# Patient Record
Sex: MALE | Race: ASIAN | NOT HISPANIC OR LATINO | URBAN - METROPOLITAN AREA
[De-identification: names, ages, dates, MRNs, and addresses within clinical notes are randomized per-mention and may not be internally consistent; named-entity substitution may affect disease eponyms.]

---

## 2021-12-05 ENCOUNTER — EMERGENCY (EMERGENCY)
Facility: HOSPITAL | Age: 46
LOS: 0 days | Discharge: HOME | End: 2021-12-05
Attending: EMERGENCY MEDICINE | Admitting: EMERGENCY MEDICINE
Payer: MEDICARE

## 2021-12-05 VITALS
HEART RATE: 119 BPM | SYSTOLIC BLOOD PRESSURE: 162 MMHG | WEIGHT: 130.07 LBS | DIASTOLIC BLOOD PRESSURE: 70 MMHG | OXYGEN SATURATION: 99 % | RESPIRATION RATE: 20 BRPM | HEIGHT: 69 IN | TEMPERATURE: 98 F

## 2021-12-05 VITALS — HEART RATE: 96 BPM
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
## 2021-12-05 DIAGNOSIS — Z88.0 ALLERGY STATUS TO PENICILLIN: ICD-10-CM

## 2021-12-05 DIAGNOSIS — R51.9 HEADACHE, UNSPECIFIED: ICD-10-CM

## 2021-12-05 DIAGNOSIS — S09.90XA UNSPECIFIED INJURY OF HEAD, INITIAL ENCOUNTER: ICD-10-CM
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
## 2021-12-05 PROCEDURE — 71045 X-RAY EXAM CHEST 1 VIEW: CPT | Mod: 26

## 2021-12-05 PROCEDURE — 70450 CT HEAD/BRAIN W/O DYE: CPT | Mod: 26,MA

## 2021-12-05 PROCEDURE — 99284 EMERGENCY DEPT VISIT MOD MDM: CPT

## 2021-12-05 PROCEDURE — 99053 MED SERV 10PM-8AM 24 HR FAC: CPT

## 2021-12-05 NOTE — ED ADULT TRIAGE NOTE - MODE OF ARRIVAL
[facility] HOSPITALIST  DISCHARGE SUMMARY     Alan Almeida Patient Status:  Observation    5/15/1970 MRN [id number]   [facility] 3NE-A Attending Jennifer Padilla, [address] Day # 2 PCP None Pcp     Date of Admission: 2017  Date of Discharge: clinically improved with above management and was discharged home in good condition.      Procedures during hospitalization:   • None    Incidental or significant findings and recommendations (brief descriptions):  • None    Lab/Test results pending at [facility].   Respiratory: Clear to auscultation bilaterally. No wheezes. No rhonchi. Cardiovascular: S1, S2. Regular rate and rhythm. No murmurs, rubs or gallops. Abdomen: Soft, nontender, nondistended. Positive bowel sounds. No rebound or guarding.   [facility]

## 2021-12-05 NOTE — ED PROVIDER NOTE - NSFOLLOWUPCLINICS_GEN_ALL_ED_FT
[facility] Concussion Program  Concussion Program  [address] [place] [address]   Phone: [phone]  Fax:   Follow Up Time: 1-3 Days

## 2021-12-05 NOTE — ED ADULT NURSE REASSESSMENT NOTE - NS ED NURSE REASSESS COMMENT FT1
Awaiting transport (cab) to [facility]. Previous JULES Joseph discharged patient

## 2021-12-05 NOTE — ED PROVIDER NOTE - OBJECTIVE STATEMENT
47 yo M pmhx DM presenting to the ED for evaluation of mechanical trip and fall, pt reports he was at motel, tripped, hit his head into table and fell backward about 1 hour ago. Pt reports mild frontal headache and mild back pain since that time. Denies any alleviating or provoking factors. Denies fever, chills, nausea, vomiting, chest pain, sob, dizziness, visual changes.

## 2021-12-05 NOTE — ED PROVIDER NOTE - NS ED ROS FT
Constitutional: (-) fever (-) malaise (-) diaphoresis (-) chills (-) wt. loss (-) bodyaches (-) night sweats  Eyes: (-) visual changes (-) eye pain (-) eye discharge (-) photophobia (-) FB sensation  Cardiac: (-) chest pain  (-) palpitations (-) syncope (-) edema  Respiratory: (-) cough (-) SOB (-) PETERSON  GI: (-) nausea (-) vomiting (-) diarrhea (-) abdominal pain  MS: (+) back pain (-) myalgia (-) muscle weakness (-)  joint pain  Neuro: (-) headache (-) dizziness (-) numbness/tingling to extremities B/L (-) weakness (-) LOC (+) head injury   Skin: (-) rash (-) laceration    Except as documented in the HPI, all other systems are negative.

## 2021-12-05 NOTE — ED PROVIDER NOTE - PHYSICAL EXAMINATION
GENERAL: Well-nourished, Well-developed. NAD.  HEAD: No visible or palpable bumps or hematomas. No ecchymosis behind ears B/L.  Eyes: PERRLA, EOMI. No asymmetry. No nystagmus. No conjunctival injection. Non-icteric sclera.  ENMT: MMM.   Neck: Supple. No cervical midline TTP. No paraveretebral TTP to traps. FROM  CVS: RRR. Normal S1,S2. No murmurs appreciated on auscultation   RESP: No use of accessory muscles. Chest rise symmetrical with good expansion. Lungs clear to auscultation B/L. No wheezing, rales, or rhonchi auscultated.  GI: Normal auscultation of bowel sounds in all 4 quadrants. Soft, Nontender, Nondistended. No guarding or rebound tenderness. No CVAT B/L.  MSK: Extremities w/o deformity or ttp. No visible signs of trauma such as ecchymosis, erythema, or swelling. FROM of upper and lower extremities B/L. No midline spinal TTP. FROM of back with flexion and extension.  Skin: Warm, Dry. No rashes or lesions. Good cap refill < 2 sec B/L.  EXT: Radial and pedal pulses present B/L. No calf tenderness or swelling B/L. No palpable cords. No pedal edema B/L.  Neuro: AA&O x 3. Sensation grossly intact. Strength 5/5 B/L. Gait within normal limits.

## 2021-12-05 NOTE — ED PROVIDER NOTE - NSFOLLOWUPINSTRUCTIONS_ED_ALL_ED_FT
**Follow up with concussion clinic and primary care doctor 1-3 days**    Closed Head Injury    A closed head injury is an injury to your head that may or may not involve a traumatic brain injury (TBI). Symptoms of TBI can be short or long lasting and include headache, dizziness, interference with memory or speech, fatigue, confusion, changes in sleep, mood changes, nausea, depression/anxiety, and dulling of senses. Make sure to obtain proper rest which includes getting plenty of sleep, avoiding excessive visual stimulation, and avoiding activities that may cause physical or mental stress. Avoid any situation where there is potential for another head injury, including sports.    SEEK IMMEDIATE MEDICAL CARE IF YOU HAVE ANY OF THE FOLLOWING SYMPTOMS: unusual drowsiness, vomiting, severe dizziness, seizures, lightheadedness, muscular weakness, different pupil sizes, visual changes, or clear or bloody discharge from your ears or nose.

## 2021-12-05 NOTE — ED PROVIDER NOTE - PATIENT PORTAL LINK FT
You can access the FollowMyHealth Patient Portal offered by [facility] by registering at the following website: [email or web address]. By joining [email or web address]’s FollowMyHealth portal, you will also be able to view your health information using other applications (apps) compatible with our system.

## 2021-12-05 NOTE — ED PROVIDER NOTE - ATTENDING CONTRIBUTION TO CARE
I personally evaluated the patient. I reviewed the Resident’s or Physician Assistant’s note (as assigned above), and agree with the findings and plan except as documented in my note.  Chart reviewed.  H/O schizophrenia, DM, fell and hit head and hurt upper back.  No LOC.  Exam shows alert in no distress, HEENT NCAT, lungs clear, RR S1S2, abdomen soft Nt +BS, FROM, neuro A&OX3 GCS 15 nod eficits.

## 2022-03-31 ENCOUNTER — INPATIENT (INPATIENT)
Facility: HOSPITAL | Age: 47
LOS: 193 days | Discharge: HOME HEALTH | End: 2022-10-11
Attending: HOSPITALIST | Admitting: HOSPITALIST
Payer: MEDICARE

## 2022-03-31 VITALS
TEMPERATURE: 98 F | DIASTOLIC BLOOD PRESSURE: 87 MMHG | OXYGEN SATURATION: 99 % | RESPIRATION RATE: 16 BRPM | WEIGHT: 134.92 LBS | SYSTOLIC BLOOD PRESSURE: 133 MMHG | HEIGHT: 69 IN | HEART RATE: 111 BPM

## 2022-03-31 LAB
ALBUMIN SERPL ELPH-MCNC: 4.3 G/DL — SIGNIFICANT CHANGE UP (ref 3.5–5.2)
ALP SERPL-CCNC: 145 U/L — HIGH (ref 30–115)
ALT FLD-CCNC: 14 U/L — SIGNIFICANT CHANGE UP (ref 0–41)
ANION GAP SERPL CALC-SCNC: 11 MMOL/L — SIGNIFICANT CHANGE UP (ref 7–14)
AST SERPL-CCNC: 15 U/L — SIGNIFICANT CHANGE UP (ref 0–41)
BASOPHILS # BLD AUTO: 0.03 K/UL — SIGNIFICANT CHANGE UP (ref 0–0.2)
BASOPHILS NFR BLD AUTO: 0.5 % — SIGNIFICANT CHANGE UP (ref 0–1)
BILIRUB SERPL-MCNC: 0.5 MG/DL — SIGNIFICANT CHANGE UP (ref 0.2–1.2)
BUN SERPL-MCNC: 15 MG/DL — SIGNIFICANT CHANGE UP (ref 10–20)
CALCIUM SERPL-MCNC: 9.2 MG/DL — SIGNIFICANT CHANGE UP (ref 8.5–10.1)
CHLORIDE SERPL-SCNC: 96 MMOL/L — LOW (ref 98–110)
CO2 SERPL-SCNC: 27 MMOL/L — SIGNIFICANT CHANGE UP (ref 17–32)
CREAT SERPL-MCNC: 1.2 MG/DL — SIGNIFICANT CHANGE UP (ref 0.7–1.5)
EGFR: 75 ML/MIN/1.73M2 — SIGNIFICANT CHANGE UP
EOSINOPHIL # BLD AUTO: 0.07 K/UL — SIGNIFICANT CHANGE UP (ref 0–0.7)
EOSINOPHIL NFR BLD AUTO: 1.1 % — SIGNIFICANT CHANGE UP (ref 0–8)
GLUCOSE BLDC GLUCOMTR-MCNC: 247 MG/DL — HIGH (ref 70–99)
GLUCOSE SERPL-MCNC: 369 MG/DL — HIGH (ref 70–99)
HCT VFR BLD CALC: 41.7 % — LOW (ref 42–52)
HGB BLD-MCNC: 14 G/DL — SIGNIFICANT CHANGE UP (ref 14–18)
IMM GRANULOCYTES NFR BLD AUTO: 0.3 % — SIGNIFICANT CHANGE UP (ref 0.1–0.3)
LYMPHOCYTES # BLD AUTO: 1.24 K/UL — SIGNIFICANT CHANGE UP (ref 1.2–3.4)
LYMPHOCYTES # BLD AUTO: 19.2 % — LOW (ref 20.5–51.1)
MAGNESIUM SERPL-MCNC: 1.8 MG/DL — SIGNIFICANT CHANGE UP (ref 1.8–2.4)
MCHC RBC-ENTMCNC: 31.7 PG — HIGH (ref 27–31)
MCHC RBC-ENTMCNC: 33.6 G/DL — SIGNIFICANT CHANGE UP (ref 32–37)
MCV RBC AUTO: 94.6 FL — HIGH (ref 80–94)
MONOCYTES # BLD AUTO: 0.5 K/UL — SIGNIFICANT CHANGE UP (ref 0.1–0.6)
MONOCYTES NFR BLD AUTO: 7.7 % — SIGNIFICANT CHANGE UP (ref 1.7–9.3)
NEUTROPHILS # BLD AUTO: 4.6 K/UL — SIGNIFICANT CHANGE UP (ref 1.4–6.5)
NEUTROPHILS NFR BLD AUTO: 71.2 % — SIGNIFICANT CHANGE UP (ref 42.2–75.2)
NRBC # BLD: 0 /100 WBCS — SIGNIFICANT CHANGE UP (ref 0–0)
PHOSPHATE SERPL-MCNC: 3.1 MG/DL — SIGNIFICANT CHANGE UP (ref 2.1–4.9)
PLATELET # BLD AUTO: 269 K/UL — SIGNIFICANT CHANGE UP (ref 130–400)
POTASSIUM SERPL-MCNC: 3.9 MMOL/L — SIGNIFICANT CHANGE UP (ref 3.5–5)
POTASSIUM SERPL-SCNC: 3.9 MMOL/L — SIGNIFICANT CHANGE UP (ref 3.5–5)
PROT SERPL-MCNC: 7.2 G/DL — SIGNIFICANT CHANGE UP (ref 6–8)
RBC # BLD: 4.41 M/UL — LOW (ref 4.7–6.1)
RBC # FLD: 11.2 % — LOW (ref 11.5–14.5)
SARS-COV-2 RNA SPEC QL NAA+PROBE: SIGNIFICANT CHANGE UP
SODIUM SERPL-SCNC: 134 MMOL/L — LOW (ref 135–146)
WBC # BLD: 6.46 K/UL — SIGNIFICANT CHANGE UP (ref 4.8–10.8)
WBC # FLD AUTO: 6.46 K/UL — SIGNIFICANT CHANGE UP (ref 4.8–10.8)

## 2022-03-31 PROCEDURE — 73630 X-RAY EXAM OF FOOT: CPT | Mod: 26,50

## 2022-03-31 PROCEDURE — 99285 EMERGENCY DEPT VISIT HI MDM: CPT

## 2022-03-31 RX ORDER — INSULIN LISPRO 100/ML
3 VIAL (ML) SUBCUTANEOUS
Refills: 0 | Status: DISCONTINUED | OUTPATIENT
Start: 2022-03-31 | End: 2022-04-02

## 2022-03-31 RX ORDER — INSULIN GLARGINE 100 [IU]/ML
9 INJECTION, SOLUTION SUBCUTANEOUS AT BEDTIME
Refills: 0 | Status: DISCONTINUED | OUTPATIENT
Start: 2022-03-31 | End: 2022-04-02

## 2022-03-31 RX ORDER — ENOXAPARIN SODIUM 100 MG/ML
40 INJECTION SUBCUTANEOUS EVERY 24 HOURS
Refills: 0 | Status: DISCONTINUED | OUTPATIENT
Start: 2022-03-31 | End: 2022-04-27

## 2022-03-31 RX ORDER — INSULIN LISPRO 100/ML
VIAL (ML) SUBCUTANEOUS
Refills: 0 | Status: DISCONTINUED | OUTPATIENT
Start: 2022-03-31 | End: 2022-04-02

## 2022-03-31 RX ADMIN — Medication 100 MILLIGRAM(S): at 23:53

## 2022-03-31 RX ADMIN — ENOXAPARIN SODIUM 40 MILLIGRAM(S): 100 INJECTION SUBCUTANEOUS at 23:53

## 2022-03-31 NOTE — ED PROVIDER NOTE - CARE PLAN
1 Principal Discharge DX:	DM foot ulcers   Principal Discharge DX:	DM foot ulcers  Secondary Diagnosis:	Hyperglycemia

## 2022-03-31 NOTE — ED PROVIDER NOTE - PHYSICAL EXAMINATION
Vital Signs: Reviewed  GEN: alert, NAD, speaks full sentences  HEAD:  normocephalic, atraumatic  EYES:  PERRLA; conjunctivae without injection, drainage or discharge  ENMT:  nasal mucosa moist; mouth moist without ulcerations or lesions; throat moist without erythema, exudate, ulcerations or lesions  NECK:  supple  CARDIAC:  regular rate, normal S1 and S2, no murmurs  RESP:  respiratory rate and effort appear normal for age; lungs are clear to auscultation bilaterally; no rales or wheezes  ABDOMEN:  soft, nontender, nondistended  MUSCULOSKELETAL/NEURO:  normal movement, normal tone  SKIN: b/l feet w/ extensive ulceration and scant purulent discharge, fungal nail growth; otherwise normal skin color for age and race, well-perfused; warm and dry

## 2022-03-31 NOTE — H&P ADULT - HISTORY OF PRESENT ILLNESS
45 yo M with PMHx of Intellectual Disability, DM not on any medications presents with foot wounds. Pt has very poor foot hygiene and per the sister, has been persistently scratching an open wound on his L second toe, which worsened and became more red, had drainage, malodor. He saw a physician at [facility] and was told to come to the ED for evaluation. Pt is poor historian. Does endorse abdominal pain for a few days, cannot give much more description. Per the sister, pt did not seem to have any recent fevers, however is also not a very good historian and does not seem to have good health literacy. States that her and her mom decided to stop giving pt Metformin a while ago, are treating his diabetes by not giving him any sugary foods.  In the ED, T 97.8, /87, , RR 16, SpO2 99% on RA. Lab work unrevealing.

## 2022-03-31 NOTE — ED PROVIDER NOTE - NS ED ROS FT
Review of Systems:  	•	CONSTITUTIONAL - no fever, no diaphoresis  	•	SKIN - no rash, +foot ulcers  	•	HEMATOLOGIC - no bleeding, no bruising  	•	EYES - no discharge, no injection  	•	ENT - no sore throat, no runny nose  	•	RESPIRATORY - no shortness of breath, no cough  	•	CARDIAC - no chest pain, no palpitations  	•	GI - no abd pain, no nausea, no vomiting, no diarrhea  	•	GENITO-URINARY - no dysuria, no hematuria  	•	MUSCULOSKELETAL - no joint pain, no muscle aches  	•	NEUROLOGIC - no dizziness, no headache

## 2022-03-31 NOTE — H&P ADULT - NSHPPHYSICALEXAM_GEN_ALL_CORE
VITALS:   T(C): 36.7 (03-31-22 @ 20:02), Max: 36.7 (03-31-22 @ 20:02)  HR: 113 (03-31-22 @ 20:02) (111 - 113)  BP: 123/58 (03-31-22 @ 20:02) (123/58 - 133/87)  RR: 17 (03-31-22 @ 20:02) (16 - 17)  SpO2: 100% (03-31-22 @ 20:02) (99% - 100%)    GENERAL: NAD, sitting up in chair comfortably  HEART: Regular rhythm, inc rate, no murmurs, rubs, or gallops  LUNGS: Unlabored respirations.  Clear to auscultation bilaterally, no crackles, wheezing, or rhonchi  ABDOMEN: Soft, slightly tender, nondistended, +BS  EXTREMITIES: Warm, well perfused feet, peripheral pulses intact; very poor foot hygiene bilaterally, fungal nail growth, open, ulcerative wound with small amount of purulent discharge on L 2nd toe, eschar on 1st toe  NERVOUS SYSTEM:  A&Ox3, no focal deficits   SKIN: No rashes or lesions

## 2022-03-31 NOTE — H&P ADULT - NSHPLABSRESULTS_GEN_ALL_CORE
LABS:                        14.0   6.46  )-----------( 269      ( 31 Mar 2022 18:39 )             41.7     03-31    134<L>  |  96<L>  |  15  ----------------------------<  369<H>  3.9   |  27  |  1.2    Ca    9.2      31 Mar 2022 18:39  Phos  3.1     03-31  Mg     1.8     03-31    TPro  7.2  /  Alb  4.3  /  TBili  0.5  /  DBili  x   /  AST  15  /  ALT  14  /  AlkPhos  145<H>  03-31

## 2022-03-31 NOTE — H&P ADULT - ASSESSMENT
47 yo M with PMHx of Intellectual Disability, DM not on any medications presents with foot wounds.     #Diabetes Mellitus not on any medications  #Poor foot hygiene, fungal nail growth bilaterally  #Purulent wound on L first toe  - No sepsis on admission  - Podiatry called by ED, will evaluate pt, likely needs ultimate debridement  - XRay bilateral feet performed, pending read  - Will treat with IV Clindamycin  - A1c, lipid profile in AM  - Will initiate Lantus 9, Lispro 3 and SS, escalate as needed  - Per sister, pt was previously on Metformin, stopped giving it to pt because sometimes it would affect his stomach and decided to treat DM with low sugar foods - will likely need appropriate regimen upon discharge depending on A1c    DVT ppx: Lovenox  Diet: CC  Activity: AAT  Full Code  Dispo: Acute

## 2022-03-31 NOTE — ED PROVIDER NOTE - OBJECTIVE STATEMENT
46yM pmhx DM, intellectual disability c/o sores to b/l feet for a long time, worsening over the past few days, states it is a "fungal and bacterial infection"; was evaluated at [facility] and told to come to ED for possible admission. Pt reports difficulty ambulating due to pain. Sister at bedside confirms pt's history. Denies fever/chills, chest pain, SOB.

## 2022-03-31 NOTE — H&P ADULT - ATTENDING COMMENTS
46 yr old male presented with foot wounds. 46 yr old male presented with foot wounds.  VITAL SIGNS (Last 24 hrs):  T(C): 35.7 (04-01-22 @ 08:00), Max: 36.8 (04-01-22 @ 00:00)  HR: 101 (04-01-22 @ 08:00) (101 - 120)  BP: 130/77 (04-01-22 @ 08:00) (120/56 - 133/87)  RR: 18 (04-01-22 @ 08:00) (16 - 18)  SpO2: 97% (04-01-22 @ 00:15) (97% - 100%)  Wt(kg): --  Daily Height in cm: 175.26 (31 Mar 2022 16:49)    Daily     I&O's Summary    01 Apr 2022 07:01  -  01 Apr 2022 13:46  --------------------------------------------------------  IN: 220 mL / OUT: 0 mL / NET: 220 mL                          14.3   7.15  )-----------( 276      ( 01 Apr 2022 07:30 )             42.8   04-01    142  |  102  |  16  ----------------------------<  194<H>  3.7   |  26  |  0.7    Ca    9.8      01 Apr 2022 07:30  Phos  3.1     03-31  Mg     1.7     04-01    TPro  7.1  /  Alb  4.5  /  TBili  0.7  /  DBili  x   /  AST  15  /  ALT  14  /  AlkPhos  141<H>  04-01  o/e  aaox3  chest-- b/l air entry  [id number]  feet-- dry-- fungal infection in nails  no edema  Assessment and plan:  #onchymosis of finger nails-- anifungal ABX po likely due to severe infection-- will await ID and podiatry eval  # uncontrolled diabetes mellitus-- hba1c is 8.9--patient does not take any medication at home  # mildly autistic-- patient is high functioning but unable to take care of basics.  # hypomagnesemia-- replace IV  spoke with podiatry-- no surgical intervention is advised.

## 2022-03-31 NOTE — ED PROVIDER NOTE - ATTENDING CONTRIBUTION TO CARE
46-year-old male with past medical history of diabetes, intellectual disability presents to ED for some of his bilateral feet that is gotten worse the past few days.  Patient was seen at [facility] and told to come to the emergency department for possible admission for bacterial/fungal infection.  Patient has not had any fever or chills.  Patient is here with his sister.    Const: Well nourished, well developed, appears stated age  CV: RRR, Warm, well-perfused extremities  RESP: CTA B/L, no tachypnea   GI: soft, non-tender, non-distended  MSK: No gross deformities appreciated  Skin: ulcers of feet with ulceration and discharge     will do labs, call podiatry

## 2022-03-31 NOTE — ED PROVIDER NOTE - PROGRESS NOTE DETAILS
AG: per podiatry admit to medicine, will likely require at the least debridement and washout, depending on xr may need further care.

## 2022-03-31 NOTE — ED ADULT NURSE NOTE - OBJECTIVE STATEMENT
pt reports bilateral foot sores for a few weeks. was evaluated by [facility] and was told to come to ER.

## 2022-03-31 NOTE — ED PROVIDER NOTE - CLINICAL SUMMARY MEDICAL DECISION MAKING FREE TEXT BOX
46-year-old male presented to ED for ulcers of bilateral feet.  Podiatry consulted and recommended admission.  Patient mated for further evaluation management.

## 2022-04-01 LAB
A1C WITH ESTIMATED AVERAGE GLUCOSE RESULT: 8.9 % — HIGH (ref 4–5.6)
ALBUMIN SERPL ELPH-MCNC: 4.5 G/DL — SIGNIFICANT CHANGE UP (ref 3.5–5.2)
ALP SERPL-CCNC: 141 U/L — HIGH (ref 30–115)
ALT FLD-CCNC: 14 U/L — SIGNIFICANT CHANGE UP (ref 0–41)
ANION GAP SERPL CALC-SCNC: 14 MMOL/L — SIGNIFICANT CHANGE UP (ref 7–14)
AST SERPL-CCNC: 15 U/L — SIGNIFICANT CHANGE UP (ref 0–41)
BASOPHILS # BLD AUTO: 0.04 K/UL — SIGNIFICANT CHANGE UP (ref 0–0.2)
BASOPHILS NFR BLD AUTO: 0.6 % — SIGNIFICANT CHANGE UP (ref 0–1)
BILIRUB SERPL-MCNC: 0.7 MG/DL — SIGNIFICANT CHANGE UP (ref 0.2–1.2)
BUN SERPL-MCNC: 16 MG/DL — SIGNIFICANT CHANGE UP (ref 10–20)
CALCIUM SERPL-MCNC: 9.8 MG/DL — SIGNIFICANT CHANGE UP (ref 8.5–10.1)
CHLORIDE SERPL-SCNC: 102 MMOL/L — SIGNIFICANT CHANGE UP (ref 98–110)
CHOLEST SERPL-MCNC: 153 MG/DL — SIGNIFICANT CHANGE UP
CO2 SERPL-SCNC: 26 MMOL/L — SIGNIFICANT CHANGE UP (ref 17–32)
CREAT SERPL-MCNC: 0.7 MG/DL — SIGNIFICANT CHANGE UP (ref 0.7–1.5)
EGFR: 114 ML/MIN/1.73M2 — SIGNIFICANT CHANGE UP
EOSINOPHIL # BLD AUTO: 0.09 K/UL — SIGNIFICANT CHANGE UP (ref 0–0.7)
EOSINOPHIL NFR BLD AUTO: 1.3 % — SIGNIFICANT CHANGE UP (ref 0–8)
ESTIMATED AVERAGE GLUCOSE: 209 MG/DL — HIGH (ref 68–114)
GLUCOSE BLDC GLUCOMTR-MCNC: 163 MG/DL — HIGH (ref 70–99)
GLUCOSE BLDC GLUCOMTR-MCNC: 174 MG/DL — HIGH (ref 70–99)
GLUCOSE BLDC GLUCOMTR-MCNC: 188 MG/DL — HIGH (ref 70–99)
GLUCOSE BLDC GLUCOMTR-MCNC: 228 MG/DL — HIGH (ref 70–99)
GLUCOSE SERPL-MCNC: 194 MG/DL — HIGH (ref 70–99)
HCT VFR BLD CALC: 42.8 % — SIGNIFICANT CHANGE UP (ref 42–52)
HDLC SERPL-MCNC: 48 MG/DL — SIGNIFICANT CHANGE UP
HGB BLD-MCNC: 14.3 G/DL — SIGNIFICANT CHANGE UP (ref 14–18)
IMM GRANULOCYTES NFR BLD AUTO: 0.4 % — HIGH (ref 0.1–0.3)
LIPID PNL WITH DIRECT LDL SERPL: 102 MG/DL — HIGH
LYMPHOCYTES # BLD AUTO: 2.48 K/UL — SIGNIFICANT CHANGE UP (ref 1.2–3.4)
LYMPHOCYTES # BLD AUTO: 34.7 % — SIGNIFICANT CHANGE UP (ref 20.5–51.1)
MAGNESIUM SERPL-MCNC: 1.7 MG/DL — LOW (ref 1.8–2.4)
MCHC RBC-ENTMCNC: 31.8 PG — HIGH (ref 27–31)
MCHC RBC-ENTMCNC: 33.4 G/DL — SIGNIFICANT CHANGE UP (ref 32–37)
MCV RBC AUTO: 95.1 FL — HIGH (ref 80–94)
MONOCYTES # BLD AUTO: 0.46 K/UL — SIGNIFICANT CHANGE UP (ref 0.1–0.6)
MONOCYTES NFR BLD AUTO: 6.4 % — SIGNIFICANT CHANGE UP (ref 1.7–9.3)
NEUTROPHILS # BLD AUTO: 4.05 K/UL — SIGNIFICANT CHANGE UP (ref 1.4–6.5)
NEUTROPHILS NFR BLD AUTO: 56.6 % — SIGNIFICANT CHANGE UP (ref 42.2–75.2)
NON HDL CHOLESTEROL: 105 MG/DL — SIGNIFICANT CHANGE UP
NRBC # BLD: 0 /100 WBCS — SIGNIFICANT CHANGE UP (ref 0–0)
PLATELET # BLD AUTO: 276 K/UL — SIGNIFICANT CHANGE UP (ref 130–400)
POTASSIUM SERPL-MCNC: 3.7 MMOL/L — SIGNIFICANT CHANGE UP (ref 3.5–5)
POTASSIUM SERPL-SCNC: 3.7 MMOL/L — SIGNIFICANT CHANGE UP (ref 3.5–5)
PROT SERPL-MCNC: 7.1 G/DL — SIGNIFICANT CHANGE UP (ref 6–8)
RBC # BLD: 4.5 M/UL — LOW (ref 4.7–6.1)
RBC # FLD: 11.1 % — LOW (ref 11.5–14.5)
SODIUM SERPL-SCNC: 142 MMOL/L — SIGNIFICANT CHANGE UP (ref 135–146)
TRIGL SERPL-MCNC: 53 MG/DL — SIGNIFICANT CHANGE UP
WBC # BLD: 7.15 K/UL — SIGNIFICANT CHANGE UP (ref 4.8–10.8)
WBC # FLD AUTO: 7.15 K/UL — SIGNIFICANT CHANGE UP (ref 4.8–10.8)

## 2022-04-01 PROCEDURE — 99221 1ST HOSP IP/OBS SF/LOW 40: CPT

## 2022-04-01 PROCEDURE — 93010 ELECTROCARDIOGRAM REPORT: CPT

## 2022-04-01 PROCEDURE — 99223 1ST HOSP IP/OBS HIGH 75: CPT

## 2022-04-01 RX ORDER — ONDANSETRON 8 MG/1
4 TABLET, FILM COATED ORAL ONCE
Refills: 0 | Status: COMPLETED | OUTPATIENT
Start: 2022-04-01 | End: 2022-04-01

## 2022-04-01 RX ORDER — ONDANSETRON 8 MG/1
4 TABLET, FILM COATED ORAL EVERY 6 HOURS
Refills: 0 | Status: DISCONTINUED | OUTPATIENT
Start: 2022-04-01 | End: 2022-05-17

## 2022-04-01 RX ORDER — MAGNESIUM SULFATE 500 MG/ML
2 VIAL (ML) INJECTION ONCE
Refills: 0 | Status: COMPLETED | OUTPATIENT
Start: 2022-04-01 | End: 2022-04-01

## 2022-04-01 RX ADMIN — Medication 100 MILLIGRAM(S): at 15:52

## 2022-04-01 RX ADMIN — Medication 100 MILLIGRAM(S): at 21:35

## 2022-04-01 RX ADMIN — Medication 3 UNIT(S): at 17:16

## 2022-04-01 RX ADMIN — Medication 25 GRAM(S): at 11:42

## 2022-04-01 RX ADMIN — Medication 3 UNIT(S): at 07:47

## 2022-04-01 RX ADMIN — Medication 100 MILLIGRAM(S): at 05:20

## 2022-04-01 RX ADMIN — INSULIN GLARGINE 9 UNIT(S): 100 INJECTION, SOLUTION SUBCUTANEOUS at 21:35

## 2022-04-01 RX ADMIN — Medication 4: at 17:17

## 2022-04-01 RX ADMIN — ONDANSETRON 4 MILLIGRAM(S): 8 TABLET, FILM COATED ORAL at 18:08

## 2022-04-01 RX ADMIN — Medication 3 UNIT(S): at 11:43

## 2022-04-01 RX ADMIN — ONDANSETRON 4 MILLIGRAM(S): 8 TABLET, FILM COATED ORAL at 14:39

## 2022-04-01 RX ADMIN — ENOXAPARIN SODIUM 40 MILLIGRAM(S): 100 INJECTION SUBCUTANEOUS at 23:59

## 2022-04-01 RX ADMIN — Medication 2: at 11:43

## 2022-04-01 RX ADMIN — Medication 2: at 07:45

## 2022-04-01 NOTE — CONSULT NOTE ADULT - SUBJECTIVE AND OBJECTIVE BOX
CARIN WATSON  46y, Male  Allergy: penicillin (Unknown)      CHIEF COMPLAINT: DFU (31 Mar 2022 22:36)      HPI:  46yMale with a past medical history of DM (not on any medications 2/2 possible medical illiteracy), intellectual disability, and poor hygiene presents from urgent care for foot ulcer. Per patient's sister, patient persistently scratching Left toe wound; now malodorous with drainage. No recent fevers. Tachy in ED to 111; otherwise VS stable. Labs show random blood glucose of 369.      Infectious Diseases History:  Old Micro Data/Cultures:     FAMILY HISTORY:    PAST MEDICAL & SURGICAL HISTORY:  DM (diabetes mellitus)    Intellectual disability        SOCIAL HISTORY  Social History:      Recent Travel:  Other Exposures:     ROS  General: Denies rigors, nightsweats  HEENT: Denies headache, rhinorrhea, sore throat, eye pain  CV: Denies CP, palpitations  PULM: Denies wheezing, hemoptysis  GI: Denies hematemesis, hematochezia, melena  : Denies discharge, hematuria  MSK: Denies arthralgias, myalgias  SKIN: Denies rash, lesions  NEURO: Denies paresthesias, weakness  PSYCH: Denies depression, anxiety    VITALS:  T(F): 96.3, Max: 98.3 (04-01-22 @ 00:00)  HR: 101  BP: 130/77  RR: 18Vital Signs Last 24 Hrs  T(C): 35.7 (01 Apr 2022 08:00), Max: 36.8 (01 Apr 2022 00:00)  T(F): 96.3 (01 Apr 2022 08:00), Max: 98.3 (01 Apr 2022 00:00)  HR: 101 (01 Apr 2022 08:00) (101 - 120)  BP: 130/77 (01 Apr 2022 08:00) (120/56 - 133/87)  BP(mean): --  RR: 18 (01 Apr 2022 08:00) (16 - 18)  SpO2: 97% (01 Apr 2022 00:15) (97% - 100%)    PHYSICAL EXAM:  CONSTITUTIONAL: Well nourished.  NAD  ENT: Airway patent, No thrush  EYES: Clear bilaterally, pupils equal, round and reactive to light.  CARDIAC: Normal rate, regular rhythm. no edema  RESPIRATORY: No wheezing; Normal chest expansion. Not tachypneic, No use of accessory muscles  GASTROINTESTINAL: Abdomen soft, non-tender, No guarding, Positive BS  MUSCULOSKELETAL: Range of motion is not limited, No clubbing, cyanosis  NEUROLOGICAL: Alert and oriented, No motor deficits.  SKIN: Skin normal color for race, No evidence of rash    TESTS & MEASUREMENTS:                        14.3   7.15  )-----------( 276      ( 01 Apr 2022 07:30 )             42.8     04-01    142  |  102  |  16  ----------------------------<  194<H>  3.7   |  26  |  0.7    Ca    9.8      01 Apr 2022 07:30  Phos  3.1     03-31  Mg     1.7     04-01    TPro  7.1  /  Alb  4.5  /  TBili  0.7  /  DBili  x   /  AST  15  /  ALT  14  /  AlkPhos  141<H>  04-01      LIVER FUNCTIONS - ( 01 Apr 2022 07:30 )  Alb: 4.5 g/dL / Pro: 7.1 g/dL / ALK PHOS: 141 U/L / ALT: 14 U/L / AST: 15 U/L / GGT: x                     INFECTIOUS DISEASES TESTING      RADIOLOGY & ADDITIONAL TESTS:  < from: Xray Foot AP + Lateral + Oblique, Bilat (03.31.22 @ 21:07) >  INTERPRETATION:  Clinical History / Reason for exam: Foot ulcer.    Technique: 6 x-rays of bilateral feet are obtained.    Comparison:  No studies are available for comparison.    Findings:  No radiographic evidence of acute osteomyelitis of bilateral feet.  No radiographic evidence of acute fracture or dislocation.  Lisfranc   joints are intact.    Impression:  No evidence of acute osseous abnormality.    < end of copied text >        CARDIOLOGY TESTING  n/a    MEDICATIONS  clindamycin IVPB   clindamycin IVPB 600  enoxaparin Injectable 40  insulin glargine Injectable (LANTUS) 9  insulin lispro (ADMELOG) corrective regimen sliding scale   insulin lispro Injectable (ADMELOG) 3      ANTIBIOTICS:  clindamycin IVPB      clindamycin IVPB 600 milliGRAM(s) IV Intermittent every 8 hours      All available historical data has been reviewed    ASSESSMENT  46yMale with a PMH of DM (not on any medications 2/2 possible medical illiteracy), intellectual disability, and poor hygiene presents from urgent care for foot ulcer. Per patient's sister, patient persistently scratching Left toe wound; now malodorous with drainage. No recent fevers. Tachy in ED to 111; otherwise VS stable. Labs show random blood glucose of 369.    IMPRESSION  #Infected ulcer of Left toe    RECOMMENDATIONS  - f/u Podiatry evaluation  -     This is a pended note. All final recommendations to follow pending discussion with ID Attending    CARIN WATSON  46y, Male  Allergy: penicillin (Unknown)      CHIEF COMPLAINT: DFU (31 Mar 2022 22:36)      HPI:  46yMale with a past medical history of DM (not on any medications 2/2 possible medical illiteracy), intellectual disability, and poor hygiene presents from urgent care for foot ulcer. Per patient's sister, patient persistently scratching Left toe wound; now malodorous with drainage. No recent fevers. Tachy in ED to 111; otherwise VS stable. Labs show random blood glucose of 369.      Infectious Diseases History:  Old Micro Data/Cultures:     FAMILY HISTORY:    PAST MEDICAL & SURGICAL HISTORY:  DM (diabetes mellitus)    Intellectual disability        SOCIAL HISTORY  Social History:      Recent Travel:  Other Exposures:     ROS  General: Denies fever  HEENT: +Headaches  CV: Denies CP, palpitations  PULM: Denies cough  GI: +Vomited on ride to ED after ingesting Cheetos and chocolate milk  MSK: Denies arthralgias, myalgias  SKIN: +Itching and pain of toes of both feet  NEURO: +Tingling discomfort of b/l toes    VITALS:  T(F): 96.3, Max: 98.3 (04-01-22 @ 00:00)  HR: 101  BP: 130/77  RR: 18Vital Signs Last 24 Hrs  T(C): 35.7 (01 Apr 2022 08:00), Max: 36.8 (01 Apr 2022 00:00)  T(F): 96.3 (01 Apr 2022 08:00), Max: 98.3 (01 Apr 2022 00:00)  HR: 101 (01 Apr 2022 08:00) (101 - 120)  BP: 130/77 (01 Apr 2022 08:00) (120/56 - 133/87)  BP(mean): --  RR: 18 (01 Apr 2022 08:00) (16 - 18)  SpO2: 97% (01 Apr 2022 00:15) (97% - 100%)    PHYSICAL EXAM:  CONSTITUTIONAL: Well nourished comfortable appearing male in NAD  ENT: Airway patent, poor dental hygiene  EYES: Clear bilaterally, pupils equal, round and reactive to light.  CARDIAC: Normal rate, regular rhythm. no edema  RESPIRATORY: No wheezing; Normal chest expansion. Not tachypneic, No use of accessory muscles  GASTROINTESTINAL: Abdomen soft, non-tender, No guarding, Positive BS  MUSCULOSKELETAL: Range of motion is not limited, b/l clubbing of fingernails  NEUROLOGICAL: Alert and awake, No motor deficits.  SKIN: Skin normal color for race, Left foot wrapped in gauze; Right foot with fungal deformities of toe nails; yellow/brown plaques on dorsal toes and lateral foot    TESTS & MEASUREMENTS:                        14.3   7.15  )-----------( 276      ( 01 Apr 2022 07:30 )             42.8     04-01    142  |  102  |  16  ----------------------------<  194<H>  3.7   |  26  |  0.7    Ca    9.8      01 Apr 2022 07:30  Phos  3.1     03-31  Mg     1.7     04-01    TPro  7.1  /  Alb  4.5  /  TBili  0.7  /  DBili  x   /  AST  15  /  ALT  14  /  AlkPhos  141<H>  04-01      LIVER FUNCTIONS - ( 01 Apr 2022 07:30 )  Alb: 4.5 g/dL / Pro: 7.1 g/dL / ALK PHOS: 141 U/L / ALT: 14 U/L / AST: 15 U/L / GGT: x                 RADIOLOGY & ADDITIONAL TESTS:  < from: Xray Foot AP + Lateral + Oblique, Bilat (03.31.22 @ 21:07) >  INTERPRETATION:  Clinical History / Reason for exam: Foot ulcer.    Technique: 6 x-rays of bilateral feet are obtained.    Comparison:  No studies are available for comparison.    Findings:  No radiographic evidence of acute osteomyelitis of bilateral feet.  No radiographic evidence of acute fracture or dislocation.  Lisfranc   joints are intact.    Impression:  No evidence of acute osseous abnormality.    < end of copied text >        CARDIOLOGY TESTING  n/a    MEDICATIONS  clindamycin IVPB   clindamycin IVPB 600  enoxaparin Injectable 40  insulin glargine Injectable (LANTUS) 9  insulin lispro (ADMELOG) corrective regimen sliding scale   insulin lispro Injectable (ADMELOG) 3      ANTIBIOTICS:  clindamycin IVPB      clindamycin IVPB 600 milliGRAM(s) IV Intermittent every 8 hours      All available historical data has been reviewed    ASSESSMENT  46yMale with a PMH of DM (not on any medications 2/2 possible medical illiteracy), intellectual disability, and poor hygiene presents from urgent care for foot ulcer. Per patient's sister, patient persistently scratching Left toe wound; now malodorous with drainage. No recent fevers. Tachy in ED to 111; otherwise VS stable. Labs show random blood glucose of 369.    IMPRESSION  #Infected ulcer of Left toe  #Onychomycosis    RECOMMENDATIONS  - f/u Podiatry evaluation  -     This is a pended note. All final recommendations to follow pending discussion with ID Attending    CARIN WATSON  46y, Male  Allergy: penicillin (Unknown)      CHIEF COMPLAINT: DFU (31 Mar 2022 22:36)      HPI:  46yMale with a past medical history of DM (not on any medications 2/2 possible medical illiteracy), intellectual disability, and poor hygiene presents from urgent care for foot ulcer. Per patient's sister, patient persistently scratching Left toe wound; now malodorous with drainage. No recent fevers. Tachy in ED to 111; otherwise VS stable. Labs show random blood glucose of 369.      Infectious Diseases History:  Old Micro Data/Cultures:     FAMILY HISTORY:    PAST MEDICAL & SURGICAL HISTORY:  DM (diabetes mellitus)    Intellectual disability        SOCIAL HISTORY  Social History:      Recent Travel:  Other Exposures:     ROS  General: Denies fever  HEENT: +Headaches  CV: Denies CP, palpitations  PULM: Denies cough  GI: +Vomited on ride to ED after ingesting Cheetos and chocolate milk  MSK: Denies arthralgias, myalgias  SKIN: +Itching and pain of toes of both feet  NEURO: +Tingling discomfort of b/l toes    VITALS:  T(F): 96.3, Max: 98.3 (04-01-22 @ 00:00)  HR: 101  BP: 130/77  RR: 18Vital Signs Last 24 Hrs  T(C): 35.7 (01 Apr 2022 08:00), Max: 36.8 (01 Apr 2022 00:00)  T(F): 96.3 (01 Apr 2022 08:00), Max: 98.3 (01 Apr 2022 00:00)  HR: 101 (01 Apr 2022 08:00) (101 - 120)  BP: 130/77 (01 Apr 2022 08:00) (120/56 - 133/87)  BP(mean): --  RR: 18 (01 Apr 2022 08:00) (16 - 18)  SpO2: 97% (01 Apr 2022 00:15) (97% - 100%)    PHYSICAL EXAM:  CONSTITUTIONAL: Well nourished comfortable appearing male in NAD  ENT: Airway patent, poor dental hygiene  EYES: Clear bilaterally, pupils equal, round and reactive to light.  CARDIAC: Normal rate, regular rhythm. no edema  RESPIRATORY: No wheezing; Normal chest expansion. Not tachypneic, No use of accessory muscles  GASTROINTESTINAL: Abdomen soft, non-tender, No guarding, Positive BS  MUSCULOSKELETAL: Range of motion is not limited, b/l clubbing of fingernails  NEUROLOGICAL: Alert and awake, No motor deficits.  SKIN: Skin normal color for race, Left foot wrapped in gauze; Right foot with fungal deformities of toe nails; yellow/brown plaques on dorsal toes and lateral foot    TESTS & MEASUREMENTS:                        14.3   7.15  )-----------( 276      ( 01 Apr 2022 07:30 )             42.8     04-01    142  |  102  |  16  ----------------------------<  194<H>  3.7   |  26  |  0.7    Ca    9.8      01 Apr 2022 07:30  Phos  3.1     03-31  Mg     1.7     04-01    TPro  7.1  /  Alb  4.5  /  TBili  0.7  /  DBili  x   /  AST  15  /  ALT  14  /  AlkPhos  141<H>  04-01      LIVER FUNCTIONS - ( 01 Apr 2022 07:30 )  Alb: 4.5 g/dL / Pro: 7.1 g/dL / ALK PHOS: 141 U/L / ALT: 14 U/L / AST: 15 U/L / GGT: x                 RADIOLOGY & ADDITIONAL TESTS:  < from: Xray Foot AP + Lateral + Oblique, Bilat (03.31.22 @ 21:07) >  INTERPRETATION:  Clinical History / Reason for exam: Foot ulcer.    Technique: 6 x-rays of bilateral feet are obtained.    Comparison:  No studies are available for comparison.    Findings:  No radiographic evidence of acute osteomyelitis of bilateral feet.  No radiographic evidence of acute fracture or dislocation.  Lisfranc   joints are intact.    Impression:  No evidence of acute osseous abnormality.    < end of copied text >        CARDIOLOGY TESTING  n/a    MEDICATIONS  clindamycin IVPB   clindamycin IVPB 600  enoxaparin Injectable 40  insulin glargine Injectable (LANTUS) 9  insulin lispro (ADMELOG) corrective regimen sliding scale   insulin lispro Injectable (ADMELOG) 3      ANTIBIOTICS:  clindamycin IVPB      clindamycin IVPB 600 milliGRAM(s) IV Intermittent every 8 hours      All available historical data has been reviewed    ASSESSMENT  46yMale with a PMH of DM (not on any medications 2/2 possible medical illiteracy), intellectual disability, and poor hygiene presents from urgent care for foot ulcer. Per patient's sister, patient persistently scratching Left toe wound; now malodorous with drainage. No recent fevers. Tachy in ED to 111; otherwise VS stable. Labs show random blood glucose of 369.    IMPRESSION  #Left foot 1st and 2nd nail bed wounds  #Onychomycosis    RECOMMENDATIONS  - f/u Podiatry evaluation  - Clindamycin 300mg q8h; complete 7-day course    This is a pended note. All final recommendations to follow pending discussion with ID Attending    CARIN WATSON  46y, Male  Allergy: penicillin (Unknown)      CHIEF COMPLAINT: DFU (31 Mar 2022 22:36)      HPI:  46yMale with a past medical history of DM (not on any medications 2/2 possible medical illiteracy), intellectual disability, and poor hygiene presents from urgent care for foot ulcer. Per patient's sister, patient persistently scratching Left toe wound; now malodorous with drainage. No recent fevers. Tachy in ED to 111; otherwise VS stable. Labs show random blood glucose of 369.      Infectious Diseases History:  Old Micro Data/Cultures:     FAMILY HISTORY:    PAST MEDICAL & SURGICAL HISTORY:  DM (diabetes mellitus)    Intellectual disability        SOCIAL HISTORY  Social History:      Recent Travel:  Other Exposures:     ROS  General: Denies fever  HEENT: +Headaches  CV: Denies CP, palpitations  PULM: Denies cough  GI: +Vomited on ride to ED after ingesting Cheetos and chocolate milk  MSK: Denies arthralgias, myalgias  SKIN: +Itching and pain of toes of both feet  NEURO: +Tingling discomfort of b/l toes    VITALS:  T(F): 96.3, Max: 98.3 (04-01-22 @ 00:00)  HR: 101  BP: 130/77  RR: 18Vital Signs Last 24 Hrs  T(C): 35.7 (01 Apr 2022 08:00), Max: 36.8 (01 Apr 2022 00:00)  T(F): 96.3 (01 Apr 2022 08:00), Max: 98.3 (01 Apr 2022 00:00)  HR: 101 (01 Apr 2022 08:00) (101 - 120)  BP: 130/77 (01 Apr 2022 08:00) (120/56 - 133/87)  BP(mean): --  RR: 18 (01 Apr 2022 08:00) (16 - 18)  SpO2: 97% (01 Apr 2022 00:15) (97% - 100%)    PHYSICAL EXAM:  CONSTITUTIONAL: Well nourished comfortable appearing male in NAD  ENT: Airway patent, poor dental hygiene  EYES: Clear bilaterally, pupils equal, round and reactive to light.  CARDIAC: Normal rate, regular rhythm. no edema  RESPIRATORY: No wheezing; Normal chest expansion. Not tachypneic, No use of accessory muscles  GASTROINTESTINAL: Abdomen soft, non-tender, No guarding, Positive BS  MUSCULOSKELETAL: Range of motion is not limited, b/l clubbing of fingernails  NEUROLOGICAL: Alert and awake, No motor deficits.  SKIN: Skin normal color for race, Left foot wrapped in gauze; Right foot with fungal deformities of toe nails; yellow/brown plaques on dorsal toes and lateral foot    TESTS & MEASUREMENTS:                        14.3   7.15  )-----------( 276      ( 01 Apr 2022 07:30 )             42.8     04-01    142  |  102  |  16  ----------------------------<  194<H>  3.7   |  26  |  0.7    Ca    9.8      01 Apr 2022 07:30  Phos  3.1     03-31  Mg     1.7     04-01    TPro  7.1  /  Alb  4.5  /  TBili  0.7  /  DBili  x   /  AST  15  /  ALT  14  /  AlkPhos  141<H>  04-01      LIVER FUNCTIONS - ( 01 Apr 2022 07:30 )  Alb: 4.5 g/dL / Pro: 7.1 g/dL / ALK PHOS: 141 U/L / ALT: 14 U/L / AST: 15 U/L / GGT: x                 RADIOLOGY & ADDITIONAL TESTS:  < from: Xray Foot AP + Lateral + Oblique, Bilat (03.31.22 @ 21:07) >  INTERPRETATION:  Clinical History / Reason for exam: Foot ulcer.    Technique: 6 x-rays of bilateral feet are obtained.    Comparison:  No studies are available for comparison.    Findings:  No radiographic evidence of acute osteomyelitis of bilateral feet.  No radiographic evidence of acute fracture or dislocation.  Lisfranc   joints are intact.    Impression:  No evidence of acute osseous abnormality.    < end of copied text >        CARDIOLOGY TESTING  n/a    MEDICATIONS  clindamycin IVPB   clindamycin IVPB 600  enoxaparin Injectable 40  insulin glargine Injectable (LANTUS) 9  insulin lispro (ADMELOG) corrective regimen sliding scale   insulin lispro Injectable (ADMELOG) 3      ANTIBIOTICS:  clindamycin IVPB      clindamycin IVPB 600 milliGRAM(s) IV Intermittent every 8 hours      All available historical data has been reviewed    ASSESSMENT  46yMale with a PMH of DM (not on any medications 2/2 possible medical illiteracy), intellectual disability, and poor hygiene presents from urgent care for foot ulcer. Per patient's sister, patient persistently scratching Left toe wound; now malodorous with drainage. No recent fevers. Tachy in ED to 111; otherwise VS stable. Labs show random blood glucose of 369.    IMPRESSION  #Left foot 1st and 2nd nail bed wounds  #Onychomycosis    RECOMMENDATIONS  - f/u Podiatry evaluation  - Clindamycin 300mg po  q8h; complete 7-day course  Please do not hesitate to recall ID if any questions arise either through [email or web address] or through [employer]

## 2022-04-01 NOTE — PROGRESS NOTE ADULT - SUBJECTIVE AND OBJECTIVE BOX
FARHADDAVIDCARIN 46y Male      Patient is a 46y old  Male who presents with a chief complaint of DFU (01 Apr 2022 13:10)        INTERVAL HPI/OVERNIGHT EVENTS: No acute events overnight. Patient was seen and evaluated at the bedside. The patient denies pain. Vitals stable. Patient denies fever/chills, chest pain, shortness of breath, abdominal pain, headaches, nausea/vomiting, and diarrhea/constipation.      PHYSICAL EXAM:  GENERAL: NAD  HEAD:  Normocephalic  EYES:  conjunctiva and sclera clear  ENMT: Moist mucous membranes  NECK: Supple  NERVOUS SYSTEM:  Alert, awake  CHEST/LUNG: Good air exchange bilaterally, no wheeze  HEART: Regular rate and rhythm        Vital Signs Last 24 Hrs  T(C): 35.7 (01 Apr 2022 08:00), Max: 36.8 (01 Apr 2022 00:00)  T(F): 96.3 (01 Apr 2022 08:00), Max: 98.3 (01 Apr 2022 00:00)  HR: 101 (01 Apr 2022 08:00) (101 - 120)  BP: 130/77 (01 Apr 2022 08:00) (120/56 - 133/87)  BP(mean): --  RR: 18 (01 Apr 2022 08:00) (16 - 18)  SpO2: 97% (01 Apr 2022 00:15) (97% - 100%)      Consultant(s) Notes Reviewed:  [X] YES  [ ] NO  Care Discussed with Consultants/Other Providers [X] YES  [ ] NO  Imaging Personally Reviewed:  [X] YES  [ ] NO      LABS:                        14.3   7.15  )-----------( 276      ( 01 Apr 2022 07:30 )             42.8     04-01    142  |  102  |  16  ----------------------------<  194<H>  3.7   |  26  |  0.7    Ca    9.8      01 Apr 2022 07:30  Phos  3.1     03-31  Mg     1.7     04-01    TPro  7.1  /  Alb  4.5  /  TBili  0.7  /  DBili  x   /  AST  15  /  ALT  14  /  AlkPhos  141<H>  04-01          CAPILLARY BLOOD GLUCOSE      POCT Blood Glucose.: 188 mg/dL (01 Apr 2022 11:19)  POCT Blood Glucose.: 174 mg/dL (01 Apr 2022 07:27)  POCT Blood Glucose.: 247 mg/dL (31 Mar 2022 23:37)           CARIN WATSON 46y Male      Patient is a 46y old  Male who presents with a chief complaint of DFU (01 Apr 2022 13:10)        INTERVAL HPI/OVERNIGHT EVENTS: Pt with episodes of vomit.       PHYSICAL EXAM:  GENERAL: NAD  HEAD:  Normocephalic  EYES:  conjunctiva and sclera clear  ENMT: Moist mucous membranes  NECK: Supple  NERVOUS SYSTEM:  Alert, awake  CHEST/LUNG: Good air exchange bilaterally, no wheeze  HEART: Regular rate and rhythm        Vital Signs Last 24 Hrs  T(C): 35.7 (01 Apr 2022 08:00), Max: 36.8 (01 Apr 2022 00:00)  T(F): 96.3 (01 Apr 2022 08:00), Max: 98.3 (01 Apr 2022 00:00)  HR: 101 (01 Apr 2022 08:00) (101 - 120)  BP: 130/77 (01 Apr 2022 08:00) (120/56 - 133/87)  BP(mean): --  RR: 18 (01 Apr 2022 08:00) (16 - 18)  SpO2: 97% (01 Apr 2022 00:15) (97% - 100%)      Consultant(s) Notes Reviewed:  [X] YES  [ ] NO  Care Discussed with Consultants/Other Providers [X] YES  [ ] NO  Imaging Personally Reviewed:  [X] YES  [ ] NO      LABS:                        14.3   7.15  )-----------( 276      ( 01 Apr 2022 07:30 )             42.8     04-01    142  |  102  |  16  ----------------------------<  194<H>  3.7   |  26  |  0.7    Ca    9.8      01 Apr 2022 07:30  Phos  3.1     03-31  Mg     1.7     04-01    TPro  7.1  /  Alb  4.5  /  TBili  0.7  /  DBili  x   /  AST  15  /  ALT  14  /  AlkPhos  141<H>  04-01          CAPILLARY BLOOD GLUCOSE      POCT Blood Glucose.: 188 mg/dL (01 Apr 2022 11:19)  POCT Blood Glucose.: 174 mg/dL (01 Apr 2022 07:27)  POCT Blood Glucose.: 247 mg/dL (31 Mar 2022 23:37)

## 2022-04-01 NOTE — PROGRESS NOTE ADULT - ASSESSMENT
47 yo M with PMHx of Intellectual Disability, DM not on any medications presents with foot wounds.       #  - No sepsis on admission  - XRay bilateral feet: negative   - Podiatry recs noted  - FU ID     #Diabetes Mellitus not on any medications  - A1c 8.9   -Lantus 9, Lispro 3 and SS    DVT ppx: Lovenox  Diet: CC  Activity: AAT  Full Code  Dispo: Acute 45 yo M with PMHx of Intellectual Disability, DM not on any medications presents with foot wounds.       ##Left foot 1st and 2nd nail bed wounds  #Onychomycosis  - No sepsis on admission  - XRay bilateral feet: negative   - Podiatry recs noted  - FU ID     #Diabetes Mellitus not on any medications  - A1c 8.9   -Lantus 9, Lispro 3 and SS    DVT ppx: Lovenox  Diet: CC  Activity: AAT  Full Code  Dispo: Acute

## 2022-04-01 NOTE — CONSULT NOTE ADULT - SUBJECTIVE AND OBJECTIVE BOX
Podiatry Consult Note    Subjective:  CARIN WATSON is a pleasant well-nourished, well developed 46y Male in no acute distress, alert awake, and oriented to person, place and time.   Patient is a 46y old  Male who presents with a chief complaint of DFU (01 Apr 2022 09:53). Pt seen & evaluated at bedside w/Attending. Pt rambling at bedside; states he sees Dr. Tucker for foot care. Denies N/V/F/C/pain. No other pedal complaints.    HPI:  47 yo M with PMHx of Intellectual Disability, DM not on any medications presents with foot wounds. Pt has very poor foot hygiene and per the sister, has been persistently scratching an open wound on his L second toe, which worsened and became more red, had drainage, malodor. He saw a physician at [facility] and was told to come to the ED for evaluation. Pt is poor historian. Does endorse abdominal pain for a few days, cannot give much more description. Per the sister, pt did not seem to have any recent fevers, however is also not a very good historian and does not seem to have good health literacy. States that her and her mom decided to stop giving pt Metformin a while ago, are treating his diabetes by not giving him any sugary foods.  In the ED, T 97.8, /87, , RR 16, SpO2 99% on RA. Lab work unrevealing.  (31 Mar 2022 22:36)    PAST MEDICAL & SURGICAL HISTORY:  DM (diabetes mellitus)    Intellectual disability       Objective:  Vital Signs Last 24 Hrs  T(C): 35.7 (01 Apr 2022 08:00), Max: 36.8 (01 Apr 2022 00:00)  T(F): 96.3 (01 Apr 2022 08:00), Max: 98.3 (01 Apr 2022 00:00)  HR: 101 (01 Apr 2022 08:00) (101 - 120)  BP: 130/77 (01 Apr 2022 08:00) (120/56 - 133/87)  BP(mean): --  RR: 18 (01 Apr 2022 08:00) (16 - 18)  SpO2: 97% (01 Apr 2022 00:15) (97% - 100%)                        14.3   7.15  )-----------( 276      ( 01 Apr 2022 07:30 )             42.8                 04-01    142  |  102  |  16  ----------------------------<  194<H>  3.7   |  26  |  0.7    Ca    9.8      01 Apr 2022 07:30  Phos  3.1     03-31  Mg     1.7     04-01    TPro  7.1  /  Alb  4.5  /  TBili  0.7  /  DBili  x   /  AST  15  /  ALT  14  /  AlkPhos  141<H>  04-01      ------------    ACC: [id number] EXAM: XR FOOT COMP MIN 3 VIEWS BI  PROCEDURE DATE: 03/31/2022  INTERPRETATION: Clinical History / Reason for exam: Foot ulcer.    Technique: 6 x-rays of bilateral feet are obtained.    Comparison: No studies are available for comparison.    Findings:  No radiographic evidence of acute osteomyelitis of bilateral feet.  No radiographic evidence of acute fracture or dislocation. Lisfranc joints are intact.    Impression:  No evidence of acute osseous abnormality.    --- End of Report ---    CAMERON RASCON MD; Attending Radiologist  This document has been electronically signed. Apr 1 2022 4:31AM  Notes    ----------  Physical Exam - Lower Extremity Focused:   #Left Lower Extremity  Derm:   Open Left Hallux Nail Bed Ulcer  Wound Probes to Soft Tissue w/ No Active Drainage/Bleeding. Dried sanguinous exudate to wound bed  Erythema & Non-Pitting Edema to Left Hallux & Left Second Digit    Vascular: DP and PT Pulses Diminished; Foot is Warm to Warm to the touch   Neuro: Protective Sensation Diminished / Moderately Neuropathic   MSK: Pain On Palpation at Wound Site     Assessment:  Left Hallux Sausage Digit  Left Second Sausage Digit    Plan:  Chart reviewed and Patient evaluated. All Questions and Concerns Addressed and Answered  Discussed diagnosis and treatment with patient  Wound Flushed w/ NS; Wound Dressed w/ Xeroform / DSD / Kerlix   Local Wound Care; As Stated Above   XR Imaging B/L Foot; See Report Above  Recommend; Lower Extremity Arterial Duplex B/L; ESR/CRP;   ID Consult Active; WBC; 7.15 / Follow Up Final Recs;  Patient stable from Podiatry standpoint. Patient can f/u w/Dr. Tucker in o/p Podiatry Clinic 1 week post DSC;  Discussed Plan w/ Attending    Podiatry

## 2022-04-01 NOTE — PATIENT PROFILE ADULT - FALL HARM RISK - HARM RISK INTERVENTIONS
Assistance with ambulation/Assistance OOB with selected safe patient handling equipment/Communicate Risk of Fall with Harm to all staff/Discuss with provider need for PT consult/Monitor gait and stability/Reinforce activity limits and safety measures with patient and family/Tailored Fall Risk Interventions/Visual Cue: Yellow wristband and red socks/Bed in lowest position, wheels locked, appropriate side rails in place/Call bell, personal items and telephone in reach/Instruct patient to call for assistance before getting out of bed or chair/Non-slip footwear when patient is out of bed/Central to call system/Physically safe environment - no spills, clutter or unnecessary equipment/Purposeful Proactive Rounding/Room/bathroom lighting operational, light cord in reach

## 2022-04-02 LAB
ANION GAP SERPL CALC-SCNC: 16 MMOL/L — HIGH (ref 7–14)
BUN SERPL-MCNC: 18 MG/DL — SIGNIFICANT CHANGE UP (ref 10–20)
CALCIUM SERPL-MCNC: 9.5 MG/DL — SIGNIFICANT CHANGE UP (ref 8.5–10.1)
CHLORIDE SERPL-SCNC: 102 MMOL/L — SIGNIFICANT CHANGE UP (ref 98–110)
CO2 SERPL-SCNC: 25 MMOL/L — SIGNIFICANT CHANGE UP (ref 17–32)
CREAT SERPL-MCNC: 1 MG/DL — SIGNIFICANT CHANGE UP (ref 0.7–1.5)
EGFR: 93 ML/MIN/1.73M2 — SIGNIFICANT CHANGE UP
GLUCOSE BLDC GLUCOMTR-MCNC: 119 MG/DL — HIGH (ref 70–99)
GLUCOSE BLDC GLUCOMTR-MCNC: 195 MG/DL — HIGH (ref 70–99)
GLUCOSE BLDC GLUCOMTR-MCNC: 252 MG/DL — HIGH (ref 70–99)
GLUCOSE BLDC GLUCOMTR-MCNC: 266 MG/DL — HIGH (ref 70–99)
GLUCOSE SERPL-MCNC: 118 MG/DL — HIGH (ref 70–99)
HCT VFR BLD CALC: 46.2 % — SIGNIFICANT CHANGE UP (ref 42–52)
HGB BLD-MCNC: 15.7 G/DL — SIGNIFICANT CHANGE UP (ref 14–18)
MCHC RBC-ENTMCNC: 32.2 PG — HIGH (ref 27–31)
MCHC RBC-ENTMCNC: 34 G/DL — SIGNIFICANT CHANGE UP (ref 32–37)
MCV RBC AUTO: 94.9 FL — HIGH (ref 80–94)
NRBC # BLD: 0 /100 WBCS — SIGNIFICANT CHANGE UP (ref 0–0)
PLATELET # BLD AUTO: 288 K/UL — SIGNIFICANT CHANGE UP (ref 130–400)
POTASSIUM SERPL-MCNC: 3.7 MMOL/L — SIGNIFICANT CHANGE UP (ref 3.5–5)
POTASSIUM SERPL-SCNC: 3.7 MMOL/L — SIGNIFICANT CHANGE UP (ref 3.5–5)
RBC # BLD: 4.87 M/UL — SIGNIFICANT CHANGE UP (ref 4.7–6.1)
RBC # FLD: 11.4 % — LOW (ref 11.5–14.5)
SODIUM SERPL-SCNC: 143 MMOL/L — SIGNIFICANT CHANGE UP (ref 135–146)
WBC # BLD: 8.64 K/UL — SIGNIFICANT CHANGE UP (ref 4.8–10.8)
WBC # FLD AUTO: 8.64 K/UL — SIGNIFICANT CHANGE UP (ref 4.8–10.8)

## 2022-04-02 PROCEDURE — 99232 SBSQ HOSP IP/OBS MODERATE 35: CPT

## 2022-04-02 RX ORDER — SOD,AMMONIUM,POTASSIUM LACTATE
1 CREAM (GRAM) TOPICAL
Refills: 0 | Status: DISCONTINUED | OUTPATIENT
Start: 2022-04-02 | End: 2022-10-11

## 2022-04-02 RX ORDER — METFORMIN HYDROCHLORIDE 850 MG/1
500 TABLET ORAL
Refills: 0 | Status: DISCONTINUED | OUTPATIENT
Start: 2022-04-02 | End: 2022-04-03

## 2022-04-02 RX ORDER — PANTOPRAZOLE SODIUM 20 MG/1
40 TABLET, DELAYED RELEASE ORAL
Refills: 0 | Status: DISCONTINUED | OUTPATIENT
Start: 2022-04-02 | End: 2022-05-13

## 2022-04-02 RX ADMIN — Medication 3 UNIT(S): at 14:01

## 2022-04-02 RX ADMIN — Medication 300 MILLIGRAM(S): at 22:18

## 2022-04-02 RX ADMIN — Medication 6: at 14:02

## 2022-04-02 RX ADMIN — Medication 3 UNIT(S): at 08:25

## 2022-04-02 RX ADMIN — Medication 300 MILLIGRAM(S): at 16:40

## 2022-04-02 RX ADMIN — Medication 100 MILLIGRAM(S): at 05:37

## 2022-04-02 RX ADMIN — ENOXAPARIN SODIUM 40 MILLIGRAM(S): 100 INJECTION SUBCUTANEOUS at 23:12

## 2022-04-02 RX ADMIN — PANTOPRAZOLE SODIUM 40 MILLIGRAM(S): 20 TABLET, DELAYED RELEASE ORAL at 14:04

## 2022-04-02 RX ADMIN — Medication 1 APPLICATION(S): at 16:42

## 2022-04-02 RX ADMIN — Medication 1 APPLICATION(S): at 17:22

## 2022-04-02 RX ADMIN — METFORMIN HYDROCHLORIDE 500 MILLIGRAM(S): 850 TABLET ORAL at 16:41

## 2022-04-02 NOTE — DISCHARGE NOTE NURSING/CASE MANAGEMENT/SOCIAL WORK - NSDCPEFALRISK_GEN_ALL_CORE
For information on Fall & Injury Prevention, visit: [email or web address] OR  [email or web address] OR  https://www.cdc.gov/steadi/patient.html

## 2022-04-02 NOTE — DISCHARGE NOTE PROVIDER - NSDCCPCAREPLAN_GEN_ALL_CORE_FT
PRINCIPAL DISCHARGE DIAGNOSIS  Diagnosis: Onychomycosis  Assessment and Plan of Treatment: Nail fungus, or onychomycosis, is a fungal infection in your toenail or fingernail. Nail fungus is more common in toenails than fingernails. The cause of the infection may not be known.  Common symptoms include the following:   •Nails that curl up or down or are misshapen  •Discolored (often white, yellow, or brown) nails  •Fragile or cracked nails  •Thick nails or nails with rough, jagged edges  •Nail that is  from the nail bed  •Tenderness or pain in the affected nail  Please follow up with a podiatrist as instructed as OP for further management.         SECONDARY DISCHARGE DIAGNOSES  Diagnosis: Hyperglycemia  Assessment and Plan of Treatment: Diabetes is a chronic condition caused by high blood sugar levels. Your blood sugar levels become high because your body does not have enough insulin. Insulin helps move sugar out of the blood so it can be used for energy. Increased sugar in your blood can cause problems in several organs of your body including but not limited to your blood vessels, your kidneys, your brain, and your eyes. The lack of insulin forces your body to use fat instead of sugar for energy. This can be dangerous if not controlled.  Seek Medical Attention If:  You have a seizure.  You begin to breathe fast, or are short of breath.  You become weak and confused.  You are more drowsy than usual.  You have fruity, sweet breath.  You have severe, new stomach pain and are vomiting.  Your blood sugar level is lower or higher than your healthcare provider says it should be.  You have ketones in your blood or urine.  You have a fever or chills.  You are more thirsty than usual.  You are urinating more often than usual.  You have questions or concerns about your condition or care.  Insulin and diabetes medicine decreases the amount of sugar in your blood.  The best way to prevent problems from Diabetes is to control your blood sugars.   Monitor your blood sugar levels closely.   Please follow up with an endocrinolgist as directed in the instructions.

## 2022-04-02 NOTE — PROGRESS NOTE ADULT - ASSESSMENT
45 yo M with PMHx of Intellectual Disability, DM not on any medications presents with foot wounds. Pt has very poor foot hygiene and per the sister, has been persistently scratching an open wound on his L second toe, which worsened and became more red, had drainage, malodor. He saw a physician at [facility] and was told to come to the ED for eval    #onchymosis of finger nails-- anifungal ABX po likely due to severe infection-- seen by ID and podiatry eval-- started on po clindamycin changed iv clindamycin. PO terbinafine   # uncontrolled diabetes mellitus-- hba1c is 8.9--patient does not take any medication at home-- started on metformin.  # mildly autistic-- patient is high functioning but unable to take care of basics.  # hypomagnesemia-- replace IV  spoke with podiatry-- no surgical intervention is advised.   spoke with patient's family-- mother and sister live in a hotel and are unable to take care of him. They will not take him for a week and refused discharge--I told them that we will not do any further treatment inpatient but they want to talk with .    DC plan is cancelled for weekend-- as has no home.

## 2022-04-02 NOTE — DISCHARGE NOTE NURSING/CASE MANAGEMENT/SOCIAL WORK - PATIENT PORTAL LINK FT
You can access the FollowMyHealth Patient Portal offered by [facility] by registering at the following website: [email or web address]. By joining [employer]’s FollowMyHealth portal, you will also be able to view your health information using other applications (apps) compatible with our system.

## 2022-04-02 NOTE — PROGRESS NOTE ADULT - SUBJECTIVE AND OBJECTIVE BOX
SUBJECTIVE:    Patient is a 46y old Male who presents with a chief complaint of DFU (02 Apr 2022 09:19)    Currently admitted to medicine with the primary diagnosis of Onychomycosis       Today is hospital day 2d.     PAST MEDICAL & SURGICAL HISTORY  DM (diabetes mellitus)    Intellectual disability      ALLERGIES:  penicillin (Unknown)    MEDICATIONS:  STANDING MEDICATIONS  clindamycin   Capsule 300 milliGRAM(s) Oral every 8 hours  enoxaparin Injectable 40 milliGRAM(s) SubCutaneous every 24 hours  insulin glargine Injectable (LANTUS) 9 Unit(s) SubCutaneous at bedtime  insulin lispro (ADMELOG) corrective regimen sliding scale   SubCutaneous three times a day before meals  insulin lispro Injectable (ADMELOG) 3 Unit(s) SubCutaneous three times a day before meals  pantoprazole    Tablet 40 milliGRAM(s) Oral before breakfast    PRN MEDICATIONS  ondansetron Injectable 4 milliGRAM(s) IV Push every 6 hours PRN    VITALS:   T(F): 97.3  HR: 97  BP: 124/72  RR: 18  SpO2: 97%    LABS:                        15.7   8.64  )-----------( 288      ( 02 Apr 2022 04:30 )             46.2     04-02    143  |  102  |  18  ----------------------------<  118<H>  3.7   |  25  |  1.0    Ca    9.5      02 Apr 2022 04:30  Phos  3.1     03-31  Mg     1.7     04-01    TPro  7.1  /  Alb  4.5  /  TBili  0.7  /  DBili  x   /  AST  15  /  ALT  14  /  AlkPhos  141<H>  04-01                  RADIOLOGY:    PHYSICAL EXAM:  GEN: No acute distress  LUNGS: Clear to auscultation bilaterally   HEART: S1/S2 present. RRR.   ABD/ GI: Soft, non-tender, non-distended. Bowel sounds present  EXT: NC/NC/NE/2+PP/LOJA  NEURO: [id number]

## 2022-04-02 NOTE — DISCHARGE NOTE PROVIDER - HOSPITAL COURSE
History of Present Illness:   47 yo M with PMHx of Intellectual Disability, DM not on any medications presents with foot wounds. Pt has very poor foot hygiene and per the sister, has been persistently scratching an open wound on his L second toe, which worsened and became more red, had drainage, malodor. He saw a physician at [facility] and was told to come to the ED for evaluation. Pt is poor historian. Does endorse abdominal pain for a few days, cannot give much more description. Per the sister, pt did not seem to have any recent fevers, however is also not a very good historian and does not seem to have good health literacy. States that her and her mom decided to stop giving pt Metformin a while ago, are treating his diabetes by not giving him any sugary foods.  In the ED, T 97.8, /87, , RR 16, SpO2 99% on RA. Lab work unrevealing.     Hospital course:   Pt admitted to medical floor for management of onychomycosis and Left foot 1st and 2nd nail bed wounds. Xray done negative for osteomyelitis. Seen by podiatry. No intervention needed, pt to follo wup with them as OP. Pt also seen by ID who recommneded clinda for 7 days. History of Present Illness:   45 yo M with PMHx of Intellectual Disability, DM not on any medications presents with foot wounds. Pt has very poor foot hygiene and per the sister, has been persistently scratching an open wound on his L second toe, which worsened and became more red, had drainage, malodor. He saw a physician at [facility] and was told to come to the ED for evaluation. Pt is poor historian. Does endorse abdominal pain for a few days, cannot give much more description. Per the sister, pt did not seem to have any recent fevers, however is also not a very good historian and does not seem to have good health literacy. States that her and her mom decided to stop giving pt Metformin a while ago, are treating his diabetes by not giving him any sugary foods.  In the ED, T 97.8, /87, , RR 16, SpO2 99% on RA. Lab work unrevealing.     Hospital course:   Pt admitted to medical floor for management of onychomycosis and Left foot 1st and 2nd nail bed wounds. Xray done negative for osteomyelitis. Seen by podiatry. No intervention needed, pt to follo wup with them as OP. Pt also seen by ID who recommneded clinda for 7 days.   Diabtes with hba1c of 8.9-- patient started on metformin 500mg Bid and dose increased today to 850 mg BID. History of Present Illness:   45 yo M with PMHx of Intellectual Disability, DM not on any medications presents with foot wounds. Pt has very poor foot hygiene and per the sister, has been persistently scratching an open wound on his L second toe, which worsened and became more red, had drainage, malodor. He saw a physician at [facility] [place] and was told to come to the ED for evaluation. Pt is poor historian. Does endorse abdominal pain for a few days, cannot give much more description. Per the sister, pt did not seem to have any recent fevers, however is also not a very good historian and does not seem to have good health literacy. States that her and her mom decided to stop giving pt Metformin a while ago, are treating his diabetes by not giving him any sugary foods.  In the ED, T 97.8, /87, , RR 16, SpO2 99% on RA. Lab work unrevealing.     Hospital course:   Pt admitted to medical floor for management of onychomycosis and Left foot 1st and 2nd nail bed wounds. Xray done negative for osteomyelitis. Seen by podiatry. No intervention needed, pt to follow up with them as OP. Pt also seen by ID who recommneded clinda for 7 days.   Diabetes with hba1c of 8.9-- patient started on metformin 500mg Bid and dose increased to 850 mg BID with improved glucose control. Will send home with 100 mg BID.     Physical exam and vital signs stable for discharge. 45 yo M with PMHx of Intellectual Disability, DM not on any medications presents with foot wounds. Pt has very poor foot hygiene and per the sister, has been persistently scratching an open wound on his L second toe, which worsened and became more red, had drainage, malodor. He saw a physician at [facility] and was told to come to the ED for eval.    #Onychomycosis   - antifungal, ABX po (likely due to severe infection)  - seen by ID and podiatry eval  - Completed clindamycin  - PO terbinafine not suggested by ID or podiatry  - Per podiatry, no surgical intervention is advised, clotrimazole cream adequate     #uncontrolled diabetes mellitus   - hba1c is 8.9- patient does not take any medication at home  - started on metformin- dose increased to 1000 mg BID.    # Autism   - patient is high functioning but unable to take care of basics. 46 yo M with PMH of Intellectual Disability and DM not on any medications presented with foot wounds. Hospital stay complicated Hospital acquired COVID s/p RDV, poorly controlled DM. Patient has been in the hospital >100 days, guardianship established and now pending placement after IQ tests done by neuropsych. Patient still has itchy skin excoriations all over his body.    # Excoriations likely 2/2 skin dryness- Active  - lesions still itchy but improving on lac-hydrin and topical triamcinolone.  - Derm consut 9/16- cw triamcinolone 0.1% topical cream, moisturizing cream, and only way to improve excoriations is pt must avoid scratching  - Patient given on 9/15 permethrin 1% full body wash.     # sinus Tachycardia 8/12 and intermittently - resolved  - No CP/SOB  - EKG 8/11 with new incomplete RBBB and s1q3t3  - DVT and PE were ruled out on 8/13 : CTA + LE duplex negative  - TTE on 8/14: 1. EF of 66 % Grade I diastolic dysfunction, no valvular dysfunction    # Covid PNA 5/21- resolved  - S/p RDV    # bilateral Onychomycosis - stable  - S/p debridement and curettage by podiatry 7/18  - F/u outpatient with Dr. Bronson  - will consider podiatry follow up for finger and toe nails    # Dental caries - stable  - S/p course of clindamycin, outpatient f/u for teeth extractions    # DM2- stable  - LAntus, lispro and SS  - A1c 7.5 in 8/11. well controlled.    # Intellectual disability/autism- stable  - Neuropsych consulted Dr Deborah Weller ([facility]) and her assistant Cheryl ([phone])  - Psychological test with IQ and [place] scores done for placement completed 9/14 and showed:  Based on full-scale IQ (FSIQ = 60) and his inability to perform IADLs and certain ADLS on his own, he meets criteria for Mild Intellectual Disability (F70.) Following that patient does not have a support system nor family to care for him, it is crucial for his safety, that he should be placed in a supervised living environment. Ideally, this placement should be long-standing, as his disability is permanent and he will continue to require support and supervision for the remainder of his life.    48 yo M with PMH of Intellectual Disability and DM not on any medications presented with foot wounds. Hospital stay complicated Hospital acquired COVID s/p RDV, poorly controlled DM. Patient has been in the hospital >100 days, guardianship established and now pending placement after IQ tests done by neuropsych. Patient still has itchy skin excoriations all over his body.    #Possible conjunctivitis vs allergy  - added opthalmic erythromycin  - patient scratches around eyes occasionally advised pt to limit doing so  - Claritin 10mg PO daily for 1 month starting 10/10/22    # Excoriations likely 2/2 skin dryness- Active  - lesions still itchy but improving on lac-hydrin and topical triamcinolone.  - Derm consut 9/16- cw triamcinolone 0.1% topical cream, moisturizing cream, and only way to improve excoriations is pt must avoid scratching  - Patient given on 9/15 permethrin 1% full body wash.     # sinus Tachycardia 8/12 and intermittently - resolved  - No CP/SOB  - EKG 8/11 with new incomplete RBBB and s1q3t3  - DVT and PE were ruled out on 8/13 : CTA + LE duplex negative  - TTE on 8/14: 1. EF of 66 % Grade I diastolic dysfunction, no valvular dysfunction    # Covid PNA 5/21- resolved  - S/p RDV    # bilateral Onychomycosis - stable  - S/p debridement and curettage by podiatry 7/18  - F/u outpatient with Dr. Bronson  - will consider podiatry follow up for finger and toe nails    # Dental caries - stable  - S/p course of clindamycin, outpatient f/u for teeth extractions    # DM2- stable  - LAntus, lispro and SS  - A1c 7.5 in 8/11. well controlled.    # Intellectual disability/autism- stable  - Neuropsych consulted Dr Deborah Weller ([facility]) and her assistant Cheryl ([phone])  - Psychological test with IQ and [place] scores done for placement completed 9/14 and showed:  Based on full-scale IQ (FSIQ = 60) and his inability to perform IADLs and certain ADLS on his own, he meets criteria for Mild Intellectual Disability (F70.) Following that patient does not have a support system nor family to care for him, it is crucial for his safety, that he should be placed in a supervised living environment. Ideally, this placement should be long-standing, as his disability is permanent and he will continue to require support and supervision for the remainder of his life.    48 yo M with PMH of Intellectual Disability and DM not on any medications presented with foot wounds. Hospital stay complicated Hospital acquired COVID s/p RDV, poorly controlled DM. Patient has been in the hospital >100 days, guardianship established and now pending placement after IQ tests done by neuropsych. Patient still has itchy skin excoriations all over his body.    #Possible conjunctivitis vs allergy  - added opthalmic erythromycin  - patient scratches around eyes occasionally advised pt to limit doing so  - Claritin 10mg PO daily for 1 month starting 10/10/22    # Excoriations likely 2/2 skin dryness- Active  - lesions still itchy but improving on lac-hydrin and topical triamcinolone.  - Derm consut 9/16- cw triamcinolone 0.1% topical cream, moisturizing cream, and only way to improve excoriations is pt must avoid scratching  - Patient given on 9/15 permethrin 1% full body wash.     # sinus Tachycardia 8/12 and intermittently - resolved  - No CP/SOB  - EKG 8/11 with new incomplete RBBB and s1q3t3  - DVT and PE were ruled out on 8/13 : CTA + LE duplex negative  - TTE on 8/14: 1. EF of 66 % Grade I diastolic dysfunction, no valvular dysfunction    # Covid PNA 5/21- resolved  - S/p RDV    # bilateral Onychomycosis - stable  - S/p debridement and curettage by podiatry 7/18  - F/u outpatient with Dr. Bronson  - will consider podiatry follow up for finger and toe nails    # Dental caries - stable  - S/p course of clindamycin, outpatient f/u for teeth extractions    # DM2- stable  - LAntus, lispro and SS  - A1c 7.5 in 8/11. well controlled.    # Intellectual disability/autism- stable  - Neuropsych consulted Dr Deborah Weller ([facility]) and her assistant Cheryl ([phone])  - Psychological test with IQ and [place] scores done for placement completed 9/14 and showed:  Based on full-scale IQ (FSIQ = 60) and his inability to perform IADLs and certain ADLS on his own, he meets criteria for Mild Intellectual Disability (F70.) Following that patient does not have a support system nor family to care for him, it is crucial for his safety, that he should be placed in a supervised living environment. Ideally, this placement should be long-standing, as his disability is permanent and he will continue to require support and supervision for the remainder of his life.    46 yo M with PMH of Intellectual Disability and DM not on any medications presented with foot wounds. Hospital stay complicated Hospital acquired COVID s/p RDV, poorly controlled DM. Patient has been in the hospital >100 days, guardianship established and now pending placement after IQ tests done by neuropsych. Patient still has itchy skin excoriations all over his body.    #Possible conjunctivitis vs allergy  - added opthalmic erythromycin  - patient scratches around eyes occasionally advised pt to limit doing so  - Claritin 10mg PO daily for 1 month starting 10/10/22    # Excoriations likely 2/2 skin dryness- Active  - lesions still itchy but improving on lac-hydrin and topical triamcinolone.  - Derm consut 9/16- cw triamcinolone 0.1% topical cream, moisturizing cream, and only way to improve excoriations is pt must avoid scratching  - Patient given on 9/15 permethrin 1% full body wash.     # sinus Tachycardia 8/12 and intermittently - resolved  - No CP/SOB  - EKG 8/11 with new incomplete RBBB and s1q3t3  - DVT and PE were ruled out on 8/13 : CTA + LE duplex negative  - TTE on 8/14: 1. EF of 66 % Grade I diastolic dysfunction, no valvular dysfunction    # Covid PNA 5/21- resolved  - S/p RDV    # bilateral Onychomycosis - stable  - S/p debridement and curettage by podiatry 7/18  - F/u outpatient with Dr. Bronson  - will consider podiatry follow up for finger and toe nails    # Dental caries - stable  - S/p course of clindamycin, outpatient f/u for teeth extractions    # DM2- stable  - LAntus, lispro and SS  - A1c 7.5 in 8/11. well controlled.    # Intellectual disability/autism- stable  - Neuropsych consulted Dr Deborah Weller ([facility]) and her assistant Cheryl ([phone])  - Psychological test with IQ and [place] scores done for placement completed 9/14 and showed:  Based on full-scale IQ (FSIQ = 60) and his inability to perform IADLs and certain ADLS on his own, he meets criteria for Mild Intellectual Disability (F70.) Following that patient does not have a support system nor family to care for him, it is crucial for his safety, that he should be placed in a supervised living environment. Ideally, this placement should be long-standing, as his disability is permanent and he will continue to require support and supervision for the remainder of his life.     Attending Attestation:  Patient was seen & examined independently. At least 10 systems were reviewed in ROS. All systems reviewed  are within normal limits. Latest vital signs and labs were reviewed today. Case was discussed with house staff in morning rounds for assessment and plan.  Patient is medically stable for discharge . About 45 mins spent on discharge disposition.

## 2022-04-02 NOTE — DISCHARGE NOTE PROVIDER - PROVIDER TOKENS
PROVIDER:[TOKEN:[[id number]],FOLLOWUP:[1 week]],PROVIDER:[TOKEN:[[id number]],FOLLOWUP:[1 week]]

## 2022-04-02 NOTE — DISCHARGE NOTE PROVIDER - NSDCMRMEDTOKEN_GEN_ALL_CORE_FT
ammonium lactate 12% topical lotion: 1 application topically 2 times a day  clindamycin 300 mg oral capsule: 1 cap(s) orally every 8 hours  clotrimazole 1% topical cream: 1 application topically 2 times a day  metFORMIN 850 mg oral tablet: 1 tab(s) orally 2 times a day   ciclopirox 0.77% topical cream: Apply topically to affected area once a day   metFORMIN 1000 mg oral tablet: 1 tab(s) orally 2 times a day   clotrimazole 1% topical cream: 1 application topically 2 times a day  metFORMIN 1000 mg oral tablet: 1 tab(s) orally 2 times a day  vitamin A and D topical ointment: 1 application topically once a day   acetaminophen 325 mg oral tablet: 2 tab(s) orally every 6 hours, As needed, Temp greater or equal to 38C (100.4F), Mild Pain (1 - 3)  clotrimazole 1% topical cream: 1 application topically 2 times a day  metFORMIN 1000 mg oral tablet: 1 tab(s) orally 2 times a day  rivaroxaban 2.5 mg oral tablet: 2 tab(s) orally once a day  vitamin A and D topical ointment: 1 application topically once a day   acetaminophen 325 mg oral tablet: 2 tab(s) orally every 6 hours, As needed, Temp greater or equal to 38C (100.4F), Mild Pain (1 - 3)  atorvastatin 40 mg oral tablet: 1 tab(s) orally once a day (at bedtime) MDD:40mg  clotrimazole 1% topical cream: 1 application topically 2 times a day  famotidine 20 mg oral tablet: 1 tab(s) orally once a day MDD:20mg  lactobacillus acidophilus oral capsule: 1  orally once a day MDD:1 pill  loratadine 10 mg oral tablet: 1 tab(s) orally once a day MDD:10mg  melatonin 3 mg oral tablet: 1 tab(s) orally once a day (at bedtime)  metFORMIN 1000 mg oral tablet: 1 tab(s) orally 2 times a day MDD:2000mg  rivaroxaban 2.5 mg oral tablet: 2 tab(s) orally once a day MDD:5mg  vitamin A and D topical ointment: 1 application topically once a day   acetaminophen 325 mg oral tablet: 2 tab(s) orally every 6 hours, As needed, Temp greater or equal to 38C (100.4F), Mild Pain (1 - 3)  atorvastatin 40 mg oral tablet: 1 tab(s) orally once a day (at bedtime) MDD:40mg  clotrimazole 1% topical cream: 1 application topically 2 times a day  famotidine 20 mg oral tablet: 1 tab(s) orally once a day MDD:20mg  lactobacillus acidophilus oral capsule: 1  orally once a day MDD:1 pill  loratadine 10 mg oral tablet: 1 tab(s) orally once a day MDD:10mg  melatonin 3 mg oral tablet: 1 tab(s) orally once a day (at bedtime)  metFORMIN 1000 mg oral tablet: 1 tab(s) orally 2 times a day MDD:2000mg  vitamin A and D topical ointment: 1 application topically once a day

## 2022-04-02 NOTE — DISCHARGE NOTE PROVIDER - CARE PROVIDER_API CALL
Noe Tucker  PODIATRIC MEDICINE AND SURGERY  [address] [place] [address]  Phone: [phone]  Fax: [phone]  Follow Up Time: 1 week    Britta Epstein)  [place] [address]  Phone: [phone]  Fax: [phone]  Follow Up Time: 1 week

## 2022-04-03 LAB
ALBUMIN SERPL ELPH-MCNC: 4.4 G/DL — SIGNIFICANT CHANGE UP (ref 3.5–5.2)
ALP SERPL-CCNC: 144 U/L — HIGH (ref 30–115)
ALT FLD-CCNC: 15 U/L — SIGNIFICANT CHANGE UP (ref 0–41)
ANION GAP SERPL CALC-SCNC: 11 MMOL/L — SIGNIFICANT CHANGE UP (ref 7–14)
AST SERPL-CCNC: 18 U/L — SIGNIFICANT CHANGE UP (ref 0–41)
BASOPHILS # BLD AUTO: 0.06 K/UL — SIGNIFICANT CHANGE UP (ref 0–0.2)
BASOPHILS NFR BLD AUTO: 0.9 % — SIGNIFICANT CHANGE UP (ref 0–1)
BILIRUB SERPL-MCNC: 1.1 MG/DL — SIGNIFICANT CHANGE UP (ref 0.2–1.2)
BUN SERPL-MCNC: 14 MG/DL — SIGNIFICANT CHANGE UP (ref 10–20)
CALCIUM SERPL-MCNC: 9.3 MG/DL — SIGNIFICANT CHANGE UP (ref 8.5–10.1)
CHLORIDE SERPL-SCNC: 101 MMOL/L — SIGNIFICANT CHANGE UP (ref 98–110)
CO2 SERPL-SCNC: 26 MMOL/L — SIGNIFICANT CHANGE UP (ref 17–32)
CREAT SERPL-MCNC: 0.8 MG/DL — SIGNIFICANT CHANGE UP (ref 0.7–1.5)
EGFR: 110 ML/MIN/1.73M2 — SIGNIFICANT CHANGE UP
EOSINOPHIL # BLD AUTO: 0.11 K/UL — SIGNIFICANT CHANGE UP (ref 0–0.7)
EOSINOPHIL NFR BLD AUTO: 1.7 % — SIGNIFICANT CHANGE UP (ref 0–8)
GLUCOSE BLDC GLUCOMTR-MCNC: 188 MG/DL — HIGH (ref 70–99)
GLUCOSE BLDC GLUCOMTR-MCNC: 216 MG/DL — HIGH (ref 70–99)
GLUCOSE BLDC GLUCOMTR-MCNC: 228 MG/DL — HIGH (ref 70–99)
GLUCOSE BLDC GLUCOMTR-MCNC: 239 MG/DL — HIGH (ref 70–99)
GLUCOSE BLDC GLUCOMTR-MCNC: 308 MG/DL — HIGH (ref 70–99)
GLUCOSE SERPL-MCNC: 157 MG/DL — HIGH (ref 70–99)
HCT VFR BLD CALC: 45.4 % — SIGNIFICANT CHANGE UP (ref 42–52)
HGB BLD-MCNC: 15.3 G/DL — SIGNIFICANT CHANGE UP (ref 14–18)
IMM GRANULOCYTES NFR BLD AUTO: 0.2 % — SIGNIFICANT CHANGE UP (ref 0.1–0.3)
LYMPHOCYTES # BLD AUTO: 2.05 K/UL — SIGNIFICANT CHANGE UP (ref 1.2–3.4)
LYMPHOCYTES # BLD AUTO: 31.4 % — SIGNIFICANT CHANGE UP (ref 20.5–51.1)
MCHC RBC-ENTMCNC: 32.3 PG — HIGH (ref 27–31)
MCHC RBC-ENTMCNC: 33.7 G/DL — SIGNIFICANT CHANGE UP (ref 32–37)
MCV RBC AUTO: 95.8 FL — HIGH (ref 80–94)
MONOCYTES # BLD AUTO: 0.47 K/UL — SIGNIFICANT CHANGE UP (ref 0.1–0.6)
MONOCYTES NFR BLD AUTO: 7.2 % — SIGNIFICANT CHANGE UP (ref 1.7–9.3)
NEUTROPHILS # BLD AUTO: 3.82 K/UL — SIGNIFICANT CHANGE UP (ref 1.4–6.5)
NEUTROPHILS NFR BLD AUTO: 58.6 % — SIGNIFICANT CHANGE UP (ref 42.2–75.2)
NRBC # BLD: 0 /100 WBCS — SIGNIFICANT CHANGE UP (ref 0–0)
PLATELET # BLD AUTO: 266 K/UL — SIGNIFICANT CHANGE UP (ref 130–400)
POTASSIUM SERPL-MCNC: 3.8 MMOL/L — SIGNIFICANT CHANGE UP (ref 3.5–5)
POTASSIUM SERPL-SCNC: 3.8 MMOL/L — SIGNIFICANT CHANGE UP (ref 3.5–5)
PROT SERPL-MCNC: 7.1 G/DL — SIGNIFICANT CHANGE UP (ref 6–8)
RBC # BLD: 4.74 M/UL — SIGNIFICANT CHANGE UP (ref 4.7–6.1)
RBC # FLD: 11.4 % — LOW (ref 11.5–14.5)
SODIUM SERPL-SCNC: 138 MMOL/L — SIGNIFICANT CHANGE UP (ref 135–146)
WBC # BLD: 6.52 K/UL — SIGNIFICANT CHANGE UP (ref 4.8–10.8)
WBC # FLD AUTO: 6.52 K/UL — SIGNIFICANT CHANGE UP (ref 4.8–10.8)

## 2022-04-03 PROCEDURE — 99232 SBSQ HOSP IP/OBS MODERATE 35: CPT

## 2022-04-03 RX ORDER — METFORMIN HYDROCHLORIDE 850 MG/1
850 TABLET ORAL
Refills: 0 | Status: DISCONTINUED | OUTPATIENT
Start: 2022-04-03 | End: 2022-04-05

## 2022-04-03 RX ORDER — METFORMIN HYDROCHLORIDE 850 MG/1
1 TABLET ORAL
Qty: 0 | Refills: 0 | DISCHARGE
Start: 2022-04-03

## 2022-04-03 RX ORDER — SOD,AMMONIUM,POTASSIUM LACTATE
1 CREAM (GRAM) TOPICAL
Qty: 0 | Refills: 0 | DISCHARGE
Start: 2022-04-03

## 2022-04-03 RX ADMIN — Medication 300 MILLIGRAM(S): at 15:11

## 2022-04-03 RX ADMIN — Medication 1 APPLICATION(S): at 06:46

## 2022-04-03 RX ADMIN — METFORMIN HYDROCHLORIDE 500 MILLIGRAM(S): 850 TABLET ORAL at 06:27

## 2022-04-03 RX ADMIN — ENOXAPARIN SODIUM 40 MILLIGRAM(S): 100 INJECTION SUBCUTANEOUS at 23:12

## 2022-04-03 RX ADMIN — Medication 1 APPLICATION(S): at 18:52

## 2022-04-03 RX ADMIN — Medication 300 MILLIGRAM(S): at 22:34

## 2022-04-03 RX ADMIN — METFORMIN HYDROCHLORIDE 850 MILLIGRAM(S): 850 TABLET ORAL at 18:53

## 2022-04-03 RX ADMIN — Medication 300 MILLIGRAM(S): at 06:27

## 2022-04-03 RX ADMIN — PANTOPRAZOLE SODIUM 40 MILLIGRAM(S): 20 TABLET, DELAYED RELEASE ORAL at 06:27

## 2022-04-03 NOTE — PROGRESS NOTE ADULT - SUBJECTIVE AND OBJECTIVE BOX
SUBJECTIVE:    Patient is a 46y old Male who presents with a chief complaint of DFU (02 Apr 2022 14:54)    Currently admitted to medicine with the primary diagnosis of Onychomycosis       Today is hospital day 3d.     PAST MEDICAL & SURGICAL HISTORY  DM (diabetes mellitus)    Intellectual disability      ALLERGIES:  penicillin (Unknown)    MEDICATIONS:  STANDING MEDICATIONS  ammonium lactate 12% Lotion 1 Application(s) Topical two times a day  clindamycin   Capsule 300 milliGRAM(s) Oral every 8 hours  clotrimazole 1% Cream 1 Application(s) Topical two times a day  enoxaparin Injectable 40 milliGRAM(s) SubCutaneous every 24 hours  metFORMIN 500 milliGRAM(s) Oral two times a day  pantoprazole    Tablet 40 milliGRAM(s) Oral before breakfast    PRN MEDICATIONS  ondansetron Injectable 4 milliGRAM(s) IV Push every 6 hours PRN    VITALS:   T(F): 96.6  HR: 78  BP: 116/70  RR: 18  SpO2: --    LABS:                        15.3   6.52  )-----------( 266      ( 03 Apr 2022 06:27 )             45.4     04-03    138  |  101  |  14  ----------------------------<  157<H>  3.8   |  26  |  0.8    Ca    9.3      03 Apr 2022 06:27    TPro  7.1  /  Alb  4.4  /  TBili  1.1  /  DBili  x   /  AST  18  /  ALT  15  /  AlkPhos  144<H>  04-03                  RADIOLOGY:    PHYSICAL EXAM:  GEN: No acute distress  LUNGS: Clear to auscultation bilaterally   HEART: S1/S2 present. RRR.   ABD/ GI: Soft, non-tender, non-distended. Bowel sounds present  EXT: NC/NC/NE/2+PP/LOJA  NEURO: AAOX3

## 2022-04-03 NOTE — PROGRESS NOTE ADULT - ASSESSMENT
45 yo M with PMHx of Intellectual Disability, DM not on any medications presents with foot wounds. Pt has very poor foot hygiene and per the sister, has been persistently scratching an open wound on his L second toe, which worsened and became more red, had drainage, malodor. He saw a physician at [facility] and was told to come to the ED for eval    #onchymosis of finger nails-- anifungal ABX po likely due to severe infection-- seen by ID and podiatry eval-- started on po clindamycin changed iv clindamycin. PO terbinafine not suggested by ID or podiatry  # uncontrolled diabetes mellitus-- hba1c is 8.9--patient does not take any medication at home-- started on metformin-- dose increased to 850mg BID.  # mildly autistic-- patient is high functioning but unable to take care of basics.  # hypomagnesemia-- replace IV  spoke with podiatry-- no surgical intervention is advised.   spoke with patient's family-- mother and sister live in a hotel and are unable to take care of him. They will not take him for a week and refused discharge--I told them that we will not do any further treatment inpatient but they want to talk with .    DC plan -- family may appeal discahrge.

## 2022-04-04 LAB
GLUCOSE BLDC GLUCOMTR-MCNC: 153 MG/DL — HIGH (ref 70–99)
GLUCOSE BLDC GLUCOMTR-MCNC: 179 MG/DL — HIGH (ref 70–99)
GLUCOSE BLDC GLUCOMTR-MCNC: 183 MG/DL — HIGH (ref 70–99)
GLUCOSE BLDC GLUCOMTR-MCNC: 196 MG/DL — HIGH (ref 70–99)

## 2022-04-04 PROCEDURE — 99232 SBSQ HOSP IP/OBS MODERATE 35: CPT

## 2022-04-04 RX ORDER — METFORMIN HYDROCHLORIDE 850 MG/1
1 TABLET ORAL
Qty: 60 | Refills: 0
Start: 2022-04-04 | End: 2022-05-03

## 2022-04-04 RX ORDER — CICLOPIROX OLAMINE 7.7 MG/G
1 CREAM TOPICAL
Qty: 1 | Refills: 0
Start: 2022-04-04 | End: 2022-04-10

## 2022-04-04 RX ADMIN — METFORMIN HYDROCHLORIDE 850 MILLIGRAM(S): 850 TABLET ORAL at 05:20

## 2022-04-04 RX ADMIN — Medication 1 APPLICATION(S): at 17:16

## 2022-04-04 RX ADMIN — Medication 300 MILLIGRAM(S): at 21:39

## 2022-04-04 RX ADMIN — METFORMIN HYDROCHLORIDE 850 MILLIGRAM(S): 850 TABLET ORAL at 17:17

## 2022-04-04 RX ADMIN — Medication 1 APPLICATION(S): at 06:14

## 2022-04-04 RX ADMIN — PANTOPRAZOLE SODIUM 40 MILLIGRAM(S): 20 TABLET, DELAYED RELEASE ORAL at 08:01

## 2022-04-04 RX ADMIN — Medication 300 MILLIGRAM(S): at 13:03

## 2022-04-04 RX ADMIN — ENOXAPARIN SODIUM 40 MILLIGRAM(S): 100 INJECTION SUBCUTANEOUS at 21:42

## 2022-04-04 RX ADMIN — Medication 300 MILLIGRAM(S): at 05:22

## 2022-04-04 NOTE — PROGRESS NOTE ADULT - ASSESSMENT
45 yo M with PMHx of Intellectual Disability, DM not on any medications presents with foot wounds. Pt has very poor foot hygiene and per the sister, has been persistently scratching an open wound on his L second toe, which worsened and became more red, had drainage, malodor. He saw a physician at [facility] and was told to come to the ED for eval.    #Onchymosis of finger nails  - antifungal ABX po likely due to severe infection  - seen by ID and podiatry eval  - started on po clindamycin changed iv clindamycin. PO terbinafine not suggested by ID or podiatry  - Per podiatry, no surgical intervention is advised.     #uncontrolled diabetes mellitus   - hba1c is 8.9- patient does not take any medication at home  - started on metformin- dose increased to 850mg BID.    # mildly autistic  - patient is high functioning but unable to take care of basics.    # hypomagnesemia  - replace IV?    Attending spoke with patient's family- mother and sister live in a hotel and are unable to take care of him. They will not take him for a week and refused discharge- informed that no further treatment inpatient required but they requested to speak with .    Dispo: DC planning; family may appeal discharge

## 2022-04-04 NOTE — PROGRESS NOTE ADULT - SUBJECTIVE AND OBJECTIVE BOX
HPI:  45 yo M with PMHx of Intellectual Disability, DM not on any medications presents with foot wounds. Pt has very poor foot hygiene and per the sister, has been persistently scratching an open wound on his L second toe, which worsened and became more red, had drainage, malodor. He saw a physician at [facility] and was told to come to the ED for evaluation. Pt is poor historian. Does endorse abdominal pain for a few days, cannot give much more description. Per the sister, pt did not seem to have any recent fevers, however is also not a very good historian and does not seem to have good health literacy. States that her and her mom decided to stop giving pt Metformin a while ago, are treating his diabetes by not giving him any sugary foods.  In the ED, T 97.8, /87, , RR 16, SpO2 99% on RA. Lab work unrevealing.  (31 Mar 2022 22:36)    Currently admitted to medicine with the primary diagnosis of Onychomycosis       Today is hospital day 4d.     INTERVAL HPI / OVERNIGHT EVENTS:  Patient was examined and seen at bedside. This morning he is resting comfortably in bed and reports no new issues or overnight events.     ROS: Otherwise unremarkable     PAST MEDICAL & SURGICAL HISTORY  DM (diabetes mellitus)    Intellectual disability      ALLERGIES  penicillin (Unknown)    MEDICATIONS  STANDING MEDICATIONS  ammonium lactate 12% Lotion 1 Application(s) Topical two times a day  clindamycin   Capsule 300 milliGRAM(s) Oral every 8 hours  clotrimazole 1% Cream 1 Application(s) Topical two times a day  enoxaparin Injectable 40 milliGRAM(s) SubCutaneous every 24 hours  metFORMIN 850 milliGRAM(s) Oral two times a day  pantoprazole    Tablet 40 milliGRAM(s) Oral before breakfast    PRN MEDICATIONS  ondansetron Injectable 4 milliGRAM(s) IV Push every 6 hours PRN    VITALS:  T(F): 96.6  HR: 106  BP: 123/76  RR: 18  SpO2: 97%    PHYSICAL EXAM  GEN: NAD, Resting comfortably in bed  PULM: Clear to auscultation bilaterally, No wheezes  CVS: Regular rate and rhythm, S1-S2, no murmurs  ABD: Soft, non-tender, non-distended, no guarding  EXT: No edema  NEURO: A&Ox3, no focal deficits    LABS                        15.3   6.52  )-----------( 266      ( 03 Apr 2022 06:27 )             45.4     04-03    138  |  101  |  14  ----------------------------<  157<H>  3.8   |  26  |  0.8    Ca    9.3      03 Apr 2022 06:27    TPro  7.1  /  Alb  4.4  /  TBili  1.1  /  DBili  x   /  AST  18  /  ALT  15  /  AlkPhos  144<H>  04-03                  RADIOLOGY

## 2022-04-05 LAB
GLUCOSE BLDC GLUCOMTR-MCNC: 114 MG/DL — HIGH (ref 70–99)
GLUCOSE BLDC GLUCOMTR-MCNC: 126 MG/DL — HIGH (ref 70–99)
GLUCOSE BLDC GLUCOMTR-MCNC: 153 MG/DL — HIGH (ref 70–99)
GLUCOSE BLDC GLUCOMTR-MCNC: 231 MG/DL — HIGH (ref 70–99)

## 2022-04-05 PROCEDURE — 99231 SBSQ HOSP IP/OBS SF/LOW 25: CPT

## 2022-04-05 RX ORDER — CICLOPIROX OLAMINE 7.7 MG/G
1 CREAM TOPICAL
Qty: 1 | Refills: 0
Start: 2022-04-05 | End: 2022-04-11

## 2022-04-05 RX ORDER — METFORMIN HYDROCHLORIDE 850 MG/1
1 TABLET ORAL
Qty: 60 | Refills: 0
Start: 2022-04-05 | End: 2022-05-04

## 2022-04-05 RX ORDER — SALICYLIC ACID 0.5 %
1 CLEANSER (GRAM) TOPICAL DAILY
Refills: 0 | Status: DISCONTINUED | OUTPATIENT
Start: 2022-04-05 | End: 2022-10-11

## 2022-04-05 RX ORDER — METFORMIN HYDROCHLORIDE 850 MG/1
1000 TABLET ORAL
Refills: 0 | Status: ACTIVE | OUTPATIENT
Start: 2022-04-05 | End: 2023-03-04

## 2022-04-05 RX ADMIN — ENOXAPARIN SODIUM 40 MILLIGRAM(S): 100 INJECTION SUBCUTANEOUS at 21:27

## 2022-04-05 RX ADMIN — METFORMIN HYDROCHLORIDE 1000 MILLIGRAM(S): 850 TABLET ORAL at 18:15

## 2022-04-05 RX ADMIN — Medication 300 MILLIGRAM(S): at 05:46

## 2022-04-05 RX ADMIN — Medication 1 APPLICATION(S): at 05:46

## 2022-04-05 RX ADMIN — PANTOPRAZOLE SODIUM 40 MILLIGRAM(S): 20 TABLET, DELAYED RELEASE ORAL at 12:07

## 2022-04-05 RX ADMIN — Medication 1 APPLICATION(S): at 18:15

## 2022-04-05 RX ADMIN — METFORMIN HYDROCHLORIDE 850 MILLIGRAM(S): 850 TABLET ORAL at 05:46

## 2022-04-05 RX ADMIN — Medication 1 APPLICATION(S): at 05:45

## 2022-04-05 RX ADMIN — Medication 1 APPLICATION(S): at 12:07

## 2022-04-05 NOTE — PROGRESS NOTE ADULT - SUBJECTIVE AND OBJECTIVE BOX
HPI:  47 yo M with PMHx of Intellectual Disability, DM not on any medications presents with foot wounds. Pt has very poor foot hygiene and per the sister, has been persistently scratching an open wound on his L second toe, which worsened and became more red, had drainage, malodor. He saw a physician at [facility] and was told to come to the ED for evaluation. Pt is poor historian. Does endorse abdominal pain for a few days, cannot give much more description. Per the sister, pt did not seem to have any recent fevers, however is also not a very good historian and does not seem to have good health literacy. States that her and her mom decided to stop giving pt Metformin a while ago, are treating his diabetes by not giving him any sugary foods.  In the ED, T 97.8, /87, , RR 16, SpO2 99% on RA. Lab work unrevealing.  (31 Mar 2022 22:36)    Currently admitted to medicine with the primary diagnosis of Onychomycosis       Today is hospital day 5d.     INTERVAL HPI / OVERNIGHT EVENTS:  Patient was examined and seen at bedside. This morning he is resting comfortably in bed and reports no new issues or overnight events.     ROS: Otherwise unremarkable     PAST MEDICAL & SURGICAL HISTORY  DM (diabetes mellitus)    Intellectual disability      ALLERGIES  penicillin (Unknown)    MEDICATIONS  STANDING MEDICATIONS  ammonium lactate 12% Lotion 1 Application(s) Topical two times a day  clotrimazole 1% Cream 1 Application(s) Topical two times a day  enoxaparin Injectable 40 milliGRAM(s) SubCutaneous every 24 hours  metFORMIN 1000 milliGRAM(s) Oral two times a day  pantoprazole    Tablet 40 milliGRAM(s) Oral before breakfast  vitamin A &amp; D Ointment 1 Application(s) Topical daily    PRN MEDICATIONS  ondansetron Injectable 4 milliGRAM(s) IV Push every 6 hours PRN    VITALS:  T(F): 98.1  HR: 89  BP: 119/80  RR: 18  SpO2: --    PHYSICAL EXAM  GEN: NAD, Resting comfortably in bed  PULM: Clear to auscultation bilaterally, No wheezes  CVS: Regular rate and rhythm, S1-S2, no murmurs  ABD: Soft, non-tender, non-distended, no guarding  EXT: No edema  NEURO: A&Ox3, no focal deficits

## 2022-04-05 NOTE — PROGRESS NOTE ADULT - ASSESSMENT
47 yo M with PMHx of Intellectual Disability, DM not on any medications presents with foot wounds. Pt has very poor foot hygiene and per the sister, has been persistently scratching an open wound on his L second toe, which worsened and became more red, had drainage, malodor. He saw a physician at [facility] and was told to come to the ED for eval.    #Onychomycosis   - antifungal, ABX po (likely due to severe infection)  - seen by ID and podiatry eval  - Completed clindamycin  - PO terbinafine not suggested by ID or podiatry  - Per podiatry, no surgical intervention is advised, clotrimazole cream adequate     #uncontrolled diabetes mellitus   - hba1c is 8.9- patient does not take any medication at home  - started on metformin- dose increased to 1000 mg BID.    # Autism   - patient is high functioning but unable to take care of basics.    Attending spoke with patient's family- mother and sister live in a hotel and are unable to take care of him. They will not take him for a week and refused discharge- informed that no further treatment inpatient required but they requested to speak with . Afterwards, pt's family refused to answer calls from the hospital, including 's calls to coordinate discharge home.     Dispo: discharged; family may appeal discharge

## 2022-04-05 NOTE — PROGRESS NOTE ADULT - SUBJECTIVE AND OBJECTIVE BOX
CARIN WATSON  46y Male    INTERVAL HPI/OVERNIGHT EVENTS:    some pain of left toe  otherwise no complaints and ambulating  ROS negative  no fever    T(F): 97.5 (04-05-22 @ 15:14), Max: 98.1 (04-05-22 @ 07:37)  HR: 86 (04-05-22 @ 15:14) (86 - 107)  BP: 117/55 (04-05-22 @ 15:14) (110/58 - 178/81)  RR: 18 (04-05-22 @ 15:14) (18 - 18)  SpO2: --  I&O's Summary    CAPILLARY BLOOD GLUCOSE      POCT Blood Glucose.: 231 mg/dL (05 Apr 2022 11:25)  POCT Blood Glucose.: 126 mg/dL (05 Apr 2022 07:20)  POCT Blood Glucose.: 179 mg/dL (04 Apr 2022 21:03)  POCT Blood Glucose.: 183 mg/dL (04 Apr 2022 16:23)        PHYSICAL EXAM:  GENERAL: NAD  HEAD:  Normocephalic  EYES:  conjunctiva and sclera clear  ENMT: Moist mucous membranes  NERVOUS SYSTEM:  Alert, awake, Good concentration  CHEST/LUNG: CTA b/l  HEART: Regular rate and rhythm  ABDOMEN: Soft, Nontender, Nondistended; Bowel sounds present  EXTREMITIES:   No edema  SKIN: dressing over feet    Consultant(s) Notes Reviewed:  [x ] YES  [ ] NO  Care Discussed with Consultants/Other Providers [ x] YES  [ ] NO    MEDICATIONS  (STANDING):  ammonium lactate 12% Lotion 1 Application(s) Topical two times a day  clotrimazole 1% Cream 1 Application(s) Topical two times a day  enoxaparin Injectable 40 milliGRAM(s) SubCutaneous every 24 hours  metFORMIN 1000 milliGRAM(s) Oral two times a day  pantoprazole    Tablet 40 milliGRAM(s) Oral before breakfast  vitamin A &amp; D Ointment 1 Application(s) Topical daily    MEDICATIONS  (PRN):  ondansetron Injectable 4 milliGRAM(s) IV Push every 6 hours PRN Nausea and/or Vomiting        Case discussed with residents and RN on rounds today    Care discussed with pt

## 2022-04-05 NOTE — PROGRESS NOTE ADULT - ASSESSMENT
47 yo M with PMH of Intellectual Disability and DM not on any medications presented with foot wounds. Pt has very poor foot hygiene and per the sister, has been persistently scratching an open wound on his Left second toe, which worsened and became more red, had drainage and malodor. He saw a physician at [facility] and was told to come to the ED for evaluation.    1. Nail bed wound and onychomycosis  s/p clindamycin  outpt podiatry f/u    2. DM type 2 - uncontrolled  A1C 8.9  on metformin 1000mg BID  now better controlled    3. mildly autistic    4. DVT prophylaxis    PROGRESS NOTE HANDOFF    Pending: discharge in appeals - case management following    Family discussion: resident updated sister today    Disposition: home - discharge in appeals

## 2022-04-06 LAB
GLUCOSE BLDC GLUCOMTR-MCNC: 122 MG/DL — HIGH (ref 70–99)
GLUCOSE BLDC GLUCOMTR-MCNC: 97 MG/DL — SIGNIFICANT CHANGE UP (ref 70–99)
GLUCOSE BLDC GLUCOMTR-MCNC: 99 MG/DL — SIGNIFICANT CHANGE UP (ref 70–99)

## 2022-04-06 PROCEDURE — 99232 SBSQ HOSP IP/OBS MODERATE 35: CPT

## 2022-04-06 RX ADMIN — Medication 1 APPLICATION(S): at 18:21

## 2022-04-06 RX ADMIN — ENOXAPARIN SODIUM 40 MILLIGRAM(S): 100 INJECTION SUBCUTANEOUS at 21:20

## 2022-04-06 RX ADMIN — METFORMIN HYDROCHLORIDE 1000 MILLIGRAM(S): 850 TABLET ORAL at 18:21

## 2022-04-06 RX ADMIN — PANTOPRAZOLE SODIUM 40 MILLIGRAM(S): 20 TABLET, DELAYED RELEASE ORAL at 07:48

## 2022-04-06 RX ADMIN — Medication 1 APPLICATION(S): at 18:20

## 2022-04-06 RX ADMIN — Medication 1 APPLICATION(S): at 05:18

## 2022-04-06 RX ADMIN — METFORMIN HYDROCHLORIDE 1000 MILLIGRAM(S): 850 TABLET ORAL at 05:18

## 2022-04-06 NOTE — PROGRESS NOTE ADULT - ASSESSMENT
47 yo M with PMHx of Intellectual Disability, DM not on any medications presents with foot wounds. Pt has very poor foot hygiene and per the sister, has been persistently scratching an open wound on his L second toe, which worsened and became more red, had drainage, malodor. He saw a physician at [facility] and was told to come to the ED for eval.    #Onychomycosis   - antifungal, ABX po (likely due to severe infection)  - seen by ID and podiatry eval  - Completed clindamycin  - PO terbinafine not suggested by ID or podiatry  - Per podiatry, no surgical intervention is advised, clotrimazole cream adequate     #uncontrolled diabetes mellitus   - hba1c is 8.9- patient does not take any medication at home  - started on metformin- dose increased to 1000 mg BID.    # Autism   - patient is high functioning but unable to take care of basics.    Attending spoke with patient's family- mother and sister live in a hotel and are unable to take care of him. They will not take him for a week and refused discharge- informed that no further treatment inpatient required but they requested to speak with . Afterwards, pt's family refused to answer calls from the hospital, including 's calls to coordinate discharge home. Pt was discharged and family was educated on Aftercare recommendations, for which they communicated understanding. They are currently appealing the discharge.     Dispo: discharged; family appealing discharge

## 2022-04-06 NOTE — PROGRESS NOTE ADULT - SUBJECTIVE AND OBJECTIVE BOX
HPI:  47 yo M with PMHx of Intellectual Disability, DM not on any medications presents with foot wounds. Pt has very poor foot hygiene and per the sister, has been persistently scratching an open wound on his L second toe, which worsened and became more red, had drainage, malodor. He saw a physician at [facility] and was told to come to the ED for evaluation. Pt is poor historian. Does endorse abdominal pain for a few days, cannot give much more description. Per the sister, pt did not seem to have any recent fevers, however is also not a very good historian and does not seem to have good health literacy. States that her and her mom decided to stop giving pt Metformin a while ago, are treating his diabetes by not giving him any sugary foods.  In the ED, T 97.8, /87, , RR 16, SpO2 99% on RA. Lab work unrevealing.  (31 Mar 2022 22:36)    Currently admitted to medicine with the primary diagnosis of Onychomycosis       Today is hospital day 6d.     INTERVAL HPI / OVERNIGHT EVENTS:  Patient was examined and seen at bedside. This morning he is resting comfortably in bed and reports no new issues or overnight events.     ROS: Otherwise unremarkable     PAST MEDICAL & SURGICAL HISTORY  DM (diabetes mellitus)    Intellectual disability      ALLERGIES  penicillin (Unknown)    MEDICATIONS  STANDING MEDICATIONS  ammonium lactate 12% Lotion 1 Application(s) Topical two times a day  clotrimazole 1% Cream 1 Application(s) Topical two times a day  enoxaparin Injectable 40 milliGRAM(s) SubCutaneous every 24 hours  metFORMIN 1000 milliGRAM(s) Oral two times a day  pantoprazole    Tablet 40 milliGRAM(s) Oral before breakfast  vitamin A &amp; D Ointment 1 Application(s) Topical daily    PRN MEDICATIONS  ondansetron Injectable 4 milliGRAM(s) IV Push every 6 hours PRN    VITALS:  T(F): 99.2  HR: 101  BP: 132/58  RR: 16  SpO2: 97%    PHYSICAL EXAM  GEN: NAD, Resting comfortably in bed  PULM: Clear to auscultation bilaterally, No wheezes  CVS: Regular rate and rhythm, S1-S2, no murmurs  ABD: Soft, non-tender, non-distended, no guarding  EXT: No edema  NEURO: A&Ox3, no focal deficits

## 2022-04-06 NOTE — PROGRESS NOTE ADULT - SUBJECTIVE AND OBJECTIVE BOX
Progress Note:  Provider Speciality                            Hospitalist      CARIN WATSON MRN-[id number] 46y Male     CHIEF PRESENTING COMPLAINT:  Patient is a 46y old  Male who presents with a chief complaint of DFU (06 Apr 2022 12:18)        SUBJECTIVE:  Patient was seen and examined at bedside. No new complaints described by patient in morning rounds.   No significant overnight events reported.     HISTORY OF PRESENTING ILLNESS:  HPI:  45 yo M with PMHx of Intellectual Disability, DM not on any medications presents with foot wounds. Pt has very poor foot hygiene and per the sister, has been persistently scratching an open wound on his L second toe, which worsened and became more red, had drainage, malodor. He saw a physician at [facility] and was told to come to the ED for evaluation. Pt is poor historian. Does endorse abdominal pain for a few days, cannot give much more description. Per the sister, pt did not seem to have any recent fevers, however is also not a very good historian and does not seem to have good health literacy. States that her and her mom decided to stop giving pt Metformin a while ago, are treating his diabetes by not giving him any sugary foods.  In the ED, T 97.8, /87, , RR 16, SpO2 99% on RA. Lab work unrevealing.  (31 Mar 2022 22:36)        REVIEW OF SYSTEMS:  Patient denies any headache, any vision complaints, runny nose, fever, chills. Denies chest pain, shortness of breath, palpitation. Denies nausea, vomiting, abdominal pain or diarrhoea, Denies dysuria. Denies  localized weakness in any part of the body or numbness.   At least 10 systems were reviewed in ROS. All systems reviewed  are within normal limits except for the complaints as described in Subjective.    PAST MEDICAL & SURGICAL HISTORY:  PAST MEDICAL & SURGICAL HISTORY:  DM (diabetes mellitus)    Intellectual disability            VITAL SIGNS:  Vital Signs Last 24 Hrs  T(C): 37.3 (06 Apr 2022 09:40), Max: 37.3 (06 Apr 2022 09:40)  T(F): 99.2 (06 Apr 2022 09:40), Max: 99.2 (06 Apr 2022 09:40)  HR: 101 (06 Apr 2022 09:40) (86 - 101)  BP: 132/58 (06 Apr 2022 09:40) (117/55 - 132/58)  BP(mean): --  RR: 16 (06 Apr 2022 09:40) (16 - 18)  SpO2: 97% (06 Apr 2022 09:40) (97% - 97%)          PHYSICAL EXAMINATION:  Not in acute distress  General: No icterus  HEENT:   no JVD.  Heart: S1+S2 audible  Lungs: bilateral  moderate air entry, no wheezing, no crepitations.  Abdomen: Soft, non-tender, non-distended , no  rigidity or guarding.  CNS: Awake alert, CN  grossly intact.  Extremities:  No edema            CONSULTS:  Consultant(s) Notes Reviewed by me.   Care Discussed with Consultants/Other Providers where required.        MEDICATIONS:  MEDICATIONS  (STANDING):  ammonium lactate 12% Lotion 1 Application(s) Topical two times a day  clotrimazole 1% Cream 1 Application(s) Topical two times a day  enoxaparin Injectable 40 milliGRAM(s) SubCutaneous every 24 hours  metFORMIN 1000 milliGRAM(s) Oral two times a day  pantoprazole    Tablet 40 milliGRAM(s) Oral before breakfast  vitamin A &amp; D Ointment 1 Application(s) Topical daily    MEDICATIONS  (PRN):  ondansetron Injectable 4 milliGRAM(s) IV Push every 6 hours PRN Nausea and/or Vomiting            ASSESSMENT:    45 yo M with PMH of Intellectual Disability and DM not on any medications presented with foot wounds.    Onychomycosis  DM2  Intellectual disability/autism    Nail bed wound and onychomycosis  Completed clindamycin  Podiatry noted- no surgical intervention is advised. outpt podiatry f/u  DM type 2 - uncontrolled. A1C 8.9. Continue  metformin 1000mg BID  Handoff" Pending: discharge in appeal - case management following

## 2022-04-07 LAB
GLUCOSE BLDC GLUCOMTR-MCNC: 171 MG/DL — HIGH (ref 70–99)
GLUCOSE BLDC GLUCOMTR-MCNC: 72 MG/DL — SIGNIFICANT CHANGE UP (ref 70–99)

## 2022-04-07 PROCEDURE — 99231 SBSQ HOSP IP/OBS SF/LOW 25: CPT

## 2022-04-07 RX ADMIN — ENOXAPARIN SODIUM 40 MILLIGRAM(S): 100 INJECTION SUBCUTANEOUS at 21:14

## 2022-04-07 RX ADMIN — Medication 1 APPLICATION(S): at 17:04

## 2022-04-07 RX ADMIN — PANTOPRAZOLE SODIUM 40 MILLIGRAM(S): 20 TABLET, DELAYED RELEASE ORAL at 08:00

## 2022-04-07 RX ADMIN — Medication 1 APPLICATION(S): at 06:02

## 2022-04-07 RX ADMIN — METFORMIN HYDROCHLORIDE 1000 MILLIGRAM(S): 850 TABLET ORAL at 17:05

## 2022-04-07 RX ADMIN — Medication 1 APPLICATION(S): at 11:25

## 2022-04-07 RX ADMIN — Medication 1 APPLICATION(S): at 17:05

## 2022-04-07 RX ADMIN — METFORMIN HYDROCHLORIDE 1000 MILLIGRAM(S): 850 TABLET ORAL at 05:13

## 2022-04-07 NOTE — PROGRESS NOTE ADULT - SUBJECTIVE AND OBJECTIVE BOX
Progress Note:  Provider Speciality                            Hospitalist      CARIN WATSON MRN-[id number] 46y Male     CHIEF PRESENTING COMPLAINT:  Patient is a 46y old  Male who presents with a chief complaint of DFU (06 Apr 2022 12:18)        SUBJECTIVE:  Patient was seen and examined at bedside. No new complaints described by patient in morning rounds.   No significant overnight events reported.     HISTORY OF PRESENTING ILLNESS:  HPI:  47 yo M with PMHx of Intellectual Disability, DM not on any medications presents with foot wounds. Pt has very poor foot hygiene and per the sister, has been persistently scratching an open wound on his L second toe, which worsened and became more red, had drainage, malodor. He saw a physician at [facility] and was told to come to the ED for evaluation. Pt is poor historian. Does endorse abdominal pain for a few days, cannot give much more description. Per the sister, pt did not seem to have any recent fevers, however is also not a very good historian and does not seem to have good health literacy. States that her and her mom decided to stop giving pt Metformin a while ago, are treating his diabetes by not giving him any sugary foods.  In the ED, T 97.8, /87, , RR 16, SpO2 99% on RA. Lab work unrevealing.  (31 Mar 2022 22:36)        REVIEW OF SYSTEMS:  Patient denies any headache, any vision complaints, runny nose, fever, chills. Denies chest pain, shortness of breath, palpitation. Denies nausea, vomiting, abdominal pain or diarrhoea, Denies dysuria. Denies  localized weakness in any part of the body or numbness.   At least 10 systems were reviewed in ROS. All systems reviewed  are within normal limits except for the complaints as described in Subjective.    PAST MEDICAL & SURGICAL HISTORY:  PAST MEDICAL & SURGICAL HISTORY:  DM (diabetes mellitus)    Intellectual disability            VITAL SIGNS:  Vital Signs Last 24 Hrs  T(C): 36.2 (07 Apr 2022 07:27), Max: 36.2 (07 Apr 2022 07:27)  T(F): 97.1 (07 Apr 2022 07:27), Max: 97.1 (07 Apr 2022 07:27)  HR: 82 (07 Apr 2022 07:27) (75 - 99)  BP: 116/56 (07 Apr 2022 07:27) (102/52 - 116/56)  BP(mean): --  RR: 18 (07 Apr 2022 07:27) (16 - 18)  SpO2: 98% (07 Apr 2022 07:27) (98% - 98%)      PHYSICAL EXAMINATION:  Not in acute distress  General: No icterus  HEENT:   no JVD.  Heart: S1+S2 audible  Lungs: bilateral  moderate air entry, no wheezing, no crepitations.  Abdomen: Soft, non-tender, non-distended , no  rigidity or guarding.  CNS: Awake alert, CN  grossly intact.  Extremities:  No edema            CONSULTS:  Consultant(s) Notes Reviewed by me.   Care Discussed with Consultants/Other Providers where required.        MEDICATIONS:  MEDICATIONS  (STANDING):  ammonium lactate 12% Lotion 1 Application(s) Topical two times a day  clotrimazole 1% Cream 1 Application(s) Topical two times a day  enoxaparin Injectable 40 milliGRAM(s) SubCutaneous every 24 hours  metFORMIN 1000 milliGRAM(s) Oral two times a day  pantoprazole    Tablet 40 milliGRAM(s) Oral before breakfast  vitamin A &amp; D Ointment 1 Application(s) Topical daily    MEDICATIONS  (PRN):  ondansetron Injectable 4 milliGRAM(s) IV Push every 6 hours PRN Nausea and/or Vomiting            ASSESSMENT:    47 yo M with PMH of Intellectual Disability and DM not on any medications presented with foot wounds.    Onychomycosis  DM2  Intellectual disability/autism    Nail bed wound and onychomycosis  Completed clindamycin  Podiatry noted- no surgical intervention is advised. outpt podiatry f/u  DM type 2 - uncontrolled. A1C 8.9. Continue  metformin 1000mg BID  Handoff" Pending: discharge in appeal - case management following

## 2022-04-07 NOTE — PROGRESS NOTE ADULT - ASSESSMENT
47 yo M with PMHx of Intellectual Disability, DM not on any medications presents with foot wounds. Pt has very poor foot hygiene and per the sister, has been persistently scratching an open wound on his L second toe, which worsened and became more red, had drainage, malodor. He saw a physician at [facility] and was told to come to the ED for eval.    #Onychomycosis   - antifungal, ABX po (likely due to severe infection)  - seen by ID and podiatry eval  - Completed clindamycin  - PO terbinafine not suggested by ID or podiatry  - Per podiatry, no surgical intervention is advised, clotrimazole cream adequate     #uncontrolled diabetes mellitus   - hba1c is 8.9- patient does not take any medication at home  - started on metformin- dose increased to 1000 mg BID.    # Autism   - patient is high functioning but unable to take care of basics.    Attending spoke with patient's family- mother and sister live in a hotel and are unable to take care of him. They will not take him for a week and refused discharge- informed that no further treatment inpatient required but they requested to speak with . Afterwards, pt's family refused to answer calls from the hospital, including 's calls to coordinate discharge home. Pt was discharged and family was educated on Aftercare recommendations, for which they communicated understanding. They are currently appealing the discharge.     Dispo: discharged; family appealing discharge

## 2022-04-07 NOTE — PROGRESS NOTE ADULT - SUBJECTIVE AND OBJECTIVE BOX
HPI:  47 yo M with PMHx of Intellectual Disability, DM not on any medications presents with foot wounds. Pt has very poor foot hygiene and per the sister, has been persistently scratching an open wound on his L second toe, which worsened and became more red, had drainage, malodor. He saw a physician at [facility] and was told to come to the ED for evaluation. Pt is poor historian. Does endorse abdominal pain for a few days, cannot give much more description. Per the sister, pt did not seem to have any recent fevers, however is also not a very good historian and does not seem to have good health literacy. States that her and her mom decided to stop giving pt Metformin a while ago, are treating his diabetes by not giving him any sugary foods.  In the ED, T 97.8, /87, , RR 16, SpO2 99% on RA. Lab work unrevealing.  (31 Mar 2022 22:36)    Currently admitted to medicine with the primary diagnosis of Onychomycosis       Today is hospital day 7d.     INTERVAL HPI / OVERNIGHT EVENTS:  Patient was examined and seen at bedside. This morning he is resting comfortably in bed and reports no new issues or overnight events.     ROS: Otherwise unremarkable     PAST MEDICAL & SURGICAL HISTORY  DM (diabetes mellitus)    Intellectual disability      ALLERGIES  penicillin (Unknown)    MEDICATIONS  STANDING MEDICATIONS  ammonium lactate 12% Lotion 1 Application(s) Topical two times a day  clotrimazole 1% Cream 1 Application(s) Topical two times a day  enoxaparin Injectable 40 milliGRAM(s) SubCutaneous every 24 hours  metFORMIN 1000 milliGRAM(s) Oral two times a day  pantoprazole    Tablet 40 milliGRAM(s) Oral before breakfast  vitamin A &amp; D Ointment 1 Application(s) Topical daily    PRN MEDICATIONS  ondansetron Injectable 4 milliGRAM(s) IV Push every 6 hours PRN    VITALS:  T(F): 97.1  HR: 82  BP: 116/56  RR: 18  SpO2: 98%    PHYSICAL EXAM  GEN: NAD, Resting comfortably in bed  PULM: Clear to auscultation bilaterally, No wheezes  CVS: Regular rate and rhythm, S1-S2, no murmurs  ABD: Soft, non-tender, non-distended, no guarding  EXT: No edema  NEURO: A&Ox3, no focal deficits    LABS                        RADIOLOGY

## 2022-04-08 LAB — GLUCOSE BLDC GLUCOMTR-MCNC: 111 MG/DL — HIGH (ref 70–99)

## 2022-04-08 PROCEDURE — 99231 SBSQ HOSP IP/OBS SF/LOW 25: CPT

## 2022-04-08 RX ORDER — ACETAMINOPHEN 500 MG
650 TABLET ORAL ONCE
Refills: 0 | Status: COMPLETED | OUTPATIENT
Start: 2022-04-08 | End: 2022-04-08

## 2022-04-08 RX ADMIN — Medication 1 APPLICATION(S): at 11:05

## 2022-04-08 RX ADMIN — Medication 1 APPLICATION(S): at 05:09

## 2022-04-08 RX ADMIN — Medication 1 APPLICATION(S): at 17:08

## 2022-04-08 RX ADMIN — Medication 1 APPLICATION(S): at 17:07

## 2022-04-08 RX ADMIN — METFORMIN HYDROCHLORIDE 1000 MILLIGRAM(S): 850 TABLET ORAL at 05:08

## 2022-04-08 RX ADMIN — METFORMIN HYDROCHLORIDE 1000 MILLIGRAM(S): 850 TABLET ORAL at 17:08

## 2022-04-08 RX ADMIN — PANTOPRAZOLE SODIUM 40 MILLIGRAM(S): 20 TABLET, DELAYED RELEASE ORAL at 07:50

## 2022-04-08 RX ADMIN — Medication 650 MILLIGRAM(S): at 03:10

## 2022-04-08 RX ADMIN — ENOXAPARIN SODIUM 40 MILLIGRAM(S): 100 INJECTION SUBCUTANEOUS at 23:31

## 2022-04-08 NOTE — PROGRESS NOTE ADULT - ASSESSMENT
45 yo M with PMHx of Intellectual Disability, DM not on any medications presents with foot wounds. Pt has very poor foot hygiene and per the sister, has been persistently scratching an open wound on his L second toe, which worsened and became more red, had drainage, malodor. He saw a physician at [facility] and was told to come to the ED for eval.    #Onychomycosis   - antifungal, ABX po (likely due to severe infection)  - seen by ID and podiatry eval  - Completed clindamycin  - PO terbinafine not suggested by ID or podiatry  - Per podiatry, no surgical intervention is advised, clotrimazole cream adequate     #uncontrolled diabetes mellitus   - hba1c is 8.9- patient does not take any medication at home  - started on metformin- dose increased to 1000 mg BID.    # Autism   - patient is high functioning but unable to take care of basics.    Attending spoke with patient's family- mother and sister live in a hotel and are unable to take care of him. They will not take him for a week and refused discharge- informed that no further treatment inpatient required but they requested to speak with . Afterwards, pt's family refused to answer calls from the hospital, including 's calls to coordinate discharge home. Pt was discharged and family was educated on Aftercare recommendations, for which they communicated understanding. They are currently appealing the discharge.     Dispo: discharged; family appealing discharge

## 2022-04-08 NOTE — PROGRESS NOTE ADULT - SUBJECTIVE AND OBJECTIVE BOX
HPI:  45 yo M with PMHx of Intellectual Disability, DM not on any medications presents with foot wounds. Pt has very poor foot hygiene and per the sister, has been persistently scratching an open wound on his L second toe, which worsened and became more red, had drainage, malodor. He saw a physician at [facility] and was told to come to the ED for evaluation. Pt is poor historian. Does endorse abdominal pain for a few days, cannot give much more description. Per the sister, pt did not seem to have any recent fevers, however is also not a very good historian and does not seem to have good health literacy. States that her and her mom decided to stop giving pt Metformin a while ago, are treating his diabetes by not giving him any sugary foods.  In the ED, T 97.8, /87, , RR 16, SpO2 99% on RA. Lab work unrevealing.  (31 Mar 2022 22:36)    Currently admitted to medicine with the primary diagnosis of Onychomycosis       Today is hospital day 8d.     INTERVAL HPI / OVERNIGHT EVENTS:  Patient was examined and seen at bedside. This morning he is resting comfortably in bed and reports no new issues or overnight events.     ROS: Otherwise unremarkable     PAST MEDICAL & SURGICAL HISTORY  DM (diabetes mellitus)    Intellectual disability      ALLERGIES  penicillin (Unknown)    MEDICATIONS  STANDING MEDICATIONS  ammonium lactate 12% Lotion 1 Application(s) Topical two times a day  clotrimazole 1% Cream 1 Application(s) Topical two times a day  enoxaparin Injectable 40 milliGRAM(s) SubCutaneous every 24 hours  metFORMIN 1000 milliGRAM(s) Oral two times a day  pantoprazole    Tablet 40 milliGRAM(s) Oral before breakfast  vitamin A &amp; D Ointment 1 Application(s) Topical daily    PRN MEDICATIONS  ondansetron Injectable 4 milliGRAM(s) IV Push every 6 hours PRN    VITALS:  T(F): 97.2  HR: 83  BP: 131/61  RR: 18  SpO2: 98%    PHYSICAL EXAM  GEN: NAD, Resting comfortably in bed  PULM: Clear to auscultation bilaterally, No wheezes  CVS: Regular rate and rhythm, S1-S2, no murmurs  ABD: Soft, non-tender, non-distended, no guarding  EXT: No edema  NEURO: A&Ox3, no focal deficits    LABS                        RADIOLOGY

## 2022-04-08 NOTE — PROGRESS NOTE ADULT - SUBJECTIVE AND OBJECTIVE BOX
Progress Note:  Provider Speciality                            Hospitalist      CARIN WATSON MRN-[id number] 46y Male     CHIEF PRESENTING COMPLAINT:  Patient is a 46y old  Male who presents with a chief complaint of DFU (06 Apr 2022 12:18)        SUBJECTIVE:  Patient was seen and examined at bedside. No new complaints described by patient in morning rounds.   No significant overnight events reported.     HISTORY OF PRESENTING ILLNESS:  HPI:  47 yo M with PMHx of Intellectual Disability, DM not on any medications presents with foot wounds. Pt has very poor foot hygiene and per the sister, has been persistently scratching an open wound on his L second toe, which worsened and became more red, had drainage, malodor. He saw a physician at [facility] and was told to come to the ED for evaluation. Pt is poor historian. Does endorse abdominal pain for a few days, cannot give much more description. Per the sister, pt did not seem to have any recent fevers, however is also not a very good historian and does not seem to have good health literacy. States that her and her mom decided to stop giving pt Metformin a while ago, are treating his diabetes by not giving him any sugary foods.  In the ED, T 97.8, /87, , RR 16, SpO2 99% on RA. Lab work unrevealing.  (31 Mar 2022 22:36)        REVIEW OF SYSTEMS:  Patient denies any headache, any vision complaints, runny nose, fever, chills. Denies chest pain, shortness of breath, palpitation. Denies nausea, vomiting, abdominal pain or diarrhoea, Denies dysuria. Denies  localized weakness in any part of the body or numbness.   At least 10 systems were reviewed in ROS. All systems reviewed  are within normal limits except for the complaints as described in Subjective.    PAST MEDICAL & SURGICAL HISTORY:  PAST MEDICAL & SURGICAL HISTORY:  DM (diabetes mellitus)    Intellectual disability            VITAL SIGNS:  Vital Signs Last 24 Hrs  T(C): 36.2 (08 Apr 2022 07:56), Max: 36.2 (08 Apr 2022 07:56)  T(F): 97.2 (08 Apr 2022 07:56), Max: 97.2 (08 Apr 2022 07:56)  HR: 83 (08 Apr 2022 07:56) (78 - 83)  BP: 131/61 (08 Apr 2022 07:56) (115/59 - 131/61)  BP(mean): --  RR: 18 (08 Apr 2022 07:56) (18 - 18)  SpO2: 98% (08 Apr 2022 07:56) (98% - 98%)    PHYSICAL EXAMINATION:  Not in acute distress  General: No icterus  HEENT:   no JVD.  Heart: S1+S2 audible  Lungs: bilateral  moderate air entry, no wheezing, no crepitations.  Abdomen: Soft, non-tender, non-distended , no  rigidity or guarding.  CNS: Awake alert, CN  grossly intact.  Extremities:  No edema            CONSULTS:  Consultant(s) Notes Reviewed by me.   Care Discussed with Consultants/Other Providers where required.        MEDICATIONS:  MEDICATIONS  (STANDING):  ammonium lactate 12% Lotion 1 Application(s) Topical two times a day  clotrimazole 1% Cream 1 Application(s) Topical two times a day  enoxaparin Injectable 40 milliGRAM(s) SubCutaneous every 24 hours  metFORMIN 1000 milliGRAM(s) Oral two times a day  pantoprazole    Tablet 40 milliGRAM(s) Oral before breakfast  vitamin A &amp; D Ointment 1 Application(s) Topical daily    MEDICATIONS  (PRN):  ondansetron Injectable 4 milliGRAM(s) IV Push every 6 hours PRN Nausea and/or Vomiting            ASSESSMENT:    47 yo M with PMH of Intellectual Disability and DM not on any medications presented with foot wounds.    Onychomycosis  DM2  Intellectual disability/autism    Nail bed wound and onychomycosis  Completed clindamycin  Podiatry noted- no surgical intervention is advised. outpt podiatry f/u  DM type 2 - uncontrolled. A1C 8.9. Continue  metformin 1000mg BID  Handoff: Pending: discharge in appeal - case management following

## 2022-04-09 LAB
GLUCOSE BLDC GLUCOMTR-MCNC: 101 MG/DL — HIGH (ref 70–99)
GLUCOSE BLDC GLUCOMTR-MCNC: 113 MG/DL — HIGH (ref 70–99)
GLUCOSE BLDC GLUCOMTR-MCNC: 134 MG/DL — HIGH (ref 70–99)
GLUCOSE BLDC GLUCOMTR-MCNC: 84 MG/DL — SIGNIFICANT CHANGE UP (ref 70–99)

## 2022-04-09 PROCEDURE — 99231 SBSQ HOSP IP/OBS SF/LOW 25: CPT

## 2022-04-09 RX ADMIN — METFORMIN HYDROCHLORIDE 1000 MILLIGRAM(S): 850 TABLET ORAL at 05:10

## 2022-04-09 RX ADMIN — Medication 1 APPLICATION(S): at 11:48

## 2022-04-09 RX ADMIN — Medication 1 APPLICATION(S): at 05:11

## 2022-04-09 RX ADMIN — METFORMIN HYDROCHLORIDE 1000 MILLIGRAM(S): 850 TABLET ORAL at 18:37

## 2022-04-09 RX ADMIN — Medication 1 APPLICATION(S): at 18:37

## 2022-04-09 RX ADMIN — ENOXAPARIN SODIUM 40 MILLIGRAM(S): 100 INJECTION SUBCUTANEOUS at 21:31

## 2022-04-09 RX ADMIN — PANTOPRAZOLE SODIUM 40 MILLIGRAM(S): 20 TABLET, DELAYED RELEASE ORAL at 05:11

## 2022-04-09 NOTE — PROGRESS NOTE ADULT - SUBJECTIVE AND OBJECTIVE BOX
Progress Note:  Provider Speciality                            Hospitalist      CARIN WATSON MRN-[id number] 46y Male     CHIEF PRESENTING COMPLAINT:  Patient is a 46y old  Male who presents with a chief complaint of DFU (06 Apr 2022 12:18)        SUBJECTIVE:  Patient was seen and examined at bedside. No new complaints described by patient in morning rounds.   No significant overnight events reported.     HISTORY OF PRESENTING ILLNESS:  HPI:  45 yo M with PMHx of Intellectual Disability, DM not on any medications presents with foot wounds. Pt has very poor foot hygiene and per the sister, has been persistently scratching an open wound on his L second toe, which worsened and became more red, had drainage, malodor. He saw a physician at [facility] and was told to come to the ED for evaluation. Pt is poor historian. Does endorse abdominal pain for a few days, cannot give much more description. Per the sister, pt did not seem to have any recent fevers, however is also not a very good historian and does not seem to have good health literacy. States that her and her mom decided to stop giving pt Metformin a while ago, are treating his diabetes by not giving him any sugary foods.  In the ED, T 97.8, /87, , RR 16, SpO2 99% on RA. Lab work unrevealing.  (31 Mar 2022 22:36)        REVIEW OF SYSTEMS:  Patient denies any headache, any vision complaints, runny nose, fever, chills. Denies chest pain, shortness of breath, palpitation. Denies nausea, vomiting, abdominal pain or diarrhoea, Denies dysuria. Denies  localized weakness in any part of the body or numbness.   At least 10 systems were reviewed in ROS. All systems reviewed  are within normal limits except for the complaints as described in Subjective.    PAST MEDICAL & SURGICAL HISTORY:  PAST MEDICAL & SURGICAL HISTORY:  DM (diabetes mellitus)    Intellectual disability            VITAL SIGNS:  Vital Signs Last 24 Hrs  T(C): 36.1 (09 Apr 2022 08:24), Max: 36.1 (09 Apr 2022 08:24)  T(F): 97 (09 Apr 2022 08:24), Max: 97 (09 Apr 2022 08:24)  HR: 80 (09 Apr 2022 08:24) (80 - 85)  BP: 111/55 (09 Apr 2022 08:24) (101/69 - 111/55)  BP(mean): --  RR: 18 (09 Apr 2022 08:24) (18 - 18)  SpO2: --      PHYSICAL EXAMINATION:  Not in acute distress  General: No icterus  HEENT:   no JVD.  Heart: S1+S2 audible  Lungs: bilateral  moderate air entry, no wheezing, no crepitations.  Abdomen: Soft, non-tender, non-distended , no  rigidity or guarding.  CNS: Awake alert, CN  grossly intact.  Extremities:  No edema            CONSULTS:  Consultant(s) Notes Reviewed by me.   Care Discussed with Consultants/Other Providers where required.        MEDICATIONS:  MEDICATIONS  (STANDING):  ammonium lactate 12% Lotion 1 Application(s) Topical two times a day  clotrimazole 1% Cream 1 Application(s) Topical two times a day  enoxaparin Injectable 40 milliGRAM(s) SubCutaneous every 24 hours  metFORMIN 1000 milliGRAM(s) Oral two times a day  pantoprazole    Tablet 40 milliGRAM(s) Oral before breakfast  vitamin A &amp; D Ointment 1 Application(s) Topical daily    MEDICATIONS  (PRN):  ondansetron Injectable 4 milliGRAM(s) IV Push every 6 hours PRN Nausea and/or Vomiting            ASSESSMENT:    45 yo M with PMH of Intellectual Disability and DM not on any medications presented with foot wounds.    Onychomycosis  DM2  Intellectual disability/autism    Nail bed wound and onychomycosis  Completed clindamycin  Podiatry noted- no surgical intervention is advised. outpt podiatry f/u  DM type 2 - uncontrolled. A1C 8.9. Continue  metformin 1000mg BID  Handoff: Pending: discharge in appeal - case management following

## 2022-04-10 LAB
GLUCOSE BLDC GLUCOMTR-MCNC: 110 MG/DL — HIGH (ref 70–99)
GLUCOSE BLDC GLUCOMTR-MCNC: 113 MG/DL — HIGH (ref 70–99)
GLUCOSE BLDC GLUCOMTR-MCNC: 144 MG/DL — HIGH (ref 70–99)
GLUCOSE BLDC GLUCOMTR-MCNC: 180 MG/DL — HIGH (ref 70–99)

## 2022-04-10 PROCEDURE — 99231 SBSQ HOSP IP/OBS SF/LOW 25: CPT

## 2022-04-10 RX ADMIN — METFORMIN HYDROCHLORIDE 1000 MILLIGRAM(S): 850 TABLET ORAL at 17:22

## 2022-04-10 RX ADMIN — Medication 1 APPLICATION(S): at 16:45

## 2022-04-10 RX ADMIN — Medication 1 APPLICATION(S): at 05:12

## 2022-04-10 RX ADMIN — Medication 1 APPLICATION(S): at 10:16

## 2022-04-10 RX ADMIN — ONDANSETRON 4 MILLIGRAM(S): 8 TABLET, FILM COATED ORAL at 01:54

## 2022-04-10 RX ADMIN — METFORMIN HYDROCHLORIDE 1000 MILLIGRAM(S): 850 TABLET ORAL at 05:12

## 2022-04-10 RX ADMIN — ENOXAPARIN SODIUM 40 MILLIGRAM(S): 100 INJECTION SUBCUTANEOUS at 23:03

## 2022-04-10 RX ADMIN — PANTOPRAZOLE SODIUM 40 MILLIGRAM(S): 20 TABLET, DELAYED RELEASE ORAL at 05:12

## 2022-04-10 NOTE — PROGRESS NOTE ADULT - ASSESSMENT
47 yo M with PMHx of Intellectual Disability, DM not on any medications presents with foot wounds. Pt has very poor foot hygiene and per the sister, has been persistently scratching an open wound on his L second toe, which worsened and became more red, had drainage, malodor. He saw a physician at [facility] and was told to come to the ED for eval.    #Onychomycosis   - antifungal, ABX po (likely due to severe infection)  - seen by ID and podiatry eval  - Completed clindamycin  - PO terbinafine not suggested by ID or podiatry  - Per podiatry, no surgical intervention is advised, clotrimazole cream adequate     #uncontrolled diabetes mellitus   - hba1c is 8.9- patient does not take any medication at home  - started on metformin- dose increased to 1000 mg BID.    # Autism   - patient is high functioning but unable to take care of basics.    Attending spoke with patient's family- mother and sister live in a hotel and are unable to take care of him. They will not take him for a week and refused discharge- informed that no further treatment inpatient required but they requested to speak with . Afterwards, pt's family refused to answer calls from the hospital, including 's calls to coordinate discharge home. Pt was discharged and family was educated on Aftercare recommendations, for which they communicated understanding. They are currently appealing the discharge.     Dispo: discharged; family appealing discharge

## 2022-04-10 NOTE — PROGRESS NOTE ADULT - SUBJECTIVE AND OBJECTIVE BOX
HPI:  47 yo M with PMHx of Intellectual Disability, DM not on any medications presents with foot wounds. Pt has very poor foot hygiene and per the sister, has been persistently scratching an open wound on his L second toe, which worsened and became more red, had drainage, malodor. He saw a physician at [facility] and was told to come to the ED for evaluation. Pt is poor historian. Does endorse abdominal pain for a few days, cannot give much more description. Per the sister, pt did not seem to have any recent fevers, however is also not a very good historian and does not seem to have good health literacy. States that her and her mom decided to stop giving pt Metformin a while ago, are treating his diabetes by not giving him any sugary foods.  In the ED, T 97.8, /87, , RR 16, SpO2 99% on RA. Lab work unrevealing.  (31 Mar 2022 22:36)    Currently admitted to medicine with the primary diagnosis of Onychomycosis       Today is hospital day 10d.     INTERVAL HPI / OVERNIGHT EVENTS:  Patient was examined and seen at bedside. This morning he is resting comfortably in bed and reports no new issues or overnight events.     ROS: Otherwise unremarkable     PAST MEDICAL & SURGICAL HISTORY  DM (diabetes mellitus)    Intellectual disability      ALLERGIES  penicillin (Unknown)    MEDICATIONS  STANDING MEDICATIONS  ammonium lactate 12% Lotion 1 Application(s) Topical two times a day  clotrimazole 1% Cream 1 Application(s) Topical two times a day  enoxaparin Injectable 40 milliGRAM(s) SubCutaneous every 24 hours  metFORMIN 1000 milliGRAM(s) Oral two times a day  pantoprazole    Tablet 40 milliGRAM(s) Oral before breakfast  vitamin A &amp; D Ointment 1 Application(s) Topical daily    PRN MEDICATIONS  ondansetron Injectable 4 milliGRAM(s) IV Push every 6 hours PRN    VITALS:  T(F): 97  HR: 81  BP: 116/56  RR: 18  SpO2: 98%    PHYSICAL EXAM  GEN: NAD, Resting comfortably in bed  PULM: Clear to auscultation bilaterally, No wheezes  CVS: Regular rate and rhythm, S1-S2, no murmurs  ABD: Soft, non-tender, non-distended, no guarding  EXT: No edema  NEURO: A&Ox3, no focal deficits    LABS                        RADIOLOGY

## 2022-04-10 NOTE — PROGRESS NOTE ADULT - SUBJECTIVE AND OBJECTIVE BOX
Progress Note:  Provider Speciality                            Hospitalist      CARIN WATSON MRN-[id number] 46y Male     CHIEF PRESENTING COMPLAINT:  Patient is a 46y old  Male who presents with a chief complaint of DFU (06 Apr 2022 12:18)        SUBJECTIVE:  Patient was seen and examined at bedside. No new complaints described by patient in morning rounds.   No significant overnight events reported.     HISTORY OF PRESENTING ILLNESS:  HPI:  45 yo M with PMHx of Intellectual Disability, DM not on any medications presents with foot wounds. Pt has very poor foot hygiene and per the sister, has been persistently scratching an open wound on his L second toe, which worsened and became more red, had drainage, malodor. He saw a physician at [facility] and was told to come to the ED for evaluation. Pt is poor historian. Does endorse abdominal pain for a few days, cannot give much more description. Per the sister, pt did not seem to have any recent fevers, however is also not a very good historian and does not seem to have good health literacy. States that her and her mom decided to stop giving pt Metformin a while ago, are treating his diabetes by not giving him any sugary foods.  In the ED, T 97.8, /87, , RR 16, SpO2 99% on RA. Lab work unrevealing.  (31 Mar 2022 22:36)        REVIEW OF SYSTEMS:  Patient denies any headache, any vision complaints, runny nose, fever, chills. Denies chest pain, shortness of breath, palpitation. Denies nausea, vomiting, abdominal pain or diarrhoea, Denies dysuria. Denies  localized weakness in any part of the body or numbness.   At least 10 systems were reviewed in ROS. All systems reviewed  are within normal limits except for the complaints as described in Subjective.    PAST MEDICAL & SURGICAL HISTORY:  PAST MEDICAL & SURGICAL HISTORY:  DM (diabetes mellitus)    Intellectual disability            VITAL SIGNS:  Vital Signs Last 24 Hrs  T(C): 36.3 (10 Apr 2022 07:06), Max: 36.3 (10 Apr 2022 07:06)  T(F): 97.3 (10 Apr 2022 07:06), Max: 97.3 (10 Apr 2022 07:06)  HR: 78 (10 Apr 2022 07:06) (72 - 81)  BP: 106/58 (10 Apr 2022 07:06) (106/58 - 154/54)  BP(mean): 80 (09 Apr 2022 21:00) (80 - 80)  RR: 18 (10 Apr 2022 07:06) (18 - 18)  SpO2: 98% (09 Apr 2022 21:00) (98% - 98%)    PHYSICAL EXAMINATION:  Not in acute distress  General: No icterus  HEENT:   no JVD.  Heart: S1+S2 audible  Lungs: bilateral  moderate air entry, no wheezing, no crepitations.  Abdomen: Soft, non-tender, non-distended , no  rigidity or guarding.  CNS: Awake alert, CN  grossly intact.  Extremities:  No edema            CONSULTS:  Consultant(s) Notes Reviewed by me.   Care Discussed with Consultants/Other Providers where required.        MEDICATIONS:  MEDICATIONS  (STANDING):  ammonium lactate 12% Lotion 1 Application(s) Topical two times a day  clotrimazole 1% Cream 1 Application(s) Topical two times a day  enoxaparin Injectable 40 milliGRAM(s) SubCutaneous every 24 hours  metFORMIN 1000 milliGRAM(s) Oral two times a day  pantoprazole    Tablet 40 milliGRAM(s) Oral before breakfast  vitamin A &amp; D Ointment 1 Application(s) Topical daily    MEDICATIONS  (PRN):  ondansetron Injectable 4 milliGRAM(s) IV Push every 6 hours PRN Nausea and/or Vomiting            ASSESSMENT:    45 yo M with PMH of Intellectual Disability and DM not on any medications presented with foot wounds.    Onychomycosis  DM2  Intellectual disability/autism    Nail bed wound and onychomycosis  Completed clindamycin  Podiatry noted- no surgical intervention is advised. outpt podiatry f/u  DM type 2 - uncontrolled. A1C 8.9. Continue  metformin 1000mg BID  Handoff: Pending: discharge in appeal(lost appeal & now intends to review) - case management following

## 2022-04-11 LAB
GLUCOSE BLDC GLUCOMTR-MCNC: 105 MG/DL — HIGH (ref 70–99)
GLUCOSE BLDC GLUCOMTR-MCNC: 153 MG/DL — HIGH (ref 70–99)
GLUCOSE BLDC GLUCOMTR-MCNC: 170 MG/DL — HIGH (ref 70–99)
GLUCOSE BLDC GLUCOMTR-MCNC: 92 MG/DL — SIGNIFICANT CHANGE UP (ref 70–99)

## 2022-04-11 PROCEDURE — 99231 SBSQ HOSP IP/OBS SF/LOW 25: CPT

## 2022-04-11 RX ADMIN — Medication 1 APPLICATION(S): at 05:06

## 2022-04-11 RX ADMIN — METFORMIN HYDROCHLORIDE 1000 MILLIGRAM(S): 850 TABLET ORAL at 05:05

## 2022-04-11 RX ADMIN — Medication 1 APPLICATION(S): at 18:25

## 2022-04-11 RX ADMIN — Medication 1 APPLICATION(S): at 18:24

## 2022-04-11 RX ADMIN — Medication 1 APPLICATION(S): at 11:33

## 2022-04-11 RX ADMIN — METFORMIN HYDROCHLORIDE 1000 MILLIGRAM(S): 850 TABLET ORAL at 18:26

## 2022-04-11 RX ADMIN — ENOXAPARIN SODIUM 40 MILLIGRAM(S): 100 INJECTION SUBCUTANEOUS at 23:02

## 2022-04-11 RX ADMIN — PANTOPRAZOLE SODIUM 40 MILLIGRAM(S): 20 TABLET, DELAYED RELEASE ORAL at 06:32

## 2022-04-11 NOTE — PROGRESS NOTE ADULT - SUBJECTIVE AND OBJECTIVE BOX
Progress Note:  Provider Speciality                            Hospitalist      CARIN WATSON MRN-[id number] 46y Male     CHIEF PRESENTING COMPLAINT:  Patient is a 46y old  Male who presents with a chief complaint of DFU (06 Apr 2022 12:18)        SUBJECTIVE:  Patient was seen and examined at bedside. No new complaints described by patient in morning rounds.   No significant overnight events reported.     HISTORY OF PRESENTING ILLNESS:  HPI:  47 yo M with PMHx of Intellectual Disability, DM not on any medications presents with foot wounds. Pt has very poor foot hygiene and per the sister, has been persistently scratching an open wound on his L second toe, which worsened and became more red, had drainage, malodor. He saw a physician at [facility] and was told to come to the ED for evaluation. Pt is poor historian. Does endorse abdominal pain for a few days, cannot give much more description. Per the sister, pt did not seem to have any recent fevers, however is also not a very good historian and does not seem to have good health literacy. States that her and her mom decided to stop giving pt Metformin a while ago, are treating his diabetes by not giving him any sugary foods.  In the ED, T 97.8, /87, , RR 16, SpO2 99% on RA. Lab work unrevealing.  (31 Mar 2022 22:36)        REVIEW OF SYSTEMS:  Patient denies any headache, any vision complaints, runny nose, fever, chills. Denies chest pain, shortness of breath, palpitation. Denies nausea, vomiting, abdominal pain or diarrhoea, Denies dysuria. Denies  localized weakness in any part of the body or numbness.   At least 10 systems were reviewed in ROS. All systems reviewed  are within normal limits except for the complaints as described in Subjective.    PAST MEDICAL & SURGICAL HISTORY:  PAST MEDICAL & SURGICAL HISTORY:  DM (diabetes mellitus)    Intellectual disability            VITAL SIGNS:  Vital Signs Last 24 Hrs  T(C): 35.6 (11 Apr 2022 08:00), Max: 35.8 (11 Apr 2022 00:00)  T(F): 96 (11 Apr 2022 08:00), Max: 96.4 (11 Apr 2022 00:00)  HR: 74 (11 Apr 2022 08:00) (74 - 92)  BP: 106/63 (11 Apr 2022 08:00) (106/63 - 113/56)  BP(mean): --  RR: 18 (11 Apr 2022 08:00) (18 - 18)  SpO2: --  PHYSICAL EXAMINATION:  Not in acute distress  General: No icterus  HEENT:   no JVD.  Heart: S1+S2 audible  Lungs: bilateral  moderate air entry, no wheezing, no crepitations.  Abdomen: Soft, non-tender, non-distended , no  rigidity or guarding.  CNS: Awake alert, CN  grossly intact.  Extremities:  No edema            CONSULTS:  Consultant(s) Notes Reviewed by me.   Care Discussed with Consultants/Other Providers where required.        MEDICATIONS:  MEDICATIONS  (STANDING):  ammonium lactate 12% Lotion 1 Application(s) Topical two times a day  clotrimazole 1% Cream 1 Application(s) Topical two times a day  enoxaparin Injectable 40 milliGRAM(s) SubCutaneous every 24 hours  metFORMIN 1000 milliGRAM(s) Oral two times a day  pantoprazole    Tablet 40 milliGRAM(s) Oral before breakfast  vitamin A &amp; D Ointment 1 Application(s) Topical daily    MEDICATIONS  (PRN):  ondansetron Injectable 4 milliGRAM(s) IV Push every 6 hours PRN Nausea and/or Vomiting            ASSESSMENT:    47 yo M with PMH of Intellectual Disability and DM not on any medications presented with foot wounds.    Onychomycosis  DM2  Intellectual disability/autism    Nail bed wound and onychomycosis  Completed clindamycin  Podiatry noted- no surgical intervention is advised. outpt podiatry f/u  DM type 2 - uncontrolled. A1C 8.9. Continue  metformin 1000mg BID  Handoff: Pending: discharge in appeal(lost appeal & now intends to review) - case management following

## 2022-04-11 NOTE — CHART NOTE - NSCHARTNOTEFT_GEN_A_CORE
Limited Note:    Patient with good appetite/ PO intake consuming % of meals. Requesting additional food at time of visit. No pressure injuries.    Diet:  Diet, Consistent Carbohydrate/No Snacks (03-31-22 @ 22:59) [Active]      No nutrition risk at this time, RD will continue to follow up  Jonny Hamilton RD #[id number]

## 2022-04-11 NOTE — PROGRESS NOTE ADULT - ASSESSMENT
47 yo M with PMHx of Intellectual Disability, DM not on any medications presents with foot wounds. Pt has very poor foot hygiene and per the sister, has been persistently scratching an open wound on his L second toe, which worsened and became more red, had drainage, malodor. He saw a physician at [facility] and was told to come to the ED for eval.    #Onychomycosis   - antifungal, ABX po (likely due to severe infection)  - seen by ID and podiatry eval  - Completed clindamycin  - PO terbinafine not suggested by ID or podiatry  - Per podiatry, no surgical intervention is advised, clotrimazole cream adequate     #uncontrolled diabetes mellitus   - hba1c is 8.9- patient does not take any medication at home  - started on metformin- dose increased to 1000 mg BID.    # Autism   - patient is high functioning but unable to take care of basics.    #Misc   - DVT prophylaxis: Lovenox   - GI prophylaxis: PPI   - Diet: CC  - Activity: IAT   - Disposition: per CM pt's sister Shantel on Friday who requested to file second level appeal

## 2022-04-11 NOTE — PROGRESS NOTE ADULT - SUBJECTIVE AND OBJECTIVE BOX
----------Daily Progress Note----------    HISTORY OF PRESENT ILLNESS:  Patient is a 46y old Male who presents with a chief complaint of DFU (10 Apr 2022 10:15)    Currently admitted to medicine with the primary diagnosis of Onychomycosis    47 yo M with PMHx of Intellectual Disability, DM not on any medications presents with foot wounds. Pt has very poor foot hygiene and per the sister, has been persistently scratching an open wound on his L second toe, which worsened and became more red, had drainage, malodor. He saw a physician at [facility] and was told to come to the ED for evaluation. Pt is poor historian. Does endorse abdominal pain for a few days, cannot give much more description. Per the sister, pt did not seem to have any recent fevers, however is also not a very good historian and does not seem to have good health literacy. States that her and her mom decided to stop giving pt Metformin a while ago, are treating his diabetes by not giving him any sugary foods.  In the ED, T 97.8, /87, , RR 16, SpO2 99% on RA. Lab work unrevealing.      Today is hospital day 11d.     INTERVAL HOSPITAL COURSE / OVERNIGHT EVENTS:    Patient was examined and seen at bedside. This morning he is resting comfortably in bed and reports no new issues or overnight events.     Review of Systems: Otherwise unremarkable     <<<<<PAST MEDICAL & SURGICAL HISTORY>>>>>  DM (diabetes mellitus)    Intellectual disability      ALLERGIES  penicillin (Unknown)      Home Medications:        MEDICATIONS  STANDING MEDICATIONS  ammonium lactate 12% Lotion 1 Application(s) Topical two times a day  clotrimazole 1% Cream 1 Application(s) Topical two times a day  enoxaparin Injectable 40 milliGRAM(s) SubCutaneous every 24 hours  metFORMIN 1000 milliGRAM(s) Oral two times a day  pantoprazole    Tablet 40 milliGRAM(s) Oral before breakfast  vitamin A &amp; D Ointment 1 Application(s) Topical daily    PRN MEDICATIONS  ondansetron Injectable 4 milliGRAM(s) IV Push every 6 hours PRN    VITALS:  T(F): 96  HR: 74  BP: 106/63  RR: 18  SpO2: --    <<<<<LABS>>>>>                  [id number]        <<<<<RADIOLOGY>>>>>    <<<<<PHYSICAL EXAM>>>>>  GENERAL: Patient in no acute distress. Resting comfortably in bed.  PULMONARY: Clear to auscultation bilaterally. No rales, rhonchi, or wheezing.  CARDIOVASCULAR: Regular rate and rhythm, S1-S2, no murmurs  GASTROINTESTINAL: Soft, non-tender, non-distended, no guarding.  SKIN/EXTREMITIES: onychomycosis noted         -----------------------------------------------------------------------------------------------------------------------------------------------------------------------------------------------

## 2022-04-12 LAB
GLUCOSE BLDC GLUCOMTR-MCNC: 118 MG/DL — HIGH (ref 70–99)
GLUCOSE BLDC GLUCOMTR-MCNC: 121 MG/DL — HIGH (ref 70–99)
GLUCOSE BLDC GLUCOMTR-MCNC: 141 MG/DL — HIGH (ref 70–99)

## 2022-04-12 PROCEDURE — 99231 SBSQ HOSP IP/OBS SF/LOW 25: CPT

## 2022-04-12 RX ORDER — SALICYLIC ACID 0.5 %
1 CLEANSER (GRAM) TOPICAL
Qty: 0 | Refills: 0 | DISCHARGE
Start: 2022-04-12

## 2022-04-12 RX ADMIN — Medication 1 APPLICATION(S): at 11:21

## 2022-04-12 RX ADMIN — Medication 1 APPLICATION(S): at 05:53

## 2022-04-12 RX ADMIN — METFORMIN HYDROCHLORIDE 1000 MILLIGRAM(S): 850 TABLET ORAL at 17:20

## 2022-04-12 RX ADMIN — Medication 1 APPLICATION(S): at 05:52

## 2022-04-12 RX ADMIN — Medication 1 APPLICATION(S): at 17:22

## 2022-04-12 RX ADMIN — METFORMIN HYDROCHLORIDE 1000 MILLIGRAM(S): 850 TABLET ORAL at 05:52

## 2022-04-12 RX ADMIN — ENOXAPARIN SODIUM 40 MILLIGRAM(S): 100 INJECTION SUBCUTANEOUS at 22:27

## 2022-04-12 RX ADMIN — PANTOPRAZOLE SODIUM 40 MILLIGRAM(S): 20 TABLET, DELAYED RELEASE ORAL at 07:44

## 2022-04-12 NOTE — PROGRESS NOTE ADULT - SUBJECTIVE AND OBJECTIVE BOX
----------Daily Progress Note----------    HISTORY OF PRESENT ILLNESS:  Patient is a 46y old Male who presents with a chief complaint of DFU (11 Apr 2022 12:03)    Currently admitted to medicine with the primary diagnosis of Onychomycosis    45 yo M with PMHx of Intellectual Disability, DM not on any medications presents with foot wounds. Pt has very poor foot hygiene and per the sister, has been persistently scratching an open wound on his L second toe, which worsened and became more red, had drainage, malodor. He saw a physician at [facility] and was told to come to the ED for evaluation. Pt is poor historian. Does endorse abdominal pain for a few days, cannot give much more description. Per the sister, pt did not seem to have any recent fevers, however is also not a very good historian and does not seem to have good health literacy. States that her and her mom decided to stop giving pt Metformin a while ago, are treating his diabetes by not giving him any sugary foods.  In the ED, T 97.8, /87, , RR 16, SpO2 99% on RA. Lab work unrevealing.     Today is hospital day 12d.     INTERVAL HOSPITAL COURSE / OVERNIGHT EVENTS:    Patient was examined and seen at bedside. This morning he is resting comfortably in bed and reports no new issues or overnight events.     Review of Systems: Otherwise unremarkable     <<<<<PAST MEDICAL & SURGICAL HISTORY>>>>>  DM (diabetes mellitus)    Intellectual disability      ALLERGIES  penicillin (Unknown)      Home Medications:        MEDICATIONS  STANDING MEDICATIONS  ammonium lactate 12% Lotion 1 Application(s) Topical two times a day  clotrimazole 1% Cream 1 Application(s) Topical two times a day  enoxaparin Injectable 40 milliGRAM(s) SubCutaneous every 24 hours  metFORMIN 1000 milliGRAM(s) Oral two times a day  pantoprazole    Tablet 40 milliGRAM(s) Oral before breakfast  vitamin A &amp; D Ointment 1 Application(s) Topical daily    PRN MEDICATIONS  ondansetron Injectable 4 milliGRAM(s) IV Push every 6 hours PRN    VITALS:  T(F): 97.9  HR: 73  BP: 102/50  RR: 18  SpO2: --    <<<<<LABS>>>>>                  [id number]        <<<<<RADIOLOGY>>>>>    <<<<<PHYSICAL EXAM>>>>>  GENERAL: Patient in no acute distress. Resting comfortably in bed.  PULMONARY: Clear to auscultation bilaterally. No rales, rhonchi, or wheezing.  CARDIOVASCULAR: Regular rate and rhythm, S1-S2, no murmurs  GASTROINTESTINAL: Soft, non-tender, non-distended, no guarding.  SKIN/EXTREMITIES: onychomycosis noted       -----------------------------------------------------------------------------------------------------------------------------------------------------------------------------------------------

## 2022-04-12 NOTE — PROGRESS NOTE ADULT - SUBJECTIVE AND OBJECTIVE BOX
Progress Note:  Provider Speciality                            Hospitalist      CARIN WATSON MRN-[id number] 46y Male     CHIEF PRESENTING COMPLAINT:  Patient is a 46y old  Male who presents with a chief complaint of DFU (06 Apr 2022 12:18)        SUBJECTIVE:  Patient was seen and examined at bedside. No new complaints described by patient in morning rounds.   No significant overnight events reported.     HISTORY OF PRESENTING ILLNESS:  HPI:  45 yo M with PMHx of Intellectual Disability, DM not on any medications presents with foot wounds. Pt has very poor foot hygiene and per the sister, has been persistently scratching an open wound on his L second toe, which worsened and became more red, had drainage, malodor. He saw a physician at [facility] and was told to come to the ED for evaluation. Pt is poor historian. Does endorse abdominal pain for a few days, cannot give much more description. Per the sister, pt did not seem to have any recent fevers, however is also not a very good historian and does not seem to have good health literacy. States that her and her mom decided to stop giving pt Metformin a while ago, are treating his diabetes by not giving him any sugary foods.  In the ED, T 97.8, /87, , RR 16, SpO2 99% on RA. Lab work unrevealing.  (31 Mar 2022 22:36)        REVIEW OF SYSTEMS:  Patient denies any headache, any vision complaints, runny nose, fever, chills. Denies chest pain, shortness of breath, palpitation. Denies nausea, vomiting, abdominal pain or diarrhoea, Denies dysuria. Denies  localized weakness in any part of the body or numbness.   At least 10 systems were reviewed in ROS. All systems reviewed  are within normal limits except for the complaints as described in Subjective.    PAST MEDICAL & SURGICAL HISTORY:  PAST MEDICAL & SURGICAL HISTORY:  DM (diabetes mellitus)    Intellectual disability            VITAL SIGNS:  Vital Signs Last 24 Hrs  T(C): 36.6 (12 Apr 2022 07:53), Max: 36.6 (12 Apr 2022 07:53)  T(F): 97.9 (12 Apr 2022 07:53), Max: 97.9 (12 Apr 2022 07:53)  HR: 73 (12 Apr 2022 07:53) (73 - 102)  BP: 102/50 (12 Apr 2022 07:53) (102/50 - 121/64)  BP(mean): --  RR: 18 (12 Apr 2022 07:53) (18 - 19)  SpO2: --      PHYSICAL EXAMINATION:  Not in acute distress  General: No icterus  HEENT:   no JVD.  Heart: S1+S2 audible  Lungs: bilateral  moderate air entry, no wheezing, no crepitations.  Abdomen: Soft, non-tender, non-distended , no  rigidity or guarding.  CNS: Awake alert, CN  grossly intact.  Extremities:  No edema            CONSULTS:  Consultant(s) Notes Reviewed by me.   Care Discussed with Consultants/Other Providers where required.        MEDICATIONS:  MEDICATIONS  (STANDING):  ammonium lactate 12% Lotion 1 Application(s) Topical two times a day  clotrimazole 1% Cream 1 Application(s) Topical two times a day  enoxaparin Injectable 40 milliGRAM(s) SubCutaneous every 24 hours  metFORMIN 1000 milliGRAM(s) Oral two times a day  pantoprazole    Tablet 40 milliGRAM(s) Oral before breakfast  vitamin A &amp; D Ointment 1 Application(s) Topical daily    MEDICATIONS  (PRN):  ondansetron Injectable 4 milliGRAM(s) IV Push every 6 hours PRN Nausea and/or Vomiting            ASSESSMENT:    45 yo M with PMH of Intellectual Disability and DM not on any medications presented with foot wounds.    Onychomycosis  DM2  Intellectual disability/autism    Nail bed wound and onychomycosis  Completed clindamycin  Podiatry noted- no surgical intervention is advised. outpt podiatry f/u  DM type 2 - uncontrolled. A1C 8.9. Continue  metformin 1000mg BID  Handoff: Pending: discharge in appeal(lost appeal & now HCP intends to review but we are not able to reach HCP) - case management following

## 2022-04-12 NOTE — PROGRESS NOTE ADULT - ASSESSMENT
47 yo M with PMHx of Intellectual Disability, DM not on any medications presents with foot wounds. Pt has very poor foot hygiene and per the sister, has been persistently scratching an open wound on his L second toe, which worsened and became more red, had drainage, malodor. He saw a physician at [facility] and was told to come to the ED for eval.    #Onychomycosis   - antifungal, ABX po (likely due to severe infection)  - seen by ID and podiatry eval  - Completed clindamycin  - PO terbinafine not suggested by ID or podiatry  - Per podiatry, no surgical intervention is advised, clotrimazole cream adequate     #uncontrolled diabetes mellitus   - hba1c is 8.9- patient does not take any medication at home  - started on metformin- dose increased to 1000 mg BID.    # Autism   - patient is high functioning but unable to take care of basics.    #Misc   - DVT prophylaxis: Lovenox   - GI prophylaxis: PPI   - Diet: CC  - Activity: IAT   - Disposition: per CM pt's sister Shantel on Friday who requested to file second level appeal

## 2022-04-13 LAB
GLUCOSE BLDC GLUCOMTR-MCNC: 109 MG/DL — HIGH (ref 70–99)
GLUCOSE BLDC GLUCOMTR-MCNC: 185 MG/DL — HIGH (ref 70–99)
GLUCOSE BLDC GLUCOMTR-MCNC: 237 MG/DL — HIGH (ref 70–99)
GLUCOSE BLDC GLUCOMTR-MCNC: 96 MG/DL — SIGNIFICANT CHANGE UP (ref 70–99)

## 2022-04-13 PROCEDURE — 99231 SBSQ HOSP IP/OBS SF/LOW 25: CPT

## 2022-04-13 RX ADMIN — METFORMIN HYDROCHLORIDE 1000 MILLIGRAM(S): 850 TABLET ORAL at 05:35

## 2022-04-13 RX ADMIN — Medication 1 APPLICATION(S): at 05:35

## 2022-04-13 RX ADMIN — Medication 1 APPLICATION(S): at 17:08

## 2022-04-13 RX ADMIN — METFORMIN HYDROCHLORIDE 1000 MILLIGRAM(S): 850 TABLET ORAL at 17:10

## 2022-04-13 RX ADMIN — PANTOPRAZOLE SODIUM 40 MILLIGRAM(S): 20 TABLET, DELAYED RELEASE ORAL at 05:35

## 2022-04-13 RX ADMIN — ENOXAPARIN SODIUM 40 MILLIGRAM(S): 100 INJECTION SUBCUTANEOUS at 21:00

## 2022-04-13 NOTE — PROGRESS NOTE ADULT - ASSESSMENT
47 yo M with PMHx of Intellectual Disability, DM not on any medications presents with foot wounds. Pt has very poor foot hygiene and per the sister, has been persistently scratching an open wound on his L second toe, which worsened and became more red, had drainage, malodor. He saw a physician at [facility] and was told to come to the ED for eval.    #Onychomycosis   - antifungal, ABX po (likely due to severe infection)  - seen by ID and podiatry eval  - Completed clindamycin  - PO terbinafine not suggested by ID or podiatry  - Per podiatry, no surgical intervention is advised, clotrimazole cream adequate     #uncontrolled diabetes mellitus   - hba1c is 8.9- patient does not take any medication at home  - started on metformin- dose increased to 1000 mg BID.    # Autism   - patient is high functioning but unable to take care of basics.    #Misc   - DVT prophylaxis: Lovenox   - GI prophylaxis: PPI   - Diet: CC  - Activity: IAT   - Disposition: per CM pt's sister Shantel on Friday who requested to file second level appeal

## 2022-04-14 LAB
GLUCOSE BLDC GLUCOMTR-MCNC: 105 MG/DL — HIGH (ref 70–99)
GLUCOSE BLDC GLUCOMTR-MCNC: 161 MG/DL — HIGH (ref 70–99)
GLUCOSE BLDC GLUCOMTR-MCNC: 165 MG/DL — HIGH (ref 70–99)
GLUCOSE BLDC GLUCOMTR-MCNC: 204 MG/DL — HIGH (ref 70–99)

## 2022-04-14 PROCEDURE — 99231 SBSQ HOSP IP/OBS SF/LOW 25: CPT

## 2022-04-14 RX ADMIN — PANTOPRAZOLE SODIUM 40 MILLIGRAM(S): 20 TABLET, DELAYED RELEASE ORAL at 10:40

## 2022-04-14 RX ADMIN — METFORMIN HYDROCHLORIDE 1000 MILLIGRAM(S): 850 TABLET ORAL at 05:16

## 2022-04-14 RX ADMIN — Medication 1 APPLICATION(S): at 17:15

## 2022-04-14 RX ADMIN — Medication 1 APPLICATION(S): at 17:14

## 2022-04-14 RX ADMIN — Medication 1 APPLICATION(S): at 05:16

## 2022-04-14 RX ADMIN — METFORMIN HYDROCHLORIDE 1000 MILLIGRAM(S): 850 TABLET ORAL at 17:13

## 2022-04-14 NOTE — PROGRESS NOTE ADULT - ASSESSMENT
45 yo M with PMHx of Intellectual Disability, DM not on any medications presents with foot wounds. Pt has very poor foot hygiene and per the sister, has been persistently scratching an open wound on his L second toe, which worsened and became more red, had drainage, malodor. He saw a physician at [facility] and was told to come to the ED for eval.    #Onychomycosis   - antifungal, ABX po (likely due to severe infection)  - seen by ID and podiatry eval  - Completed clindamycin  - PO terbinafine not suggested by ID or podiatry  - Per podiatry, no surgical intervention is advised, clotrimazole cream adequate     #uncontrolled diabetes mellitus   - hba1c is 8.9- patient does not take any medication at home  - started on metformin- dose increased to 1000 mg BID.    # Autism   - patient is high functioning but unable to take care of basics.    #Misc   - DVT prophylaxis: Lovenox   - GI prophylaxis: PPI   - Diet: CC  - Activity: IAT   - Disposition: Discharge

## 2022-04-14 NOTE — PROGRESS NOTE ADULT - SUBJECTIVE AND OBJECTIVE BOX
----------Daily Progress Note----------    HISTORY OF PRESENT ILLNESS:  Patient is a 46y old Male who presents with a chief complaint of DFU (13 Apr 2022 10:03)    Currently admitted to medicine with the primary diagnosis of Onychomycosis    45 yo M with PMHx of Intellectual Disability, DM not on any medications presents with foot wounds. Pt has very poor foot hygiene and per the sister, has been persistently scratching an open wound on his L second toe, which worsened and became more red, had drainage, malodor. He saw a physician at [facility] and was told to come to the ED for evaluation. Pt is poor historian. Does endorse abdominal pain for a few days, cannot give much more description. Per the sister, pt did not seem to have any recent fevers, however is also not a very good historian and does not seem to have good health literacy. States that her and her mom decided to stop giving pt Metformin a while ago, are treating his diabetes by not giving him any sugary foods.  In the ED, T 97.8, /87, , RR 16, SpO2 99% on RA. Lab work unrevealing.       Today is hospital day 14d.     INTERVAL HOSPITAL COURSE / OVERNIGHT EVENTS:    Patient was examined and seen at bedside. This morning he is resting comfortably in bed and reports no new issues or overnight events.     Review of Systems: Otherwise unremarkable     <<<<<PAST MEDICAL & SURGICAL HISTORY>>>>>  DM (diabetes mellitus)    Intellectual disability      ALLERGIES  penicillin (Unknown)      Home Medications:  vitamin A and D topical ointment: 1 application topically once a day (12 Apr 2022 11:40)        MEDICATIONS  STANDING MEDICATIONS  ammonium lactate 12% Lotion 1 Application(s) Topical two times a day  clotrimazole 1% Cream 1 Application(s) Topical two times a day  enoxaparin Injectable 40 milliGRAM(s) SubCutaneous every 24 hours  metFORMIN 1000 milliGRAM(s) Oral two times a day  pantoprazole    Tablet 40 milliGRAM(s) Oral before breakfast  vitamin A &amp; D Ointment 1 Application(s) Topical daily    PRN MEDICATIONS  ondansetron Injectable 4 milliGRAM(s) IV Push every 6 hours PRN    VITALS:  T(F): 98.1  HR: 79  BP: 106/71  RR: 18  SpO2: 98%    <<<<<LABS>>>>>                  [id number]        <<<<<RADIOLOGY>>>>>    <<<<<PHYSICAL EXAM>>>>>  GENERAL: Patient in no acute distress. Resting comfortably in bed.  PULMONARY: Clear to auscultation bilaterally. No rales, rhonchi, or wheezing.  CARDIOVASCULAR: Regular rate and rhythm, S1-S2, no murmurs  GASTROINTESTINAL: Soft, non-tender, non-distended, no guarding.  SKIN/EXTREMITIES: onychomycosis noted           -----------------------------------------------------------------------------------------------------------------------------------------------------------------------------------------------

## 2022-04-15 DIAGNOSIS — F84.0 AUTISTIC DISORDER: ICD-10-CM

## 2022-04-15 LAB
GLUCOSE BLDC GLUCOMTR-MCNC: 130 MG/DL — HIGH (ref 70–99)
GLUCOSE BLDC GLUCOMTR-MCNC: 188 MG/DL — HIGH (ref 70–99)

## 2022-04-15 PROCEDURE — 99221 1ST HOSP IP/OBS SF/LOW 40: CPT

## 2022-04-15 PROCEDURE — 99231 SBSQ HOSP IP/OBS SF/LOW 25: CPT

## 2022-04-15 RX ADMIN — Medication 1 APPLICATION(S): at 11:38

## 2022-04-15 RX ADMIN — Medication 1 APPLICATION(S): at 05:12

## 2022-04-15 RX ADMIN — METFORMIN HYDROCHLORIDE 1000 MILLIGRAM(S): 850 TABLET ORAL at 05:12

## 2022-04-15 RX ADMIN — Medication 1 APPLICATION(S): at 17:09

## 2022-04-15 RX ADMIN — PANTOPRAZOLE SODIUM 40 MILLIGRAM(S): 20 TABLET, DELAYED RELEASE ORAL at 08:02

## 2022-04-15 RX ADMIN — METFORMIN HYDROCHLORIDE 1000 MILLIGRAM(S): 850 TABLET ORAL at 17:09

## 2022-04-15 RX ADMIN — Medication 1 APPLICATION(S): at 05:11

## 2022-04-15 RX ADMIN — Medication 1 APPLICATION(S): at 17:10

## 2022-04-15 NOTE — BH CONSULTATION LIAISON ASSESSMENT NOTE - ADDITIONAL DETAILS ALL
Patient denies current SI. Patient reported that in the past, he has thought of suicide, describing moments when he was on a roof and no one was around and thinking of jumping.

## 2022-04-15 NOTE — BH CONSULTATION LIAISON ASSESSMENT NOTE - RISK ASSESSMENT
Risk factors - reported past SI, intellectual disability limiting insight and judgment  Protective factors - no hx of aggression or violence, denies current substance use and SI/HI/psychotic symptoms Risk factors - reported past Suicidal ideation, intellectual disability limiting insight and judgment  Protective factors - no hx of aggression or violence, denies current substance use and SI/HI/psychotic symptoms

## 2022-04-15 NOTE — BH CONSULTATION LIAISON ASSESSMENT NOTE - NSBHCHARTREVIEWVS_PSY_A_CORE FT
Vital Signs Last 24 Hrs  T(C): 35.7 (15 Apr 2022 08:26), Max: 36.7 (15 Apr 2022 00:00)  T(F): 96.2 (15 Apr 2022 08:26), Max: 98 (15 Apr 2022 00:00)  HR: 73 (15 Apr 2022 08:26) (73 - 104)  BP: 120/58 (15 Apr 2022 08:26) (106/59 - 120/58)  BP(mean): --  RR: 18 (15 Apr 2022 08:26) (18 - 18)  SpO2: --

## 2022-04-15 NOTE — BH CONSULTATION LIAISON ASSESSMENT NOTE - CASE SUMMARY
Mr. Maurer is a 45 yo  male, with Intellectual Disability, DM, reported admission to [facility] >20 years ago who is admitted for the management of diabetic food ulcers, psychiatry consulted for mental health evaluation, awaiting placement/disposition.   On evaluation there is overt presence of intellectual limitation, impairing level functioning and insight  which are all  related to the  underlying intellectual disability.  However, there is no acute psychiatric symptoms that warrant inpatient psychiatry admission or pharmacological treatment management. This patient is not suicidal, he is not homicidal and there is no psychosis elicited. He is very preoccupied about the reason his family is not returning his call. At this time there is no psychiatric contraindication to discharge this patient.

## 2022-04-15 NOTE — PROGRESS NOTE ADULT - ASSESSMENT
45 yo M with PMHx of Intellectual Disability, DM not on any medications presents with foot wounds. Pt has very poor foot hygiene and per the sister, has been persistently scratching an open wound on his L second toe, which worsened and became more red, had drainage, malodor. He saw a physician at [facility] and was told to come to the ED for eval.    #Onychomycosis   - antifungal, ABX po (likely due to severe infection)  - seen by ID and podiatry eval  - Completed clindamycin  - PO terbinafine not suggested by ID or podiatry  - Per podiatry, no surgical intervention is advised, clotrimazole cream adequate     #uncontrolled diabetes mellitus   - hba1c is 8.9- patient does not take any medication at home  - started on metformin- dose increased to 1000 mg BID.    # Autism   - patient is high functioning but unable to take care of basics.    #Misc   - DVT prophylaxis: Lovenox   - GI prophylaxis: PPI   - Diet: CC  - Activity: IAT   - Disposition: Discharge. Legal will obtain guardianship.  psychiatry eval is pending.

## 2022-04-15 NOTE — BH CONSULTATION LIAISON ASSESSMENT NOTE - OTHER PAST PSYCHIATRIC HISTORY (INCLUDE DETAILS REGARDING ONSET, COURSE OF ILLNESS, INPATIENT/OUTPATIENT TREATMENT)
Patient reports hx of AH and VH for which he was hospitalized at [facility] in 2000 and medicated. Cannot recall what medication. Not currently on psychotropic medication.

## 2022-04-15 NOTE — BH CONSULTATION LIAISON ASSESSMENT NOTE - NSBHMSEPERCEPT_PSY_A_CORE
Please call patient with message. Will need to follow-up in about 6 weeks instead of 3 months. Thanks    Damaris Pace, your labs show that your diabetes is uncontrolled and a little bit worse than it was a couple of years ago. Your average sugar was around 230 over the past 3 months based on these numbers. Your cholesterol is slightly elevated but we can continue the same dose of pravastatin at this time. Your kidney function dropped a little bit and your blood counts were also a little bit too low. We should recheck this in about 6 weeks to make sure you are not actively bleeding. Keep working on diet (more fruits and vegetables) and exercise (15-20 minutes of walking or biking) daily. Call with any questions.     ThanksDoris MD No abnormalities

## 2022-04-15 NOTE — BH CONSULTATION LIAISON ASSESSMENT NOTE - NSBHCONSULTFOLLOWAFTERCARE_PSY_A_CORE FT
Please refer patient to [facility], [address] [place] [address], phone number [phone] for supportive psychotherapy and medication management, if agreeable. Patient will need to call listed phone number for appointment.

## 2022-04-15 NOTE — BH CONSULTATION LIAISON ASSESSMENT NOTE - NSBHREFERDETAILS_PSY_A_CORE_FT
Patient has Hx of Autism. Patient's family not picking up their phone after appeal. Psych is consulted for guardianship process

## 2022-04-15 NOTE — PROGRESS NOTE ADULT - SUBJECTIVE AND OBJECTIVE BOX
----------Daily Progress Note----------    HISTORY OF PRESENT ILLNESS:  Patient is a 46y old Male who presents with a chief complaint of DFU (14 Apr 2022 10:17)    Currently admitted to medicine with the primary diagnosis of Onychomycosis    45 yo M with PMHx of Intellectual Disability, DM not on any medications presents with foot wounds. Pt has very poor foot hygiene and per the sister, has been persistently scratching an open wound on his L second toe, which worsened and became more red, had drainage, malodor. He saw a physician at [facility] and was told to come to the ED for evaluation. Pt is poor historian. Does endorse abdominal pain for a few days, cannot give much more description. Per the sister, pt did not seem to have any recent fevers, however is also not a very good historian and does not seem to have good health literacy. States that her and her mom decided to stop giving pt Metformin a while ago, are treating his diabetes by not giving him any sugary foods.  In the ED, T 97.8, /87, , RR 16, SpO2 99% on RA. Lab work unrevealing.        Today is hospital day 15d.     INTERVAL HOSPITAL COURSE / OVERNIGHT EVENTS:    Patient was examined and seen at bedside. This morning he is resting comfortably in bed and reports no new issues or overnight events.     Review of Systems: Otherwise unremarkable     <<<<<PAST MEDICAL & SURGICAL HISTORY>>>>>  DM (diabetes mellitus)    Intellectual disability      ALLERGIES  penicillin (Unknown)      Home Medications:  vitamin A and D topical ointment: 1 application topically once a day (12 Apr 2022 11:40)        MEDICATIONS  STANDING MEDICATIONS  ammonium lactate 12% Lotion 1 Application(s) Topical two times a day  clotrimazole 1% Cream 1 Application(s) Topical two times a day  enoxaparin Injectable 40 milliGRAM(s) SubCutaneous every 24 hours  metFORMIN 1000 milliGRAM(s) Oral two times a day  pantoprazole    Tablet 40 milliGRAM(s) Oral before breakfast  vitamin A &amp; D Ointment 1 Application(s) Topical daily    PRN MEDICATIONS  ondansetron Injectable 4 milliGRAM(s) IV Push every 6 hours PRN    VITALS:  T(F): 98  HR: 81  BP: 108/62  RR: 18  SpO2: --    <<<<<LABS>>>>>                  [id number]        <<<<<RADIOLOGY>>>>>      <<<<<PHYSICAL EXAM>>>>>  GENERAL: Patient in no acute distress. Resting comfortably in bed.  PULMONARY: Clear to auscultation bilaterally. No rales, rhonchi, or wheezing.  CARDIOVASCULAR: Regular rate and rhythm, S1-S2, no murmurs  GASTROINTESTINAL: Soft, non-tender, non-distended, no guarding.  SKIN/EXTREMITIES: onychomycosis noted           -----------------------------------------------------------------------------------------------------------------------------------------------------------------------------------------------

## 2022-04-15 NOTE — BH CONSULTATION LIAISON ASSESSMENT NOTE - HPI (INCLUDE ILLNESS QUALITY, SEVERITY, DURATION, TIMING, CONTEXT, MODIFYING FACTORS, ASSOCIATED SIGNS AND SYMPTOMS)
Mr. Maurer is a 47 yo  male, single, domiciled with family, on disability, PMHx of Intellectual Disability, DM, unknown pph with reported admission to [facility] >20 years ago admitted for the management of diabetic food ulcers, psychiatry consulted for mental health evaluation    On approach, patient is sitting in bed, no acute distress and friendly to writer on introductions. Patient states that he is waiting for his family to pick him up and has been calling them but keeps getting their voicemail. Patient states that if they do not pick him up, he would be all right with remaining in the hospital until his family is contacted and/or he has a place to live. Patient feels "happy" and is comfortable. He denies current feelings of depression and anxiety. States that he sleeps well with the help of Ambien, though sometimes has nightmares and walks in his sleep. Patient states his appetite is good. He enjoys drawing and watching [employer], which he is able to do without loss of interest or concentration. He denies thoughts of self harm or suicide and states he would never want to hurt or kill others. Patient reported that in the past, he has thought of suicide, describing moments when he was on a roof and no one was around and thinking of jumping; denies current thoughts of suicide. Patient denies hearing voices or seeing shadows/images/visions at this time. He has reported hearing voices speaking to him in the past and telling him to do things to himself, stating he is able to stop them by telling them to go away. He has also reported seeing visions and black shadows in the past. Patient states he was admitted to [facility] for the voices and shadows about 20 years ago. Denies current AVH, feelings of paranoia, SI, HI. Patient denies feeling elevated sense of self, elevated energy and feeling as if he is on top of the world.     Patient was born in the [facility]. Moved to [place] where he almost completed high school. 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 He reports past smoking and denies current use. Describes social alcohol use. Denies use of heroin, ecstasy and cocaine.     Collateral -  Mr. Maurer is a 45 yo  male, single, domiciled with family, on disability, PMHx of Intellectual Disability, DM, unknown pph with reported admission to [facility] >20 years ago admitted for the management of diabetic food ulcers, psychiatry consulted for mental health evaluation    On approach, patient is sitting in bed, no acute distress and friendly to writer on introductions. Patient states that he is waiting for his family to pick him up and has been calling them but keeps getting their voicemail. Patient states that if they do not pick him up, he would be all right with remaining in the hospital until his family is contacted and/or he has a place to live. Patient feels "happy" and is comfortable. He denies current feelings of depression and anxiety. States that he sleeps well with the help of Ambien, though sometimes has nightmares and walks in his sleep. Patient states his appetite is good. He enjoys drawing and watching [employer], which he is able to do without loss of interest or concentration. He denies thoughts of self harm or suicide and states he would never want to hurt or kill others. Patient reported that in the past, he has thought of suicide, describing moments when he was on a roof and no one was around and thinking of jumping; denies current thoughts of suicide. Patient denies hearing voices or seeing shadows/images/visions at this time. He has reported hearing voices speaking to him in the past and telling him to do things to himself, stating he is able to stop them by telling them to go away. He has also reported seeing visions and black shadows in the past. Patient states he was admitted to [facility] for the voices and shadows about 20 years ago. Denies current AVH, feelings of paranoia, SI, HI. Patient denies feeling elevated sense of self, elevated energy and feeling as if he is on top of the world.     Patient was born in the [facility]. Moved to [place] where he almost completed high school. 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 He reports past smoking and denies current use. Describes social alcohol use. Denies use of heroin, ecstasy and cocaine.     Katherine - Shameka, patient's mother, [phone] and [phone] - left  at 11:45 AM Mr. Maurer is a 45 yo  male, single, domiciled with family, on disability, PMHx of Intellectual Disability, DM, unknown pph with reported admission to [facility]note 2] >20 years ago admitted for the management of diabetic food ulcers, psychiatry consulted for mental health evaluation    On approach, patient is sitting in bed, no acute distress and friendly to writer on introductions. Patient states that he is waiting for his family to pick him up and has been calling them but keeps getting their voicemail. Patient states that if they do not pick him up, he would be all right with remaining in the hospital until his family is contacted and/or he has a place to live. Patient feels "happy" and is comfortable. He denies current feelings of depression and anxiety. States that he sleeps well with the help of Ambien, though sometimes has nightmares and walks in his sleep. Patient states his appetite is good. He enjoys drawing and watching [employer], which he is able to do without loss of interest or concentration. He denies thoughts of self harm or suicide and states he would never want to hurt or kill others. Patient reported that in the past, he has thought of suicide, describing moments when he was on a roof and no one was around and thinking of jumping; denies current thoughts of suicide. Patient denies hearing voices or seeing shadows/images/visions at this time. He has reported hearing voices speaking to him in the past and telling him to do things to himself, stating he is able to stop them by telling them to go away. He has also reported seeing visions and black shadows in the past. Patient states he was admitted to [facility] for the voices and shadows about 20 years ago. Denies current AVH, feelings of paranoia, SI, HI. Patient denies feeling elevated sense of self, elevated energy and feeling as if he is on top of the world.     Patient was born in the [facility]. Moved to [place] where he almost completed high school. 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 He reports past smoking and denies current use. Describes social alcohol use. Denies use of heroin, ecstasy and cocaine.     Katherine Myles, patient's mother, [phone] and [phone] - left Voicemail at 11:45 AM

## 2022-04-15 NOTE — BH CONSULTATION LIAISON ASSESSMENT NOTE - SUMMARY
Mr. Maurer is a 45 yo  male, single, domiciled with family, on disability, PMHx of Intellectual Disability, DM, unknown pph with reported admission to [facility] >20 years ago admitted for the management of diabetic food ulcers, psychiatry consulted for mental health evaluation.    At this time, patient reports he is doing well, awaiting family. He is pleasant and cooperative, insight and judgment are limited. Patient does not show signs of current mood disorder, phillip, psychosis or suicidal and homicidal ideation. He does not report substance use beyond social alcohol use. Collateral contacts were not able to be reached despite multiple attempts by writer and social work team. At this time, patient is not a danger to himself or others and would not benefit from inpatient psychiatric hospitalization or psychotropic medications.     Recommendations:  - Continue medical management per primary team  - Signing off. Call back with any questions.

## 2022-04-15 NOTE — BH CONSULTATION LIAISON ASSESSMENT NOTE - CURRENT MEDICATION
MEDICATIONS  (STANDING):  ammonium lactate 12% Lotion 1 Application(s) Topical two times a day  clotrimazole 1% Cream 1 Application(s) Topical two times a day  enoxaparin Injectable 40 milliGRAM(s) SubCutaneous every 24 hours  metFORMIN 1000 milliGRAM(s) Oral two times a day  pantoprazole    Tablet 40 milliGRAM(s) Oral before breakfast  vitamin A &amp; D Ointment 1 Application(s) Topical daily    MEDICATIONS  (PRN):  ondansetron Injectable 4 milliGRAM(s) IV Push every 6 hours PRN Nausea and/or Vomiting

## 2022-04-16 LAB
GLUCOSE BLDC GLUCOMTR-MCNC: 181 MG/DL — HIGH (ref 70–99)
GLUCOSE BLDC GLUCOMTR-MCNC: 195 MG/DL — HIGH (ref 70–99)
GLUCOSE BLDC GLUCOMTR-MCNC: 208 MG/DL — HIGH (ref 70–99)
GLUCOSE BLDC GLUCOMTR-MCNC: 215 MG/DL — HIGH (ref 70–99)
GLUCOSE BLDC GLUCOMTR-MCNC: 226 MG/DL — HIGH (ref 70–99)

## 2022-04-16 PROCEDURE — 99231 SBSQ HOSP IP/OBS SF/LOW 25: CPT

## 2022-04-16 RX ADMIN — Medication 1 APPLICATION(S): at 06:46

## 2022-04-16 RX ADMIN — METFORMIN HYDROCHLORIDE 1000 MILLIGRAM(S): 850 TABLET ORAL at 05:48

## 2022-04-16 RX ADMIN — METFORMIN HYDROCHLORIDE 1000 MILLIGRAM(S): 850 TABLET ORAL at 17:11

## 2022-04-16 RX ADMIN — PANTOPRAZOLE SODIUM 40 MILLIGRAM(S): 20 TABLET, DELAYED RELEASE ORAL at 07:53

## 2022-04-16 RX ADMIN — ENOXAPARIN SODIUM 40 MILLIGRAM(S): 100 INJECTION SUBCUTANEOUS at 23:00

## 2022-04-16 RX ADMIN — Medication 1 APPLICATION(S): at 17:11

## 2022-04-16 RX ADMIN — Medication 1 APPLICATION(S): at 11:36

## 2022-04-16 NOTE — PROGRESS NOTE ADULT - SUBJECTIVE AND OBJECTIVE BOX
SUBJECTIVE:    Patient is a 46y old Male who presents with a chief complaint of DFU (15 Apr 2022 07:29)    Currently admitted to medicine with the primary diagnosis of Onychomycosis       Today is hospital day 16d.     PAST MEDICAL & SURGICAL HISTORY  DM (diabetes mellitus)    Intellectual disability      ALLERGIES:  penicillin (Unknown)    MEDICATIONS:  STANDING MEDICATIONS  ammonium lactate 12% Lotion 1 Application(s) Topical two times a day  clotrimazole 1% Cream 1 Application(s) Topical two times a day  enoxaparin Injectable 40 milliGRAM(s) SubCutaneous every 24 hours  metFORMIN 1000 milliGRAM(s) Oral two times a day  pantoprazole    Tablet 40 milliGRAM(s) Oral before breakfast  vitamin A &amp; D Ointment 1 Application(s) Topical daily    PRN MEDICATIONS  ondansetron Injectable 4 milliGRAM(s) IV Push every 6 hours PRN    VITALS:   T(F): 97.9  HR: 63  BP: 103/50  RR: 18  SpO2: --    LABS:                        RADIOLOGY:    PHYSICAL EXAM:  GEN: No acute distress  LUNGS: Clear to auscultation bilaterally   HEART: S1/S2 present. RRR.   ABD/ GI: Soft, non-tender, non-distended. Bowel sounds present  EXT: NC/NC/NE/2+PP/LOJA  NEURO: AAOX3

## 2022-04-16 NOTE — PROGRESS NOTE ADULT - ASSESSMENT
47 yo M with PMH of Intellectual Disability and DM not on any medications presented with foot wounds.    Onychomycosis  DM2  Intellectual disability/autism    Nail bed wound and onychomycosis  Completed clindamycin  Podiatry noted- no surgical intervention is advised. outpt podiatry f/u  DM type 2 - uncontrolled. A1C 8.9. Continue  metformin 1000mg BID  Handoff: Pending: l(lost appeal & now HCP intends to review but we are not able to reach HCP) - case management following. Legal will obtain guardianship.  psychiatry eval is done.   patient is walking around on the floor.

## 2022-04-17 LAB
GLUCOSE BLDC GLUCOMTR-MCNC: 109 MG/DL — HIGH (ref 70–99)
GLUCOSE BLDC GLUCOMTR-MCNC: 147 MG/DL — HIGH (ref 70–99)
GLUCOSE BLDC GLUCOMTR-MCNC: 265 MG/DL — HIGH (ref 70–99)

## 2022-04-17 PROCEDURE — 99231 SBSQ HOSP IP/OBS SF/LOW 25: CPT

## 2022-04-17 RX ADMIN — ENOXAPARIN SODIUM 40 MILLIGRAM(S): 100 INJECTION SUBCUTANEOUS at 23:28

## 2022-04-17 RX ADMIN — Medication 1 APPLICATION(S): at 05:11

## 2022-04-17 RX ADMIN — Medication 1 APPLICATION(S): at 11:33

## 2022-04-17 RX ADMIN — Medication 1 APPLICATION(S): at 17:03

## 2022-04-17 RX ADMIN — METFORMIN HYDROCHLORIDE 1000 MILLIGRAM(S): 850 TABLET ORAL at 17:03

## 2022-04-17 RX ADMIN — METFORMIN HYDROCHLORIDE 1000 MILLIGRAM(S): 850 TABLET ORAL at 05:10

## 2022-04-17 RX ADMIN — PANTOPRAZOLE SODIUM 40 MILLIGRAM(S): 20 TABLET, DELAYED RELEASE ORAL at 07:14

## 2022-04-17 NOTE — PROGRESS NOTE ADULT - ASSESSMENT
45 yo M with PMHx of Intellectual Disability, DM not on any medications presents with foot wounds. Pt has very poor foot hygiene and per the sister, has been persistently scratching an open wound on his L second toe, which worsened and became more red, had drainage, malodor. He saw a physician at [facility] and was told to come to the ED for eval.    #Onychomycosis   - antifungal, ABX po (likely due to severe infection)  - seen by ID and podiatry eval  - Completed clindamycin  - PO terbinafine not suggested by ID or podiatry  - Per podiatry, no surgical intervention is advised, clotrimazole cream adequate     #uncontrolled diabetes mellitus   - hba1c is 8.9- patient does not take any medication at home  - started on metformin- dose increased to 1000 mg BID.    # Autism   - patient is high functioning but unable to take care of basics.    #Misc   - DVT prophylaxis: Lovenox   - GI prophylaxis: PPI   - Diet: CC  - Activity: IAT   - Disposition: CM following. Legal will obtain guardianship.   eval is done.

## 2022-04-17 NOTE — PROGRESS NOTE ADULT - SUBJECTIVE AND OBJECTIVE BOX
----------Daily Progress Note----------    HISTORY OF PRESENT ILLNESS:  Patient is a 46y old Male who presents with a chief complaint of DFU (16 Apr 2022 14:29)    Currently admitted to medicine with the primary diagnosis of Onychomycosis    47 yo M with PMHx of Intellectual Disability, DM not on any medications presents with foot wounds. Pt has very poor foot hygiene and per the sister, has been persistently scratching an open wound on his L second toe, which worsened and became more red, had drainage, malodor. He saw a physician at [facility] and was told to come to the ED for evaluation. Pt is poor historian. Does endorse abdominal pain for a few days, cannot give much more description. Per the sister, pt did not seem to have any recent fevers, however is also not a very good historian and does not seem to have good health literacy. States that her and her mom decided to stop giving pt Metformin a while ago, are treating his diabetes by not giving him any sugary foods.  In the ED, T 97.8, /87, , RR 16, SpO2 99% on RA. Lab work unrevealing.        Today is hospital day 17d.     INTERVAL HOSPITAL COURSE / OVERNIGHT EVENTS:    Patient was examined and seen at bedside. This morning he is resting comfortably in bed and reports no new issues or overnight events.     Review of Systems: Otherwise unremarkable     <<<<<PAST MEDICAL & SURGICAL HISTORY>>>>>  DM (diabetes mellitus)    Intellectual disability      ALLERGIES  penicillin (Unknown)      Home Medications:  vitamin A and D topical ointment: 1 application topically once a day (12 Apr 2022 11:40)        MEDICATIONS  STANDING MEDICATIONS  ammonium lactate 12% Lotion 1 Application(s) Topical two times a day  clotrimazole 1% Cream 1 Application(s) Topical two times a day  enoxaparin Injectable 40 milliGRAM(s) SubCutaneous every 24 hours  metFORMIN 1000 milliGRAM(s) Oral two times a day  pantoprazole    Tablet 40 milliGRAM(s) Oral before breakfast  vitamin A &amp; D Ointment 1 Application(s) Topical daily    PRN MEDICATIONS  ondansetron Injectable 4 milliGRAM(s) IV Push every 6 hours PRN    VITALS:  T(F): 97.2  HR: 87  BP: 106/56  RR: 18  SpO2: --    <<<<<LABS>>>>>                  [id number]        <<<<<RADIOLOGY>>>>>    <<<<<PHYSICAL EXAM>>>>>  GENERAL: Patient in no acute distress. Resting comfortably in bed.  PULMONARY: Clear to auscultation bilaterally. No rales, rhonchi, or wheezing.  CARDIOVASCULAR: Regular rate and rhythm, S1-S2, no murmurs  GASTROINTESTINAL: Soft, non-tender, non-distended, no guarding.  SKIN/EXTREMITIES: onychomycosis noted       -----------------------------------------------------------------------------------------------------------------------------------------------------------------------------------------------

## 2022-04-18 LAB
GLUCOSE BLDC GLUCOMTR-MCNC: 123 MG/DL — HIGH (ref 70–99)
GLUCOSE BLDC GLUCOMTR-MCNC: 199 MG/DL — HIGH (ref 70–99)
GLUCOSE BLDC GLUCOMTR-MCNC: 262 MG/DL — HIGH (ref 70–99)
GLUCOSE BLDC GLUCOMTR-MCNC: 336 MG/DL — HIGH (ref 70–99)

## 2022-04-18 PROCEDURE — 99231 SBSQ HOSP IP/OBS SF/LOW 25: CPT

## 2022-04-18 RX ADMIN — Medication 1 APPLICATION(S): at 05:37

## 2022-04-18 RX ADMIN — Medication 1 APPLICATION(S): at 21:00

## 2022-04-18 RX ADMIN — METFORMIN HYDROCHLORIDE 1000 MILLIGRAM(S): 850 TABLET ORAL at 18:52

## 2022-04-18 RX ADMIN — Medication 1 APPLICATION(S): at 05:38

## 2022-04-18 RX ADMIN — PANTOPRAZOLE SODIUM 40 MILLIGRAM(S): 20 TABLET, DELAYED RELEASE ORAL at 07:03

## 2022-04-18 RX ADMIN — ENOXAPARIN SODIUM 40 MILLIGRAM(S): 100 INJECTION SUBCUTANEOUS at 23:01

## 2022-04-18 RX ADMIN — METFORMIN HYDROCHLORIDE 1000 MILLIGRAM(S): 850 TABLET ORAL at 05:37

## 2022-04-18 RX ADMIN — Medication 1 APPLICATION(S): at 16:55

## 2022-04-18 NOTE — PROGRESS NOTE ADULT - SUBJECTIVE AND OBJECTIVE BOX
HPI:  45 yo M with PMHx of Intellectual Disability, DM not on any medications presents with foot wounds. Pt has very poor foot hygiene and per the sister, has been persistently scratching an open wound on his L second toe, which worsened and became more red, had drainage, malodor. He saw a physician at [facility] and was told to come to the ED for evaluation. Pt is poor historian. Does endorse abdominal pain for a few days, cannot give much more description. Per the sister, pt did not seem to have any recent fevers, however is also not a very good historian and does not seem to have good health literacy. States that her and her mom decided to stop giving pt Metformin a while ago, are treating his diabetes by not giving him any sugary foods.  In the ED, T 97.8, /87, , RR 16, SpO2 99% on RA. Lab work unrevealing.  (31 Mar 2022 22:36)    Currently admitted to medicine with the primary diagnosis of Onychomycosis       Today is hospital day 18d.     INTERVAL HPI / OVERNIGHT EVENTS:  Patient was examined and seen at bedside. This morning he is resting comfortably in bed and reports no new issues or overnight events.     ROS: Otherwise unremarkable     PAST MEDICAL & SURGICAL HISTORY  DM (diabetes mellitus)    Intellectual disability      ALLERGIES  penicillin (Unknown)    MEDICATIONS  STANDING MEDICATIONS  ammonium lactate 12% Lotion 1 Application(s) Topical two times a day  clotrimazole 1% Cream 1 Application(s) Topical two times a day  enoxaparin Injectable 40 milliGRAM(s) SubCutaneous every 24 hours  metFORMIN 1000 milliGRAM(s) Oral two times a day  pantoprazole    Tablet 40 milliGRAM(s) Oral before breakfast  vitamin A &amp; D Ointment 1 Application(s) Topical daily    PRN MEDICATIONS  ondansetron Injectable 4 milliGRAM(s) IV Push every 6 hours PRN    VITALS:  T(F): 98.3  HR: 76  BP: 106/61  RR: 18  SpO2: 99%    PHYSICAL EXAM  GEN: NAD, Resting comfortably in bed  PULM: Clear to auscultation bilaterally, No wheezes  CVS: Regular rate and rhythm, S1-S2, no murmurs  ABD: Soft, non-tender, non-distended, no guarding  EXT: No edema, onychomycosis  NEURO: A&Ox3, no focal deficits    LABS                        RADIOLOGY

## 2022-04-19 LAB
GLUCOSE BLDC GLUCOMTR-MCNC: 131 MG/DL — HIGH (ref 70–99)
GLUCOSE BLDC GLUCOMTR-MCNC: 154 MG/DL — HIGH (ref 70–99)
GLUCOSE BLDC GLUCOMTR-MCNC: 158 MG/DL — HIGH (ref 70–99)
GLUCOSE BLDC GLUCOMTR-MCNC: 209 MG/DL — HIGH (ref 70–99)

## 2022-04-19 PROCEDURE — 99231 SBSQ HOSP IP/OBS SF/LOW 25: CPT

## 2022-04-19 RX ADMIN — Medication 1 APPLICATION(S): at 11:30

## 2022-04-19 RX ADMIN — Medication 1 APPLICATION(S): at 17:34

## 2022-04-19 RX ADMIN — PANTOPRAZOLE SODIUM 40 MILLIGRAM(S): 20 TABLET, DELAYED RELEASE ORAL at 06:49

## 2022-04-19 RX ADMIN — Medication 1 APPLICATION(S): at 17:33

## 2022-04-19 RX ADMIN — METFORMIN HYDROCHLORIDE 1000 MILLIGRAM(S): 850 TABLET ORAL at 05:33

## 2022-04-19 RX ADMIN — METFORMIN HYDROCHLORIDE 1000 MILLIGRAM(S): 850 TABLET ORAL at 17:32

## 2022-04-19 RX ADMIN — ENOXAPARIN SODIUM 40 MILLIGRAM(S): 100 INJECTION SUBCUTANEOUS at 23:06

## 2022-04-19 RX ADMIN — Medication 1 APPLICATION(S): at 05:39

## 2022-04-19 RX ADMIN — Medication 1 APPLICATION(S): at 05:38

## 2022-04-19 NOTE — PROGRESS NOTE ADULT - SUBJECTIVE AND OBJECTIVE BOX
HPI:  45 yo M with PMHx of Intellectual Disability, DM not on any medications presents with foot wounds. Pt has very poor foot hygiene and per the sister, has been persistently scratching an open wound on his L second toe, which worsened and became more red, had drainage, malodor. He saw a physician at [facility] and was told to come to the ED for evaluation. Pt is poor historian. Does endorse abdominal pain for a few days, cannot give much more description. Per the sister, pt did not seem to have any recent fevers, however is also not a very good historian and does not seem to have good health literacy. States that her and her mom decided to stop giving pt Metformin a while ago, are treating his diabetes by not giving him any sugary foods.  In the ED, T 97.8, /87, , RR 16, SpO2 99% on RA. Lab work unrevealing.  (31 Mar 2022 22:36)    Currently admitted to medicine with the primary diagnosis of Onychomycosis       Today is hospital day 19d.     INTERVAL HPI / OVERNIGHT EVENTS:  Patient was examined and seen at bedside. This morning he is resting comfortably in bed and reports no new issues or overnight events.     ROS: Otherwise unremarkable     PAST MEDICAL & SURGICAL HISTORY  DM (diabetes mellitus)    Intellectual disability      ALLERGIES  penicillin (Unknown)    MEDICATIONS  STANDING MEDICATIONS  ammonium lactate 12% Lotion 1 Application(s) Topical two times a day  clotrimazole 1% Cream 1 Application(s) Topical two times a day  enoxaparin Injectable 40 milliGRAM(s) SubCutaneous every 24 hours  metFORMIN 1000 milliGRAM(s) Oral two times a day  pantoprazole    Tablet 40 milliGRAM(s) Oral before breakfast  vitamin A &amp; D Ointment 1 Application(s) Topical daily    PRN MEDICATIONS  ondansetron Injectable 4 milliGRAM(s) IV Push every 6 hours PRN    VITALS:  T(F): 97.1  HR: 80  BP: 125/69  RR: 18  SpO2: --    PHYSICAL EXAM  GEN: NAD, Resting comfortably in bed  PULM: Clear to auscultation bilaterally, No wheezes  CVS: Regular rate and rhythm, S1-S2, no murmurs  ABD: Soft, non-tender, non-distended, no guarding  EXT: No edema  NEURO: A&Ox3, no focal deficits    LABS                        RADIOLOGY

## 2022-04-19 NOTE — PROGRESS NOTE ADULT - ASSESSMENT
47 yo M with PMHx of Intellectual Disability, DM not on any medications presents with foot wounds. Pt has very poor foot hygiene and per the sister, has been persistently scratching an open wound on his L second toe, which worsened and became more red, had drainage, malodor. He saw a physician at [facility] and was told to come to the ED for eval.    #Onychomycosis   - antifungal, s/p ABX po (likely due to severe infection)  - seen by ID and podiatry eval  - Completed clindamycin  - PO terbinafine not suggested by ID or podiatry  - Per podiatry, no surgical intervention is advised, clotrimazole cream adequate     #uncontrolled diabetes mellitus   - hba1c is 8.9- patient does not take any medication at home  - started on metformin- dose increased to 1000 mg BID.    # Autism   - patient is high functioning but unable to take care of basics.    #Misc   - DVT prophylaxis: Lovenox   - GI prophylaxis: PPI   - Diet: CC  - Activity: IAT   - Disposition: CM following. Legal will obtain guardianship.  eval is done.

## 2022-04-20 LAB
GLUCOSE BLDC GLUCOMTR-MCNC: 131 MG/DL — HIGH (ref 70–99)
GLUCOSE BLDC GLUCOMTR-MCNC: 176 MG/DL — HIGH (ref 70–99)
GLUCOSE BLDC GLUCOMTR-MCNC: 210 MG/DL — HIGH (ref 70–99)
GLUCOSE BLDC GLUCOMTR-MCNC: 218 MG/DL — HIGH (ref 70–99)
GLUCOSE BLDC GLUCOMTR-MCNC: 291 MG/DL — HIGH (ref 70–99)

## 2022-04-20 PROCEDURE — 99231 SBSQ HOSP IP/OBS SF/LOW 25: CPT

## 2022-04-20 RX ADMIN — Medication 1 APPLICATION(S): at 05:17

## 2022-04-20 RX ADMIN — PANTOPRAZOLE SODIUM 40 MILLIGRAM(S): 20 TABLET, DELAYED RELEASE ORAL at 06:21

## 2022-04-20 RX ADMIN — Medication 1 APPLICATION(S): at 17:02

## 2022-04-20 RX ADMIN — METFORMIN HYDROCHLORIDE 1000 MILLIGRAM(S): 850 TABLET ORAL at 17:01

## 2022-04-20 RX ADMIN — METFORMIN HYDROCHLORIDE 1000 MILLIGRAM(S): 850 TABLET ORAL at 05:11

## 2022-04-20 RX ADMIN — Medication 1 APPLICATION(S): at 11:01

## 2022-04-20 NOTE — PROGRESS NOTE ADULT - SUBJECTIVE AND OBJECTIVE BOX
Progress Note:  Provider Speciality                            Hospitalist      CARIN WATSON MRN-[id number] 46y Male     CHIEF PRESENTING COMPLAINT:  Patient is a 46y old  Male who presents with a chief complaint of DFU (06 Apr 2022 12:18)        SUBJECTIVE:  Patient was seen and examined at bedside. No new complaints described by patient in morning rounds.   No significant overnight events reported.     HISTORY OF PRESENTING ILLNESS:  HPI:  47 yo M with PMHx of Intellectual Disability, DM not on any medications presents with foot wounds. Pt has very poor foot hygiene and per the sister, has been persistently scratching an open wound on his L second toe, which worsened and became more red, had drainage, malodor. He saw a physician at [facility] and was told to come to the ED for evaluation. Pt is poor historian. Does endorse abdominal pain for a few days, cannot give much more description. Per the sister, pt did not seem to have any recent fevers, however is also not a very good historian and does not seem to have good health literacy. States that her and her mom decided to stop giving pt Metformin a while ago, are treating his diabetes by not giving him any sugary foods.  In the ED, T 97.8, /87, , RR 16, SpO2 99% on RA. Lab work unrevealing.  (31 Mar 2022 22:36)        REVIEW OF SYSTEMS:  Patient denies any headache, any vision complaints, runny nose, fever, chills. Denies chest pain, shortness of breath, palpitation. Denies nausea, vomiting, abdominal pain or diarrhoea, Denies dysuria. Denies  localized weakness in any part of the body or numbness.   At least 10 systems were reviewed in ROS. All systems reviewed  are within normal limits except for the complaints as described in Subjective.    PAST MEDICAL & SURGICAL HISTORY:  PAST MEDICAL & SURGICAL HISTORY:  DM (diabetes mellitus)    Intellectual disability            VITAL SIGNS:  Vital Signs Last 24 Hrs  T(C): 35.7 (20 Apr 2022 13:32), Max: 36.4 (19 Apr 2022 15:14)  T(F): 96.2 (20 Apr 2022 13:32), Max: 97.6 (19 Apr 2022 15:14)  HR: 94 (20 Apr 2022 09:20) (67 - 94)  BP: 115/58 (20 Apr 2022 09:20) (103/53 - 135/83)  BP(mean): --  RR: 18 (20 Apr 2022 09:20) (17 - 18)  SpO2: --    PHYSICAL EXAMINATION:  Not in acute distress  General: No icterus  HEENT:   no JVD.  Heart: S1+S2 audible  Lungs: bilateral  moderate air entry, no wheezing, no crepitations.  Abdomen: Soft, non-tender, non-distended , no  rigidity or guarding.  CNS: Awake alert, CN  grossly intact.  Extremities:  No edema            CONSULTS:  Consultant(s) Notes Reviewed by me.   Care Discussed with Consultants/Other Providers where required.        MEDICATIONS:  MEDICATIONS  (STANDING):  ammonium lactate 12% Lotion 1 Application(s) Topical two times a day  clotrimazole 1% Cream 1 Application(s) Topical two times a day  enoxaparin Injectable 40 milliGRAM(s) SubCutaneous every 24 hours  metFORMIN 1000 milliGRAM(s) Oral two times a day  pantoprazole    Tablet 40 milliGRAM(s) Oral before breakfast  vitamin A &amp; D Ointment 1 Application(s) Topical daily    MEDICATIONS  (PRN):  ondansetron Injectable 4 milliGRAM(s) IV Push every 6 hours PRN Nausea and/or Vomiting            ASSESSMENT:    47 yo M with PMH of Intellectual Disability and DM not on any medications presented with foot wounds.    Onychomycosis  DM2  Intellectual disability/autism    Nail bed wound and onychomycosis  Completed clindamycin  Podiatry noted- no surgical intervention is advised. outpt podiatry f/u  DM type 2 - uncontrolled. A1C 8.9. Continue  metformin 1000mg BID  Handoff: Pending legal issue and guardianship, otherwise medically stable for discharge

## 2022-04-20 NOTE — PROGRESS NOTE ADULT - ASSESSMENT
45 yo M with PMHx of Intellectual Disability, DM not on any medications presents with foot wounds. Pt has very poor foot hygiene and per the sister, has been persistently scratching an open wound on his L second toe, which worsened and became more red, had drainage, malodor. He saw a physician at [facility] and was told to come to the ED for eval.    #Onychomycosis   - antifungal, s/p ABX po (likely due to severe infection)  - seen by ID and podiatry eval  - Completed clindamycin  - PO terbinafine not suggested by ID or podiatry  - Per podiatry, no surgical intervention is advised, clotrimazole cream adequate     #uncontrolled diabetes mellitus   - hba1c is 8.9- patient does not take any medication at home  - started on metformin- dose increased to 1000 mg BID.    # Autism   - patient is high functioning but unable to take care of basics.    #Misc   - DVT prophylaxis: Lovenox   - GI prophylaxis: PPI   - Diet: CC  - Activity: IAT   - Disposition: CM following. Legal will obtain guardianship.  eval is done.

## 2022-04-20 NOTE — PROGRESS NOTE ADULT - SUBJECTIVE AND OBJECTIVE BOX
HPI:  47 yo M with PMHx of Intellectual Disability, DM not on any medications presents with foot wounds. Pt has very poor foot hygiene and per the sister, has been persistently scratching an open wound on his L second toe, which worsened and became more red, had drainage, malodor. He saw a physician at [facility] and was told to come to the ED for evaluation. Pt is poor historian. Does endorse abdominal pain for a few days, cannot give much more description. Per the sister, pt did not seem to have any recent fevers, however is also not a very good historian and does not seem to have good health literacy. States that her and her mom decided to stop giving pt Metformin a while ago, are treating his diabetes by not giving him any sugary foods.  In the ED, T 97.8, /87, , RR 16, SpO2 99% on RA. Lab work unrevealing.  (31 Mar 2022 22:36)    Currently admitted to medicine with the primary diagnosis of Onychomycosis       Today is hospital day 20d.     INTERVAL HPI / OVERNIGHT EVENTS:  Patient was examined and seen at bedside. This morning he is resting comfortably in bed and reports no new issues or overnight events.     ROS: Otherwise unremarkable     PAST MEDICAL & SURGICAL HISTORY  DM (diabetes mellitus)    Intellectual disability      ALLERGIES  penicillin (Unknown)    MEDICATIONS  STANDING MEDICATIONS  ammonium lactate 12% Lotion 1 Application(s) Topical two times a day  clotrimazole 1% Cream 1 Application(s) Topical two times a day  enoxaparin Injectable 40 milliGRAM(s) SubCutaneous every 24 hours  metFORMIN 1000 milliGRAM(s) Oral two times a day  pantoprazole    Tablet 40 milliGRAM(s) Oral before breakfast  vitamin A &amp; D Ointment 1 Application(s) Topical daily    PRN MEDICATIONS  ondansetron Injectable 4 milliGRAM(s) IV Push every 6 hours PRN    VITALS:  T(F): 96.2  HR: 94  BP: 115/58  RR: 18  SpO2: --    PHYSICAL EXAM  GEN: NAD, Resting comfortably in bed  PULM: Clear to auscultation bilaterally, No wheezes  CVS: Regular rate and rhythm, S1-S2, no murmurs  ABD: Soft, non-tender, non-distended, no guarding  EXT: No edema, onychomycosis  NEURO: A&Ox3, no focal deficits    LABS                        RADIOLOGY

## 2022-04-21 LAB
ALBUMIN SERPL ELPH-MCNC: 4.6 G/DL — SIGNIFICANT CHANGE UP (ref 3.5–5.2)
ALP SERPL-CCNC: 118 U/L — HIGH (ref 30–115)
ALT FLD-CCNC: 77 U/L — HIGH (ref 0–41)
ANION GAP SERPL CALC-SCNC: 11 MMOL/L — SIGNIFICANT CHANGE UP (ref 7–14)
AST SERPL-CCNC: 37 U/L — SIGNIFICANT CHANGE UP (ref 0–41)
BILIRUB SERPL-MCNC: 0.4 MG/DL — SIGNIFICANT CHANGE UP (ref 0.2–1.2)
BUN SERPL-MCNC: 14 MG/DL — SIGNIFICANT CHANGE UP (ref 10–20)
CALCIUM SERPL-MCNC: 9.8 MG/DL — SIGNIFICANT CHANGE UP (ref 8.5–10.1)
CHLORIDE SERPL-SCNC: 99 MMOL/L — SIGNIFICANT CHANGE UP (ref 98–110)
CO2 SERPL-SCNC: 27 MMOL/L — SIGNIFICANT CHANGE UP (ref 17–32)
CREAT SERPL-MCNC: 0.7 MG/DL — SIGNIFICANT CHANGE UP (ref 0.7–1.5)
EGFR: 114 ML/MIN/1.73M2 — SIGNIFICANT CHANGE UP
GLUCOSE BLDC GLUCOMTR-MCNC: 142 MG/DL — HIGH (ref 70–99)
GLUCOSE BLDC GLUCOMTR-MCNC: 171 MG/DL — HIGH (ref 70–99)
GLUCOSE BLDC GLUCOMTR-MCNC: 218 MG/DL — HIGH (ref 70–99)
GLUCOSE BLDC GLUCOMTR-MCNC: 246 MG/DL — HIGH (ref 70–99)
GLUCOSE SERPL-MCNC: 189 MG/DL — HIGH (ref 70–99)
HCT VFR BLD CALC: 40.4 % — LOW (ref 42–52)
HGB BLD-MCNC: 13.7 G/DL — LOW (ref 14–18)
MAGNESIUM SERPL-MCNC: 1.7 MG/DL — LOW (ref 1.8–2.4)
MCHC RBC-ENTMCNC: 33 PG — HIGH (ref 27–31)
MCHC RBC-ENTMCNC: 33.9 G/DL — SIGNIFICANT CHANGE UP (ref 32–37)
MCV RBC AUTO: 97.3 FL — HIGH (ref 80–94)
NRBC # BLD: 0 /100 WBCS — SIGNIFICANT CHANGE UP (ref 0–0)
PLATELET # BLD AUTO: 307 K/UL — SIGNIFICANT CHANGE UP (ref 130–400)
POTASSIUM SERPL-MCNC: 4.5 MMOL/L — SIGNIFICANT CHANGE UP (ref 3.5–5)
POTASSIUM SERPL-SCNC: 4.5 MMOL/L — SIGNIFICANT CHANGE UP (ref 3.5–5)
PROT SERPL-MCNC: 7 G/DL — SIGNIFICANT CHANGE UP (ref 6–8)
RBC # BLD: 4.15 M/UL — LOW (ref 4.7–6.1)
RBC # FLD: 11.8 % — SIGNIFICANT CHANGE UP (ref 11.5–14.5)
SODIUM SERPL-SCNC: 137 MMOL/L — SIGNIFICANT CHANGE UP (ref 135–146)
WBC # BLD: 6.1 K/UL — SIGNIFICANT CHANGE UP (ref 4.8–10.8)
WBC # FLD AUTO: 6.1 K/UL — SIGNIFICANT CHANGE UP (ref 4.8–10.8)

## 2022-04-21 PROCEDURE — 99231 SBSQ HOSP IP/OBS SF/LOW 25: CPT

## 2022-04-21 RX ADMIN — Medication 1 APPLICATION(S): at 17:07

## 2022-04-21 RX ADMIN — Medication 1 APPLICATION(S): at 05:53

## 2022-04-21 RX ADMIN — Medication 1 APPLICATION(S): at 05:52

## 2022-04-21 RX ADMIN — PANTOPRAZOLE SODIUM 40 MILLIGRAM(S): 20 TABLET, DELAYED RELEASE ORAL at 07:49

## 2022-04-21 RX ADMIN — Medication 1 APPLICATION(S): at 11:17

## 2022-04-21 RX ADMIN — METFORMIN HYDROCHLORIDE 1000 MILLIGRAM(S): 850 TABLET ORAL at 17:06

## 2022-04-21 RX ADMIN — ENOXAPARIN SODIUM 40 MILLIGRAM(S): 100 INJECTION SUBCUTANEOUS at 23:35

## 2022-04-21 RX ADMIN — METFORMIN HYDROCHLORIDE 1000 MILLIGRAM(S): 850 TABLET ORAL at 05:57

## 2022-04-21 NOTE — PROGRESS NOTE ADULT - ASSESSMENT
47 yo M with PMHx of Intellectual Disability, DM not on any medications presents with foot wounds. Pt has very poor foot hygiene and per the sister, has been persistently scratching an open wound on his L second toe, which worsened and became more red, had drainage, malodor. He saw a physician at [facility] and was told to come to the ED for eval.    #Onychomycosis   - antifungal, s/p ABX po (likely due to severe infection)  - seen by ID and podiatry eval  - Completed clindamycin  - PO terbinafine not suggested by ID or podiatry  - Per podiatry, no surgical intervention is advised, clotrimazole cream adequate     #uncontrolled diabetes mellitus   - hba1c is 8.9- patient does not take any medication at home  - started on metformin- dose increased to 1000 mg BID.    # Autism   - patient is high functioning but unable to take care of basics.    #Misc   - DVT prophylaxis: Lovenox   - GI prophylaxis: PPI   - Diet: CC  - Activity: IAT   - Disposition: CM following. Legal will obtain guardianship.  eval is done.

## 2022-04-21 NOTE — PROGRESS NOTE ADULT - SUBJECTIVE AND OBJECTIVE BOX
HPI:  47 yo M with PMHx of Intellectual Disability, DM not on any medications presents with foot wounds. Pt has very poor foot hygiene and per the sister, has been persistently scratching an open wound on his L second toe, which worsened and became more red, had drainage, malodor. He saw a physician at [facility] and was told to come to the ED for evaluation. Pt is poor historian. Does endorse abdominal pain for a few days, cannot give much more description. Per the sister, pt did not seem to have any recent fevers, however is also not a very good historian and does not seem to have good health literacy. States that her and her mom decided to stop giving pt Metformin a while ago, are treating his diabetes by not giving him any sugary foods.  In the ED, T 97.8, /87, , RR 16, SpO2 99% on RA. Lab work unrevealing.  (31 Mar 2022 22:36)    Currently admitted to medicine with the primary diagnosis of Onychomycosis       Today is hospital day 21d.     INTERVAL HPI / OVERNIGHT EVENTS:  Patient was examined and seen at bedside. This morning he is resting comfortably in bed and reports no new issues or overnight events.     ROS: Otherwise unremarkable     PAST MEDICAL & SURGICAL HISTORY  DM (diabetes mellitus)    Intellectual disability      ALLERGIES  penicillin (Unknown)    MEDICATIONS  STANDING MEDICATIONS  ammonium lactate 12% Lotion 1 Application(s) Topical two times a day  clotrimazole 1% Cream 1 Application(s) Topical two times a day  enoxaparin Injectable 40 milliGRAM(s) SubCutaneous every 24 hours  metFORMIN 1000 milliGRAM(s) Oral two times a day  pantoprazole    Tablet 40 milliGRAM(s) Oral before breakfast  vitamin A &amp; D Ointment 1 Application(s) Topical daily    PRN MEDICATIONS  ondansetron Injectable 4 milliGRAM(s) IV Push every 6 hours PRN    VITALS:  T(F): 96.5  HR: 78  BP: 111/56  RR: 18  SpO2: 97%    PHYSICAL EXAM  GEN: NAD, Resting comfortably in bed  PULM: Clear to auscultation bilaterally, No wheezes  CVS: Regular rate and rhythm, S1-S2, no murmurs  ABD: Soft, non-tender, non-distended, no guarding  EXT: No edema, onychomycosis  NEURO: A&Ox3, no focal deficits    LABS                        RADIOLOGY

## 2022-04-21 NOTE — PROGRESS NOTE ADULT - SUBJECTIVE AND OBJECTIVE BOX
Progress Note:  Provider Speciality                            Hospitalist      CARIN WATSON MRN-[id number] 46y Male     CHIEF PRESENTING COMPLAINT:  Patient is a 46y old  Male who presents with a chief complaint of DFU (06 Apr 2022 12:18)        SUBJECTIVE:  Patient was seen and examined at bedside. No new complaints described by patient in morning rounds.   No significant overnight events reported.     HISTORY OF PRESENTING ILLNESS:  HPI:  45 yo M with PMHx of Intellectual Disability, DM not on any medications presents with foot wounds. Pt has very poor foot hygiene and per the sister, has been persistently scratching an open wound on his L second toe, which worsened and became more red, had drainage, malodor. He saw a physician at [facility] and was told to come to the ED for evaluation. Pt is poor historian. Does endorse abdominal pain for a few days, cannot give much more description. Per the sister, pt did not seem to have any recent fevers, however is also not a very good historian and does not seem to have good health literacy. States that her and her mom decided to stop giving pt Metformin a while ago, are treating his diabetes by not giving him any sugary foods.  In the ED, T 97.8, /87, , RR 16, SpO2 99% on RA. Lab work unrevealing.  (31 Mar 2022 22:36)        REVIEW OF SYSTEMS:  Patient denies any headache, any vision complaints, runny nose, fever, chills. Denies chest pain, shortness of breath, palpitation. Denies nausea, vomiting, abdominal pain or diarrhoea, Denies dysuria. Denies  localized weakness in any part of the body or numbness.   At least 10 systems were reviewed in ROS. All systems reviewed  are within normal limits except for the complaints as described in Subjective.    PAST MEDICAL & SURGICAL HISTORY:  PAST MEDICAL & SURGICAL HISTORY:  DM (diabetes mellitus)    Intellectual disability            VITAL SIGNS:  Vital Signs Last 24 Hrs  T(C): 35.8 (21 Apr 2022 08:55), Max: 36.2 (21 Apr 2022 00:00)  T(F): 96.5 (21 Apr 2022 08:55), Max: 97.2 (21 Apr 2022 00:00)  HR: 78 (21 Apr 2022 08:55) (78 - 97)  BP: 111/56 (21 Apr 2022 08:55) (111/56 - 118/60)  BP(mean): --  RR: 18 (21 Apr 2022 08:55) (18 - 18)  SpO2: --        PHYSICAL EXAMINATION:  Not in acute distress  General: No icterus  HEENT:   no JVD.  Heart: S1+S2 audible  Lungs: bilateral  moderate air entry, no wheezing, no crepitations.  Abdomen: Soft, non-tender, non-distended , no  rigidity or guarding.  CNS: Awake alert, CN  grossly intact.  Extremities:  No edema            CONSULTS:  Consultant(s) Notes Reviewed by me.   Care Discussed with Consultants/Other Providers where required.        MEDICATIONS:  MEDICATIONS  (STANDING):  ammonium lactate 12% Lotion 1 Application(s) Topical two times a day  clotrimazole 1% Cream 1 Application(s) Topical two times a day  enoxaparin Injectable 40 milliGRAM(s) SubCutaneous every 24 hours  metFORMIN 1000 milliGRAM(s) Oral two times a day  pantoprazole    Tablet 40 milliGRAM(s) Oral before breakfast  vitamin A &amp; D Ointment 1 Application(s) Topical daily    MEDICATIONS  (PRN):  ondansetron Injectable 4 milliGRAM(s) IV Push every 6 hours PRN Nausea and/or Vomiting            ASSESSMENT:    45 yo M with PMH of Intellectual Disability and DM not on any medications presented with foot wounds.    Onychomycosis  DM2  Intellectual disability/autism    Nail bed wound and onychomycosis  Completed clindamycin  Podiatry noted- no surgical intervention is advised. outpt podiatry f/u  DM type 2 - uncontrolled. A1C 8.9. Continue  metformin 1000mg BID  Handoff: Pending legal issue and guardianship, otherwise medically stable for discharge

## 2022-04-22 LAB
GLUCOSE BLDC GLUCOMTR-MCNC: 119 MG/DL — HIGH (ref 70–99)
GLUCOSE BLDC GLUCOMTR-MCNC: 200 MG/DL — HIGH (ref 70–99)
GLUCOSE BLDC GLUCOMTR-MCNC: 233 MG/DL — HIGH (ref 70–99)

## 2022-04-22 PROCEDURE — 99231 SBSQ HOSP IP/OBS SF/LOW 25: CPT

## 2022-04-22 RX ADMIN — PANTOPRAZOLE SODIUM 40 MILLIGRAM(S): 20 TABLET, DELAYED RELEASE ORAL at 06:40

## 2022-04-22 RX ADMIN — METFORMIN HYDROCHLORIDE 1000 MILLIGRAM(S): 850 TABLET ORAL at 17:01

## 2022-04-22 RX ADMIN — Medication 1 APPLICATION(S): at 06:43

## 2022-04-22 RX ADMIN — Medication 1 APPLICATION(S): at 17:01

## 2022-04-22 RX ADMIN — Medication 1 APPLICATION(S): at 11:02

## 2022-04-22 RX ADMIN — Medication 1 APPLICATION(S): at 06:42

## 2022-04-22 RX ADMIN — Medication 1 APPLICATION(S): at 17:37

## 2022-04-22 RX ADMIN — ENOXAPARIN SODIUM 40 MILLIGRAM(S): 100 INJECTION SUBCUTANEOUS at 23:10

## 2022-04-22 RX ADMIN — METFORMIN HYDROCHLORIDE 1000 MILLIGRAM(S): 850 TABLET ORAL at 06:41

## 2022-04-22 NOTE — PROGRESS NOTE ADULT - SUBJECTIVE AND OBJECTIVE BOX
HPI:  45 yo M with PMHx of Intellectual Disability, DM not on any medications presents with foot wounds. Pt has very poor foot hygiene and per the sister, has been persistently scratching an open wound on his L second toe, which worsened and became more red, had drainage, malodor. He saw a physician at [facility] and was told to come to the ED for evaluation. Pt is poor historian. Does endorse abdominal pain for a few days, cannot give much more description. Per the sister, pt did not seem to have any recent fevers, however is also not a very good historian and does not seem to have good health literacy. States that her and her mom decided to stop giving pt Metformin a while ago, are treating his diabetes by not giving him any sugary foods.  In the ED, T 97.8, /87, , RR 16, SpO2 99% on RA. Lab work unrevealing.  (31 Mar 2022 22:36)    Currently admitted to medicine with the primary diagnosis of Onychomycosis       Today is hospital day 22d.     INTERVAL HPI / OVERNIGHT EVENTS:  Patient was examined and seen at bedside. This morning he is resting comfortably in bed and reports no new issues or overnight events.     ROS: Otherwise unremarkable     PAST MEDICAL & SURGICAL HISTORY  DM (diabetes mellitus)    Intellectual disability      ALLERGIES  penicillin (Unknown)    MEDICATIONS  STANDING MEDICATIONS  ammonium lactate 12% Lotion 1 Application(s) Topical two times a day  clotrimazole 1% Cream 1 Application(s) Topical two times a day  enoxaparin Injectable 40 milliGRAM(s) SubCutaneous every 24 hours  metFORMIN 1000 milliGRAM(s) Oral two times a day  pantoprazole    Tablet 40 milliGRAM(s) Oral before breakfast  vitamin A &amp; D Ointment 1 Application(s) Topical daily    PRN MEDICATIONS  ondansetron Injectable 4 milliGRAM(s) IV Push every 6 hours PRN    VITALS:  T(F): 97.1  HR: 77  BP: 108/72  RR: 18  SpO2: 98%    PHYSICAL EXAM  GEN: NAD, Resting comfortably in bed  PULM: Clear to auscultation bilaterally, No wheezes  CVS: Regular rate and rhythm, S1-S2, no murmurs  ABD: Soft, non-tender, non-distended, no guarding  EXT: No edema, onychomycosis   NEURO: A&Ox3, no focal deficits    LABS                        13.7   6.10  )-----------( 307      ( 21 Apr 2022 11:39 )             40.4     04-21    137  |  99  |  14  ----------------------------<  189<H>  4.5   |  27  |  0.7    Ca    9.8      21 Apr 2022 11:39  Mg     1.7     04-21    TPro  7.0  /  Alb  4.6  /  TBili  0.4  /  DBili  x   /  AST  37  /  ALT  77<H>  /  AlkPhos  118<H>  04-21                  RADIOLOGY

## 2022-04-22 NOTE — PROGRESS NOTE ADULT - SUBJECTIVE AND OBJECTIVE BOX
Progress Note:  Provider Speciality                            Hospitalist      CARIN WATSON MRN-[id number] 46y Male     CHIEF PRESENTING COMPLAINT:  Patient is a 46y old  Male who presents with a chief complaint of DFU (06 Apr 2022 12:18)        SUBJECTIVE:  Patient was seen and examined at bedside. No new complaints described by patient in morning rounds.   No significant overnight events reported.     HISTORY OF PRESENTING ILLNESS:  HPI:  45 yo M with PMHx of Intellectual Disability, DM not on any medications presents with foot wounds. Pt has very poor foot hygiene and per the sister, has been persistently scratching an open wound on his L second toe, which worsened and became more red, had drainage, malodor. He saw a physician at [facility] and was told to come to the ED for evaluation. Pt is poor historian. Does endorse abdominal pain for a few days, cannot give much more description. Per the sister, pt did not seem to have any recent fevers, however is also not a very good historian and does not seem to have good health literacy. States that her and her mom decided to stop giving pt Metformin a while ago, are treating his diabetes by not giving him any sugary foods.  In the ED, T 97.8, /87, , RR 16, SpO2 99% on RA. Lab work unrevealing.  (31 Mar 2022 22:36)        REVIEW OF SYSTEMS:  Patient denies any headache, any vision complaints, runny nose, fever, chills. Denies chest pain, shortness of breath, palpitation. Denies nausea, vomiting, abdominal pain or diarrhoea, Denies dysuria. Denies  localized weakness in any part of the body or numbness.   At least 10 systems were reviewed in ROS. All systems reviewed  are within normal limits except for the complaints as described in Subjective.    PAST MEDICAL & SURGICAL HISTORY:  PAST MEDICAL & SURGICAL HISTORY:  DM (diabetes mellitus)    Intellectual disability            VITAL SIGNS:  Vital Signs Last 24 Hrs  T(C): 36.2 (22 Apr 2022 08:10), Max: 36.2 (22 Apr 2022 08:10)  T(F): 97.1 (22 Apr 2022 08:10), Max: 97.1 (22 Apr 2022 08:10)  HR: 77 (22 Apr 2022 08:10) (77 - 87)  BP: 108/72 (22 Apr 2022 08:10) (103/51 - 109/66)  BP(mean): --  RR: 18 (22 Apr 2022 08:10) (18 - 18)  SpO2: 98% (21 Apr 2022 15:12) (98% - 98%)        PHYSICAL EXAMINATION:  Not in acute distress  General: No icterus  HEENT:   no JVD.  Heart: S1+S2 audible  Lungs: bilateral  moderate air entry, no wheezing, no crepitations.  Abdomen: Soft, non-tender, non-distended , no  rigidity or guarding.  CNS: Awake alert, CN  grossly intact.  Extremities:  No edema            CONSULTS:  Consultant(s) Notes Reviewed by me.   Care Discussed with Consultants/Other Providers where required.        MEDICATIONS:  MEDICATIONS  (STANDING):  ammonium lactate 12% Lotion 1 Application(s) Topical two times a day  clotrimazole 1% Cream 1 Application(s) Topical two times a day  enoxaparin Injectable 40 milliGRAM(s) SubCutaneous every 24 hours  metFORMIN 1000 milliGRAM(s) Oral two times a day  pantoprazole    Tablet 40 milliGRAM(s) Oral before breakfast  vitamin A &amp; D Ointment 1 Application(s) Topical daily    MEDICATIONS  (PRN):  ondansetron Injectable 4 milliGRAM(s) IV Push every 6 hours PRN Nausea and/or Vomiting            ASSESSMENT:    45 yo M with PMH of Intellectual Disability and DM not on any medications presented with foot wounds.    Onychomycosis  DM2  Intellectual disability/autism    Nail bed wound and onychomycosis  Completed clindamycin  Podiatry noted- no surgical intervention is advised. outpt podiatry f/u  DM type 2 - uncontrolled. A1C 8.9. Continue  metformin 1000mg BID  Handoff: Pending legal issue and guardianship, otherwise medically stable for discharge

## 2022-04-22 NOTE — PROGRESS NOTE ADULT - ASSESSMENT
47 yo M with PMHx of Intellectual Disability, DM not on any medications presents with foot wounds. Pt has very poor foot hygiene and per the sister, has been persistently scratching an open wound on his L second toe, which worsened and became more red, had drainage, malodor. He saw a physician at [facility] and was told to come to the ED for eval.    #Onychomycosis   - antifungal, s/p ABX po (likely due to severe infection)  - seen by ID and podiatry eval  - Completed clindamycin  - PO terbinafine not suggested by ID or podiatry  - Per podiatry, no surgical intervention is advised, clotrimazole cream adequate     #uncontrolled diabetes mellitus   - hba1c is 8.9- patient does not take any medication at home  - started on metformin- dose increased to 1000 mg BID.    # Autism   - patient is high functioning but unable to take care of basics.    #Misc   - DVT prophylaxis: Lovenox   - GI prophylaxis: PPI   - Diet: CC  - Activity: IAT   - Disposition: CM following. Legal will obtain guardianship.  eval is done.

## 2022-04-23 LAB
GLUCOSE BLDC GLUCOMTR-MCNC: 145 MG/DL — HIGH (ref 70–99)
GLUCOSE BLDC GLUCOMTR-MCNC: 250 MG/DL — HIGH (ref 70–99)
GLUCOSE BLDC GLUCOMTR-MCNC: 261 MG/DL — HIGH (ref 70–99)
GLUCOSE BLDC GLUCOMTR-MCNC: 97 MG/DL — SIGNIFICANT CHANGE UP (ref 70–99)

## 2022-04-23 PROCEDURE — 99231 SBSQ HOSP IP/OBS SF/LOW 25: CPT

## 2022-04-23 RX ADMIN — METFORMIN HYDROCHLORIDE 1000 MILLIGRAM(S): 850 TABLET ORAL at 17:21

## 2022-04-23 RX ADMIN — METFORMIN HYDROCHLORIDE 1000 MILLIGRAM(S): 850 TABLET ORAL at 05:04

## 2022-04-23 RX ADMIN — Medication 1 APPLICATION(S): at 17:21

## 2022-04-23 RX ADMIN — Medication 1 APPLICATION(S): at 05:06

## 2022-04-23 RX ADMIN — Medication 1 APPLICATION(S): at 12:23

## 2022-04-23 RX ADMIN — ENOXAPARIN SODIUM 40 MILLIGRAM(S): 100 INJECTION SUBCUTANEOUS at 22:35

## 2022-04-23 RX ADMIN — PANTOPRAZOLE SODIUM 40 MILLIGRAM(S): 20 TABLET, DELAYED RELEASE ORAL at 08:21

## 2022-04-23 NOTE — PROGRESS NOTE ADULT - SUBJECTIVE AND OBJECTIVE BOX
Progress Note:  Provider Speciality                            Hospitalist      CARIN WATSON MRN-[id number] 46y Male     CHIEF PRESENTING COMPLAINT:  Patient is a 46y old  Male who presents with a chief complaint of DFU (06 Apr 2022 12:18)        SUBJECTIVE:  Patient was seen and examined at bedside. No new complaints described by patient in morning rounds.   No significant overnight events reported.     HISTORY OF PRESENTING ILLNESS:  HPI:  45 yo M with PMHx of Intellectual Disability, DM not on any medications presents with foot wounds. Pt has very poor foot hygiene and per the sister, has been persistently scratching an open wound on his L second toe, which worsened and became more red, had drainage, malodor. He saw a physician at [facility] and was told to come to the ED for evaluation. Pt is poor historian. Does endorse abdominal pain for a few days, cannot give much more description. Per the sister, pt did not seem to have any recent fevers, however is also not a very good historian and does not seem to have good health literacy. States that her and her mom decided to stop giving pt Metformin a while ago, are treating his diabetes by not giving him any sugary foods.  In the ED, T 97.8, /87, , RR 16, SpO2 99% on RA. Lab work unrevealing.  (31 Mar 2022 22:36)        REVIEW OF SYSTEMS:  Patient denies any headache, any vision complaints, runny nose, fever, chills. Denies chest pain, shortness of breath, palpitation. Denies nausea, vomiting, abdominal pain or diarrhoea, Denies dysuria. Denies  localized weakness in any part of the body or numbness.   At least 10 systems were reviewed in ROS. All systems reviewed  are within normal limits except for the complaints as described in Subjective.    PAST MEDICAL & SURGICAL HISTORY:  PAST MEDICAL & SURGICAL HISTORY:  DM (diabetes mellitus)    Intellectual disability            VITAL SIGNS:  Vital Signs Last 24 Hrs  T(C): 35.7 (23 Apr 2022 08:10), Max: 36.3 (22 Apr 2022 15:35)  T(F): 96.3 (23 Apr 2022 08:10), Max: 97.4 (22 Apr 2022 15:35)  HR: 71 (23 Apr 2022 08:10) (71 - 98)  BP: 109/59 (23 Apr 2022 08:10) (105/55 - 109/59)  BP(mean): --  RR: 18 (23 Apr 2022 08:10) (18 - 18)  SpO2: --      PHYSICAL EXAMINATION:  Not in acute distress  General: No icterus  HEENT:   no JVD.  Heart: S1+S2 audible  Lungs: bilateral  moderate air entry, no wheezing, no crepitations.  Abdomen: Soft, non-tender, non-distended , no  rigidity or guarding.  CNS: Awake alert, CN  grossly intact.  Extremities:  No edema            CONSULTS:  Consultant(s) Notes Reviewed by me.   Care Discussed with Consultants/Other Providers where required.        MEDICATIONS:  MEDICATIONS  (STANDING):  ammonium lactate 12% Lotion 1 Application(s) Topical two times a day  clotrimazole 1% Cream 1 Application(s) Topical two times a day  enoxaparin Injectable 40 milliGRAM(s) SubCutaneous every 24 hours  metFORMIN 1000 milliGRAM(s) Oral two times a day  pantoprazole    Tablet 40 milliGRAM(s) Oral before breakfast  vitamin A &amp; D Ointment 1 Application(s) Topical daily    MEDICATIONS  (PRN):  ondansetron Injectable 4 milliGRAM(s) IV Push every 6 hours PRN Nausea and/or Vomiting            ASSESSMENT:    45 yo M with PMH of Intellectual Disability and DM not on any medications presented with foot wounds.    Onychomycosis  DM2  Intellectual disability/autism    Nail bed wound and onychomycosis  Completed clindamycin  Podiatry noted- no surgical intervention is advised. outpt podiatry f/u  DM type 2 - uncontrolled. A1C 8.9. Continue  metformin 1000mg BID  Handoff: Pending legal issue and guardianship, otherwise medically stable for discharge

## 2022-04-23 NOTE — PROGRESS NOTE ADULT - ASSESSMENT
47 yo M with PMHx of Intellectual Disability, DM not on any medications presents with foot wounds. Pt has very poor foot hygiene and per the sister, has been persistently scratching an open wound on his L second toe, which worsened and became more red, had drainage, malodor. He saw a physician at [facility] and was told to come to the ED for eval.    #Onychomycosis   - antifungal, s/p ABX po (likely due to severe infection)  - seen by ID and podiatry eval  - Completed clindamycin  - PO terbinafine not suggested by ID or podiatry  - Per podiatry, no surgical intervention is advised, clotrimazole cream adequate     #uncontrolled diabetes mellitus   - hba1c is 8.9- patient does not take any medication at home  - started on metformin- dose increased to 1000 mg BID.    # Autism   - patient is high functioning but unable to take care of basics.    #Misc   - DVT prophylaxis: Lovenox   - GI prophylaxis: PPI   - Diet: CC  - Activity: IAT   - Disposition: CM following. Legal will obtain guardianship.  eval is done.

## 2022-04-23 NOTE — PROGRESS NOTE ADULT - SUBJECTIVE AND OBJECTIVE BOX
HPI:  45 yo M with PMHx of Intellectual Disability, DM not on any medications presents with foot wounds. Pt has very poor foot hygiene and per the sister, has been persistently scratching an open wound on his L second toe, which worsened and became more red, had drainage, malodor. He saw a physician at [facility] and was told to come to the ED for evaluation. Pt is poor historian. Does endorse abdominal pain for a few days, cannot give much more description. Per the sister, pt did not seem to have any recent fevers, however is also not a very good historian and does not seem to have good health literacy. States that her and her mom decided to stop giving pt Metformin a while ago, are treating his diabetes by not giving him any sugary foods.  In the ED, T 97.8, /87, , RR 16, SpO2 99% on RA. Lab work unrevealing.  (31 Mar 2022 22:36)    Currently admitted to medicine with the primary diagnosis of Onychomycosis       Today is hospital day 23d.     INTERVAL HPI / OVERNIGHT EVENTS:  Patient was examined and seen at bedside. This morning he is resting comfortably in bed and reports no new issues or overnight events.     ROS: Otherwise unremarkable     PAST MEDICAL & SURGICAL HISTORY  DM (diabetes mellitus)    Intellectual disability      ALLERGIES  penicillin (Unknown)    MEDICATIONS  STANDING MEDICATIONS  ammonium lactate 12% Lotion 1 Application(s) Topical two times a day  clotrimazole 1% Cream 1 Application(s) Topical two times a day  enoxaparin Injectable 40 milliGRAM(s) SubCutaneous every 24 hours  metFORMIN 1000 milliGRAM(s) Oral two times a day  pantoprazole    Tablet 40 milliGRAM(s) Oral before breakfast  vitamin A &amp; D Ointment 1 Application(s) Topical daily    PRN MEDICATIONS  ondansetron Injectable 4 milliGRAM(s) IV Push every 6 hours PRN    VITALS:  T(F): 96.3  HR: 71  BP: 109/59  RR: 18  SpO2: --    PHYSICAL EXAM  GEN: NAD, Resting comfortably in bed  PULM: Clear to auscultation bilaterally, No wheezes  CVS: Regular rate and rhythm, S1-S2, no murmurs  ABD: Soft, non-tender, non-distended, no guarding  EXT: No edema, onychomycosis   NEURO: A&Ox3, no focal deficits    LABS                        13.7   6.10  )-----------( 307      ( 21 Apr 2022 11:39 )             40.4     04-21    137  |  99  |  14  ----------------------------<  189<H>  4.5   |  27  |  0.7    Ca    9.8      21 Apr 2022 11:39  Mg     1.7     04-21    TPro  7.0  /  Alb  4.6  /  TBili  0.4  /  DBili  x   /  AST  37  /  ALT  77<H>  /  AlkPhos  118<H>  04-21                  RADIOLOGY

## 2022-04-24 LAB
GLUCOSE BLDC GLUCOMTR-MCNC: 142 MG/DL — HIGH (ref 70–99)
GLUCOSE BLDC GLUCOMTR-MCNC: 187 MG/DL — HIGH (ref 70–99)
GLUCOSE BLDC GLUCOMTR-MCNC: 237 MG/DL — HIGH (ref 70–99)
GLUCOSE BLDC GLUCOMTR-MCNC: 259 MG/DL — HIGH (ref 70–99)

## 2022-04-24 PROCEDURE — 99231 SBSQ HOSP IP/OBS SF/LOW 25: CPT

## 2022-04-24 RX ADMIN — Medication 1 APPLICATION(S): at 17:33

## 2022-04-24 RX ADMIN — Medication 1 APPLICATION(S): at 17:32

## 2022-04-24 RX ADMIN — Medication 1 APPLICATION(S): at 06:17

## 2022-04-24 RX ADMIN — METFORMIN HYDROCHLORIDE 1000 MILLIGRAM(S): 850 TABLET ORAL at 17:33

## 2022-04-24 RX ADMIN — ENOXAPARIN SODIUM 40 MILLIGRAM(S): 100 INJECTION SUBCUTANEOUS at 22:25

## 2022-04-24 RX ADMIN — PANTOPRAZOLE SODIUM 40 MILLIGRAM(S): 20 TABLET, DELAYED RELEASE ORAL at 06:18

## 2022-04-24 RX ADMIN — METFORMIN HYDROCHLORIDE 1000 MILLIGRAM(S): 850 TABLET ORAL at 05:52

## 2022-04-24 RX ADMIN — Medication 1 APPLICATION(S): at 11:30

## 2022-04-24 NOTE — PROGRESS NOTE ADULT - SUBJECTIVE AND OBJECTIVE BOX
Progress Note:  Provider Speciality                            Hospitalist      CARIN WATSON MRN-[id number] 46y Male     CHIEF PRESENTING COMPLAINT:  Patient is a 46y old  Male who presents with a chief complaint of DFU (06 Apr 2022 12:18)        SUBJECTIVE:  Patient was seen and examined at bedside. No new complaints described by patient in morning rounds.   No significant overnight events reported.     HISTORY OF PRESENTING ILLNESS:  HPI:  47 yo M with PMHx of Intellectual Disability, DM not on any medications presents with foot wounds. Pt has very poor foot hygiene and per the sister, has been persistently scratching an open wound on his L second toe, which worsened and became more red, had drainage, malodor. He saw a physician at [facility] and was told to come to the ED for evaluation. Pt is poor historian. Does endorse abdominal pain for a few days, cannot give much more description. Per the sister, pt did not seem to have any recent fevers, however is also not a very good historian and does not seem to have good health literacy. States that her and her mom decided to stop giving pt Metformin a while ago, are treating his diabetes by not giving him any sugary foods.  In the ED, T 97.8, /87, , RR 16, SpO2 99% on RA. Lab work unrevealing.  (31 Mar 2022 22:36)        REVIEW OF SYSTEMS:  Patient denies any headache, any vision complaints, runny nose, fever, chills. Denies chest pain, shortness of breath, palpitation. Denies nausea, vomiting, abdominal pain or diarrhoea, Denies dysuria. Denies  localized weakness in any part of the body or numbness.   At least 10 systems were reviewed in ROS. All systems reviewed  are within normal limits except for the complaints as described in Subjective.    PAST MEDICAL & SURGICAL HISTORY:  PAST MEDICAL & SURGICAL HISTORY:  DM (diabetes mellitus)    Intellectual disability            VITAL SIGNS:  Vital Signs Last 24 Hrs  T(C): 37 (24 Apr 2022 07:39), Max: 37 (24 Apr 2022 07:39)  T(F): 98.6 (24 Apr 2022 07:39), Max: 98.6 (24 Apr 2022 07:39)  HR: 87 (24 Apr 2022 07:39) (87 - 92)  BP: 124/59 (24 Apr 2022 07:39) (98/49 - 124/59)  BP(mean): --  RR: 16 (24 Apr 2022 07:39) (16 - 18)  SpO2: --    PHYSICAL EXAMINATION:  Not in acute distress  General: No icterus  HEENT:   no JVD.  Heart: S1+S2 audible  Lungs: bilateral  moderate air entry, no wheezing, no crepitations.  Abdomen: Soft, non-tender, non-distended , no  rigidity or guarding.  CNS: Awake alert, CN  grossly intact.  Extremities:  No edema            CONSULTS:  Consultant(s) Notes Reviewed by me.   Care Discussed with Consultants/Other Providers where required.        MEDICATIONS:  MEDICATIONS  (STANDING):  ammonium lactate 12% Lotion 1 Application(s) Topical two times a day  clotrimazole 1% Cream 1 Application(s) Topical two times a day  enoxaparin Injectable 40 milliGRAM(s) SubCutaneous every 24 hours  metFORMIN 1000 milliGRAM(s) Oral two times a day  pantoprazole    Tablet 40 milliGRAM(s) Oral before breakfast  vitamin A &amp; D Ointment 1 Application(s) Topical daily    MEDICATIONS  (PRN):  ondansetron Injectable 4 milliGRAM(s) IV Push every 6 hours PRN Nausea and/or Vomiting            ASSESSMENT:    47 yo M with PMH of Intellectual Disability and DM not on any medications presented with foot wounds.    Onychomycosis  DM2  Intellectual disability/autism    Nail bed wound and onychomycosis  Completed clindamycin  Podiatry noted- no surgical intervention is advised. outpt podiatry f/u  DM type 2 - uncontrolled. A1C 8.9. Continue  metformin 1000mg BID  Handoff: Pending legal issue and guardianship, otherwise medically stable for discharge

## 2022-04-25 LAB
GLUCOSE BLDC GLUCOMTR-MCNC: 131 MG/DL — HIGH (ref 70–99)
GLUCOSE BLDC GLUCOMTR-MCNC: 234 MG/DL — HIGH (ref 70–99)
GLUCOSE BLDC GLUCOMTR-MCNC: 239 MG/DL — HIGH (ref 70–99)
GLUCOSE BLDC GLUCOMTR-MCNC: 264 MG/DL — HIGH (ref 70–99)

## 2022-04-25 PROCEDURE — 99231 SBSQ HOSP IP/OBS SF/LOW 25: CPT

## 2022-04-25 RX ORDER — INSULIN LISPRO 100/ML
4 VIAL (ML) SUBCUTANEOUS ONCE
Refills: 0 | Status: COMPLETED | OUTPATIENT
Start: 2022-04-25 | End: 2022-04-25

## 2022-04-25 RX ADMIN — METFORMIN HYDROCHLORIDE 1000 MILLIGRAM(S): 850 TABLET ORAL at 17:18

## 2022-04-25 RX ADMIN — Medication 1 APPLICATION(S): at 17:18

## 2022-04-25 RX ADMIN — Medication 1 APPLICATION(S): at 06:10

## 2022-04-25 RX ADMIN — PANTOPRAZOLE SODIUM 40 MILLIGRAM(S): 20 TABLET, DELAYED RELEASE ORAL at 06:10

## 2022-04-25 RX ADMIN — Medication 4 UNIT(S): at 22:42

## 2022-04-25 RX ADMIN — Medication 1 APPLICATION(S): at 06:09

## 2022-04-25 RX ADMIN — Medication 1 APPLICATION(S): at 11:34

## 2022-04-25 RX ADMIN — METFORMIN HYDROCHLORIDE 1000 MILLIGRAM(S): 850 TABLET ORAL at 05:34

## 2022-04-25 NOTE — PROGRESS NOTE ADULT - SUBJECTIVE AND OBJECTIVE BOX
HPI:  47 yo M with PMHx of Intellectual Disability, DM not on any medications presents with foot wounds. Pt has very poor foot hygiene and per the sister, has been persistently scratching an open wound on his L second toe, which worsened and became more red, had drainage, malodor. He saw a physician at [facility] and was told to come to the ED for evaluation. Pt is poor historian. Does endorse abdominal pain for a few days, cannot give much more description. Per the sister, pt did not seem to have any recent fevers, however is also not a very good historian and does not seem to have good health literacy. States that her and her mom decided to stop giving pt Metformin a while ago, are treating his diabetes by not giving him any sugary foods.  In the ED, T 97.8, /87, , RR 16, SpO2 99% on RA. Lab work unrevealing.  (31 Mar 2022 22:36)    Currently admitted to medicine with the primary diagnosis of Onychomycosis       Today is hospital day 25d.     INTERVAL HPI / OVERNIGHT EVENTS:  Patient was examined and seen at bedside. This morning he is resting comfortably in bed and reports no new issues or overnight events.     ROS: Otherwise unremarkable     PAST MEDICAL & SURGICAL HISTORY  DM (diabetes mellitus)    Intellectual disability      ALLERGIES  penicillin (Unknown)    MEDICATIONS  STANDING MEDICATIONS  ammonium lactate 12% Lotion 1 Application(s) Topical two times a day  clotrimazole 1% Cream 1 Application(s) Topical two times a day  enoxaparin Injectable 40 milliGRAM(s) SubCutaneous every 24 hours  metFORMIN 1000 milliGRAM(s) Oral two times a day  pantoprazole    Tablet 40 milliGRAM(s) Oral before breakfast  vitamin A &amp; D Ointment 1 Application(s) Topical daily    PRN MEDICATIONS  ondansetron Injectable 4 milliGRAM(s) IV Push every 6 hours PRN    VITALS:  T(F): 97.1  HR: 92  BP: 88/64  RR: 18  SpO2: --    PHYSICAL EXAM  GEN: NAD, Resting comfortably in bed  PULM: Clear to auscultation bilaterally, No wheezes  CVS: Regular rate and rhythm, S1-S2, no murmurs  ABD: Soft, non-tender, non-distended, no guarding  EXT: No edema, onychomycosis   NEURO: A&Ox3, no focal deficits    LABS                        RADIOLOGY

## 2022-04-25 NOTE — PROGRESS NOTE ADULT - ASSESSMENT
47 yo M with PMHx of Intellectual Disability, DM not on any medications presents with foot wounds. Pt has very poor foot hygiene and per the sister, has been persistently scratching an open wound on his L second toe, which worsened and became more red, had drainage, malodor. He saw a physician at [facility] and was told to come to the ED for eval.    #Onychomycosis   - antifungal, s/p ABX po (likely due to severe infection)  - seen by ID and podiatry eval  - Completed clindamycin  - PO terbinafine not suggested by ID or podiatry  - Per podiatry, no surgical intervention is advised, clotrimazole cream adequate     #uncontrolled diabetes mellitus   - hba1c is 8.9- patient does not take any medication at home  - started on metformin- dose increased to 1000 mg BID.    # Autism   - patient is high functioning but unable to take care of basics.    #Misc   - DVT prophylaxis: Lovenox   - GI prophylaxis: PPI   - Diet: CC  - Activity: IAT   - Disposition: CM following. Legal will obtain guardianship.  eval is done.

## 2022-04-25 NOTE — PROGRESS NOTE ADULT - SUBJECTIVE AND OBJECTIVE BOX
Progress Note:  Provider Speciality                            Hospitalist      CARIN WATSON MRN-[id number] 46y Male     CHIEF PRESENTING COMPLAINT:  Patient is a 46y old  Male who presents with a chief complaint of DFU (06 Apr 2022 12:18)        SUBJECTIVE:  Patient was seen and examined at bedside. No new complaints described by patient in morning rounds.   No significant overnight events reported.     HISTORY OF PRESENTING ILLNESS:  HPI:  47 yo M with PMHx of Intellectual Disability, DM not on any medications presents with foot wounds. Pt has very poor foot hygiene and per the sister, has been persistently scratching an open wound on his L second toe, which worsened and became more red, had drainage, malodor. He saw a physician at [facility] and was told to come to the ED for evaluation. Pt is poor historian. Does endorse abdominal pain for a few days, cannot give much more description. Per the sister, pt did not seem to have any recent fevers, however is also not a very good historian and does not seem to have good health literacy. States that her and her mom decided to stop giving pt Metformin a while ago, are treating his diabetes by not giving him any sugary foods.  In the ED, T 97.8, /87, , RR 16, SpO2 99% on RA. Lab work unrevealing.  (31 Mar 2022 22:36)        REVIEW OF SYSTEMS:  Patient denies any headache, any vision complaints, runny nose, fever, chills. Denies chest pain, shortness of breath, palpitation. Denies nausea, vomiting, abdominal pain or diarrhoea, Denies dysuria. Denies  localized weakness in any part of the body or numbness.   At least 10 systems were reviewed in ROS. All systems reviewed  are within normal limits except for the complaints as described in Subjective.    PAST MEDICAL & SURGICAL HISTORY:  PAST MEDICAL & SURGICAL HISTORY:  DM (diabetes mellitus)    Intellectual disability            VITAL SIGNS:  Vital Signs Last 24 Hrs  T(C): 36.2 (25 Apr 2022 07:38), Max: 36.6 (25 Apr 2022 00:00)  T(F): 97.1 (25 Apr 2022 07:38), Max: 97.9 (25 Apr 2022 00:00)  HR: 92 (25 Apr 2022 07:38) (92 - 103)  BP: 88/64 (25 Apr 2022 07:38) (88/64 - 139/67)  BP(mean): --  RR: 18 (25 Apr 2022 07:38) (17 - 18)  SpO2: --        PHYSICAL EXAMINATION:  Not in acute distress  General: No icterus  HEENT:   no JVD.  Heart: S1+S2 audible  Lungs: bilateral  moderate air entry, no wheezing, no crepitations.  Abdomen: Soft, non-tender, non-distended , no  rigidity or guarding.  CNS: Awake alert, CN  grossly intact.  Extremities:  No edema            CONSULTS:  Consultant(s) Notes Reviewed by me.   Care Discussed with Consultants/Other Providers where required.        MEDICATIONS:  MEDICATIONS  (STANDING):  ammonium lactate 12% Lotion 1 Application(s) Topical two times a day  clotrimazole 1% Cream 1 Application(s) Topical two times a day  enoxaparin Injectable 40 milliGRAM(s) SubCutaneous every 24 hours  metFORMIN 1000 milliGRAM(s) Oral two times a day  pantoprazole    Tablet 40 milliGRAM(s) Oral before breakfast  vitamin A &amp; D Ointment 1 Application(s) Topical daily    MEDICATIONS  (PRN):  ondansetron Injectable 4 milliGRAM(s) IV Push every 6 hours PRN Nausea and/or Vomiting            ASSESSMENT:    47 yo M with PMH of Intellectual Disability and DM not on any medications presented with foot wounds.    Onychomycosis  DM2  Intellectual disability/autism    Nail bed wound and onychomycosis  Completed clindamycin  Podiatry noted- no surgical intervention is advised. outpt podiatry f/u  DM type 2 - uncontrolled. A1C 8.9. Continue  metformin 1000mg BID  Handoff: Pending legal issue and guardianship, otherwise medically stable for discharge

## 2022-04-26 LAB
GLUCOSE BLDC GLUCOMTR-MCNC: 160 MG/DL — HIGH (ref 70–99)
GLUCOSE BLDC GLUCOMTR-MCNC: 162 MG/DL — HIGH (ref 70–99)
GLUCOSE BLDC GLUCOMTR-MCNC: 192 MG/DL — HIGH (ref 70–99)
GLUCOSE BLDC GLUCOMTR-MCNC: 243 MG/DL — HIGH (ref 70–99)

## 2022-04-26 PROCEDURE — 99231 SBSQ HOSP IP/OBS SF/LOW 25: CPT

## 2022-04-26 RX ADMIN — METFORMIN HYDROCHLORIDE 1000 MILLIGRAM(S): 850 TABLET ORAL at 17:02

## 2022-04-26 RX ADMIN — METFORMIN HYDROCHLORIDE 1000 MILLIGRAM(S): 850 TABLET ORAL at 05:59

## 2022-04-26 RX ADMIN — Medication 1 APPLICATION(S): at 17:02

## 2022-04-26 RX ADMIN — PANTOPRAZOLE SODIUM 40 MILLIGRAM(S): 20 TABLET, DELAYED RELEASE ORAL at 05:59

## 2022-04-26 RX ADMIN — Medication 1 APPLICATION(S): at 06:00

## 2022-04-26 RX ADMIN — Medication 1 APPLICATION(S): at 17:01

## 2022-04-26 RX ADMIN — Medication 1 APPLICATION(S): at 11:17

## 2022-04-26 RX ADMIN — Medication 1 APPLICATION(S): at 05:59

## 2022-04-26 NOTE — PROGRESS NOTE ADULT - SUBJECTIVE AND OBJECTIVE BOX
Progress Note:  Provider Speciality                            Hospitalist      CARIN WATSON MRN-[id number] 46y Male     CHIEF PRESENTING COMPLAINT:  Patient is a 46y old  Male who presents with a chief complaint of DFU (06 Apr 2022 12:18)        SUBJECTIVE:  Patient was seen and examined at bedside. No new complaints described by patient in morning rounds.   No significant overnight events reported.     HISTORY OF PRESENTING ILLNESS:  HPI:  45 yo M with PMHx of Intellectual Disability, DM not on any medications presents with foot wounds. Pt has very poor foot hygiene and per the sister, has been persistently scratching an open wound on his L second toe, which worsened and became more red, had drainage, malodor. He saw a physician at [facility] and was told to come to the ED for evaluation. Pt is poor historian. Does endorse abdominal pain for a few days, cannot give much more description. Per the sister, pt did not seem to have any recent fevers, however is also not a very good historian and does not seem to have good health literacy. States that her and her mom decided to stop giving pt Metformin a while ago, are treating his diabetes by not giving him any sugary foods.  In the ED, T 97.8, /87, , RR 16, SpO2 99% on RA. Lab work unrevealing.  (31 Mar 2022 22:36)        REVIEW OF SYSTEMS:  Patient denies any headache, any vision complaints, runny nose, fever, chills. Denies chest pain, shortness of breath, palpitation. Denies nausea, vomiting, abdominal pain or diarrhoea, Denies dysuria. Denies  localized weakness in any part of the body or numbness.   At least 10 systems were reviewed in ROS. All systems reviewed  are within normal limits except for the complaints as described in Subjective.    PAST MEDICAL & SURGICAL HISTORY:  PAST MEDICAL & SURGICAL HISTORY:  DM (diabetes mellitus)    Intellectual disability            VITAL SIGNS:  Vital Signs Last 24 Hrs  T(C): 36.7 (26 Apr 2022 07:51), Max: 36.7 (26 Apr 2022 07:51)  T(F): 98.1 (26 Apr 2022 07:51), Max: 98.1 (26 Apr 2022 07:51)  HR: 78 (26 Apr 2022 07:51) (78 - 112)  BP: 115/66 (26 Apr 2022 07:51) (115/66 - 126/58)  BP(mean): --  RR: 20 (26 Apr 2022 07:51) (20 - 20)  SpO2: --        PHYSICAL EXAMINATION:  Not in acute distress  General: No icterus  HEENT:   no JVD.  Heart: S1+S2 audible  Lungs: bilateral  moderate air entry, no wheezing, no crepitations.  Abdomen: Soft, non-tender, non-distended , no  rigidity or guarding.  CNS: Awake alert, CN  grossly intact.  Extremities:  No edema            CONSULTS:  Consultant(s) Notes Reviewed by me.   Care Discussed with Consultants/Other Providers where required.        MEDICATIONS:  MEDICATIONS  (STANDING):  ammonium lactate 12% Lotion 1 Application(s) Topical two times a day  clotrimazole 1% Cream 1 Application(s) Topical two times a day  enoxaparin Injectable 40 milliGRAM(s) SubCutaneous every 24 hours  metFORMIN 1000 milliGRAM(s) Oral two times a day  pantoprazole    Tablet 40 milliGRAM(s) Oral before breakfast  vitamin A &amp; D Ointment 1 Application(s) Topical daily    MEDICATIONS  (PRN):  ondansetron Injectable 4 milliGRAM(s) IV Push every 6 hours PRN Nausea and/or Vomiting            ASSESSMENT:    45 yo M with PMH of Intellectual Disability and DM not on any medications presented with foot wounds.    Onychomycosis  DM2  Intellectual disability/autism    Nail bed wound and onychomycosis  Completed clindamycin  Podiatry noted- no surgical intervention is advised. outpt podiatry f/u  DM type 2 - uncontrolled. A1C 8.9. Continue  metformin 1000mg BID  Handoff: Pending legal issue and guardianship, otherwise medically stable for discharge

## 2022-04-26 NOTE — PROGRESS NOTE ADULT - SUBJECTIVE AND OBJECTIVE BOX
HPI:  45 yo M with PMHx of Intellectual Disability, DM not on any medications presents with foot wounds. Pt has very poor foot hygiene and per the sister, has been persistently scratching an open wound on his L second toe, which worsened and became more red, had drainage, malodor. He saw a physician at [facility] and was told to come to the ED for evaluation. Pt is poor historian. Does endorse abdominal pain for a few days, cannot give much more description. Per the sister, pt did not seem to have any recent fevers, however is also not a very good historian and does not seem to have good health literacy. States that her and her mom decided to stop giving pt Metformin a while ago, are treating his diabetes by not giving him any sugary foods.  In the ED, T 97.8, /87, , RR 16, SpO2 99% on RA. Lab work unrevealing.  (31 Mar 2022 22:36)    Currently admitted to medicine with the primary diagnosis of Onychomycosis       Today is hospital day 26d.     INTERVAL HPI / OVERNIGHT EVENTS:  Patient was examined and seen at bedside. This morning he is resting comfortably in bed and reports no new issues or overnight events.     ROS: Otherwise unremarkable     PAST MEDICAL & SURGICAL HISTORY  DM (diabetes mellitus)    Intellectual disability      ALLERGIES  penicillin (Unknown)    MEDICATIONS  STANDING MEDICATIONS  ammonium lactate 12% Lotion 1 Application(s) Topical two times a day  clotrimazole 1% Cream 1 Application(s) Topical two times a day  enoxaparin Injectable 40 milliGRAM(s) SubCutaneous every 24 hours  metFORMIN 1000 milliGRAM(s) Oral two times a day  pantoprazole    Tablet 40 milliGRAM(s) Oral before breakfast  vitamin A &amp; D Ointment 1 Application(s) Topical daily    PRN MEDICATIONS  ondansetron Injectable 4 milliGRAM(s) IV Push every 6 hours PRN    VITALS:  T(F): 98.1  HR: 78  BP: 115/66  RR: 20  SpO2: --    PHYSICAL EXAM  GEN: NAD, Resting comfortably in bed  PULM: Clear to auscultation bilaterally, No wheezes  CVS: Regular rate and rhythm, S1-S2, no murmurs  ABD: Soft, non-tender, non-distended, no guarding  EXT: No edema, onychomycosis   NEURO: A&Ox3, no focal deficits    LABS                        RADIOLOGY

## 2022-04-26 NOTE — PROGRESS NOTE ADULT - ASSESSMENT
47 yo M with PMHx of Intellectual Disability, DM not on any medications presents with foot wounds. Pt has very poor foot hygiene and per the sister, has been persistently scratching an open wound on his L second toe, which worsened and became more red, had drainage, malodor. He saw a physician at [facility] and was told to come to the ED for eval.    #Onychomycosis   - antifungal, s/p ABX po (likely due to severe infection)  - seen by ID and podiatry eval  - Completed clindamycin  - PO terbinafine not suggested by ID or podiatry  - Per podiatry, no surgical intervention is advised, clotrimazole cream adequate     #uncontrolled diabetes mellitus   - hba1c is 8.9- patient does not take any medication at home  - started on metformin- dose increased to 1000 mg BID.    # Autism   - patient is high functioning but unable to take care of basics.    #Misc   - DVT prophylaxis: Lovenox   - GI prophylaxis: PPI   - Diet: CC  - Activity: IAT   - Disposition: CM following. Legal will obtain guardianship.  eval is done.

## 2022-04-27 LAB
GLUCOSE BLDC GLUCOMTR-MCNC: 137 MG/DL — HIGH (ref 70–99)
GLUCOSE BLDC GLUCOMTR-MCNC: 193 MG/DL — HIGH (ref 70–99)
GLUCOSE BLDC GLUCOMTR-MCNC: 219 MG/DL — HIGH (ref 70–99)
GLUCOSE BLDC GLUCOMTR-MCNC: 310 MG/DL — HIGH (ref 70–99)

## 2022-04-27 PROCEDURE — 99231 SBSQ HOSP IP/OBS SF/LOW 25: CPT

## 2022-04-27 RX ADMIN — PANTOPRAZOLE SODIUM 40 MILLIGRAM(S): 20 TABLET, DELAYED RELEASE ORAL at 05:44

## 2022-04-27 RX ADMIN — Medication 1 APPLICATION(S): at 05:45

## 2022-04-27 RX ADMIN — METFORMIN HYDROCHLORIDE 1000 MILLIGRAM(S): 850 TABLET ORAL at 05:44

## 2022-04-27 RX ADMIN — METFORMIN HYDROCHLORIDE 1000 MILLIGRAM(S): 850 TABLET ORAL at 17:52

## 2022-04-27 RX ADMIN — Medication 1 APPLICATION(S): at 05:47

## 2022-04-27 RX ADMIN — Medication 1 APPLICATION(S): at 12:27

## 2022-04-27 NOTE — PROGRESS NOTE ADULT - SUBJECTIVE AND OBJECTIVE BOX
HPI:  45 yo M with PMHx of Intellectual Disability, DM not on any medications presents with foot wounds. Pt has very poor foot hygiene and per the sister, has been persistently scratching an open wound on his L second toe, which worsened and became more red, had drainage, malodor. He saw a physician at [facility] and was told to come to the ED for evaluation. Pt is poor historian. Does endorse abdominal pain for a few days, cannot give much more description. Per the sister, pt did not seem to have any recent fevers, however is also not a very good historian and does not seem to have good health literacy. States that her and her mom decided to stop giving pt Metformin a while ago, are treating his diabetes by not giving him any sugary foods.  In the ED, T 97.8, /87, , RR 16, SpO2 99% on RA. Lab work unrevealing.  (31 Mar 2022 22:36)    Currently admitted to medicine with the primary diagnosis of Onychomycosis       Today is hospital day 27d.     INTERVAL HPI / OVERNIGHT EVENTS:  Patient was examined and seen at bedside. This morning he is resting comfortably in bed and reports no new issues or overnight events.     ROS: Otherwise unremarkable     PAST MEDICAL & SURGICAL HISTORY  DM (diabetes mellitus)    Intellectual disability      ALLERGIES  penicillin (Unknown)    MEDICATIONS  STANDING MEDICATIONS  ammonium lactate 12% Lotion 1 Application(s) Topical two times a day  clotrimazole 1% Cream 1 Application(s) Topical two times a day  enoxaparin Injectable 40 milliGRAM(s) SubCutaneous every 24 hours  metFORMIN 1000 milliGRAM(s) Oral two times a day  pantoprazole    Tablet 40 milliGRAM(s) Oral before breakfast  vitamin A &amp; D Ointment 1 Application(s) Topical daily    PRN MEDICATIONS  ondansetron Injectable 4 milliGRAM(s) IV Push every 6 hours PRN    VITALS:  T(F): 98.1  HR: 77  BP: 106/58  RR: 18  SpO2: --    PHYSICAL EXAM  GEN: NAD, Resting comfortably in bed  PULM: Clear to auscultation bilaterally, No wheezes  CVS: Regular rate and rhythm, S1-S2, no murmurs  ABD: Soft, non-tender, non-distended, no guarding  EXT: No edema, onychomycosis   NEURO: A&Ox3, no focal deficits    LABS                        RADIOLOGY

## 2022-04-27 NOTE — PROGRESS NOTE ADULT - ASSESSMENT
45 yo M with PMHx of Intellectual Disability, DM not on any medications presents with foot wounds. Pt has very poor foot hygiene and per the sister, has been persistently scratching an open wound on his L second toe, which worsened and became more red, had drainage, malodor. He saw a physician at [facility] and was told to come to the ED for eval.    #Onychomycosis   - antifungal, s/p ABX po (likely due to severe infection)  - seen by ID and podiatry eval  - Completed clindamycin  - PO terbinafine not suggested by ID or podiatry  - Per podiatry, no surgical intervention is advised, clotrimazole cream adequate     #uncontrolled diabetes mellitus   - hba1c is 8.9- patient does not take any medication at home  - started on metformin- dose increased to 1000 mg BID.    # Autism   - patient is high functioning but unable to take care of basics.    #Misc   - DVT prophylaxis: not indicated, pt is ambulating   - GI prophylaxis: PPI   - Diet: CC  - Activity: IAT   - Disposition: CM following. Legal will obtain guardianship.  eval is done.

## 2022-04-28 LAB
GLUCOSE BLDC GLUCOMTR-MCNC: 177 MG/DL — HIGH (ref 70–99)
GLUCOSE BLDC GLUCOMTR-MCNC: 200 MG/DL — HIGH (ref 70–99)
GLUCOSE BLDC GLUCOMTR-MCNC: 228 MG/DL — HIGH (ref 70–99)
GLUCOSE BLDC GLUCOMTR-MCNC: 284 MG/DL — HIGH (ref 70–99)

## 2022-04-28 PROCEDURE — 99231 SBSQ HOSP IP/OBS SF/LOW 25: CPT

## 2022-04-28 RX ADMIN — METFORMIN HYDROCHLORIDE 1000 MILLIGRAM(S): 850 TABLET ORAL at 17:09

## 2022-04-28 RX ADMIN — PANTOPRAZOLE SODIUM 40 MILLIGRAM(S): 20 TABLET, DELAYED RELEASE ORAL at 06:56

## 2022-04-28 RX ADMIN — Medication 1 APPLICATION(S): at 13:01

## 2022-04-28 RX ADMIN — METFORMIN HYDROCHLORIDE 1000 MILLIGRAM(S): 850 TABLET ORAL at 05:17

## 2022-04-28 RX ADMIN — Medication 1 APPLICATION(S): at 05:17

## 2022-04-28 NOTE — PROGRESS NOTE ADULT - SUBJECTIVE AND OBJECTIVE BOX
HPI:  45 yo M with PMHx of Intellectual Disability, DM not on any medications presents with foot wounds. Pt has very poor foot hygiene and per the sister, has been persistently scratching an open wound on his L second toe, which worsened and became more red, had drainage, malodor. He saw a physician at [facility] and was told to come to the ED for evaluation. Pt is poor historian. Does endorse abdominal pain for a few days, cannot give much more description. Per the sister, pt did not seem to have any recent fevers, however is also not a very good historian and does not seem to have good health literacy. States that her and her mom decided to stop giving pt Metformin a while ago, are treating his diabetes by not giving him any sugary foods.  In the ED, T 97.8, /87, , RR 16, SpO2 99% on RA. Lab work unrevealing.  (31 Mar 2022 22:36)    Currently admitted to medicine with the primary diagnosis of Onychomycosis       Today is hospital day 28d.     INTERVAL HPI / OVERNIGHT EVENTS:  Patient was examined and seen at bedside. This morning he is resting comfortably in bed and reports no new issues or overnight events.     ROS: Otherwise unremarkable     PAST MEDICAL & SURGICAL HISTORY  DM (diabetes mellitus)    Intellectual disability      ALLERGIES  penicillin (Unknown)    MEDICATIONS  STANDING MEDICATIONS  ammonium lactate 12% Lotion 1 Application(s) Topical two times a day  clotrimazole 1% Cream 1 Application(s) Topical two times a day  metFORMIN 1000 milliGRAM(s) Oral two times a day  pantoprazole    Tablet 40 milliGRAM(s) Oral before breakfast  vitamin A &amp; D Ointment 1 Application(s) Topical daily    PRN MEDICATIONS  ondansetron Injectable 4 milliGRAM(s) IV Push every 6 hours PRN    VITALS:  T(F): 98.1  HR: 96  BP: 128/65  RR: 18  SpO2: --    PHYSICAL EXAM  GEN: NAD, Resting comfortably in bed  PULM: Clear to auscultation bilaterally, No wheezes  CVS: Regular rate and rhythm, S1-S2, no murmurs  ABD: Soft, non-tender, non-distended, no guarding  EXT: No edema, onychomycosis  NEURO: A&Ox3, no focal deficits    LABS                        RADIOLOGY

## 2022-04-28 NOTE — PROGRESS NOTE ADULT - ASSESSMENT
45 yo M with PMHx of Intellectual Disability, DM not on any medications presents with foot wounds. Pt has very poor foot hygiene and per the sister, has been persistently scratching an open wound on his L second toe, which worsened and became more red, had drainage, malodor. He saw a physician at [facility] and was told to come to the ED for eval.    #Onychomycosis   - antifungal, s/p ABX po (likely due to severe infection)  - seen by ID and podiatry eval  - Completed clindamycin  - PO terbinafine not suggested by ID or podiatry  - Per podiatry, no surgical intervention is advised, clotrimazole cream adequate     #uncontrolled diabetes mellitus   - hba1c is 8.9- patient does not take any medication at home  - started on metformin- dose increased to 1000 mg BID.    # Autism   - patient is high functioning but unable to take care of basics.    #Misc   - DVT prophylaxis: not indicated, pt is ambulating   - GI prophylaxis: PPI   - Diet: CC  - Activity: IAT   - Disposition: CM following. Legal will obtain guardianship.  eval is done.

## 2022-04-29 LAB
GLUCOSE BLDC GLUCOMTR-MCNC: 132 MG/DL — HIGH (ref 70–99)
GLUCOSE BLDC GLUCOMTR-MCNC: 182 MG/DL — HIGH (ref 70–99)
GLUCOSE BLDC GLUCOMTR-MCNC: 230 MG/DL — HIGH (ref 70–99)
GLUCOSE BLDC GLUCOMTR-MCNC: 266 MG/DL — HIGH (ref 70–99)

## 2022-04-29 PROCEDURE — 93010 ELECTROCARDIOGRAM REPORT: CPT

## 2022-04-29 PROCEDURE — 99231 SBSQ HOSP IP/OBS SF/LOW 25: CPT

## 2022-04-29 RX ORDER — INSULIN LISPRO 100/ML
VIAL (ML) SUBCUTANEOUS
Refills: 0 | Status: DISCONTINUED | OUTPATIENT
Start: 2022-04-29 | End: 2022-06-25

## 2022-04-29 RX ORDER — DEXTROSE 50 % IN WATER 50 %
25 SYRINGE (ML) INTRAVENOUS ONCE
Refills: 0 | Status: DISCONTINUED | OUTPATIENT
Start: 2022-04-29 | End: 2022-05-04

## 2022-04-29 RX ORDER — DEXTROSE 50 % IN WATER 50 %
12.5 SYRINGE (ML) INTRAVENOUS ONCE
Refills: 0 | Status: DISCONTINUED | OUTPATIENT
Start: 2022-04-29 | End: 2022-05-04

## 2022-04-29 RX ORDER — SODIUM CHLORIDE 9 MG/ML
1000 INJECTION, SOLUTION INTRAVENOUS
Refills: 0 | Status: DISCONTINUED | OUTPATIENT
Start: 2022-04-29 | End: 2022-05-04

## 2022-04-29 RX ORDER — DEXTROSE 50 % IN WATER 50 %
15 SYRINGE (ML) INTRAVENOUS ONCE
Refills: 0 | Status: DISCONTINUED | OUTPATIENT
Start: 2022-04-29 | End: 2022-05-04

## 2022-04-29 RX ORDER — GLUCAGON INJECTION, SOLUTION 0.5 MG/.1ML
1 INJECTION, SOLUTION SUBCUTANEOUS ONCE
Refills: 0 | Status: DISCONTINUED | OUTPATIENT
Start: 2022-04-29 | End: 2022-07-30

## 2022-04-29 RX ORDER — ACETAMINOPHEN 500 MG
650 TABLET ORAL ONCE
Refills: 0 | Status: COMPLETED | OUTPATIENT
Start: 2022-04-29 | End: 2022-04-29

## 2022-04-29 RX ADMIN — Medication 2: at 17:40

## 2022-04-29 RX ADMIN — METFORMIN HYDROCHLORIDE 1000 MILLIGRAM(S): 850 TABLET ORAL at 17:39

## 2022-04-29 RX ADMIN — Medication 650 MILLIGRAM(S): at 12:34

## 2022-04-29 RX ADMIN — METFORMIN HYDROCHLORIDE 1000 MILLIGRAM(S): 850 TABLET ORAL at 05:02

## 2022-04-29 RX ADMIN — Medication 1 APPLICATION(S): at 06:11

## 2022-04-29 RX ADMIN — Medication 1 APPLICATION(S): at 12:06

## 2022-04-29 RX ADMIN — PANTOPRAZOLE SODIUM 40 MILLIGRAM(S): 20 TABLET, DELAYED RELEASE ORAL at 06:11

## 2022-04-29 NOTE — CHART NOTE - NSCHARTNOTEFT_GEN_A_CORE
Patient on Carb Consistent diet (no snacks).  Patient not fully receptive to the diet and counting carbohydrates, making meal ordering and delivery challenging as patient is requesting additional carbohydrate-rich foods.  Due to patient's preferences and regular diet at home, recommend changing diet to Regular.  This could allow insulin dosing to be more accurate and specific at discharge, for home regimen.  Spoke with Resident regarding diet liberalization earlier today.      Recommend liberalizing diet and adjusting insulin for coverage.     Will continue to follow.

## 2022-04-29 NOTE — PROGRESS NOTE ADULT - SUBJECTIVE AND OBJECTIVE BOX
HPI:  47 yo M with PMHx of Intellectual Disability, DM not on any medications presents with foot wounds. Pt has very poor foot hygiene and per the sister, has been persistently scratching an open wound on his L second toe, which worsened and became more red, had drainage, malodor. He saw a physician at [facility] and was told to come to the ED for evaluation. Pt is poor historian. Does endorse abdominal pain for a few days, cannot give much more description. Per the sister, pt did not seem to have any recent fevers, however is also not a very good historian and does not seem to have good health literacy. States that her and her mom decided to stop giving pt Metformin a while ago, are treating his diabetes by not giving him any sugary foods.  In the ED, T 97.8, /87, , RR 16, SpO2 99% on RA. Lab work unrevealing.  (31 Mar 2022 22:36)    Currently admitted to medicine with the primary diagnosis of Onychomycosis       Today is hospital day 29d.     INTERVAL HPI / OVERNIGHT EVENTS:  Patient was examined and seen at bedside. This morning he is resting comfortably in bed and reports no new issues or overnight events. Pt noted to be mildly tachy this morning, with no sxs or sensation of palpitations. Will obtain EKG.     ROS: Otherwise unremarkable     PAST MEDICAL & SURGICAL HISTORY  DM (diabetes mellitus)    Intellectual disability      ALLERGIES  penicillin (Unknown)    MEDICATIONS  STANDING MEDICATIONS  ammonium lactate 12% Lotion 1 Application(s) Topical two times a day  clotrimazole 1% Cream 1 Application(s) Topical two times a day  metFORMIN 1000 milliGRAM(s) Oral two times a day  pantoprazole    Tablet 40 milliGRAM(s) Oral before breakfast  vitamin A &amp; D Ointment 1 Application(s) Topical daily    PRN MEDICATIONS  ondansetron Injectable 4 milliGRAM(s) IV Push every 6 hours PRN    VITALS:  T(F): 97  HR: 84  BP: 142/63  RR: 18  SpO2: --    PHYSICAL EXAM  GEN: NAD, Resting comfortably in bed  PULM: Clear to auscultation bilaterally, No wheezes  CVS: Regular rate and rhythm, S1-S2, no murmurs  ABD: Soft, non-tender, non-distended, no guarding  EXT: No edema, onychomyosis  NEURO: A&Ox3, no focal deficits    LABS                        RADIOLOGY

## 2022-04-29 NOTE — PROGRESS NOTE ADULT - ASSESSMENT
45 yo M with PMHx of Intellectual Disability, DM not on any medications presents with foot wounds. Pt has very poor foot hygiene and per the sister, has been persistently scratching an open wound on his L second toe, which worsened and became more red, had drainage, malodor. He saw a physician at [facility] and was told to come to the ED for eval.    #Onychomycosis   - antifungal, s/p ABX po (likely due to severe infection)  - seen by ID and podiatry eval  - Completed clindamycin  - PO terbinafine not suggested by ID or podiatry  - Per podiatry, no surgical intervention is advised, clotrimazole cream adequate     #uncontrolled diabetes mellitus   - hba1c is 8.9- patient does not take any medication at home  - started on metformin- dose increased to 1000 mg BID.    # Autism   - patient is high functioning but unable to take care of basics.    #Misc   - DVT prophylaxis: not indicated, pt is ambulating   - GI prophylaxis: PPI   - Diet: CC  - Activity: IAT   - Disposition: CM following. Legal will obtain guardianship.  eval is done.

## 2022-04-30 LAB
ALBUMIN SERPL ELPH-MCNC: 4.2 G/DL — SIGNIFICANT CHANGE UP (ref 3.5–5.2)
ALP SERPL-CCNC: 121 U/L — HIGH (ref 30–115)
ALT FLD-CCNC: 64 U/L — HIGH (ref 0–41)
ANION GAP SERPL CALC-SCNC: 13 MMOL/L — SIGNIFICANT CHANGE UP (ref 7–14)
AST SERPL-CCNC: 24 U/L — SIGNIFICANT CHANGE UP (ref 0–41)
BILIRUB SERPL-MCNC: 0.4 MG/DL — SIGNIFICANT CHANGE UP (ref 0.2–1.2)
BUN SERPL-MCNC: 21 MG/DL — HIGH (ref 10–20)
CALCIUM SERPL-MCNC: 9.6 MG/DL — SIGNIFICANT CHANGE UP (ref 8.5–10.1)
CHLORIDE SERPL-SCNC: 101 MMOL/L — SIGNIFICANT CHANGE UP (ref 98–110)
CO2 SERPL-SCNC: 26 MMOL/L — SIGNIFICANT CHANGE UP (ref 17–32)
CREAT SERPL-MCNC: 0.7 MG/DL — SIGNIFICANT CHANGE UP (ref 0.7–1.5)
EGFR: 114 ML/MIN/1.73M2 — SIGNIFICANT CHANGE UP
GLUCOSE BLDC GLUCOMTR-MCNC: 136 MG/DL — HIGH (ref 70–99)
GLUCOSE BLDC GLUCOMTR-MCNC: 169 MG/DL — HIGH (ref 70–99)
GLUCOSE BLDC GLUCOMTR-MCNC: 181 MG/DL — HIGH (ref 70–99)
GLUCOSE BLDC GLUCOMTR-MCNC: 259 MG/DL — HIGH (ref 70–99)
GLUCOSE SERPL-MCNC: 165 MG/DL — HIGH (ref 70–99)
HCT VFR BLD CALC: 40.8 % — LOW (ref 42–52)
HGB BLD-MCNC: 13.1 G/DL — LOW (ref 14–18)
MCHC RBC-ENTMCNC: 32.1 G/DL — SIGNIFICANT CHANGE UP (ref 32–37)
MCHC RBC-ENTMCNC: 32.1 PG — HIGH (ref 27–31)
MCV RBC AUTO: 100 FL — HIGH (ref 80–94)
NRBC # BLD: 0 /100 WBCS — SIGNIFICANT CHANGE UP (ref 0–0)
PLATELET # BLD AUTO: 297 K/UL — SIGNIFICANT CHANGE UP (ref 130–400)
POTASSIUM SERPL-MCNC: 4.1 MMOL/L — SIGNIFICANT CHANGE UP (ref 3.5–5)
POTASSIUM SERPL-SCNC: 4.1 MMOL/L — SIGNIFICANT CHANGE UP (ref 3.5–5)
PROT SERPL-MCNC: 6.5 G/DL — SIGNIFICANT CHANGE UP (ref 6–8)
RBC # BLD: 4.08 M/UL — LOW (ref 4.7–6.1)
RBC # FLD: 11.9 % — SIGNIFICANT CHANGE UP (ref 11.5–14.5)
SODIUM SERPL-SCNC: 140 MMOL/L — SIGNIFICANT CHANGE UP (ref 135–146)
TSH SERPL-MCNC: 4.11 UIU/ML — SIGNIFICANT CHANGE UP (ref 0.27–4.2)
WBC # BLD: 7.5 K/UL — SIGNIFICANT CHANGE UP (ref 4.8–10.8)
WBC # FLD AUTO: 7.5 K/UL — SIGNIFICANT CHANGE UP (ref 4.8–10.8)

## 2022-04-30 PROCEDURE — 99232 SBSQ HOSP IP/OBS MODERATE 35: CPT

## 2022-04-30 RX ORDER — ACETAMINOPHEN 500 MG
650 TABLET ORAL ONCE
Refills: 0 | Status: COMPLETED | OUTPATIENT
Start: 2022-04-30 | End: 2022-04-30

## 2022-04-30 RX ADMIN — METFORMIN HYDROCHLORIDE 1000 MILLIGRAM(S): 850 TABLET ORAL at 17:31

## 2022-04-30 RX ADMIN — Medication 1 APPLICATION(S): at 17:30

## 2022-04-30 RX ADMIN — Medication 650 MILLIGRAM(S): at 15:55

## 2022-04-30 RX ADMIN — Medication 1 APPLICATION(S): at 11:39

## 2022-04-30 RX ADMIN — PANTOPRAZOLE SODIUM 40 MILLIGRAM(S): 20 TABLET, DELAYED RELEASE ORAL at 06:04

## 2022-04-30 RX ADMIN — Medication 1: at 12:19

## 2022-04-30 RX ADMIN — Medication 650 MILLIGRAM(S): at 16:57

## 2022-04-30 RX ADMIN — Medication 1 APPLICATION(S): at 06:07

## 2022-04-30 RX ADMIN — METFORMIN HYDROCHLORIDE 1000 MILLIGRAM(S): 850 TABLET ORAL at 06:04

## 2022-04-30 RX ADMIN — Medication 3: at 16:58

## 2022-04-30 NOTE — PROGRESS NOTE ADULT - ASSESSMENT
47 yo M with PMH of Intellectual Disability and DM not on any medications presented with foot wounds.    Onychomycosis  DM2  Intellectual disability/autism    Nail bed wound and onychomycosis  Completed clindamycin  Podiatry noted- no surgical intervention is advised. outpt podiatry f/u  DM type 2 - uncontrolled. A1C 8.9. Continue  metformin 1000mg BID-- insulin sliding scale.  Handoff: Pending legal issue and guardianship, otherwise medically stable for discharge .

## 2022-04-30 NOTE — PROGRESS NOTE ADULT - SUBJECTIVE AND OBJECTIVE BOX
SUBJECTIVE:    Patient is a 46y old Male who presents with a chief complaint of DFU (29 Apr 2022 11:28)    Currently admitted to medicine with the primary diagnosis of Onychomycosis       Today is hospital day 30d.     PAST MEDICAL & SURGICAL HISTORY  DM (diabetes mellitus)    Intellectual disability      ALLERGIES:  penicillin (Unknown)    MEDICATIONS:  STANDING MEDICATIONS  ammonium lactate 12% Lotion 1 Application(s) Topical two times a day  clotrimazole 1% Cream 1 Application(s) Topical two times a day  dextrose 5%. 1000 milliLiter(s) IV Continuous <Continuous>  dextrose 5%. 1000 milliLiter(s) IV Continuous <Continuous>  dextrose 50% Injectable 25 Gram(s) IV Push once  dextrose 50% Injectable 12.5 Gram(s) IV Push once  dextrose 50% Injectable 25 Gram(s) IV Push once  glucagon  Injectable 1 milliGRAM(s) IntraMuscular once  insulin lispro (ADMELOG) corrective regimen sliding scale   SubCutaneous three times a day before meals  metFORMIN 1000 milliGRAM(s) Oral two times a day  pantoprazole    Tablet 40 milliGRAM(s) Oral before breakfast  vitamin A &amp; D Ointment 1 Application(s) Topical daily    PRN MEDICATIONS  dextrose Oral Gel 15 Gram(s) Oral once PRN  ondansetron Injectable 4 milliGRAM(s) IV Push every 6 hours PRN    VITALS:   T(F): 98  HR: 97  BP: 130/64  RR: 18  SpO2: --    LABS:                        13.1   7.50  )-----------( 297      ( 30 Apr 2022 04:30 )             40.8     04-30    140  |  101  |  21<H>  ----------------------------<  165<H>  4.1   |  26  |  0.7    Ca    9.6      30 Apr 2022 04:30    TPro  6.5  /  Alb  4.2  /  TBili  0.4  /  DBili  x   /  AST  24  /  ALT  64<H>  /  AlkPhos  121<H>  04-30                  RADIOLOGY:    PHYSICAL EXAM:  GEN: No acute distress  LUNGS: Clear to auscultation bilaterally   HEART: S1/S2 present. RRR.   ABD/ GI: Soft, non-tender, non-distended. Bowel sounds present  EXT: NC/NC/NE/2+PP/LOJA  NEURO: AAOX3

## 2022-05-01 LAB
GLUCOSE BLDC GLUCOMTR-MCNC: 170 MG/DL — HIGH (ref 70–99)
GLUCOSE BLDC GLUCOMTR-MCNC: 255 MG/DL — HIGH (ref 70–99)
GLUCOSE BLDC GLUCOMTR-MCNC: 292 MG/DL — HIGH (ref 70–99)
GLUCOSE BLDC GLUCOMTR-MCNC: 418 MG/DL — HIGH (ref 70–99)

## 2022-05-01 PROCEDURE — 99231 SBSQ HOSP IP/OBS SF/LOW 25: CPT

## 2022-05-01 RX ORDER — LANOLIN ALCOHOL/MO/W.PET/CERES
5 CREAM (GRAM) TOPICAL AT BEDTIME
Refills: 0 | Status: DISCONTINUED | OUTPATIENT
Start: 2022-05-01 | End: 2022-05-07

## 2022-05-01 RX ADMIN — Medication 1: at 08:18

## 2022-05-01 RX ADMIN — METFORMIN HYDROCHLORIDE 1000 MILLIGRAM(S): 850 TABLET ORAL at 17:16

## 2022-05-01 RX ADMIN — Medication 1 APPLICATION(S): at 05:20

## 2022-05-01 RX ADMIN — Medication 5 MILLIGRAM(S): at 21:20

## 2022-05-01 RX ADMIN — Medication 6: at 17:14

## 2022-05-01 RX ADMIN — PANTOPRAZOLE SODIUM 40 MILLIGRAM(S): 20 TABLET, DELAYED RELEASE ORAL at 05:20

## 2022-05-01 RX ADMIN — Medication 1 APPLICATION(S): at 17:16

## 2022-05-01 RX ADMIN — Medication 1 APPLICATION(S): at 17:18

## 2022-05-01 RX ADMIN — Medication 3: at 11:53

## 2022-05-01 RX ADMIN — Medication 1 APPLICATION(S): at 11:53

## 2022-05-01 RX ADMIN — METFORMIN HYDROCHLORIDE 1000 MILLIGRAM(S): 850 TABLET ORAL at 05:20

## 2022-05-01 NOTE — PROGRESS NOTE ADULT - SUBJECTIVE AND OBJECTIVE BOX
HPI:  45 yo M with PMHx of Intellectual Disability, DM not on any medications presents with foot wounds. Pt has very poor foot hygiene and per the sister, has been persistently scratching an open wound on his L second toe, which worsened and became more red, had drainage, malodor. He saw a physician at [facility] and was told to come to the ED for evaluation. Pt is poor historian. Does endorse abdominal pain for a few days, cannot give much more description. Per the sister, pt did not seem to have any recent fevers, however is also not a very good historian and does not seem to have good health literacy. States that her and her mom decided to stop giving pt Metformin a while ago, are treating his diabetes by not giving him any sugary foods.  In the ED, T 97.8, /87, , RR 16, SpO2 99% on RA. Lab work unrevealing.  (31 Mar 2022 22:36)    Currently admitted to medicine with the primary diagnosis of Onychomycosis       Today is hospital day 31d.     INTERVAL HPI / OVERNIGHT EVENTS:  Patient was examined and seen at bedside. This morning he is resting comfortably in bed and reports no new issues or overnight events. Pt requesting Ambien for sleep, stating he had been on it in the past. Will start melatonin tonight; pt agreeable.     ROS: Otherwise unremarkable     PAST MEDICAL & SURGICAL HISTORY  DM (diabetes mellitus)    Intellectual disability      ALLERGIES  penicillin (Unknown)    MEDICATIONS  STANDING MEDICATIONS  ammonium lactate 12% Lotion 1 Application(s) Topical two times a day  clotrimazole 1% Cream 1 Application(s) Topical two times a day  dextrose 5%. 1000 milliLiter(s) IV Continuous <Continuous>  dextrose 5%. 1000 milliLiter(s) IV Continuous <Continuous>  dextrose 50% Injectable 25 Gram(s) IV Push once  dextrose 50% Injectable 12.5 Gram(s) IV Push once  dextrose 50% Injectable 25 Gram(s) IV Push once  glucagon  Injectable 1 milliGRAM(s) IntraMuscular once  insulin lispro (ADMELOG) corrective regimen sliding scale   SubCutaneous three times a day before meals  melatonin 5 milliGRAM(s) Oral at bedtime  metFORMIN 1000 milliGRAM(s) Oral two times a day  pantoprazole    Tablet 40 milliGRAM(s) Oral before breakfast  vitamin A &amp; D Ointment 1 Application(s) Topical daily    PRN MEDICATIONS  dextrose Oral Gel 15 Gram(s) Oral once PRN  ondansetron Injectable 4 milliGRAM(s) IV Push every 6 hours PRN    VITALS:  T(F): 97.2  HR: 85  BP: 111/60  RR: 18  SpO2: --    PHYSICAL EXAM  GEN: NAD, Resting comfortably in bed  PULM: Clear to auscultation bilaterally, No wheezes  CVS: Regular rate and rhythm, S1-S2, no murmurs  ABD: Soft, non-tender, non-distended, no guarding  EXT: No edema, onychomycosis   NEURO: A&Ox3, no focal deficits    LABS                        13.1   7.50  )-----------( 297      ( 30 Apr 2022 04:30 )             40.8     04-30    140  |  101  |  21<H>  ----------------------------<  165<H>  4.1   |  26  |  0.7    Ca    9.6      30 Apr 2022 04:30    TPro  6.5  /  Alb  4.2  /  TBili  0.4  /  DBili  x   /  AST  24  /  ALT  64<H>  /  AlkPhos  121<H>  04-30                  RADIOLOGY

## 2022-05-01 NOTE — PROGRESS NOTE ADULT - ASSESSMENT
45 yo M with PMHx of Intellectual Disability, DM not on any medications presents with foot wounds. Pt has very poor foot hygiene and per the sister, has been persistently scratching an open wound on his L second toe, which worsened and became more red, had drainage, malodor. He saw a physician at [facility] and was told to come to the ED for eval.    #Onychomycosis   - antifungal, s/p ABX po (likely due to severe infection)  - seen by ID and podiatry eval  - Completed clindamycin  - PO terbinafine not suggested by ID or podiatry  - Per podiatry, no surgical intervention is advised, clotrimazole cream adequate     #uncontrolled diabetes mellitus   - hba1c is 8.9- patient does not take any medication at home  - started on metformin- dose increased to 1000 mg BID.    # Autism   - patient is high functioning but unable to take care of basics.    #Insomnia  - start melatonin 5 mg PO qhs  - counseled on sleep hygiene     #Misc   - DVT prophylaxis: not indicated, pt is ambulating   - GI prophylaxis: PPI   - Diet: CC  - Activity: IAT   - Disposition: CM following. Legal will obtain guardianship.  eval is done.

## 2022-05-02 LAB
GLUCOSE BLDC GLUCOMTR-MCNC: 146 MG/DL — HIGH (ref 70–99)
GLUCOSE BLDC GLUCOMTR-MCNC: 169 MG/DL — HIGH (ref 70–99)
GLUCOSE BLDC GLUCOMTR-MCNC: 335 MG/DL — HIGH (ref 70–99)
GLUCOSE BLDC GLUCOMTR-MCNC: 374 MG/DL — HIGH (ref 70–99)

## 2022-05-02 PROCEDURE — 99231 SBSQ HOSP IP/OBS SF/LOW 25: CPT

## 2022-05-02 RX ADMIN — PANTOPRAZOLE SODIUM 40 MILLIGRAM(S): 20 TABLET, DELAYED RELEASE ORAL at 05:16

## 2022-05-02 RX ADMIN — Medication 5 MILLIGRAM(S): at 21:06

## 2022-05-02 RX ADMIN — Medication 1 APPLICATION(S): at 05:16

## 2022-05-02 RX ADMIN — METFORMIN HYDROCHLORIDE 1000 MILLIGRAM(S): 850 TABLET ORAL at 17:05

## 2022-05-02 RX ADMIN — Medication 5: at 17:06

## 2022-05-02 RX ADMIN — METFORMIN HYDROCHLORIDE 1000 MILLIGRAM(S): 850 TABLET ORAL at 05:16

## 2022-05-02 RX ADMIN — Medication 1: at 08:14

## 2022-05-02 RX ADMIN — Medication 1 APPLICATION(S): at 12:35

## 2022-05-02 NOTE — PROGRESS NOTE ADULT - ASSESSMENT
47 yo M with PMHx of Intellectual Disability, DM not on any medications presents with foot wounds. Pt has very poor foot hygiene and per the sister, has been persistently scratching an open wound on his L second toe, which worsened and became more red, had drainage, malodor. He saw a physician at [facility] and was told to come to the ED for eval.    #Onychomycosis   - antifungal, s/p ABX po (likely due to severe infection)  - seen by ID and podiatry eval  - Completed clindamycin  - PO terbinafine not suggested by ID or podiatry  - Per podiatry, no surgical intervention is advised, clotrimazole cream adequate     #uncontrolled diabetes mellitus   - hba1c is 8.9- patient does not take any medication at home  - started on metformin- dose increased to 1000 mg BID.    # Autism   - patient is high functioning but unable to take care of basics.    #Insomnia  - start melatonin 5 mg PO qhs  - counseled on sleep hygiene     #Misc   - DVT prophylaxis: not indicated, pt is ambulating   - GI prophylaxis: PPI   - Diet: CC  - Activity: IAT   - Disposition: CM following. Legal will obtain guardianship.  eval is done.

## 2022-05-02 NOTE — PROGRESS NOTE ADULT - SUBJECTIVE AND OBJECTIVE BOX
HPI:  47 yo M with PMHx of Intellectual Disability, DM not on any medications presents with foot wounds. Pt has very poor foot hygiene and per the sister, has been persistently scratching an open wound on his L second toe, which worsened and became more red, had drainage, malodor. He saw a physician at [facility] and was told to come to the ED for evaluation. Pt is poor historian. Does endorse abdominal pain for a few days, cannot give much more description. Per the sister, pt did not seem to have any recent fevers, however is also not a very good historian and does not seem to have good health literacy. States that her and her mom decided to stop giving pt Metformin a while ago, are treating his diabetes by not giving him any sugary foods.  In the ED, T 97.8, /87, , RR 16, SpO2 99% on RA. Lab work unrevealing.  (31 Mar 2022 22:36)    Currently admitted to medicine with the primary diagnosis of Onychomycosis       Today is hospital day 32d.     INTERVAL HPI / OVERNIGHT EVENTS:  Patient was examined and seen at bedside. This morning he is resting comfortably in bed and reports no new issues or overnight events.     Pt requesting Ambien for sleep, stating he had been on it in the past. Started melatonin 4/30; pt agreeable.     ROS: Otherwise unremarkable     PAST MEDICAL & SURGICAL HISTORY  DM (diabetes mellitus)    Intellectual disability      ALLERGIES  penicillin (Unknown)    MEDICATIONS  STANDING MEDICATIONS  ammonium lactate 12% Lotion 1 Application(s) Topical two times a day  clotrimazole 1% Cream 1 Application(s) Topical two times a day  dextrose 5%. 1000 milliLiter(s) IV Continuous <Continuous>  dextrose 5%. 1000 milliLiter(s) IV Continuous <Continuous>  dextrose 50% Injectable 25 Gram(s) IV Push once  dextrose 50% Injectable 12.5 Gram(s) IV Push once  dextrose 50% Injectable 25 Gram(s) IV Push once  glucagon  Injectable 1 milliGRAM(s) IntraMuscular once  insulin lispro (ADMELOG) corrective regimen sliding scale   SubCutaneous three times a day before meals  melatonin 5 milliGRAM(s) Oral at bedtime  metFORMIN 1000 milliGRAM(s) Oral two times a day  pantoprazole    Tablet 40 milliGRAM(s) Oral before breakfast  vitamin A &amp; D Ointment 1 Application(s) Topical daily    PRN MEDICATIONS  dextrose Oral Gel 15 Gram(s) Oral once PRN  ondansetron Injectable 4 milliGRAM(s) IV Push every 6 hours PRN    Vital Signs Last 24 Hrs  T(C): 36.8 (02 May 2022 07:57), Max: 36.8 (02 May 2022 07:57)  T(F): 98.3 (02 May 2022 07:57), Max: 98.3 (02 May 2022 07:57)  HR: 87 (02 May 2022 07:57) (87 - 100)  BP: 130/74 (02 May 2022 07:57) (113/56 - 130/74)  BP(mean): --  RR: 18 (02 May 2022 07:57) (18 - 19)  SpO2: --    PHYSICAL EXAM  GEN: NAD, Resting comfortably in bed  PULM: Clear to auscultation bilaterally, No wheezes  CVS: Regular rate and rhythm, S1-S2, no murmurs  ABD: Soft, non-tender, non-distended, no guarding  EXT: No edema, onychomycosis   NEURO: A&Ox3, no focal deficits    LABS                        13.1   7.50  )-----------( 297      ( 30 Apr 2022 04:30 )             40.8     04-30    140  |  101  |  21<H>  ----------------------------<  165<H>  4.1   |  26  |  0.7    Ca    9.6      30 Apr 2022 04:30    TPro  6.5  /  Alb  4.2  /  TBili  0.4  /  DBili  x   /  AST  24  /  ALT  64<H>  /  AlkPhos  121<H>  04-30

## 2022-05-03 LAB
GLUCOSE BLDC GLUCOMTR-MCNC: 141 MG/DL — HIGH (ref 70–99)
GLUCOSE BLDC GLUCOMTR-MCNC: 205 MG/DL — HIGH (ref 70–99)
GLUCOSE BLDC GLUCOMTR-MCNC: 224 MG/DL — HIGH (ref 70–99)
GLUCOSE BLDC GLUCOMTR-MCNC: 228 MG/DL — HIGH (ref 70–99)

## 2022-05-03 PROCEDURE — 99231 SBSQ HOSP IP/OBS SF/LOW 25: CPT

## 2022-05-03 RX ADMIN — Medication 2: at 17:54

## 2022-05-03 RX ADMIN — Medication 1 APPLICATION(S): at 11:48

## 2022-05-03 RX ADMIN — PANTOPRAZOLE SODIUM 40 MILLIGRAM(S): 20 TABLET, DELAYED RELEASE ORAL at 06:58

## 2022-05-03 RX ADMIN — Medication 2: at 11:48

## 2022-05-03 RX ADMIN — Medication 2: at 08:23

## 2022-05-03 RX ADMIN — METFORMIN HYDROCHLORIDE 1000 MILLIGRAM(S): 850 TABLET ORAL at 17:57

## 2022-05-03 RX ADMIN — METFORMIN HYDROCHLORIDE 1000 MILLIGRAM(S): 850 TABLET ORAL at 06:57

## 2022-05-03 RX ADMIN — Medication 5 MILLIGRAM(S): at 21:35

## 2022-05-03 NOTE — PROGRESS NOTE ADULT - ASSESSMENT
45 yo M with PMHx of Intellectual Disability, DM not on any medications presents with foot wounds. Pt has very poor foot hygiene and per the sister, has been persistently scratching an open wound on his L second toe, which worsened and became more red, had drainage, malodor. He saw a physician at [facility] and was told to come to the ED for eval.    #Onychomycosis   - antifungal, s/p ABX po (likely due to severe infection)  - seen by ID and podiatry eval  - Completed clindamycin  - PO terbinafine not suggested by ID or podiatry  - Per podiatry, no surgical intervention is advised, clotrimazole cream adequate     #uncontrolled diabetes mellitus   - hba1c is 8.9- patient does not take any medication at home  - started on metformin- dose increased to 1000 mg BID.    # Autism   - patient is high functioning but unable to take care of basics.    #Insomnia  - start melatonin 5 mg PO qhs  - counseled on sleep hygiene     #Misc   - DVT prophylaxis: not indicated, pt is ambulating   - GI prophylaxis: PPI   - Diet: CC  - Activity: IAT   - Disposition: CM following. Legal will obtain guardianship, meeting planned for Friday.  eval is done.

## 2022-05-03 NOTE — PROGRESS NOTE ADULT - SUBJECTIVE AND OBJECTIVE BOX
HPI:  47 yo M with PMHx of Intellectual Disability, DM not on any medications presents with foot wounds. Pt has very poor foot hygiene and per the sister, has been persistently scratching an open wound on his L second toe, which worsened and became more red, had drainage, malodor. He saw a physician at [facility] and was told to come to the ED for evaluation. Pt is poor historian. Does endorse abdominal pain for a few days, cannot give much more description. Per the sister, pt did not seem to have any recent fevers, however is also not a very good historian and does not seem to have good health literacy. States that her and her mom decided to stop giving pt Metformin a while ago, are treating his diabetes by not giving him any sugary foods.  In the ED, T 97.8, /87, , RR 16, SpO2 99% on RA. Lab work unrevealing.  (31 Mar 2022 22:36)    Currently admitted to medicine with the primary diagnosis of Onychomycosis       Today is hospital day 33d.     INTERVAL HPI / OVERNIGHT EVENTS:  Patient was examined and seen at bedside. This morning he is resting comfortably in bed and reports no new issues or overnight events.     Pt requesting Ambien for sleep, stating he had been on it in the past. Started melatonin 4/30; pt agreeable.     5/3: Patient has a meeting Friday for guardianship     ROS: Otherwise unremarkable     PAST MEDICAL & SURGICAL HISTORY  DM (diabetes mellitus)    Intellectual disability      ALLERGIES  penicillin (Unknown)    MEDICATIONS  MEDICATIONS  (STANDING):  ammonium lactate 12% Lotion 1 Application(s) Topical two times a day  clotrimazole 1% Cream 1 Application(s) Topical two times a day  dextrose 5%. 1000 milliLiter(s) (100 mL/Hr) IV Continuous <Continuous>  dextrose 5%. 1000 milliLiter(s) (50 mL/Hr) IV Continuous <Continuous>  dextrose 50% Injectable 25 Gram(s) IV Push once  dextrose 50% Injectable 12.5 Gram(s) IV Push once  dextrose 50% Injectable 25 Gram(s) IV Push once  glucagon  Injectable 1 milliGRAM(s) IntraMuscular once  insulin lispro (ADMELOG) corrective regimen sliding scale   SubCutaneous three times a day before meals  melatonin 5 milliGRAM(s) Oral at bedtime  metFORMIN 1000 milliGRAM(s) Oral two times a day  pantoprazole    Tablet 40 milliGRAM(s) Oral before breakfast  vitamin A &amp; D Ointment 1 Application(s) Topical daily    MEDICATIONS  (PRN):  dextrose Oral Gel 15 Gram(s) Oral once PRN Blood Glucose LESS THAN 70 milliGRAM(s)/deciliter  ondansetron Injectable 4 milliGRAM(s) IV Push every 6 hours PRN Nausea and/or Vomiting      Vital Signs Last 24 Hrs  T(C): 36.2 (03 May 2022 08:12), Max: 36.2 (02 May 2022 15:30)  T(F): 97.2 (03 May 2022 08:12), Max: 97.2 (02 May 2022 15:30)  HR: 80 (03 May 2022 08:12) (80 - 115)  BP: 104/53 (03 May 2022 08:12) (104/53 - 127/78)  BP(mean): --  RR: 18 (03 May 2022 08:12) (18 - 18)  SpO2: --    PHYSICAL EXAM  GEN: NAD, Resting comfortably in bed  PULM: Clear to auscultation bilaterally, No wheezes  CVS: Regular rate and rhythm, S1-S2, no murmurs  ABD: Soft, non-tender, non-distended, no guarding  EXT: No edema, onychomycosis   NEURO: A&Ox3, no focal deficits    LABS                        13.1   7.50  )-----------( 297      ( 30 Apr 2022 04:30 )             40.8     04-30    140  |  101  |  21<H>  ----------------------------<  165<H>  4.1   |  26  |  0.7    Ca    9.6      30 Apr 2022 04:30    TPro  6.5  /  Alb  4.2  /  TBili  0.4  /  DBili  x   /  AST  24  /  ALT  64<H>  /  AlkPhos  121<H>  04-30

## 2022-05-04 LAB
GLUCOSE BLDC GLUCOMTR-MCNC: 173 MG/DL — HIGH (ref 70–99)
GLUCOSE BLDC GLUCOMTR-MCNC: 194 MG/DL — HIGH (ref 70–99)
GLUCOSE BLDC GLUCOMTR-MCNC: 213 MG/DL — HIGH (ref 70–99)
GLUCOSE BLDC GLUCOMTR-MCNC: 257 MG/DL — HIGH (ref 70–99)

## 2022-05-04 PROCEDURE — 99231 SBSQ HOSP IP/OBS SF/LOW 25: CPT

## 2022-05-04 RX ADMIN — Medication 1: at 17:12

## 2022-05-04 RX ADMIN — Medication 1: at 08:03

## 2022-05-04 RX ADMIN — PANTOPRAZOLE SODIUM 40 MILLIGRAM(S): 20 TABLET, DELAYED RELEASE ORAL at 05:53

## 2022-05-04 RX ADMIN — METFORMIN HYDROCHLORIDE 1000 MILLIGRAM(S): 850 TABLET ORAL at 17:13

## 2022-05-04 RX ADMIN — METFORMIN HYDROCHLORIDE 1000 MILLIGRAM(S): 850 TABLET ORAL at 05:53

## 2022-05-04 RX ADMIN — Medication 3: at 12:07

## 2022-05-04 RX ADMIN — Medication 1 APPLICATION(S): at 12:08

## 2022-05-04 RX ADMIN — Medication 5 MILLIGRAM(S): at 21:34

## 2022-05-04 NOTE — PROGRESS NOTE ADULT - SUBJECTIVE AND OBJECTIVE BOX
HPI:  45 yo M with PMHx of Intellectual Disability, DM not on any medications presents with foot wounds. Pt has very poor foot hygiene and per the sister, has been persistently scratching an open wound on his L second toe, which worsened and became more red, had drainage, malodor. He saw a physician at [facility] and was told to come to the ED for evaluation. Pt is poor historian. Does endorse abdominal pain for a few days, cannot give much more description. Per the sister, pt did not seem to have any recent fevers, however is also not a very good historian and does not seem to have good health literacy. States that her and her mom decided to stop giving pt Metformin a while ago, are treating his diabetes by not giving him any sugary foods.  In the ED, T 97.8, /87, , RR 16, SpO2 99% on RA. Lab work unrevealing.  (31 Mar 2022 22:36)    Currently admitted to medicine with the primary diagnosis of Onychomycosis       Today is hospital day 34d.     INTERVAL HPI / OVERNIGHT EVENTS:  Patient was examined and seen at bedside. This morning he is resting comfortably in bed and reports no new issues or overnight events.     Pt requesting Ambien for sleep, stating he had been on it in the past. Started melatonin 4/30; pt agreeable.     5/3: Patient has a meeting Friday for guardianship   5/4: meeting is scheduled for friday, no acute distress    ROS: Otherwise unremarkable     PAST MEDICAL & SURGICAL HISTORY  DM (diabetes mellitus)    Intellectual disability      ALLERGIES  penicillin (Unknown)    MEDICATIONS  MEDICATIONS  (STANDING):  ammonium lactate 12% Lotion 1 Application(s) Topical two times a day  clotrimazole 1% Cream 1 Application(s) Topical two times a day  dextrose 5%. 1000 milliLiter(s) (100 mL/Hr) IV Continuous <Continuous>  dextrose 5%. 1000 milliLiter(s) (50 mL/Hr) IV Continuous <Continuous>  dextrose 50% Injectable 25 Gram(s) IV Push once  dextrose 50% Injectable 12.5 Gram(s) IV Push once  dextrose 50% Injectable 25 Gram(s) IV Push once  glucagon  Injectable 1 milliGRAM(s) IntraMuscular once  insulin lispro (ADMELOG) corrective regimen sliding scale   SubCutaneous three times a day before meals  melatonin 5 milliGRAM(s) Oral at bedtime  metFORMIN 1000 milliGRAM(s) Oral two times a day  pantoprazole    Tablet 40 milliGRAM(s) Oral before breakfast  vitamin A &amp; D Ointment 1 Application(s) Topical daily    MEDICATIONS  (PRN):  dextrose Oral Gel 15 Gram(s) Oral once PRN Blood Glucose LESS THAN 70 milliGRAM(s)/deciliter  ondansetron Injectable 4 milliGRAM(s) IV Push every 6 hours PRN Nausea and/or Vomiting      Vital Signs Last 24 Hrs  T(C): 36.2 (04 May 2022 08:44), Max: 36.2 (04 May 2022 08:44)  T(F): 97.2 (04 May 2022 08:44), Max: 97.2 (04 May 2022 08:44)  HR: 97 (04 May 2022 08:44) (89 - 111)  BP: 119/58 (04 May 2022 08:44) (119/58 - 138/81)  BP(mean): --  RR: 18 (04 May 2022 08:44) (18 - 18)  SpO2: --    PHYSICAL EXAM  GEN: NAD, Resting comfortably in bed  PULM: Clear to auscultation bilaterally, No wheezes  CVS: Regular rate and rhythm, S1-S2, no murmurs  ABD: Soft, non-tender, non-distended, no guarding  EXT: No edema, onychomycosis   NEURO: A&Ox3, no focal deficits    LABS                        13.1   7.50  )-----------( 297      ( 30 Apr 2022 04:30 )             40.8     04-30    140  |  101  |  21<H>  ----------------------------<  165<H>  4.1   |  26  |  0.7    Ca    9.6      30 Apr 2022 04:30    TPro  6.5  /  Alb  4.2  /  TBili  0.4  /  DBili  x   /  AST  24  /  ALT  64<H>  /  AlkPhos  121<H>  04-30

## 2022-05-04 NOTE — PROGRESS NOTE ADULT - ASSESSMENT
45 yo M with PMHx of Intellectual Disability, DM not on any medications presents with foot wounds. Pt has very poor foot hygiene and per the sister, has been persistently scratching an open wound on his L second toe, which worsened and became more red, had drainage, malodor. He saw a physician at [facility] and was told to come to the ED for eval.    #Onychomycosis   - antifungal, s/p ABX po (likely due to severe infection)  - seen by ID and podiatry eval  - Completed clindamycin  - PO terbinafine not suggested by ID or podiatry  - Per podiatry, no surgical intervention is advised, clotrimazole cream adequate     #uncontrolled diabetes mellitus   - hba1c is 8.9- patient does not take any medication at home  - started on metformin- dose increased to 1000 mg BID.    # Autism   - patient is high functioning but unable to take care of basics.    #Insomnia  - start melatonin 5 mg PO qhs  - counseled on sleep hygiene     #Misc   - DVT prophylaxis: not indicated, pt is ambulating   - GI prophylaxis: PPI   - Diet: CC  - Activity: IAT   - Disposition: CM following. Legal will obtain guardianship, meeting planned for Friday.  eval is done.

## 2022-05-04 NOTE — PROGRESS NOTE ADULT - SUBJECTIVE AND OBJECTIVE BOX
Progress Note:  Provider Speciality                            Hospitalist      CARIN WATSON MRN-[id number] 46y Male     CHIEF PRESENTING COMPLAINT:  Patient is a 46y old  Male who presents with a chief complaint of DFU (06 Apr 2022 12:18)        SUBJECTIVE:  Patient was seen and examined at bedside. No new complaints described by patient in morning rounds.   No significant overnight events reported.     HISTORY OF PRESENTING ILLNESS:  HPI:  45 yo M with PMHx of Intellectual Disability, DM not on any medications presents with foot wounds. Pt has very poor foot hygiene and per the sister, has been persistently scratching an open wound on his L second toe, which worsened and became more red, had drainage, malodor. He saw a physician at [facility] and was told to come to the ED for evaluation. Pt is poor historian. Does endorse abdominal pain for a few days, cannot give much more description. Per the sister, pt did not seem to have any recent fevers, however is also not a very good historian and does not seem to have good health literacy. States that her and her mom decided to stop giving pt Metformin a while ago, are treating his diabetes by not giving him any sugary foods.  In the ED, T 97.8, /87, , RR 16, SpO2 99% on RA. Lab work unrevealing.  (31 Mar 2022 22:36)        REVIEW OF SYSTEMS:  Patient denies any headache, any vision complaints, runny nose, fever, chills. Denies chest pain, shortness of breath, palpitation. Denies nausea, vomiting, abdominal pain or diarrhoea, Denies dysuria. Denies  localized weakness in any part of the body or numbness.   At least 10 systems were reviewed in ROS. All systems reviewed  are within normal limits except for the complaints as described in Subjective.    PAST MEDICAL & SURGICAL HISTORY:  PAST MEDICAL & SURGICAL HISTORY:  DM (diabetes mellitus)    Intellectual disability            VITAL SIGNS:  Vital Signs Last 24 Hrs  T(C): 36.2 (04 May 2022 08:44), Max: 36.2 (04 May 2022 08:44)  T(F): 97.2 (04 May 2022 08:44), Max: 97.2 (04 May 2022 08:44)  HR: 97 (04 May 2022 08:44) (89 - 111)  BP: 119/58 (04 May 2022 08:44) (119/58 - 138/81)  BP(mean): --  RR: 18 (04 May 2022 08:44) (18 - 18)  SpO2: --      PHYSICAL EXAMINATION:  Not in acute distress  General: No icterus  HEENT:   no JVD.  Heart: S1+S2 audible  Lungs: bilateral  moderate air entry, no wheezing, no crepitations.  Abdomen: Soft, non-tender, non-distended , no  rigidity or guarding.  CNS: Awake alert, CN  grossly intact.  Extremities:  No edema            CONSULTS:  Consultant(s) Notes Reviewed by me.   Care Discussed with Consultants/Other Providers where required.        MEDICATIONS:  MEDICATIONS  (STANDING):  ammonium lactate 12% Lotion 1 Application(s) Topical two times a day  clotrimazole 1% Cream 1 Application(s) Topical two times a day  enoxaparin Injectable 40 milliGRAM(s) SubCutaneous every 24 hours  metFORMIN 1000 milliGRAM(s) Oral two times a day  pantoprazole    Tablet 40 milliGRAM(s) Oral before breakfast  vitamin A &amp; D Ointment 1 Application(s) Topical daily    MEDICATIONS  (PRN):  ondansetron Injectable 4 milliGRAM(s) IV Push every 6 hours PRN Nausea and/or Vomiting            ASSESSMENT:    45 yo M with PMH of Intellectual Disability and DM not on any medications presented with foot wounds.    Onychomycosis  DM2  Intellectual disability/autism    Nail bed wound and onychomycosis  Completed clindamycin  Podiatry noted- no surgical intervention is advised. outpt podiatry f/u  DM type 2 - uncontrolled. A1C 8.9. Continue  metformin 1000mg BID  Handoff:  Pending legal issue and guardianship, meeting with case management on friday .

## 2022-05-05 LAB
GLUCOSE BLDC GLUCOMTR-MCNC: 136 MG/DL — HIGH (ref 70–99)
GLUCOSE BLDC GLUCOMTR-MCNC: 165 MG/DL — HIGH (ref 70–99)
GLUCOSE BLDC GLUCOMTR-MCNC: 186 MG/DL — HIGH (ref 70–99)
GLUCOSE BLDC GLUCOMTR-MCNC: 306 MG/DL — HIGH (ref 70–99)

## 2022-05-05 PROCEDURE — 99231 SBSQ HOSP IP/OBS SF/LOW 25: CPT

## 2022-05-05 RX ADMIN — Medication 5 MILLIGRAM(S): at 21:02

## 2022-05-05 RX ADMIN — METFORMIN HYDROCHLORIDE 1000 MILLIGRAM(S): 850 TABLET ORAL at 05:05

## 2022-05-05 RX ADMIN — Medication 1 APPLICATION(S): at 04:59

## 2022-05-05 RX ADMIN — Medication 4: at 17:07

## 2022-05-05 RX ADMIN — Medication 1 APPLICATION(S): at 11:49

## 2022-05-05 RX ADMIN — Medication 1: at 11:49

## 2022-05-05 RX ADMIN — PANTOPRAZOLE SODIUM 40 MILLIGRAM(S): 20 TABLET, DELAYED RELEASE ORAL at 05:06

## 2022-05-05 RX ADMIN — METFORMIN HYDROCHLORIDE 1000 MILLIGRAM(S): 850 TABLET ORAL at 17:07

## 2022-05-05 NOTE — PROGRESS NOTE ADULT - SUBJECTIVE AND OBJECTIVE BOX
Progress Note:  Provider Speciality                            Hospitalist      CARIN WATSON MRN-[id number] 46y Male     CHIEF PRESENTING COMPLAINT:  Patient is a 46y old  Male who presents with a chief complaint of DFU (06 Apr 2022 12:18)        SUBJECTIVE:  Patient was seen and examined at bedside. No new complaints described by patient in morning rounds.   No significant overnight events reported.     HISTORY OF PRESENTING ILLNESS:  HPI:  47 yo M with PMHx of Intellectual Disability, DM not on any medications presents with foot wounds. Pt has very poor foot hygiene and per the sister, has been persistently scratching an open wound on his L second toe, which worsened and became more red, had drainage, malodor. He saw a physician at [facility] and was told to come to the ED for evaluation. Pt is poor historian. Does endorse abdominal pain for a few days, cannot give much more description. Per the sister, pt did not seem to have any recent fevers, however is also not a very good historian and does not seem to have good health literacy. States that her and her mom decided to stop giving pt Metformin a while ago, are treating his diabetes by not giving him any sugary foods.  In the ED, T 97.8, /87, , RR 16, SpO2 99% on RA. Lab work unrevealing.  (31 Mar 2022 22:36)        REVIEW OF SYSTEMS:  Patient denies any headache, any vision complaints, runny nose, fever, chills. Denies chest pain, shortness of breath, palpitation. Denies nausea, vomiting, abdominal pain or diarrhoea, Denies dysuria. Denies  localized weakness in any part of the body or numbness.   At least 10 systems were reviewed in ROS. All systems reviewed  are within normal limits except for the complaints as described in Subjective.    PAST MEDICAL & SURGICAL HISTORY:  PAST MEDICAL & SURGICAL HISTORY:  DM (diabetes mellitus)    Intellectual disability            VITAL SIGNS:  Vital Signs Last 24 Hrs  T(C): 36.5 (05 May 2022 07:13), Max: 36.5 (05 May 2022 07:13)  T(F): 97.7 (05 May 2022 07:13), Max: 97.7 (05 May 2022 07:13)  HR: 86 (05 May 2022 07:13) (86 - 109)  BP: 104/58 (05 May 2022 07:13) (104/58 - 122/52)  BP(mean): 79 (04 May 2022 15:49) (79 - 79)  RR: 18 (05 May 2022 07:13) (18 - 18)  SpO2: --    PHYSICAL EXAMINATION:  Not in acute distress  General: No icterus  HEENT:   no JVD.  Heart: S1+S2 audible  Lungs: bilateral  moderate air entry, no wheezing, no crepitations.  Abdomen: Soft, non-tender, non-distended , no  rigidity or guarding.  CNS: Awake alert, CN  grossly intact.  Extremities:  No edema            CONSULTS:  Consultant(s) Notes Reviewed by me.   Care Discussed with Consultants/Other Providers where required.        MEDICATIONS:  MEDICATIONS  (STANDING):  ammonium lactate 12% Lotion 1 Application(s) Topical two times a day  clotrimazole 1% Cream 1 Application(s) Topical two times a day  enoxaparin Injectable 40 milliGRAM(s) SubCutaneous every 24 hours  metFORMIN 1000 milliGRAM(s) Oral two times a day  pantoprazole    Tablet 40 milliGRAM(s) Oral before breakfast  vitamin A &amp; D Ointment 1 Application(s) Topical daily    MEDICATIONS  (PRN):  ondansetron Injectable 4 milliGRAM(s) IV Push every 6 hours PRN Nausea and/or Vomiting            ASSESSMENT:    47 yo M with PMH of Intellectual Disability and DM not on any medications presented with foot wounds.    Onychomycosis  DM2  Intellectual disability/autism    Nail bed wound and onychomycosis  Completed clindamycin  Podiatry noted- no surgical intervention is advised. outpt podiatry f/u  DM type 2 - uncontrolled. A1C 8.9. Continue  metformin 1000mg BID  Handoff:  Pending legal issue and guardianship, meeting with case management on friday .

## 2022-05-05 NOTE — PROGRESS NOTE ADULT - ASSESSMENT
47 yo M with PMHx of Intellectual Disability, DM not on any medications presents with foot wounds. Pt has very poor foot hygiene and per the sister, has been persistently scratching an open wound on his L second toe, which worsened and became more red, had drainage, malodor. He saw a physician at [facility] and was told to come to the ED for eval.    #Onychomycosis   - antifungal, s/p ABX po (likely due to severe infection)  - seen by ID and podiatry eval  - Completed clindamycin  - PO terbinafine not suggested by ID or podiatry  - Per podiatry, no surgical intervention is advised, clotrimazole cream adequate     #uncontrolled diabetes mellitus   - hba1c is 8.9- patient does not take any medication at home  - started on metformin- dose increased to 1000 mg BID.    # Autism   - patient is high functioning but unable to take care of basics.    #Insomnia  - start melatonin 5 mg PO qhs  - counseled on sleep hygiene     #Misc   - DVT prophylaxis: not indicated, pt is ambulating   - GI prophylaxis: PPI   - Diet: CC  - Activity: IAT   - Disposition: CM following. Legal will obtain guardianship, meeting planned for Friday.  eval is done.

## 2022-05-05 NOTE — PROGRESS NOTE ADULT - SUBJECTIVE AND OBJECTIVE BOX
HPI:  47 yo M with PMHx of Intellectual Disability, DM not on any medications presents with foot wounds. Pt has very poor foot hygiene and per the sister, has been persistently scratching an open wound on his L second toe, which worsened and became more red, had drainage, malodor. He saw a physician at [facility] and was told to come to the ED for evaluation. Pt is poor historian. Does endorse abdominal pain for a few days, cannot give much more description. Per the sister, pt did not seem to have any recent fevers, however is also not a very good historian and does not seem to have good health literacy. States that her and her mom decided to stop giving pt Metformin a while ago, are treating his diabetes by not giving him any sugary foods.  In the ED, T 97.8, /87, , RR 16, SpO2 99% on RA. Lab work unrevealing.  (31 Mar 2022 22:36)    Currently admitted to medicine with the primary diagnosis of Onychomycosis       Today is hospital day 35d.     INTERVAL HPI / OVERNIGHT EVENTS:  Patient was examined and seen at bedside. This morning he is resting comfortably in bed and reports no new issues or overnight events.     Pt requesting Ambien for sleep, stating he had been on it in the past. Started melatonin 4/30; pt agreeable.     5/3: Patient has a meeting Friday for guardianship   5/4: meeting is scheduled for friday, no acute distress  5/5: resting comfortably in bed    ROS: Otherwise unremarkable     PAST MEDICAL & SURGICAL HISTORY  DM (diabetes mellitus)    Intellectual disability      ALLERGIES  penicillin (Unknown)    MEDICATIONS  MEDICATIONS  (STANDING):  ammonium lactate 12% Lotion 1 Application(s) Topical two times a day  clotrimazole 1% Cream 1 Application(s) Topical two times a day  dextrose 5%. 1000 milliLiter(s) (100 mL/Hr) IV Continuous <Continuous>  dextrose 5%. 1000 milliLiter(s) (50 mL/Hr) IV Continuous <Continuous>  dextrose 50% Injectable 25 Gram(s) IV Push once  dextrose 50% Injectable 12.5 Gram(s) IV Push once  dextrose 50% Injectable 25 Gram(s) IV Push once  glucagon  Injectable 1 milliGRAM(s) IntraMuscular once  insulin lispro (ADMELOG) corrective regimen sliding scale   SubCutaneous three times a day before meals  melatonin 5 milliGRAM(s) Oral at bedtime  metFORMIN 1000 milliGRAM(s) Oral two times a day  pantoprazole    Tablet 40 milliGRAM(s) Oral before breakfast  vitamin A &amp; D Ointment 1 Application(s) Topical daily    MEDICATIONS  (PRN):  dextrose Oral Gel 15 Gram(s) Oral once PRN Blood Glucose LESS THAN 70 milliGRAM(s)/deciliter  ondansetron Injectable 4 milliGRAM(s) IV Push every 6 hours PRN Nausea and/or Vomiting      Vital Signs Last 24 Hrs  T(C): 36.5 (05 May 2022 07:13), Max: 36.5 (05 May 2022 07:13)  T(F): 97.7 (05 May 2022 07:13), Max: 97.7 (05 May 2022 07:13)  HR: 86 (05 May 2022 07:13) (86 - 109)  BP: 104/58 (05 May 2022 07:13) (104/58 - 122/52)  BP(mean): 79 (04 May 2022 15:49) (79 - 79)  RR: 18 (05 May 2022 07:13) (18 - 18)  SpO2: --    PHYSICAL EXAM  GEN: NAD, Resting comfortably in bed  PULM: Clear to auscultation bilaterally, No wheezes  CVS: Regular rate and rhythm, S1-S2, no murmurs  ABD: Soft, non-tender, non-distended, no guarding  EXT: No edema, onychomycosis   NEURO: A&Ox3, no focal deficits    LABS                        13.1   7.50  )-----------( 297      ( 30 Apr 2022 04:30 )             40.8     04-30    140  |  101  |  21<H>  ----------------------------<  165<H>  4.1   |  26  |  0.7    Ca    9.6      30 Apr 2022 04:30    TPro  6.5  /  Alb  4.2  /  TBili  0.4  /  DBili  x   /  AST  24  /  ALT  64<H>  /  AlkPhos  121<H>  04-30

## 2022-05-06 LAB
GLUCOSE BLDC GLUCOMTR-MCNC: 154 MG/DL — HIGH (ref 70–99)
GLUCOSE BLDC GLUCOMTR-MCNC: 194 MG/DL — HIGH (ref 70–99)
GLUCOSE BLDC GLUCOMTR-MCNC: 216 MG/DL — HIGH (ref 70–99)
GLUCOSE BLDC GLUCOMTR-MCNC: 238 MG/DL — HIGH (ref 70–99)

## 2022-05-06 PROCEDURE — 99231 SBSQ HOSP IP/OBS SF/LOW 25: CPT

## 2022-05-06 RX ADMIN — Medication 5 MILLIGRAM(S): at 21:08

## 2022-05-06 RX ADMIN — PANTOPRAZOLE SODIUM 40 MILLIGRAM(S): 20 TABLET, DELAYED RELEASE ORAL at 05:02

## 2022-05-06 RX ADMIN — Medication 2: at 17:00

## 2022-05-06 RX ADMIN — Medication 1 APPLICATION(S): at 11:27

## 2022-05-06 RX ADMIN — METFORMIN HYDROCHLORIDE 1000 MILLIGRAM(S): 850 TABLET ORAL at 17:00

## 2022-05-06 RX ADMIN — Medication 2: at 11:26

## 2022-05-06 RX ADMIN — Medication 1 APPLICATION(S): at 17:01

## 2022-05-06 RX ADMIN — Medication 1: at 08:06

## 2022-05-06 RX ADMIN — Medication 1 APPLICATION(S): at 05:03

## 2022-05-06 RX ADMIN — METFORMIN HYDROCHLORIDE 1000 MILLIGRAM(S): 850 TABLET ORAL at 05:02

## 2022-05-06 NOTE — PROGRESS NOTE ADULT - ASSESSMENT
47 yo M with PMHx of Intellectual Disability, DM not on any medications presents with foot wounds. Pt has very poor foot hygiene and per the sister, has been persistently scratching an open wound on his L second toe, which worsened and became more red, had drainage, malodor. He saw a physician at [facility] [place] and was told to come to the ED for eval.    #Onychomycosis   - antifungal, s/p ABX po (likely due to severe infection)  - seen by ID and podiatry eval  - Completed clindamycin  - PO terbinafine not suggested by ID or podiatry  - Per podiatry, no surgical intervention is advised, clotrimazole cream adequate     #uncontrolled diabetes mellitus   - hba1c is 8.9- patient does not take any medication at home  - started on metformin- dose increased to 1000 mg BID.    # Autism   - patient is high functioning but unable to take care of basics.    #Insomnia  - start melatonin 5 mg PO qhs  - counseled on sleep hygiene     #Misc   - DVT prophylaxis: not indicated, pt is ambulating   - GI prophylaxis: PPI   - Diet: CC  - Activity: IAT   - Disposition: guardianship meeting today 5/6

## 2022-05-06 NOTE — PROGRESS NOTE ADULT - SUBJECTIVE AND OBJECTIVE BOX
Progress Note:  Provider Speciality                            Hospitalist      CARIN WATSON MRN-[id number] 46y Male     CHIEF PRESENTING COMPLAINT:  Patient is a 46y old  Male who presents with a chief complaint of DFU (06 Apr 2022 12:18)        SUBJECTIVE:  Patient was seen and examined at bedside. No new complaints described by patient in morning rounds.   No significant overnight events reported.     HISTORY OF PRESENTING ILLNESS:  HPI:  45 yo M with PMHx of Intellectual Disability, DM not on any medications presents with foot wounds. Pt has very poor foot hygiene and per the sister, has been persistently scratching an open wound on his L second toe, which worsened and became more red, had drainage, malodor. He saw a physician at [facility] and was told to come to the ED for evaluation. Pt is poor historian. Does endorse abdominal pain for a few days, cannot give much more description. Per the sister, pt did not seem to have any recent fevers, however is also not a very good historian and does not seem to have good health literacy. States that her and her mom decided to stop giving pt Metformin a while ago, are treating his diabetes by not giving him any sugary foods.  In the ED, T 97.8, /87, , RR 16, SpO2 99% on RA. Lab work unrevealing.  (31 Mar 2022 22:36)        REVIEW OF SYSTEMS:  Patient denies any headache, any vision complaints, runny nose, fever, chills. Denies chest pain, shortness of breath, palpitation. Denies nausea, vomiting, abdominal pain or diarrhoea, Denies dysuria. Denies  localized weakness in any part of the body or numbness.   At least 10 systems were reviewed in ROS. All systems reviewed  are within normal limits except for the complaints as described in Subjective.    PAST MEDICAL & SURGICAL HISTORY:  PAST MEDICAL & SURGICAL HISTORY:  DM (diabetes mellitus)    Intellectual disability            VITAL SIGNS:  Vital Signs Last 24 Hrs  T(C): 35.9 (06 May 2022 07:55), Max: 36.3 (05 May 2022 23:49)  T(F): 96.7 (06 May 2022 07:55), Max: 97.3 (05 May 2022 23:49)  HR: 76 (06 May 2022 07:55) (76 - 89)  BP: 115/79 (06 May 2022 07:55) (110/58 - 118/58)  BP(mean): --  RR: 18 (06 May 2022 07:55) (18 - 19)  SpO2: --      PHYSICAL EXAMINATION:  Not in acute distress  General: No icterus  HEENT:   no JVD.  Heart: S1+S2 audible  Lungs: bilateral  moderate air entry, no wheezing, no crepitations.  Abdomen: Soft, non-tender, non-distended , no  rigidity or guarding.  CNS: Awake alert, CN  grossly intact.  Extremities:  No edema            CONSULTS:  Consultant(s) Notes Reviewed by me.   Care Discussed with Consultants/Other Providers where required.        MEDICATIONS:  MEDICATIONS  (STANDING):  ammonium lactate 12% Lotion 1 Application(s) Topical two times a day  clotrimazole 1% Cream 1 Application(s) Topical two times a day  enoxaparin Injectable 40 milliGRAM(s) SubCutaneous every 24 hours  metFORMIN 1000 milliGRAM(s) Oral two times a day  pantoprazole    Tablet 40 milliGRAM(s) Oral before breakfast  vitamin A &amp; D Ointment 1 Application(s) Topical daily    MEDICATIONS  (PRN):  ondansetron Injectable 4 milliGRAM(s) IV Push every 6 hours PRN Nausea and/or Vomiting            ASSESSMENT:    45 yo M with PMH of Intellectual Disability and DM not on any medications presented with foot wounds.    Onychomycosis  DM2  Intellectual disability/autism    Nail bed wound and onychomycosis  Completed clindamycin  Podiatry noted- no surgical intervention is advised. outpt podiatry f/u  DM type 2 - uncontrolled. A1C 8.9. Continue  metformin 1000mg BID  Handoff:  Pending legal issue and guardianship, meeting with case management on friday .

## 2022-05-06 NOTE — PROGRESS NOTE ADULT - SUBJECTIVE AND OBJECTIVE BOX
HPI:  45 yo M with PMHx of Intellectual Disability, DM not on any medications presents with foot wounds. Pt has very poor foot hygiene and per the sister, has been persistently scratching an open wound on his L second toe, which worsened and became more red, had drainage, malodor. He saw a physician at [facility] and was told to come to the ED for evaluation. Pt is poor historian. Does endorse abdominal pain for a few days, cannot give much more description. Per the sister, pt did not seem to have any recent fevers, however is also not a very good historian and does not seem to have good health literacy. States that her and her mom decided to stop giving pt Metformin a while ago, are treating his diabetes by not giving him any sugary foods.  In the ED, T 97.8, /87, , RR 16, SpO2 99% on RA. Lab work unrevealing.  (31 Mar 2022 22:36)    Currently admitted to medicine with the primary diagnosis of Onychomycosis       Today is hospital day 36d.     INTERVAL HPI / OVERNIGHT EVENTS:  Patient was examined and seen at bedside. This morning he is resting comfortably in bed and reports no new issues or overnight events.     Pt requesting Ambien for sleep, stating he had been on it in the past. Started melatonin 4/30; pt agreeable.     5/3: Patient has a meeting Friday for guardianship   5/4: meeting is scheduled for friday, no acute distress  5/5: resting comfortably in bed  5/6: meeting scheduled for today    ROS: Otherwise unremarkable     PAST MEDICAL & SURGICAL HISTORY  DM (diabetes mellitus)    Intellectual disability      ALLERGIES  penicillin (Unknown)    MEDICATIONS  MEDICATIONS  (STANDING):  ammonium lactate 12% Lotion 1 Application(s) Topical two times a day  clotrimazole 1% Cream 1 Application(s) Topical two times a day  dextrose 5%. 1000 milliLiter(s) (100 mL/Hr) IV Continuous <Continuous>  dextrose 5%. 1000 milliLiter(s) (50 mL/Hr) IV Continuous <Continuous>  dextrose 50% Injectable 25 Gram(s) IV Push once  dextrose 50% Injectable 12.5 Gram(s) IV Push once  dextrose 50% Injectable 25 Gram(s) IV Push once  glucagon  Injectable 1 milliGRAM(s) IntraMuscular once  insulin lispro (ADMELOG) corrective regimen sliding scale   SubCutaneous three times a day before meals  melatonin 5 milliGRAM(s) Oral at bedtime  metFORMIN 1000 milliGRAM(s) Oral two times a day  pantoprazole    Tablet 40 milliGRAM(s) Oral before breakfast  vitamin A &amp; D Ointment 1 Application(s) Topical daily    MEDICATIONS  (PRN):  dextrose Oral Gel 15 Gram(s) Oral once PRN Blood Glucose LESS THAN 70 milliGRAM(s)/deciliter  ondansetron Injectable 4 milliGRAM(s) IV Push every 6 hours PRN Nausea and/or Vomiting      Vital Signs Last 24 Hrs  T(C): 35.9 (06 May 2022 07:55), Max: 36.3 (05 May 2022 23:49)  T(F): 96.7 (06 May 2022 07:55), Max: 97.3 (05 May 2022 23:49)  HR: 76 (06 May 2022 07:55) (76 - 89)  BP: 115/79 (06 May 2022 07:55) (110/58 - 118/58)  BP(mean): --  RR: 18 (06 May 2022 07:55) (18 - 19)  SpO2: --    PHYSICAL EXAM  GEN: NAD, Resting comfortably in bed  PULM: Clear to auscultation bilaterally, No wheezes  CVS: Regular rate and rhythm, S1-S2, no murmurs  ABD: Soft, non-tender, non-distended, no guarding  EXT: No edema, onychomycosis   NEURO: A&Ox3, no focal deficits    LABS                        13.1   7.50  )-----------( 297      ( 30 Apr 2022 04:30 )             40.8     04-30    140  |  101  |  21<H>  ----------------------------<  165<H>  4.1   |  26  |  0.7    Ca    9.6      30 Apr 2022 04:30    TPro  6.5  /  Alb  4.2  /  TBili  0.4  /  DBili  x   /  AST  24  /  ALT  64<H>  /  AlkPhos  121<H>  04-30

## 2022-05-07 LAB
GLUCOSE BLDC GLUCOMTR-MCNC: 168 MG/DL — HIGH (ref 70–99)
GLUCOSE BLDC GLUCOMTR-MCNC: 181 MG/DL — HIGH (ref 70–99)
GLUCOSE BLDC GLUCOMTR-MCNC: 192 MG/DL — HIGH (ref 70–99)
GLUCOSE BLDC GLUCOMTR-MCNC: 194 MG/DL — HIGH (ref 70–99)

## 2022-05-07 PROCEDURE — 99231 SBSQ HOSP IP/OBS SF/LOW 25: CPT

## 2022-05-07 RX ORDER — ZOLPIDEM TARTRATE 10 MG/1
5 TABLET ORAL AT BEDTIME
Refills: 0 | Status: DISCONTINUED | OUTPATIENT
Start: 2022-05-07 | End: 2022-05-14

## 2022-05-07 RX ADMIN — Medication 1: at 08:14

## 2022-05-07 RX ADMIN — Medication 1 APPLICATION(S): at 11:16

## 2022-05-07 RX ADMIN — Medication 1 APPLICATION(S): at 17:14

## 2022-05-07 RX ADMIN — Medication 1: at 12:22

## 2022-05-07 RX ADMIN — ZOLPIDEM TARTRATE 5 MILLIGRAM(S): 10 TABLET ORAL at 22:42

## 2022-05-07 RX ADMIN — METFORMIN HYDROCHLORIDE 1000 MILLIGRAM(S): 850 TABLET ORAL at 05:52

## 2022-05-07 RX ADMIN — METFORMIN HYDROCHLORIDE 1000 MILLIGRAM(S): 850 TABLET ORAL at 17:14

## 2022-05-07 RX ADMIN — PANTOPRAZOLE SODIUM 40 MILLIGRAM(S): 20 TABLET, DELAYED RELEASE ORAL at 05:52

## 2022-05-07 RX ADMIN — Medication 1 APPLICATION(S): at 17:13

## 2022-05-07 RX ADMIN — Medication 1: at 17:13

## 2022-05-07 NOTE — PROGRESS NOTE ADULT - ASSESSMENT
47 yo M with PMHx of Intellectual Disability, DM not on any medications presents with foot wounds. Pt has very poor foot hygiene and per the sister, has been persistently scratching an open wound on his L second toe, which worsened and became more red, had drainage, malodor. He saw a physician at [facility] and was told to come to the ED for eval.    #Onychomycosis   - antifungal, s/p ABX po (likely due to severe infection)  - seen by ID and podiatry eval  - Completed clindamycin  - PO terbinafine not suggested by ID or podiatry  - Per podiatry, no surgical intervention is advised, clotrimazole cream adequate     #uncontrolled diabetes mellitus   - hba1c is 8.9- patient does not take any medication at home  - started on metformin- dose increased to 1000 mg BID.    # Autism   - patient is high functioning but unable to take care of basics.    #Insomnia  - start melatonin 5 mg PO qhs  - counseled on sleep hygiene     #Misc   - DVT prophylaxis: not indicated, pt is ambulating   - GI prophylaxis: PPI   - Diet: CC  - Activity: IAT   - Disposition: guardianship meeting 5/6, social work and legal following

## 2022-05-07 NOTE — PROGRESS NOTE ADULT - SUBJECTIVE AND OBJECTIVE BOX
Progress Note:  Provider Speciality                            Hospitalist      CARIN WATSON MRN-[id number] 46y Male     CHIEF PRESENTING COMPLAINT:  Patient is a 46y old  Male who presents with a chief complaint of DFU (06 Apr 2022 12:18)        SUBJECTIVE:  Patient was seen and examined at bedside. No new complaints described by patient in morning rounds.   No significant overnight events reported.     HISTORY OF PRESENTING ILLNESS:  HPI:  45 yo M with PMHx of Intellectual Disability, DM not on any medications presents with foot wounds. Pt has very poor foot hygiene and per the sister, has been persistently scratching an open wound on his L second toe, which worsened and became more red, had drainage, malodor. He saw a physician at [facility] and was told to come to the ED for evaluation. Pt is poor historian. Does endorse abdominal pain for a few days, cannot give much more description. Per the sister, pt did not seem to have any recent fevers, however is also not a very good historian and does not seem to have good health literacy. States that her and her mom decided to stop giving pt Metformin a while ago, are treating his diabetes by not giving him any sugary foods.  In the ED, T 97.8, /87, , RR 16, SpO2 99% on RA. Lab work unrevealing.  (31 Mar 2022 22:36)        REVIEW OF SYSTEMS:  Patient denies any headache, any vision complaints, runny nose, fever, chills. Denies chest pain, shortness of breath, palpitation. Denies nausea, vomiting, abdominal pain or diarrhoea, Denies dysuria. Denies  localized weakness in any part of the body or numbness.   At least 10 systems were reviewed in ROS. All systems reviewed  are within normal limits except for the complaints as described in Subjective.    PAST MEDICAL & SURGICAL HISTORY:  PAST MEDICAL & SURGICAL HISTORY:  DM (diabetes mellitus)    Intellectual disability            VITAL SIGNS:  Vital Signs Last 24 Hrs  T(C): 36 (07 May 2022 09:45), Max: 36.4 (06 May 2022 15:40)  T(F): 96.8 (07 May 2022 09:45), Max: 97.6 (06 May 2022 15:40)  HR: 102 (07 May 2022 09:45) (81 - 102)  BP: 123/92 (07 May 2022 09:45) (103/57 - 132/65)  BP(mean): --  RR: 19 (07 May 2022 09:45) (19 - 19)  SpO2: 97% (07 May 2022 09:45) (97% - 97%)    PHYSICAL EXAMINATION:  Not in acute distress  General: No icterus  HEENT:   no JVD.  Heart: S1+S2 audible  Lungs: bilateral  moderate air entry, no wheezing, no crepitations.  Abdomen: Soft, non-tender, non-distended , no  rigidity or guarding.  CNS: Awake alert, CN  grossly intact.  Extremities:  No edema            CONSULTS:  Consultant(s) Notes Reviewed by me.   Care Discussed with Consultants/Other Providers where required.        MEDICATIONS:  MEDICATIONS  (STANDING):  ammonium lactate 12% Lotion 1 Application(s) Topical two times a day  clotrimazole 1% Cream 1 Application(s) Topical two times a day  enoxaparin Injectable 40 milliGRAM(s) SubCutaneous every 24 hours  metFORMIN 1000 milliGRAM(s) Oral two times a day  pantoprazole    Tablet 40 milliGRAM(s) Oral before breakfast  vitamin A &amp; D Ointment 1 Application(s) Topical daily    MEDICATIONS  (PRN):  ondansetron Injectable 4 milliGRAM(s) IV Push every 6 hours PRN Nausea and/or Vomiting            ASSESSMENT:    45 yo M with PMH of Intellectual Disability and DM not on any medications presented with foot wounds.    Onychomycosis  DM2  Intellectual disability/autism    Nail bed wound and onychomycosis  Completed clindamycin  Podiatry noted- no surgical intervention is advised. outpt podiatry f/u  DM type 2 - uncontrolled. A1C 8.9. Continue  metformin 1000mg BID  Handoff:  Pending legal issue and guardianship, meeting with case management on friday .

## 2022-05-07 NOTE — PROGRESS NOTE ADULT - SUBJECTIVE AND OBJECTIVE BOX
HPI:  47 yo M with PMHx of Intellectual Disability, DM not on any medications presents with foot wounds. Pt has very poor foot hygiene and per the sister, has been persistently scratching an open wound on his L second toe, which worsened and became more red, had drainage, malodor. He saw a physician at [facility] and was told to come to the ED for evaluation. Pt is poor historian. Does endorse abdominal pain for a few days, cannot give much more description. Per the sister, pt did not seem to have any recent fevers, however is also not a very good historian and does not seem to have good health literacy. States that her and her mom decided to stop giving pt Metformin a while ago, are treating his diabetes by not giving him any sugary foods.  In the ED, T 97.8, /87, , RR 16, SpO2 99% on RA. Lab work unrevealing.  (31 Mar 2022 22:36)    Currently admitted to medicine with the primary diagnosis of Onychomycosis       Today is hospital day 36d.     INTERVAL HPI / OVERNIGHT EVENTS:  Patient was examined and seen at bedside. This morning he is resting comfortably in bed and reports no new issues or overnight events.     Pt requesting Ambien for sleep, stating he had been on it in the past. Started melatonin 4/30; pt agreeable.     5/3: Patient has a meeting Friday for guardianship   5/4: meeting is scheduled for friday, no acute distress  5/5: resting comfortably in bed  5/6: meeting scheduled for today  5/7: no acute distress    ROS: Otherwise unremarkable     PAST MEDICAL & SURGICAL HISTORY  DM (diabetes mellitus)    Intellectual disability      ALLERGIES  penicillin (Unknown)    MEDICATIONS  MEDICATIONS  (STANDING):  ammonium lactate 12% Lotion 1 Application(s) Topical two times a day  clotrimazole 1% Cream 1 Application(s) Topical two times a day  dextrose 5%. 1000 milliLiter(s) (100 mL/Hr) IV Continuous <Continuous>  dextrose 5%. 1000 milliLiter(s) (50 mL/Hr) IV Continuous <Continuous>  dextrose 50% Injectable 25 Gram(s) IV Push once  dextrose 50% Injectable 12.5 Gram(s) IV Push once  dextrose 50% Injectable 25 Gram(s) IV Push once  glucagon  Injectable 1 milliGRAM(s) IntraMuscular once  insulin lispro (ADMELOG) corrective regimen sliding scale   SubCutaneous three times a day before meals  melatonin 5 milliGRAM(s) Oral at bedtime  metFORMIN 1000 milliGRAM(s) Oral two times a day  pantoprazole    Tablet 40 milliGRAM(s) Oral before breakfast  vitamin A &amp; D Ointment 1 Application(s) Topical daily    MEDICATIONS  (PRN):  dextrose Oral Gel 15 Gram(s) Oral once PRN Blood Glucose LESS THAN 70 milliGRAM(s)/deciliter  ondansetron Injectable 4 milliGRAM(s) IV Push every 6 hours PRN Nausea and/or Vomiting      Vital Signs Last 24 Hrs  T(C): 35.8 (07 May 2022 00:00), Max: 36.4 (06 May 2022 15:40)  T(F): 96.5 (07 May 2022 00:00), Max: 97.6 (06 May 2022 15:40)  HR: 81 (07 May 2022 00:00) (81 - 100)  BP: 103/57 (07 May 2022 00:00) (103/57 - 132/65)  BP(mean): --  RR: 19 (06 May 2022 15:40) (19 - 19)  SpO2: --    PHYSICAL EXAM  GEN: NAD, Resting comfortably in bed  PULM: Clear to auscultation bilaterally, No wheezes  CVS: Regular rate and rhythm, S1-S2, no murmurs  ABD: Soft, non-tender, non-distended, no guarding  EXT: No edema, onychomycosis   NEURO: A&Ox3, no focal deficits    LABS                        13.1   7.50  )-----------( 297      ( 30 Apr 2022 04:30 )             40.8     04-30    140  |  101  |  21<H>  ----------------------------<  165<H>  4.1   |  26  |  0.7    Ca    9.6      30 Apr 2022 04:30    TPro  6.5  /  Alb  4.2  /  TBili  0.4  /  DBili  x   /  AST  24  /  ALT  64<H>  /  AlkPhos  121<H>  04-30

## 2022-05-08 LAB
GLUCOSE BLDC GLUCOMTR-MCNC: 138 MG/DL — HIGH (ref 70–99)
GLUCOSE BLDC GLUCOMTR-MCNC: 199 MG/DL — HIGH (ref 70–99)
GLUCOSE BLDC GLUCOMTR-MCNC: 239 MG/DL — HIGH (ref 70–99)
GLUCOSE BLDC GLUCOMTR-MCNC: 241 MG/DL — HIGH (ref 70–99)

## 2022-05-08 PROCEDURE — 99231 SBSQ HOSP IP/OBS SF/LOW 25: CPT

## 2022-05-08 RX ADMIN — METFORMIN HYDROCHLORIDE 1000 MILLIGRAM(S): 850 TABLET ORAL at 06:35

## 2022-05-08 RX ADMIN — Medication 1 APPLICATION(S): at 11:00

## 2022-05-08 RX ADMIN — Medication 1 APPLICATION(S): at 17:12

## 2022-05-08 RX ADMIN — ZOLPIDEM TARTRATE 5 MILLIGRAM(S): 10 TABLET ORAL at 22:34

## 2022-05-08 RX ADMIN — Medication 1 APPLICATION(S): at 06:35

## 2022-05-08 RX ADMIN — PANTOPRAZOLE SODIUM 40 MILLIGRAM(S): 20 TABLET, DELAYED RELEASE ORAL at 06:35

## 2022-05-08 RX ADMIN — Medication 2: at 17:11

## 2022-05-08 RX ADMIN — Medication 1: at 12:10

## 2022-05-08 RX ADMIN — METFORMIN HYDROCHLORIDE 1000 MILLIGRAM(S): 850 TABLET ORAL at 17:11

## 2022-05-08 NOTE — PROGRESS NOTE ADULT - SUBJECTIVE AND OBJECTIVE BOX
Progress Note:  Provider Speciality                            Hospitalist      CARIN WATSON MRN-[id number] 46y Male     CHIEF PRESENTING COMPLAINT:  Patient is a 46y old  Male who presents with a chief complaint of DFU (06 Apr 2022 12:18)        SUBJECTIVE:  Patient was seen and examined at bedside. No new complaints described by patient in morning rounds.   No significant overnight events reported.     HISTORY OF PRESENTING ILLNESS:  HPI:  47 yo M with PMHx of Intellectual Disability, DM not on any medications presents with foot wounds. Pt has very poor foot hygiene and per the sister, has been persistently scratching an open wound on his L second toe, which worsened and became more red, had drainage, malodor. He saw a physician at [facility] and was told to come to the ED for evaluation. Pt is poor historian. Does endorse abdominal pain for a few days, cannot give much more description. Per the sister, pt did not seem to have any recent fevers, however is also not a very good historian and does not seem to have good health literacy. States that her and her mom decided to stop giving pt Metformin a while ago, are treating his diabetes by not giving him any sugary foods.  In the ED, T 97.8, /87, , RR 16, SpO2 99% on RA. Lab work unrevealing.  (31 Mar 2022 22:36)        REVIEW OF SYSTEMS:  Patient denies any headache, any vision complaints, runny nose, fever, chills. Denies chest pain, shortness of breath, palpitation. Denies nausea, vomiting, abdominal pain or diarrhoea, Denies dysuria. Denies  localized weakness in any part of the body or numbness.   At least 10 systems were reviewed in ROS. All systems reviewed  are within normal limits except for the complaints as described in Subjective.    PAST MEDICAL & SURGICAL HISTORY:  PAST MEDICAL & SURGICAL HISTORY:  DM (diabetes mellitus)    Intellectual disability            VITAL SIGNS:  Vital Signs Last 24 Hrs  T(C): 36.1 (08 May 2022 00:00), Max: 36.1 (08 May 2022 00:00)  T(F): 96.9 (08 May 2022 00:00), Max: 96.9 (08 May 2022 00:00)  HR: 90 (08 May 2022 00:00) (90 - 90)  BP: 123/57 (08 May 2022 00:00) (123/57 - 123/57)  BP(mean): --  RR: 18 (08 May 2022 00:00) (18 - 18)  SpO2: --        PHYSICAL EXAMINATION:  Not in acute distress  General: No icterus  HEENT:   no JVD.  Heart: S1+S2 audible  Lungs: bilateral  moderate air entry, no wheezing, no crepitations.  Abdomen: Soft, non-tender, non-distended , no  rigidity or guarding.  CNS: Awake alert, CN  grossly intact.  Extremities:  No edema            CONSULTS:  Consultant(s) Notes Reviewed by me.   Care Discussed with Consultants/Other Providers where required.        MEDICATIONS:  MEDICATIONS  (STANDING):  ammonium lactate 12% Lotion 1 Application(s) Topical two times a day  clotrimazole 1% Cream 1 Application(s) Topical two times a day  enoxaparin Injectable 40 milliGRAM(s) SubCutaneous every 24 hours  metFORMIN 1000 milliGRAM(s) Oral two times a day  pantoprazole    Tablet 40 milliGRAM(s) Oral before breakfast  vitamin A &amp; D Ointment 1 Application(s) Topical daily    MEDICATIONS  (PRN):  ondansetron Injectable 4 milliGRAM(s) IV Push every 6 hours PRN Nausea and/or Vomiting            ASSESSMENT:    47 yo M with PMH of Intellectual Disability and DM not on any medications presented with foot wounds.    Onychomycosis  DM2  Intellectual disability/autism    Nail bed wound and onychomycosis  Completed clindamycin  Podiatry noted- no surgical intervention is advised. outpt podiatry f/u  DM type 2 - uncontrolled. A1C 8.9. Continue  metformin 1000mg BID  Handoff:  Pending legal issue and guardianship, meeting with case management on friday .

## 2022-05-09 LAB
GLUCOSE BLDC GLUCOMTR-MCNC: 184 MG/DL — HIGH (ref 70–99)
GLUCOSE BLDC GLUCOMTR-MCNC: 229 MG/DL — HIGH (ref 70–99)
GLUCOSE BLDC GLUCOMTR-MCNC: 231 MG/DL — HIGH (ref 70–99)
GLUCOSE BLDC GLUCOMTR-MCNC: 272 MG/DL — HIGH (ref 70–99)

## 2022-05-09 PROCEDURE — 99231 SBSQ HOSP IP/OBS SF/LOW 25: CPT

## 2022-05-09 RX ORDER — LACTOBACILLUS ACIDOPHILUS 100MM CELL
1 CAPSULE ORAL
Refills: 0 | Status: DISCONTINUED | OUTPATIENT
Start: 2022-05-09 | End: 2022-10-11

## 2022-05-09 RX ADMIN — Medication 1 APPLICATION(S): at 05:29

## 2022-05-09 RX ADMIN — Medication 2: at 17:05

## 2022-05-09 RX ADMIN — Medication 1 APPLICATION(S): at 17:49

## 2022-05-09 RX ADMIN — Medication 1 APPLICATION(S): at 05:27

## 2022-05-09 RX ADMIN — Medication 1 TABLET(S): at 17:06

## 2022-05-09 RX ADMIN — METFORMIN HYDROCHLORIDE 1000 MILLIGRAM(S): 850 TABLET ORAL at 17:05

## 2022-05-09 RX ADMIN — PANTOPRAZOLE SODIUM 40 MILLIGRAM(S): 20 TABLET, DELAYED RELEASE ORAL at 06:13

## 2022-05-09 RX ADMIN — Medication 1 APPLICATION(S): at 11:09

## 2022-05-09 RX ADMIN — Medication 3: at 11:52

## 2022-05-09 RX ADMIN — Medication 1: at 08:02

## 2022-05-09 RX ADMIN — METFORMIN HYDROCHLORIDE 1000 MILLIGRAM(S): 850 TABLET ORAL at 05:26

## 2022-05-09 NOTE — CHART NOTE - NSCHARTNOTEFT_GEN_A_CORE
PT remains with good PO intake. Observed lunch tray eaten 90%. No chewing/swallowing difficulty reported. No GI discomfort, LBM 5/8 per pt.   EMR PO doc: 4/28-5/7: %.     Will re-assess in 10 days.

## 2022-05-09 NOTE — PROGRESS NOTE ADULT - SUBJECTIVE AND OBJECTIVE BOX
HPI:  45 yo M with PMHx of Intellectual Disability, DM not on any medications presents with foot wounds. Pt has very poor foot hygiene and per the sister, has been persistently scratching an open wound on his L second toe, which worsened and became more red, had drainage, malodor. He saw a physician at [facility] and was told to come to the ED for evaluation. Pt is poor historian. Does endorse abdominal pain for a few days, cannot give much more description. Per the sister, pt did not seem to have any recent fevers, however is also not a very good historian and does not seem to have good health literacy. States that her and her mom decided to stop giving pt Metformin a while ago, are treating his diabetes by not giving him any sugary foods.  In the ED, T 97.8, /87, , RR 16, SpO2 99% on RA. Lab work unrevealing.  (31 Mar 2022 22:36)    Currently admitted to medicine with the primary diagnosis of Onychomycosis       Today is hospital day 39d.     INTERVAL HPI / OVERNIGHT EVENTS:  Patient was examined and seen at bedside. This morning he is resting comfortably in bed and reports no new issues or overnight events.     Pt requesting Ambien for sleep, stating he had been on it in the past. Started melatonin 4/30; pt agreeable.     5/3: Patient has a meeting Friday for guardianship   5/4: meeting is scheduled for friday, no acute distress  5/5: resting comfortably in bed  5/6: meeting scheduled for today  5/7: no acute distress  5/9: Social issues continue to be addressed     ROS: Otherwise unremarkable     PAST MEDICAL & SURGICAL HISTORY  DM (diabetes mellitus)    Intellectual disability      ALLERGIES  penicillin (Unknown)    MEDICATIONS  MEDICATIONS  (STANDING):  ammonium lactate 12% Lotion 1 Application(s) Topical two times a day  clotrimazole 1% Cream 1 Application(s) Topical two times a day  dextrose 5%. 1000 milliLiter(s) (100 mL/Hr) IV Continuous <Continuous>  dextrose 5%. 1000 milliLiter(s) (50 mL/Hr) IV Continuous <Continuous>  dextrose 50% Injectable 25 Gram(s) IV Push once  dextrose 50% Injectable 12.5 Gram(s) IV Push once  dextrose 50% Injectable 25 Gram(s) IV Push once  glucagon  Injectable 1 milliGRAM(s) IntraMuscular once  insulin lispro (ADMELOG) corrective regimen sliding scale   SubCutaneous three times a day before meals  melatonin 5 milliGRAM(s) Oral at bedtime  metFORMIN 1000 milliGRAM(s) Oral two times a day  pantoprazole    Tablet 40 milliGRAM(s) Oral before breakfast  vitamin A &amp; D Ointment 1 Application(s) Topical daily    MEDICATIONS  (PRN):  dextrose Oral Gel 15 Gram(s) Oral once PRN Blood Glucose LESS THAN 70 milliGRAM(s)/deciliter  ondansetron Injectable 4 milliGRAM(s) IV Push every 6 hours PRN Nausea and/or Vomiting      Vital Signs Last 24 Hrs  Vital Signs Last 24 Hrs  T(C): 36 (09 May 2022 08:00), Max: 36.4 (09 May 2022 00:00)  T(F): 96.8 (09 May 2022 08:00), Max: 97.5 (09 May 2022 00:00)  HR: 97 (09 May 2022 08:00) (95 - 97)  BP: 131/56 (09 May 2022 08:00) (128/63 - 131/56)  BP(mean): --  RR: 18 (09 May 2022 08:00) (18 - 18)  SpO2: 97% (09 May 2022 08:00) (97% - 97%)    PHYSICAL EXAM  GEN: NAD, Resting comfortably in bed  PULM: Clear to auscultation bilaterally, No wheezes  CVS: Regular rate and rhythm, S1-S2, no murmurs  ABD: Soft, non-tender, non-distended, no guarding  EXT: No edema, onychomycosis   NEURO: A&Ox3, no focal deficits    LABS                        13.1   7.50  )-----------( 297      ( 30 Apr 2022 04:30 )             40.8     04-30    140  |  101  |  21<H>  ----------------------------<  165<H>  4.1   |  26  |  0.7    Ca    9.6      30 Apr 2022 04:30    TPro  6.5  /  Alb  4.2  /  TBili  0.4  /  DBili  x   /  AST  24  /  ALT  64<H>  /  AlkPhos  121<H>  04-30

## 2022-05-09 NOTE — PROGRESS NOTE ADULT - SUBJECTIVE AND OBJECTIVE BOX
Progress Note:  Provider Speciality                            Hospitalist      CARIN WATSON MRN-[id number] 46y Male     CHIEF PRESENTING COMPLAINT:  Patient is a 46y old  Male who presents with a chief complaint of DFU (06 Apr 2022 12:18)        SUBJECTIVE:  Patient was seen and examined at bedside. No new complaints described by patient in morning rounds.   No significant overnight events reported.     HISTORY OF PRESENTING ILLNESS:  HPI:  45 yo M with PMHx of Intellectual Disability, DM not on any medications presents with foot wounds. Pt has very poor foot hygiene and per the sister, has been persistently scratching an open wound on his L second toe, which worsened and became more red, had drainage, malodor. He saw a physician at [facility] and was told to come to the ED for evaluation. Pt is poor historian. Does endorse abdominal pain for a few days, cannot give much more description. Per the sister, pt did not seem to have any recent fevers, however is also not a very good historian and does not seem to have good health literacy. States that her and her mom decided to stop giving pt Metformin a while ago, are treating his diabetes by not giving him any sugary foods.  In the ED, T 97.8, /87, , RR 16, SpO2 99% on RA. Lab work unrevealing.  (31 Mar 2022 22:36)        REVIEW OF SYSTEMS:  Patient denies any headache, any vision complaints, runny nose, fever, chills. Denies chest pain, shortness of breath, palpitation. Denies nausea, vomiting, abdominal pain or diarrhoea, Denies dysuria. Denies  localized weakness in any part of the body or numbness.   At least 10 systems were reviewed in ROS. All systems reviewed  are within normal limits except for the complaints as described in Subjective.    PAST MEDICAL & SURGICAL HISTORY:  PAST MEDICAL & SURGICAL HISTORY:  DM (diabetes mellitus)    Intellectual disability            VITAL SIGNS:  Vital Signs Last 24 Hrs  T(C): 36 (09 May 2022 08:00), Max: 36.4 (09 May 2022 00:00)  T(F): 96.8 (09 May 2022 08:00), Max: 97.5 (09 May 2022 00:00)  HR: 97 (09 May 2022 08:00) (95 - 97)  BP: 131/56 (09 May 2022 08:00) (128/63 - 131/56)  BP(mean): --  RR: 18 (09 May 2022 08:00) (18 - 18)  SpO2: 97% (09 May 2022 08:00) (97% - 97%)    PHYSICAL EXAMINATION:  Not in acute distress  General: No icterus  HEENT:   no JVD.  Heart: S1+S2 audible  Lungs: bilateral  moderate air entry, no wheezing, no crepitations.  Abdomen: Soft, non-tender, non-distended , no  rigidity or guarding.  CNS: Awake alert, CN  grossly intact.  Extremities:  No edema            CONSULTS:  Consultant(s) Notes Reviewed by me.   Care Discussed with Consultants/Other Providers where required.        MEDICATIONS:  MEDICATIONS  (STANDING):  ammonium lactate 12% Lotion 1 Application(s) Topical two times a day  clotrimazole 1% Cream 1 Application(s) Topical two times a day  enoxaparin Injectable 40 milliGRAM(s) SubCutaneous every 24 hours  metFORMIN 1000 milliGRAM(s) Oral two times a day  pantoprazole    Tablet 40 milliGRAM(s) Oral before breakfast  vitamin A &amp; D Ointment 1 Application(s) Topical daily    MEDICATIONS  (PRN):  ondansetron Injectable 4 milliGRAM(s) IV Push every 6 hours PRN Nausea and/or Vomiting            ASSESSMENT:    45 yo M with PMH of Intellectual Disability and DM not on any medications presented with foot wounds.    Onychomycosis  DM2  Intellectual disability/autism    Nail bed wound and onychomycosis  Completed clindamycin  Podiatry noted- no surgical intervention is advised. outpt podiatry f/u  DM type 2 - uncontrolled. A1C 8.9. Continue  metformin 1000mg BID  Handoff:  Pending legal issue and guardianship, meeting with case management on friday .

## 2022-05-09 NOTE — PROGRESS NOTE ADULT - ASSESSMENT
47 yo M with PMHx of Intellectual Disability, DM not on any medications presents with foot wounds. Pt has very poor foot hygiene and per the sister, has been persistently scratching an open wound on his L second toe, which worsened and became more red, had drainage, malodor. He saw a physician at [facility] and was told to come to the ED for eval.    #Onychomycosis   - antifungal, s/p ABX po (likely due to severe infection)  - seen by ID and podiatry eval  - Completed clindamycin  - PO terbinafine not suggested by ID or podiatry  - Per podiatry, no surgical intervention is advised, clotrimazole cream adequate     #uncontrolled diabetes mellitus   - hba1c is 8.9- patient does not take any medication at home  - started on metformin- dose increased to 1000 mg BID.    # Autism   - patient is high functioning but unable to take care of basics.    #Insomnia  - start melatonin 5 mg PO qhs  - counseled on sleep hygiene     #Misc   - DVT prophylaxis: not indicated, pt is ambulating   - GI prophylaxis: PPI   - Diet: CC  - Activity: IAT   - Disposition: guardianship meeting was 5/6, social work and legal following

## 2022-05-10 LAB
GLUCOSE BLDC GLUCOMTR-MCNC: 125 MG/DL — HIGH (ref 70–99)
GLUCOSE BLDC GLUCOMTR-MCNC: 225 MG/DL — HIGH (ref 70–99)
GLUCOSE BLDC GLUCOMTR-MCNC: 227 MG/DL — HIGH (ref 70–99)
GLUCOSE BLDC GLUCOMTR-MCNC: 244 MG/DL — HIGH (ref 70–99)

## 2022-05-10 PROCEDURE — 99231 SBSQ HOSP IP/OBS SF/LOW 25: CPT

## 2022-05-10 RX ADMIN — METFORMIN HYDROCHLORIDE 1000 MILLIGRAM(S): 850 TABLET ORAL at 05:17

## 2022-05-10 RX ADMIN — METFORMIN HYDROCHLORIDE 1000 MILLIGRAM(S): 850 TABLET ORAL at 17:35

## 2022-05-10 RX ADMIN — Medication 1 APPLICATION(S): at 17:25

## 2022-05-10 RX ADMIN — Medication 1 TABLET(S): at 05:13

## 2022-05-10 RX ADMIN — Medication 1 APPLICATION(S): at 11:04

## 2022-05-10 RX ADMIN — Medication 2: at 16:33

## 2022-05-10 RX ADMIN — Medication 2: at 12:19

## 2022-05-10 RX ADMIN — Medication 1 APPLICATION(S): at 05:08

## 2022-05-10 RX ADMIN — Medication 1 APPLICATION(S): at 05:09

## 2022-05-10 RX ADMIN — Medication 1 TABLET(S): at 17:36

## 2022-05-10 RX ADMIN — PANTOPRAZOLE SODIUM 40 MILLIGRAM(S): 20 TABLET, DELAYED RELEASE ORAL at 06:01

## 2022-05-10 NOTE — PROGRESS NOTE ADULT - SUBJECTIVE AND OBJECTIVE BOX
Progress Note:  Provider Speciality                            Hospitalist      CARIN WATSON MRN-[id number] 46y Male     CHIEF PRESENTING COMPLAINT:  Patient is a 46y old  Male who presents with a chief complaint of DFU (06 Apr 2022 12:18)        SUBJECTIVE:  Patient was seen and examined at bedside. No new complaints described by patient in morning rounds.   No significant overnight events reported.     HISTORY OF PRESENTING ILLNESS:  HPI:  47 yo M with PMHx of Intellectual Disability, DM not on any medications presents with foot wounds. Pt has very poor foot hygiene and per the sister, has been persistently scratching an open wound on his L second toe, which worsened and became more red, had drainage, malodor. He saw a physician at [facility] and was told to come to the ED for evaluation. Pt is poor historian. Does endorse abdominal pain for a few days, cannot give much more description. Per the sister, pt did not seem to have any recent fevers, however is also not a very good historian and does not seem to have good health literacy. States that her and her mom decided to stop giving pt Metformin a while ago, are treating his diabetes by not giving him any sugary foods.  In the ED, T 97.8, /87, , RR 16, SpO2 99% on RA. Lab work unrevealing.  (31 Mar 2022 22:36)        REVIEW OF SYSTEMS:  Patient denies any headache, any vision complaints, runny nose, fever, chills. Denies chest pain, shortness of breath, palpitation. Denies nausea, vomiting, abdominal pain or diarrhoea, Denies dysuria. Denies  localized weakness in any part of the body or numbness.   At least 10 systems were reviewed in ROS. All systems reviewed  are within normal limits except for the complaints as described in Subjective.    PAST MEDICAL & SURGICAL HISTORY:  PAST MEDICAL & SURGICAL HISTORY:  DM (diabetes mellitus)    Intellectual disability            VITAL SIGNS:  Vital Signs Last 24 Hrs  T(C): 35.7 (10 May 2022 08:00), Max: 35.9 (10 May 2022 00:24)  T(F): 96.3 (10 May 2022 08:00), Max: 96.7 (10 May 2022 00:24)  HR: 70 (10 May 2022 08:00) (70 - 108)  BP: 124/81 (10 May 2022 08:00) (124/81 - 150/84)  BP(mean): --  RR: 18 (10 May 2022 08:00) (18 - 18)  SpO2: 98% (10 May 2022 08:00) (98% - 98%)          PHYSICAL EXAMINATION:  Not in acute distress  General: No icterus  HEENT:   no JVD.  Heart: S1+S2 audible  Lungs: bilateral  moderate air entry, no wheezing, no crepitations.  Abdomen: Soft, non-tender, non-distended , no  rigidity or guarding.  CNS: Awake alert, CN  grossly intact.  Extremities:  No edema            CONSULTS:  Consultant(s) Notes Reviewed by me.   Care Discussed with Consultants/Other Providers where required.        MEDICATIONS:  MEDICATIONS  (STANDING):  ammonium lactate 12% Lotion 1 Application(s) Topical two times a day  clotrimazole 1% Cream 1 Application(s) Topical two times a day  enoxaparin Injectable 40 milliGRAM(s) SubCutaneous every 24 hours  metFORMIN 1000 milliGRAM(s) Oral two times a day  pantoprazole    Tablet 40 milliGRAM(s) Oral before breakfast  vitamin A &amp; D Ointment 1 Application(s) Topical daily    MEDICATIONS  (PRN):  ondansetron Injectable 4 milliGRAM(s) IV Push every 6 hours PRN Nausea and/or Vomiting            ASSESSMENT:    47 yo M with PMH of Intellectual Disability and DM not on any medications presented with foot wounds.    Onychomycosis  DM2  Intellectual disability/autism    Nail bed wound and onychomycosis  Completed clindamycin  Podiatry noted- no surgical intervention is advised. outpt podiatry f/u  DM type 2 - uncontrolled. A1C 8.9. Continue  metformin 1000mg BID  Handoff:  Pending legal issue and guardianship, meeting with case management on friday .

## 2022-05-10 NOTE — PROGRESS NOTE ADULT - ASSESSMENT
45 yo M with PMHx of Intellectual Disability, DM not on any medications presents with foot wounds. Pt has very poor foot hygiene and per the sister, has been persistently scratching an open wound on his L second toe, which worsened and became more red, had drainage, malodor. He saw a physician at [facility] and was told to come to the ED for eval.    #Onychomycosis   - antifungal, s/p ABX po (likely due to severe infection)  - seen by ID and podiatry eval  - Completed clindamycin  - PO terbinafine not suggested by ID or podiatry  - Per podiatry, no surgical intervention is advised, clotrimazole cream adequate     #uncontrolled diabetes mellitus   - hba1c is 8.9- patient does not take any medication at home  - started on metformin- dose increased to 1000 mg BID.    # Autism   - patient is high functioning but unable to take care of basics.    #Insomnia  - start melatonin 5 mg PO qhs  - counseled on sleep hygiene     #Misc   - DVT prophylaxis: not indicated, pt is ambulating   - GI prophylaxis: PPI   - Diet: CC  - Activity: IAT   - Disposition: guardianship meeting was 5/6, social work and legal following

## 2022-05-10 NOTE — PROGRESS NOTE ADULT - SUBJECTIVE AND OBJECTIVE BOX
HPI:  47 yo M with PMHx of Intellectual Disability, DM not on any medications presents with foot wounds. Pt has very poor foot hygiene and per the sister, has been persistently scratching an open wound on his L second toe, which worsened and became more red, had drainage, malodor. He saw a physician at [facility] and was told to come to the ED for evaluation. Pt is poor historian. Does endorse abdominal pain for a few days, cannot give much more description. Per the sister, pt did not seem to have any recent fevers, however is also not a very good historian and does not seem to have good health literacy. States that her and her mom decided to stop giving pt Metformin a while ago, are treating his diabetes by not giving him any sugary foods.  In the ED, T 97.8, /87, , RR 16, SpO2 99% on RA. Lab work unrevealing.  (31 Mar 2022 22:36)    Currently admitted to medicine with the primary diagnosis of Onychomycosis       Today is hospital day 40d.     INTERVAL HPI / OVERNIGHT EVENTS:  Patient was examined and seen at bedside. This morning he is resting comfortably in bed and reports no new issues or overnight events.     Pt requesting Ambien for sleep, stating he had been on it in the past. Started melatonin 4/30; pt agreeable.     5/3: Patient has a meeting Friday for guardianship   5/4: meeting is scheduled for friday, no acute distress  5/5: resting comfortably in bed  5/6: meeting scheduled for today  5/7: no acute distress  5/9: Social issues continue to be addressed   5/10: Resting comfortably in bed    ROS: Otherwise unremarkable     PAST MEDICAL & SURGICAL HISTORY  DM (diabetes mellitus)    Intellectual disability      ALLERGIES  penicillin (Unknown)    MEDICATIONS  MEDICATIONS  (STANDING):  ammonium lactate 12% Lotion 1 Application(s) Topical two times a day  clotrimazole 1% Cream 1 Application(s) Topical two times a day  dextrose 5%. 1000 milliLiter(s) (100 mL/Hr) IV Continuous <Continuous>  dextrose 5%. 1000 milliLiter(s) (50 mL/Hr) IV Continuous <Continuous>  dextrose 50% Injectable 25 Gram(s) IV Push once  dextrose 50% Injectable 12.5 Gram(s) IV Push once  dextrose 50% Injectable 25 Gram(s) IV Push once  glucagon  Injectable 1 milliGRAM(s) IntraMuscular once  insulin lispro (ADMELOG) corrective regimen sliding scale   SubCutaneous three times a day before meals  melatonin 5 milliGRAM(s) Oral at bedtime  metFORMIN 1000 milliGRAM(s) Oral two times a day  pantoprazole    Tablet 40 milliGRAM(s) Oral before breakfast  vitamin A &amp; D Ointment 1 Application(s) Topical daily    MEDICATIONS  (PRN):  dextrose Oral Gel 15 Gram(s) Oral once PRN Blood Glucose LESS THAN 70 milliGRAM(s)/deciliter  ondansetron Injectable 4 milliGRAM(s) IV Push every 6 hours PRN Nausea and/or Vomiting      Vital Signs Last 24 Hrs  T(C): 35.7 (10 May 2022 08:00), Max: 35.9 (10 May 2022 00:24)  T(F): 96.3 (10 May 2022 08:00), Max: 96.7 (10 May 2022 00:24)  HR: 70 (10 May 2022 08:00) (70 - 108)  BP: 124/81 (10 May 2022 08:00) (124/81 - 150/84)  BP(mean): --  RR: 18 (10 May 2022 08:00) (18 - 18)  SpO2: 98% (10 May 2022 08:00) (98% - 98%)    PHYSICAL EXAM  GEN: NAD, Resting comfortably in bed  PULM: Clear to auscultation bilaterally, No wheezes  CVS: Regular rate and rhythm, S1-S2, no murmurs  ABD: Soft, non-tender, non-distended, no guarding  EXT: No edema, onychomycosis   NEURO: A&Ox3, no focal deficits    LABS                        13.1   7.50  )-----------( 297      ( 30 Apr 2022 04:30 )             40.8     04-30    140  |  101  |  21<H>  ----------------------------<  165<H>  4.1   |  26  |  0.7    Ca    9.6      30 Apr 2022 04:30    TPro  6.5  /  Alb  4.2  /  TBili  0.4  /  DBili  x   /  AST  24  /  ALT  64<H>  /  AlkPhos  121<H>  04-30

## 2022-05-11 LAB
GLUCOSE BLDC GLUCOMTR-MCNC: 156 MG/DL — HIGH (ref 70–99)
GLUCOSE BLDC GLUCOMTR-MCNC: 246 MG/DL — HIGH (ref 70–99)
GLUCOSE BLDC GLUCOMTR-MCNC: 264 MG/DL — HIGH (ref 70–99)
GLUCOSE BLDC GLUCOMTR-MCNC: 266 MG/DL — HIGH (ref 70–99)

## 2022-05-11 PROCEDURE — 99231 SBSQ HOSP IP/OBS SF/LOW 25: CPT

## 2022-05-11 RX ADMIN — Medication 2: at 08:08

## 2022-05-11 RX ADMIN — ZOLPIDEM TARTRATE 5 MILLIGRAM(S): 10 TABLET ORAL at 00:07

## 2022-05-11 RX ADMIN — Medication 1 TABLET(S): at 06:37

## 2022-05-11 RX ADMIN — Medication 1 APPLICATION(S): at 15:49

## 2022-05-11 RX ADMIN — PANTOPRAZOLE SODIUM 40 MILLIGRAM(S): 20 TABLET, DELAYED RELEASE ORAL at 06:36

## 2022-05-11 RX ADMIN — Medication 1: at 17:30

## 2022-05-11 RX ADMIN — Medication 1 APPLICATION(S): at 17:33

## 2022-05-11 RX ADMIN — METFORMIN HYDROCHLORIDE 1000 MILLIGRAM(S): 850 TABLET ORAL at 17:30

## 2022-05-11 RX ADMIN — Medication 1 APPLICATION(S): at 06:36

## 2022-05-11 RX ADMIN — METFORMIN HYDROCHLORIDE 1000 MILLIGRAM(S): 850 TABLET ORAL at 06:36

## 2022-05-11 RX ADMIN — Medication 1 TABLET(S): at 17:30

## 2022-05-11 RX ADMIN — Medication 3: at 12:27

## 2022-05-11 RX ADMIN — Medication 1 APPLICATION(S): at 06:35

## 2022-05-11 NOTE — PROGRESS NOTE ADULT - SUBJECTIVE AND OBJECTIVE BOX
HPI:  45 yo M with PMHx of Intellectual Disability, DM not on any medications presents with foot wounds. Pt has very poor foot hygiene and per the sister, has been persistently scratching an open wound on his L second toe, which worsened and became more red, had drainage, malodor. He saw a physician at [facility] and was told to come to the ED for evaluation. Pt is poor historian. Does endorse abdominal pain for a few days, cannot give much more description. Per the sister, pt did not seem to have any recent fevers, however is also not a very good historian and does not seem to have good health literacy. States that her and her mom decided to stop giving pt Metformin a while ago, are treating his diabetes by not giving him any sugary foods.  In the ED, T 97.8, /87, , RR 16, SpO2 99% on RA. Lab work unrevealing.  (31 Mar 2022 22:36)    Currently admitted to medicine with the primary diagnosis of Onychomycosis       Today is hospital day 41d.     INTERVAL HPI / OVERNIGHT EVENTS:  Patient was examined and seen at bedside. This morning he is resting comfortably in bed and reports no new issues or overnight events.     Pt requesting Ambien for sleep, stating he had been on it in the past. Started melatonin 4/30; pt agreeable.     5/3: Patient has a meeting Friday for guardianship   5/4: meeting is scheduled for friday, no acute distress  5/5: resting comfortably in bed  5/6: meeting scheduled for today  5/7: no acute distress  5/9: Social issues continue to be addressed   5/10: Resting comfortably in bed    Patient has Guardianship hearing 6/23 at 10am    ROS: Otherwise unremarkable     PAST MEDICAL & SURGICAL HISTORY  DM (diabetes mellitus)    Intellectual disability      ALLERGIES  penicillin (Unknown)    MEDICATIONS  MEDICATIONS  (STANDING):  ammonium lactate 12% Lotion 1 Application(s) Topical two times a day  clotrimazole 1% Cream 1 Application(s) Topical two times a day  dextrose 5%. 1000 milliLiter(s) (100 mL/Hr) IV Continuous <Continuous>  dextrose 5%. 1000 milliLiter(s) (50 mL/Hr) IV Continuous <Continuous>  dextrose 50% Injectable 25 Gram(s) IV Push once  dextrose 50% Injectable 12.5 Gram(s) IV Push once  dextrose 50% Injectable 25 Gram(s) IV Push once  glucagon  Injectable 1 milliGRAM(s) IntraMuscular once  insulin lispro (ADMELOG) corrective regimen sliding scale   SubCutaneous three times a day before meals  melatonin 5 milliGRAM(s) Oral at bedtime  metFORMIN 1000 milliGRAM(s) Oral two times a day  pantoprazole    Tablet 40 milliGRAM(s) Oral before breakfast  vitamin A &amp; D Ointment 1 Application(s) Topical daily    MEDICATIONS  (PRN):  dextrose Oral Gel 15 Gram(s) Oral once PRN Blood Glucose LESS THAN 70 milliGRAM(s)/deciliter  ondansetron Injectable 4 milliGRAM(s) IV Push every 6 hours PRN Nausea and/or Vomiting      Vital Signs Last 24 Hrs  T(C): 35.9 (11 May 2022 08:11), Max: 36.7 (11 May 2022 00:08)  T(F): 96.7 (11 May 2022 08:11), Max: 98 (11 May 2022 00:08)  HR: 89 (11 May 2022 08:11) (89 - 101)  BP: 108/55 (11 May 2022 08:11) (108/55 - 145/63)  BP(mean): --  RR: 18 (11 May 2022 08:11) (18 - 18)  SpO2: --    PHYSICAL EXAM  GEN: NAD, Resting comfortably in bed  PULM: Clear to auscultation bilaterally, No wheezes  CVS: Regular rate and rhythm, S1-S2, no murmurs  ABD: Soft, non-tender, non-distended, no guarding  EXT: No edema, onychomycosis   NEURO: A&Ox3, no focal deficits    LABS                        13.1   7.50  )-----------( 297      ( 30 Apr 2022 04:30 )             40.8     04-30    140  |  101  |  21<H>  ----------------------------<  165<H>  4.1   |  26  |  0.7    Ca    9.6      30 Apr 2022 04:30    TPro  6.5  /  Alb  4.2  /  TBili  0.4  /  DBili  x   /  AST  24  /  ALT  64<H>  /  AlkPhos  121<H>  04-30

## 2022-05-11 NOTE — PROGRESS NOTE ADULT - ASSESSMENT
45 yo M with PMHx of Intellectual Disability, DM not on any medications presents with foot wounds. Pt has very poor foot hygiene and per the sister, has been persistently scratching an open wound on his L second toe, which worsened and became more red, had drainage, malodor. He saw a physician at [facility] and was told to come to the ED for eval.    #Onychomycosis   - antifungal, s/p ABX po (likely due to severe infection)  - seen by ID and podiatry eval  - Completed clindamycin  - PO terbinafine not suggested by ID or podiatry  - Per podiatry, no surgical intervention is advised, clotrimazole cream adequate     #uncontrolled diabetes mellitus   - hba1c is 8.9- patient does not take any medication at home  - started on metformin- dose increased to 1000 mg BID.    # Autism   - patient is high functioning but unable to take care of basics.    #Insomnia  - start melatonin 5 mg PO qhs  - counseled on sleep hygiene     #Misc   - DVT prophylaxis: not indicated, pt is ambulating   - GI prophylaxis: PPI   - Diet: CC  - Activity: IAT   - Disposition: Patient has Guardianship hearing 6/23 at 10am

## 2022-05-12 LAB
GLUCOSE BLDC GLUCOMTR-MCNC: 182 MG/DL — HIGH (ref 70–99)
GLUCOSE BLDC GLUCOMTR-MCNC: 265 MG/DL — HIGH (ref 70–99)
GLUCOSE BLDC GLUCOMTR-MCNC: 266 MG/DL — HIGH (ref 70–99)
GLUCOSE BLDC GLUCOMTR-MCNC: 290 MG/DL — HIGH (ref 70–99)
GLUCOSE BLDC GLUCOMTR-MCNC: 322 MG/DL — HIGH (ref 70–99)

## 2022-05-12 PROCEDURE — 99231 SBSQ HOSP IP/OBS SF/LOW 25: CPT

## 2022-05-12 RX ORDER — INSULIN LISPRO 100/ML
3 VIAL (ML) SUBCUTANEOUS ONCE
Refills: 0 | Status: COMPLETED | OUTPATIENT
Start: 2022-05-12 | End: 2022-05-12

## 2022-05-12 RX ADMIN — Medication 1 APPLICATION(S): at 17:06

## 2022-05-12 RX ADMIN — METFORMIN HYDROCHLORIDE 1000 MILLIGRAM(S): 850 TABLET ORAL at 17:02

## 2022-05-12 RX ADMIN — Medication 1 TABLET(S): at 06:48

## 2022-05-12 RX ADMIN — Medication 1 TABLET(S): at 17:03

## 2022-05-12 RX ADMIN — Medication 1 APPLICATION(S): at 06:48

## 2022-05-12 RX ADMIN — Medication 1: at 08:23

## 2022-05-12 RX ADMIN — METFORMIN HYDROCHLORIDE 1000 MILLIGRAM(S): 850 TABLET ORAL at 06:47

## 2022-05-12 RX ADMIN — ZOLPIDEM TARTRATE 5 MILLIGRAM(S): 10 TABLET ORAL at 00:01

## 2022-05-12 RX ADMIN — Medication 3: at 12:05

## 2022-05-12 RX ADMIN — Medication 3 UNIT(S): at 21:19

## 2022-05-12 RX ADMIN — Medication 1 APPLICATION(S): at 11:04

## 2022-05-12 RX ADMIN — PANTOPRAZOLE SODIUM 40 MILLIGRAM(S): 20 TABLET, DELAYED RELEASE ORAL at 06:47

## 2022-05-12 RX ADMIN — Medication 1 APPLICATION(S): at 17:05

## 2022-05-12 RX ADMIN — Medication 4: at 17:02

## 2022-05-12 NOTE — PROGRESS NOTE ADULT - SUBJECTIVE AND OBJECTIVE BOX
HPI:  45 yo M with PMHx of Intellectual Disability, DM not on any medications presents with foot wounds. Pt has very poor foot hygiene and per the sister, has been persistently scratching an open wound on his L second toe, which worsened and became more red, had drainage, malodor. He saw a physician at [facility] and was told to come to the ED for evaluation. Pt is poor historian. Does endorse abdominal pain for a few days, cannot give much more description. Per the sister, pt did not seem to have any recent fevers, however is also not a very good historian and does not seem to have good health literacy. States that her and her mom decided to stop giving pt Metformin a while ago, are treating his diabetes by not giving him any sugary foods.  In the ED, T 97.8, /87, , RR 16, SpO2 99% on RA. Lab work unrevealing.  (31 Mar 2022 22:36)    Currently admitted to medicine with the primary diagnosis of Onychomycosis       Today is hospital day 42d.     INTERVAL HPI / OVERNIGHT EVENTS:  Patient was examined and seen at bedside. This morning he is resting comfortably in bed and reports no new issues or overnight events.     He would like to see a dentist, dental consult placed    Patient has Guardianship hearing 6/23 at 10am    ROS: Otherwise unremarkable     PAST MEDICAL & SURGICAL HISTORY  DM (diabetes mellitus)    Intellectual disability      ALLERGIES  penicillin (Unknown)    MEDICATIONS  MEDICATIONS  (STANDING):  ammonium lactate 12% Lotion 1 Application(s) Topical two times a day  clotrimazole 1% Cream 1 Application(s) Topical two times a day  dextrose 5%. 1000 milliLiter(s) (100 mL/Hr) IV Continuous <Continuous>  dextrose 5%. 1000 milliLiter(s) (50 mL/Hr) IV Continuous <Continuous>  dextrose 50% Injectable 25 Gram(s) IV Push once  dextrose 50% Injectable 12.5 Gram(s) IV Push once  dextrose 50% Injectable 25 Gram(s) IV Push once  glucagon  Injectable 1 milliGRAM(s) IntraMuscular once  insulin lispro (ADMELOG) corrective regimen sliding scale   SubCutaneous three times a day before meals  melatonin 5 milliGRAM(s) Oral at bedtime  metFORMIN 1000 milliGRAM(s) Oral two times a day  pantoprazole    Tablet 40 milliGRAM(s) Oral before breakfast  vitamin A &amp; D Ointment 1 Application(s) Topical daily    MEDICATIONS  (PRN):  dextrose Oral Gel 15 Gram(s) Oral once PRN Blood Glucose LESS THAN 70 milliGRAM(s)/deciliter  ondansetron Injectable 4 milliGRAM(s) IV Push every 6 hours PRN Nausea and/or Vomiting      Vital Signs Last 24 Hrs  T(C): 35.7 (12 May 2022 08:10), Max: 36.3 (12 May 2022 00:00)  T(F): 96.3 (12 May 2022 08:10), Max: 97.3 (12 May 2022 00:00)  HR: 89 (12 May 2022 08:10) (89 - 105)  BP: 130/66 (12 May 2022 08:10) (124/68 - 136/63)  BP(mean): --  RR: 18 (12 May 2022 08:10) (18 - 18)  SpO2: --    PHYSICAL EXAM  GEN: NAD, Resting comfortably in bed  PULM: Clear to auscultation bilaterally, No wheezes  CVS: Regular rate and rhythm, S1-S2, no murmurs  ABD: Soft, non-tender, non-distended, no guarding  EXT: No edema, onychomycosis   NEURO: A&Ox3, no focal deficits    LABS                        13.1   7.50  )-----------( 297      ( 30 Apr 2022 04:30 )             40.8     04-30    140  |  101  |  21<H>  ----------------------------<  165<H>  4.1   |  26  |  0.7    Ca    9.6      30 Apr 2022 04:30    TPro  6.5  /  Alb  4.2  /  TBili  0.4  /  DBili  x   /  AST  24  /  ALT  64<H>  /  AlkPhos  121<H>  04-30

## 2022-05-12 NOTE — PROGRESS NOTE ADULT - ASSESSMENT
47 yo M with PMHx of Intellectual Disability, DM not on any medications presents with foot wounds. Pt has very poor foot hygiene and per the sister, has been persistently scratching an open wound on his L second toe, which worsened and became more red, had drainage, malodor. He saw a physician at [facility] and was told to come to the ED for eval.    #Onychomycosis   - antifungal, s/p ABX po (likely due to severe infection)  - seen by ID and podiatry eval  - Completed clindamycin  - PO terbinafine not suggested by ID or podiatry  - Per podiatry, no surgical intervention is advised, clotrimazole cream adequate     #uncontrolled diabetes mellitus   - hba1c is 8.9- patient does not take any medication at home  - started on metformin- dose increased to 1000 mg BID.    # Autism   - patient is high functioning but unable to take care of basics.    #Insomnia  - start melatonin 5 mg PO qhs  - counseled on sleep hygiene     #Misc   - DVT prophylaxis: not indicated, pt is ambulating   - GI prophylaxis: PPI   - Diet: CC  - Activity: IAT   - Disposition: Patient has Guardianship hearing 6/23 at 10am

## 2022-05-13 LAB
GLUCOSE BLDC GLUCOMTR-MCNC: 182 MG/DL — HIGH (ref 70–99)
GLUCOSE BLDC GLUCOMTR-MCNC: 209 MG/DL — HIGH (ref 70–99)
GLUCOSE BLDC GLUCOMTR-MCNC: 233 MG/DL — HIGH (ref 70–99)
GLUCOSE BLDC GLUCOMTR-MCNC: 298 MG/DL — HIGH (ref 70–99)

## 2022-05-13 PROCEDURE — 99231 SBSQ HOSP IP/OBS SF/LOW 25: CPT

## 2022-05-13 RX ORDER — FAMOTIDINE 10 MG/ML
20 INJECTION INTRAVENOUS DAILY
Refills: 0 | Status: DISCONTINUED | OUTPATIENT
Start: 2022-05-13 | End: 2022-10-11

## 2022-05-13 RX ORDER — INSULIN LISPRO 100/ML
3 VIAL (ML) SUBCUTANEOUS ONCE
Refills: 0 | Status: COMPLETED | OUTPATIENT
Start: 2022-05-13 | End: 2022-05-21

## 2022-05-13 RX ADMIN — Medication 1 APPLICATION(S): at 17:38

## 2022-05-13 RX ADMIN — Medication 2: at 07:59

## 2022-05-13 RX ADMIN — Medication 1 APPLICATION(S): at 12:44

## 2022-05-13 RX ADMIN — Medication 3: at 17:29

## 2022-05-13 RX ADMIN — METFORMIN HYDROCHLORIDE 1000 MILLIGRAM(S): 850 TABLET ORAL at 17:28

## 2022-05-13 RX ADMIN — PANTOPRAZOLE SODIUM 40 MILLIGRAM(S): 20 TABLET, DELAYED RELEASE ORAL at 05:48

## 2022-05-13 RX ADMIN — METFORMIN HYDROCHLORIDE 1000 MILLIGRAM(S): 850 TABLET ORAL at 05:48

## 2022-05-13 RX ADMIN — Medication 1 TABLET(S): at 17:38

## 2022-05-13 RX ADMIN — Medication 1: at 11:58

## 2022-05-13 RX ADMIN — Medication 1 TABLET(S): at 05:48

## 2022-05-13 RX ADMIN — FAMOTIDINE 20 MILLIGRAM(S): 10 INJECTION INTRAVENOUS at 17:28

## 2022-05-13 NOTE — PROGRESS NOTE ADULT - ASSESSMENT
47 yo M with PMHx of Intellectual Disability, DM not on any medications presents with foot wounds. Pt has very poor foot hygiene and per the sister, has been persistently scratching an open wound on his L second toe, which worsened and became more red, had drainage, malodor. He saw a physician at [facility] and was told to come to the ED for eval.    #Onychomycosis   - antifungal, s/p ABX po (likely due to severe infection)  - seen by ID and podiatry eval  - Completed clindamycin  - PO terbinafine not suggested by ID or podiatry  - Per podiatry, no surgical intervention is advised, clotrimazole cream adequate     #uncontrolled diabetes mellitus   - hba1c is 8.9- patient does not take any medication at home  - started on metformin- dose increased to 1000 mg BID.    # Autism   - patient is high functioning but unable to take care of basics.    #Insomnia  - start melatonin 5 mg PO qhs  - counseled on sleep hygiene     #Misc   - DVT prophylaxis: not indicated, pt is ambulating   - GI prophylaxis: PPI   - Diet: CC  - Activity: IAT   - Disposition: Patient has Guardianship hearing 6/23 at 10am

## 2022-05-13 NOTE — PROGRESS NOTE ADULT - SUBJECTIVE AND OBJECTIVE BOX
HPI:  47 yo M with PMHx of Intellectual Disability, DM not on any medications presents with foot wounds. Pt has very poor foot hygiene and per the sister, has been persistently scratching an open wound on his L second toe, which worsened and became more red, had drainage, malodor. He saw a physician at [facility] and was told to come to the ED for evaluation. Pt is poor historian. Does endorse abdominal pain for a few days, cannot give much more description. Per the sister, pt did not seem to have any recent fevers, however is also not a very good historian and does not seem to have good health literacy. States that her and her mom decided to stop giving pt Metformin a while ago, are treating his diabetes by not giving him any sugary foods.  In the ED, T 97.8, /87, , RR 16, SpO2 99% on RA. Lab work unrevealing.  (31 Mar 2022 22:36)    Currently admitted to medicine with the primary diagnosis of Onychomycosis       Today is hospital day 43d.     INTERVAL HPI / OVERNIGHT EVENTS:  Patient was examined and seen at bedside. This morning he is resting comfortably in bed and reports no new issues or overnight events.     He would like to see a dentist, dental consult placed    Patient has Guardianship hearing 6/23 at 10am    ROS: Otherwise unremarkable     PAST MEDICAL & SURGICAL HISTORY  DM (diabetes mellitus)    Intellectual disability      ALLERGIES  penicillin (Unknown)    MEDICATIONS  MEDICATIONS  (STANDING):  ammonium lactate 12% Lotion 1 Application(s) Topical two times a day  clotrimazole 1% Cream 1 Application(s) Topical two times a day  dextrose 5%. 1000 milliLiter(s) (100 mL/Hr) IV Continuous <Continuous>  dextrose 5%. 1000 milliLiter(s) (50 mL/Hr) IV Continuous <Continuous>  dextrose 50% Injectable 25 Gram(s) IV Push once  dextrose 50% Injectable 12.5 Gram(s) IV Push once  dextrose 50% Injectable 25 Gram(s) IV Push once  glucagon  Injectable 1 milliGRAM(s) IntraMuscular once  insulin lispro (ADMELOG) corrective regimen sliding scale   SubCutaneous three times a day before meals  melatonin 5 milliGRAM(s) Oral at bedtime  metFORMIN 1000 milliGRAM(s) Oral two times a day  pantoprazole    Tablet 40 milliGRAM(s) Oral before breakfast  vitamin A &amp; D Ointment 1 Application(s) Topical daily    MEDICATIONS  (PRN):  dextrose Oral Gel 15 Gram(s) Oral once PRN Blood Glucose LESS THAN 70 milliGRAM(s)/deciliter  ondansetron Injectable 4 milliGRAM(s) IV Push every 6 hours PRN Nausea and/or Vomiting      Vital Signs Last 24 Hrs  T(C): 36.3 (13 May 2022 07:09), Max: 36.7 (13 May 2022 00:00)  T(F): 97.3 (13 May 2022 07:09), Max: 98 (13 May 2022 00:00)  HR: 89 (13 May 2022 07:09) (89 - 101)  BP: 131/59 (13 May 2022 07:09) (119/58 - 133/72)  BP(mean): --  RR: 18 (13 May 2022 07:09) (18 - 18)  SpO2: --      PHYSICAL EXAM  GEN: NAD, Resting comfortably in bed  PULM: Clear to auscultation bilaterally, No wheezes  CVS: Regular rate and rhythm, S1-S2, no murmurs  ABD: Soft, non-tender, non-distended, no guarding  EXT: No edema, onychomycosis   NEURO: A&Ox3, no focal deficits    LABS                        13.1   7.50  )-----------( 297      ( 30 Apr 2022 04:30 )             40.8     04-30    140  |  101  |  21<H>  ----------------------------<  165<H>  4.1   |  26  |  0.7    Ca    9.6      30 Apr 2022 04:30    TPro  6.5  /  Alb  4.2  /  TBili  0.4  /  DBili  x   /  AST  24  /  ALT  64<H>  /  AlkPhos  121<H>  04-30

## 2022-05-14 LAB
GLUCOSE BLDC GLUCOMTR-MCNC: 189 MG/DL — HIGH (ref 70–99)
GLUCOSE BLDC GLUCOMTR-MCNC: 196 MG/DL — HIGH (ref 70–99)
GLUCOSE BLDC GLUCOMTR-MCNC: 221 MG/DL — HIGH (ref 70–99)
GLUCOSE BLDC GLUCOMTR-MCNC: 249 MG/DL — HIGH (ref 70–99)

## 2022-05-14 PROCEDURE — 99231 SBSQ HOSP IP/OBS SF/LOW 25: CPT

## 2022-05-14 RX ORDER — LANOLIN ALCOHOL/MO/W.PET/CERES
3 CREAM (GRAM) TOPICAL AT BEDTIME
Refills: 0 | Status: DISCONTINUED | OUTPATIENT
Start: 2022-05-14 | End: 2022-06-08

## 2022-05-14 RX ADMIN — METFORMIN HYDROCHLORIDE 1000 MILLIGRAM(S): 850 TABLET ORAL at 17:18

## 2022-05-14 RX ADMIN — Medication 1: at 17:18

## 2022-05-14 RX ADMIN — METFORMIN HYDROCHLORIDE 1000 MILLIGRAM(S): 850 TABLET ORAL at 05:29

## 2022-05-14 RX ADMIN — Medication 1 TABLET(S): at 18:37

## 2022-05-14 RX ADMIN — ZOLPIDEM TARTRATE 5 MILLIGRAM(S): 10 TABLET ORAL at 00:26

## 2022-05-14 RX ADMIN — Medication 1 APPLICATION(S): at 05:31

## 2022-05-14 RX ADMIN — FAMOTIDINE 20 MILLIGRAM(S): 10 INJECTION INTRAVENOUS at 12:01

## 2022-05-14 RX ADMIN — Medication 1: at 08:17

## 2022-05-14 RX ADMIN — Medication 2: at 12:01

## 2022-05-14 RX ADMIN — Medication 1 TABLET(S): at 05:29

## 2022-05-14 RX ADMIN — Medication 1 APPLICATION(S): at 14:02

## 2022-05-14 RX ADMIN — Medication 3 MILLIGRAM(S): at 21:22

## 2022-05-14 NOTE — PROGRESS NOTE ADULT - SUBJECTIVE AND OBJECTIVE BOX
SUBJECTIVE:    Patient is a 46y old Male who presents with a chief complaint of DFU (13 May 2022 10:13)    Currently admitted to medicine with the primary diagnosis of Onychomycosis       Today is hospital day 44d.     PAST MEDICAL & SURGICAL HISTORY  DM (diabetes mellitus)    Intellectual disability      ALLERGIES:  penicillin (Unknown)    MEDICATIONS:  STANDING MEDICATIONS  ammonium lactate 12% Lotion 1 Application(s) Topical two times a day  clotrimazole 1% Cream 1 Application(s) Topical two times a day  famotidine    Tablet 20 milliGRAM(s) Oral daily  glucagon  Injectable 1 milliGRAM(s) IntraMuscular once  insulin lispro (ADMELOG) corrective regimen sliding scale   SubCutaneous three times a day before meals  insulin lispro Injectable (ADMELOG). 3 Unit(s) SubCutaneous once  lactobacillus acidophilus 1 Tablet(s) Oral two times a day  melatonin 3 milliGRAM(s) Oral at bedtime  metFORMIN 1000 milliGRAM(s) Oral two times a day  vitamin A &amp; D Ointment 1 Application(s) Topical daily    PRN MEDICATIONS  ondansetron Injectable 4 milliGRAM(s) IV Push every 6 hours PRN    VITALS:   T(F): 96.9  HR: 85  BP: 117/75  RR: 18  SpO2: --    LABS:                        RADIOLOGY:    PHYSICAL EXAM:  GEN: No acute distress  LUNGS: Clear to auscultation bilaterally   HEART: S1/S2 present. RRR.   ABD/ GI: Soft, non-tender, non-distended. Bowel sounds present  EXT: NC/NC/NE/2+PP/LOJA  NEURO: AAOX3

## 2022-05-14 NOTE — PROGRESS NOTE ADULT - ASSESSMENT
45 yo M with PMH of Intellectual Disability and DM not on any medications presented with foot wounds.    Onychomycosis  DM2  Intellectual disability/autism    Nail bed wound and onychomycosis  Completed clindamycin  Podiatry noted- no surgical intervention is advised. outpt podiatry f/u  DM type 2 - uncontrolled. A1C 8.9. Continue  metformin 1000mg BID--diet adjusted to diabetic-- blood sugars are high  Handoff:  Pending legal issue and guardianship, guardianship meeting 6/23 .

## 2022-05-15 LAB
GLUCOSE BLDC GLUCOMTR-MCNC: 168 MG/DL — HIGH (ref 70–99)
GLUCOSE BLDC GLUCOMTR-MCNC: 174 MG/DL — HIGH (ref 70–99)
GLUCOSE BLDC GLUCOMTR-MCNC: 232 MG/DL — HIGH (ref 70–99)
GLUCOSE BLDC GLUCOMTR-MCNC: 355 MG/DL — HIGH (ref 70–99)

## 2022-05-15 PROCEDURE — 99231 SBSQ HOSP IP/OBS SF/LOW 25: CPT

## 2022-05-15 RX ADMIN — METFORMIN HYDROCHLORIDE 1000 MILLIGRAM(S): 850 TABLET ORAL at 05:39

## 2022-05-15 RX ADMIN — Medication 1: at 08:09

## 2022-05-15 RX ADMIN — Medication 1 APPLICATION(S): at 05:38

## 2022-05-15 RX ADMIN — Medication 1 APPLICATION(S): at 05:39

## 2022-05-15 RX ADMIN — METFORMIN HYDROCHLORIDE 1000 MILLIGRAM(S): 850 TABLET ORAL at 17:17

## 2022-05-15 RX ADMIN — Medication 1 TABLET(S): at 18:12

## 2022-05-15 RX ADMIN — Medication 1 APPLICATION(S): at 17:02

## 2022-05-15 RX ADMIN — Medication 3 MILLIGRAM(S): at 21:11

## 2022-05-15 RX ADMIN — Medication 2: at 12:18

## 2022-05-15 RX ADMIN — Medication 5: at 17:16

## 2022-05-15 RX ADMIN — Medication 1 TABLET(S): at 05:39

## 2022-05-15 RX ADMIN — FAMOTIDINE 20 MILLIGRAM(S): 10 INJECTION INTRAVENOUS at 12:18

## 2022-05-15 NOTE — PROGRESS NOTE ADULT - ASSESSMENT
47 yo M with PMHx of Intellectual Disability, DM not on any medications presents with foot wounds. Pt has very poor foot hygiene and per the sister, has been persistently scratching an open wound on his L second toe, which worsened and became more red, had drainage, malodor. He saw a physician at [facility] and was told to come to the ED for eval.    #Onychomycosis   - antifungal, s/p ABX po (likely due to severe infection)  - seen by ID and podiatry eval  - Completed clindamycin  - PO terbinafine not suggested by ID or podiatry  - Per podiatry, no surgical intervention is advised, clotrimazole cream adequate     #uncontrolled diabetes mellitus   - hba1c is 8.9- patient does not take any medication at home  - started on metformin- dose increased to 1000 mg BID.    # Autism   - patient is high functioning but unable to take care of basics.    #Insomnia  - c/w melatonin 5 mg PO qhs  - counseled on sleep hygiene     #Misc   - DVT prophylaxis: not indicated, pt is ambulating   - GI prophylaxis: Famotidine  - Diet: CC  - Activity: IAT   - Disposition: Patient has Guardianship hearing 6/23 at 10am

## 2022-05-15 NOTE — PROGRESS NOTE ADULT - SUBJECTIVE AND OBJECTIVE BOX
HPI:  47 yo M with PMHx of Intellectual Disability, DM not on any medications presents with foot wounds. Pt has very poor foot hygiene and per the sister, has been persistently scratching an open wound on his L second toe, which worsened and became more red, had drainage, malodor. He saw a physician at [facility] and was told to come to the ED for evaluation. Pt is poor historian. Does endorse abdominal pain for a few days, cannot give much more description. Per the sister, pt did not seem to have any recent fevers, however is also not a very good historian and does not seem to have good health literacy. States that her and her mom decided to stop giving pt Metformin a while ago, are treating his diabetes by not giving him any sugary foods.  In the ED, T 97.8, /87, , RR 16, SpO2 99% on RA. Lab work unrevealing.  (31 Mar 2022 22:36)    Currently admitted to medicine with the primary diagnosis of Onychomycosis       Today is hospital day 45d.     INTERVAL HPI / OVERNIGHT EVENTS:  Patient was examined and seen at bedside. This morning he is resting comfortably in bed and reports no new issues or overnight events.     He would like to see a dentist, dental consult placed (may need to call [facility] to get an appointment    Patient has Guardianship hearing 6/23 at 10am    ROS: Otherwise unremarkable     PAST MEDICAL & SURGICAL HISTORY  DM (diabetes mellitus)    Intellectual disability      ALLERGIES  penicillin (Unknown)    MEDICATIONS  MEDICATIONS  (STANDING):  ammonium lactate 12% Lotion 1 Application(s) Topical two times a day  clotrimazole 1% Cream 1 Application(s) Topical two times a day  famotidine    Tablet 20 milliGRAM(s) Oral daily  glucagon  Injectable 1 milliGRAM(s) IntraMuscular once  insulin lispro (ADMELOG) corrective regimen sliding scale   SubCutaneous three times a day before meals  insulin lispro Injectable (ADMELOG). 3 Unit(s) SubCutaneous once  lactobacillus acidophilus 1 Tablet(s) Oral two times a day  melatonin 3 milliGRAM(s) Oral at bedtime  metFORMIN 1000 milliGRAM(s) Oral two times a day  vitamin A &amp; D Ointment 1 Application(s) Topical daily    MEDICATIONS  (PRN):  ondansetron Injectable 4 milliGRAM(s) IV Push every 6 hours PRN Nausea and/or Vomiting      Vital Signs Last 24 Hrs  T(C): 36 (14 May 2022 23:33), Max: 36.2 (14 May 2022 15:25)  T(F): 96.8 (14 May 2022 23:33), Max: 97.2 (14 May 2022 15:25)  HR: 92 (14 May 2022 23:33) (85 - 92)  BP: 138/90 (14 May 2022 23:33) (117/75 - 138/90)  BP(mean): --  RR: 18 (14 May 2022 23:33) (18 - 19)  SpO2: --    PHYSICAL EXAM  GEN: NAD, Resting comfortably in bed  PULM: Clear to auscultation bilaterally, No wheezes  CVS: Regular rate and rhythm, S1-S2, no murmurs  ABD: Soft, non-tender, non-distended, no guarding  EXT: No edema, onychomycosis   NEURO: A&Ox3, no focal deficits    LABS                        13.1   7.50  )-----------( 297      ( 30 Apr 2022 04:30 )             40.8     04-30    140  |  101  |  21<H>  ----------------------------<  165<H>  4.1   |  26  |  0.7    Ca    9.6      30 Apr 2022 04:30    TPro  6.5  /  Alb  4.2  /  TBili  0.4  /  DBili  x   /  AST  24  /  ALT  64<H>  /  AlkPhos  121<H>  04-30

## 2022-05-15 NOTE — PROGRESS NOTE ADULT - SUBJECTIVE AND OBJECTIVE BOX
SUBJECTIVE:    Patient is a 46y old Male who presents with a chief complaint of DFU (15 May 2022 00:32)    Currently admitted to medicine with the primary diagnosis of Onychomycosis       Today is hospital day 45d.     PAST MEDICAL & SURGICAL HISTORY  DM (diabetes mellitus)    Intellectual disability      ALLERGIES:  penicillin (Unknown)    MEDICATIONS:  STANDING MEDICATIONS  ammonium lactate 12% Lotion 1 Application(s) Topical two times a day  clotrimazole 1% Cream 1 Application(s) Topical two times a day  famotidine    Tablet 20 milliGRAM(s) Oral daily  glucagon  Injectable 1 milliGRAM(s) IntraMuscular once  insulin lispro (ADMELOG) corrective regimen sliding scale   SubCutaneous three times a day before meals  insulin lispro Injectable (ADMELOG). 3 Unit(s) SubCutaneous once  lactobacillus acidophilus 1 Tablet(s) Oral two times a day  melatonin 3 milliGRAM(s) Oral at bedtime  metFORMIN 1000 milliGRAM(s) Oral two times a day  vitamin A &amp; D Ointment 1 Application(s) Topical daily    PRN MEDICATIONS  ondansetron Injectable 4 milliGRAM(s) IV Push every 6 hours PRN    VITALS:   T(F): 96  HR: 83  BP: 125/65  RR: 18  SpO2: --    LABS:                        RADIOLOGY:    PHYSICAL EXAM:  GEN: No acute distress  LUNGS: Clear to auscultation bilaterally   HEART: S1/S2 present. RRR.   ABD/ GI: Soft, non-tender, non-distended. Bowel sounds present  EXT: NC/NC/NE/2+PP/LOJA  NEURO: AAOX3

## 2022-05-15 NOTE — PROGRESS NOTE ADULT - ASSESSMENT
47 yo M with PMH of Intellectual Disability and DM not on any medications presented with foot wounds.    Onychomycosis  DM2  Intellectual disability/autism    Nail bed wound and onychomycosis  Completed clindamycin  Podiatry noted- no surgical intervention is advised. outpt podiatry f/u  DM type 2 - uncontrolled. A1C 8.9. Continue  metformin 1000mg BID--diet adjusted to diabetic-- blood sugars are high  Handoff:  Pending legal issue and guardianship, guardianship meeting 6/23 .

## 2022-05-16 LAB
ALBUMIN SERPL ELPH-MCNC: 4.3 G/DL — SIGNIFICANT CHANGE UP (ref 3.5–5.2)
ALP SERPL-CCNC: 130 U/L — HIGH (ref 30–115)
ALT FLD-CCNC: 39 U/L — SIGNIFICANT CHANGE UP (ref 0–41)
ANION GAP SERPL CALC-SCNC: 15 MMOL/L — HIGH (ref 7–14)
AST SERPL-CCNC: 19 U/L — SIGNIFICANT CHANGE UP (ref 0–41)
BILIRUB SERPL-MCNC: 0.3 MG/DL — SIGNIFICANT CHANGE UP (ref 0.2–1.2)
BUN SERPL-MCNC: 15 MG/DL — SIGNIFICANT CHANGE UP (ref 10–20)
CALCIUM SERPL-MCNC: 9.5 MG/DL — SIGNIFICANT CHANGE UP (ref 8.5–10.1)
CHLORIDE SERPL-SCNC: 99 MMOL/L — SIGNIFICANT CHANGE UP (ref 98–110)
CO2 SERPL-SCNC: 22 MMOL/L — SIGNIFICANT CHANGE UP (ref 17–32)
CREAT SERPL-MCNC: 0.8 MG/DL — SIGNIFICANT CHANGE UP (ref 0.7–1.5)
EGFR: 110 ML/MIN/1.73M2 — SIGNIFICANT CHANGE UP
GLUCOSE BLDC GLUCOMTR-MCNC: 140 MG/DL — HIGH (ref 70–99)
GLUCOSE BLDC GLUCOMTR-MCNC: 179 MG/DL — HIGH (ref 70–99)
GLUCOSE BLDC GLUCOMTR-MCNC: 246 MG/DL — HIGH (ref 70–99)
GLUCOSE BLDC GLUCOMTR-MCNC: 252 MG/DL — HIGH (ref 70–99)
GLUCOSE SERPL-MCNC: 240 MG/DL — HIGH (ref 70–99)
HCT VFR BLD CALC: 43.7 % — SIGNIFICANT CHANGE UP (ref 42–52)
HGB BLD-MCNC: 14.6 G/DL — SIGNIFICANT CHANGE UP (ref 14–18)
MCHC RBC-ENTMCNC: 33 PG — HIGH (ref 27–31)
MCHC RBC-ENTMCNC: 33.4 G/DL — SIGNIFICANT CHANGE UP (ref 32–37)
MCV RBC AUTO: 98.9 FL — HIGH (ref 80–94)
NRBC # BLD: 0 /100 WBCS — SIGNIFICANT CHANGE UP (ref 0–0)
PLATELET # BLD AUTO: 348 K/UL — SIGNIFICANT CHANGE UP (ref 130–400)
POTASSIUM SERPL-MCNC: 4.3 MMOL/L — SIGNIFICANT CHANGE UP (ref 3.5–5)
POTASSIUM SERPL-SCNC: 4.3 MMOL/L — SIGNIFICANT CHANGE UP (ref 3.5–5)
PROT SERPL-MCNC: 7.1 G/DL — SIGNIFICANT CHANGE UP (ref 6–8)
RBC # BLD: 4.42 M/UL — LOW (ref 4.7–6.1)
RBC # FLD: 11.7 % — SIGNIFICANT CHANGE UP (ref 11.5–14.5)
SODIUM SERPL-SCNC: 136 MMOL/L — SIGNIFICANT CHANGE UP (ref 135–146)
WBC # BLD: 7.74 K/UL — SIGNIFICANT CHANGE UP (ref 4.8–10.8)
WBC # FLD AUTO: 7.74 K/UL — SIGNIFICANT CHANGE UP (ref 4.8–10.8)

## 2022-05-16 PROCEDURE — 99231 SBSQ HOSP IP/OBS SF/LOW 25: CPT

## 2022-05-16 RX ADMIN — FAMOTIDINE 20 MILLIGRAM(S): 10 INJECTION INTRAVENOUS at 11:47

## 2022-05-16 RX ADMIN — Medication 1: at 17:18

## 2022-05-16 RX ADMIN — Medication 3: at 11:46

## 2022-05-16 RX ADMIN — Medication 1 APPLICATION(S): at 05:33

## 2022-05-16 RX ADMIN — METFORMIN HYDROCHLORIDE 1000 MILLIGRAM(S): 850 TABLET ORAL at 05:33

## 2022-05-16 RX ADMIN — Medication 1 APPLICATION(S): at 05:32

## 2022-05-16 RX ADMIN — Medication 3 MILLIGRAM(S): at 21:56

## 2022-05-16 RX ADMIN — Medication 1 APPLICATION(S): at 11:49

## 2022-05-16 RX ADMIN — Medication 1 APPLICATION(S): at 17:22

## 2022-05-16 RX ADMIN — Medication 1 TABLET(S): at 17:18

## 2022-05-16 RX ADMIN — METFORMIN HYDROCHLORIDE 1000 MILLIGRAM(S): 850 TABLET ORAL at 17:18

## 2022-05-16 NOTE — PROGRESS NOTE ADULT - SUBJECTIVE AND OBJECTIVE BOX
CARIN WATSON 46y Male  MRN#: [id number]   Hospital Day: 46d    SUBJECTIVE  Patient is a 46y old Male who presents with a chief complaint of DFU (15 May 2022 16:01)  Currently admitted to medicine with the primary diagnosis of Onychomycosis      INTERVAL HPI AND OVERNIGHT EVENTS:  Patient was examined and seen at bedside. This morning he is resting comfortably in bed and reports no issues or overnight events.    REVIEW OF SYMPTOMS:  CONSTITUTIONAL: No weakness, fevers or chills; No headaches  EYES: No visual changes, eye pain, or discharge  ENT: No vertigo; No ear pain or change in hearing; No sore throat or difficulty swallowing  NECK: No pain or stiffness  RESPIRATORY: No cough, wheezing, or hemoptysis; No shortness of breath  CARDIOVASCULAR: No chest pain or palpitations  GASTROINTESTINAL: No abdominal or epigastric pain; No nausea, vomiting, or hematemesis; No diarrhea or constipation; No melena or hematochezia  GENITOURINARY: No dysuria, frequency or hematuria  MUSCULOSKELETAL: No joint pain, no muscle pain, no weakness  NEUROLOGICAL: No numbness or weakness  SKIN: No itching or rashes    OBJECTIVE  PAST MEDICAL & SURGICAL HISTORY  DM (diabetes mellitus)    Intellectual disability      ALLERGIES:  penicillin (Unknown)    MEDICATIONS:  STANDING MEDICATIONS  ammonium lactate 12% Lotion 1 Application(s) Topical two times a day  clotrimazole 1% Cream 1 Application(s) Topical two times a day  famotidine    Tablet 20 milliGRAM(s) Oral daily  glucagon  Injectable 1 milliGRAM(s) IntraMuscular once  insulin lispro (ADMELOG) corrective regimen sliding scale   SubCutaneous three times a day before meals  insulin lispro Injectable (ADMELOG). 3 Unit(s) SubCutaneous once  lactobacillus acidophilus 1 Tablet(s) Oral two times a day  melatonin 3 milliGRAM(s) Oral at bedtime  metFORMIN 1000 milliGRAM(s) Oral two times a day  vitamin A &amp; D Ointment 1 Application(s) Topical daily    PRN MEDICATIONS  ondansetron Injectable 4 milliGRAM(s) IV Push every 6 hours PRN      VITAL SIGNS: Last 24 Hours  T(C): 36.1 (16 May 2022 08:16), Max: 36.4 (15 May 2022 16:45)  T(F): 96.9 (16 May 2022 08:16), Max: 97.5 (15 May 2022 16:45)  HR: 97 (16 May 2022 08:16) (95 - 100)  BP: 110/55 (16 May 2022 08:16) (110/55 - 128/60)  BP(mean): --  RR: 18 (16 May 2022 00:15) (18 - 18)  SpO2: --    LABS:                        RADIOLOGY:      PHYSICAL EXAM:  CONSTITUTIONAL: No acute distress, well-developed, well-groomed, AAOx3  HEAD: Atraumatic, normocephalic  EYES: EOM intact, PERRLA, conjunctiva and sclera clear  ENT: Supple, no masses, no thyromegaly, no bruits, no JVD; moist mucous membranes  PULMONARY: Clear to auscultation bilaterally; no wheezes, rales, or rhonchi  CARDIOVASCULAR: Regular rate and rhythm; no murmurs, rubs, or gallops  GASTROINTESTINAL: Soft, non-tender, non-distended; bowel sounds present  MUSCULOSKELETAL: 2+ peripheral pulses; no clubbing, no cyanosis, no edema  NEUROLOGY: non-focal  SKIN: No rashes or lesions; warm and dry

## 2022-05-16 NOTE — PROGRESS NOTE ADULT - ASSESSMENT
45 yo M with PMHx of Intellectual Disability, DM not on any medications presents with foot wounds. Pt has very poor foot hygiene and per the sister, has been persistently scratching an open wound on his L second toe, which worsened and became more red, had drainage, malodor. He saw a physician at [facility] and was told to come to the ED for eval.    #Onychomycosis   - antifungal, s/p ABX po (likely due to severe infection)  - seen by ID and podiatry eval  - Completed clindamycin  - PO terbinafine not suggested by ID or podiatry  - Per podiatry, no surgical intervention is advised, clotrimazole cream adequate   - f/u labs today (no blood work for awhile)    #uncontrolled diabetes mellitus   - hba1c is 8.9- patient does not take any medication at home  - started on metformin- dose increased to 1000 mg BID.    # Autism   - patient is high functioning but unable to take care of basics.    #Insomnia  - c/w melatonin 5 mg PO qhs  - counseled on sleep hygiene     #Misc   - DVT prophylaxis: not indicated, pt is ambulating   - GI prophylaxis: Famotidine  - Diet: CC  - Activity: IAT   - Disposition: Patient has Guardianship hearing 6/23 at 10am

## 2022-05-17 LAB
ALBUMIN SERPL ELPH-MCNC: 4.4 G/DL — SIGNIFICANT CHANGE UP (ref 3.5–5.2)
ALP SERPL-CCNC: 133 U/L — HIGH (ref 30–115)
ALT FLD-CCNC: 36 U/L — SIGNIFICANT CHANGE UP (ref 0–41)
ANION GAP SERPL CALC-SCNC: 12 MMOL/L — SIGNIFICANT CHANGE UP (ref 7–14)
AST SERPL-CCNC: 18 U/L — SIGNIFICANT CHANGE UP (ref 0–41)
BILIRUB SERPL-MCNC: 0.3 MG/DL — SIGNIFICANT CHANGE UP (ref 0.2–1.2)
BUN SERPL-MCNC: 21 MG/DL — HIGH (ref 10–20)
CALCIUM SERPL-MCNC: 9.8 MG/DL — SIGNIFICANT CHANGE UP (ref 8.5–10.1)
CHLORIDE SERPL-SCNC: 102 MMOL/L — SIGNIFICANT CHANGE UP (ref 98–110)
CO2 SERPL-SCNC: 26 MMOL/L — SIGNIFICANT CHANGE UP (ref 17–32)
CREAT SERPL-MCNC: 0.8 MG/DL — SIGNIFICANT CHANGE UP (ref 0.7–1.5)
EGFR: 110 ML/MIN/1.73M2 — SIGNIFICANT CHANGE UP
GLUCOSE BLDC GLUCOMTR-MCNC: 138 MG/DL — HIGH (ref 70–99)
GLUCOSE BLDC GLUCOMTR-MCNC: 215 MG/DL — HIGH (ref 70–99)
GLUCOSE BLDC GLUCOMTR-MCNC: 217 MG/DL — HIGH (ref 70–99)
GLUCOSE BLDC GLUCOMTR-MCNC: 279 MG/DL — HIGH (ref 70–99)
GLUCOSE SERPL-MCNC: 176 MG/DL — HIGH (ref 70–99)
HCT VFR BLD CALC: 43.2 % — SIGNIFICANT CHANGE UP (ref 42–52)
HGB BLD-MCNC: 14.3 G/DL — SIGNIFICANT CHANGE UP (ref 14–18)
MAGNESIUM SERPL-MCNC: 1.7 MG/DL — LOW (ref 1.8–2.4)
MCHC RBC-ENTMCNC: 32.9 PG — HIGH (ref 27–31)
MCHC RBC-ENTMCNC: 33.1 G/DL — SIGNIFICANT CHANGE UP (ref 32–37)
MCV RBC AUTO: 99.5 FL — HIGH (ref 80–94)
NRBC # BLD: 0 /100 WBCS — SIGNIFICANT CHANGE UP (ref 0–0)
PLATELET # BLD AUTO: 331 K/UL — SIGNIFICANT CHANGE UP (ref 130–400)
POTASSIUM SERPL-MCNC: 4.2 MMOL/L — SIGNIFICANT CHANGE UP (ref 3.5–5)
POTASSIUM SERPL-SCNC: 4.2 MMOL/L — SIGNIFICANT CHANGE UP (ref 3.5–5)
PROT SERPL-MCNC: 6.7 G/DL — SIGNIFICANT CHANGE UP (ref 6–8)
RBC # BLD: 4.34 M/UL — LOW (ref 4.7–6.1)
RBC # FLD: 11.8 % — SIGNIFICANT CHANGE UP (ref 11.5–14.5)
SODIUM SERPL-SCNC: 140 MMOL/L — SIGNIFICANT CHANGE UP (ref 135–146)
WBC # BLD: 7.99 K/UL — SIGNIFICANT CHANGE UP (ref 4.8–10.8)
WBC # FLD AUTO: 7.99 K/UL — SIGNIFICANT CHANGE UP (ref 4.8–10.8)

## 2022-05-17 PROCEDURE — 99231 SBSQ HOSP IP/OBS SF/LOW 25: CPT

## 2022-05-17 RX ADMIN — Medication 1 TABLET(S): at 17:18

## 2022-05-17 RX ADMIN — Medication 3 MILLIGRAM(S): at 22:09

## 2022-05-17 RX ADMIN — METFORMIN HYDROCHLORIDE 1000 MILLIGRAM(S): 850 TABLET ORAL at 17:17

## 2022-05-17 RX ADMIN — FAMOTIDINE 20 MILLIGRAM(S): 10 INJECTION INTRAVENOUS at 11:45

## 2022-05-17 RX ADMIN — Medication 1 APPLICATION(S): at 17:17

## 2022-05-17 RX ADMIN — Medication 3: at 17:18

## 2022-05-17 RX ADMIN — Medication 1 TABLET(S): at 05:27

## 2022-05-17 RX ADMIN — Medication 1 APPLICATION(S): at 11:45

## 2022-05-17 RX ADMIN — METFORMIN HYDROCHLORIDE 1000 MILLIGRAM(S): 850 TABLET ORAL at 05:27

## 2022-05-17 RX ADMIN — Medication 1 APPLICATION(S): at 05:28

## 2022-05-17 RX ADMIN — Medication 2: at 11:45

## 2022-05-17 NOTE — PROGRESS NOTE ADULT - SUBJECTIVE AND OBJECTIVE BOX
SUBJECTIVE:    Patient is a 46y old Male who presents with a chief complaint of DFU (16 May 2022 09:06)    Currently admitted to medicine with the primary diagnosis of Onychomycosis       Today is hospital day 47d. This morning he is resting in bed and reports no new issues or overnight events.     PAST MEDICAL & SURGICAL HISTORY  DM (diabetes mellitus)    Intellectual disability      SOCIAL HISTORY:  Negative for smoking/alcohol/drug use.     ALLERGIES:  penicillin (Unknown)    MEDICATIONS:  STANDING MEDICATIONS  ammonium lactate 12% Lotion 1 Application(s) Topical two times a day  clotrimazole 1% Cream 1 Application(s) Topical two times a day  famotidine    Tablet 20 milliGRAM(s) Oral daily  glucagon  Injectable 1 milliGRAM(s) IntraMuscular once  insulin lispro (ADMELOG) corrective regimen sliding scale   SubCutaneous three times a day before meals  insulin lispro Injectable (ADMELOG). 3 Unit(s) SubCutaneous once  lactobacillus acidophilus 1 Tablet(s) Oral two times a day  melatonin 3 milliGRAM(s) Oral at bedtime  metFORMIN 1000 milliGRAM(s) Oral two times a day  vitamin A &amp; D Ointment 1 Application(s) Topical daily    PRN MEDICATIONS  ondansetron Injectable 4 milliGRAM(s) IV Push every 6 hours PRN    VITALS:   T(F): 97.1  HR: 95  BP: 130/78  RR: 18  SpO2: --    LABS:                        14.3   7.99  )-----------( 331      ( 17 May 2022 07:17 )             43.2     05-17    140  |  102  |  21<H>  ----------------------------<  176<H>  4.2   |  26  |  0.8    Ca    9.8      17 May 2022 07:17  Mg     1.7     05-17    TPro  6.7  /  Alb  4.4  /  TBili  0.3  /  DBili  x   /  AST  18  /  ALT  36  /  AlkPhos  133<H>  05-17        PHYSICAL EXAM:  GEN: No acute distress  LUNGS: Clear to auscultation bilaterally   HEART: S1/S2 present. RRR.   ABD: Soft, non-tender, non-distended. Bowel sounds present  EXT: NC/NC/NE/2+PP/LOJA  NEURO: AAOX3

## 2022-05-17 NOTE — PROGRESS NOTE ADULT - ASSESSMENT
46 yr old male with hx of Intellectual Disability and DM not on any medications presented with foot wounds.    # Onychomycosis  # DM2  # Intellectual disability/autism  - Nail bed wound and onychomycosis  - Completed clindamycin  - Podiatry noted- no surgical intervention is advised. outpt podiatry f/u  - uncontrolled. A1C 8.9. Continue  metformin 1000mg BID--diet adjusted to diabetic    # Handoff:  Pending legal issue and guardianship, guardianship meeting 6/23 .

## 2022-05-18 LAB
ALBUMIN SERPL ELPH-MCNC: 4.1 G/DL — SIGNIFICANT CHANGE UP (ref 3.5–5.2)
ALP SERPL-CCNC: 123 U/L — HIGH (ref 30–115)
ALT FLD-CCNC: 34 U/L — SIGNIFICANT CHANGE UP (ref 0–41)
ANION GAP SERPL CALC-SCNC: 14 MMOL/L — SIGNIFICANT CHANGE UP (ref 7–14)
AST SERPL-CCNC: 19 U/L — SIGNIFICANT CHANGE UP (ref 0–41)
BILIRUB SERPL-MCNC: 0.3 MG/DL — SIGNIFICANT CHANGE UP (ref 0.2–1.2)
BUN SERPL-MCNC: 19 MG/DL — SIGNIFICANT CHANGE UP (ref 10–20)
CALCIUM SERPL-MCNC: 9.5 MG/DL — SIGNIFICANT CHANGE UP (ref 8.5–10.1)
CHLORIDE SERPL-SCNC: 99 MMOL/L — SIGNIFICANT CHANGE UP (ref 98–110)
CO2 SERPL-SCNC: 22 MMOL/L — SIGNIFICANT CHANGE UP (ref 17–32)
CREAT SERPL-MCNC: 0.7 MG/DL — SIGNIFICANT CHANGE UP (ref 0.7–1.5)
EGFR: 114 ML/MIN/1.73M2 — SIGNIFICANT CHANGE UP
GLUCOSE BLDC GLUCOMTR-MCNC: 206 MG/DL — HIGH (ref 70–99)
GLUCOSE BLDC GLUCOMTR-MCNC: 213 MG/DL — HIGH (ref 70–99)
GLUCOSE BLDC GLUCOMTR-MCNC: 223 MG/DL — HIGH (ref 70–99)
GLUCOSE BLDC GLUCOMTR-MCNC: 245 MG/DL — HIGH (ref 70–99)
GLUCOSE SERPL-MCNC: 230 MG/DL — HIGH (ref 70–99)
HCT VFR BLD CALC: 39.5 % — LOW (ref 42–52)
HGB BLD-MCNC: 13.4 G/DL — LOW (ref 14–18)
MAGNESIUM SERPL-MCNC: 1.6 MG/DL — LOW (ref 1.8–2.4)
MCHC RBC-ENTMCNC: 33.3 PG — HIGH (ref 27–31)
MCHC RBC-ENTMCNC: 33.9 G/DL — SIGNIFICANT CHANGE UP (ref 32–37)
MCV RBC AUTO: 98 FL — HIGH (ref 80–94)
NRBC # BLD: 0 /100 WBCS — SIGNIFICANT CHANGE UP (ref 0–0)
PLATELET # BLD AUTO: 289 K/UL — SIGNIFICANT CHANGE UP (ref 130–400)
POTASSIUM SERPL-MCNC: 4 MMOL/L — SIGNIFICANT CHANGE UP (ref 3.5–5)
POTASSIUM SERPL-SCNC: 4 MMOL/L — SIGNIFICANT CHANGE UP (ref 3.5–5)
PROT SERPL-MCNC: 6.3 G/DL — SIGNIFICANT CHANGE UP (ref 6–8)
RBC # BLD: 4.03 M/UL — LOW (ref 4.7–6.1)
RBC # FLD: 11.7 % — SIGNIFICANT CHANGE UP (ref 11.5–14.5)
SODIUM SERPL-SCNC: 135 MMOL/L — SIGNIFICANT CHANGE UP (ref 135–146)
WBC # BLD: 7.03 K/UL — SIGNIFICANT CHANGE UP (ref 4.8–10.8)
WBC # FLD AUTO: 7.03 K/UL — SIGNIFICANT CHANGE UP (ref 4.8–10.8)

## 2022-05-18 PROCEDURE — 99231 SBSQ HOSP IP/OBS SF/LOW 25: CPT

## 2022-05-18 RX ORDER — MAGNESIUM SULFATE 500 MG/ML
2 VIAL (ML) INJECTION ONCE
Refills: 0 | Status: COMPLETED | OUTPATIENT
Start: 2022-05-18 | End: 2022-05-18

## 2022-05-18 RX ADMIN — FAMOTIDINE 20 MILLIGRAM(S): 10 INJECTION INTRAVENOUS at 11:57

## 2022-05-18 RX ADMIN — Medication 1 APPLICATION(S): at 17:23

## 2022-05-18 RX ADMIN — Medication 2: at 11:56

## 2022-05-18 RX ADMIN — Medication 1 APPLICATION(S): at 11:57

## 2022-05-18 RX ADMIN — Medication 2: at 07:52

## 2022-05-18 RX ADMIN — Medication 2: at 17:23

## 2022-05-18 RX ADMIN — Medication 1 APPLICATION(S): at 06:32

## 2022-05-18 RX ADMIN — Medication 3 MILLIGRAM(S): at 22:00

## 2022-05-18 RX ADMIN — Medication 1 TABLET(S): at 06:31

## 2022-05-18 RX ADMIN — METFORMIN HYDROCHLORIDE 1000 MILLIGRAM(S): 850 TABLET ORAL at 06:32

## 2022-05-18 RX ADMIN — Medication 1 APPLICATION(S): at 06:36

## 2022-05-18 RX ADMIN — METFORMIN HYDROCHLORIDE 1000 MILLIGRAM(S): 850 TABLET ORAL at 17:24

## 2022-05-18 RX ADMIN — Medication 1 TABLET(S): at 17:24

## 2022-05-18 NOTE — PROGRESS NOTE ADULT - SUBJECTIVE AND OBJECTIVE BOX
Patient is a 46y old  Male who presents with a chief complaint of DFU (17 May 2022 14:22)      OVERNIGHT EVENTS: remained stable     SUBJECTIVE / INTERVAL HPI: Patient seen and examined at bedside.     VITAL SIGNS:  Vital Signs Last 24 Hrs  T(C): 36.1 (18 May 2022 08:08), Max: 36.8 (17 May 2022 16:16)  T(F): 96.9 (18 May 2022 08:08), Max: 98.3 (17 May 2022 16:16)  HR: 95 (18 May 2022 08:08) (95 - 108)  BP: 116/63 (18 May 2022 08:08) (116/63 - 138/79)  BP(mean): --  RR: 18 (18 May 2022 08:08) (18 - 18)  SpO2: --    PHYSICAL EXAM:    General: WDWN  HEENT: NC/AT; PERRL, clear conjunctiva  Neck: supple  Cardiovascular: +S1/S2; RRR  Respiratory: CTA b/l; no W/R/R  Gastrointestinal: soft, NT/ND; +BSx4  Extremities: WWP; 2+ peripheral pulses; no edema   Neurological: AAOx3; no focal deficits    MEDICATIONS:  MEDICATIONS  (STANDING):  ammonium lactate 12% Lotion 1 Application(s) Topical two times a day  clotrimazole 1% Cream 1 Application(s) Topical two times a day  famotidine    Tablet 20 milliGRAM(s) Oral daily  glucagon  Injectable 1 milliGRAM(s) IntraMuscular once  insulin lispro (ADMELOG) corrective regimen sliding scale   SubCutaneous three times a day before meals  insulin lispro Injectable (ADMELOG). 3 Unit(s) SubCutaneous once  lactobacillus acidophilus 1 Tablet(s) Oral two times a day  melatonin 3 milliGRAM(s) Oral at bedtime  metFORMIN 1000 milliGRAM(s) Oral two times a day  vitamin A &amp; D Ointment 1 Application(s) Topical daily    MEDICATIONS  (PRN):      ALLERGIES:  Allergies    penicillin (Unknown)    Intolerances        LABS:                        13.4   7.03  )-----------( 289      ( 18 May 2022 07:22 )             39.5     05-18    135  |  99  |  19  ----------------------------<  230<H>  4.0   |  22  |  0.7    Ca    9.5      18 May 2022 07:23  Mg     1.6     05-18    TPro  6.3  /  Alb  4.1  /  TBili  0.3  /  DBili  x   /  AST  19  /  ALT  34  /  AlkPhos  123<H>  05-18        CAPILLARY BLOOD GLUCOSE      POCT Blood Glucose.: 213 mg/dL (18 May 2022 07:41)

## 2022-05-18 NOTE — PROGRESS NOTE ADULT - ASSESSMENT
46 yr old male with hx of Intellectual Disability and DM not on any medications presented with foot wounds.    # Onychomycosis  # DM2  # Intellectual disability/autism  - Nail bed wound and onychomycosis  - Completed clindamycin  - Podiatry noted- no surgical intervention is advised. outpt podiatry f/u  - uncontrolled. A1C 8.9. Continue  metformin 1000mg BID- insulin SCC -diet adjusted to diabetic    # Handoff:  Pending legal issue and guardianship, pending guardianship meeting

## 2022-05-18 NOTE — PROGRESS NOTE ADULT - SUBJECTIVE AND OBJECTIVE BOX
Progress Note:  Provider Speciality                            Hospitalist      CARIN WATSON MRN-[id number] 46y Male     CHIEF PRESENTING COMPLAINT:  Patient is a 46y old  Male who presents with a chief complaint of DFU (06 Apr 2022 12:18)        SUBJECTIVE:  Patient was seen and examined at bedside. No new complaints described by patient in morning rounds.   No significant overnight events reported.     HISTORY OF PRESENTING ILLNESS:  HPI:  47 yo M with PMHx of Intellectual Disability, DM not on any medications presents with foot wounds. Pt has very poor foot hygiene and per the sister, has been persistently scratching an open wound on his L second toe, which worsened and became more red, had drainage, malodor. He saw a physician at [facility] and was told to come to the ED for evaluation. Pt is poor historian. Does endorse abdominal pain for a few days, cannot give much more description. Per the sister, pt did not seem to have any recent fevers, however is also not a very good historian and does not seem to have good health literacy. States that her and her mom decided to stop giving pt Metformin a while ago, are treating his diabetes by not giving him any sugary foods.  In the ED, T 97.8, /87, , RR 16, SpO2 99% on RA. Lab work unrevealing.  (31 Mar 2022 22:36)        REVIEW OF SYSTEMS:  Patient denies any headache, any vision complaints, runny nose, fever, chills. Denies chest pain, shortness of breath, palpitation. Denies nausea, vomiting, abdominal pain or diarrhoea, Denies dysuria. Denies  localized weakness in any part of the body or numbness.   At least 10 systems were reviewed in ROS. All systems reviewed  are within normal limits except for the complaints as described in Subjective.    PAST MEDICAL & SURGICAL HISTORY:  PAST MEDICAL & SURGICAL HISTORY:  DM (diabetes mellitus)    Intellectual disability            VITAL SIGNS:  Vital Signs Last 24 Hrs  T(C): 36.1 (18 May 2022 08:08), Max: 36.8 (17 May 2022 16:16)  T(F): 96.9 (18 May 2022 08:08), Max: 98.3 (17 May 2022 16:16)  HR: 95 (18 May 2022 08:08) (95 - 108)  BP: 116/63 (18 May 2022 08:08) (116/63 - 138/79)  BP(mean): --  RR: 18 (18 May 2022 08:08) (18 - 18)  SpO2: --        PHYSICAL EXAMINATION:  Not in acute distress  General: No icterus  HEENT:   no JVD.  Heart: S1+S2 audible  Lungs: bilateral  moderate air entry, no wheezing, no crepitations.  Abdomen: Soft, non-tender, non-distended , no  rigidity or guarding.  CNS: Awake alert, CN  grossly intact.  Extremities:  No edema            CONSULTS:  Consultant(s) Notes Reviewed by me.   Care Discussed with Consultants/Other Providers where required.        MEDICATIONS:  MEDICATIONS  (STANDING):  ammonium lactate 12% Lotion 1 Application(s) Topical two times a day  clotrimazole 1% Cream 1 Application(s) Topical two times a day  enoxaparin Injectable 40 milliGRAM(s) SubCutaneous every 24 hours  metFORMIN 1000 milliGRAM(s) Oral two times a day  pantoprazole    Tablet 40 milliGRAM(s) Oral before breakfast  vitamin A &amp; D Ointment 1 Application(s) Topical daily    MEDICATIONS  (PRN):  ondansetron Injectable 4 milliGRAM(s) IV Push every 6 hours PRN Nausea and/or Vomiting            ASSESSMENT:    47 yo M with PMH of Intellectual Disability and DM not on any medications presented with foot wounds.    Onychomycosis  DM2  Intellectual disability/autism    Nail bed wound and onychomycosis  Completed clindamycin  Podiatry noted- no surgical intervention is advised. outpt podiatry f/u  DM type 2 - uncontrolled. A1C 8.9. Continue  metformin 1000mg BID  Handoff:  Pending legal issue and guardianship, next  meeting due 06/23

## 2022-05-19 LAB
ANION GAP SERPL CALC-SCNC: 13 MMOL/L — SIGNIFICANT CHANGE UP (ref 7–14)
BUN SERPL-MCNC: 19 MG/DL — SIGNIFICANT CHANGE UP (ref 10–20)
CALCIUM SERPL-MCNC: 10.3 MG/DL — HIGH (ref 8.5–10.1)
CHLORIDE SERPL-SCNC: 98 MMOL/L — SIGNIFICANT CHANGE UP (ref 98–110)
CO2 SERPL-SCNC: 25 MMOL/L — SIGNIFICANT CHANGE UP (ref 17–32)
CREAT SERPL-MCNC: 0.8 MG/DL — SIGNIFICANT CHANGE UP (ref 0.7–1.5)
EGFR: 110 ML/MIN/1.73M2 — SIGNIFICANT CHANGE UP
GLUCOSE BLDC GLUCOMTR-MCNC: 147 MG/DL — HIGH (ref 70–99)
GLUCOSE BLDC GLUCOMTR-MCNC: 214 MG/DL — HIGH (ref 70–99)
GLUCOSE BLDC GLUCOMTR-MCNC: 257 MG/DL — HIGH (ref 70–99)
GLUCOSE BLDC GLUCOMTR-MCNC: 267 MG/DL — HIGH (ref 70–99)
GLUCOSE SERPL-MCNC: 268 MG/DL — HIGH (ref 70–99)
HCT VFR BLD CALC: 44.4 % — SIGNIFICANT CHANGE UP (ref 42–52)
HGB BLD-MCNC: 15 G/DL — SIGNIFICANT CHANGE UP (ref 14–18)
MAGNESIUM SERPL-MCNC: 1.7 MG/DL — LOW (ref 1.8–2.4)
MCHC RBC-ENTMCNC: 33.3 PG — HIGH (ref 27–31)
MCHC RBC-ENTMCNC: 33.8 G/DL — SIGNIFICANT CHANGE UP (ref 32–37)
MCV RBC AUTO: 98.7 FL — HIGH (ref 80–94)
NRBC # BLD: 0 /100 WBCS — SIGNIFICANT CHANGE UP (ref 0–0)
PLATELET # BLD AUTO: 328 K/UL — SIGNIFICANT CHANGE UP (ref 130–400)
POTASSIUM SERPL-MCNC: 4.6 MMOL/L — SIGNIFICANT CHANGE UP (ref 3.5–5)
POTASSIUM SERPL-SCNC: 4.6 MMOL/L — SIGNIFICANT CHANGE UP (ref 3.5–5)
RBC # BLD: 4.5 M/UL — LOW (ref 4.7–6.1)
RBC # FLD: 11.7 % — SIGNIFICANT CHANGE UP (ref 11.5–14.5)
SODIUM SERPL-SCNC: 136 MMOL/L — SIGNIFICANT CHANGE UP (ref 135–146)
WBC # BLD: 7.28 K/UL — SIGNIFICANT CHANGE UP (ref 4.8–10.8)
WBC # FLD AUTO: 7.28 K/UL — SIGNIFICANT CHANGE UP (ref 4.8–10.8)

## 2022-05-19 PROCEDURE — 99231 SBSQ HOSP IP/OBS SF/LOW 25: CPT

## 2022-05-19 RX ADMIN — Medication 3 MILLIGRAM(S): at 21:23

## 2022-05-19 RX ADMIN — Medication 1 TABLET(S): at 17:26

## 2022-05-19 RX ADMIN — Medication 1 APPLICATION(S): at 11:33

## 2022-05-19 RX ADMIN — Medication 1 APPLICATION(S): at 05:23

## 2022-05-19 RX ADMIN — Medication 1 APPLICATION(S): at 17:23

## 2022-05-19 RX ADMIN — Medication 1 APPLICATION(S): at 17:28

## 2022-05-19 RX ADMIN — Medication 1 TABLET(S): at 05:22

## 2022-05-19 RX ADMIN — METFORMIN HYDROCHLORIDE 1000 MILLIGRAM(S): 850 TABLET ORAL at 05:19

## 2022-05-19 RX ADMIN — Medication 2: at 17:22

## 2022-05-19 RX ADMIN — FAMOTIDINE 20 MILLIGRAM(S): 10 INJECTION INTRAVENOUS at 11:32

## 2022-05-19 RX ADMIN — METFORMIN HYDROCHLORIDE 1000 MILLIGRAM(S): 850 TABLET ORAL at 17:23

## 2022-05-19 RX ADMIN — Medication 3: at 11:32

## 2022-05-19 NOTE — PROGRESS NOTE ADULT - SUBJECTIVE AND OBJECTIVE BOX
Progress Note:  Provider Speciality                            Hospitalist      CARIN WATSON MRN-[id number] 46y Male     CHIEF PRESENTING COMPLAINT:  Patient is a 46y old  Male who presents with a chief complaint of DFU (06 Apr 2022 12:18)        SUBJECTIVE:  Patient was seen and examined at bedside. No new complaints described by patient in morning rounds.   No significant overnight events reported.     HISTORY OF PRESENTING ILLNESS:  HPI:  45 yo M with PMHx of Intellectual Disability, DM not on any medications presents with foot wounds. Pt has very poor foot hygiene and per the sister, has been persistently scratching an open wound on his L second toe, which worsened and became more red, had drainage, malodor. He saw a physician at [facility] and was told to come to the ED for evaluation. Pt is poor historian. Does endorse abdominal pain for a few days, cannot give much more description. Per the sister, pt did not seem to have any recent fevers, however is also not a very good historian and does not seem to have good health literacy. States that her and her mom decided to stop giving pt Metformin a while ago, are treating his diabetes by not giving him any sugary foods.  In the ED, T 97.8, /87, , RR 16, SpO2 99% on RA. Lab work unrevealing.  (31 Mar 2022 22:36)        REVIEW OF SYSTEMS:  Patient denies any headache, any vision complaints, runny nose, fever, chills. Denies chest pain, shortness of breath, palpitation. Denies nausea, vomiting, abdominal pain or diarrhoea, Denies dysuria. Denies  localized weakness in any part of the body or numbness.   At least 10 systems were reviewed in ROS. All systems reviewed  are within normal limits except for the complaints as described in Subjective.    PAST MEDICAL & SURGICAL HISTORY:  PAST MEDICAL & SURGICAL HISTORY:  DM (diabetes mellitus)    Intellectual disability            VITAL SIGNS:  Vital Signs Last 24 Hrs  T(C): 36.6 (19 May 2022 07:51), Max: 36.6 (19 May 2022 07:51)  T(F): 97.9 (19 May 2022 07:51), Max: 97.9 (19 May 2022 07:51)  HR: 72 (19 May 2022 07:51) (72 - 93)  BP: 111/65 (19 May 2022 07:51) (111/65 - 123/76)  BP(mean): --  RR: 18 (19 May 2022 07:51) (17 - 18)  SpO2: --      PHYSICAL EXAMINATION:  Not in acute distress  General: No icterus  HEENT:   no JVD.  Heart: S1+S2 audible  Lungs: bilateral  moderate air entry, no wheezing, no crepitations.  Abdomen: Soft, non-tender, non-distended , no  rigidity or guarding.  CNS: Awake alert, CN  grossly intact.  Extremities:  No edema            CONSULTS:  Consultant(s) Notes Reviewed by me.   Care Discussed with Consultants/Other Providers where required.        MEDICATIONS:  MEDICATIONS  (STANDING):  ammonium lactate 12% Lotion 1 Application(s) Topical two times a day  clotrimazole 1% Cream 1 Application(s) Topical two times a day  enoxaparin Injectable 40 milliGRAM(s) SubCutaneous every 24 hours  metFORMIN 1000 milliGRAM(s) Oral two times a day  pantoprazole    Tablet 40 milliGRAM(s) Oral before breakfast  vitamin A &amp; D Ointment 1 Application(s) Topical daily    MEDICATIONS  (PRN):  ondansetron Injectable 4 milliGRAM(s) IV Push every 6 hours PRN Nausea and/or Vomiting            ASSESSMENT:    45 yo M with PMH of Intellectual Disability and DM not on any medications presented with foot wounds.    Onychomycosis  DM2  Intellectual disability/autism    Nail bed wound and onychomycosis  Completed clindamycin  Podiatry noted- no surgical intervention is advised. outpt podiatry f/u  DM type 2 - uncontrolled. A1C 8.9. Continue  metformin 1000mg BID  Handoff:  Pending legal issue and guardianship, next  meeting due 06/23

## 2022-05-20 LAB
ALBUMIN SERPL ELPH-MCNC: 4.4 G/DL — SIGNIFICANT CHANGE UP (ref 3.5–5.2)
ALP SERPL-CCNC: 122 U/L — HIGH (ref 30–115)
ALT FLD-CCNC: 48 U/L — HIGH (ref 0–41)
ANION GAP SERPL CALC-SCNC: 15 MMOL/L — HIGH (ref 7–14)
AST SERPL-CCNC: 25 U/L — SIGNIFICANT CHANGE UP (ref 0–41)
BILIRUB SERPL-MCNC: 0.4 MG/DL — SIGNIFICANT CHANGE UP (ref 0.2–1.2)
BUN SERPL-MCNC: 23 MG/DL — HIGH (ref 10–20)
CALCIUM SERPL-MCNC: 9.5 MG/DL — SIGNIFICANT CHANGE UP (ref 8.5–10.1)
CHLORIDE SERPL-SCNC: 100 MMOL/L — SIGNIFICANT CHANGE UP (ref 98–110)
CO2 SERPL-SCNC: 23 MMOL/L — SIGNIFICANT CHANGE UP (ref 17–32)
CREAT SERPL-MCNC: 0.8 MG/DL — SIGNIFICANT CHANGE UP (ref 0.7–1.5)
EGFR: 110 ML/MIN/1.73M2 — SIGNIFICANT CHANGE UP
GLUCOSE BLDC GLUCOMTR-MCNC: 153 MG/DL — HIGH (ref 70–99)
GLUCOSE BLDC GLUCOMTR-MCNC: 184 MG/DL — HIGH (ref 70–99)
GLUCOSE BLDC GLUCOMTR-MCNC: 208 MG/DL — HIGH (ref 70–99)
GLUCOSE BLDC GLUCOMTR-MCNC: 217 MG/DL — HIGH (ref 70–99)
GLUCOSE SERPL-MCNC: 257 MG/DL — HIGH (ref 70–99)
HCT VFR BLD CALC: 41.3 % — LOW (ref 42–52)
HGB BLD-MCNC: 14.1 G/DL — SIGNIFICANT CHANGE UP (ref 14–18)
MAGNESIUM SERPL-MCNC: 1.7 MG/DL — LOW (ref 1.8–2.4)
MCHC RBC-ENTMCNC: 33.2 PG — HIGH (ref 27–31)
MCHC RBC-ENTMCNC: 34.1 G/DL — SIGNIFICANT CHANGE UP (ref 32–37)
MCV RBC AUTO: 97.2 FL — HIGH (ref 80–94)
NRBC # BLD: 0 /100 WBCS — SIGNIFICANT CHANGE UP (ref 0–0)
PLATELET # BLD AUTO: 298 K/UL — SIGNIFICANT CHANGE UP (ref 130–400)
POTASSIUM SERPL-MCNC: 4.1 MMOL/L — SIGNIFICANT CHANGE UP (ref 3.5–5)
POTASSIUM SERPL-SCNC: 4.1 MMOL/L — SIGNIFICANT CHANGE UP (ref 3.5–5)
PROT SERPL-MCNC: 6.9 G/DL — SIGNIFICANT CHANGE UP (ref 6–8)
RBC # BLD: 4.25 M/UL — LOW (ref 4.7–6.1)
RBC # FLD: 11.9 % — SIGNIFICANT CHANGE UP (ref 11.5–14.5)
SODIUM SERPL-SCNC: 138 MMOL/L — SIGNIFICANT CHANGE UP (ref 135–146)
WBC # BLD: 7.16 K/UL — SIGNIFICANT CHANGE UP (ref 4.8–10.8)
WBC # FLD AUTO: 7.16 K/UL — SIGNIFICANT CHANGE UP (ref 4.8–10.8)

## 2022-05-20 PROCEDURE — 99231 SBSQ HOSP IP/OBS SF/LOW 25: CPT

## 2022-05-20 RX ORDER — MAGNESIUM OXIDE 400 MG ORAL TABLET 241.3 MG
400 TABLET ORAL
Refills: 0 | Status: COMPLETED | OUTPATIENT
Start: 2022-05-20 | End: 2022-05-21

## 2022-05-20 RX ORDER — MAGNESIUM SULFATE 500 MG/ML
1 VIAL (ML) INJECTION ONCE
Refills: 0 | Status: DISCONTINUED | OUTPATIENT
Start: 2022-05-20 | End: 2022-05-20

## 2022-05-20 RX ADMIN — METFORMIN HYDROCHLORIDE 1000 MILLIGRAM(S): 850 TABLET ORAL at 17:03

## 2022-05-20 RX ADMIN — Medication 3 MILLIGRAM(S): at 21:19

## 2022-05-20 RX ADMIN — Medication 2: at 16:44

## 2022-05-20 RX ADMIN — MAGNESIUM OXIDE 400 MG ORAL TABLET 400 MILLIGRAM(S): 241.3 TABLET ORAL at 12:07

## 2022-05-20 RX ADMIN — FAMOTIDINE 20 MILLIGRAM(S): 10 INJECTION INTRAVENOUS at 11:40

## 2022-05-20 RX ADMIN — METFORMIN HYDROCHLORIDE 1000 MILLIGRAM(S): 850 TABLET ORAL at 05:09

## 2022-05-20 RX ADMIN — Medication 1: at 11:39

## 2022-05-20 RX ADMIN — Medication 2: at 07:37

## 2022-05-20 RX ADMIN — Medication 1 APPLICATION(S): at 11:01

## 2022-05-20 RX ADMIN — Medication 1 APPLICATION(S): at 17:03

## 2022-05-20 RX ADMIN — Medication 1 TABLET(S): at 05:09

## 2022-05-20 RX ADMIN — MAGNESIUM OXIDE 400 MG ORAL TABLET 400 MILLIGRAM(S): 241.3 TABLET ORAL at 17:03

## 2022-05-20 RX ADMIN — Medication 1 APPLICATION(S): at 05:08

## 2022-05-20 RX ADMIN — Medication 1 TABLET(S): at 17:03

## 2022-05-20 NOTE — PROGRESS NOTE ADULT - SUBJECTIVE AND OBJECTIVE BOX
Progress Note:  Provider Speciality                            Hospitalist      CARIN WATSON MRN-[id number] 46y Male     CHIEF PRESENTING COMPLAINT:  Patient is a 46y old  Male who presents with a chief complaint of DFU (06 Apr 2022 12:18)        SUBJECTIVE:  Patient was seen and examined at bedside. No new complaints described by patient in morning rounds.   No significant overnight events reported.     HISTORY OF PRESENTING ILLNESS:  HPI:  45 yo M with PMHx of Intellectual Disability, DM not on any medications presents with foot wounds. Pt has very poor foot hygiene and per the sister, has been persistently scratching an open wound on his L second toe, which worsened and became more red, had drainage, malodor. He saw a physician at [facility] and was told to come to the ED for evaluation. Pt is poor historian. Does endorse abdominal pain for a few days, cannot give much more description. Per the sister, pt did not seem to have any recent fevers, however is also not a very good historian and does not seem to have good health literacy. States that her and her mom decided to stop giving pt Metformin a while ago, are treating his diabetes by not giving him any sugary foods.  In the ED, T 97.8, /87, , RR 16, SpO2 99% on RA. Lab work unrevealing.  (31 Mar 2022 22:36)        REVIEW OF SYSTEMS:  Patient denies any headache, any vision complaints, runny nose, fever, chills. Denies chest pain, shortness of breath, palpitation. Denies nausea, vomiting, abdominal pain or diarrhoea, Denies dysuria. Denies  localized weakness in any part of the body or numbness.   At least 10 systems were reviewed in ROS. All systems reviewed  are within normal limits except for the complaints as described in Subjective.    PAST MEDICAL & SURGICAL HISTORY:  PAST MEDICAL & SURGICAL HISTORY:  DM (diabetes mellitus)    Intellectual disability            VITAL SIGNS:  Vital Signs Last 24 Hrs  T(C): 36.7 (20 May 2022 07:53), Max: 36.7 (19 May 2022 16:00)  T(F): 98.1 (20 May 2022 07:53), Max: 98.1 (19 May 2022 16:00)  HR: 110 (20 May 2022 07:53) (108 - 110)  BP: 133/69 (20 May 2022 07:53) (125/59 - 136/64)  BP(mean): --  RR: 18 (20 May 2022 07:53) (18 - 18)  SpO2: 99% (19 May 2022 16:00) (99% - 99%)          PHYSICAL EXAMINATION:  Not in acute distress  General: No icterus  HEENT:   no JVD.  Heart: S1+S2 audible  Lungs: bilateral  moderate air entry, no wheezing, no crepitations.  Abdomen: Soft, non-tender, non-distended , no  rigidity or guarding.  CNS: Awake alert, CN  grossly intact.  Extremities:  No edema            CONSULTS:  Consultant(s) Notes Reviewed by me.   Care Discussed with Consultants/Other Providers where required.        MEDICATIONS:  MEDICATIONS  (STANDING):  ammonium lactate 12% Lotion 1 Application(s) Topical two times a day  clotrimazole 1% Cream 1 Application(s) Topical two times a day  enoxaparin Injectable 40 milliGRAM(s) SubCutaneous every 24 hours  metFORMIN 1000 milliGRAM(s) Oral two times a day  pantoprazole    Tablet 40 milliGRAM(s) Oral before breakfast  vitamin A &amp; D Ointment 1 Application(s) Topical daily    MEDICATIONS  (PRN):  ondansetron Injectable 4 milliGRAM(s) IV Push every 6 hours PRN Nausea and/or Vomiting            ASSESSMENT:    45 yo M with PMH of Intellectual Disability and DM not on any medications presented with foot wounds.    Onychomycosis  DM2  Intellectual disability/autism    Nail bed wound and onychomycosis  Completed clindamycin  Podiatry noted- no surgical intervention is advised. outpt podiatry f/u  DM type 2 - uncontrolled. A1C 8.9. Continue  metformin 1000mg BID  Handoff:  Pending legal issue and guardianship, next  meeting due 06/23

## 2022-05-21 LAB
ANION GAP SERPL CALC-SCNC: 16 MMOL/L — HIGH (ref 7–14)
BUN SERPL-MCNC: 13 MG/DL — SIGNIFICANT CHANGE UP (ref 10–20)
CALCIUM SERPL-MCNC: 9.1 MG/DL — SIGNIFICANT CHANGE UP (ref 8.5–10.1)
CHLORIDE SERPL-SCNC: 101 MMOL/L — SIGNIFICANT CHANGE UP (ref 98–110)
CO2 SERPL-SCNC: 24 MMOL/L — SIGNIFICANT CHANGE UP (ref 17–32)
CREAT SERPL-MCNC: 0.9 MG/DL — SIGNIFICANT CHANGE UP (ref 0.7–1.5)
D DIMER BLD IA.RAPID-MCNC: 152 NG/ML DDU — SIGNIFICANT CHANGE UP (ref 0–230)
EGFR: 106 ML/MIN/1.73M2 — SIGNIFICANT CHANGE UP
GLUCOSE BLDC GLUCOMTR-MCNC: 143 MG/DL — HIGH (ref 70–99)
GLUCOSE BLDC GLUCOMTR-MCNC: 159 MG/DL — HIGH (ref 70–99)
GLUCOSE BLDC GLUCOMTR-MCNC: 185 MG/DL — HIGH (ref 70–99)
GLUCOSE BLDC GLUCOMTR-MCNC: 252 MG/DL — HIGH (ref 70–99)
GLUCOSE SERPL-MCNC: 149 MG/DL — HIGH (ref 70–99)
HCT VFR BLD CALC: 45.2 % — SIGNIFICANT CHANGE UP (ref 42–52)
HGB BLD-MCNC: 15.2 G/DL — SIGNIFICANT CHANGE UP (ref 14–18)
LACTATE SERPL-SCNC: 2.4 MMOL/L — HIGH (ref 0.7–2)
MAGNESIUM SERPL-MCNC: 2 MG/DL — SIGNIFICANT CHANGE UP (ref 1.8–2.4)
MCHC RBC-ENTMCNC: 33.2 PG — HIGH (ref 27–31)
MCHC RBC-ENTMCNC: 33.6 G/DL — SIGNIFICANT CHANGE UP (ref 32–37)
MCV RBC AUTO: 98.7 FL — HIGH (ref 80–94)
NRBC # BLD: 0 /100 WBCS — SIGNIFICANT CHANGE UP (ref 0–0)
PLATELET # BLD AUTO: 259 K/UL — SIGNIFICANT CHANGE UP (ref 130–400)
POTASSIUM SERPL-MCNC: 4 MMOL/L — SIGNIFICANT CHANGE UP (ref 3.5–5)
POTASSIUM SERPL-SCNC: 4 MMOL/L — SIGNIFICANT CHANGE UP (ref 3.5–5)
RBC # BLD: 4.58 M/UL — LOW (ref 4.7–6.1)
RBC # FLD: 12.1 % — SIGNIFICANT CHANGE UP (ref 11.5–14.5)
SARS-COV-2 RNA SPEC QL NAA+PROBE: DETECTED
SODIUM SERPL-SCNC: 141 MMOL/L — SIGNIFICANT CHANGE UP (ref 135–146)
WBC # BLD: 5.29 K/UL — SIGNIFICANT CHANGE UP (ref 4.8–10.8)
WBC # FLD AUTO: 5.29 K/UL — SIGNIFICANT CHANGE UP (ref 4.8–10.8)

## 2022-05-21 PROCEDURE — 99233 SBSQ HOSP IP/OBS HIGH 50: CPT

## 2022-05-21 PROCEDURE — 71045 X-RAY EXAM CHEST 1 VIEW: CPT | Mod: 26

## 2022-05-21 RX ORDER — ACETAMINOPHEN 500 MG
650 TABLET ORAL EVERY 6 HOURS
Refills: 0 | Status: DISCONTINUED | OUTPATIENT
Start: 2022-05-21 | End: 2022-10-11

## 2022-05-21 RX ORDER — SODIUM CHLORIDE 9 MG/ML
1000 INJECTION INTRAMUSCULAR; INTRAVENOUS; SUBCUTANEOUS
Refills: 0 | Status: DISCONTINUED | OUTPATIENT
Start: 2022-05-21 | End: 2022-06-26

## 2022-05-21 RX ORDER — SODIUM CHLORIDE 9 MG/ML
1000 INJECTION INTRAMUSCULAR; INTRAVENOUS; SUBCUTANEOUS ONCE
Refills: 0 | Status: COMPLETED | OUTPATIENT
Start: 2022-05-21 | End: 2022-05-21

## 2022-05-21 RX ADMIN — Medication 3: at 17:01

## 2022-05-21 RX ADMIN — Medication 1: at 08:40

## 2022-05-21 RX ADMIN — Medication 650 MILLIGRAM(S): at 16:03

## 2022-05-21 RX ADMIN — SODIUM CHLORIDE 1000 MILLILITER(S): 9 INJECTION INTRAMUSCULAR; INTRAVENOUS; SUBCUTANEOUS at 19:13

## 2022-05-21 RX ADMIN — Medication 1 APPLICATION(S): at 05:11

## 2022-05-21 RX ADMIN — Medication 3 UNIT(S): at 08:41

## 2022-05-21 RX ADMIN — Medication 1: at 12:20

## 2022-05-21 RX ADMIN — Medication 1 APPLICATION(S): at 17:32

## 2022-05-21 RX ADMIN — METFORMIN HYDROCHLORIDE 1000 MILLIGRAM(S): 850 TABLET ORAL at 05:11

## 2022-05-21 RX ADMIN — FAMOTIDINE 20 MILLIGRAM(S): 10 INJECTION INTRAVENOUS at 11:24

## 2022-05-21 RX ADMIN — Medication 650 MILLIGRAM(S): at 01:17

## 2022-05-21 RX ADMIN — Medication 3 MILLIGRAM(S): at 21:44

## 2022-05-21 RX ADMIN — MAGNESIUM OXIDE 400 MG ORAL TABLET 400 MILLIGRAM(S): 241.3 TABLET ORAL at 08:55

## 2022-05-21 RX ADMIN — METFORMIN HYDROCHLORIDE 1000 MILLIGRAM(S): 850 TABLET ORAL at 17:02

## 2022-05-21 RX ADMIN — Medication 1 TABLET(S): at 05:11

## 2022-05-21 NOTE — PROGRESS NOTE ADULT - SUBJECTIVE AND OBJECTIVE BOX
Progress Note:  Provider Speciality                            Hospitalist      CARIN WATSON MRN-[id number] 46y Male     CHIEF PRESENTING COMPLAINT:  Patient is a 46y old  Male who presents with a chief complaint of DFU (06 Apr 2022 12:18)        SUBJECTIVE:  Patient was seen and examined at bedside.  Overnight spiked fever and converted covid positive  HISTORY OF PRESENTING ILLNESS:  HPI:  47 yo M with PMHx of Intellectual Disability, DM not on any medications presents with foot wounds. Pt has very poor foot hygiene and per the sister, has been persistently scratching an open wound on his L second toe, which worsened and became more red, had drainage, malodor. He saw a physician at [facility] and was told to come to the ED for evaluation. Pt is poor historian. Does endorse abdominal pain for a few days, cannot give much more description. Per the sister, pt did not seem to have any recent fevers, however is also not a very good historian and does not seem to have good health literacy. States that her and her mom decided to stop giving pt Metformin a while ago, are treating his diabetes by not giving him any sugary foods.  In the ED, T 97.8, /87, , RR 16, SpO2 99% on RA. Lab work unrevealing.  (31 Mar 2022 22:36)        REVIEW OF SYSTEMS:  Patient denies any headache, any vision complaints, runny nose. Denies chest pain, shortness of breath, palpitation. Denies nausea, vomiting, abdominal pain or diarrhoea, Denies dysuria. Denies  localized weakness in any part of the body or numbness.   At least 10 systems were reviewed in ROS. All systems reviewed  are within normal limits except for the complaints as described in Subjective.    PAST MEDICAL & SURGICAL HISTORY:  PAST MEDICAL & SURGICAL HISTORY:  DM (diabetes mellitus)    Intellectual disability            VITAL SIGNS:  Vital Signs Last 24 Hrs  T(C): 36.7 (20 May 2022 07:53), Max: 36.7 (19 May 2022 16:00)  T(F): 98.1 (20 May 2022 07:53), Max: 98.1 (19 May 2022 16:00)  HR: 110 (20 May 2022 07:53) (108 - 110)  BP: 133/69 (20 May 2022 07:53) (125/59 - 136/64)  BP(mean): --  RR: 18 (20 May 2022 07:53) (18 - 18)  SpO2: 99% (19 May 2022 16:00) (99% - 99%)          PHYSICAL EXAMINATION:  Not in acute distress  General: No icterus  HEENT:   no JVD.  Heart: S1+S2 audible  Lungs: bilateral  moderate air entry, no wheezing, no crepitations.  Abdomen: Soft, non-tender, non-distended , no  rigidity or guarding.  CNS: Awake alert, CN  grossly intact.  Extremities:  No edema            CONSULTS:  Consultant(s) Notes Reviewed by me.   Care Discussed with Consultants/Other Providers where required.        MEDICATIONS:  MEDICATIONS  (STANDING):  ammonium lactate 12% Lotion 1 Application(s) Topical two times a day  clotrimazole 1% Cream 1 Application(s) Topical two times a day  enoxaparin Injectable 40 milliGRAM(s) SubCutaneous every 24 hours  metFORMIN 1000 milliGRAM(s) Oral two times a day  pantoprazole    Tablet 40 milliGRAM(s) Oral before breakfast  vitamin A &amp; D Ointment 1 Application(s) Topical daily    MEDICATIONS  (PRN):  ondansetron Injectable 4 milliGRAM(s) IV Push every 6 hours PRN Nausea and/or Vomiting            ASSESSMENT:    47 yo M with PMH of Intellectual Disability and DM not on any medications presented with foot wounds.    Covid positive with pyrexia  Onychomycosis  DM2  Intellectual disability/autism        Covid positive with pyrexia- will get sepsis work up and markers  Tylenol for pyrexia  Nail bed wound and onychomycosis  Completed clindamycin  Podiatry noted- no surgical intervention is advised. outpt podiatry f/u  DM type 2 - uncontrolled. A1C 8.9. Continue  metformin 1000mg BID  Handoff:  Pending legal issue and guardianship, next  meeting due 06/23, currently Covid positive since 05/21

## 2022-05-22 LAB
ALBUMIN SERPL ELPH-MCNC: 4 G/DL — SIGNIFICANT CHANGE UP (ref 3.5–5.2)
ALBUMIN SERPL ELPH-MCNC: 4.2 G/DL — SIGNIFICANT CHANGE UP (ref 3.5–5.2)
ALP SERPL-CCNC: 115 U/L — SIGNIFICANT CHANGE UP (ref 30–115)
ALP SERPL-CCNC: 118 U/L — HIGH (ref 30–115)
ALT FLD-CCNC: 41 U/L — SIGNIFICANT CHANGE UP (ref 0–41)
ALT FLD-CCNC: 42 U/L — HIGH (ref 0–41)
ANION GAP SERPL CALC-SCNC: 14 MMOL/L — SIGNIFICANT CHANGE UP (ref 7–14)
AST SERPL-CCNC: 28 U/L — SIGNIFICANT CHANGE UP (ref 0–41)
AST SERPL-CCNC: 28 U/L — SIGNIFICANT CHANGE UP (ref 0–41)
BILIRUB DIRECT SERPL-MCNC: <0.2 MG/DL — SIGNIFICANT CHANGE UP (ref 0–0.3)
BILIRUB INDIRECT FLD-MCNC: >0.2 MG/DL — SIGNIFICANT CHANGE UP (ref 0.2–1.2)
BILIRUB SERPL-MCNC: 0.4 MG/DL — SIGNIFICANT CHANGE UP (ref 0.2–1.2)
BILIRUB SERPL-MCNC: 0.4 MG/DL — SIGNIFICANT CHANGE UP (ref 0.2–1.2)
BUN SERPL-MCNC: 16 MG/DL — SIGNIFICANT CHANGE UP (ref 10–20)
CALCIUM SERPL-MCNC: 8.5 MG/DL — SIGNIFICANT CHANGE UP (ref 8.5–10.1)
CHLORIDE SERPL-SCNC: 98 MMOL/L — SIGNIFICANT CHANGE UP (ref 98–110)
CO2 SERPL-SCNC: 21 MMOL/L — SIGNIFICANT CHANGE UP (ref 17–32)
CREAT SERPL-MCNC: 0.9 MG/DL — SIGNIFICANT CHANGE UP (ref 0.7–1.5)
CREAT SERPL-MCNC: 0.9 MG/DL — SIGNIFICANT CHANGE UP (ref 0.7–1.5)
CRP SERPL-MCNC: 5 MG/L — HIGH
EGFR: 106 ML/MIN/1.73M2 — SIGNIFICANT CHANGE UP
EGFR: 106 ML/MIN/1.73M2 — SIGNIFICANT CHANGE UP
FERRITIN SERPL-MCNC: 155 NG/ML — SIGNIFICANT CHANGE UP (ref 30–400)
GLUCOSE BLDC GLUCOMTR-MCNC: 162 MG/DL — HIGH (ref 70–99)
GLUCOSE BLDC GLUCOMTR-MCNC: 185 MG/DL — HIGH (ref 70–99)
GLUCOSE BLDC GLUCOMTR-MCNC: 202 MG/DL — HIGH (ref 70–99)
GLUCOSE BLDC GLUCOMTR-MCNC: 210 MG/DL — HIGH (ref 70–99)
GLUCOSE SERPL-MCNC: 186 MG/DL — HIGH (ref 70–99)
HCT VFR BLD CALC: 43.5 % — SIGNIFICANT CHANGE UP (ref 42–52)
HGB BLD-MCNC: 14.8 G/DL — SIGNIFICANT CHANGE UP (ref 14–18)
INR BLD: 1.1 RATIO — SIGNIFICANT CHANGE UP (ref 0.65–1.3)
MAGNESIUM SERPL-MCNC: 1.7 MG/DL — LOW (ref 1.8–2.4)
MCHC RBC-ENTMCNC: 33 PG — HIGH (ref 27–31)
MCHC RBC-ENTMCNC: 34 G/DL — SIGNIFICANT CHANGE UP (ref 32–37)
MCV RBC AUTO: 97.1 FL — HIGH (ref 80–94)
NRBC # BLD: 0 /100 WBCS — SIGNIFICANT CHANGE UP (ref 0–0)
PLATELET # BLD AUTO: 228 K/UL — SIGNIFICANT CHANGE UP (ref 130–400)
POTASSIUM SERPL-MCNC: 4 MMOL/L — SIGNIFICANT CHANGE UP (ref 3.5–5)
POTASSIUM SERPL-SCNC: 4 MMOL/L — SIGNIFICANT CHANGE UP (ref 3.5–5)
PROCALCITONIN SERPL-MCNC: 0.1 NG/ML — SIGNIFICANT CHANGE UP (ref 0.02–0.1)
PROT SERPL-MCNC: 6.7 G/DL — SIGNIFICANT CHANGE UP (ref 6–8)
PROT SERPL-MCNC: 6.8 G/DL — SIGNIFICANT CHANGE UP (ref 6–8)
PROTHROM AB SERPL-ACNC: 12.6 SEC — SIGNIFICANT CHANGE UP (ref 9.95–12.87)
RBC # BLD: 4.48 M/UL — LOW (ref 4.7–6.1)
RBC # FLD: 12.1 % — SIGNIFICANT CHANGE UP (ref 11.5–14.5)
SODIUM SERPL-SCNC: 133 MMOL/L — LOW (ref 135–146)
WBC # BLD: 7.3 K/UL — SIGNIFICANT CHANGE UP (ref 4.8–10.8)
WBC # FLD AUTO: 7.3 K/UL — SIGNIFICANT CHANGE UP (ref 4.8–10.8)

## 2022-05-22 PROCEDURE — 99233 SBSQ HOSP IP/OBS HIGH 50: CPT

## 2022-05-22 RX ORDER — REMDESIVIR 5 MG/ML
100 INJECTION INTRAVENOUS EVERY 24 HOURS
Refills: 0 | Status: ACTIVE | OUTPATIENT
Start: 2022-05-23 | End: 2022-05-26

## 2022-05-22 RX ORDER — REMDESIVIR 5 MG/ML
INJECTION INTRAVENOUS
Refills: 0 | Status: ACTIVE | OUTPATIENT
Start: 2022-05-22 | End: 2022-05-26

## 2022-05-22 RX ORDER — GUAIFENESIN/DEXTROMETHORPHAN 600MG-30MG
10 TABLET, EXTENDED RELEASE 12 HR ORAL EVERY 4 HOURS
Refills: 0 | Status: DISCONTINUED | OUTPATIENT
Start: 2022-05-22 | End: 2022-07-05

## 2022-05-22 RX ORDER — REMDESIVIR 5 MG/ML
200 INJECTION INTRAVENOUS EVERY 24 HOURS
Refills: 0 | Status: COMPLETED | OUTPATIENT
Start: 2022-05-22 | End: 2022-05-22

## 2022-05-22 RX ADMIN — Medication 10 MILLILITER(S): at 11:42

## 2022-05-22 RX ADMIN — Medication 1: at 17:19

## 2022-05-22 RX ADMIN — Medication 1 APPLICATION(S): at 11:28

## 2022-05-22 RX ADMIN — Medication 1 TABLET(S): at 17:19

## 2022-05-22 RX ADMIN — Medication 650 MILLIGRAM(S): at 06:29

## 2022-05-22 RX ADMIN — Medication 1 APPLICATION(S): at 17:19

## 2022-05-22 RX ADMIN — Medication 1 APPLICATION(S): at 06:44

## 2022-05-22 RX ADMIN — METFORMIN HYDROCHLORIDE 1000 MILLIGRAM(S): 850 TABLET ORAL at 17:19

## 2022-05-22 RX ADMIN — Medication 1 APPLICATION(S): at 17:20

## 2022-05-22 RX ADMIN — Medication 10 MILLILITER(S): at 15:55

## 2022-05-22 RX ADMIN — Medication 650 MILLIGRAM(S): at 15:56

## 2022-05-22 RX ADMIN — METFORMIN HYDROCHLORIDE 1000 MILLIGRAM(S): 850 TABLET ORAL at 06:30

## 2022-05-22 RX ADMIN — Medication 2: at 08:40

## 2022-05-22 RX ADMIN — Medication 650 MILLIGRAM(S): at 21:41

## 2022-05-22 RX ADMIN — Medication 1 TABLET(S): at 06:36

## 2022-05-22 RX ADMIN — Medication 650 MILLIGRAM(S): at 16:45

## 2022-05-22 RX ADMIN — Medication 1: at 11:27

## 2022-05-22 RX ADMIN — REMDESIVIR 500 MILLIGRAM(S): 5 INJECTION INTRAVENOUS at 11:28

## 2022-05-22 RX ADMIN — Medication 3 MILLIGRAM(S): at 21:09

## 2022-05-22 RX ADMIN — FAMOTIDINE 20 MILLIGRAM(S): 10 INJECTION INTRAVENOUS at 11:28

## 2022-05-22 NOTE — PROGRESS NOTE ADULT - SUBJECTIVE AND OBJECTIVE BOX
Progress Note:  Provider Speciality                            Hospitalist      CARIN WATSON MRN-[id number] 46y Male     CHIEF PRESENTING COMPLAINT:  Patient is a 46y old  Male who presents with a chief complaint of DFU (06 Apr 2022 12:18)        SUBJECTIVE:  Patient was seen and examined at bedside.  Overnight spiked fever   HISTORY OF PRESENTING ILLNESS:  HPI:  47 yo M with PMHx of Intellectual Disability, DM not on any medications presents with foot wounds. Pt has very poor foot hygiene and per the sister, has been persistently scratching an open wound on his L second toe, which worsened and became more red, had drainage, malodor. He saw a physician at [facility] and was told to come to the ED for evaluation. Pt is poor historian. Does endorse abdominal pain for a few days, cannot give much more description. Per the sister, pt did not seem to have any recent fevers, however is also not a very good historian and does not seem to have good health literacy. States that her and her mom decided to stop giving pt Metformin a while ago, are treating his diabetes by not giving him any sugary foods.  In the ED, T 97.8, /87, , RR 16, SpO2 99% on RA. Lab work unrevealing.  (31 Mar 2022 22:36)        REVIEW OF SYSTEMS:  Patient denies any headache, any vision complaints, runny nose. Denies chest pain, shortness of breath, palpitation. Denies nausea, vomiting, abdominal pain or diarrhoea, Denies dysuria. Denies  localized weakness in any part of the body or numbness.   At least 10 systems were reviewed in ROS. All systems reviewed  are within normal limits except for the complaints as described in Subjective.    PAST MEDICAL & SURGICAL HISTORY:  PAST MEDICAL & SURGICAL HISTORY:  DM (diabetes mellitus)    Intellectual disability            VITAL SIGNS:  Vital Signs Last 24 Hrs  T(C): 37.2 (22 May 2022 08:13), Max: 39.1 (21 May 2022 16:16)  T(F): 99 (22 May 2022 08:13), Max: 102.4 (21 May 2022 16:16)  HR: 82 (22 May 2022 08:13) (82 - 122)  BP: 129/60 (22 May 2022 08:13) (100/59 - 129/60)  BP(mean): --  RR: 18 (22 May 2022 08:13) (18 - 18)  SpO2: --      PHYSICAL EXAMINATION:  Not in acute distress  General: No icterus  HEENT:   no JVD.  Heart: S1+S2 audible  Lungs: bilateral  moderate air entry, no wheezing, no crepitations.  Abdomen: Soft, non-tender, non-distended , no  rigidity or guarding.  CNS: Awake alert, CN  grossly intact.  Extremities:  No edema            CONSULTS:  Consultant(s) Notes Reviewed by me.   Care Discussed with Consultants/Other Providers where required.        MEDICATIONS:  MEDICATIONS  (STANDING):  ammonium lactate 12% Lotion 1 Application(s) Topical two times a day  clotrimazole 1% Cream 1 Application(s) Topical two times a day  enoxaparin Injectable 40 milliGRAM(s) SubCutaneous every 24 hours  metFORMIN 1000 milliGRAM(s) Oral two times a day  pantoprazole    Tablet 40 milliGRAM(s) Oral before breakfast  vitamin A &amp; D Ointment 1 Application(s) Topical daily    MEDICATIONS  (PRN):  ondansetron Injectable 4 milliGRAM(s) IV Push every 6 hours PRN Nausea and/or Vomiting            ASSESSMENT:    47 yo M with PMH of Intellectual Disability and DM not on any medications presented with foot wounds.    Covid positive with pyrexia  Onychomycosis  DM2  Intellectual disability/autism        Covid positive with pyrexia-  CXr show no infiltrates  Started RDv to prevent progression  Tylenol for pyrexia  Nail bed wound and onychomycosis  Completed clindamycin  Podiatry noted- no surgical intervention is advised. outpt podiatry f/u  DM type 2 - uncontrolled. A1C 8.9. Continue  metformin 1000mg BID  Handoff:  Pending legal issue and guardianship, next  meeting due 06/23, currently Covid positive with intermittent pyrexia

## 2022-05-23 LAB
GLUCOSE BLDC GLUCOMTR-MCNC: 117 MG/DL — HIGH (ref 70–99)
GLUCOSE BLDC GLUCOMTR-MCNC: 167 MG/DL — HIGH (ref 70–99)
GLUCOSE BLDC GLUCOMTR-MCNC: 177 MG/DL — HIGH (ref 70–99)
GLUCOSE BLDC GLUCOMTR-MCNC: 210 MG/DL — HIGH (ref 70–99)

## 2022-05-23 PROCEDURE — 99233 SBSQ HOSP IP/OBS HIGH 50: CPT

## 2022-05-23 RX ORDER — METOCLOPRAMIDE HCL 10 MG
10 TABLET ORAL EVERY 8 HOURS
Refills: 0 | Status: DISCONTINUED | OUTPATIENT
Start: 2022-05-23 | End: 2022-07-05

## 2022-05-23 RX ADMIN — Medication 1 APPLICATION(S): at 17:36

## 2022-05-23 RX ADMIN — Medication 650 MILLIGRAM(S): at 08:14

## 2022-05-23 RX ADMIN — FAMOTIDINE 20 MILLIGRAM(S): 10 INJECTION INTRAVENOUS at 11:37

## 2022-05-23 RX ADMIN — Medication 1 TABLET(S): at 17:36

## 2022-05-23 RX ADMIN — Medication 10 MILLILITER(S): at 05:49

## 2022-05-23 RX ADMIN — REMDESIVIR 500 MILLIGRAM(S): 5 INJECTION INTRAVENOUS at 11:35

## 2022-05-23 RX ADMIN — Medication 1 APPLICATION(S): at 17:37

## 2022-05-23 RX ADMIN — Medication 1 TABLET(S): at 05:49

## 2022-05-23 RX ADMIN — Medication 1 APPLICATION(S): at 11:37

## 2022-05-23 RX ADMIN — METFORMIN HYDROCHLORIDE 1000 MILLIGRAM(S): 850 TABLET ORAL at 17:35

## 2022-05-23 RX ADMIN — Medication 10 MILLILITER(S): at 21:46

## 2022-05-23 RX ADMIN — Medication 1: at 11:36

## 2022-05-23 RX ADMIN — Medication 10 MILLILITER(S): at 17:35

## 2022-05-23 RX ADMIN — Medication 650 MILLIGRAM(S): at 08:44

## 2022-05-23 RX ADMIN — Medication 1: at 08:14

## 2022-05-23 RX ADMIN — Medication 2: at 16:35

## 2022-05-23 RX ADMIN — METFORMIN HYDROCHLORIDE 1000 MILLIGRAM(S): 850 TABLET ORAL at 05:49

## 2022-05-23 RX ADMIN — Medication 3 MILLIGRAM(S): at 21:46

## 2022-05-23 RX ADMIN — Medication 10 MILLILITER(S): at 11:37

## 2022-05-23 NOTE — PROGRESS NOTE ADULT - ASSESSMENT
45 yo M with PMHx of Intellectual Disability, DM not on any medications presents with foot wounds. Pt has very poor foot hygiene and per the sister, has been persistently scratching an open wound on his L second toe, which worsened and became more red, had drainage, malodor. He saw a physician at [facility] and was told to come to the ED for eval.    #Covid positive  - Patient tested positive for Covid 5/21, occasion fevers  - WBC unremarkable    #Onychomycosis   - antifungal, s/p ABX po (likely due to severe infection)  - seen by ID and podiatry eval  - Completed clindamycin  - PO terbinafine not suggested by ID or podiatry  - Per podiatry, no surgical intervention is advised, clotrimazole cream adequate     #uncontrolled diabetes mellitus   - hba1c is 8.9- patient does not take any medication at home  - started on metformin- dose increased to 1000 mg BID.    # Autism   - patient is high functioning but unable to take care of basics.    #Insomnia  - Melatonin 5 mg PO qhs  - counseled on sleep hygiene     #Misc   - DVT prophylaxis: not indicated, pt is ambulating   - GI prophylaxis: PPI   - Diet: CC  - Activity: IAT   - Disposition: Patient has Guardianship hearing 6/23 at 10am 45 yo M with PMHx of Intellectual Disability, DM not on any medications presents with foot wounds. Pt has very poor foot hygiene and per the sister, has been persistently scratching an open wound on his L second toe, which worsened and became more red, had drainage, malodor. He saw a physician at [facility] and was told to come to the ED for eval.    #Covid positive  - Patient tested positive for Covid 5/21, occasion fevers  - WBC unremarkable  - Started Remdesivir     #Onychomycosis   - antifungal, s/p ABX po (likely due to severe infection)  - seen by ID and podiatry eval  - Completed clindamycin  - PO terbinafine not suggested by ID or podiatry  - Per podiatry, no surgical intervention is advised, clotrimazole cream adequate     #uncontrolled diabetes mellitus   - hba1c is 8.9- patient does not take any medication at home  - started on metformin- dose increased to 1000 mg BID.    # Autism   - patient is high functioning but unable to take care of basics.    #Insomnia  - Melatonin 5 mg PO qhs  - counseled on sleep hygiene     #Misc   - DVT prophylaxis: not indicated, pt is ambulating   - GI prophylaxis: PPI   - Diet: CC  - Activity: IAT   - Disposition: Guardianship hearing pending for June

## 2022-05-23 NOTE — PROGRESS NOTE ADULT - SUBJECTIVE AND OBJECTIVE BOX
HPI:  45 yo M with PMHx of Intellectual Disability, DM not on any medications presents with foot wounds. Pt has very poor foot hygiene and per the sister, has been persistently scratching an open wound on his L second toe, which worsened and became more red, had drainage, malodor. He saw a physician at [facility] and was told to come to the ED for evaluation. Pt is poor historian. Does endorse abdominal pain for a few days, cannot give much more description. Per the sister, pt did not seem to have any recent fevers, however is also not a very good historian and does not seem to have good health literacy. States that her and her mom decided to stop giving pt Metformin a while ago, are treating his diabetes by not giving him any sugary foods.  In the ED, T 97.8, /87, , RR 16, SpO2 99% on RA. Lab work unrevealing.  (31 Mar 2022 22:36)    Currently admitted to medicine with the primary diagnosis of Onychomycosis       Today is hospital day 53d.     INTERVAL HPI / OVERNIGHT EVENTS:  Patient was examined and seen at bedside. This morning he is resting comfortably in bed and reports no new issues or overnight events.     Patient has Guardianship hearing 6/23 at 10am    ROS: Otherwise unremarkable     PAST MEDICAL & SURGICAL HISTORY  DM (diabetes mellitus)    Intellectual disability      ALLERGIES  penicillin (Unknown)    MEDICATIONS  MEDICATIONS  (STANDING):  ammonium lactate 12% Lotion 1 Application(s) Topical two times a day  clotrimazole 1% Cream 1 Application(s) Topical two times a day  famotidine    Tablet 20 milliGRAM(s) Oral daily  glucagon  Injectable 1 milliGRAM(s) IntraMuscular once  insulin lispro (ADMELOG) corrective regimen sliding scale   SubCutaneous three times a day before meals  lactobacillus acidophilus 1 Tablet(s) Oral two times a day  melatonin 3 milliGRAM(s) Oral at bedtime  metFORMIN 1000 milliGRAM(s) Oral two times a day  remdesivir  IVPB 100 milliGRAM(s) IV Intermittent every 24 hours  remdesivir  IVPB   IV Intermittent   sodium chloride 0.9%. 1000 milliLiter(s) (75 mL/Hr) IV Continuous <Continuous>  vitamin A &amp; D Ointment 1 Application(s) Topical daily    MEDICATIONS  (PRN):  acetaminophen     Tablet .. 650 milliGRAM(s) Oral every 6 hours PRN Temp greater or equal to 38C (100.4F), Mild Pain (1 - 3)  guaifenesin/dextromethorphan Oral Liquid 10 milliLiter(s) Oral every 4 hours PRN s    Vital Signs Last 24 Hrs  T(C): 37.8 (23 May 2022 08:00), Max: 38.4 (22 May 2022 15:41)  T(F): 100.1 (23 May 2022 08:00), Max: 101.2 (22 May 2022 15:41)  HR: 99 (23 May 2022 08:00) (97 - 108)  BP: 123/60 (23 May 2022 08:00) (108/65 - 130/59)  BP(mean): --  RR: 18 (23 May 2022 08:00) (18 - 18)  SpO2: 97% (23 May 2022 08:00) (97% - 98%)      PHYSICAL EXAM  GEN: NAD, Resting comfortably in bed  PULM: Clear to auscultation bilaterally, No wheezes  CVS: Regular rate and rhythm, S1-S2, no murmurs  ABD: Soft, non-tender, non-distended, no guarding  EXT: No edema, onychomycosis   NEURO: A&Ox3, no focal deficits      LABS:  cret                        14.8   7.30  )-----------( 228      ( 22 May 2022 07:09 )             43.5     05-22    x   |  x   |  x   ----------------------------<  x   x    |  x   |  0.9    Ca    8.5      22 May 2022 07:09  Mg     1.7     05-22    TPro  6.7  /  Alb  4.0  /  TBili  0.4  /  DBili  <0.2  /  AST  28  /  ALT  41  /  AlkPhos  118<H>  05-22    PT/INR - ( 22 May 2022 11:47 )   PT: 12.60 sec;   INR: 1.10 ratio              HPI:  47 yo M with PMHx of Intellectual Disability, DM not on any medications presents with foot wounds. Pt has very poor foot hygiene and per the sister, has been persistently scratching an open wound on his L second toe, which worsened and became more red, had drainage, malodor. He saw a physician at [facility] and was told to come to the ED for evaluation. Pt is poor historian. Does endorse abdominal pain for a few days, cannot give much more description. Per the sister, pt did not seem to have any recent fevers, however is also not a very good historian and does not seem to have good health literacy. States that her and her mom decided to stop giving pt Metformin a while ago, are treating his diabetes by not giving him any sugary foods.  In the ED, T 97.8, /87, , RR 16, SpO2 99% on RA. Lab work unrevealing.  (31 Mar 2022 22:36)    Currently admitted to medicine with the primary diagnosis of Onychomycosis       Today is hospital day 53d.     INTERVAL HPI / OVERNIGHT EVENTS:  Patient was examined and seen at bedside. This morning he is resting comfortably in bed and reports no new issues or overnight events.     Patient has Guardianship hearing in June    ROS: Otherwise unremarkable     PAST MEDICAL & SURGICAL HISTORY  DM (diabetes mellitus)    Intellectual disability      ALLERGIES  penicillin (Unknown)    MEDICATIONS  MEDICATIONS  (STANDING):  ammonium lactate 12% Lotion 1 Application(s) Topical two times a day  clotrimazole 1% Cream 1 Application(s) Topical two times a day  famotidine    Tablet 20 milliGRAM(s) Oral daily  glucagon  Injectable 1 milliGRAM(s) IntraMuscular once  insulin lispro (ADMELOG) corrective regimen sliding scale   SubCutaneous three times a day before meals  lactobacillus acidophilus 1 Tablet(s) Oral two times a day  melatonin 3 milliGRAM(s) Oral at bedtime  metFORMIN 1000 milliGRAM(s) Oral two times a day  remdesivir  IVPB 100 milliGRAM(s) IV Intermittent every 24 hours  remdesivir  IVPB   IV Intermittent   sodium chloride 0.9%. 1000 milliLiter(s) (75 mL/Hr) IV Continuous <Continuous>  vitamin A &amp; D Ointment 1 Application(s) Topical daily    MEDICATIONS  (PRN):  acetaminophen     Tablet .. 650 milliGRAM(s) Oral every 6 hours PRN Temp greater or equal to 38C (100.4F), Mild Pain (1 - 3)  guaifenesin/dextromethorphan Oral Liquid 10 milliLiter(s) Oral every 4 hours PRN s    Vital Signs Last 24 Hrs  T(C): 37.8 (23 May 2022 08:00), Max: 38.4 (22 May 2022 15:41)  T(F): 100.1 (23 May 2022 08:00), Max: 101.2 (22 May 2022 15:41)  HR: 99 (23 May 2022 08:00) (97 - 108)  BP: 123/60 (23 May 2022 08:00) (108/65 - 130/59)  BP(mean): --  RR: 18 (23 May 2022 08:00) (18 - 18)  SpO2: 97% (23 May 2022 08:00) (97% - 98%)      PHYSICAL EXAM  GEN: NAD, Resting comfortably in bed  PULM: Clear to auscultation bilaterally, No wheezes  CVS: Regular rate and rhythm, S1-S2, no murmurs  ABD: Soft, non-tender, non-distended, no guarding  EXT: No edema, onychomycosis   NEURO: A&Ox3, no focal deficits      LABS:  cret                        14.8   7.30  )-----------( 228      ( 22 May 2022 07:09 )             43.5     05-22    x   |  x   |  x   ----------------------------<  x   x    |  x   |  0.9    Ca    8.5      22 May 2022 07:09  Mg     1.7     05-22    TPro  6.7  /  Alb  4.0  /  TBili  0.4  /  DBili  <0.2  /  AST  28  /  ALT  41  /  AlkPhos  118<H>  05-22    PT/INR - ( 22 May 2022 11:47 )   PT: 12.60 sec;   INR: 1.10 ratio

## 2022-05-23 NOTE — CHART NOTE - NSCHARTNOTEFT_GEN_A_CORE
Per RN, patient continues with good PO intake (>75% of most meals). No GI issues/discomfort. Per MD note, patient currently Covid positive. Recommend to continue all current nutrition interventions at this time.     RD to F/U in 10 days.

## 2022-05-23 NOTE — PROGRESS NOTE ADULT - SUBJECTIVE AND OBJECTIVE BOX
Progress Note:  Provider Speciality                            Hospitalist      CARIN WATSON MRN-[id number] 46y Male     CHIEF PRESENTING COMPLAINT:  Patient is a 46y old  Male who presents with a chief complaint of DFU (06 Apr 2022 12:18)        SUBJECTIVE:  Patient was seen and examined at bedside.  Overnight spiked fever   HISTORY OF PRESENTING ILLNESS:  HPI:  45 yo M with PMHx of Intellectual Disability, DM not on any medications presents with foot wounds. Pt has very poor foot hygiene and per the sister, has been persistently scratching an open wound on his L second toe, which worsened and became more red, had drainage, malodor. He saw a physician at [facility] and was told to come to the ED for evaluation. Pt is poor historian. Does endorse abdominal pain for a few days, cannot give much more description. Per the sister, pt did not seem to have any recent fevers, however is also not a very good historian and does not seem to have good health literacy. States that her and her mom decided to stop giving pt Metformin a while ago, are treating his diabetes by not giving him any sugary foods.  In the ED, T 97.8, /87, , RR 16, SpO2 99% on RA. Lab work unrevealing.  (31 Mar 2022 22:36)        REVIEW OF SYSTEMS:  Patient denies any headache, any vision complaints, runny nose. Denies chest pain, shortness of breath, palpitation. Denies nausea, vomiting, abdominal pain or diarrhoea, Denies dysuria. Denies  localized weakness in any part of the body or numbness.   At least 10 systems were reviewed in ROS. All systems reviewed  are within normal limits except for the complaints as described in Subjective.    PAST MEDICAL & SURGICAL HISTORY:  PAST MEDICAL & SURGICAL HISTORY:  DM (diabetes mellitus)    Intellectual disability            VITAL SIGNS:  Vital Signs Last 24 Hrs  T(C): 37.8 (23 May 2022 08:00), Max: 38.4 (22 May 2022 15:41)  T(F): 100.1 (23 May 2022 08:00), Max: 101.2 (22 May 2022 15:41)  HR: 99 (23 May 2022 08:00) (97 - 108)  BP: 123/60 (23 May 2022 08:00) (108/65 - 130/59)  BP(mean): --  RR: 18 (23 May 2022 08:00) (18 - 18)  SpO2: 97% (23 May 2022 08:00) (97% - 98%)    PHYSICAL EXAMINATION:  Not in acute distress  General: No icterus  HEENT:   no JVD.  Heart: S1+S2 audible  Lungs: bilateral  moderate air entry, no wheezing, no crepitations.  Abdomen: Soft, non-tender, non-distended , no  rigidity or guarding.  CNS: Awake alert, CN  grossly intact.  Extremities:  No edema            CONSULTS:  Consultant(s) Notes Reviewed by me.   Care Discussed with Consultants/Other Providers where required.        MEDICATIONS:  MEDICATIONS  (STANDING):  ammonium lactate 12% Lotion 1 Application(s) Topical two times a day  clotrimazole 1% Cream 1 Application(s) Topical two times a day  enoxaparin Injectable 40 milliGRAM(s) SubCutaneous every 24 hours  metFORMIN 1000 milliGRAM(s) Oral two times a day  pantoprazole    Tablet 40 milliGRAM(s) Oral before breakfast  vitamin A &amp; D Ointment 1 Application(s) Topical daily    MEDICATIONS  (PRN):  ondansetron Injectable 4 milliGRAM(s) IV Push every 6 hours PRN Nausea and/or Vomiting            ASSESSMENT:    45 yo M with PMH of Intellectual Disability and DM not on any medications presented with foot wounds.    Covid  PNA  Onychomycosis  DM2  Intellectual disability/autism        Covid positive with pyrexia-  CXr show no infiltrates  Started RDv to prevent progression  Tylenol for pyrexia  Nail bed wound and onychomycosis  Completed clindamycin  Podiatry noted- no surgical intervention is advised. outpt podiatry f/u  DM type 2 - uncontrolled. A1C 8.9. Continue  metformin 1000mg BID  Handoff:  Pending legal issue and guardianship, next  meeting due 06/23, currently Covid positive with intermittent pyrexia

## 2022-05-24 LAB
ALBUMIN SERPL ELPH-MCNC: 3.9 G/DL — SIGNIFICANT CHANGE UP (ref 3.5–5.2)
ALP SERPL-CCNC: 102 U/L — SIGNIFICANT CHANGE UP (ref 30–115)
ALT FLD-CCNC: 28 U/L — SIGNIFICANT CHANGE UP (ref 0–41)
ANION GAP SERPL CALC-SCNC: 16 MMOL/L — HIGH (ref 7–14)
AST SERPL-CCNC: 19 U/L — SIGNIFICANT CHANGE UP (ref 0–41)
BILIRUB SERPL-MCNC: 0.7 MG/DL — SIGNIFICANT CHANGE UP (ref 0.2–1.2)
BUN SERPL-MCNC: 17 MG/DL — SIGNIFICANT CHANGE UP (ref 10–20)
CALCIUM SERPL-MCNC: 8.4 MG/DL — LOW (ref 8.5–10.1)
CHLORIDE SERPL-SCNC: 100 MMOL/L — SIGNIFICANT CHANGE UP (ref 98–110)
CO2 SERPL-SCNC: 20 MMOL/L — SIGNIFICANT CHANGE UP (ref 17–32)
CREAT SERPL-MCNC: 0.7 MG/DL — SIGNIFICANT CHANGE UP (ref 0.7–1.5)
EGFR: 114 ML/MIN/1.73M2 — SIGNIFICANT CHANGE UP
GLUCOSE BLDC GLUCOMTR-MCNC: 142 MG/DL — HIGH (ref 70–99)
GLUCOSE BLDC GLUCOMTR-MCNC: 155 MG/DL — HIGH (ref 70–99)
GLUCOSE BLDC GLUCOMTR-MCNC: 157 MG/DL — HIGH (ref 70–99)
GLUCOSE BLDC GLUCOMTR-MCNC: 198 MG/DL — HIGH (ref 70–99)
GLUCOSE SERPL-MCNC: 178 MG/DL — HIGH (ref 70–99)
HCT VFR BLD CALC: 44.1 % — SIGNIFICANT CHANGE UP (ref 42–52)
HGB BLD-MCNC: 14.9 G/DL — SIGNIFICANT CHANGE UP (ref 14–18)
MCHC RBC-ENTMCNC: 32.7 PG — HIGH (ref 27–31)
MCHC RBC-ENTMCNC: 33.8 G/DL — SIGNIFICANT CHANGE UP (ref 32–37)
MCV RBC AUTO: 96.7 FL — HIGH (ref 80–94)
NRBC # BLD: 0 /100 WBCS — SIGNIFICANT CHANGE UP (ref 0–0)
PLATELET # BLD AUTO: 250 K/UL — SIGNIFICANT CHANGE UP (ref 130–400)
POTASSIUM SERPL-MCNC: 3.8 MMOL/L — SIGNIFICANT CHANGE UP (ref 3.5–5)
POTASSIUM SERPL-SCNC: 3.8 MMOL/L — SIGNIFICANT CHANGE UP (ref 3.5–5)
PROT SERPL-MCNC: 6.5 G/DL — SIGNIFICANT CHANGE UP (ref 6–8)
RBC # BLD: 4.56 M/UL — LOW (ref 4.7–6.1)
RBC # FLD: 11.8 % — SIGNIFICANT CHANGE UP (ref 11.5–14.5)
SODIUM SERPL-SCNC: 136 MMOL/L — SIGNIFICANT CHANGE UP (ref 135–146)
WBC # BLD: 7.12 K/UL — SIGNIFICANT CHANGE UP (ref 4.8–10.8)
WBC # FLD AUTO: 7.12 K/UL — SIGNIFICANT CHANGE UP (ref 4.8–10.8)

## 2022-05-24 PROCEDURE — 99232 SBSQ HOSP IP/OBS MODERATE 35: CPT

## 2022-05-24 RX ADMIN — Medication 1 APPLICATION(S): at 17:43

## 2022-05-24 RX ADMIN — Medication 1 TABLET(S): at 05:10

## 2022-05-24 RX ADMIN — Medication 1 APPLICATION(S): at 11:14

## 2022-05-24 RX ADMIN — METFORMIN HYDROCHLORIDE 1000 MILLIGRAM(S): 850 TABLET ORAL at 17:57

## 2022-05-24 RX ADMIN — Medication 1: at 11:13

## 2022-05-24 RX ADMIN — Medication 10 MILLILITER(S): at 05:10

## 2022-05-24 RX ADMIN — REMDESIVIR 500 MILLIGRAM(S): 5 INJECTION INTRAVENOUS at 11:13

## 2022-05-24 RX ADMIN — Medication 1 APPLICATION(S): at 05:09

## 2022-05-24 RX ADMIN — METFORMIN HYDROCHLORIDE 1000 MILLIGRAM(S): 850 TABLET ORAL at 05:10

## 2022-05-24 RX ADMIN — Medication 1 TABLET(S): at 17:57

## 2022-05-24 RX ADMIN — Medication 1 APPLICATION(S): at 05:10

## 2022-05-24 RX ADMIN — FAMOTIDINE 20 MILLIGRAM(S): 10 INJECTION INTRAVENOUS at 11:13

## 2022-05-24 RX ADMIN — Medication 1: at 17:57

## 2022-05-24 RX ADMIN — Medication 3 MILLIGRAM(S): at 21:52

## 2022-05-24 NOTE — PROGRESS NOTE ADULT - SUBJECTIVE AND OBJECTIVE BOX
Progress Note:  Provider Speciality                            Hospitalist      CARIN WATSON MRN-[id number] 46y Male     CHIEF PRESENTING COMPLAINT:  Patient is a 46y old  Male who presents with a chief complaint of DFU (06 Apr 2022 12:18)        SUBJECTIVE:  Patient was seen and examined at bedside.  No new complaints described by patient in morning rounds.   HISTORY OF PRESENTING ILLNESS:  HPI:  45 yo M with PMHx of Intellectual Disability, DM not on any medications presents with foot wounds. Pt has very poor foot hygiene and per the sister, has been persistently scratching an open wound on his L second toe, which worsened and became more red, had drainage, malodor. He saw a physician at [facility] and was told to come to the ED for evaluation. Pt is poor historian. Does endorse abdominal pain for a few days, cannot give much more description. Per the sister, pt did not seem to have any recent fevers, however is also not a very good historian and does not seem to have good health literacy. States that her and her mom decided to stop giving pt Metformin a while ago, are treating his diabetes by not giving him any sugary foods.  In the ED, T 97.8, /87, , RR 16, SpO2 99% on RA. Lab work unrevealing.  (31 Mar 2022 22:36)        REVIEW OF SYSTEMS:  Patient denies any headache, any vision complaints, runny nose. Denies chest pain, shortness of breath, palpitation. Denies nausea, vomiting, abdominal pain or diarrhoea, Denies dysuria. Denies  localized weakness in any part of the body or numbness.   At least 10 systems were reviewed in ROS. All systems reviewed  are within normal limits except for the complaints as described in Subjective.    PAST MEDICAL & SURGICAL HISTORY:  PAST MEDICAL & SURGICAL HISTORY:  DM (diabetes mellitus)    Intellectual disability            VITAL SIGNS:  Vital Signs Last 24 Hrs  T(C): 36.6 (24 May 2022 00:00), Max: 36.6 (24 May 2022 00:00)  T(F): 97.9 (24 May 2022 00:00), Max: 97.9 (24 May 2022 00:00)  HR: 87 (24 May 2022 00:00) (87 - 94)  BP: 122/75 (24 May 2022 00:00) (122/75 - 143/72)  BP(mean): --  RR: 18 (24 May 2022 00:00) (18 - 18)  SpO2: --  PHYSICAL EXAMINATION:  Not in acute distress  General: No icterus  HEENT:   no JVD.  Heart: S1+S2 audible  Lungs: bilateral  moderate air entry, no wheezing, no crepitations.  Abdomen: Soft, non-tender, non-distended , no  rigidity or guarding.  CNS: Awake alert, CN  grossly intact.  Extremities:  No edema            CONSULTS:  Consultant(s) Notes Reviewed by me.   Care Discussed with Consultants/Other Providers where required.        MEDICATIONS:  MEDICATIONS  (STANDING):  ammonium lactate 12% Lotion 1 Application(s) Topical two times a day  clotrimazole 1% Cream 1 Application(s) Topical two times a day  enoxaparin Injectable 40 milliGRAM(s) SubCutaneous every 24 hours  metFORMIN 1000 milliGRAM(s) Oral two times a day  pantoprazole    Tablet 40 milliGRAM(s) Oral before breakfast  vitamin A &amp; D Ointment 1 Application(s) Topical daily    MEDICATIONS  (PRN):  ondansetron Injectable 4 milliGRAM(s) IV Push every 6 hours PRN Nausea and/or Vomiting            ASSESSMENT:    45 yo M with PMH of Intellectual Disability and DM not on any medications presented with foot wounds.    Covid  PNA  Onychomycosis  DM2  Intellectual disability/autism        Covid positive with pyrexia-  CXr show no infiltrates  Started RDV to prevent progression x 5 days  No need for Dexa. never had hypoxia  Tylenol for pyrexia  Nail bed wound and onychomycosis  Podiatry- no surgical intervention is advised. outpt podiatry f/u  DM type 2 - uncontrolled. A1C 8.9. Continue  metformin 1000mg BID  Handoff:  Pending legal issue and guardianship, next  meeting due 06/23, currently Covid positive with intermittent pyrexia

## 2022-05-24 NOTE — PROGRESS NOTE ADULT - ASSESSMENT
47 yo M with PMHx of Intellectual Disability, DM not on any medications presents with foot wounds. Pt has very poor foot hygiene and per the sister, has been persistently scratching an open wound on his L second toe, which worsened and became more red, had drainage, malodor. He saw a physician at [facility] and was told to come to the ED for eval.    #Covid positive  - Patient tested positive for Covid 5/21, occasion fevers  - WBC unremarkable  - c/w Remdesivir     #Onychomycosis   - antifungal, s/p ABX po (likely due to severe infection)  - seen by ID and podiatry eval  - Completed clindamycin  - PO terbinafine not suggested by ID or podiatry  - Per podiatry, no surgical intervention is advised, clotrimazole cream adequate     #uncontrolled diabetes mellitus   - hba1c is 8.9- patient does not take any medication at home  - started on metformin- dose increased to 1000 mg BID.    # Autism   - patient is high functioning but unable to take care of basics.    #Insomnia  - Melatonin 5 mg PO qhs  - counseled on sleep hygiene     #Misc   - DVT prophylaxis: not indicated, pt is ambulating   - GI prophylaxis: PPI   - Diet: CC  - Activity: IAT   - Disposition: Guardianship hearing pending for June

## 2022-05-24 NOTE — PROGRESS NOTE ADULT - SUBJECTIVE AND OBJECTIVE BOX
HPI:  47 yo M with PMHx of Intellectual Disability, DM not on any medications presents with foot wounds. Pt has very poor foot hygiene and per the sister, has been persistently scratching an open wound on his L second toe, which worsened and became more red, had drainage, malodor. He saw a physician at [facility] and was told to come to the ED for evaluation. Pt is poor historian. Does endorse abdominal pain for a few days, cannot give much more description. Per the sister, pt did not seem to have any recent fevers, however is also not a very good historian and does not seem to have good health literacy. States that her and her mom decided to stop giving pt Metformin a while ago, are treating his diabetes by not giving him any sugary foods.  In the ED, T 97.8, /87, , RR 16, SpO2 99% on RA. Lab work unrevealing.  (31 Mar 2022 22:36)    Currently admitted to medicine with the primary diagnosis of Onychomycosis       Today is hospital day 54d.     INTERVAL HPI / OVERNIGHT EVENTS:  Patient was examined and seen at bedside. This morning he is resting comfortably in bed and reports no new issues or overnight events.     Patient has Guardianship hearing in June    ROS: Otherwise unremarkable     PAST MEDICAL & SURGICAL HISTORY  DM (diabetes mellitus)    Intellectual disability      ALLERGIES  penicillin (Unknown)    MEDICATIONS  MEDICATIONS  (STANDING):  ammonium lactate 12% Lotion 1 Application(s) Topical two times a day  clotrimazole 1% Cream 1 Application(s) Topical two times a day  famotidine    Tablet 20 milliGRAM(s) Oral daily  glucagon  Injectable 1 milliGRAM(s) IntraMuscular once  insulin lispro (ADMELOG) corrective regimen sliding scale   SubCutaneous three times a day before meals  lactobacillus acidophilus 1 Tablet(s) Oral two times a day  melatonin 3 milliGRAM(s) Oral at bedtime  metFORMIN 1000 milliGRAM(s) Oral two times a day  remdesivir  IVPB 100 milliGRAM(s) IV Intermittent every 24 hours  remdesivir  IVPB   IV Intermittent   sodium chloride 0.9%. 1000 milliLiter(s) (75 mL/Hr) IV Continuous <Continuous>  vitamin A &amp; D Ointment 1 Application(s) Topical daily    MEDICATIONS  (PRN):  acetaminophen     Tablet .. 650 milliGRAM(s) Oral every 6 hours PRN Temp greater or equal to 38C (100.4F), Mild Pain (1 - 3)  guaifenesin/dextromethorphan Oral Liquid 10 milliLiter(s) Oral every 4 hours PRN s    Vital Signs Last 24 Hrs  T(C): 36.6 (24 May 2022 00:00), Max: 36.6 (24 May 2022 00:00)  T(F): 97.9 (24 May 2022 00:00), Max: 97.9 (24 May 2022 00:00)  HR: 87 (24 May 2022 00:00) (87 - 94)  BP: 122/75 (24 May 2022 00:00) (122/75 - 143/72)  BP(mean): --  RR: 18 (24 May 2022 00:00) (18 - 18)  SpO2: --      PHYSICAL EXAM  GEN: NAD, Resting comfortably in bed  PULM: Clear to auscultation bilaterally, No wheezes  CVS: Regular rate and rhythm, S1-S2, no murmurs  ABD: Soft, non-tender, non-distended, no guarding  EXT: No edema, onychomycosis   NEURO: A&Ox3, no focal deficits      LABS:  cret                        14.8   7.30  )-----------( 228      ( 22 May 2022 07:09 )             43.5     05-22    x   |  x   |  x   ----------------------------<  x   x    |  x   |  0.9    Ca    8.5      22 May 2022 07:09  Mg     1.7     05-22    TPro  6.7  /  Alb  4.0  /  TBili  0.4  /  DBili  <0.2  /  AST  28  /  ALT  41  /  AlkPhos  118<H>  05-22    PT/INR - ( 22 May 2022 11:47 )   PT: 12.60 sec;   INR: 1.10 ratio

## 2022-05-25 LAB
GLUCOSE BLDC GLUCOMTR-MCNC: 131 MG/DL — HIGH (ref 70–99)
GLUCOSE BLDC GLUCOMTR-MCNC: 165 MG/DL — HIGH (ref 70–99)
GLUCOSE BLDC GLUCOMTR-MCNC: 169 MG/DL — HIGH (ref 70–99)
GLUCOSE BLDC GLUCOMTR-MCNC: 180 MG/DL — HIGH (ref 70–99)

## 2022-05-25 PROCEDURE — 99231 SBSQ HOSP IP/OBS SF/LOW 25: CPT

## 2022-05-25 RX ADMIN — METFORMIN HYDROCHLORIDE 1000 MILLIGRAM(S): 850 TABLET ORAL at 05:32

## 2022-05-25 RX ADMIN — Medication 1 APPLICATION(S): at 17:39

## 2022-05-25 RX ADMIN — Medication 1 TABLET(S): at 17:40

## 2022-05-25 RX ADMIN — FAMOTIDINE 20 MILLIGRAM(S): 10 INJECTION INTRAVENOUS at 13:47

## 2022-05-25 RX ADMIN — Medication 1 TABLET(S): at 05:32

## 2022-05-25 RX ADMIN — Medication 1 APPLICATION(S): at 13:47

## 2022-05-25 RX ADMIN — Medication 1: at 11:48

## 2022-05-25 RX ADMIN — Medication 1: at 17:37

## 2022-05-25 RX ADMIN — METFORMIN HYDROCHLORIDE 1000 MILLIGRAM(S): 850 TABLET ORAL at 17:40

## 2022-05-25 RX ADMIN — Medication 3 MILLIGRAM(S): at 22:38

## 2022-05-25 RX ADMIN — REMDESIVIR 500 MILLIGRAM(S): 5 INJECTION INTRAVENOUS at 13:47

## 2022-05-25 NOTE — PROGRESS NOTE ADULT - SUBJECTIVE AND OBJECTIVE BOX
CARIN WATSON 46y Male  MRN#: [id number]     Hospital Day: 55d    Pt is currently admitted with the primary diagnosis of  DM FOOT ULCERS; HYPERGLYCEMIA        SUBJECTIVE     Overnight events  None    Subjective complaints  Pt was evaluated this am. Patient is intellectually disabled and unable to answer appropriately                                            ----------------------------------------------------------  OBJECTIVE  PAST MEDICAL & SURGICAL HISTORY  DM (diabetes mellitus)    Intellectual disability                                              -----------------------------------------------------------  ALLERGIES:  penicillin (Unknown)                                            ------------------------------------------------------------    HOME MEDICATIONS  Home Medications:  vitamin A and D topical ointment: 1 application topically once a day (12 Apr 2022 11:40)                           MEDICATIONS:  STANDING MEDICATIONS  ammonium lactate 12% Lotion 1 Application(s) Topical two times a day  clotrimazole 1% Cream 1 Application(s) Topical two times a day  famotidine    Tablet 20 milliGRAM(s) Oral daily  glucagon  Injectable 1 milliGRAM(s) IntraMuscular once  insulin lispro (ADMELOG) corrective regimen sliding scale   SubCutaneous three times a day before meals  lactobacillus acidophilus 1 Tablet(s) Oral two times a day  melatonin 3 milliGRAM(s) Oral at bedtime  metFORMIN 1000 milliGRAM(s) Oral two times a day  remdesivir  IVPB   IV Intermittent   remdesivir  IVPB 100 milliGRAM(s) IV Intermittent every 24 hours  sodium chloride 0.9%. 1000 milliLiter(s) IV Continuous <Continuous>  vitamin A &amp; D Ointment 1 Application(s) Topical daily    PRN MEDICATIONS  acetaminophen     Tablet .. 650 milliGRAM(s) Oral every 6 hours PRN  guaifenesin/dextromethorphan Oral Liquid 10 milliLiter(s) Oral every 4 hours PRN  metoclopramide Injectable 10 milliGRAM(s) IV Push every 8 hours PRN                                            ------------------------------------------------------------  VITAL SIGNS: Last 24 Hours  T(C): 35.9 (25 May 2022 08:10), Max: 36 (24 May 2022 16:00)  T(F): 96.6 (25 May 2022 08:10), Max: 96.8 (24 May 2022 16:00)  HR: 75 (25 May 2022 08:10) (75 - 95)  BP: 120/64 (25 May 2022 08:10) (104/56 - 126/80)  BP(mean): --  RR: 18 (25 May 2022 08:10) (18 - 18)  SpO2: --      05-24-22 @ 07:01  -  05-25-22 @ 07:00  --------------------------------------------------------  IN: 450 mL / OUT: 0 mL / NET: 450 mL                                             --------------------------------------------------------------  LABS:                        14.9   7.12  )-----------( 250      ( 24 May 2022 06:00 )             44.1     05-24    136  |  100  |  17  ----------------------------<  178<H>  3.8   |  20  |  0.7    Ca    8.4<L>      24 May 2022 06:00    TPro  6.5  /  Alb  3.9  /  TBili  0.7  /  DBili  x   /  AST  19  /  ALT  28  /  AlkPhos  102  05-24                                                              -------------------------------------------------------------  RADIOLOGY:  < from: Xray Chest 1 View-PORTABLE IMMEDIATE (Xray Chest 1 View-PORTABLE IMMEDIATE .) (05.21.22 @ 11:07) >  No radiographic evidence of acute cardiopulmonary disease.    < end of copied text >                                              --------------------------------------------------------------    PHYSICAL EXAM:  GENERAL: NAD, lying in bed comfortably  HEAD:  Atraumatic, Normocephalic  EYES: EOMI, conjunctiva and sclera clear  ENT: Moist mucous membranes  NECK: Supple, No JVD  CHEST/LUNG: Clear to auscultation bilaterally; No rales, rhonchi, wheezing, or rubs. Unlabored respirations  HEART: regular rate and rhythm; No murmurs, rubs, or gallops  ABDOMEN: Bowel sounds present; Soft, Nontender, Nondistended.    EXTREMITIES: Onchomycosisl left second toe  Neuro: alert & Oriented X3. No focal deficits   SKIN: No rashes or lesions                                           --------------------------------------------------------------

## 2022-05-25 NOTE — PROGRESS NOTE ADULT - ASSESSMENT
47 yo M with PMHx of Intellectual Disability, DM not on any medications presents with foot wounds. Pt has very poor foot hygiene and per the sister, has been persistently scratching an open wound on his L second toe, which worsened and became more red, had drainage, malodor. He saw a physician at [facility] and was told to come to the ED for eval.    #Covid positive  - Patient tested positive for Covid 5/21, occasion fevers  - WBC unremarkable  - c/w Remdesivir     #Onychomycosis   - antifungal, s/p ABX po (likely due to severe infection)  - seen by ID and podiatry eval  - Completed clindamycin  - PO terbinafine not suggested by ID or podiatry  - Per podiatry, no surgical intervention is advised, clotrimazole cream adequate     #uncontrolled diabetes mellitus   - hba1c is 8.9- patient does not take any medication at home  - started on metformin- dose increased to 1000 mg BID.    # Autism   - patient is high functioning but unable to take care of basics.    #Insomnia  - Melatonin 5 mg PO qhs  - counseled on sleep hygiene     #Misc   - DVT prophylaxis: not indicated, pt is ambulating   - GI prophylaxis: PPI   - Diet: CC  - Activity: IAT   - Disposition: Guardianship hearing pending for June

## 2022-05-26 LAB
GLUCOSE BLDC GLUCOMTR-MCNC: 121 MG/DL — HIGH (ref 70–99)
GLUCOSE BLDC GLUCOMTR-MCNC: 149 MG/DL — HIGH (ref 70–99)
GLUCOSE BLDC GLUCOMTR-MCNC: 151 MG/DL — HIGH (ref 70–99)
GLUCOSE BLDC GLUCOMTR-MCNC: 182 MG/DL — HIGH (ref 70–99)

## 2022-05-26 PROCEDURE — 99231 SBSQ HOSP IP/OBS SF/LOW 25: CPT

## 2022-05-26 RX ADMIN — Medication 1 TABLET(S): at 06:04

## 2022-05-26 RX ADMIN — METFORMIN HYDROCHLORIDE 1000 MILLIGRAM(S): 850 TABLET ORAL at 06:04

## 2022-05-26 RX ADMIN — Medication 3 MILLIGRAM(S): at 21:25

## 2022-05-26 RX ADMIN — Medication 1: at 16:40

## 2022-05-26 RX ADMIN — Medication 1 TABLET(S): at 18:00

## 2022-05-26 RX ADMIN — Medication 1 APPLICATION(S): at 06:04

## 2022-05-26 RX ADMIN — Medication 1 APPLICATION(S): at 17:57

## 2022-05-26 RX ADMIN — Medication 1 APPLICATION(S): at 14:48

## 2022-05-26 RX ADMIN — METFORMIN HYDROCHLORIDE 1000 MILLIGRAM(S): 850 TABLET ORAL at 18:00

## 2022-05-26 RX ADMIN — FAMOTIDINE 20 MILLIGRAM(S): 10 INJECTION INTRAVENOUS at 14:48

## 2022-05-26 RX ADMIN — REMDESIVIR 500 MILLIGRAM(S): 5 INJECTION INTRAVENOUS at 14:48

## 2022-05-26 NOTE — PROGRESS NOTE ADULT - ASSESSMENT
47 yo M with PMHx of Intellectual Disability, DM not on any medications presents with foot wounds. Pt has very poor foot hygiene and per the sister, has been persistently scratching an open wound on his L second toe, which worsened and became more red, had drainage, malodor. He saw a physician at [facility] and was told to come to the ED for eval.    #Covid positive  - Patient tested positive for Covid 5/21, occasion fevers  - WBC unremarkable  - c/w Remdesivir     #Onychomycosis   - antifungal, s/p ABX po (likely due to severe infection)  - seen by ID and podiatry eval  - Completed clindamycin  - PO terbinafine not suggested by ID or podiatry  - Per podiatry, no surgical intervention is advised, clotrimazole cream adequate     #uncontrolled diabetes mellitus   - hba1c is 8.9- patient does not take any medication at home  - started on metformin- dose increased to 1000 mg BID.    # Autism   - patient is high functioning but unable to take care of basics.    #Insomnia  - Melatonin 5 mg PO qhs  - counseled on sleep hygiene     #Misc   - DVT prophylaxis: not indicated, pt is ambulating   - GI prophylaxis: PPI   - Diet: CC  - Activity: IAT   - Disposition: Guardianship hearing pending for June

## 2022-05-26 NOTE — PROGRESS NOTE ADULT - SUBJECTIVE AND OBJECTIVE BOX
CARIN WATSON 46y Male  MRN#: [id number]     Hospital Day: 56d    Pt is currently admitted with the primary diagnosis of  DM FOOT ULCERS; HYPERGLYCEMIA        SUBJECTIVE     Overnight events  None    Subjective complaints  Pt was evaluated this am. Patient denied any active complaints.                                            ----------------------------------------------------------  OBJECTIVE  PAST MEDICAL & SURGICAL HISTORY  DM (diabetes mellitus)    Intellectual disability                                              -----------------------------------------------------------  ALLERGIES:  penicillin (Unknown)                                            ------------------------------------------------------------    HOME MEDICATIONS  Home Medications:  vitamin A and D topical ointment: 1 application topically once a day (12 Apr 2022 11:40)                           MEDICATIONS:  STANDING MEDICATIONS  ammonium lactate 12% Lotion 1 Application(s) Topical two times a day  clotrimazole 1% Cream 1 Application(s) Topical two times a day  famotidine    Tablet 20 milliGRAM(s) Oral daily  glucagon  Injectable 1 milliGRAM(s) IntraMuscular once  insulin lispro (ADMELOG) corrective regimen sliding scale   SubCutaneous three times a day before meals  lactobacillus acidophilus 1 Tablet(s) Oral two times a day  melatonin 3 milliGRAM(s) Oral at bedtime  metFORMIN 1000 milliGRAM(s) Oral two times a day  remdesivir  IVPB 100 milliGRAM(s) IV Intermittent every 24 hours  remdesivir  IVPB   IV Intermittent   sodium chloride 0.9%. 1000 milliLiter(s) IV Continuous <Continuous>  vitamin A &amp; D Ointment 1 Application(s) Topical daily    PRN MEDICATIONS  acetaminophen     Tablet .. 650 milliGRAM(s) Oral every 6 hours PRN  guaifenesin/dextromethorphan Oral Liquid 10 milliLiter(s) Oral every 4 hours PRN  metoclopramide Injectable 10 milliGRAM(s) IV Push every 8 hours PRN                                            ------------------------------------------------------------  VITAL SIGNS: Last 24 Hours  T(C): 35.6 (26 May 2022 08:00), Max: 36.1 (25 May 2022 16:52)  T(F): 96 (26 May 2022 08:00), Max: 97 (25 May 2022 16:52)  HR: 83 (26 May 2022 08:00) (83 - 85)  BP: 129/81 (26 May 2022 08:00) (125/68 - 129/81)  BP(mean): --  RR: 18 (26 May 2022 08:00) (18 - 18)  SpO2: --                                             --------------------------------------------------------------  LABS:                                                                    -------------------------------------------------------------  RADIOLOGY:                                            --------------------------------------------------------------    PHYSICAL EXAM:  GENERAL: NAD, lying in bed comfortably  HEAD:  Atraumatic, Normocephalic  EYES: EOMI, conjunctiva and sclera clear  ENT: Moist mucous membranes  NECK: Supple, No JVD  CHEST/LUNG: Clear to auscultation bilaterally; No rales, rhonchi, wheezing, or rubs. Unlabored respirations  HEART: regular rate and rhythm; No murmurs, rubs, or gallops  ABDOMEN: Bowel sounds present; Soft, Nontender, Nondistended.    EXTREMITIES: Warm. No clubbing, cyanosis, or edema  NERVOUS SYSTEM:  Alert & Oriented X2. No focal deficits   SKIN: No rashes or lesions                                           --------------------------------------------------------------

## 2022-05-27 LAB
GLUCOSE BLDC GLUCOMTR-MCNC: 116 MG/DL — HIGH (ref 70–99)
GLUCOSE BLDC GLUCOMTR-MCNC: 166 MG/DL — HIGH (ref 70–99)
GLUCOSE BLDC GLUCOMTR-MCNC: 176 MG/DL — HIGH (ref 70–99)
GLUCOSE BLDC GLUCOMTR-MCNC: 199 MG/DL — HIGH (ref 70–99)

## 2022-05-27 PROCEDURE — 99231 SBSQ HOSP IP/OBS SF/LOW 25: CPT

## 2022-05-27 RX ADMIN — Medication 1 APPLICATION(S): at 17:23

## 2022-05-27 RX ADMIN — Medication 3 MILLIGRAM(S): at 22:40

## 2022-05-27 RX ADMIN — Medication 1: at 11:45

## 2022-05-27 RX ADMIN — Medication 1 APPLICATION(S): at 11:47

## 2022-05-27 RX ADMIN — FAMOTIDINE 20 MILLIGRAM(S): 10 INJECTION INTRAVENOUS at 11:41

## 2022-05-27 RX ADMIN — Medication 1 APPLICATION(S): at 05:20

## 2022-05-27 RX ADMIN — Medication 1 TABLET(S): at 17:27

## 2022-05-27 RX ADMIN — METFORMIN HYDROCHLORIDE 1000 MILLIGRAM(S): 850 TABLET ORAL at 17:27

## 2022-05-27 RX ADMIN — METFORMIN HYDROCHLORIDE 1000 MILLIGRAM(S): 850 TABLET ORAL at 05:50

## 2022-05-27 RX ADMIN — Medication 1 TABLET(S): at 05:50

## 2022-05-27 RX ADMIN — Medication 1: at 17:30

## 2022-05-27 NOTE — PROGRESS NOTE ADULT - ASSESSMENT
45 yo M with PMHx of Intellectual Disability, DM not on any medications presents with foot wounds. Pt has very poor foot hygiene and per the sister, has been persistently scratching an open wound on his L second toe, which worsened and became more red, had drainage, malodor. He saw a physician at [facility] and was told to come to the ED for eval.    #Covid positive  - Patient tested positive for Covid 5/21, occasion fevers  - WBC unremarkable  - c/w Remdesivir     #Onychomycosis   - antifungal, s/p ABX po (likely due to severe infection)  - seen by ID and podiatry eval  - Completed clindamycin  - PO terbinafine not suggested by ID or podiatry  - Per podiatry, no surgical intervention is advised, clotrimazole cream adequate     #uncontrolled diabetes mellitus   - hba1c is 8.9- patient does not take any medication at home  - started on metformin- dose increased to 1000 mg BID.    # Autism   - patient is high functioning but unable to take care of basics.    #Insomnia  - Melatonin 5 mg PO qhs  - counseled on sleep hygiene     #Misc   - DVT prophylaxis: not indicated, pt is ambulating   - GI prophylaxis: PPI   - Diet: CC  - Activity: IAT   - Disposition: Guardianship hearing pending for June

## 2022-05-27 NOTE — PROGRESS NOTE ADULT - SUBJECTIVE AND OBJECTIVE BOX
CARIN WATSON 46y Male  MRN#: [id number]   CODE STATUS:________    Hospital Day: 57d    Pt is currently admitted with the primary diagnosis of     Overnight events   -No major overnight events                                          ----------------------------------------------------------  OBJECTIVE  PAST MEDICAL & SURGICAL HISTORY  DM (diabetes mellitus)    Intellectual disability                                              -----------------------------------------------------------  ALLERGIES:  penicillin (Unknown)                                            ------------------------------------------------------------    HOME MEDICATIONS  Home Medications:  vitamin A and D topical ointment: 1 application topically once a day (12 Apr 2022 11:40)                           MEDICATIONS:  STANDING MEDICATIONS  ammonium lactate 12% Lotion 1 Application(s) Topical two times a day  clotrimazole 1% Cream 1 Application(s) Topical two times a day  famotidine    Tablet 20 milliGRAM(s) Oral daily  glucagon  Injectable 1 milliGRAM(s) IntraMuscular once  insulin lispro (ADMELOG) corrective regimen sliding scale   SubCutaneous three times a day before meals  lactobacillus acidophilus 1 Tablet(s) Oral two times a day  melatonin 3 milliGRAM(s) Oral at bedtime  metFORMIN 1000 milliGRAM(s) Oral two times a day  sodium chloride 0.9%. 1000 milliLiter(s) IV Continuous <Continuous>  vitamin A &amp; D Ointment 1 Application(s) Topical daily    PRN MEDICATIONS  acetaminophen     Tablet .. 650 milliGRAM(s) Oral every 6 hours PRN  guaifenesin/dextromethorphan Oral Liquid 10 milliLiter(s) Oral every 4 hours PRN  metoclopramide Injectable 10 milliGRAM(s) IV Push every 8 hours PRN                                            ------------------------------------------------------------  VITAL SIGNS: Last 24 Hours  T(C): 36.4 (27 May 2022 00:00), Max: 36.4 (26 May 2022 15:48)  T(F): 97.6 (27 May 2022 00:00), Max: 97.6 (27 May 2022 00:00)  HR: 78 (27 May 2022 00:00) (78 - 84)  BP: 134/73 (27 May 2022 00:00) (119/78 - 134/73)  BP(mean): --  RR: 18 (27 May 2022 00:00) (18 - 18)  SpO2: --      05-26-22 @ 07:01  -  05-27-22 @ 07:00  --------------------------------------------------------  IN: 480 mL / OUT: 0 mL / NET: 480 mL                                             --------------------------------------------------------------  LABS:                                                                      --------------------------------------------------------------    PHYSICAL EXAM:  GENERAL: Awake, alert, oriented to person, place, time, situation. Well-nourished, laying in bed appearing in no acute distress  HEENT: No FNDs, atraumatic, normocephalic  LUNGS: Clear to auscultation bilaterally  HEART: S1/S2. No heaves or thrills  ABD: Soft, non-tender, non-distended.  EXT/NEURO: 5/5 strength in all extremity joints. Sensation and ROM grossly intact.  SKIN: No breaks, erythema, edema                                           --------------------------------------------------------------  47 yo M with PMHx of Intellectual Disability, DM not on any medications presents with foot wounds. Pt has very poor foot hygiene and per the sister, has been persistently scratching an open wound on his L second toe, which worsened and became more red, had drainage, malodor. He saw a physician at [facility] and was told to come to the ED for eval.    #Covid positive  - Patient tested positive for Covid 5/21, occasion fevers  - WBC unremarkable  - c/w Remdesivir     #Onychomycosis   - antifungal, s/p ABX po (likely due to severe infection)  - seen by ID and podiatry eval  - Completed clindamycin  - PO terbinafine not suggested by ID or podiatry  - Per podiatry, no surgical intervention is advised, clotrimazole cream adequate     #uncontrolled diabetes mellitus   - hba1c is 8.9- patient does not take any medication at home  - started on metformin- dose increased to 1000 mg BID.    # Autism   - patient is high functioning but unable to take care of basics.    #Insomnia  - Melatonin 5 mg PO qhs  - counseled on sleep hygiene     #Misc   - DVT prophylaxis: not indicated, pt is ambulating   - GI prophylaxis: PPI   - Diet: CC  - Activity: IAT   - Disposition: Guardianship hearing pending for June   CARIN WATSON 46y Male  MRN#: [id number]   CODE STATUS:________    Hospital Day: 57d    Pt is currently admitted with the primary diagnosis of foot ulcer + COVID 19    Overnight events   -No major overnight events                                          ----------------------------------------------------------  OBJECTIVE  PAST MEDICAL & SURGICAL HISTORY  DM (diabetes mellitus)    Intellectual disability                                              -----------------------------------------------------------  ALLERGIES:  penicillin (Unknown)                                            ------------------------------------------------------------    HOME MEDICATIONS  Home Medications:  vitamin A and D topical ointment: 1 application topically once a day (12 Apr 2022 11:40)                           MEDICATIONS:  STANDING MEDICATIONS  ammonium lactate 12% Lotion 1 Application(s) Topical two times a day  clotrimazole 1% Cream 1 Application(s) Topical two times a day  famotidine    Tablet 20 milliGRAM(s) Oral daily  glucagon  Injectable 1 milliGRAM(s) IntraMuscular once  insulin lispro (ADMELOG) corrective regimen sliding scale   SubCutaneous three times a day before meals  lactobacillus acidophilus 1 Tablet(s) Oral two times a day  melatonin 3 milliGRAM(s) Oral at bedtime  metFORMIN 1000 milliGRAM(s) Oral two times a day  sodium chloride 0.9%. 1000 milliLiter(s) IV Continuous <Continuous>  vitamin A &amp; D Ointment 1 Application(s) Topical daily    PRN MEDICATIONS  acetaminophen     Tablet .. 650 milliGRAM(s) Oral every 6 hours PRN  guaifenesin/dextromethorphan Oral Liquid 10 milliLiter(s) Oral every 4 hours PRN  metoclopramide Injectable 10 milliGRAM(s) IV Push every 8 hours PRN                                            ------------------------------------------------------------  VITAL SIGNS: Last 24 Hours  T(C): 36.4 (27 May 2022 00:00), Max: 36.4 (26 May 2022 15:48)  T(F): 97.6 (27 May 2022 00:00), Max: 97.6 (27 May 2022 00:00)  HR: 78 (27 May 2022 00:00) (78 - 84)  BP: 134/73 (27 May 2022 00:00) (119/78 - 134/73)  BP(mean): --  RR: 18 (27 May 2022 00:00) (18 - 18)  SpO2: --      05-26-22 @ 07:01  -  05-27-22 @ 07:00  --------------------------------------------------------  IN: 480 mL / OUT: 0 mL / NET: 480 mL                                             --------------------------------------------------------------  LABS:                                                                      --------------------------------------------------------------    PHYSICAL EXAM:  GENERAL: Awake, alert, oriented to person, place, time, situation. Well-nourished, laying in bed appearing in no acute distress  HEENT: No FNDs, atraumatic, normocephalic  LUNGS: Clear to auscultation bilaterally  HEART: S1/S2. No heaves or thrills  ABD: Soft, non-tender, non-distended.  EXT/NEURO: 5/5 strength in all extremity joints. Sensation and ROM grossly intact.  SKIN: bilateral onychomycosis of toenals                                           --------------------------------------------------------------

## 2022-05-28 LAB
GLUCOSE BLDC GLUCOMTR-MCNC: 161 MG/DL — HIGH (ref 70–99)
GLUCOSE BLDC GLUCOMTR-MCNC: 207 MG/DL — HIGH (ref 70–99)
GLUCOSE BLDC GLUCOMTR-MCNC: 249 MG/DL — HIGH (ref 70–99)
GLUCOSE BLDC GLUCOMTR-MCNC: 89 MG/DL — SIGNIFICANT CHANGE UP (ref 70–99)

## 2022-05-28 PROCEDURE — 99231 SBSQ HOSP IP/OBS SF/LOW 25: CPT

## 2022-05-28 RX ADMIN — METFORMIN HYDROCHLORIDE 1000 MILLIGRAM(S): 850 TABLET ORAL at 17:39

## 2022-05-28 RX ADMIN — Medication 1 TABLET(S): at 06:08

## 2022-05-28 RX ADMIN — FAMOTIDINE 20 MILLIGRAM(S): 10 INJECTION INTRAVENOUS at 11:18

## 2022-05-28 RX ADMIN — Medication 2: at 11:47

## 2022-05-28 RX ADMIN — Medication 1 APPLICATION(S): at 10:46

## 2022-05-28 RX ADMIN — Medication 1 APPLICATION(S): at 20:00

## 2022-05-28 RX ADMIN — Medication 10 MILLILITER(S): at 21:02

## 2022-05-28 RX ADMIN — Medication 1: at 08:03

## 2022-05-28 RX ADMIN — Medication 1 TABLET(S): at 17:56

## 2022-05-28 RX ADMIN — Medication 2: at 17:39

## 2022-05-28 RX ADMIN — METFORMIN HYDROCHLORIDE 1000 MILLIGRAM(S): 850 TABLET ORAL at 06:08

## 2022-05-28 RX ADMIN — Medication 3 MILLIGRAM(S): at 21:02

## 2022-05-28 NOTE — PROGRESS NOTE ADULT - ASSESSMENT
47 yo M with PMHx of Intellectual Disability, DM not on any medications presents with foot wounds.    #COVID+  Asymptomatic  - Cont RDV to prevent progression  - No indication for steroids    #Onychomycosis w/ possible superimposed bacterial infection  s/p course of clindamycin  Podiatry- no surgical intervention is advised. outpt podiatry f/u    #DM2  - hba1c is 8.9  - Cont metformin 1000 BID    # Autism   - Pending guardianship    #Insomnia  - Melatonin 5 mg PO qhs    Handoff:  Pending legal issue and guardianship, next meeting due 06/23,

## 2022-05-29 LAB
GLUCOSE BLDC GLUCOMTR-MCNC: 124 MG/DL — HIGH (ref 70–99)
GLUCOSE BLDC GLUCOMTR-MCNC: 151 MG/DL — HIGH (ref 70–99)
GLUCOSE BLDC GLUCOMTR-MCNC: 196 MG/DL — HIGH (ref 70–99)
GLUCOSE BLDC GLUCOMTR-MCNC: 265 MG/DL — HIGH (ref 70–99)

## 2022-05-29 PROCEDURE — 99231 SBSQ HOSP IP/OBS SF/LOW 25: CPT

## 2022-05-29 RX ADMIN — Medication 1 APPLICATION(S): at 17:59

## 2022-05-29 RX ADMIN — Medication 1 APPLICATION(S): at 05:33

## 2022-05-29 RX ADMIN — Medication 1 APPLICATION(S): at 12:30

## 2022-05-29 RX ADMIN — FAMOTIDINE 20 MILLIGRAM(S): 10 INJECTION INTRAVENOUS at 11:55

## 2022-05-29 RX ADMIN — METFORMIN HYDROCHLORIDE 1000 MILLIGRAM(S): 850 TABLET ORAL at 05:33

## 2022-05-29 RX ADMIN — Medication 1: at 11:57

## 2022-05-29 RX ADMIN — METFORMIN HYDROCHLORIDE 1000 MILLIGRAM(S): 850 TABLET ORAL at 18:07

## 2022-05-29 RX ADMIN — Medication 1 TABLET(S): at 05:35

## 2022-05-29 RX ADMIN — Medication 3 MILLIGRAM(S): at 21:04

## 2022-05-29 RX ADMIN — Medication 10 MILLILITER(S): at 21:05

## 2022-05-29 RX ADMIN — Medication 3: at 16:40

## 2022-05-29 RX ADMIN — Medication 1 TABLET(S): at 18:07

## 2022-05-29 NOTE — PROGRESS NOTE ADULT - SUBJECTIVE AND OBJECTIVE BOX
SUBJECTIVE:    Patient is a 46y old Male who presents with a chief complaint of DFU (28 May 2022 14:05)    Currently admitted to medicine with the primary diagnosis of Onychomycosis       Today is hospital day 59d. This morning he is resting in bed and reports no new issues or overnight events.     PAST MEDICAL & SURGICAL HISTORY  DM (diabetes mellitus)    Intellectual disability      SOCIAL HISTORY:  Negative for smoking/alcohol/drug use.     ALLERGIES:  penicillin (Unknown)    MEDICATIONS:  STANDING MEDICATIONS  ammonium lactate 12% Lotion 1 Application(s) Topical two times a day  clotrimazole 1% Cream 1 Application(s) Topical two times a day  famotidine    Tablet 20 milliGRAM(s) Oral daily  glucagon  Injectable 1 milliGRAM(s) IntraMuscular once  insulin lispro (ADMELOG) corrective regimen sliding scale   SubCutaneous three times a day before meals  lactobacillus acidophilus 1 Tablet(s) Oral two times a day  melatonin 3 milliGRAM(s) Oral at bedtime  metFORMIN 1000 milliGRAM(s) Oral two times a day  sodium chloride 0.9%. 1000 milliLiter(s) IV Continuous <Continuous>  vitamin A &amp; D Ointment 1 Application(s) Topical daily    PRN MEDICATIONS  acetaminophen     Tablet .. 650 milliGRAM(s) Oral every 6 hours PRN  guaifenesin/dextromethorphan Oral Liquid 10 milliLiter(s) Oral every 4 hours PRN  metoclopramide Injectable 10 milliGRAM(s) IV Push every 8 hours PRN    VITALS:   T(F): 96  HR: 84  BP: 112/59  RR: 18    PHYSICAL EXAM:  GEN: No acute distress  LUNGS: Clear to auscultation bilaterally   HEART: S1/S2 present. RRR.   ABD: Soft, non-tender, non-distended. Bowel sounds present  EXT: NC/NC/NE/2+PP/LOJA  NEURO: AAOX3  SKIN: intact

## 2022-05-30 LAB
GLUCOSE BLDC GLUCOMTR-MCNC: 127 MG/DL — HIGH (ref 70–99)
GLUCOSE BLDC GLUCOMTR-MCNC: 182 MG/DL — HIGH (ref 70–99)
GLUCOSE BLDC GLUCOMTR-MCNC: 261 MG/DL — HIGH (ref 70–99)
GLUCOSE BLDC GLUCOMTR-MCNC: 269 MG/DL — HIGH (ref 70–99)

## 2022-05-30 PROCEDURE — 99231 SBSQ HOSP IP/OBS SF/LOW 25: CPT

## 2022-05-30 RX ADMIN — Medication 3: at 16:52

## 2022-05-30 RX ADMIN — Medication 1 APPLICATION(S): at 05:52

## 2022-05-30 RX ADMIN — Medication 1 TABLET(S): at 05:52

## 2022-05-30 RX ADMIN — Medication 1 APPLICATION(S): at 18:18

## 2022-05-30 RX ADMIN — FAMOTIDINE 20 MILLIGRAM(S): 10 INJECTION INTRAVENOUS at 12:02

## 2022-05-30 RX ADMIN — METFORMIN HYDROCHLORIDE 1000 MILLIGRAM(S): 850 TABLET ORAL at 18:18

## 2022-05-30 RX ADMIN — Medication 3 MILLIGRAM(S): at 22:33

## 2022-05-30 RX ADMIN — Medication 1 APPLICATION(S): at 12:02

## 2022-05-30 RX ADMIN — Medication 1 TABLET(S): at 18:18

## 2022-05-30 RX ADMIN — Medication 3: at 12:48

## 2022-05-30 RX ADMIN — METFORMIN HYDROCHLORIDE 1000 MILLIGRAM(S): 850 TABLET ORAL at 05:52

## 2022-05-30 NOTE — PROGRESS NOTE ADULT - SUBJECTIVE AND OBJECTIVE BOX
SUBJECTIVE:  HPI:  45 yo M with PMHx of Intellectual Disability, DM not on any medications presents with foot wounds. Pt has very poor foot hygiene and per the sister, has been persistently scratching an open wound on his L second toe, which worsened and became more red, had drainage, malodor. He saw a physician at [facility] and was told to come to the ED for evaluation. Pt is poor historian. Does endorse abdominal pain for a few days, cannot give much more description. Per the sister, pt did not seem to have any recent fevers, however is also not a very good historian and does not seem to have good health literacy. States that her and her mom decided to stop giving pt Metformin a while ago, are treating his diabetes by not giving him any sugary foods.  In the ED, T 97.8, /87, , RR 16, SpO2 99% on RA. Lab work unrevealing.  (31 Mar 2022 22:36)      Patient is a 46y old Male who presents with a chief complaint of DFU (29 May 2022 12:30)    Currently admitted to medicine with the primary diagnosis of Onychomycosis       Today is hospital day 60d.     PAST MEDICAL & SURGICAL HISTORY  DM (diabetes mellitus)    Intellectual disability        ALLERGIES:  penicillin (Unknown)    MEDICATIONS:  ACTIVE MEDICATIONS  acetaminophen     Tablet .. 650 milliGRAM(s) Oral every 6 hours PRN  ammonium lactate 12% Lotion 1 Application(s) Topical two times a day  clotrimazole 1% Cream 1 Application(s) Topical two times a day  famotidine    Tablet 20 milliGRAM(s) Oral daily  glucagon  Injectable 1 milliGRAM(s) IntraMuscular once  guaifenesin/dextromethorphan Oral Liquid 10 milliLiter(s) Oral every 4 hours PRN  insulin lispro (ADMELOG) corrective regimen sliding scale   SubCutaneous three times a day before meals  lactobacillus acidophilus 1 Tablet(s) Oral two times a day  melatonin 3 milliGRAM(s) Oral at bedtime  metFORMIN 1000 milliGRAM(s) Oral two times a day  metoclopramide Injectable 10 milliGRAM(s) IV Push every 8 hours PRN  sodium chloride 0.9%. 1000 milliLiter(s) IV Continuous <Continuous>  vitamin A &amp; D Ointment 1 Application(s) Topical daily      VITALS:   T(F): 96.6  HR: 74  BP: 101/55  RR: 18  SpO2: --    LABS:                            PHYSICAL EXAM:  GENERAL: Awake, alert, oriented to person, place, time, situation. Well-nourished, laying in bed appearing in no acute distress  HEENT: No FNDs, atraumatic, normocephalic  LUNGS: Clear to auscultation bilaterally  HEART: S1/S2. No heaves or thrills  ABD: Soft, non-tender, non-distended.  EXT/NEURO: 5/5 strength in all extremity joints. Sensation and ROM grossly intact.  SKIN: bilateral onychomycosis of toenals                                           --------------------------------------------------------------      Assessment and Plan:   · Assessment	  45 yo M with PMHx of Intellectual Disability, DM not on any medications presents with foot wounds. Pt has very poor foot hygiene and per the sister, has been persistently scratching an open wound on his L second toe, which worsened and became more red, had drainage, malodor. He saw a physician at [facility] and was told to come to the ED for eval.    #Covid positive  - Patient tested positive for Covid 5/21, occasion fevers  - WBC unremarkable  - c/w Remdesivir     #Onychomycosis   - antifungal, s/p ABX po (likely due to severe infection)  - seen by ID and podiatry eval  - Completed clindamycin  - PO terbinafine not suggested by ID or podiatry  - Per podiatry, no surgical intervention is advised, clotrimazole cream adequate     #uncontrolled diabetes mellitus   - hba1c is 8.9- patient does not take any medication at home  - started on metformin- dose increased to 1000 mg BID.    # Autism   - patient is high functioning but unable to take care of basics.    #Insomnia  - Melatonin 5 mg PO qhs  - counseled on sleep hygiene     #Misc   - DVT prophylaxis: not indicated, pt is ambulating   - GI prophylaxis: PPI   - Diet: CC  - Activity: IAT   - Disposition: Guardianship hearing pending for June

## 2022-05-31 LAB
GLUCOSE BLDC GLUCOMTR-MCNC: 122 MG/DL — HIGH (ref 70–99)
GLUCOSE BLDC GLUCOMTR-MCNC: 129 MG/DL — HIGH (ref 70–99)
GLUCOSE BLDC GLUCOMTR-MCNC: 151 MG/DL — HIGH (ref 70–99)
GLUCOSE BLDC GLUCOMTR-MCNC: 210 MG/DL — HIGH (ref 70–99)

## 2022-05-31 PROCEDURE — 99231 SBSQ HOSP IP/OBS SF/LOW 25: CPT

## 2022-05-31 RX ADMIN — Medication 1 TABLET(S): at 18:01

## 2022-05-31 RX ADMIN — Medication 650 MILLIGRAM(S): at 13:37

## 2022-05-31 RX ADMIN — Medication 1 APPLICATION(S): at 06:32

## 2022-05-31 RX ADMIN — Medication 2: at 12:13

## 2022-05-31 RX ADMIN — METFORMIN HYDROCHLORIDE 1000 MILLIGRAM(S): 850 TABLET ORAL at 18:01

## 2022-05-31 RX ADMIN — Medication 1: at 17:45

## 2022-05-31 RX ADMIN — FAMOTIDINE 20 MILLIGRAM(S): 10 INJECTION INTRAVENOUS at 12:13

## 2022-05-31 RX ADMIN — Medication 3 MILLIGRAM(S): at 21:41

## 2022-05-31 RX ADMIN — METFORMIN HYDROCHLORIDE 1000 MILLIGRAM(S): 850 TABLET ORAL at 06:22

## 2022-05-31 RX ADMIN — Medication 1 TABLET(S): at 06:22

## 2022-05-31 RX ADMIN — Medication 1 APPLICATION(S): at 12:13

## 2022-05-31 NOTE — PROGRESS NOTE ADULT - ASSESSMENT
45 yo M with PMHx of Intellectual Disability, DM not on any medications presents with foot wounds. Pt has very poor foot hygiene and per the sister, has been persistently scratching an open wound on his L second toe, which worsened and became more red, had drainage, malodor. He saw a physician at [facility] and was told to come to the ED for eval.    #Covid positive  - Patient tested positive for Covid 5/21, occasion fevers  - WBC unremarkable  - S/p Remdesivir    #Onychomycosis   - antifungal, s/p ABX po (likely due to severe infection)  - seen by ID and podiatry eval  - Completed clindamycin  - PO terbinafine not suggested by ID or podiatry  - Per podiatry, no surgical intervention is advised, clotrimazole cream adequate     #uncontrolled diabetes mellitus   - hba1c is 8.9- patient does not take any medication at home  - started on metformin- dose increased to 1000 mg BID.    # Autism   - patient is high functioning but unable to take care of basics.    #Insomnia  - Melatonin 5 mg PO qhs  - counseled on sleep hygiene     #Misc   - DVT prophylaxis: not indicated, pt is ambulating   - GI prophylaxis: PPI   - Diet: CC  - Activity: IAT   - Disposition: Guardianship hearing pending for June

## 2022-05-31 NOTE — PROGRESS NOTE ADULT - SUBJECTIVE AND OBJECTIVE BOX
CARIN WATSON 46y Male  MRN#: [id number]      Pt is currently admitted with the primary diagnosis of      Hospital Day: 61d  CC: Foot wound     HPI:  47 yo M with PMHx of Intellectual Disability, DM not on any medications presents with foot wounds. Pt has very poor foot hygiene and per the sister, has been persistently scratching an open wound on his L second toe, which worsened and became more red, had drainage, malodor. He saw a physician at [facility] and was told to come to the ED for evaluation. Pt is poor historian. Does endorse abdominal pain for a few days, cannot give much more description. Per the sister, pt did not seem to have any recent fevers, however is also not a very good historian and does not seem to have good health literacy. States that her and her mom decided to stop giving pt Metformin a while ago, are treating his diabetes by not giving him any sugary foods.  In the ED, T 97.8, /87, , RR 16, SpO2 99% on RA. Lab work unrevealing.  (31 Mar 2022 22:36)      Overnight events:  HD stable  Afebrile     Subjective complaints:  Complaining of mild headache and some nausea. ROS otherwise negative     Present Today:   - [place]:  No [ x ], Yes [   ] : Indication:                                             ----------------------------------------------------------    PAST MEDICAL & SURGICAL HISTORY  DM (diabetes mellitus)    Intellectual disability                                              -----------------------------------------------------------  ALLERGIES:  penicillin (Unknown)                                            ------------------------------------------------------------    HOME MEDICATIONS  Home Medications:  vitamin A and D topical ointment: 1 application topically once a day (12 Apr 2022 11:40)                           MEDICATIONS:  STANDING MEDICATIONS  ammonium lactate 12% Lotion 1 Application(s) Topical two times a day  clotrimazole 1% Cream 1 Application(s) Topical two times a day  famotidine    Tablet 20 milliGRAM(s) Oral daily  glucagon  Injectable 1 milliGRAM(s) IntraMuscular once  insulin lispro (ADMELOG) corrective regimen sliding scale   SubCutaneous three times a day before meals  lactobacillus acidophilus 1 Tablet(s) Oral two times a day  melatonin 3 milliGRAM(s) Oral at bedtime  metFORMIN 1000 milliGRAM(s) Oral two times a day  sodium chloride 0.9%. 1000 milliLiter(s) IV Continuous <Continuous>  vitamin A &amp; D Ointment 1 Application(s) Topical daily    PRN MEDICATIONS  acetaminophen     Tablet .. 650 milliGRAM(s) Oral every 6 hours PRN  guaifenesin/dextromethorphan Oral Liquid 10 milliLiter(s) Oral every 4 hours PRN  metoclopramide Injectable 10 milliGRAM(s) IV Push every 8 hours PRN                                            ------------------------------------------------------------  VITAL SIGNS: Last 24 Hours  T(C): 36.1 (31 May 2022 07:28), Max: 36.6 (30 May 2022 23:48)  T(F): 96.9 (31 May 2022 07:28), Max: 97.9 (30 May 2022 23:48)  HR: 81 (31 May 2022 07:28) (81 - 96)  BP: 117/68 (31 May 2022 07:28) (113/77 - 117/68)  BP(mean): --  RR: 18 (31 May 2022 07:28) (18 - 18)  SpO2: 97% (31 May 2022 07:28) (97% - 97%)      05-30-22 @ 07:01  -  05-31-22 @ 07:00  --------------------------------------------------------  IN: 800 mL / OUT: 0 mL / NET: 800 mL                                             --------------------------------------------------------------  LABS:                                                                    -------------------------------------------------------------  RADIOLOGY:                                            --------------------------------------------------------------    PHYSICAL EXAM:  General: NAD   HEENT: Normocephalic, nontraumatic.   LUNGS: Clear to auscultation b/l. No wheezes, rales, or rhonchi.  HEART: RRR. No murmurs, rubs, or gallops.  ABDOMEN: Nontender, nondistended. + bowel sounds.  EXT: Nonedematous

## 2022-05-31 NOTE — PROGRESS NOTE ADULT - SUBJECTIVE AND OBJECTIVE BOX
SUBJECTIVE:    Patient is a 46y old Male who presents with a chief complaint of DFU (30 May 2022 08:42)    Currently admitted to medicine with the primary diagnosis of Onychomycosis     Today is hospital day 61d. This morning he is resting in bed and reports no new issues or overnight events.     PAST MEDICAL & SURGICAL HISTORY  DM (diabetes mellitus)    Intellectual disability      SOCIAL HISTORY:  Negative for smoking/alcohol/drug use.     ALLERGIES:  penicillin (Unknown)    MEDICATIONS:  STANDING MEDICATIONS  ammonium lactate 12% Lotion 1 Application(s) Topical two times a day  clotrimazole 1% Cream 1 Application(s) Topical two times a day  famotidine    Tablet 20 milliGRAM(s) Oral daily  glucagon  Injectable 1 milliGRAM(s) IntraMuscular once  insulin lispro (ADMELOG) corrective regimen sliding scale   SubCutaneous three times a day before meals  lactobacillus acidophilus 1 Tablet(s) Oral two times a day  melatonin 3 milliGRAM(s) Oral at bedtime  metFORMIN 1000 milliGRAM(s) Oral two times a day  sodium chloride 0.9%. 1000 milliLiter(s) IV Continuous <Continuous>  vitamin A &amp; D Ointment 1 Application(s) Topical daily    PRN MEDICATIONS  acetaminophen     Tablet .. 650 milliGRAM(s) Oral every 6 hours PRN  guaifenesin/dextromethorphan Oral Liquid 10 milliLiter(s) Oral every 4 hours PRN  metoclopramide Injectable 10 milliGRAM(s) IV Push every 8 hours PRN    VITALS:   T(F): 96.9  HR: 81  BP: 117/68  RR: 18  SpO2: 97%    PHYSICAL EXAM:  GEN: No acute distress  LUNGS: Clear to auscultation bilaterally   HEART: S1/S2 present. RRR.   ABD: Soft, non-tender, non-distended. Bowel sounds present  EXT: NC/NC/NE/2+PP/LOJA  NEURO: AAOX3  SKIN: intact

## 2022-05-31 NOTE — PROGRESS NOTE ADULT - ASSESSMENT
45 yo M with PMHx of Intellectual Disability, DM not on any medications presents with foot wounds.    #COVID+  Asymptomatic  - Cont RDV to prevent progression  - No indication for steroids    #Onychomycosis w/ possible superimposed bacterial infection  s/p course of clindamycin  Podiatry- no surgical intervention is advised. outpt podiatry f/u    #DM2  - hba1c is 8.9  - Cont metformin 1000 BID    # Autism   - Pending guardianship    #Insomnia  - Melatonin 5 mg PO qhs    Handoff:  Pending legal issue and guardianship, next meeting due 06/23 .

## 2022-06-01 LAB
GLUCOSE BLDC GLUCOMTR-MCNC: 133 MG/DL — HIGH (ref 70–99)
GLUCOSE BLDC GLUCOMTR-MCNC: 190 MG/DL — HIGH (ref 70–99)
GLUCOSE BLDC GLUCOMTR-MCNC: 199 MG/DL — HIGH (ref 70–99)
GLUCOSE BLDC GLUCOMTR-MCNC: 227 MG/DL — HIGH (ref 70–99)

## 2022-06-01 PROCEDURE — 99231 SBSQ HOSP IP/OBS SF/LOW 25: CPT

## 2022-06-01 RX ADMIN — Medication 1 APPLICATION(S): at 06:16

## 2022-06-01 RX ADMIN — Medication 3 MILLIGRAM(S): at 21:41

## 2022-06-01 RX ADMIN — Medication 1 APPLICATION(S): at 17:15

## 2022-06-01 RX ADMIN — Medication 1 TABLET(S): at 17:12

## 2022-06-01 RX ADMIN — Medication 1 TABLET(S): at 06:15

## 2022-06-01 RX ADMIN — METFORMIN HYDROCHLORIDE 1000 MILLIGRAM(S): 850 TABLET ORAL at 06:15

## 2022-06-01 RX ADMIN — Medication 1 APPLICATION(S): at 06:15

## 2022-06-01 RX ADMIN — Medication 1 APPLICATION(S): at 17:14

## 2022-06-01 RX ADMIN — METFORMIN HYDROCHLORIDE 1000 MILLIGRAM(S): 850 TABLET ORAL at 17:10

## 2022-06-01 RX ADMIN — Medication 1: at 17:09

## 2022-06-01 NOTE — PROGRESS NOTE ADULT - SUBJECTIVE AND OBJECTIVE BOX
Progress Note:  Provider Speciality                            Hospitalist      CARIN WATSON MRN-[id number] 46y Male     CHIEF PRESENTING COMPLAINT:  Patient is a 46y old  Male who presents with a chief complaint of DFU (06 Apr 2022 12:18)        SUBJECTIVE:  Patient was seen and examined at bedside.  No new complaints described by patient in morning rounds.   HISTORY OF PRESENTING ILLNESS:  HPI:  45 yo M with PMHx of Intellectual Disability, DM not on any medications presents with foot wounds. Pt has very poor foot hygiene and per the sister, has been persistently scratching an open wound on his L second toe, which worsened and became more red, had drainage, malodor. He saw a physician at [facility] and was told to come to the ED for evaluation. Pt is poor historian. Does endorse abdominal pain for a few days, cannot give much more description. Per the sister, pt did not seem to have any recent fevers, however is also not a very good historian and does not seem to have good health literacy. States that her and her mom decided to stop giving pt Metformin a while ago, are treating his diabetes by not giving him any sugary foods.  In the ED, T 97.8, /87, , RR 16, SpO2 99% on RA. Lab work unrevealing.  (31 Mar 2022 22:36)        REVIEW OF SYSTEMS:  Patient denies any headache, any vision complaints, runny nose. Denies chest pain, shortness of breath, palpitation. Denies nausea, vomiting, abdominal pain or diarrhoea, Denies dysuria. Denies  localized weakness in any part of the body or numbness.   At least 10 systems were reviewed in ROS. All systems reviewed  are within normal limits except for the complaints as described in Subjective.    PAST MEDICAL & SURGICAL HISTORY:  PAST MEDICAL & SURGICAL HISTORY:  DM (diabetes mellitus)    Intellectual disability            VITAL SIGNS:  Vital Signs Last 24 Hrs  T(C): 35.9 (01 Jun 2022 08:09), Max: 35.9 (01 Jun 2022 08:09)  T(F): 96.7 (01 Jun 2022 08:09), Max: 96.7 (01 Jun 2022 08:09)  HR: 91 (01 Jun 2022 08:09) (80 - 91)  BP: 109/77 (01 Jun 2022 08:09) (109/77 - 121/59)  BP(mean): --  RR: 18 (01 Jun 2022 08:09) (18 - 18)  SpO2: 100% (01 Jun 2022 08:09) (100% - 100%)        PHYSICAL EXAMINATION:  Not in acute distress  General: No icterus  HEENT:   no JVD.  Heart: S1+S2 audible  Lungs: bilateral  moderate air entry, no wheezing, no crepitations.  Abdomen: Soft, non-tender, non-distended , no  rigidity or guarding.  CNS: Awake alert, CN  grossly intact.  Extremities:  No edema            CONSULTS:  Consultant(s) Notes Reviewed by me.   Care Discussed with Consultants/Other Providers where required.        MEDICATIONS:  MEDICATIONS  (STANDING):  ammonium lactate 12% Lotion 1 Application(s) Topical two times a day  clotrimazole 1% Cream 1 Application(s) Topical two times a day  enoxaparin Injectable 40 milliGRAM(s) SubCutaneous every 24 hours  metFORMIN 1000 milliGRAM(s) Oral two times a day  pantoprazole    Tablet 40 milliGRAM(s) Oral before breakfast  vitamin A &amp; D Ointment 1 Application(s) Topical daily    MEDICATIONS  (PRN):  ondansetron Injectable 4 milliGRAM(s) IV Push every 6 hours PRN Nausea and/or Vomiting            ASSESSMENT:    45 yo M with PMH of Intellectual Disability and DM not on any medications presented with foot wounds.    Covid  PNA  Onychomycosis  DM2  Intellectual disability/autism        Covid PNA- completed RDV  Nail bed wound and onychomycosis  Podiatry- no surgical intervention is advised. outpt podiatry f/u  DM type 2 - uncontrolled. A1C 8.9. Continue  metformin 1000mg BID  Handoff:  Pending legal issue and guardianship, next  meeting due 06/23

## 2022-06-01 NOTE — PROGRESS NOTE ADULT - ASSESSMENT
ASSESSMENT & PLAN    45 yo M with PMHx of Intellectual Disability, DM not on any medications presents with foot wounds. Pt has very poor foot hygiene and per the sister, has been persistently scratching an open wound on his L second toe, which worsened and became more red, had drainage, malodor. He saw a physician at [facility] and was told to come to the ED for eval.    #Covid positive  - Patient tested positive for Covid 5/21, occasion fevers  - WBC unremarkable  - S/p Remdesivir    #Onychomycosis   - antifungal, s/p ABX po (likely due to severe infection)  - seen by ID and podiatry eval  - Completed clindamycin  - PO terbinafine not suggested by ID or podiatry  - Per podiatry, no surgical intervention is advised, clotrimazole cream adequate     #uncontrolled diabetes mellitus   - hba1c is 8.9- patient does not take any medication at home  - started on metformin- dose increased to 1000 mg BID.    # Autism   - patient is high functioning but unable to take care of basics.    #Insomnia  - Melatonin 5 mg PO qhs  - counseled on sleep hygiene     #Misc   - DVT prophylaxis: not indicated, pt is ambulating   - GI prophylaxis: Famotidine  - Diet: CC  - Activity: IAT   - Disposition: Guardianship hearing pending for June

## 2022-06-01 NOTE — PROGRESS NOTE ADULT - SUBJECTIVE AND OBJECTIVE BOX
CARIN WATSON 46y Male  MRN#: [id number]   CODE STATUS: Full    Hospital Day: 62d    Pt is currently admitted with the primary diagnosis of DFU    SUBJECTIVE  Hospital Course  Pt here with DFU, pending family meeting on 6/23 for placement. On clotrimazole cream for onychomycosis.     Overnight events   None    Subjective complaints   None                                            ----------------------------------------------------------  OBJECTIVE  PAST MEDICAL & SURGICAL HISTORY  DM (diabetes mellitus)    Intellectual disability                                              -----------------------------------------------------------  ALLERGIES:  penicillin (Unknown)                                            ------------------------------------------------------------    HOME MEDICATIONS  Home Medications:  vitamin A and D topical ointment: 1 application topically once a day (12 Apr 2022 11:40)                           MEDICATIONS:  STANDING MEDICATIONS  ammonium lactate 12% Lotion 1 Application(s) Topical two times a day  clotrimazole 1% Cream 1 Application(s) Topical two times a day  famotidine    Tablet 20 milliGRAM(s) Oral daily  glucagon  Injectable 1 milliGRAM(s) IntraMuscular once  insulin lispro (ADMELOG) corrective regimen sliding scale   SubCutaneous three times a day before meals  lactobacillus acidophilus 1 Tablet(s) Oral two times a day  melatonin 3 milliGRAM(s) Oral at bedtime  metFORMIN 1000 milliGRAM(s) Oral two times a day  sodium chloride 0.9%. 1000 milliLiter(s) IV Continuous <Continuous>  vitamin A &amp; D Ointment 1 Application(s) Topical daily    PRN MEDICATIONS  acetaminophen     Tablet .. 650 milliGRAM(s) Oral every 6 hours PRN  guaifenesin/dextromethorphan Oral Liquid 10 milliLiter(s) Oral every 4 hours PRN  metoclopramide Injectable 10 milliGRAM(s) IV Push every 8 hours PRN                                            ------------------------------------------------------------  VITAL SIGNS: Last 24 Hours  T(C): 35.9 (01 Jun 2022 08:09), Max: 35.9 (31 May 2022 15:46)  T(F): 96.7 (01 Jun 2022 08:09), Max: 96.7 (31 May 2022 15:46)  HR: 91 (01 Jun 2022 08:09) (80 - 91)  BP: 109/77 (01 Jun 2022 08:09) (109/77 - 121/59)  BP(mean): 85 (31 May 2022 15:46) (85 - 85)  RR: 18 (01 Jun 2022 08:09) (18 - 18)  SpO2: 100% (01 Jun 2022 08:09) (100% - 100%)                                             --------------------------------------------------------------  LABS:                                          -------------------------------------------------------------  RADIOLOGY:                                            --------------------------------------------------------------    PHYSICAL EXAM:  General: NAD   HEENT: Normocephalic, nontraumatic.   LUNGS: Clear to auscultation b/l. No wheezes, rales, or rhonchi.  HEART: RRR. No murmurs, rubs, or gallops.  ABDOMEN: Nontender, nondistended. + bowel sounds.  EXT: Nonedematous                                          --------------------------------------------------------------

## 2022-06-02 LAB
GLUCOSE BLDC GLUCOMTR-MCNC: 123 MG/DL — HIGH (ref 70–99)
GLUCOSE BLDC GLUCOMTR-MCNC: 141 MG/DL — HIGH (ref 70–99)
GLUCOSE BLDC GLUCOMTR-MCNC: 220 MG/DL — HIGH (ref 70–99)
GLUCOSE BLDC GLUCOMTR-MCNC: 265 MG/DL — HIGH (ref 70–99)

## 2022-06-02 PROCEDURE — 99231 SBSQ HOSP IP/OBS SF/LOW 25: CPT

## 2022-06-02 RX ADMIN — METFORMIN HYDROCHLORIDE 1000 MILLIGRAM(S): 850 TABLET ORAL at 06:12

## 2022-06-02 RX ADMIN — FAMOTIDINE 20 MILLIGRAM(S): 10 INJECTION INTRAVENOUS at 12:35

## 2022-06-02 RX ADMIN — Medication 1 APPLICATION(S): at 12:36

## 2022-06-02 RX ADMIN — METFORMIN HYDROCHLORIDE 1000 MILLIGRAM(S): 850 TABLET ORAL at 17:39

## 2022-06-02 RX ADMIN — Medication 1 TABLET(S): at 17:42

## 2022-06-02 RX ADMIN — Medication 1 APPLICATION(S): at 06:12

## 2022-06-02 RX ADMIN — Medication 1 TABLET(S): at 06:12

## 2022-06-02 RX ADMIN — Medication 3 MILLIGRAM(S): at 21:11

## 2022-06-02 RX ADMIN — Medication 1 APPLICATION(S): at 06:13

## 2022-06-02 RX ADMIN — Medication 1 APPLICATION(S): at 17:41

## 2022-06-02 NOTE — PROGRESS NOTE ADULT - SUBJECTIVE AND OBJECTIVE BOX
CARIN WATSON 46y Male  MRN#: [id number]      Pt is currently admitted with the primary diagnosis of      Hospital Day: 63d  CC: Foot wound     HPI:  45 yo M with PMHx of Intellectual Disability, DM not on any medications presents with foot wounds. Pt has very poor foot hygiene and per the sister, has been persistently scratching an open wound on his L second toe, which worsened and became more red, had drainage, malodor. He saw a physician at [facility] and was told to come to the ED for evaluation. Pt is poor historian. Does endorse abdominal pain for a few days, cannot give much more description. Per the sister, pt did not seem to have any recent fevers, however is also not a very good historian and does not seem to have good health literacy. States that her and her mom decided to stop giving pt Metformin a while ago, are treating his diabetes by not giving him any sugary foods.  In the ED, T 97.8, /87, , RR 16, SpO2 99% on RA. Lab work unrevealing.  (31 Mar 2022 22:36)      Overnight events:  HD stable  Afebrile     Subjective complaints:  ROS negative     Present Today:   - [place]:  No [ x ], Yes [   ] : Indication:                                               ----------------------------------------------------------    PAST MEDICAL & SURGICAL HISTORY  DM (diabetes mellitus)    Intellectual disability                                              -----------------------------------------------------------  ALLERGIES:  penicillin (Unknown)                                            ------------------------------------------------------------    HOME MEDICATIONS  Home Medications:  vitamin A and D topical ointment: 1 application topically once a day (12 Apr 2022 11:40)                           MEDICATIONS:  STANDING MEDICATIONS  ammonium lactate 12% Lotion 1 Application(s) Topical two times a day  clotrimazole 1% Cream 1 Application(s) Topical two times a day  famotidine    Tablet 20 milliGRAM(s) Oral daily  glucagon  Injectable 1 milliGRAM(s) IntraMuscular once  insulin lispro (ADMELOG) corrective regimen sliding scale   SubCutaneous three times a day before meals  lactobacillus acidophilus 1 Tablet(s) Oral two times a day  melatonin 3 milliGRAM(s) Oral at bedtime  metFORMIN 1000 milliGRAM(s) Oral two times a day  sodium chloride 0.9%. 1000 milliLiter(s) IV Continuous <Continuous>  vitamin A &amp; D Ointment 1 Application(s) Topical daily    PRN MEDICATIONS  acetaminophen     Tablet .. 650 milliGRAM(s) Oral every 6 hours PRN  guaifenesin/dextromethorphan Oral Liquid 10 milliLiter(s) Oral every 4 hours PRN  metoclopramide Injectable 10 milliGRAM(s) IV Push every 8 hours PRN                                            ------------------------------------------------------------  VITAL SIGNS: Last 24 Hours  T(C): 36.4 (02 Jun 2022 08:01), Max: 36.4 (02 Jun 2022 08:01)  T(F): 97.6 (02 Jun 2022 08:01), Max: 97.6 (02 Jun 2022 08:01)  HR: 92 (02 Jun 2022 08:01) (82 - 95)  BP: 125/60 (02 Jun 2022 08:01) (118/82 - 127/69)  BP(mean): --  RR: 18 (02 Jun 2022 08:01) (18 - 18)  SpO2: 100% (02 Jun 2022 08:01) (100% - 100%)                                             --------------------------------------------------------------  LABS:                                                                    -------------------------------------------------------------  RADIOLOGY:                                            --------------------------------------------------------------    PHYSICAL EXAM:  General: NAD  HEENT: Normocephalic, nontraumatic.   LUNGS: Clear to auscultation b/l. No wheezes, rales, or rhonchi.  HEART: RRR. No murmurs, rubs, or gallops.  ABDOMEN: Nontender, nondistended. + bowel sounds.  EXT: Nonedematous

## 2022-06-02 NOTE — PROGRESS NOTE ADULT - SUBJECTIVE AND OBJECTIVE BOX
Progress Note:  Provider Speciality                            Hospitalist      CARIN WATSON MRN-[id number] 46y Male     CHIEF PRESENTING COMPLAINT:  Patient is a 46y old  Male who presents with a chief complaint of DFU (06 Apr 2022 12:18)        SUBJECTIVE:  Patient was seen and examined at bedside.  No new complaints described by patient in morning rounds.   HISTORY OF PRESENTING ILLNESS:  HPI:  45 yo M with PMHx of Intellectual Disability, DM not on any medications presents with foot wounds. Pt has very poor foot hygiene and per the sister, has been persistently scratching an open wound on his L second toe, which worsened and became more red, had drainage, malodor. He saw a physician at [facility] and was told to come to the ED for evaluation. Pt is poor historian. Does endorse abdominal pain for a few days, cannot give much more description. Per the sister, pt did not seem to have any recent fevers, however is also not a very good historian and does not seem to have good health literacy. States that her and her mom decided to stop giving pt Metformin a while ago, are treating his diabetes by not giving him any sugary foods.  In the ED, T 97.8, /87, , RR 16, SpO2 99% on RA. Lab work unrevealing.  (31 Mar 2022 22:36)        REVIEW OF SYSTEMS:  Patient denies any headache, any vision complaints, runny nose. Denies chest pain, shortness of breath, palpitation. Denies nausea, vomiting, abdominal pain or diarrhoea, Denies dysuria. Denies  localized weakness in any part of the body or numbness.   At least 10 systems were reviewed in ROS. All systems reviewed  are within normal limits except for the complaints as described in Subjective.    PAST MEDICAL & SURGICAL HISTORY:  PAST MEDICAL & SURGICAL HISTORY:  DM (diabetes mellitus)    Intellectual disability            VITAL SIGNS:  Vital Signs Last 24 Hrs  T(C): 36.4 (02 Jun 2022 08:01), Max: 36.4 (02 Jun 2022 08:01)  T(F): 97.6 (02 Jun 2022 08:01), Max: 97.6 (02 Jun 2022 08:01)  HR: 92 (02 Jun 2022 08:01) (82 - 95)  BP: 125/60 (02 Jun 2022 08:01) (118/82 - 127/69)  BP(mean): --  RR: 18 (02 Jun 2022 08:01) (18 - 18)  SpO2: 100% (02 Jun 2022 08:01) (100% - 100%)    PHYSICAL EXAMINATION:  Not in acute distress  General: No icterus  HEENT:   no JVD.  Heart: S1+S2 audible  Lungs: bilateral  moderate air entry, no wheezing, no crepitations.  Abdomen: Soft, non-tender, non-distended , no  rigidity or guarding.  CNS: Awake alert, CN  grossly intact.  Extremities:  No edema            CONSULTS:  Consultant(s) Notes Reviewed by me.   Care Discussed with Consultants/Other Providers where required.        MEDICATIONS:  MEDICATIONS  (STANDING):  ammonium lactate 12% Lotion 1 Application(s) Topical two times a day  clotrimazole 1% Cream 1 Application(s) Topical two times a day  enoxaparin Injectable 40 milliGRAM(s) SubCutaneous every 24 hours  metFORMIN 1000 milliGRAM(s) Oral two times a day  pantoprazole    Tablet 40 milliGRAM(s) Oral before breakfast  vitamin A &amp; D Ointment 1 Application(s) Topical daily    MEDICATIONS  (PRN):  ondansetron Injectable 4 milliGRAM(s) IV Push every 6 hours PRN Nausea and/or Vomiting            ASSESSMENT:    45 yo M with PMH of Intellectual Disability and DM not on any medications presented with foot wounds.    Covid  PNA  Onychomycosis  DM2  Intellectual disability/autism        Covid PNA- completed RDV  Nail bed wound and onychomycosis  Podiatry- no surgical intervention is advised. outpt podiatry f/u  DM type 2 - uncontrolled. A1C 8.9. Continue  metformin 1000mg BID  Handoff:  Pending legal issue and guardianship, next  meeting due 06/23

## 2022-06-02 NOTE — PROGRESS NOTE ADULT - ASSESSMENT
ASSESSMENT & PLAN    47 yo M with PMHx of Intellectual Disability, DM not on any medications presents with foot wounds. Pt has very poor foot hygiene and per the sister, has been persistently scratching an open wound on his L second toe, which worsened and became more red, had drainage, malodor. He saw a physician at [facility] and was told to come to the ED for eval.    #Covid positive  - Patient tested positive for Covid 5/21, occasion fevers  - WBC unremarkable  - S/p Remdesivir    #Onychomycosis   - antifungal, s/p ABX po (likely due to severe infection)  - seen by ID and podiatry eval  - Completed clindamycin  - PO terbinafine not suggested by ID or podiatry  - Per podiatry, no surgical intervention is advised, clotrimazole cream adequate     #uncontrolled diabetes mellitus   - hba1c is 8.9- patient does not take any medication at home  - started on metformin- dose increased to 1000 mg BID.    # Autism   - patient is high functioning but unable to take care of basics.    #Insomnia  - Melatonin 5 mg PO qhs  - counseled on sleep hygiene     #Misc   - DVT prophylaxis: not indicated, pt is ambulating   - GI prophylaxis: Famotidine  - Diet: CC  - Activity: IAT   - Disposition: Guardianship hearing pending for June

## 2022-06-02 NOTE — CHART NOTE - NSCHARTNOTEFT_GEN_A_CORE
PO remains optimum, pt eating % since 5/22-6/2. No GI discomfort noted per EMR.     RD to re-assess in 10 days.

## 2022-06-03 LAB
GLUCOSE BLDC GLUCOMTR-MCNC: 163 MG/DL — HIGH (ref 70–99)
GLUCOSE BLDC GLUCOMTR-MCNC: 210 MG/DL — HIGH (ref 70–99)
GLUCOSE BLDC GLUCOMTR-MCNC: 273 MG/DL — HIGH (ref 70–99)
GLUCOSE BLDC GLUCOMTR-MCNC: 286 MG/DL — HIGH (ref 70–99)

## 2022-06-03 PROCEDURE — 99231 SBSQ HOSP IP/OBS SF/LOW 25: CPT

## 2022-06-03 RX ADMIN — METFORMIN HYDROCHLORIDE 1000 MILLIGRAM(S): 850 TABLET ORAL at 05:54

## 2022-06-03 RX ADMIN — Medication 1 APPLICATION(S): at 13:12

## 2022-06-03 RX ADMIN — Medication 1 APPLICATION(S): at 17:34

## 2022-06-03 RX ADMIN — Medication 1 APPLICATION(S): at 17:26

## 2022-06-03 RX ADMIN — Medication 1 APPLICATION(S): at 05:55

## 2022-06-03 RX ADMIN — Medication 1 TABLET(S): at 05:54

## 2022-06-03 RX ADMIN — Medication 2: at 12:11

## 2022-06-03 RX ADMIN — Medication 3 MILLIGRAM(S): at 21:43

## 2022-06-03 RX ADMIN — METFORMIN HYDROCHLORIDE 1000 MILLIGRAM(S): 850 TABLET ORAL at 17:26

## 2022-06-03 RX ADMIN — Medication 1: at 07:45

## 2022-06-03 RX ADMIN — FAMOTIDINE 20 MILLIGRAM(S): 10 INJECTION INTRAVENOUS at 12:12

## 2022-06-03 RX ADMIN — Medication 3: at 17:00

## 2022-06-03 RX ADMIN — Medication 1 TABLET(S): at 17:32

## 2022-06-03 NOTE — PROGRESS NOTE ADULT - ASSESSMENT
ASSESSMENT & PLAN    45 yo M with PMHx of Intellectual Disability, DM not on any medications presents with foot wounds. Pt has very poor foot hygiene and per the sister, has been persistently scratching an open wound on his L second toe, which worsened and became more red, had drainage, malodor. He saw a physician at [facility] and was told to come to the ED for eval.    #Covid positive  - Patient tested positive for Covid 5/21, occasion fevers  - WBC unremarkable  - S/p Remdesivir    #Onychomycosis   - antifungal, s/p ABX po (likely due to severe infection)  - seen by ID and podiatry eval  - Completed clindamycin  - PO terbinafine not suggested by ID or podiatry  - Per podiatry, no surgical intervention is advised, clotrimazole cream adequate     #uncontrolled diabetes mellitus   - hba1c is 8.9- patient does not take any medication at home  - started on metformin- dose increased to 1000 mg BID.    # Autism   - patient is high functioning but unable to take care of basics.    #Insomnia  - Melatonin 5 mg PO qhs  - counseled on sleep hygiene     #Misc   - DVT prophylaxis: not indicated, pt is ambulating   - GI prophylaxis: Famotidine  - Diet: CC  - Activity: IAT   - Disposition: Guardianship hearing pending for June

## 2022-06-03 NOTE — PROGRESS NOTE ADULT - SUBJECTIVE AND OBJECTIVE BOX
Progress Note:  Provider Speciality                            Hospitalist      CARIN WATSON MRN-[id number] 46y Male     CHIEF PRESENTING COMPLAINT:  Patient is a 46y old  Male who presents with a chief complaint of DFU (06 Apr 2022 12:18)        SUBJECTIVE:  Patient was seen and examined at bedside.  No new complaints described by patient in morning rounds.   HISTORY OF PRESENTING ILLNESS:  HPI:  47 yo M with PMHx of Intellectual Disability, DM not on any medications presents with foot wounds. Pt has very poor foot hygiene and per the sister, has been persistently scratching an open wound on his L second toe, which worsened and became more red, had drainage, malodor. He saw a physician at [facility] and was told to come to the ED for evaluation. Pt is poor historian. Does endorse abdominal pain for a few days, cannot give much more description. Per the sister, pt did not seem to have any recent fevers, however is also not a very good historian and does not seem to have good health literacy. States that her and her mom decided to stop giving pt Metformin a while ago, are treating his diabetes by not giving him any sugary foods.  In the ED, T 97.8, /87, , RR 16, SpO2 99% on RA. Lab work unrevealing.  (31 Mar 2022 22:36)        REVIEW OF SYSTEMS:  Patient denies any headache, any vision complaints, runny nose. Denies chest pain, shortness of breath, palpitation. Denies nausea, vomiting, abdominal pain or diarrhoea, Denies dysuria. Denies  localized weakness in any part of the body or numbness.   At least 10 systems were reviewed in ROS. All systems reviewed  are within normal limits except for the complaints as described in Subjective.    PAST MEDICAL & SURGICAL HISTORY:  PAST MEDICAL & SURGICAL HISTORY:  DM (diabetes mellitus)    Intellectual disability            VITAL SIGNS:  Vital Signs Last 24 Hrs  T(C): 36.8 (03 Jun 2022 08:28), Max: 37.6 (02 Jun 2022 23:53)  T(F): 98.3 (03 Jun 2022 08:28), Max: 99.7 (02 Jun 2022 23:53)  HR: 112 (03 Jun 2022 08:28) (110 - 112)  BP: 130/76 (03 Jun 2022 08:28) (130/76 - 150/87)  BP(mean): --  RR: 18 (03 Jun 2022 08:28) (18 - 18)  SpO2: 100% (03 Jun 2022 08:28) (100% - 100%)        PHYSICAL EXAMINATION:  Not in acute distress  General: No icterus  HEENT:   no JVD.  Heart: S1+S2 audible  Lungs: bilateral  moderate air entry, no wheezing, no crepitations.  Abdomen: Soft, non-tender, non-distended , no  rigidity or guarding.  CNS: Awake alert, CN  grossly intact.  Extremities:  No edema            CONSULTS:  Consultant(s) Notes Reviewed by me.   Care Discussed with Consultants/Other Providers where required.        MEDICATIONS:  MEDICATIONS  (STANDING):  ammonium lactate 12% Lotion 1 Application(s) Topical two times a day  clotrimazole 1% Cream 1 Application(s) Topical two times a day  enoxaparin Injectable 40 milliGRAM(s) SubCutaneous every 24 hours  metFORMIN 1000 milliGRAM(s) Oral two times a day  pantoprazole    Tablet 40 milliGRAM(s) Oral before breakfast  vitamin A &amp; D Ointment 1 Application(s) Topical daily    MEDICATIONS  (PRN):  ondansetron Injectable 4 milliGRAM(s) IV Push every 6 hours PRN Nausea and/or Vomiting            ASSESSMENT:    47 yo M with PMH of Intellectual Disability and DM not on any medications presented with foot wounds.    Covid  PNA  Onychomycosis  DM2  Intellectual disability/autism        Covid PNA- completed RDV  Nail bed wound and onychomycosis  Podiatry- no surgical intervention is advised. outpt podiatry f/u  DM type 2 - uncontrolled. A1C 8.9. Continue  metformin 1000mg BID  Handoff:  Pending legal issue and guardianship, next  meeting due 06/23

## 2022-06-03 NOTE — PROGRESS NOTE ADULT - SUBJECTIVE AND OBJECTIVE BOX
CARIN WATSON 46y Male  MRN#: [id number]      Pt is currently admitted with the primary diagnosis of      Hospital Day: 64d  CC: Foot wound     HPI:  45 yo M with PMHx of Intellectual Disability, DM not on any medications presents with foot wounds. Pt has very poor foot hygiene and per the sister, has been persistently scratching an open wound on his L second toe, which worsened and became more red, had drainage, malodor. He saw a physician at [facility] and was told to come to the ED for evaluation. Pt is poor historian. Does endorse abdominal pain for a few days, cannot give much more description. Per the sister, pt did not seem to have any recent fevers, however is also not a very good historian and does not seem to have good health literacy. States that her and her mom decided to stop giving pt Metformin a while ago, are treating his diabetes by not giving him any sugary foods.  In the ED, T 97.8, /87, , RR 16, SpO2 99% on RA. Lab work unrevealing.  (31 Mar 2022 22:36)      Overnight events:  HD stable  Afebrile     Subjective complaints:  ROS negative     Present Today:   - [place]:  No [ x ], Yes [   ] : Indication:                                               ----------------------------------------------------------    PAST MEDICAL & SURGICAL HISTORY  DM (diabetes mellitus)    Intellectual disability                                              -----------------------------------------------------------  ALLERGIES:  penicillin (Unknown)                                            ------------------------------------------------------------    HOME MEDICATIONS  Home Medications:  vitamin A and D topical ointment: 1 application topically once a day (12 Apr 2022 11:40)                           MEDICATIONS:  STANDING MEDICATIONS  ammonium lactate 12% Lotion 1 Application(s) Topical two times a day  clotrimazole 1% Cream 1 Application(s) Topical two times a day  famotidine    Tablet 20 milliGRAM(s) Oral daily  glucagon  Injectable 1 milliGRAM(s) IntraMuscular once  insulin lispro (ADMELOG) corrective regimen sliding scale   SubCutaneous three times a day before meals  lactobacillus acidophilus 1 Tablet(s) Oral two times a day  melatonin 3 milliGRAM(s) Oral at bedtime  metFORMIN 1000 milliGRAM(s) Oral two times a day  sodium chloride 0.9%. 1000 milliLiter(s) IV Continuous <Continuous>  vitamin A &amp; D Ointment 1 Application(s) Topical daily    PRN MEDICATIONS  acetaminophen     Tablet .. 650 milliGRAM(s) Oral every 6 hours PRN  guaifenesin/dextromethorphan Oral Liquid 10 milliLiter(s) Oral every 4 hours PRN  metoclopramide Injectable 10 milliGRAM(s) IV Push every 8 hours PRN                                            ------------------------------------------------------------  VITAL SIGNS: Last 24 Hours  T(C): 36.8 (03 Jun 2022 08:28), Max: 37.6 (02 Jun 2022 23:53)  T(F): 98.3 (03 Jun 2022 08:28), Max: 99.7 (02 Jun 2022 23:53)  HR: 112 (03 Jun 2022 08:28) (95 - 112)  BP: 130/76 (03 Jun 2022 08:28) (119/58 - 150/87)  BP(mean): --  RR: 18 (03 Jun 2022 08:28) (18 - 18)  SpO2: 100% (03 Jun 2022 08:28) (100% - 100%)                                             --------------------------------------------------------------  LABS:                                                                    -------------------------------------------------------------  RADIOLOGY:                                            --------------------------------------------------------------    PHYSICAL EXAM:  General: well-appearing in NAD.   HEENT: Normocephalic, nontraumatic.   LUNGS: Clear to auscultation b/l. No wheezes, rales, or rhonchi.  HEART: RRR. No murmurs, rubs, or gallops.  ABDOMEN: Nontender, nondistended. + bowel sounds.  EXT: Nonedematous

## 2022-06-04 LAB
ALBUMIN SERPL ELPH-MCNC: 4.2 G/DL — SIGNIFICANT CHANGE UP (ref 3.5–5.2)
ALP SERPL-CCNC: 107 U/L — SIGNIFICANT CHANGE UP (ref 30–115)
ALT FLD-CCNC: 27 U/L — SIGNIFICANT CHANGE UP (ref 0–41)
ANION GAP SERPL CALC-SCNC: 14 MMOL/L — SIGNIFICANT CHANGE UP (ref 7–14)
AST SERPL-CCNC: 19 U/L — SIGNIFICANT CHANGE UP (ref 0–41)
BILIRUB SERPL-MCNC: 0.6 MG/DL — SIGNIFICANT CHANGE UP (ref 0.2–1.2)
BUN SERPL-MCNC: 13 MG/DL — SIGNIFICANT CHANGE UP (ref 10–20)
CALCIUM SERPL-MCNC: 9.6 MG/DL — SIGNIFICANT CHANGE UP (ref 8.5–10.1)
CHLORIDE SERPL-SCNC: 104 MMOL/L — SIGNIFICANT CHANGE UP (ref 98–110)
CO2 SERPL-SCNC: 23 MMOL/L — SIGNIFICANT CHANGE UP (ref 17–32)
CREAT SERPL-MCNC: 0.9 MG/DL — SIGNIFICANT CHANGE UP (ref 0.7–1.5)
EGFR: 106 ML/MIN/1.73M2 — SIGNIFICANT CHANGE UP
GLUCOSE BLDC GLUCOMTR-MCNC: 170 MG/DL — HIGH (ref 70–99)
GLUCOSE BLDC GLUCOMTR-MCNC: 194 MG/DL — HIGH (ref 70–99)
GLUCOSE BLDC GLUCOMTR-MCNC: 208 MG/DL — HIGH (ref 70–99)
GLUCOSE BLDC GLUCOMTR-MCNC: 233 MG/DL — HIGH (ref 70–99)
GLUCOSE SERPL-MCNC: 224 MG/DL — HIGH (ref 70–99)
HCT VFR BLD CALC: 43 % — SIGNIFICANT CHANGE UP (ref 42–52)
HGB BLD-MCNC: 14.6 G/DL — SIGNIFICANT CHANGE UP (ref 14–18)
MAGNESIUM SERPL-MCNC: 1.9 MG/DL — SIGNIFICANT CHANGE UP (ref 1.8–2.4)
MCHC RBC-ENTMCNC: 32.7 PG — HIGH (ref 27–31)
MCHC RBC-ENTMCNC: 34 G/DL — SIGNIFICANT CHANGE UP (ref 32–37)
MCV RBC AUTO: 96.4 FL — HIGH (ref 80–94)
NRBC # BLD: 0 /100 WBCS — SIGNIFICANT CHANGE UP (ref 0–0)
PLATELET # BLD AUTO: 415 K/UL — HIGH (ref 130–400)
POTASSIUM SERPL-MCNC: 4.4 MMOL/L — SIGNIFICANT CHANGE UP (ref 3.5–5)
POTASSIUM SERPL-SCNC: 4.4 MMOL/L — SIGNIFICANT CHANGE UP (ref 3.5–5)
PROT SERPL-MCNC: 7.4 G/DL — SIGNIFICANT CHANGE UP (ref 6–8)
RBC # BLD: 4.46 M/UL — LOW (ref 4.7–6.1)
RBC # FLD: 11.4 % — LOW (ref 11.5–14.5)
SODIUM SERPL-SCNC: 141 MMOL/L — SIGNIFICANT CHANGE UP (ref 135–146)
WBC # BLD: 10.31 K/UL — SIGNIFICANT CHANGE UP (ref 4.8–10.8)
WBC # FLD AUTO: 10.31 K/UL — SIGNIFICANT CHANGE UP (ref 4.8–10.8)

## 2022-06-04 PROCEDURE — 99231 SBSQ HOSP IP/OBS SF/LOW 25: CPT

## 2022-06-04 RX ADMIN — Medication 2: at 11:49

## 2022-06-04 RX ADMIN — Medication 1: at 08:04

## 2022-06-04 RX ADMIN — Medication 1 APPLICATION(S): at 11:51

## 2022-06-04 RX ADMIN — Medication 2: at 17:13

## 2022-06-04 RX ADMIN — Medication 1 APPLICATION(S): at 05:03

## 2022-06-04 RX ADMIN — Medication 3 MILLIGRAM(S): at 22:11

## 2022-06-04 RX ADMIN — Medication 1 TABLET(S): at 17:12

## 2022-06-04 RX ADMIN — METFORMIN HYDROCHLORIDE 1000 MILLIGRAM(S): 850 TABLET ORAL at 05:02

## 2022-06-04 RX ADMIN — METFORMIN HYDROCHLORIDE 1000 MILLIGRAM(S): 850 TABLET ORAL at 17:13

## 2022-06-04 RX ADMIN — Medication 1 TABLET(S): at 05:04

## 2022-06-04 RX ADMIN — FAMOTIDINE 20 MILLIGRAM(S): 10 INJECTION INTRAVENOUS at 11:50

## 2022-06-04 NOTE — PROGRESS NOTE ADULT - SUBJECTIVE AND OBJECTIVE BOX
Progress Note:  Provider Speciality                            Hospitalist      CARIN WATSON MRN-[id number] 46y Male     CHIEF PRESENTING COMPLAINT:  Patient is a 46y old  Male who presents with a chief complaint of DFU (06 Apr 2022 12:18)        SUBJECTIVE:  Patient was seen and examined at bedside.  No new complaints described by patient in morning rounds.   HISTORY OF PRESENTING ILLNESS:  HPI:  47 yo M with PMHx of Intellectual Disability, DM not on any medications presents with foot wounds. Pt has very poor foot hygiene and per the sister, has been persistently scratching an open wound on his L second toe, which worsened and became more red, had drainage, malodor. He saw a physician at [facility] and was told to come to the ED for evaluation. Pt is poor historian. Does endorse abdominal pain for a few days, cannot give much more description. Per the sister, pt did not seem to have any recent fevers, however is also not a very good historian and does not seem to have good health literacy. States that her and her mom decided to stop giving pt Metformin a while ago, are treating his diabetes by not giving him any sugary foods.  In the ED, T 97.8, /87, , RR 16, SpO2 99% on RA. Lab work unrevealing.  (31 Mar 2022 22:36)        REVIEW OF SYSTEMS:  Patient denies any headache, any vision complaints, runny nose. Denies chest pain, shortness of breath, palpitation. Denies nausea, vomiting, abdominal pain or diarrhoea, Denies dysuria. Denies  localized weakness in any part of the body or numbness.   At least 10 systems were reviewed in ROS. All systems reviewed  are within normal limits except for the complaints as described in Subjective.    PAST MEDICAL & SURGICAL HISTORY:  PAST MEDICAL & SURGICAL HISTORY:  DM (diabetes mellitus)    Intellectual disability            VITAL SIGNS:  Vital Signs Last 24 Hrs  T(C): 36.2 (04 Jun 2022 08:26), Max: 36.2 (04 Jun 2022 08:26)  T(F): 97.1 (04 Jun 2022 08:26), Max: 97.1 (04 Jun 2022 08:26)  HR: 97 (04 Jun 2022 08:26) (97 - 107)  BP: 128/69 (04 Jun 2022 08:26) (125/63 - 137/63)  BP(mean): --  RR: 18 (04 Jun 2022 08:26) (18 - 18)  SpO2: --      PHYSICAL EXAMINATION:  Not in acute distress  General: No icterus  HEENT:   no JVD.  Heart: S1+S2 audible  Lungs: bilateral  moderate air entry, no wheezing, no crepitations.  Abdomen: Soft, non-tender, non-distended , no  rigidity or guarding.  CNS: Awake alert, CN  grossly intact.  Extremities:  No edema            CONSULTS:  Consultant(s) Notes Reviewed by me.   Care Discussed with Consultants/Other Providers where required.        MEDICATIONS:  MEDICATIONS  (STANDING):  ammonium lactate 12% Lotion 1 Application(s) Topical two times a day  clotrimazole 1% Cream 1 Application(s) Topical two times a day  enoxaparin Injectable 40 milliGRAM(s) SubCutaneous every 24 hours  metFORMIN 1000 milliGRAM(s) Oral two times a day  pantoprazole    Tablet 40 milliGRAM(s) Oral before breakfast  vitamin A &amp; D Ointment 1 Application(s) Topical daily    MEDICATIONS  (PRN):  ondansetron Injectable 4 milliGRAM(s) IV Push every 6 hours PRN Nausea and/or Vomiting            ASSESSMENT:    47 yo M with PMH of Intellectual Disability and DM not on any medications presented with foot wounds.    Covid  PNA  Onychomycosis  DM2  Intellectual disability/autism        Covid PNA- completed RDV  Nail bed wound and onychomycosis  Podiatry- no surgical intervention is advised. outpt podiatry f/u  DM type 2 - uncontrolled. A1C 8.9. Continue  metformin 1000mg BID  Handoff:  Pending legal issue and guardianship, next  meeting due 06/23

## 2022-06-04 NOTE — PROGRESS NOTE ADULT - SUBJECTIVE AND OBJECTIVE BOX
CARIN WATSON 46y Male  MRN#: [id number]   Hospital Day: 65d    SUBJECTIVE  Patient is a 46y old Male who presents with a chief complaint of DFU (03 Jun 2022 15:32)  Currently admitted to medicine with the primary diagnosis of Onychomycosis      INTERVAL HPI AND OVERNIGHT EVENTS:  Patient was examined and seen at bedside. This morning he is resting comfortably in bed and reports no issues or overnight events.    OBJECTIVE  PAST MEDICAL & SURGICAL HISTORY  DM (diabetes mellitus)    Intellectual disability      ALLERGIES:  penicillin (Unknown)    MEDICATIONS:  STANDING MEDICATIONS  ammonium lactate 12% Lotion 1 Application(s) Topical two times a day  clotrimazole 1% Cream 1 Application(s) Topical two times a day  famotidine    Tablet 20 milliGRAM(s) Oral daily  glucagon  Injectable 1 milliGRAM(s) IntraMuscular once  insulin lispro (ADMELOG) corrective regimen sliding scale   SubCutaneous three times a day before meals  lactobacillus acidophilus 1 Tablet(s) Oral two times a day  melatonin 3 milliGRAM(s) Oral at bedtime  metFORMIN 1000 milliGRAM(s) Oral two times a day  sodium chloride 0.9%. 1000 milliLiter(s) IV Continuous <Continuous>  vitamin A &amp; D Ointment 1 Application(s) Topical daily    PRN MEDICATIONS  acetaminophen     Tablet .. 650 milliGRAM(s) Oral every 6 hours PRN  guaifenesin/dextromethorphan Oral Liquid 10 milliLiter(s) Oral every 4 hours PRN  metoclopramide Injectable 10 milliGRAM(s) IV Push every 8 hours PRN      VITAL SIGNS: Last 24 Hours  T(C): 36.1 (03 Jun 2022 16:04), Max: 36.8 (03 Jun 2022 08:28)  T(F): 96.9 (03 Jun 2022 16:04), Max: 98.3 (03 Jun 2022 08:28)  HR: 107 (03 Jun 2022 23:52) (107 - 112)  BP: 125/63 (03 Jun 2022 23:52) (125/63 - 137/63)  BP(mean): --  RR: 18 (03 Jun 2022 23:52) (18 - 18)  SpO2: 100% (03 Jun 2022 08:28) (100% - 100%)    LABS:    RADIOLOGY:    PHYSICAL EXAM:  General: well-appearing in NAD.   HEENT: Normocephalic, nontraumatic.   LUNGS: Clear to auscultation b/l. No wheezes, rales, or rhonchi.  HEART: RRR. No murmurs, rubs, or gallops.  ABDOMEN: Nontender, nondistended. + bowel sounds.  EXT: Nonedematous

## 2022-06-04 NOTE — PROGRESS NOTE ADULT - ASSESSMENT
47 yo M with PMHx of Intellectual Disability, DM not on any medications presents with foot wounds. Pt has very poor foot hygiene and per the sister, has been persistently scratching an open wound on his L second toe, which worsened and became more red, had drainage, malodor. He saw a physician at [facility] and was told to come to the ED for eval.    #Covid positive  - Patient tested positive for Covid 5/21, occasion fevers  - WBC unremarkable  - S/p Remdesivir    #Onychomycosis   - antifungal, s/p ABX po (likely due to severe infection)  - seen by ID and podiatry eval  - Completed clindamycin  - PO terbinafine not suggested by ID or podiatry  - Per podiatry, no surgical intervention is advised, clotrimazole cream adequate     #uncontrolled diabetes mellitus   - hba1c is 8.9- patient does not take any medication at home  - started on metformin- dose increased to 1000 mg BID.    # Autism   - patient is high functioning but unable to take care of basics.    #Insomnia  - Melatonin 5 mg PO qhs  - counseled on sleep hygiene     #Misc   - DVT prophylaxis: not indicated, pt is ambulating   - GI prophylaxis: Famotidine  - Diet: CC  - Activity: IAT   - Disposition: Guardianship hearing pending for June 23

## 2022-06-05 LAB
GLUCOSE BLDC GLUCOMTR-MCNC: 150 MG/DL — HIGH (ref 70–99)
GLUCOSE BLDC GLUCOMTR-MCNC: 170 MG/DL — HIGH (ref 70–99)
GLUCOSE BLDC GLUCOMTR-MCNC: 243 MG/DL — HIGH (ref 70–99)
GLUCOSE BLDC GLUCOMTR-MCNC: 255 MG/DL — HIGH (ref 70–99)

## 2022-06-05 PROCEDURE — 99231 SBSQ HOSP IP/OBS SF/LOW 25: CPT

## 2022-06-05 RX ADMIN — Medication 1 TABLET(S): at 17:57

## 2022-06-05 RX ADMIN — Medication 1: at 16:47

## 2022-06-05 RX ADMIN — Medication 1 TABLET(S): at 05:53

## 2022-06-05 RX ADMIN — Medication 3 MILLIGRAM(S): at 21:26

## 2022-06-05 RX ADMIN — Medication 1 APPLICATION(S): at 17:56

## 2022-06-05 RX ADMIN — FAMOTIDINE 20 MILLIGRAM(S): 10 INJECTION INTRAVENOUS at 11:45

## 2022-06-05 RX ADMIN — Medication 1 APPLICATION(S): at 05:54

## 2022-06-05 RX ADMIN — Medication 1 APPLICATION(S): at 11:45

## 2022-06-05 RX ADMIN — Medication 3: at 11:44

## 2022-06-05 RX ADMIN — METFORMIN HYDROCHLORIDE 1000 MILLIGRAM(S): 850 TABLET ORAL at 05:53

## 2022-06-05 RX ADMIN — Medication 1 APPLICATION(S): at 17:57

## 2022-06-05 RX ADMIN — METFORMIN HYDROCHLORIDE 1000 MILLIGRAM(S): 850 TABLET ORAL at 17:57

## 2022-06-05 NOTE — PROGRESS NOTE ADULT - SUBJECTIVE AND OBJECTIVE BOX
Progress Note:  Provider Speciality                            Hospitalist      CARIN WATSON MRN-[id number] 46y Male     CHIEF PRESENTING COMPLAINT:  Patient is a 46y old  Male who presents with a chief complaint of DFU (06 Apr 2022 12:18)        SUBJECTIVE:  Patient was seen and examined at bedside.  No new complaints described by patient in morning rounds.   HISTORY OF PRESENTING ILLNESS:  HPI:  45 yo M with PMHx of Intellectual Disability, DM not on any medications presents with foot wounds. Pt has very poor foot hygiene and per the sister, has been persistently scratching an open wound on his L second toe, which worsened and became more red, had drainage, malodor. He saw a physician at [facility] and was told to come to the ED for evaluation. Pt is poor historian. Does endorse abdominal pain for a few days, cannot give much more description. Per the sister, pt did not seem to have any recent fevers, however is also not a very good historian and does not seem to have good health literacy. States that her and her mom decided to stop giving pt Metformin a while ago, are treating his diabetes by not giving him any sugary foods.  In the ED, T 97.8, /87, , RR 16, SpO2 99% on RA. Lab work unrevealing.  (31 Mar 2022 22:36)        REVIEW OF SYSTEMS:  Patient denies any headache, any vision complaints, runny nose. Denies chest pain, shortness of breath, palpitation. Denies nausea, vomiting, abdominal pain or diarrhoea, Denies dysuria. Denies  localized weakness in any part of the body or numbness.   At least 10 systems were reviewed in ROS. All systems reviewed  are within normal limits except for the complaints as described in Subjective.    PAST MEDICAL & SURGICAL HISTORY:  PAST MEDICAL & SURGICAL HISTORY:  DM (diabetes mellitus)    Intellectual disability            VITAL SIGNS:  Vital Signs Last 24 Hrs  T(C): 36.2 (04 Jun 2022 08:26), Max: 36.2 (04 Jun 2022 08:26)  T(F): 97.1 (04 Jun 2022 08:26), Max: 97.1 (04 Jun 2022 08:26)  HR: 97 (04 Jun 2022 08:26) (97 - 107)  BP: 128/69 (04 Jun 2022 08:26) (125/63 - 137/63)  BP(mean): --  RR: 18 (04 Jun 2022 08:26) (18 - 18)  SpO2: --      PHYSICAL EXAMINATION:  Not in acute distress  General: No icterus  HEENT:   no JVD.  Heart: S1+S2 audible  Lungs: bilateral  moderate air entry, no wheezing, no crepitations.  Abdomen: Soft, non-tender, non-distended , no  rigidity or guarding.  CNS: Awake alert, CN  grossly intact.  Extremities:  No edema            CONSULTS:  Consultant(s) Notes Reviewed by me.   Care Discussed with Consultants/Other Providers where required.        MEDICATIONS:  MEDICATIONS  (STANDING):  ammonium lactate 12% Lotion 1 Application(s) Topical two times a day  clotrimazole 1% Cream 1 Application(s) Topical two times a day  enoxaparin Injectable 40 milliGRAM(s) SubCutaneous every 24 hours  metFORMIN 1000 milliGRAM(s) Oral two times a day  pantoprazole    Tablet 40 milliGRAM(s) Oral before breakfast  vitamin A &amp; D Ointment 1 Application(s) Topical daily    MEDICATIONS  (PRN):  ondansetron Injectable 4 milliGRAM(s) IV Push every 6 hours PRN Nausea and/or Vomiting            ASSESSMENT:    45 yo M with PMH of Intellectual Disability and DM not on any medications presented with foot wounds.    Covid  PNA  Onychomycosis  DM2  Intellectual disability/autism        Covid PNA- completed RDV  Nail bed wound and onychomycosis  Podiatry- no surgical intervention is advised. outpt podiatry f/u  DM type 2 - uncontrolled. A1C 8.9. Continue  metformin 1000mg BID  Handoff:  Pending legal issue and guardianship, next  meeting due 06/23

## 2022-06-06 LAB
GLUCOSE BLDC GLUCOMTR-MCNC: 172 MG/DL — HIGH (ref 70–99)
GLUCOSE BLDC GLUCOMTR-MCNC: 242 MG/DL — HIGH (ref 70–99)
GLUCOSE BLDC GLUCOMTR-MCNC: 249 MG/DL — HIGH (ref 70–99)
GLUCOSE BLDC GLUCOMTR-MCNC: 283 MG/DL — HIGH (ref 70–99)

## 2022-06-06 PROCEDURE — 99231 SBSQ HOSP IP/OBS SF/LOW 25: CPT

## 2022-06-06 PROCEDURE — 99232 SBSQ HOSP IP/OBS MODERATE 35: CPT

## 2022-06-06 RX ADMIN — Medication 1 APPLICATION(S): at 05:43

## 2022-06-06 RX ADMIN — FAMOTIDINE 20 MILLIGRAM(S): 10 INJECTION INTRAVENOUS at 12:23

## 2022-06-06 RX ADMIN — Medication 2: at 17:28

## 2022-06-06 RX ADMIN — Medication 1 APPLICATION(S): at 17:29

## 2022-06-06 RX ADMIN — Medication 1: at 09:02

## 2022-06-06 RX ADMIN — Medication 1 TABLET(S): at 05:42

## 2022-06-06 RX ADMIN — Medication 3 MILLIGRAM(S): at 21:28

## 2022-06-06 RX ADMIN — METFORMIN HYDROCHLORIDE 1000 MILLIGRAM(S): 850 TABLET ORAL at 05:42

## 2022-06-06 RX ADMIN — Medication 3: at 12:22

## 2022-06-06 RX ADMIN — Medication 1 TABLET(S): at 17:28

## 2022-06-06 RX ADMIN — METFORMIN HYDROCHLORIDE 1000 MILLIGRAM(S): 850 TABLET ORAL at 17:28

## 2022-06-06 RX ADMIN — Medication 1 APPLICATION(S): at 15:00

## 2022-06-06 NOTE — PROGRESS NOTE ADULT - ASSESSMENT
45 yo M with PMHx of Intellectual Disability, DM not on any medications presents with foot wounds. Pt has very poor foot hygiene and per the sister, has been persistently scratching an open wound on his L second toe, which worsened and became more red, had drainage, malodor. He saw a physician at [facility] and was told to come to the ED for eval.    #Covid positive - stable  - s/p RDV    #Onychomycosis   - s/p abx course  - podiatry following - no sx intervention; cont clotrimazole cream     #DM II ( A1c 8.9) - pt no on meds at home   - cont inpt metformin     #Autism - patient is high functioning but unable to take care of basics.    #Insomnia - cont melatonin  - Melatonin 5 mg PO qhs  - counseled on sleep hygiene     DVT prophylaxis: not indicated, pt is ambulating   GI prophylaxis: Famotidine  Diet: CC  Activity: IAT   Disposition: Guardianship hearing pending for June 23

## 2022-06-06 NOTE — CONSULT NOTE ADULT - SUBJECTIVE AND OBJECTIVE BOX
Podiatry Consult Note    Subjective:    CARIN WATSON is a 46y year old Male seen at bedside with a chief complaint of  painful thickened, dystrophic, ingrowing and long toenails digits 1-5 bilaterally  and preventative foot examination. Patient is medically managed  by Medicine/Hospitalists.  Patient denies any history of trauma to both feet. Patient has no other pedal complaints.  Patient is experiencing pain while standing, walking and in shoe gear.  Per Chart Review sees Dr. Tucker for o/p Foot Care.    PMH: DM (diabetes mellitus)    Intellectual disability     PAST MEDICAL & SURGICAL HISTORY:  DM (diabetes mellitus)      Intellectual disability        PSH:  Allergies:penicillin (Unknown)      Labs:                        14.6   10.31 )-----------( 415      ( 04 Jun 2022 11:36 )             43.0       WBC Trend  10.31 Date (06-04 @ 11:36)  7.12 Date (05-24 @ 06:00)  7.30 Date (05-22 @ 07:09)  5.29 Date (05-21 @ 11:17)  7.16 Date (05-20 @ 06:53)  7.28 Date (05-19 @ 12:04)  7.03 Date (05-18 @ 07:22)  7.99 Date (05-17 @ 07:17)  7.74 Date (05-16 @ 11:55)      Chem  06-04    141  |  104  |  13  ----------------------------<  224<H>  4.4   |  23  |  0.9    Ca    9.6      04 Jun 2022 11:36  Mg     1.9     06-04    TPro  7.4  /  Alb  4.2  /  TBili  0.6  /  DBili  x   /  AST  19  /  ALT  27  /  AlkPhos  107  06-04      T(F): 96.9 (06-06-22 @ 08:10), Max: 96.9 (06-06-22 @ 08:10)  HR: 86 (06-06-22 @ 08:10) (86 - 87)  BP: 120/69 (06-06-22 @ 08:10) (93/50 - 120/69)  RR: 18 (06-06-22 @ 08:10) (18 - 18)  SpO2: --  Wt(kg): --    Objective:   DERM:  Skin warm, dry and supple bilateral.  No open lesions or inter-digital macerations noted bilateral.   Toenails 1-5 Right and Left feet thickened, elongated, discolored, and dystrophic with subungual debris. There is pain upon palpation of all fungal and ingrowing nails 1-5 bilaterally.   VASC: Dorsalis Pedis and Posterior Tibial pulses 2/4.  Capillary re-fill time less than 3 seconds digits 1-5 bilateral.   NEURO: Protective sensation intact to the level of the digits bilateral.  MSK: Muscle strength 5/5 all major muscle groups bilateral. No structural abnormality, bilaterally    Assessment:   Bilateral dystrophic toenails consistent with  Onychomycosis.   Pain from Elongated nails, ingrowing, incurvated,  and dystrophic nails upon palpation of nails.  Xerosis of bilateral plantar feet.     Plan:   Discussed diagnosis and treatment with patient  Aseptic debridement and curettage of all fungal and ingrowing nails 1-5 bilateral with sterile nail nipper.  Right 2nd Digit; Mild bleeding after dbx d/t prominent skin protrusion deep to nail; Band-Aid applied;   Discussed importance of daily foot examinations, drying of feet after bathing and proper shoe gear.  Patient Would Benefit From Periodic Debridements; Can Follow Up as Outpatient w/ Dr. Tucker Post Discharge   Discussed Plan w/ Attending;     Spectra;     Podiatry Consult Note    Subjective:    CARIN WATSON is a 46y year old Male seen at bedside with a chief complaint of  painful thickened, dystrophic, ingrowing and long toenails digits 1-5 bilaterally  and preventative foot examination. Patient is medically managed  by Medicine/Hospitalists.  Patient denies any history of trauma to both feet. Patient has no other pedal complaints.  Patient is experiencing pain while standing, walking and in shoe gear.  Per Chart Review sees Dr. Tucker for o/p Foot Care.    PMH: DM (diabetes mellitus)    Intellectual disability     PAST MEDICAL & SURGICAL HISTORY:  DM (diabetes mellitus)      Intellectual disability        PSH:  Allergies:penicillin (Unknown)      Labs:                        14.6   10.31 )-----------( 415      ( 04 Jun 2022 11:36 )             43.0       WBC Trend  10.31 Date (06-04 @ 11:36)  7.12 Date (05-24 @ 06:00)  7.30 Date (05-22 @ 07:09)  5.29 Date (05-21 @ 11:17)  7.16 Date (05-20 @ 06:53)  7.28 Date (05-19 @ 12:04)  7.03 Date (05-18 @ 07:22)  7.99 Date (05-17 @ 07:17)  7.74 Date (05-16 @ 11:55)      Chem  06-04    141  |  104  |  13  ----------------------------<  224<H>  4.4   |  23  |  0.9    Ca    9.6      04 Jun 2022 11:36  Mg     1.9     06-04    TPro  7.4  /  Alb  4.2  /  TBili  0.6  /  DBili  x   /  AST  19  /  ALT  27  /  AlkPhos  107  06-04      T(F): 96.9 (06-06-22 @ 08:10), Max: 96.9 (06-06-22 @ 08:10)  HR: 86 (06-06-22 @ 08:10) (86 - 87)  BP: 120/69 (06-06-22 @ 08:10) (93/50 - 120/69)  RR: 18 (06-06-22 @ 08:10) (18 - 18)  SpO2: --  Wt(kg): --    Objective:   DERM:  Skin warm, dry and supple bilateral.  No open lesions or inter-digital macerations noted bilateral.   Toenails 1-5 Right and Left feet thickened, elongated, discolored, and dystrophic with subungual debris. There is pain upon palpation of all fungal and ingrowing nails 1-5 bilaterally.   VASC: Dorsalis Pedis and Posterior Tibial pulses 2/4.  Capillary re-fill time less than 3 seconds digits 1-5 bilateral.   NEURO: Protective sensation intact to the level of the digits bilateral.  MSK: Muscle strength 5/5 all major muscle groups bilateral. No structural abnormality, bilaterally    Assessment:   Bilateral dystrophic toenails consistent with  Onychomycosis.   Pain from Elongated nails, ingrowing, incurvated,  and dystrophic nails upon palpation of nails.  Xerosis of bilateral plantar feet.     Plan:   Discussed diagnosis and treatment with patient  Aseptic debridement and curettage of all fungal and ingrowing nails 1-5 bilateral with sterile nail nipper.  Right 2nd Digit; Mild bleeding after dbx d/t prominent skin protrusion deep to nail; Band-Aid applied;   Discussed importance of daily foot examinations, drying of feet after bathing and proper shoe gear.  Patient has Rx for Amlactin; continue Amlactin b/l LE;  Patient Would Benefit From Periodic Debridements; Can Follow Up as Outpatient w/ Dr. Tucker Post Discharge   Discussed Plan w/ Attending;     Spectra;

## 2022-06-06 NOTE — PROGRESS NOTE ADULT - SUBJECTIVE AND OBJECTIVE BOX
Progress Note:  Provider Speciality                            Hospitalist      CARIN WATSON MRN-[id number] 46y Male     CHIEF PRESENTING COMPLAINT:  Patient is a 46y old  Male who presents with a chief complaint of DFU (06 Apr 2022 12:18)        SUBJECTIVE:  Patient was seen and examined at bedside.  No new complaints described by patient in morning rounds.   HISTORY OF PRESENTING ILLNESS:  HPI:  47 yo M with PMHx of Intellectual Disability, DM not on any medications presents with foot wounds. Pt has very poor foot hygiene and per the sister, has been persistently scratching an open wound on his L second toe, which worsened and became more red, had drainage, malodor. He saw a physician at [facility] and was told to come to the ED for evaluation. Pt is poor historian. Does endorse abdominal pain for a few days, cannot give much more description. Per the sister, pt did not seem to have any recent fevers, however is also not a very good historian and does not seem to have good health literacy. States that her and her mom decided to stop giving pt Metformin a while ago, are treating his diabetes by not giving him any sugary foods.  In the ED, T 97.8, /87, , RR 16, SpO2 99% on RA. Lab work unrevealing.  (31 Mar 2022 22:36)        REVIEW OF SYSTEMS:  Patient denies any headache, any vision complaints, runny nose. Denies chest pain, shortness of breath, palpitation. Denies nausea, vomiting, abdominal pain or diarrhoea, Denies dysuria. Denies  localized weakness in any part of the body or numbness.   At least 10 systems were reviewed in ROS. All systems reviewed  are within normal limits except for the complaints as described in Subjective.    PAST MEDICAL & SURGICAL HISTORY:  PAST MEDICAL & SURGICAL HISTORY:  DM (diabetes mellitus)    Intellectual disability            VITAL SIGNS:  Vital Signs Last 24 Hrs  T(C): 36.1 (06 Jun 2022 08:10), Max: 36.1 (06 Jun 2022 08:10)  T(F): 96.9 (06 Jun 2022 08:10), Max: 96.9 (06 Jun 2022 08:10)  HR: 86 (06 Jun 2022 08:10) (86 - 87)  BP: 120/69 (06 Jun 2022 08:10) (93/50 - 120/69)  BP(mean): --  RR: 18 (06 Jun 2022 08:10) (18 - 18)  SpO2: --    PHYSICAL EXAMINATION:  Not in acute distress  General: No icterus  HEENT:   no JVD.  Heart: S1+S2 audible  Lungs: bilateral  moderate air entry, no wheezing, no crepitations.  Abdomen: Soft, non-tender, non-distended , no  rigidity or guarding.  CNS: Awake alert, CN  grossly intact.  Extremities:  No edema    , onychomycosis toe nails        CONSULTS:  Consultant(s) Notes Reviewed by me.   Care Discussed with Consultants/Other Providers where required.        MEDICATIONS:  MEDICATIONS  (STANDING):  ammonium lactate 12% Lotion 1 Application(s) Topical two times a day  clotrimazole 1% Cream 1 Application(s) Topical two times a day  enoxaparin Injectable 40 milliGRAM(s) SubCutaneous every 24 hours  metFORMIN 1000 milliGRAM(s) Oral two times a day  pantoprazole    Tablet 40 milliGRAM(s) Oral before breakfast  vitamin A &amp; D Ointment 1 Application(s) Topical daily    MEDICATIONS  (PRN):  ondansetron Injectable 4 milliGRAM(s) IV Push every 6 hours PRN Nausea and/or Vomiting            ASSESSMENT:    47 yo M with PMH of Intellectual Disability and DM not on any medications presented with foot wounds.    Covid  PNA  Onychomycosis  DM2  Intellectual disability/autism        Covid PNA- completed RDV  Nail bed wound and onychomycosis. Status post Aseptic debridement and curettage of all fungal and ingrowing nails 1-5 bilateral with sterile nail nipper 06/07  Podiatry- no surgical intervention is advised. outpt podiatry f/u  DM type 2 - uncontrolled. A1C 8.9. Continue  metformin 1000mg BID  Handoff:  Pending legal issue and guardianship, next  meeting due 06/23

## 2022-06-06 NOTE — PROGRESS NOTE ADULT - SUBJECTIVE AND OBJECTIVE BOX
CARIN WATSON 46y Male  MRN#: [id number]   Hospital Day: 67d    SUBJECTIVE  Patient is a 46y old Male who presents with a chief complaint of DFU (03 Jun 2022 15:32)  Currently admitted to medicine with the primary diagnosis of Onychomycosis      INTERVAL HPI AND OVERNIGHT EVENTS:  Patient was examined and seen at bedside. This morning he is resting comfortably in bed and reports no issues or overnight events.    OBJECTIVE  PAST MEDICAL & SURGICAL HISTORY  DM (diabetes mellitus)    Intellectual disability      ALLERGIES:  penicillin (Unknown)    MEDICATIONS:  STANDING MEDICATIONS  ammonium lactate 12% Lotion 1 Application(s) Topical two times a day  clotrimazole 1% Cream 1 Application(s) Topical two times a day  famotidine    Tablet 20 milliGRAM(s) Oral daily  glucagon  Injectable 1 milliGRAM(s) IntraMuscular once  insulin lispro (ADMELOG) corrective regimen sliding scale   SubCutaneous three times a day before meals  lactobacillus acidophilus 1 Tablet(s) Oral two times a day  melatonin 3 milliGRAM(s) Oral at bedtime  metFORMIN 1000 milliGRAM(s) Oral two times a day  sodium chloride 0.9%. 1000 milliLiter(s) IV Continuous <Continuous>  vitamin A &amp; D Ointment 1 Application(s) Topical daily    PRN MEDICATIONS  acetaminophen     Tablet .. 650 milliGRAM(s) Oral every 6 hours PRN  guaifenesin/dextromethorphan Oral Liquid 10 milliLiter(s) Oral every 4 hours PRN  metoclopramide Injectable 10 milliGRAM(s) IV Push every 8 hours PRN      VITAL SIGNS:  T(C): 36.1 (06 Jun 2022 08:10), Max: 36.1 (06 Jun 2022 08:10)  T(F): 96.9 (06 Jun 2022 08:10), Max: 96.9 (06 Jun 2022 08:10)  HR: 86 (06 Jun 2022 08:10) (86 - 87)  BP: 120/69 (06 Jun 2022 08:10) (93/50 - 120/69)  RR: 18 (06 Jun 2022 08:10) (18 - 18)    LABS:                        14.6   10.31 )-----------( 415      ( 04 Jun 2022 11:36 )             43.0     141  |  104  |  13  ----------------------------<  224<H>  4.4   |  23  |  0.9    Ca    9.6      04 Jun 2022 11:36  Mg     1.9     06-04    TPro  7.4  /  Alb  4.2  /  TBili  0.6  /  DBili  x   /  AST  19  /  ALT  27  /  AlkPhos  107  06-04      RADIOLOGY:    PHYSICAL EXAM:  General: well-appearing in NAD.   HEENT: Normocephalic, nontraumatic.   LUNGS: Clear to auscultation b/l. No wheezes, rales, or rhonchi.  HEART: RRR. No murmurs, rubs, or gallops.  ABDOMEN: Nontender, nondistended. + bowel sounds.  EXT: Nonedematous

## 2022-06-07 LAB
GLUCOSE BLDC GLUCOMTR-MCNC: 192 MG/DL — HIGH (ref 70–99)
GLUCOSE BLDC GLUCOMTR-MCNC: 209 MG/DL — HIGH (ref 70–99)
GLUCOSE BLDC GLUCOMTR-MCNC: 277 MG/DL — HIGH (ref 70–99)
GLUCOSE BLDC GLUCOMTR-MCNC: 301 MG/DL — HIGH (ref 70–99)

## 2022-06-07 PROCEDURE — 99231 SBSQ HOSP IP/OBS SF/LOW 25: CPT

## 2022-06-07 RX ORDER — CHLORHEXIDINE GLUCONATE 213 G/1000ML
1 SOLUTION TOPICAL
Refills: 0 | Status: DISCONTINUED | OUTPATIENT
Start: 2022-06-07 | End: 2022-10-11

## 2022-06-07 RX ADMIN — Medication 1 APPLICATION(S): at 13:01

## 2022-06-07 RX ADMIN — Medication 1 APPLICATION(S): at 05:45

## 2022-06-07 RX ADMIN — Medication 1 APPLICATION(S): at 05:46

## 2022-06-07 RX ADMIN — Medication 1: at 08:36

## 2022-06-07 RX ADMIN — Medication 1 APPLICATION(S): at 18:29

## 2022-06-07 RX ADMIN — METFORMIN HYDROCHLORIDE 1000 MILLIGRAM(S): 850 TABLET ORAL at 17:26

## 2022-06-07 RX ADMIN — Medication 3 MILLIGRAM(S): at 21:24

## 2022-06-07 RX ADMIN — Medication 3: at 17:24

## 2022-06-07 RX ADMIN — Medication 4: at 12:19

## 2022-06-07 RX ADMIN — METFORMIN HYDROCHLORIDE 1000 MILLIGRAM(S): 850 TABLET ORAL at 05:45

## 2022-06-07 RX ADMIN — FAMOTIDINE 20 MILLIGRAM(S): 10 INJECTION INTRAVENOUS at 12:19

## 2022-06-07 RX ADMIN — Medication 1 TABLET(S): at 18:30

## 2022-06-07 RX ADMIN — Medication 1 TABLET(S): at 05:45

## 2022-06-07 NOTE — PROGRESS NOTE ADULT - SUBJECTIVE AND OBJECTIVE BOX
CARIN WATSON 46y Male  MRN#: [id number]   Hospital Day: 68d    SUBJECTIVE  Patient is a 46y old Male who presents with a chief complaint of DFU (03 Jun 2022 15:32)  Currently admitted to medicine with the primary diagnosis of Onychomycosis      INTERVAL HPI AND OVERNIGHT EVENTS:  Patient was examined and seen at bedside. This morning he is resting comfortably in bed and reports no issues or overnight events.    OBJECTIVE  PAST MEDICAL & SURGICAL HISTORY  DM (diabetes mellitus)    Intellectual disability      ALLERGIES:  penicillin (Unknown)    MEDICATIONS:  STANDING MEDICATIONS  ammonium lactate 12% Lotion 1 Application(s) Topical two times a day  clotrimazole 1% Cream 1 Application(s) Topical two times a day  famotidine    Tablet 20 milliGRAM(s) Oral daily  glucagon  Injectable 1 milliGRAM(s) IntraMuscular once  insulin lispro (ADMELOG) corrective regimen sliding scale   SubCutaneous three times a day before meals  lactobacillus acidophilus 1 Tablet(s) Oral two times a day  melatonin 3 milliGRAM(s) Oral at bedtime  metFORMIN 1000 milliGRAM(s) Oral two times a day  sodium chloride 0.9%. 1000 milliLiter(s) IV Continuous <Continuous>  vitamin A &amp; D Ointment 1 Application(s) Topical daily    PRN MEDICATIONS  acetaminophen     Tablet .. 650 milliGRAM(s) Oral every 6 hours PRN  guaifenesin/dextromethorphan Oral Liquid 10 milliLiter(s) Oral every 4 hours PRN  metoclopramide Injectable 10 milliGRAM(s) IV Push every 8 hours PRN      VITAL SIGNS:  T(C): 35.1 (07 Jun 2022 07:22), Max: 36.4 (06 Jun 2022 15:25)  T(F): 95.1 (07 Jun 2022 07:22), Max: 97.5 (06 Jun 2022 15:25)  HR: 94 (07 Jun 2022 07:22) (94 - 102)  BP: 108/68 (07 Jun 2022 07:22) (108/68 - 132/64)  RR: 18 (07 Jun 2022 07:22) (18 - 18)    LABS:    RADIOLOGY:    PHYSICAL EXAM:  General: well-appearing in NAD.   HEENT: Normocephalic, nontraumatic.   LUNGS: Clear to auscultation b/l. No wheezes, rales, or rhonchi.  HEART: RRR. No murmurs, rubs, or gallops.  ABDOMEN: Nontender, nondistended. + bowel sounds.  EXT: Nonedematous

## 2022-06-07 NOTE — PROGRESS NOTE ADULT - ASSESSMENT
47 yo M with PMHx of Intellectual Disability, DM not on any medications presents with foot wounds. Pt has very poor foot hygiene and per the sister, has been persistently scratching an open wound on his L second toe, which worsened and became more red, had drainage, malodor. He saw a physician at [facility] and was told to come to the ED for eval.    #Covid positive - stable  - s/p RDV    #Onychomycosis   - s/p abx course  - podiatry following - no sx intervention; cont clotrimazole cream     #DM II ( A1c 8.9) - pt no on meds at home   - cont inpt metformin     #Autism - patient is high functioning but unable to take care of basics.    #Insomnia - cont melatonin  - Melatonin 5 mg PO qhs  - counseled on sleep hygiene     DVT prophylaxis: not indicated, pt is ambulating   GI prophylaxis: Famotidine  Diet: CC  Activity: IAT   Disposition: Guardianship hearing pending for June 23

## 2022-06-07 NOTE — PROGRESS NOTE ADULT - SUBJECTIVE AND OBJECTIVE BOX
Progress Note:  Provider Speciality                            Hospitalist      CARIN WATSON MRN-[id number] 46y Male     CHIEF PRESENTING COMPLAINT:  Patient is a 46y old  Male who presents with a chief complaint of DFU (06 Apr 2022 12:18)        SUBJECTIVE:  Patient was seen and examined at bedside.  No new complaints described by patient in morning rounds.   HISTORY OF PRESENTING ILLNESS:  HPI:  45 yo M with PMHx of Intellectual Disability, DM not on any medications presents with foot wounds. Pt has very poor foot hygiene and per the sister, has been persistently scratching an open wound on his L second toe, which worsened and became more red, had drainage, malodor. He saw a physician at [facility] and was told to come to the ED for evaluation. Pt is poor historian. Does endorse abdominal pain for a few days, cannot give much more description. Per the sister, pt did not seem to have any recent fevers, however is also not a very good historian and does not seem to have good health literacy. States that her and her mom decided to stop giving pt Metformin a while ago, are treating his diabetes by not giving him any sugary foods.  In the ED, T 97.8, /87, , RR 16, SpO2 99% on RA. Lab work unrevealing.  (31 Mar 2022 22:36)        REVIEW OF SYSTEMS:  Patient denies any headache, any vision complaints, runny nose. Denies chest pain, shortness of breath, palpitation. Denies nausea, vomiting, abdominal pain or diarrhoea, Denies dysuria. Denies  localized weakness in any part of the body or numbness.   At least 10 systems were reviewed in ROS. All systems reviewed  are within normal limits except for the complaints as described in Subjective.    PAST MEDICAL & SURGICAL HISTORY:  PAST MEDICAL & SURGICAL HISTORY:  DM (diabetes mellitus)    Intellectual disability            VITAL SIGNS:  Vital Signs Last 24 Hrs  T(C): 35.1 (07 Jun 2022 07:22), Max: 35.7 (07 Jun 2022 00:04)  T(F): 95.1 (07 Jun 2022 07:22), Max: 96.2 (07 Jun 2022 00:04)  HR: 94 (07 Jun 2022 07:22) (94 - 102)  BP: 108/68 (07 Jun 2022 07:22) (108/68 - 126/75)  BP(mean): --  RR: 18 (07 Jun 2022 07:22) (18 - 18)  SpO2: --        PHYSICAL EXAMINATION:  Not in acute distress  General: No icterus  HEENT:   no JVD.  Heart: S1+S2 audible  Lungs: bilateral  moderate air entry, no wheezing, no crepitations.  Abdomen: Soft, non-tender, non-distended , no  rigidity or guarding.  CNS: Awake alert, CN  grossly intact.  Extremities:  No edema    , onychomycosis toe nails        CONSULTS:  Consultant(s) Notes Reviewed by me.   Care Discussed with Consultants/Other Providers where required.        MEDICATIONS:  MEDICATIONS  (STANDING):  ammonium lactate 12% Lotion 1 Application(s) Topical two times a day  clotrimazole 1% Cream 1 Application(s) Topical two times a day  enoxaparin Injectable 40 milliGRAM(s) SubCutaneous every 24 hours  metFORMIN 1000 milliGRAM(s) Oral two times a day  pantoprazole    Tablet 40 milliGRAM(s) Oral before breakfast  vitamin A &amp; D Ointment 1 Application(s) Topical daily    MEDICATIONS  (PRN):  ondansetron Injectable 4 milliGRAM(s) IV Push every 6 hours PRN Nausea and/or Vomiting            ASSESSMENT:    45 yo M with PMH of Intellectual Disability and DM not on any medications presented with foot wounds.    Covid  PNA  Onychomycosis  DM2  Intellectual disability/autism        Covid PNA- completed RDV  Nail bed wound and onychomycosis. Status post Aseptic debridement and curettage of all fungal and ingrowing nails 1-5 bilateral with sterile nail nipper 06/07  Podiatry- no surgical intervention is advised. outpt podiatry f/u  DM type 2 - uncontrolled. A1C 8.9. Continue  metformin 1000mg BID  Handoff:  Pending legal issue and guardianship, next  meeting due 06/23

## 2022-06-08 LAB
GLUCOSE BLDC GLUCOMTR-MCNC: 169 MG/DL — HIGH (ref 70–99)
GLUCOSE BLDC GLUCOMTR-MCNC: 199 MG/DL — HIGH (ref 70–99)
GLUCOSE BLDC GLUCOMTR-MCNC: 204 MG/DL — HIGH (ref 70–99)
GLUCOSE BLDC GLUCOMTR-MCNC: 235 MG/DL — HIGH (ref 70–99)

## 2022-06-08 PROCEDURE — 99231 SBSQ HOSP IP/OBS SF/LOW 25: CPT

## 2022-06-08 RX ORDER — LANOLIN ALCOHOL/MO/W.PET/CERES
3 CREAM (GRAM) TOPICAL AT BEDTIME
Refills: 0 | Status: DISCONTINUED | OUTPATIENT
Start: 2022-06-08 | End: 2022-10-11

## 2022-06-08 RX ORDER — ZOLPIDEM TARTRATE 10 MG/1
5 TABLET ORAL AT BEDTIME
Refills: 0 | Status: DISCONTINUED | OUTPATIENT
Start: 2022-06-08 | End: 2022-06-15

## 2022-06-08 RX ADMIN — Medication 1 APPLICATION(S): at 05:30

## 2022-06-08 RX ADMIN — Medication 3 MILLIGRAM(S): at 21:24

## 2022-06-08 RX ADMIN — Medication 1: at 08:12

## 2022-06-08 RX ADMIN — Medication 1 TABLET(S): at 05:30

## 2022-06-08 RX ADMIN — Medication 1 APPLICATION(S): at 18:17

## 2022-06-08 RX ADMIN — FAMOTIDINE 20 MILLIGRAM(S): 10 INJECTION INTRAVENOUS at 13:17

## 2022-06-08 RX ADMIN — Medication 2: at 13:06

## 2022-06-08 RX ADMIN — Medication 1 TABLET(S): at 18:18

## 2022-06-08 RX ADMIN — Medication 1 APPLICATION(S): at 18:18

## 2022-06-08 RX ADMIN — METFORMIN HYDROCHLORIDE 1000 MILLIGRAM(S): 850 TABLET ORAL at 18:15

## 2022-06-08 RX ADMIN — METFORMIN HYDROCHLORIDE 1000 MILLIGRAM(S): 850 TABLET ORAL at 05:30

## 2022-06-08 RX ADMIN — Medication 1: at 18:15

## 2022-06-08 RX ADMIN — CHLORHEXIDINE GLUCONATE 1 APPLICATION(S): 213 SOLUTION TOPICAL at 05:35

## 2022-06-08 RX ADMIN — Medication 1 APPLICATION(S): at 13:06

## 2022-06-08 NOTE — PROGRESS NOTE ADULT - ASSESSMENT
45 yo M with PMHx of Intellectual Disability, DM not on any medications presents with foot wounds. Pt has very poor foot hygiene and per the sister, has been persistently scratching an open wound on his L second toe, which worsened and became more red, had drainage, malodor. He saw a physician at [facility] and was told to come to the ED for eval.    #Covid positive - stable  - s/p RDV    #Onychomycosis   - podiatry following - no sx intervention;   - s/p abx course; cont clotrimazole cream     #DM II ( A1c 8.9) - pt no on meds at home   - cont inpt metformin     #Autism - patient is high functioning but unable to take care of basics.    #Insomnia  - cont melatonin; couseled sleep hygiene    #Dental Caries  - Dental eval pending    DVT prophylaxis: not indicated, pt is ambulating   GI prophylaxis: Famotidine  Diet: CC  Activity: IAT   Disposition: Guardianship hearing pending for June 23

## 2022-06-08 NOTE — PROGRESS NOTE ADULT - SUBJECTIVE AND OBJECTIVE BOX
Progress Note:  Provider Speciality                            Hospitalist      CARIN WATSON MRN-[id number] 46y Male     CHIEF PRESENTING COMPLAINT:  Patient is a 46y old  Male who presents with a chief complaint of DFU (06 Apr 2022 12:18)        SUBJECTIVE:  Patient was seen and examined at bedside.  No new complaints described by patient in morning rounds.   HISTORY OF PRESENTING ILLNESS:  HPI:  47 yo M with PMHx of Intellectual Disability, DM not on any medications presents with foot wounds. Pt has very poor foot hygiene and per the sister, has been persistently scratching an open wound on his L second toe, which worsened and became more red, had drainage, malodor. He saw a physician at [facility] and was told to come to the ED for evaluation. Pt is poor historian. Does endorse abdominal pain for a few days, cannot give much more description. Per the sister, pt did not seem to have any recent fevers, however is also not a very good historian and does not seem to have good health literacy. States that her and her mom decided to stop giving pt Metformin a while ago, are treating his diabetes by not giving him any sugary foods.  In the ED, T 97.8, /87, , RR 16, SpO2 99% on RA. Lab work unrevealing.  (31 Mar 2022 22:36)        REVIEW OF SYSTEMS:  Patient denies any headache, any vision complaints, runny nose. Denies chest pain, shortness of breath, palpitation. Denies nausea, vomiting, abdominal pain or diarrhoea, Denies dysuria. Denies  localized weakness in any part of the body or numbness.   At least 10 systems were reviewed in ROS. All systems reviewed  are within normal limits except for the complaints as described in Subjective.    PAST MEDICAL & SURGICAL HISTORY:  PAST MEDICAL & SURGICAL HISTORY:  DM (diabetes mellitus)    Intellectual disability            VITAL SIGNS:  Vital Signs Last 24 Hrs  T(C): 35.3 (08 Jun 2022 07:22), Max: 36.2 (08 Jun 2022 00:06)  T(F): 95.6 (08 Jun 2022 07:22), Max: 97.1 (08 Jun 2022 00:06)  HR: 82 (08 Jun 2022 07:22) (82 - 99)  BP: 114/55 (08 Jun 2022 07:22) (114/55 - 134/60)  BP(mean): --  RR: 18 (08 Jun 2022 07:22) (18 - 18)  SpO2: --      PHYSICAL EXAMINATION:  Not in acute distress  General: No icterus  HEENT:   no JVD.  Heart: S1+S2 audible  Lungs: bilateral  moderate air entry, no wheezing, no crepitations.  Abdomen: Soft, non-tender, non-distended , no  rigidity or guarding.  CNS: Awake alert, CN  grossly intact.  Extremities:  No edema    , onychomycosis toe nails        CONSULTS:  Consultant(s) Notes Reviewed by me.   Care Discussed with Consultants/Other Providers where required.        MEDICATIONS:  MEDICATIONS  (STANDING):  ammonium lactate 12% Lotion 1 Application(s) Topical two times a day  clotrimazole 1% Cream 1 Application(s) Topical two times a day  enoxaparin Injectable 40 milliGRAM(s) SubCutaneous every 24 hours  metFORMIN 1000 milliGRAM(s) Oral two times a day  pantoprazole    Tablet 40 milliGRAM(s) Oral before breakfast  vitamin A &amp; D Ointment 1 Application(s) Topical daily    MEDICATIONS  (PRN):  ondansetron Injectable 4 milliGRAM(s) IV Push every 6 hours PRN Nausea and/or Vomiting            ASSESSMENT:    47 yo M with PMH of Intellectual Disability and DM not on any medications presented with foot wounds.    Covid  PNA  Onychomycosis  DM2  Intellectual disability/autism        Covid PNA- completed RDV  Nail bed wound and onychomycosis. Status post Aseptic debridement and curettage of all fungal and ingrowing nails 1-5 bilateral with sterile nail nipper 06/07  Podiatry- no surgical intervention is advised. outpt podiatry f/u  DM type 2 - uncontrolled. A1C 8.9. Continue  metformin 1000mg BID  Handoff:  Pending legal issue and guardianship, next  meeting due 06/23                     Progress Note:  Provider Speciality                            Hospitalist      CARIN WATSON MRN-[id number] 46y Male     CHIEF PRESENTING COMPLAINT:  Patient is a 46y old  Male who presents with a chief complaint of DFU (06 Apr 2022 12:18)        SUBJECTIVE:  Patient was seen and examined at bedside.  No new complaints described by patient in morning rounds.   HISTORY OF PRESENTING ILLNESS:  HPI:  45 yo M with PMHx of Intellectual Disability, DM not on any medications presents with foot wounds. Pt has very poor foot hygiene and per the sister, has been persistently scratching an open wound on his L second toe, which worsened and became more red, had drainage, malodor. He saw a physician at [facility] and was told to come to the ED for evaluation. Pt is poor historian. Does endorse abdominal pain for a few days, cannot give much more description. Per the sister, pt did not seem to have any recent fevers, however is also not a very good historian and does not seem to have good health literacy. States that her and her mom decided to stop giving pt Metformin a while ago, are treating his diabetes by not giving him any sugary foods.  In the ED, T 97.8, /87, , RR 16, SpO2 99% on RA. Lab work unrevealing.  (31 Mar 2022 22:36)        REVIEW OF SYSTEMS:  Patient denies any headache, any vision complaints, runny nose. Denies chest pain, shortness of breath, palpitation. Denies nausea, vomiting, abdominal pain or diarrhoea, Denies dysuria. Denies  localized weakness in any part of the body or numbness.   At least 10 systems were reviewed in ROS. All systems reviewed  are within normal limits except for the complaints as described in Subjective.    PAST MEDICAL & SURGICAL HISTORY:  PAST MEDICAL & SURGICAL HISTORY:  DM (diabetes mellitus)    Intellectual disability            VITAL SIGNS:  Vital Signs Last 24 Hrs  T(C): 35.3 (08 Jun 2022 07:22), Max: 36.2 (08 Jun 2022 00:06)  T(F): 95.6 (08 Jun 2022 07:22), Max: 97.1 (08 Jun 2022 00:06)  HR: 82 (08 Jun 2022 07:22) (82 - 99)  BP: 114/55 (08 Jun 2022 07:22) (114/55 - 134/60)  BP(mean): --  RR: 18 (08 Jun 2022 07:22) (18 - 18)  SpO2: --      PHYSICAL EXAMINATION:  Not in acute distress  General: No icterus  HEENT:   no JVD.  Heart: S1+S2 audible  Lungs: bilateral  moderate air entry, no wheezing, no crepitations.  Abdomen: Soft, non-tender, non-distended , no  rigidity or guarding.  CNS: Awake alert, CN  grossly intact.  Extremities:  No edema    , onychomycosis toe nails        CONSULTS:  Consultant(s) Notes Reviewed by me.   Care Discussed with Consultants/Other Providers where required.        MEDICATIONS:  MEDICATIONS  (STANDING):  ammonium lactate 12% Lotion 1 Application(s) Topical two times a day  clotrimazole 1% Cream 1 Application(s) Topical two times a day  enoxaparin Injectable 40 milliGRAM(s) SubCutaneous every 24 hours  metFORMIN 1000 milliGRAM(s) Oral two times a day  pantoprazole    Tablet 40 milliGRAM(s) Oral before breakfast  vitamin A &amp; D Ointment 1 Application(s) Topical daily    MEDICATIONS  (PRN):  ondansetron Injectable 4 milliGRAM(s) IV Push every 6 hours PRN Nausea and/or Vomiting            ASSESSMENT:    45 yo M with PMH of Intellectual Disability and DM not on any medications presented with foot wounds.    Covid  PNA  Onychomycosis  DM2  Intellectual disability/autism        Covid PNA- completed RDV  Nail bed wound and onychomycosis. Status post Aseptic debridement and curettage of all fungal and ingrowing nails 1-5 bilateral with sterile nail nipper 06/07  Podiatry- no surgical intervention is advised. outpt podiatry f/u  DM type 2 - uncontrolled. A1C 8.9. Continue  metformin 1000mg BID  Dental consult pending  Handoff:  Pending legal issue and guardianship, next  meeting due 06/23

## 2022-06-08 NOTE — PROGRESS NOTE ADULT - SUBJECTIVE AND OBJECTIVE BOX
CARIN WATSON 46y Male  MRN#: [id number]   Hospital Day: 69d    SUBJECTIVE  Patient is a 46y old Male who presents with a chief complaint of DFU (03 Jun 2022 15:32)  Currently admitted to medicine with the primary diagnosis of Onychomycosis      INTERVAL HPI AND OVERNIGHT EVENTS:  Patient was examined and seen at bedside. This morning he is resting comfortably in bed and reports no issues or overnight events.    OBJECTIVE  PAST MEDICAL & SURGICAL HISTORY  DM (diabetes mellitus)    Intellectual disability      ALLERGIES:  penicillin (Unknown)    MEDICATIONS:  STANDING MEDICATIONS  ammonium lactate 12% Lotion 1 Application(s) Topical two times a day  clotrimazole 1% Cream 1 Application(s) Topical two times a day  famotidine    Tablet 20 milliGRAM(s) Oral daily  glucagon  Injectable 1 milliGRAM(s) IntraMuscular once  insulin lispro (ADMELOG) corrective regimen sliding scale   SubCutaneous three times a day before meals  lactobacillus acidophilus 1 Tablet(s) Oral two times a day  melatonin 3 milliGRAM(s) Oral at bedtime  metFORMIN 1000 milliGRAM(s) Oral two times a day  sodium chloride 0.9%. 1000 milliLiter(s) IV Continuous <Continuous>  vitamin A &amp; D Ointment 1 Application(s) Topical daily    PRN MEDICATIONS  acetaminophen     Tablet .. 650 milliGRAM(s) Oral every 6 hours PRN  guaifenesin/dextromethorphan Oral Liquid 10 milliLiter(s) Oral every 4 hours PRN  metoclopramide Injectable 10 milliGRAM(s) IV Push every 8 hours PRN      VITAL SIGNS:  T(C): 35.3 (08 Jun 2022 07:22), Max: 36.2 (08 Jun 2022 00:06)  T(F): 95.6 (08 Jun 2022 07:22), Max: 97.1 (08 Jun 2022 00:06)  HR: 82 (08 Jun 2022 07:22) (82 - 99)  BP: 114/55 (08 Jun 2022 07:22) (114/55 - 134/60)  RR: 18 (08 Jun 2022 07:22) (18 - 18)    LABS: order labs qweek (last day 6/4)    RADIOLOGY:    PHYSICAL EXAM:  General: well-appearing in NAD.   HEENT: Normocephalic, nontraumatic.   LUNGS: Clear to auscultation b/l. No wheezes, rales, or rhonchi.  HEART: RRR. No murmurs, rubs, or gallops.  ABDOMEN: Nontender, nondistended. + bowel sounds.  EXT: Nonedematous

## 2022-06-09 LAB
GLUCOSE BLDC GLUCOMTR-MCNC: 202 MG/DL — HIGH (ref 70–99)
GLUCOSE BLDC GLUCOMTR-MCNC: 265 MG/DL — HIGH (ref 70–99)
GLUCOSE BLDC GLUCOMTR-MCNC: 320 MG/DL — HIGH (ref 70–99)
GLUCOSE BLDC GLUCOMTR-MCNC: 346 MG/DL — HIGH (ref 70–99)

## 2022-06-09 PROCEDURE — 99231 SBSQ HOSP IP/OBS SF/LOW 25: CPT

## 2022-06-09 RX ADMIN — METFORMIN HYDROCHLORIDE 1000 MILLIGRAM(S): 850 TABLET ORAL at 05:29

## 2022-06-09 RX ADMIN — Medication 1 APPLICATION(S): at 05:29

## 2022-06-09 RX ADMIN — Medication 1 APPLICATION(S): at 17:05

## 2022-06-09 RX ADMIN — Medication 150 MILLIGRAM(S): at 18:23

## 2022-06-09 RX ADMIN — Medication 1 TABLET(S): at 17:08

## 2022-06-09 RX ADMIN — Medication 1 TABLET(S): at 05:29

## 2022-06-09 RX ADMIN — FAMOTIDINE 20 MILLIGRAM(S): 10 INJECTION INTRAVENOUS at 12:12

## 2022-06-09 RX ADMIN — Medication 3 MILLIGRAM(S): at 22:16

## 2022-06-09 RX ADMIN — Medication 3: at 17:06

## 2022-06-09 RX ADMIN — CHLORHEXIDINE GLUCONATE 1 APPLICATION(S): 213 SOLUTION TOPICAL at 05:31

## 2022-06-09 RX ADMIN — Medication 4: at 12:12

## 2022-06-09 RX ADMIN — METFORMIN HYDROCHLORIDE 1000 MILLIGRAM(S): 850 TABLET ORAL at 17:06

## 2022-06-09 RX ADMIN — ZOLPIDEM TARTRATE 5 MILLIGRAM(S): 10 TABLET ORAL at 22:16

## 2022-06-09 RX ADMIN — Medication 1 APPLICATION(S): at 12:13

## 2022-06-09 RX ADMIN — Medication 1 APPLICATION(S): at 17:07

## 2022-06-09 RX ADMIN — Medication 2: at 08:28

## 2022-06-09 NOTE — PROGRESS NOTE ADULT - SUBJECTIVE AND OBJECTIVE BOX
Progress Note:  Provider Speciality                            Hospitalist      CARIN WATSON MRN-[id number] 46y Male     CHIEF PRESENTING COMPLAINT:  Patient is a 46y old  Male who presents with a chief complaint of DFU (06 Apr 2022 12:18)        SUBJECTIVE:  Patient was seen and examined at bedside.  No new complaints described by patient in morning rounds.   HISTORY OF PRESENTING ILLNESS:  HPI:  47 yo M with PMHx of Intellectual Disability, DM not on any medications presents with foot wounds. Pt has very poor foot hygiene and per the sister, has been persistently scratching an open wound on his L second toe, which worsened and became more red, had drainage, malodor. He saw a physician at [facility] and was told to come to the ED for evaluation. Pt is poor historian. Does endorse abdominal pain for a few days, cannot give much more description. Per the sister, pt did not seem to have any recent fevers, however is also not a very good historian and does not seem to have good health literacy. States that her and her mom decided to stop giving pt Metformin a while ago, are treating his diabetes by not giving him any sugary foods.  In the ED, T 97.8, /87, , RR 16, SpO2 99% on RA. Lab work unrevealing.  (31 Mar 2022 22:36)        REVIEW OF SYSTEMS:  Patient denies any headache, any vision complaints, runny nose. Denies chest pain, shortness of breath, palpitation. Denies nausea, vomiting, abdominal pain or diarrhoea, Denies dysuria. Denies  localized weakness in any part of the body or numbness.   At least 10 systems were reviewed in ROS. All systems reviewed  are within normal limits except for the complaints as described in Subjective.    PAST MEDICAL & SURGICAL HISTORY:  PAST MEDICAL & SURGICAL HISTORY:  DM (diabetes mellitus)    Intellectual disability            VITAL SIGNS:  Vital Signs Last 24 Hrs  T(C): 35.7 (09 Jun 2022 07:22), Max: 35.9 (08 Jun 2022 16:00)  T(F): 96.2 (09 Jun 2022 07:22), Max: 96.6 (08 Jun 2022 16:00)  HR: 91 (09 Jun 2022 07:22) (80 - 101)  BP: 113/56 (09 Jun 2022 07:22) (107/81 - 116/64)  BP(mean): --  RR: 18 (09 Jun 2022 07:22) (17 - 18)  SpO2: 97% (09 Jun 2022 07:22) (97% - 97%)    PHYSICAL EXAMINATION:  Not in acute distress  General: No icterus  HEENT:   no JVD.  Heart: S1+S2 audible  Lungs: bilateral  moderate air entry, no wheezing, no crepitations.  Abdomen: Soft, non-tender, non-distended , no  rigidity or guarding.  CNS: Awake alert, CN  grossly intact. Intellectual disability  Extremities:  No edema    , onychomycosis toe nails        CONSULTS:  Consultant(s) Notes Reviewed by me.   Care Discussed with Consultants/Other Providers where required.        MEDICATIONS:  MEDICATIONS  (STANDING):  ammonium lactate 12% Lotion 1 Application(s) Topical two times a day  clotrimazole 1% Cream 1 Application(s) Topical two times a day  enoxaparin Injectable 40 milliGRAM(s) SubCutaneous every 24 hours  metFORMIN 1000 milliGRAM(s) Oral two times a day  pantoprazole    Tablet 40 milliGRAM(s) Oral before breakfast  vitamin A &amp; D Ointment 1 Application(s) Topical daily    MEDICATIONS  (PRN):  ondansetron Injectable 4 milliGRAM(s) IV Push every 6 hours PRN Nausea and/or Vomiting            ASSESSMENT:    47 yo M with PMH of Intellectual Disability and DM not on any medications presented with foot wounds.    Covid  PNA  Onychomycosis  DM2  Intellectual disability/autism        Covid PNA- completed RDV  Nail bed wound and onychomycosis. Status post Aseptic debridement and curettage of all fungal and ingrowing nails 1-5 bilateral with sterile nail nipper 06/07  Podiatry- no surgical intervention is advised. outpt podiatry f/u  DM type 2 - uncontrolled. A1C 8.9. Continue  metformin 1000mg BID  Dental consult pending  Handoff:  Pending legal issue and guardianship, next  meeting due 06/23

## 2022-06-09 NOTE — CONSULT NOTE ADULT - SUBJECTIVE AND OBJECTIVE BOX
Patient is a 46y old  Male who presents with a chief complaint of having dental pain in the upper right quadrant.      HPI:  47 yo M with PMHx of Intellectual Disability, DM not on any medications presents with foot wounds. Pt has very poor foot hygiene and per the sister, has been persistently scratching an open wound on his L second toe, which worsened and became more red, had drainage, malodor. He saw a physician at [facility] and was told to come to the ED for evaluation. Pt is poor historian. Does endorse abdominal pain for a few days, cannot give much more description. Per the sister, pt did not seem to have any recent fevers, however is also not a very good historian and does not seem to have good health literacy. States that her and her mom decided to stop giving pt Metformin a while ago, are treating his diabetes by not giving him any sugary foods.  In the ED, T 97.8, /87, , RR 16, SpO2 99% on RA. Lab work unrevealing.  (31 Mar 2022 22:36)      PAST MEDICAL & SURGICAL HISTORY:  DM (diabetes mellitus)      Intellectual disability        (   ) heart valve replacement  (   ) joint replacement  (   ) pregnancy    MEDICATIONS  (STANDING):  ammonium lactate 12% Lotion 1 Application(s) Topical two times a day  chlorhexidine 4% Liquid 1 Application(s) Topical <User Schedule>  clotrimazole 1% Cream 1 Application(s) Topical two times a day  famotidine    Tablet 20 milliGRAM(s) Oral daily  glucagon  Injectable 1 milliGRAM(s) IntraMuscular once  insulin lispro (ADMELOG) corrective regimen sliding scale   SubCutaneous three times a day before meals  lactobacillus acidophilus 1 Tablet(s) Oral two times a day  melatonin 3 milliGRAM(s) Oral at bedtime  metFORMIN 1000 milliGRAM(s) Oral two times a day  sodium chloride 0.9%. 1000 milliLiter(s) (75 mL/Hr) IV Continuous <Continuous>  vitamin A &amp; D Ointment 1 Application(s) Topical daily    MEDICATIONS  (PRN):  acetaminophen     Tablet .. 650 milliGRAM(s) Oral every 6 hours PRN Temp greater or equal to 38C (100.4F), Mild Pain (1 - 3)  guaifenesin/dextromethorphan Oral Liquid 10 milliLiter(s) Oral every 4 hours PRN s  metoclopramide Injectable 10 milliGRAM(s) IV Push every 8 hours PRN Hiccups  zolpidem 5 milliGRAM(s) Oral at bedtime PRN Insomnia      Allergies    penicillin (Unknown)    Intolerances        FAMILY HISTORY:      *SOCIAL HISTORY: (   ) Tobacco; (   ) ETOH    *Last Dental Visit:    Vital Signs Last 24 Hrs  T(C): 35.7 (09 Jun 2022 07:22), Max: 35.9 (08 Jun 2022 16:00)  T(F): 96.2 (09 Jun 2022 07:22), Max: 96.6 (08 Jun 2022 16:00)  HR: 91 (09 Jun 2022 07:22) (80 - 101)  BP: 113/56 (09 Jun 2022 07:22) (107/81 - 116/64)  BP(mean): --  RR: 18 (09 Jun 2022 07:22) (17 - 18)  SpO2: 97% (09 Jun 2022 07:22) (97% - 97%)    LABS:                  EOE:  TMJ ( -  ) clicks                     (  - ) pops                     ( -  ) crepitus             Mandible <<FROM>>             Facial bones and MOM <<grossly intact>>             ( -  ) trismus             ( -  ) lymphadenopathy             ( -  ) swelling             ( -  ) asymmetry             ( -  ) palpation             ( -  ) dyspnea             ( -  ) dysphagia             ( -  ) loss of consciousness    IOE:  <<permanent>> dentition: <<multiple carious teeth>> and <<multiple missing teeth>>           hard/soft palate:  ( -  ) palatal torus, <<No pathology noted>>           tongue/FOM <<No pathology noted>>           labial/buccal mucosa <<No pathology noted>>           ( +  ) percussion           ( -  ) palpation           ( -  ) swelling            ( -  ) abscess           ( -  ) sinus tract    Dentition present: <<   >>  Mobility: <<  >>  Caries: <<   >>         *DENTAL RADIOGRAPHS: 1 panoramic and 1 periapical radiographs    RADIOLOGY & ADDITIONAL STUDIES:    *ASSESSMENT: Extra-oral exam does not show any swellings, lymphadenopathy. Intra-oral exam does not show any signs of active infections including abscess or swellings. Multiple grossly carious teeth detected. Radiographic findings show multiple root tips and periapical radiolucencies associated with multiple teeth in maxillary arch. Upper right quadrant: #1 is carious, #2 is root tips, #3 existing restoration and recurrent caries. Oral hygiene is poor and patient needs comprehensive dental evaluation and multiple extractions. Extraction cannot be done at this time due to consent issues. Treatment will be rendered once consent is obtained. Patient might need full mouth rehabilitation under general anesthesia.   behavior: +, Patient is cooperative for x-rays     RECOMMENDATIONS:  1) <<Clindamycin 150mg, q6h x 7days >>  2) Dental F/U with outpatient dentist for comprehensive dental care.   3) If any difficulty swallowing/breathing, fever occur, return to ER.     Laurita Peraza, DMD pager #[phone]

## 2022-06-10 LAB
GLUCOSE BLDC GLUCOMTR-MCNC: 195 MG/DL — HIGH (ref 70–99)
GLUCOSE BLDC GLUCOMTR-MCNC: 226 MG/DL — HIGH (ref 70–99)
GLUCOSE BLDC GLUCOMTR-MCNC: 241 MG/DL — HIGH (ref 70–99)
GLUCOSE BLDC GLUCOMTR-MCNC: 254 MG/DL — HIGH (ref 70–99)

## 2022-06-10 PROCEDURE — 99231 SBSQ HOSP IP/OBS SF/LOW 25: CPT

## 2022-06-10 RX ADMIN — Medication 1 APPLICATION(S): at 11:43

## 2022-06-10 RX ADMIN — Medication 150 MILLIGRAM(S): at 00:00

## 2022-06-10 RX ADMIN — METFORMIN HYDROCHLORIDE 1000 MILLIGRAM(S): 850 TABLET ORAL at 05:18

## 2022-06-10 RX ADMIN — Medication 150 MILLIGRAM(S): at 05:19

## 2022-06-10 RX ADMIN — Medication 1 APPLICATION(S): at 17:03

## 2022-06-10 RX ADMIN — CHLORHEXIDINE GLUCONATE 1 APPLICATION(S): 213 SOLUTION TOPICAL at 05:20

## 2022-06-10 RX ADMIN — Medication 3 MILLIGRAM(S): at 22:39

## 2022-06-10 RX ADMIN — Medication 150 MILLIGRAM(S): at 17:03

## 2022-06-10 RX ADMIN — Medication 3: at 11:43

## 2022-06-10 RX ADMIN — Medication 1 APPLICATION(S): at 05:22

## 2022-06-10 RX ADMIN — METFORMIN HYDROCHLORIDE 1000 MILLIGRAM(S): 850 TABLET ORAL at 17:02

## 2022-06-10 RX ADMIN — ZOLPIDEM TARTRATE 5 MILLIGRAM(S): 10 TABLET ORAL at 22:37

## 2022-06-10 RX ADMIN — Medication 1 TABLET(S): at 05:18

## 2022-06-10 RX ADMIN — Medication 1 APPLICATION(S): at 05:24

## 2022-06-10 RX ADMIN — Medication 1: at 07:52

## 2022-06-10 RX ADMIN — Medication 150 MILLIGRAM(S): at 11:44

## 2022-06-10 RX ADMIN — Medication 1 TABLET(S): at 17:03

## 2022-06-10 RX ADMIN — Medication 2: at 17:01

## 2022-06-10 RX ADMIN — FAMOTIDINE 20 MILLIGRAM(S): 10 INJECTION INTRAVENOUS at 11:44

## 2022-06-10 NOTE — PROGRESS NOTE ADULT - SUBJECTIVE AND OBJECTIVE BOX
CARIN WATSON 46y Male  MRN#: [id number]   Hospital Day: 71d    SUBJECTIVE  Patient is a 46y old Male who presents with a chief complaint of DFU (03 Jun 2022 15:32)  Currently admitted to medicine with the primary diagnosis of Onychomycosis      INTERVAL HPI AND OVERNIGHT EVENTS:  Patient was examined and seen at bedside. This morning he is resting comfortably in bed and reports no issues or overnight events.    OBJECTIVE  PAST MEDICAL & SURGICAL HISTORY  DM (diabetes mellitus)    Intellectual disability      ALLERGIES:  penicillin (Unknown)    MEDICATIONS:  STANDING MEDICATIONS  ammonium lactate 12% Lotion 1 Application(s) Topical two times a day  clotrimazole 1% Cream 1 Application(s) Topical two times a day  famotidine    Tablet 20 milliGRAM(s) Oral daily  glucagon  Injectable 1 milliGRAM(s) IntraMuscular once  insulin lispro (ADMELOG) corrective regimen sliding scale   SubCutaneous three times a day before meals  lactobacillus acidophilus 1 Tablet(s) Oral two times a day  melatonin 3 milliGRAM(s) Oral at bedtime  metFORMIN 1000 milliGRAM(s) Oral two times a day  sodium chloride 0.9%. 1000 milliLiter(s) IV Continuous <Continuous>  vitamin A &amp; D Ointment 1 Application(s) Topical daily    PRN MEDICATIONS  acetaminophen     Tablet .. 650 milliGRAM(s) Oral every 6 hours PRN  guaifenesin/dextromethorphan Oral Liquid 10 milliLiter(s) Oral every 4 hours PRN  metoclopramide Injectable 10 milliGRAM(s) IV Push every 8 hours PRN      VITAL SIGNS:  Vital Signs Last 24 Hrs  T(C): 36.2 (10 Mo 2022 07:22), Max: 36.4 (10 Mo 2022 01:05)  T(F): 97.1 (10 Mo 2022 07:22), Max: 97.5 (10 Mo 2022 01:05)  HR: 93 (10 Mo 2022 07:22) (93 - 100)  BP: 143/63 (10 Mo 2022 07:22) (126/76 - 143/63)  BP(mean): --  RR: 18 (10 Mo 2022 07:22) (17 - 18)  SpO2: 96% (10 Mo 2022 07:22) (96% - 96%)    LABS: order labs qweek (last day 6/4)    RADIOLOGY:    PHYSICAL EXAM:  General: well-appearing in NAD.   HEENT: Normocephalic, nontraumatic.   LUNGS: Clear to auscultation b/l. No wheezes, rales, or rhonchi.  HEART: RRR. No murmurs, rubs, or gallops.  ABDOMEN: Nontender, nondistended. + bowel sounds.  EXT: Nonedematous

## 2022-06-10 NOTE — PROGRESS NOTE ADULT - SUBJECTIVE AND OBJECTIVE BOX
Patient is a 46y old  Male who presents with a chief complaint of DFU (10 Mo 2022 10:21)      Patient seen and examined at bedside.    ALLERGIES:  penicillin (Unknown)    MEDICATIONS:  acetaminophen     Tablet .. 650 milliGRAM(s) Oral every 6 hours PRN  ammonium lactate 12% Lotion 1 Application(s) Topical two times a day  chlorhexidine 4% Liquid 1 Application(s) Topical <User Schedule>  clindamycin   Capsule 150 milliGRAM(s) Oral every 6 hours  clotrimazole 1% Cream 1 Application(s) Topical two times a day  famotidine    Tablet 20 milliGRAM(s) Oral daily  glucagon  Injectable 1 milliGRAM(s) IntraMuscular once  guaifenesin/dextromethorphan Oral Liquid 10 milliLiter(s) Oral every 4 hours PRN  insulin lispro (ADMELOG) corrective regimen sliding scale   SubCutaneous three times a day before meals  lactobacillus acidophilus 1 Tablet(s) Oral two times a day  melatonin 3 milliGRAM(s) Oral at bedtime  metFORMIN 1000 milliGRAM(s) Oral two times a day  metoclopramide Injectable 10 milliGRAM(s) IV Push every 8 hours PRN  sodium chloride 0.9%. 1000 milliLiter(s) IV Continuous <Continuous>  vitamin A &amp; D Ointment 1 Application(s) Topical daily  zolpidem 5 milliGRAM(s) Oral at bedtime PRN    Vital Signs Last 24 Hrs  T(F): 98.2 (10 Mo 2022 15:40), Max: 98.2 (10 Mo 2022 15:40)  HR: 106 (10 Mo 2022 15:40) (93 - 106)  BP: 140/87 (10 Mo 2022 15:40) (126/76 - 143/63)  RR: 18 (10 Mo 2022 15:40) (18 - 18)  SpO2: 97% (10 Mo 2022 15:40) (96% - 97%)  I&O's Summary    09 Jun 2022 07:01  -  10 Mo 2022 07:00  --------------------------------------------------------  IN: 650 mL / OUT: 0 mL / NET: 650 mL        PHYSICAL EXAM:  General: NAD, alert  ENT: MMM  Neck: Supple, No JVD  Lungs: Clear to auscultation bilaterally  Cardio: RRR, S1/S2, No murmurs  Abdomen: Soft, Nontender, Nondistended; Bowel sounds present  Extremities: No cyanosis, No edema    LABS:                    04-01 Chol 153 mg/dL LDL -- HDL 48 mg/dL Trig 53 mg/dL              POCT Blood Glucose.: 226 mg/dL (10 Mo 2022 16:48)  POCT Blood Glucose.: 254 mg/dL (10 Mo 2022 11:40)  POCT Blood Glucose.: 195 mg/dL (10 Mo 2022 07:43)  POCT Blood Glucose.: 320 mg/dL (09 Jun 2022 21:33)          COVID-19 PCR: Detected (05-21-22 @ 01:20)  COVID-19 PCR: NotDetec (05-19-22 @ 08:00)      RADIOLOGY & ADDITIONAL TESTS:    Care Discussed with Consultants/Other Providers:

## 2022-06-10 NOTE — PROGRESS NOTE ADULT - ASSESSMENT
47 yo M with PMHx of Intellectual Disability, DM not on any medications presents with foot wounds. Pt has very poor foot hygiene and per the sister, has been persistently scratching an open wound on his L second toe, which worsened and became more red, had drainage, malodor. He saw a physician at [facility] and was told to come to the ED for eval.      #Onychomycosis   - podiatry following - no sx intervention;   - s/p abx course; cont clotrimazole cream     #Covid positive - stable  - s/p RDV    #DM II (A1c 8.9) - pt no on meds at home   - cont inpt metformin     #Autism - patient is high functioning but unable to take care of basics.    #Insomnia  - cont melatonin; couseled sleep hygiene    #Dental Caries  - seen in dental clinic  - radiographic findings show multiple root tips and periapical radiolucencies associated with multiple teeth in maxillary arch.   - cont clinda for 7 days  - will need outpt f/u - Oral hygiene is poor and patient needs comprehensive dental evaluation and multiple extractions    DVT prophylaxis: not indicated, pt is ambulating   GI prophylaxis: Famotidine  Diet: CC  Activity: IAT   Disposition: Guardianship hearing pending for June 23

## 2022-06-10 NOTE — PROGRESS NOTE ADULT - ASSESSMENT
47 yo M with PMHx of Intellectual Disability, DM not on any medications presents with foot wounds. Pt has very poor foot hygiene and per the sister, has been persistently scratching an open wound on his L second toe, which worsened and became more red, had drainage, malodor. He saw a physician at [facility] and was told to come to the ED for eval.      #Onychomycosis   - podiatry following - no sx intervention;   - s/p abx course; cont clotrimazole cream     #Covid positive - stable  - s/p RDV    #DM II (A1c 8.9) - pt no on meds at home   - cont inpt metformin     #Autism - patient is high functioning but unable to take care of basics.    #Insomnia  - cont melatonin; couseled sleep hygiene    #Dental Caries  - seen in dental clinic  - radiographic findings show multiple root tips and periapical radiolucencies associated with multiple teeth in maxillary arch.   - cont clinda for 7 days  - will need outpt f/u - Oral hygiene is poor and patient needs comprehensive dental evaluation and multiple extractions    DVT prophylaxis: not indicated, pt is ambulating   GI prophylaxis: Famotidine  Diet: CC  Activity: IAT   Disposition: Guardianship hearing pending for June 23

## 2022-06-11 LAB
GLUCOSE BLDC GLUCOMTR-MCNC: 176 MG/DL — HIGH (ref 70–99)
GLUCOSE BLDC GLUCOMTR-MCNC: 199 MG/DL — HIGH (ref 70–99)
GLUCOSE BLDC GLUCOMTR-MCNC: 253 MG/DL — HIGH (ref 70–99)
GLUCOSE BLDC GLUCOMTR-MCNC: 366 MG/DL — HIGH (ref 70–99)

## 2022-06-11 PROCEDURE — 99231 SBSQ HOSP IP/OBS SF/LOW 25: CPT

## 2022-06-11 RX ADMIN — Medication 3: at 17:11

## 2022-06-11 RX ADMIN — Medication 1 APPLICATION(S): at 11:25

## 2022-06-11 RX ADMIN — Medication 1 APPLICATION(S): at 06:14

## 2022-06-11 RX ADMIN — Medication 1 APPLICATION(S): at 06:12

## 2022-06-11 RX ADMIN — METFORMIN HYDROCHLORIDE 1000 MILLIGRAM(S): 850 TABLET ORAL at 06:13

## 2022-06-11 RX ADMIN — Medication 3 MILLIGRAM(S): at 22:55

## 2022-06-11 RX ADMIN — Medication 5: at 07:55

## 2022-06-11 RX ADMIN — Medication 1 TABLET(S): at 06:14

## 2022-06-11 RX ADMIN — Medication 1: at 11:22

## 2022-06-11 RX ADMIN — Medication 1 APPLICATION(S): at 17:13

## 2022-06-11 RX ADMIN — Medication 1 TABLET(S): at 17:12

## 2022-06-11 RX ADMIN — Medication 150 MILLIGRAM(S): at 06:13

## 2022-06-11 RX ADMIN — CHLORHEXIDINE GLUCONATE 1 APPLICATION(S): 213 SOLUTION TOPICAL at 06:14

## 2022-06-11 RX ADMIN — METFORMIN HYDROCHLORIDE 1000 MILLIGRAM(S): 850 TABLET ORAL at 17:12

## 2022-06-11 RX ADMIN — FAMOTIDINE 20 MILLIGRAM(S): 10 INJECTION INTRAVENOUS at 11:23

## 2022-06-11 RX ADMIN — Medication 150 MILLIGRAM(S): at 11:23

## 2022-06-11 RX ADMIN — Medication 150 MILLIGRAM(S): at 00:00

## 2022-06-11 RX ADMIN — Medication 150 MILLIGRAM(S): at 17:11

## 2022-06-11 NOTE — PROGRESS NOTE ADULT - ASSESSMENT
47 yo M with PMHx of Intellectual Disability, DM not on any medications presents with foot wounds. Pt has very poor foot hygiene and per the sister, has been persistently scratching an open wound on his L second toe, which worsened and became more red, had drainage, malodor. He saw a physician at [facility] and was told to come to the ED for eval.      #Onychomycosis   - podiatry following - no sx intervention;   - s/p abx course; cont clotrimazole cream     #Covid positive - stable  - s/p RDV    #DM II (A1c 8.9) - pt no on meds at home   - cont inpt metformin     #Autism - patient is high functioning but unable to take care of basics.    #Insomnia  - cont melatonin; couseled sleep hygiene    #Dental Caries  - seen in dental clinic  - radiographic findings show multiple root tips and periapical radiolucencies associated with multiple teeth in maxillary arch.   - cont clinda for 7 days  - will need outpt f/u - Oral hygiene is poor and patient needs comprehensive dental evaluation and multiple extractions    DVT prophylaxis: not indicated, pt is ambulating   GI prophylaxis: Famotidine  Diet: CC  Activity: IAT   Disposition: Guardianship hearing pending for June 23

## 2022-06-11 NOTE — PROGRESS NOTE ADULT - SUBJECTIVE AND OBJECTIVE BOX
FARHADDAVIDCARIN 46y Male        Patient is a 46y old  Male who presents with a chief complaint of DFU (10 Mo 2022 16:56)      HPI:  45 yo M with PMHx of Intellectual Disability, DM not on any medications presents with foot wounds. Pt has very poor foot hygiene and per the sister, has been persistently scratching an open wound on his L second toe, which worsened and became more red, had drainage, malodor. He saw a physician at [facility] and was told to come to the ED for evaluation. Pt is poor historian. Does endorse abdominal pain for a few days, cannot give much more description. Per the sister, pt did not seem to have any recent fevers, however is also not a very good historian and does not seem to have good health literacy. States that her and her mom decided to stop giving pt Metformin a while ago, are treating his diabetes by not giving him any sugary foods.  In the ED, T 97.8, /87, , RR 16, SpO2 99% on RA. Lab work unrevealing.  (31 Mar 2022 22:36)      PAST MEDICAL & SURGICAL HISTORY:  DM (diabetes mellitus)      Intellectual disability          Father: - at age - with history of   Mother: - at age - with history of           Substance Use (street drugs): (  ) never used  (  ) other:  Tobacco Usage:  (   ) never smoked   (   ) former smoker   (   ) current smoker  (     ) pack year  Alcohol Usage:  Sexual History:   Recent Travel:      Allergies    penicillin (Unknown)    Intolerances        acetaminophen     Tablet .. 650 milliGRAM(s) Oral every 6 hours PRN  ammonium lactate 12% Lotion 1 Application(s) Topical two times a day  chlorhexidine 4% Liquid 1 Application(s) Topical <User Schedule>  clindamycin   Capsule 150 milliGRAM(s) Oral every 6 hours  clotrimazole 1% Cream 1 Application(s) Topical two times a day  famotidine    Tablet 20 milliGRAM(s) Oral daily  glucagon  Injectable 1 milliGRAM(s) IntraMuscular once  guaifenesin/dextromethorphan Oral Liquid 10 milliLiter(s) Oral every 4 hours PRN  insulin lispro (ADMELOG) corrective regimen sliding scale   SubCutaneous three times a day before meals  lactobacillus acidophilus 1 Tablet(s) Oral two times a day  melatonin 3 milliGRAM(s) Oral at bedtime  metFORMIN 1000 milliGRAM(s) Oral two times a day  metoclopramide Injectable 10 milliGRAM(s) IV Push every 8 hours PRN  sodium chloride 0.9%. 1000 milliLiter(s) IV Continuous <Continuous>  vitamin A &amp; D Ointment 1 Application(s) Topical daily  zolpidem 5 milliGRAM(s) Oral at bedtime PRN      REVIEW OF SYSTEMS:  CONSTITUTIONAL: No fever, weight loss, or fatigue  EYES: No eye pain, visual disturbances, or discharge  ENMT:  No difficulty hearing, tinnitus, vertigo; No sinus or throat pain  NECK: No pain or stiffness  BREASTS: No pain, masses, or nipple discharge  RESPIRATORY: No cough, wheezing, chills or hemoptysis; No shortness of breath  CARDIOVASCULAR: No chest pain, palpitations, dizziness, or leg swelling  GASTROINTESTINAL: No abdominal or epigastric pain. No nausea, vomiting, or hematemesis; No diarrhea or constipation. No melena or hematochezia.  GENITOURINARY: No dysuria, frequency, hematuria, or incontinence  NEUROLOGICAL: No headaches, memory loss, loss of strength, numbness, or tremors  SKIN: No itching, burning, rashes, or lesions   LYMPH NODES: No enlarged glands  ENDOCRINE: No heat or cold intolerance; No hair loss  MUSCULOSKELETAL: No joint pain or swelling; No muscle, back, or extremity pain  PSYCHIATRIC: No depression, anxiety, mood swings, or difficulty sleeping  HEME/LYMPH: No easy bruising, or bleeding gums  ALLERY AND IMMUNOLOGIC: No hives or eczema    ALL ROS REVIEWED AND NORMAL EXCEPT AS STATED ABOVE    T(C): 35.6 (06-11-22 @ 08:00), Max: 36.8 (06-10-22 @ 15:40)  HR: 91 (06-11-22 @ 08:00) (91 - 106)  BP: 129/61 (06-11-22 @ 08:00) (117/57 - 140/87)  RR: 18 (06-11-22 @ 08:00) (18 - 18)  SpO2: 97% (06-10-22 @ 15:40) (97% - 97%)  Wt(kg): --Vital Signs Last 24 Hrs  T(C): 35.6 (11 Jun 2022 08:00), Max: 36.8 (10 Mo 2022 15:40)  T(F): 96 (11 Jun 2022 08:00), Max: 98.2 (10 Mo 2022 15:40)  HR: 91 (11 Jun 2022 08:00) (91 - 106)  BP: 129/61 (11 Jun 2022 08:00) (117/57 - 140/87)  BP(mean): --  RR: 18 (11 Jun 2022 08:00) (18 - 18)  SpO2: 97% (10 Mo 2022 15:40) (97% - 97%)    PHYSICAL EXAM:  GENERAL: NAD, well-groomed, well-developed  HEAD:  Atraumatic, Normocephalic  EYES: EOMI, PERRLA, conjunctiva and sclera clear  ENMT: No tonsillar erythema, exudates, or enlargement; Moist mucous membranes, Good dentition, No lesions  NECK: Supple, No JVD, Normal thyroid  NERVOUS SYSTEM:  Alert & Oriented X3, Good concentration; Motor Strength 5/5 B/L upper and lower extremities; DTRs 2+ intact and symmetric  CHEST/LUNG: Clear to percussion bilaterally; No rales, rhonchi, wheezing, or rubs  HEART: Regular rate and rhythm; No murmurs, rubs, or gallops  ABDOMEN: Soft, Nontender, Nondistended; Bowel sounds present  EXTREMITIES:  2+ Peripheral Pulses, No clubbing, cyanosis, or edema  LYMPH: No lymphadenopathy noted  SKIN: No rashes or lesions        LABS:               CAPILLARY BLOOD GLUCOSE      POCT Blood Glucose.: 199 mg/dL (11 Jun 2022 11:12)  POCT Blood Glucose.: 366 mg/dL (11 Jun 2022 07:24)  POCT Blood Glucose.: 241 mg/dL (10 Mo 2022 21:27)  POCT Blood Glucose.: 226 mg/dL (10 Mo 2022 16:48)            RADIOLOGY & ADDITIONAL TESTS:    Consultant(s) Notes Reviewed:  [x ] YES  [ ] NO  Care Discussed with Consultants/Other Providers [ x] YES  [ ] NO  Imaging Personally Reviewed:  [ ] YES  [ ] NO

## 2022-06-12 LAB
GLUCOSE BLDC GLUCOMTR-MCNC: 194 MG/DL — HIGH (ref 70–99)
GLUCOSE BLDC GLUCOMTR-MCNC: 215 MG/DL — HIGH (ref 70–99)
GLUCOSE BLDC GLUCOMTR-MCNC: 221 MG/DL — HIGH (ref 70–99)
GLUCOSE BLDC GLUCOMTR-MCNC: 347 MG/DL — HIGH (ref 70–99)

## 2022-06-12 RX ADMIN — Medication 1 TABLET(S): at 17:04

## 2022-06-12 RX ADMIN — Medication 150 MILLIGRAM(S): at 17:04

## 2022-06-12 RX ADMIN — CHLORHEXIDINE GLUCONATE 1 APPLICATION(S): 213 SOLUTION TOPICAL at 06:35

## 2022-06-12 RX ADMIN — Medication 1 APPLICATION(S): at 11:55

## 2022-06-12 RX ADMIN — Medication 1: at 07:54

## 2022-06-12 RX ADMIN — FAMOTIDINE 20 MILLIGRAM(S): 10 INJECTION INTRAVENOUS at 11:55

## 2022-06-12 RX ADMIN — METFORMIN HYDROCHLORIDE 1000 MILLIGRAM(S): 850 TABLET ORAL at 06:36

## 2022-06-12 RX ADMIN — METFORMIN HYDROCHLORIDE 1000 MILLIGRAM(S): 850 TABLET ORAL at 17:03

## 2022-06-12 RX ADMIN — Medication 4: at 17:02

## 2022-06-12 RX ADMIN — Medication 1 APPLICATION(S): at 06:35

## 2022-06-12 RX ADMIN — Medication 2: at 11:54

## 2022-06-12 RX ADMIN — Medication 150 MILLIGRAM(S): at 01:12

## 2022-06-12 RX ADMIN — Medication 3 MILLIGRAM(S): at 21:22

## 2022-06-12 RX ADMIN — Medication 150 MILLIGRAM(S): at 06:35

## 2022-06-12 RX ADMIN — Medication 1 TABLET(S): at 06:36

## 2022-06-12 RX ADMIN — Medication 150 MILLIGRAM(S): at 11:55

## 2022-06-12 NOTE — PROGRESS NOTE ADULT - ASSESSMENT
________________________________________________________________________________  DAILY PROGRESS NOTE:    ================== SUBJECTIVE ==================    CARIN WATSON  /   46y  /  Male  /  MRN#: [id number]  Patient is a 46y old Male who presents with a chief complaint of DFU (11 Jun 2022 13:27)  Currently admitted to medicine with the primary diagnosis of Onychomycosis    HOSPITAL DAY: 73d     OVERNIGHT EVENTS: None    TODAY: Patient was seen this morning at bedside. Currently, the patient reports no complaints     Review of systems is otherwise negative.    =================== OBJECTIVE ===================    VITAL SIGNS: Last 24 Hours  T(C): 35.7 (12 Jun 2022 08:00), Max: 36 (11 Jun 2022 15:40)  T(F): 96.2 (12 Jun 2022 08:00), Max: 96.8 (11 Jun 2022 15:40)  HR: 83 (12 Jun 2022 08:00) (83 - 95)  BP: 112/62 (12 Jun 2022 08:00) (112/62 - 133/73)  BP(mean): --  RR: 18 (12 Jun 2022 08:00) (18 - 19)  SpO2: --    06-11-22 @ 07:01  -  06-12-22 @ 07:00  --------------------------------------------------------  IN: 680 mL / OUT: 0 mL / NET: 680 mL      PHYSICAL EXAM:  GENERAL: NAD, well-groomed, well-developed  NERVOUS SYSTEM:  Alert & Oriented X3, Good concentration; Motor Strength 5/5 B/L upper and lower extremities; DTRs 2+ intact and symmetric  CHEST/LUNG: Clear to percussion bilaterally; No rales, rhonchi, wheezing, or rubs  HEART: Regular rate and rhythm; No murmurs, rubs, or gallops  ABDOMEN: Soft, Nontender, Nondistended; Bowel sounds present  EXTREMITIES:  2+ Peripheral Pulses, No clubbing, cyanosis, or edema  LYMPH: No lymphadenopathy noted  SKIN: Skin warm, dry and supple bilateral.  No open lesions or inter-digital macerations noted bilateral.   Toenails 1-5 Right and Left feet thickened, elongated, discolored, and dystrophic with subungual debris. There is pain upon palpation of all fungal and ingrowing nails 1-5 bilaterally.    ================== ALLERGIES ===================  penicillin (Unknown)      ==================== MEDS =====================  ammonium lactate 12% Lotion 1 Application(s) Topical two times a day  chlorhexidine 4% Liquid 1 Application(s) Topical <User Schedule>  clindamycin   Capsule 150 milliGRAM(s) Oral every 6 hours  clotrimazole 1% Cream 1 Application(s) Topical two times a day  famotidine    Tablet 20 milliGRAM(s) Oral daily  glucagon  Injectable 1 milliGRAM(s) IntraMuscular once  insulin lispro (ADMELOG) corrective regimen sliding scale   SubCutaneous three times a day before meals  lactobacillus acidophilus 1 Tablet(s) Oral two times a day  melatonin 3 milliGRAM(s) Oral at bedtime  metFORMIN 1000 milliGRAM(s) Oral two times a day  sodium chloride 0.9%. 1000 milliLiter(s) IV Continuous <Continuous>  vitamin A &amp; D Ointment 1 Application(s) Topical daily    PRN MEDICATIONS  acetaminophen     Tablet .. 650 milliGRAM(s) Oral every 6 hours PRN  guaifenesin/dextromethorphan Oral Liquid 10 milliLiter(s) Oral every 4 hours PRN  metoclopramide Injectable 10 milliGRAM(s) IV Push every 8 hours PRN  zolpidem 5 milliGRAM(s) Oral at bedtime PRN      47 yo M with PMHx of Intellectual Disability, DM not on any medications presents with foot wounds. Pt has very poor foot hygiene and per the sister, has been persistently scratching an open wound on his L second toe, which worsened and became more red, had drainage, malodor. He saw a physician at [facility] and was told to come to the ED for eval.      #Onychomycosis   Aseptic debridement and curettage of all fungal and ingrowing nails 1-5 bilateral with sterile nail nipper.  - podiatry following - no sx intervention;   - s/p abx course; cont clotrimazole cream  - Patient Would Benefit From Periodic Debridements; Can Follow Up as Outpatient w/ Dr. Tucker Post Discharge      #Covid positive - stable  - s/p RDV    #DM II (A1c 8.9) - pt no on meds at home   - cont inpt metformin     #Autism - patient is high functioning but unable to take care of basics.    #Insomnia  - cont melatonin; couseled sleep hygiene    #Dental Caries  - seen in dental clinic  - radiographic findings show multiple root tips and periapical radiolucencies associated with multiple teeth in maxillary arch.   - cont clinda for 7 days  - will need outpt f/u - Oral hygiene is poor and patient needs comprehensive dental evaluation and multiple extractions    DVT prophylaxis: not indicated, pt is ambulating   GI prophylaxis: Famotidine  Diet: CC  Activity: IAT   Disposition: Guardianship hearing pending for June 23

## 2022-06-13 LAB
GLUCOSE BLDC GLUCOMTR-MCNC: 185 MG/DL — HIGH (ref 70–99)
GLUCOSE BLDC GLUCOMTR-MCNC: 267 MG/DL — HIGH (ref 70–99)
GLUCOSE BLDC GLUCOMTR-MCNC: 318 MG/DL — HIGH (ref 70–99)

## 2022-06-13 PROCEDURE — 99231 SBSQ HOSP IP/OBS SF/LOW 25: CPT

## 2022-06-13 RX ADMIN — Medication 150 MILLIGRAM(S): at 17:39

## 2022-06-13 RX ADMIN — FAMOTIDINE 20 MILLIGRAM(S): 10 INJECTION INTRAVENOUS at 12:27

## 2022-06-13 RX ADMIN — Medication 1 APPLICATION(S): at 12:28

## 2022-06-13 RX ADMIN — Medication 3: at 12:25

## 2022-06-13 RX ADMIN — Medication 150 MILLIGRAM(S): at 12:27

## 2022-06-13 RX ADMIN — Medication 1 TABLET(S): at 17:39

## 2022-06-13 RX ADMIN — Medication 1 APPLICATION(S): at 17:39

## 2022-06-13 RX ADMIN — Medication 150 MILLIGRAM(S): at 06:22

## 2022-06-13 RX ADMIN — METFORMIN HYDROCHLORIDE 1000 MILLIGRAM(S): 850 TABLET ORAL at 06:22

## 2022-06-13 RX ADMIN — Medication 3 MILLIGRAM(S): at 22:00

## 2022-06-13 RX ADMIN — Medication 1 TABLET(S): at 06:22

## 2022-06-13 RX ADMIN — CHLORHEXIDINE GLUCONATE 1 APPLICATION(S): 213 SOLUTION TOPICAL at 06:22

## 2022-06-13 RX ADMIN — METFORMIN HYDROCHLORIDE 1000 MILLIGRAM(S): 850 TABLET ORAL at 17:39

## 2022-06-13 RX ADMIN — Medication 1: at 08:15

## 2022-06-13 NOTE — PROGRESS NOTE ADULT - SUBJECTIVE AND OBJECTIVE BOX
CARIN WATSON 46y Male  MRN#: [id number]     Hospital Day: 74d    Pt is currently admitted with the primary diagnosis of  DM FOOT ULCERS; HYPERGLYCEMIA        SUBJECTIVE     Overnight events  None    Subjective complaints  Pt was evaluated this am. Patient denied any active complaints.                                            ----------------------------------------------------------  OBJECTIVE  PAST MEDICAL & SURGICAL HISTORY  DM (diabetes mellitus)    Intellectual disability                                              -----------------------------------------------------------  ALLERGIES:  penicillin (Unknown)                                            ------------------------------------------------------------    HOME MEDICATIONS  Home Medications:  vitamin A and D topical ointment: 1 application topically once a day (12 Apr 2022 11:40)                           MEDICATIONS:  STANDING MEDICATIONS  ammonium lactate 12% Lotion 1 Application(s) Topical two times a day  chlorhexidine 4% Liquid 1 Application(s) Topical <User Schedule>  clindamycin   Capsule 150 milliGRAM(s) Oral every 6 hours  clotrimazole 1% Cream 1 Application(s) Topical two times a day  famotidine    Tablet 20 milliGRAM(s) Oral daily  glucagon  Injectable 1 milliGRAM(s) IntraMuscular once  insulin lispro (ADMELOG) corrective regimen sliding scale   SubCutaneous three times a day before meals  lactobacillus acidophilus 1 Tablet(s) Oral two times a day  melatonin 3 milliGRAM(s) Oral at bedtime  metFORMIN 1000 milliGRAM(s) Oral two times a day  sodium chloride 0.9%. 1000 milliLiter(s) IV Continuous <Continuous>  vitamin A &amp; D Ointment 1 Application(s) Topical daily    PRN MEDICATIONS  acetaminophen     Tablet .. 650 milliGRAM(s) Oral every 6 hours PRN  guaifenesin/dextromethorphan Oral Liquid 10 milliLiter(s) Oral every 4 hours PRN  metoclopramide Injectable 10 milliGRAM(s) IV Push every 8 hours PRN  zolpidem 5 milliGRAM(s) Oral at bedtime PRN                                            ------------------------------------------------------------  VITAL SIGNS: Last 24 Hours  T(C): 35.7 (13 Jun 2022 08:02), Max: 36.4 (13 Jun 2022 00:00)  T(F): 96.3 (13 Jun 2022 08:02), Max: 97.5 (13 Jun 2022 00:00)  HR: 102 (13 Jun 2022 08:02) (71 - 102)  BP: 123/73 (13 Jun 2022 08:02) (111/55 - 126/59)  BP(mean): --  RR: 18 (13 Jun 2022 08:02) (18 - 19)  SpO2: --      06-12-22 @ 07:01  -  06-13-22 @ 07:00  --------------------------------------------------------  IN: 1040 mL / OUT: 0 mL / NET: 1040 mL                                             --------------------------------------------------------------  LABS:                                                                    -------------------------------------------------------------  RADIOLOGY:                                            --------------------------------------------------------------    PHYSICAL EXAM:  GENERAL: NAD, lying in bed comfortably  HEAD:  Atraumatic, Normocephalic  EYES: EOMI, conjunctiva and sclera clear  ENT: Moist mucous membranes  NECK: Supple, No JVD  CHEST/LUNG: Clear to auscultation bilaterally; No rales, rhonchi, wheezing, or rubs. Unlabored respirations  HEART: regular rate and rhythm; No murmurs, rubs, or gallops  ABDOMEN: Bowel sounds present; Soft, Nontender, Nondistended.    EXTREMITIES: Warm. No clubbing, cyanosis, or edema  NERVOUS SYSTEM:  Alert & Oriented X3. No focal deficits   SKIN: No rashes or lesions                                           --------------------------------------------------------------

## 2022-06-13 NOTE — PROGRESS NOTE ADULT - ASSESSMENT

## 2022-06-13 NOTE — PROGRESS NOTE ADULT - SUBJECTIVE AND OBJECTIVE BOX
CARIN WATSON  46y Male    INTERVAL HPI/OVERNIGHT EVENTS:    ambulating  no fever  c/o tooth and foot pain when asked  otherwise feels well    T(F): 96.3 (06-13-22 @ 08:02), Max: 97.5 (06-13-22 @ 00:00)  HR: 102 (06-13-22 @ 08:02) (97 - 102)  BP: 123/73 (06-13-22 @ 08:02) (111/55 - 123/73)  RR: 18 (06-13-22 @ 08:02) (18 - 18)  SpO2: --  I&O's Summary    12 Jun 2022 07:01  -  13 Jun 2022 07:00  --------------------------------------------------------  IN: 1040 mL / OUT: 0 mL / NET: 1040 mL      CAPILLARY BLOOD GLUCOSE      POCT Blood Glucose.: 267 mg/dL (13 Jun 2022 11:34)  POCT Blood Glucose.: 185 mg/dL (13 Jun 2022 07:55)  POCT Blood Glucose.: 221 mg/dL (12 Jun 2022 21:35)  POCT Blood Glucose.: 347 mg/dL (12 Jun 2022 16:44)        PHYSICAL EXAM:  GENERAL: NAD  HEAD:  Normocephalic  EYES:  conjunctiva and sclera clear  ENMT: Moist mucous membranes, poor dentition  NERVOUS SYSTEM:  Alert, awake, Good concentration  CHEST/LUNG: CTA b/l  HEART: Regular rate and rhythm  ABDOMEN: Soft, Nontender, Nondistended; Bowel sounds present  EXTREMITIES:   No edema  SKIN: warm, dry    Consultant(s) Notes Reviewed:  [x ] YES  [ ] NO  Care Discussed with Consultants/Other Providers [ x] YES  [ ] NO    MEDICATIONS  (STANDING):  ammonium lactate 12% Lotion 1 Application(s) Topical two times a day  chlorhexidine 4% Liquid 1 Application(s) Topical <User Schedule>  clindamycin   Capsule 150 milliGRAM(s) Oral every 6 hours  clotrimazole 1% Cream 1 Application(s) Topical two times a day  famotidine    Tablet 20 milliGRAM(s) Oral daily  glucagon  Injectable 1 milliGRAM(s) IntraMuscular once  insulin lispro (ADMELOG) corrective regimen sliding scale   SubCutaneous three times a day before meals  lactobacillus acidophilus 1 Tablet(s) Oral two times a day  melatonin 3 milliGRAM(s) Oral at bedtime  metFORMIN 1000 milliGRAM(s) Oral two times a day  sodium chloride 0.9%. 1000 milliLiter(s) (75 mL/Hr) IV Continuous <Continuous>  vitamin A &amp; D Ointment 1 Application(s) Topical daily    MEDICATIONS  (PRN):  acetaminophen     Tablet .. 650 milliGRAM(s) Oral every 6 hours PRN Temp greater or equal to 38C (100.4F), Mild Pain (1 - 3)  guaifenesin/dextromethorphan Oral Liquid 10 milliLiter(s) Oral every 4 hours PRN s  metoclopramide Injectable 10 milliGRAM(s) IV Push every 8 hours PRN Hiccups  zolpidem 5 milliGRAM(s) Oral at bedtime PRN Insomnia        Case discussed with residents and RN on rounds today    Care discussed with pt

## 2022-06-13 NOTE — PROGRESS NOTE ADULT - ASSESSMENT
45 y/o man with PMH of Intellectual Disability and DM not on any medications presented on 3/31/22 with foot wounds. Pt has very poor foot hygiene and per the sister, he has been persistently scratching an open wound on his Leftsecond toe, which worsened and became more red, had drainage and was malodorous. He saw a physician at [facility] and was told to come to the ED for eval.    1. Onychomycosis   - podiatry following - no sx intervention  - s/p abx course; continue clotrimazole cream     2. Covid positive on 5/21 - stable- now off isolation  - s/p Remdesivir    3. DM II (A1c 8.9) - uncontrolled at times  pt no on meds at home   - on metformin 1000mg bid  - may need second agent if FS remain elevated and pt is compliant with carb consistent diet    4. Autism - unable to take care of basics    5. Insomnia  - cont melatonin    6. Dental Caries  - seen in dental clinic  - radiographic findings show multiple root tips and periapical radiolucencies associated with multiple teeth in maxillary arch.   - continue clindamycin for 7 days  - will need outpt f/u - Oral hygiene is poor and patient needs comprehensive dental evaluation and multiple extractions    7. DVT prophylaxis: not indicated, pt is ambulating in the hallway all day    full code status        PROGRESS NOTE HANDOFF    Pending: Guardianship hearing June 23  Disposition depending on outcome

## 2022-06-14 LAB
GLUCOSE BLDC GLUCOMTR-MCNC: 173 MG/DL — HIGH (ref 70–99)
GLUCOSE BLDC GLUCOMTR-MCNC: 176 MG/DL — HIGH (ref 70–99)
GLUCOSE BLDC GLUCOMTR-MCNC: 240 MG/DL — HIGH (ref 70–99)
GLUCOSE BLDC GLUCOMTR-MCNC: 248 MG/DL — HIGH (ref 70–99)

## 2022-06-14 PROCEDURE — 99231 SBSQ HOSP IP/OBS SF/LOW 25: CPT

## 2022-06-14 RX ADMIN — Medication 3 MILLIGRAM(S): at 23:00

## 2022-06-14 RX ADMIN — CHLORHEXIDINE GLUCONATE 1 APPLICATION(S): 213 SOLUTION TOPICAL at 05:20

## 2022-06-14 RX ADMIN — Medication 1 APPLICATION(S): at 11:07

## 2022-06-14 RX ADMIN — Medication 150 MILLIGRAM(S): at 05:19

## 2022-06-14 RX ADMIN — Medication 1: at 11:55

## 2022-06-14 RX ADMIN — METFORMIN HYDROCHLORIDE 1000 MILLIGRAM(S): 850 TABLET ORAL at 17:21

## 2022-06-14 RX ADMIN — Medication 1 APPLICATION(S): at 05:20

## 2022-06-14 RX ADMIN — Medication 1 APPLICATION(S): at 17:26

## 2022-06-14 RX ADMIN — Medication 150 MILLIGRAM(S): at 17:21

## 2022-06-14 RX ADMIN — Medication 150 MILLIGRAM(S): at 23:09

## 2022-06-14 RX ADMIN — ZOLPIDEM TARTRATE 5 MILLIGRAM(S): 10 TABLET ORAL at 23:09

## 2022-06-14 RX ADMIN — Medication 1: at 07:58

## 2022-06-14 RX ADMIN — FAMOTIDINE 20 MILLIGRAM(S): 10 INJECTION INTRAVENOUS at 11:06

## 2022-06-14 RX ADMIN — Medication 150 MILLIGRAM(S): at 00:43

## 2022-06-14 RX ADMIN — Medication 150 MILLIGRAM(S): at 11:06

## 2022-06-14 RX ADMIN — METFORMIN HYDROCHLORIDE 1000 MILLIGRAM(S): 850 TABLET ORAL at 05:20

## 2022-06-14 RX ADMIN — Medication 2: at 17:22

## 2022-06-14 RX ADMIN — Medication 1 TABLET(S): at 17:21

## 2022-06-14 RX ADMIN — Medication 1 TABLET(S): at 05:19

## 2022-06-14 NOTE — PROGRESS NOTE ADULT - ASSESSMENT

## 2022-06-14 NOTE — PROGRESS NOTE ADULT - SUBJECTIVE AND OBJECTIVE BOX
CARIN WATSON  46y Male    INTERVAL HPI/OVERNIGHT EVENTS:    no new complaints  left foot pain and tooth discomfort at times  no fever    T(F): 97.9 (06-14-22 @ 08:04), Max: 97.9 (06-14-22 @ 08:04)  HR: 95 (06-14-22 @ 08:04) (95 - 103)  BP: 117/56 (06-14-22 @ 08:04) (109/51 - 127/65)  RR: 18 (06-14-22 @ 08:04) (18 - 18)  SpO2: --  I&O's Summary    14 Jun 2022 07:01  -  14 Jun 2022 12:15  --------------------------------------------------------  IN: 360 mL / OUT: 0 mL / NET: 360 mL      CAPILLARY BLOOD GLUCOSE      POCT Blood Glucose.: 173 mg/dL (14 Jun 2022 11:54)  POCT Blood Glucose.: 176 mg/dL (14 Jun 2022 07:54)  POCT Blood Glucose.: 318 mg/dL (13 Jun 2022 21:29)        PHYSICAL EXAM:  GENERAL: NAD  HEAD:  Normocephalic  EYES:  conjunctiva and sclera clear  ENMT: Moist mucous membranes  NERVOUS SYSTEM:  Alert, awake, Good concentration  CHEST/LUNG: CTA b/l  HEART: Regular rate and rhythm  ABDOMEN: Soft, Nontender, Nondistended  EXTREMITIES:   No edema    Consultant(s) Notes Reviewed:  [x ] YES  [ ] NO  Care Discussed with Consultants/Other Providers [ x] YES  [ ] NO    MEDICATIONS  (STANDING):  ammonium lactate 12% Lotion 1 Application(s) Topical two times a day  chlorhexidine 4% Liquid 1 Application(s) Topical <User Schedule>  clindamycin   Capsule 150 milliGRAM(s) Oral every 6 hours  clotrimazole 1% Cream 1 Application(s) Topical two times a day  famotidine    Tablet 20 milliGRAM(s) Oral daily  glucagon  Injectable 1 milliGRAM(s) IntraMuscular once  insulin lispro (ADMELOG) corrective regimen sliding scale   SubCutaneous three times a day before meals  lactobacillus acidophilus 1 Tablet(s) Oral two times a day  melatonin 3 milliGRAM(s) Oral at bedtime  metFORMIN 1000 milliGRAM(s) Oral two times a day  sodium chloride 0.9%. 1000 milliLiter(s) (75 mL/Hr) IV Continuous <Continuous>  vitamin A &amp; D Ointment 1 Application(s) Topical daily    MEDICATIONS  (PRN):  acetaminophen     Tablet .. 650 milliGRAM(s) Oral every 6 hours PRN Temp greater or equal to 38C (100.4F), Mild Pain (1 - 3)  guaifenesin/dextromethorphan Oral Liquid 10 milliLiter(s) Oral every 4 hours PRN s  metoclopramide Injectable 10 milliGRAM(s) IV Push every 8 hours PRN Hiccups  zolpidem 5 milliGRAM(s) Oral at bedtime PRN Insomnia      Case discussed with residents and RN on rounds today    Care discussed with pt

## 2022-06-14 NOTE — PROGRESS NOTE ADULT - ASSESSMENT
47 y/o man with PMH of Intellectual Disability and DM not on any medications presented on 3/31/22 with foot wounds. Pt has very poor foot hygiene and per the sister, he has been persistently scratching an open wound on his Left second toe, which worsened and became more red, had drainage and was malodorous. He saw a physician at [facility] and was told to come to the ED for eval.    1. Onychomycosis   - podiatry following - no sx intervention  - s/p abx course; continue clotrimazole cream     2. Covid positive on 5/21 - stable- now off isolation  - s/p Remdesivir    3. DM II (A1c 8.9) - uncontrolled at times  pt no on meds at home   - on metformin 1000mg bid  - may need second agent if FS remain elevated and pt is compliant with carb consistent diet    4. Autism - unable to take care of basics    5. Insomnia  - cont melatonin    6. Dental Caries  - seen in dental clinic  - radiographic findings show multiple root tips and periapical radiolucencies associated with multiple teeth in maxillary arch.   - continue clindamycin for 7 days  - will need outpt f/u - Oral hygiene is poor and patient needs comprehensive dental evaluation and multiple extractions    7. DVT prophylaxis: not indicated, pt is ambulating in the hallway all day    full code status        PROGRESS NOTE HANDOFF    Pending: Guardianship hearing June 23  Disposition depending on outcome

## 2022-06-14 NOTE — PROGRESS NOTE ADULT - SUBJECTIVE AND OBJECTIVE BOX
CARIN WATSON 46y Male  MRN#: [id number]     Hospital Day: 75d    Pt is currently admitted with the primary diagnosis of  DM FOOT ULCERS; HYPERGLYCEMIA        SUBJECTIVE     Overnight events  None    Subjective complaints  Pt was evaluated this am. Patient denied any active complaints                                          ----------------------------------------------------------  OBJECTIVE  PAST MEDICAL & SURGICAL HISTORY  DM (diabetes mellitus)    Intellectual disability                                              -----------------------------------------------------------  ALLERGIES:  penicillin (Unknown)                                            ------------------------------------------------------------    HOME MEDICATIONS  Home Medications:  vitamin A and D topical ointment: 1 application topically once a day (12 Apr 2022 11:40)                           MEDICATIONS:  STANDING MEDICATIONS  ammonium lactate 12% Lotion 1 Application(s) Topical two times a day  chlorhexidine 4% Liquid 1 Application(s) Topical <User Schedule>  clindamycin   Capsule 150 milliGRAM(s) Oral every 6 hours  clotrimazole 1% Cream 1 Application(s) Topical two times a day  famotidine    Tablet 20 milliGRAM(s) Oral daily  glucagon  Injectable 1 milliGRAM(s) IntraMuscular once  insulin lispro (ADMELOG) corrective regimen sliding scale   SubCutaneous three times a day before meals  lactobacillus acidophilus 1 Tablet(s) Oral two times a day  melatonin 3 milliGRAM(s) Oral at bedtime  metFORMIN 1000 milliGRAM(s) Oral two times a day  sodium chloride 0.9%. 1000 milliLiter(s) IV Continuous <Continuous>  vitamin A &amp; D Ointment 1 Application(s) Topical daily    PRN MEDICATIONS  acetaminophen     Tablet .. 650 milliGRAM(s) Oral every 6 hours PRN  guaifenesin/dextromethorphan Oral Liquid 10 milliLiter(s) Oral every 4 hours PRN  metoclopramide Injectable 10 milliGRAM(s) IV Push every 8 hours PRN  zolpidem 5 milliGRAM(s) Oral at bedtime PRN                                            ------------------------------------------------------------  VITAL SIGNS: Last 24 Hours  T(C): 36.6 (14 Jun 2022 08:04), Max: 36.6 (14 Jun 2022 08:04)  T(F): 97.9 (14 Jun 2022 08:04), Max: 97.9 (14 Jun 2022 08:04)  HR: 95 (14 Jun 2022 08:04) (95 - 103)  BP: 117/56 (14 Jun 2022 08:04) (109/51 - 127/65)  BP(mean): --  RR: 18 (14 Jun 2022 08:04) (18 - 18)  SpO2: --                                             --------------------------------------------------------------  LABS:                                                                    -------------------------------------------------------------  RADIOLOGY:                                            --------------------------------------------------------------    PHYSICAL EXAM:  GENERAL: NAD, lying in bed comfortably  HEAD:  Atraumatic, Normocephalic  EYES: EOMI, conjunctiva and sclera clear  ENT: Moist mucous membranes  NECK: Supple, No JVD  CHEST/LUNG: Clear to auscultation bilaterally; No rales, rhonchi, wheezing, or rubs. Unlabored respirations  HEART: regular rate and rhythm; No murmurs, rubs, or gallops  ABDOMEN: Bowel sounds present; Soft, Nontender, Nondistended.    EXTREMITIES: Warm. No clubbing, cyanosis, or edema  NERVOUS SYSTEM:  Alert & Oriented X3. No focal deficits   SKIN: No rashes or lesions                                           --------------------------------------------------------------

## 2022-06-15 LAB
GLUCOSE BLDC GLUCOMTR-MCNC: 164 MG/DL — HIGH (ref 70–99)
GLUCOSE BLDC GLUCOMTR-MCNC: 244 MG/DL — HIGH (ref 70–99)
GLUCOSE BLDC GLUCOMTR-MCNC: 290 MG/DL — HIGH (ref 70–99)
GLUCOSE BLDC GLUCOMTR-MCNC: 311 MG/DL — HIGH (ref 70–99)

## 2022-06-15 PROCEDURE — 99231 SBSQ HOSP IP/OBS SF/LOW 25: CPT

## 2022-06-15 RX ADMIN — METFORMIN HYDROCHLORIDE 1000 MILLIGRAM(S): 850 TABLET ORAL at 17:13

## 2022-06-15 RX ADMIN — Medication 1: at 08:29

## 2022-06-15 RX ADMIN — Medication 150 MILLIGRAM(S): at 05:30

## 2022-06-15 RX ADMIN — Medication 3 MILLIGRAM(S): at 21:47

## 2022-06-15 RX ADMIN — Medication 1 TABLET(S): at 17:17

## 2022-06-15 RX ADMIN — Medication 150 MILLIGRAM(S): at 11:48

## 2022-06-15 RX ADMIN — Medication 150 MILLIGRAM(S): at 17:13

## 2022-06-15 RX ADMIN — Medication 1 APPLICATION(S): at 17:13

## 2022-06-15 RX ADMIN — CHLORHEXIDINE GLUCONATE 1 APPLICATION(S): 213 SOLUTION TOPICAL at 05:30

## 2022-06-15 RX ADMIN — Medication 2: at 11:47

## 2022-06-15 RX ADMIN — Medication 1 APPLICATION(S): at 05:30

## 2022-06-15 RX ADMIN — Medication 4: at 17:14

## 2022-06-15 RX ADMIN — ZOLPIDEM TARTRATE 5 MILLIGRAM(S): 10 TABLET ORAL at 22:07

## 2022-06-15 RX ADMIN — FAMOTIDINE 20 MILLIGRAM(S): 10 INJECTION INTRAVENOUS at 11:48

## 2022-06-15 RX ADMIN — Medication 1 APPLICATION(S): at 17:12

## 2022-06-15 RX ADMIN — Medication 150 MILLIGRAM(S): at 23:07

## 2022-06-15 RX ADMIN — METFORMIN HYDROCHLORIDE 1000 MILLIGRAM(S): 850 TABLET ORAL at 05:30

## 2022-06-15 RX ADMIN — Medication 1 TABLET(S): at 05:30

## 2022-06-15 RX ADMIN — Medication 1 APPLICATION(S): at 11:47

## 2022-06-15 RX ADMIN — Medication 1 APPLICATION(S): at 05:31

## 2022-06-15 NOTE — PROGRESS NOTE ADULT - SUBJECTIVE AND OBJECTIVE BOX
Progress Note:  Provider Speciality                            Hospitalist      CARIN WATSON MRN-[id number] 46y Male     CHIEF PRESENTING COMPLAINT:  Patient is a 46y old  Male who presents with a chief complaint of DFU (06 Apr 2022 12:18)        SUBJECTIVE:  Patient was seen and examined at bedside.  No new complaints described by patient in morning rounds.   HISTORY OF PRESENTING ILLNESS:  HPI:  45 yo M with PMHx of Intellectual Disability, DM not on any medications presents with foot wounds. Pt has very poor foot hygiene and per the sister, has been persistently scratching an open wound on his L second toe, which worsened and became more red, had drainage, malodor. He saw a physician at [facility] and was told to come to the ED for evaluation. Pt is poor historian. Does endorse abdominal pain for a few days, cannot give much more description. Per the sister, pt did not seem to have any recent fevers, however is also not a very good historian and does not seem to have good health literacy. States that her and her mom decided to stop giving pt Metformin a while ago, are treating his diabetes by not giving him any sugary foods.  In the ED, T 97.8, /87, , RR 16, SpO2 99% on RA. Lab work unrevealing.  (31 Mar 2022 22:36)        REVIEW OF SYSTEMS:  Patient denies any headache, any vision complaints, runny nose. Denies chest pain, shortness of breath, palpitation. Denies nausea, vomiting, abdominal pain or diarrhoea, Denies dysuria. Denies  localized weakness in any part of the body or numbness.   At least 10 systems were reviewed in ROS. All systems reviewed  are within normal limits except for the complaints as described in Subjective.    PAST MEDICAL & SURGICAL HISTORY:  PAST MEDICAL & SURGICAL HISTORY:  DM (diabetes mellitus)    Intellectual disability            VITAL SIGNS:  Vital Signs Last 24 Hrs  T(C): 36.4 (15 Mo 2022 08:00), Max: 37 (14 Jun 2022 23:07)  T(F): 97.5 (15 Mo 2022 08:00), Max: 98.6 (14 Jun 2022 23:07)  HR: 85 (15 Mo 2022 08:00) (85 - 107)  BP: 159/92 (15 Mo 2022 08:00) (100/49 - 159/92)  BP(mean): --  RR: 18 (15 Mo 2022 08:00) (18 - 18)  SpO2: 94% (15 Mo 2022 08:00) (94% - 97%)        PHYSICAL EXAMINATION:  Not in acute distress  General: No icterus  HEENT:   no JVD.  Heart: S1+S2 audible  Lungs: bilateral  moderate air entry, no wheezing, no crepitations.  Abdomen: Soft, non-tender, non-distended , no  rigidity or guarding.  CNS: Awake alert, CN  grossly intact. Intellectual disability  Extremities:  No edema    , onychomycosis toe nails        CONSULTS:  Consultant(s) Notes Reviewed by me.   Care Discussed with Consultants/Other Providers where required.        MEDICATIONS:  MEDICATIONS  (STANDING):  ammonium lactate 12% Lotion 1 Application(s) Topical two times a day  clotrimazole 1% Cream 1 Application(s) Topical two times a day  enoxaparin Injectable 40 milliGRAM(s) SubCutaneous every 24 hours  metFORMIN 1000 milliGRAM(s) Oral two times a day  pantoprazole    Tablet 40 milliGRAM(s) Oral before breakfast  vitamin A &amp; D Ointment 1 Application(s) Topical daily    MEDICATIONS  (PRN):  ondansetron Injectable 4 milliGRAM(s) IV Push every 6 hours PRN Nausea and/or Vomiting            ASSESSMENT:    45 yo M with PMH of Intellectual Disability and DM not on any medications presented with foot wounds.    Covid  PNA  Onychomycosis  Dental caries  DM2  Intellectual disability/autism        Covid PNA- completed RDV  Nail bed wound and onychomycosis. Status post Aseptic debridement and curettage of all fungal and ingrowing nails 1-5 bilateral with sterile nail nipper 06/07  Podiatry- no surgical intervention is advised. outpt podiatry f/u  DM type 2 - uncontrolled. A1C 8.9. Continue  metformin 1000mg BID  Dental Caries-cont clinda for 7 days.Will need outpt f/u for possible multiple extractions    Handoff:  Pending legal issue and guardianship, next  meeting due 06/23

## 2022-06-15 NOTE — PROGRESS NOTE ADULT - ASSESSMENT
45 yo M with PMHx of Intellectual Disability, DM not on any medications presents with foot wounds. Pt has very poor foot hygiene and per the sister, has been persistently scratching an open wound on his L second toe, which worsened and became more red, had drainage, malodor. He saw a physician at [facility] and was told to come to the ED for eval.      #Onychomycosis   -  no sx intervention per podiatry   - s/p abx course; cont clotrimazole cream     #Covid positive on 5/21   - s/p RDV    #DM II (A1c 8.9) - pt no on meds at home   - cont inpt metformin     #Autism   - patient is high functioning but unable to take care of basics.    #Insomnia  - cont melatonin; couseled sleep hygiene    #Dental Caries  - seen in dental clinic  - radiographic findings show multiple root tips and periapical radiolucencies associated with multiple teeth in maxillary arch.   - cont clinda for 7 days  - will need outpt f/u - Oral hygiene is poor and patient needs comprehensive dental evaluation and multiple extractions      #Misc:  - DVT prophylaxis: not indicated, pt is ambulating   - GI prophylaxis: Famotidine  - Diet: CC  - Activity: IAT   - Disposition: Guardianship hearing pending for June 23

## 2022-06-15 NOTE — PROGRESS NOTE ADULT - SUBJECTIVE AND OBJECTIVE BOX
CARIN WATSON 46y Male  MRN#: [id number]     Hospital Day: 76d    Pt is currently admitted with the primary diagnosis of  DM FOOT ULCERS; HYPERGLYCEMIA        SUBJECTIVE     Overnight events  None    Subjective complaints  Pt was evaluated this am. Patient denied any active complaints and per patient his symptoms are improving                                            ----------------------------------------------------------  OBJECTIVE  PAST MEDICAL & SURGICAL HISTORY  DM (diabetes mellitus)    Intellectual disability                                              -----------------------------------------------------------  ALLERGIES:  penicillin (Unknown)                                            ------------------------------------------------------------    HOME MEDICATIONS  Home Medications:  vitamin A and D topical ointment: 1 application topically once a day (12 Apr 2022 11:40)                           MEDICATIONS:  STANDING MEDICATIONS  ammonium lactate 12% Lotion 1 Application(s) Topical two times a day  chlorhexidine 4% Liquid 1 Application(s) Topical <User Schedule>  clindamycin   Capsule 150 milliGRAM(s) Oral every 6 hours  clotrimazole 1% Cream 1 Application(s) Topical two times a day  famotidine    Tablet 20 milliGRAM(s) Oral daily  glucagon  Injectable 1 milliGRAM(s) IntraMuscular once  insulin lispro (ADMELOG) corrective regimen sliding scale   SubCutaneous three times a day before meals  lactobacillus acidophilus 1 Tablet(s) Oral two times a day  melatonin 3 milliGRAM(s) Oral at bedtime  metFORMIN 1000 milliGRAM(s) Oral two times a day  sodium chloride 0.9%. 1000 milliLiter(s) IV Continuous <Continuous>  vitamin A &amp; D Ointment 1 Application(s) Topical daily    PRN MEDICATIONS  acetaminophen     Tablet .. 650 milliGRAM(s) Oral every 6 hours PRN  guaifenesin/dextromethorphan Oral Liquid 10 milliLiter(s) Oral every 4 hours PRN  metoclopramide Injectable 10 milliGRAM(s) IV Push every 8 hours PRN  zolpidem 5 milliGRAM(s) Oral at bedtime PRN                                            ------------------------------------------------------------  VITAL SIGNS: Last 24 Hours  T(C): 36.4 (15 Mo 2022 08:00), Max: 37 (14 Jun 2022 23:07)  T(F): 97.5 (15 Mo 2022 08:00), Max: 98.6 (14 Jun 2022 23:07)  HR: 85 (15 Mo 2022 08:00) (85 - 107)  BP: 159/92 (15 Mo 2022 08:00) (100/49 - 159/92)  BP(mean): --  RR: 18 (15 Mo 2022 08:00) (18 - 18)  SpO2: 94% (15 Mo 2022 08:00) (94% - 97%)      06-14-22 @ 07:01  -  06-15-22 @ 07:00  --------------------------------------------------------  IN: 360 mL / OUT: 0 mL / NET: 360 mL                                             --------------------------------------------------------------  LABS:                                                                    -------------------------------------------------------------  RADIOLOGY:                                            --------------------------------------------------------------    PHYSICAL EXAM:  GENERAL: NAD, lying in bed comfortably  HEAD:  Atraumatic, Normocephalic  EYES: EOMI, conjunctiva and sclera clear  ENT: Moist mucous membranes  NECK: Supple, No JVD  CHEST/LUNG: Clear to auscultation bilaterally; No rales, rhonchi, wheezing, or rubs. Unlabored respirations  HEART: regular rate and rhythm; No murmurs, rubs, or gallops  ABDOMEN: Bowel sounds present; Soft, Nontender, Nondistended.    EXTREMITIES: Warm. No clubbing, cyanosis, or edema  NERVOUS SYSTEM:  Alert & Oriented X3. No focal deficits   SKIN: No rashes or lesions                                           --------------------------------------------------------------

## 2022-06-16 LAB
GLUCOSE BLDC GLUCOMTR-MCNC: 237 MG/DL — HIGH (ref 70–99)
GLUCOSE BLDC GLUCOMTR-MCNC: 310 MG/DL — HIGH (ref 70–99)
GLUCOSE BLDC GLUCOMTR-MCNC: 322 MG/DL — HIGH (ref 70–99)
GLUCOSE BLDC GLUCOMTR-MCNC: 384 MG/DL — HIGH (ref 70–99)

## 2022-06-16 PROCEDURE — 99231 SBSQ HOSP IP/OBS SF/LOW 25: CPT

## 2022-06-16 RX ORDER — INSULIN GLARGINE 100 [IU]/ML
10 INJECTION, SOLUTION SUBCUTANEOUS ONCE
Refills: 0 | Status: COMPLETED | OUTPATIENT
Start: 2022-06-16 | End: 2022-06-16

## 2022-06-16 RX ORDER — INSULIN GLARGINE 100 [IU]/ML
10 INJECTION, SOLUTION SUBCUTANEOUS EVERY MORNING
Refills: 0 | Status: DISCONTINUED | OUTPATIENT
Start: 2022-06-16 | End: 2022-06-24

## 2022-06-16 RX ADMIN — Medication 1 APPLICATION(S): at 05:44

## 2022-06-16 RX ADMIN — INSULIN GLARGINE 10 UNIT(S): 100 INJECTION, SOLUTION SUBCUTANEOUS at 12:04

## 2022-06-16 RX ADMIN — Medication 150 MILLIGRAM(S): at 12:02

## 2022-06-16 RX ADMIN — Medication 4: at 08:22

## 2022-06-16 RX ADMIN — Medication 5: at 17:50

## 2022-06-16 RX ADMIN — METFORMIN HYDROCHLORIDE 1000 MILLIGRAM(S): 850 TABLET ORAL at 17:50

## 2022-06-16 RX ADMIN — Medication 1 APPLICATION(S): at 12:02

## 2022-06-16 RX ADMIN — Medication 1 APPLICATION(S): at 17:54

## 2022-06-16 RX ADMIN — Medication 4: at 12:05

## 2022-06-16 RX ADMIN — CHLORHEXIDINE GLUCONATE 1 APPLICATION(S): 213 SOLUTION TOPICAL at 05:44

## 2022-06-16 RX ADMIN — Medication 150 MILLIGRAM(S): at 05:44

## 2022-06-16 RX ADMIN — METFORMIN HYDROCHLORIDE 1000 MILLIGRAM(S): 850 TABLET ORAL at 05:44

## 2022-06-16 RX ADMIN — Medication 3 MILLIGRAM(S): at 21:25

## 2022-06-16 RX ADMIN — Medication 1 TABLET(S): at 05:44

## 2022-06-16 RX ADMIN — Medication 1 TABLET(S): at 17:53

## 2022-06-16 RX ADMIN — Medication 1 APPLICATION(S): at 17:55

## 2022-06-16 RX ADMIN — FAMOTIDINE 20 MILLIGRAM(S): 10 INJECTION INTRAVENOUS at 12:04

## 2022-06-16 NOTE — CHART NOTE - NSCHARTNOTEFT_GEN_A_CORE
PO remains optimum per EMR documentation. 6/3-6/12 intake: %.     Not at risk, will re-assess in 10 days.

## 2022-06-16 NOTE — PROGRESS NOTE ADULT - SUBJECTIVE AND OBJECTIVE BOX
Progress Note:  Provider Speciality                            Hospitalist      CARIN WATSON MRN-[id number] 46y Male     CHIEF PRESENTING COMPLAINT:  Patient is a 46y old  Male who presents with a chief complaint of DFU (06 Apr 2022 12:18)        SUBJECTIVE:  Patient was seen and examined at bedside.  No new complaints described by patient in morning rounds.   HISTORY OF PRESENTING ILLNESS:  HPI:  47 yo M with PMHx of Intellectual Disability, DM not on any medications presents with foot wounds. Pt has very poor foot hygiene and per the sister, has been persistently scratching an open wound on his L second toe, which worsened and became more red, had drainage, malodor. He saw a physician at [facility] and was told to come to the ED for evaluation. Pt is poor historian. Does endorse abdominal pain for a few days, cannot give much more description. Per the sister, pt did not seem to have any recent fevers, however is also not a very good historian and does not seem to have good health literacy. States that her and her mom decided to stop giving pt Metformin a while ago, are treating his diabetes by not giving him any sugary foods.  In the ED, T 97.8, /87, , RR 16, SpO2 99% on RA. Lab work unrevealing.  (31 Mar 2022 22:36)        REVIEW OF SYSTEMS:  Patient denies any headache, any vision complaints, runny nose. Denies chest pain, shortness of breath, palpitation. Denies nausea, vomiting, abdominal pain or diarrhoea, Denies dysuria. Denies  localized weakness in any part of the body or numbness.   At least 10 systems were reviewed in ROS. All systems reviewed  are within normal limits except for the complaints as described in Subjective.    PAST MEDICAL & SURGICAL HISTORY:  PAST MEDICAL & SURGICAL HISTORY:  DM (diabetes mellitus)    Intellectual disability            VITAL SIGNS:  Vital Signs Last 24 Hrs  T(C): 36.2 (16 Jun 2022 08:09), Max: 36.2 (16 Jun 2022 08:09)  T(F): 97.1 (16 Jun 2022 08:09), Max: 97.1 (16 Jun 2022 08:09)  HR: 96 (16 Jun 2022 08:09) (96 - 111)  BP: 119/68 (16 Jun 2022 08:09) (119/68 - 150/71)  BP(mean): --  RR: 19 (16 Jun 2022 08:09) (19 - 19)  SpO2: --    PHYSICAL EXAMINATION:  Not in acute distress  General: No icterus  HEENT:   no JVD.  Heart: S1+S2 audible  Lungs: bilateral  moderate air entry, no wheezing, no crepitations.  Abdomen: Soft, non-tender, non-distended , no  rigidity or guarding.  CNS: Awake alert, CN  grossly intact. Intellectual disability  Extremities:  No edema    , onychomycosis toe nails        CONSULTS:  Consultant(s) Notes Reviewed by me.   Care Discussed with Consultants/Other Providers where required.        MEDICATIONS:  MEDICATIONS  (STANDING):  ammonium lactate 12% Lotion 1 Application(s) Topical two times a day  clotrimazole 1% Cream 1 Application(s) Topical two times a day  enoxaparin Injectable 40 milliGRAM(s) SubCutaneous every 24 hours  metFORMIN 1000 milliGRAM(s) Oral two times a day  pantoprazole    Tablet 40 milliGRAM(s) Oral before breakfast  vitamin A &amp; D Ointment 1 Application(s) Topical daily    MEDICATIONS  (PRN):  ondansetron Injectable 4 milliGRAM(s) IV Push every 6 hours PRN Nausea and/or Vomiting            ASSESSMENT:    47 yo M with PMH of Intellectual Disability and DM not on any medications presented with foot wounds.    Covid  PNA  Onychomycosis  Dental caries  DM2  Intellectual disability/autism        Covid PNA- completed RDV  Nail bed wound and onychomycosis. Status post Aseptic debridement and curettage of all fungal and ingrowing nails 1-5 bilateral with sterile nail nipper 06/07  Podiatry- no surgical intervention is advised. outpt podiatry f/u  DM type 2 - uncontrolled. A1C 8.9. Continue  metformin 1000mg BID  Dental Caries-cont clinda for 7 days.Will need outpt f/u for possible multiple extractions    Handoff:  Pending legal issue and guardianship, next  meeting due 06/23

## 2022-06-16 NOTE — PROGRESS NOTE ADULT - ASSESSMENT

## 2022-06-17 LAB
A1C WITH ESTIMATED AVERAGE GLUCOSE RESULT: 8.1 % — HIGH (ref 4–5.6)
ESTIMATED AVERAGE GLUCOSE: 186 MG/DL — HIGH (ref 68–114)
GLUCOSE BLDC GLUCOMTR-MCNC: 189 MG/DL — HIGH (ref 70–99)
GLUCOSE BLDC GLUCOMTR-MCNC: 197 MG/DL — HIGH (ref 70–99)
GLUCOSE BLDC GLUCOMTR-MCNC: 226 MG/DL — HIGH (ref 70–99)
GLUCOSE BLDC GLUCOMTR-MCNC: 404 MG/DL — HIGH (ref 70–99)

## 2022-06-17 PROCEDURE — 99231 SBSQ HOSP IP/OBS SF/LOW 25: CPT

## 2022-06-17 RX ORDER — INSULIN LISPRO 100/ML
8 VIAL (ML) SUBCUTANEOUS ONCE
Refills: 0 | Status: COMPLETED | OUTPATIENT
Start: 2022-06-17 | End: 2022-06-17

## 2022-06-17 RX ADMIN — Medication 1: at 12:09

## 2022-06-17 RX ADMIN — METFORMIN HYDROCHLORIDE 1000 MILLIGRAM(S): 850 TABLET ORAL at 18:26

## 2022-06-17 RX ADMIN — Medication 8 UNIT(S): at 21:38

## 2022-06-17 RX ADMIN — FAMOTIDINE 20 MILLIGRAM(S): 10 INJECTION INTRAVENOUS at 12:09

## 2022-06-17 RX ADMIN — Medication 3 MILLIGRAM(S): at 21:39

## 2022-06-17 RX ADMIN — Medication 1 APPLICATION(S): at 18:25

## 2022-06-17 RX ADMIN — METFORMIN HYDROCHLORIDE 1000 MILLIGRAM(S): 850 TABLET ORAL at 05:18

## 2022-06-17 RX ADMIN — INSULIN GLARGINE 10 UNIT(S): 100 INJECTION, SOLUTION SUBCUTANEOUS at 08:11

## 2022-06-17 RX ADMIN — Medication 1: at 08:11

## 2022-06-17 RX ADMIN — Medication 1 TABLET(S): at 05:18

## 2022-06-17 RX ADMIN — Medication 1 APPLICATION(S): at 12:12

## 2022-06-17 RX ADMIN — Medication 2: at 16:54

## 2022-06-17 NOTE — PROGRESS NOTE ADULT - SUBJECTIVE AND OBJECTIVE BOX
CARIN WATSON 46y Male  MRN#: [id number]     Hospital Day: 78d    Pt is currently admitted with the primary diagnosis of  DM FOOT ULCERS; HYPERGLYCEMIA        SUBJECTIVE     Overnight events  None    Subjective complaints  Pt was evaluated this am. Patient denied any active complaints.                                            ----------------------------------------------------------  OBJECTIVE  PAST MEDICAL & SURGICAL HISTORY  DM (diabetes mellitus)    Intellectual disability                                              -----------------------------------------------------------  ALLERGIES:  penicillin (Unknown)                                            ------------------------------------------------------------    HOME MEDICATIONS  Home Medications:  vitamin A and D topical ointment: 1 application topically once a day (12 Apr 2022 11:40)                           MEDICATIONS:  STANDING MEDICATIONS  ammonium lactate 12% Lotion 1 Application(s) Topical two times a day  chlorhexidine 4% Liquid 1 Application(s) Topical <User Schedule>  clindamycin   Capsule 150 milliGRAM(s) Oral every 6 hours  clotrimazole 1% Cream 1 Application(s) Topical two times a day  famotidine    Tablet 20 milliGRAM(s) Oral daily  glucagon  Injectable 1 milliGRAM(s) IntraMuscular once  insulin glargine Injectable (LANTUS) 10 Unit(s) SubCutaneous every morning  insulin lispro (ADMELOG) corrective regimen sliding scale   SubCutaneous three times a day before meals  lactobacillus acidophilus 1 Tablet(s) Oral two times a day  melatonin 3 milliGRAM(s) Oral at bedtime  metFORMIN 1000 milliGRAM(s) Oral two times a day  sodium chloride 0.9%. 1000 milliLiter(s) IV Continuous <Continuous>  vitamin A &amp; D Ointment 1 Application(s) Topical daily    PRN MEDICATIONS  acetaminophen     Tablet .. 650 milliGRAM(s) Oral every 6 hours PRN  guaifenesin/dextromethorphan Oral Liquid 10 milliLiter(s) Oral every 4 hours PRN  metoclopramide Injectable 10 milliGRAM(s) IV Push every 8 hours PRN                                            ------------------------------------------------------------  VITAL SIGNS: Last 24 Hours  T(C): 36.1 (17 Jun 2022 07:38), Max: 37.1 (16 Jun 2022 23:38)  T(F): 97 (17 Jun 2022 07:38), Max: 98.8 (16 Jun 2022 23:38)  HR: 96 (17 Jun 2022 07:38) (93 - 96)  BP: 134/67 (17 Jun 2022 07:38) (129/71 - 134/67)  BP(mean): --  RR: 18 (17 Jun 2022 07:38) (18 - 18)  SpO2: 100% (17 Jun 2022 07:38) (100% - 100%)                                             --------------------------------------------------------------  LABS:                                                                    -------------------------------------------------------------  RADIOLOGY:                                            --------------------------------------------------------------    PHYSICAL EXAM:  GENERAL: NAD, lying in bed comfortably  HEAD:  Atraumatic, Normocephalic  EYES: EOMI, conjunctiva and sclera clear  ENT: Moist mucous membranes  NECK: Supple, No JVD  CHEST/LUNG: Clear to auscultation bilaterally; No rales, rhonchi, wheezing, or rubs. Unlabored respirations  HEART: regular rate and rhythm; No murmurs, rubs, or gallops  ABDOMEN: Bowel sounds present; Soft, Nontender, Nondistended.    EXTREMITIES: Warm. No clubbing, cyanosis, or edema  NERVOUS SYSTEM:  Alert & Oriented X3. No focal deficits   SKIN: No rashes or lesions                                           --------------------------------------------------------------

## 2022-06-17 NOTE — PROGRESS NOTE ADULT - ASSESSMENT
47 yo M with PMHx of Intellectual Disability, DM not on any medications presents with foot wounds. Pt has very poor foot hygiene and per the sister, has been persistently scratching an open wound on his L second toe, which worsened and became more red, had drainage, malodor. He saw a physician at [facility] and was told to come to the ED for eval.      #Onychomycosis   -  no sx intervention per podiatry   - s/p abx course; cont clotrimazole cream     #Covid positive on 5/21   - s/p RDV    #DM II (A1c 8.9) - pt no on meds at home   - cont inpt metformin   - c/w SS insulin  - CC diet  - BG better controlled after adding lantus on 6/16    #Autism   - patient is high functioning but unable to take care of basics.    #Insomnia  - cont melatonin; couseled sleep hygiene    #Dental Caries  - seen in dental clinic  - radiographic findings show multiple root tips and periapical radiolucencies associated with multiple teeth in maxillary arch.   - cont clinda for 7 days  - will need outpt f/u - Oral hygiene is poor and patient needs comprehensive dental evaluation and multiple extractions      #Misc:  - DVT prophylaxis: not indicated, pt is ambulating   - GI prophylaxis: Famotidine  - Diet: CC  - Activity: IAT   - Disposition: Guardianship hearing pending for June 23

## 2022-06-17 NOTE — PROGRESS NOTE ADULT - SUBJECTIVE AND OBJECTIVE BOX
Progress Note:  Provider Speciality                            Hospitalist      CARIN WATSON MRN-[id number] 46y Male     CHIEF PRESENTING COMPLAINT:  Patient is a 46y old  Male who presents with a chief complaint of DFU (06 Apr 2022 12:18)        SUBJECTIVE:  Patient was seen and examined at bedside.  No new complaints described by patient in morning rounds.   HISTORY OF PRESENTING ILLNESS:  HPI:  47 yo M with PMHx of Intellectual Disability, DM not on any medications presents with foot wounds. Pt has very poor foot hygiene and per the sister, has been persistently scratching an open wound on his L second toe, which worsened and became more red, had drainage, malodor. He saw a physician at [facility] and was told to come to the ED for evaluation. Pt is poor historian. Does endorse abdominal pain for a few days, cannot give much more description. Per the sister, pt did not seem to have any recent fevers, however is also not a very good historian and does not seem to have good health literacy. States that her and her mom decided to stop giving pt Metformin a while ago, are treating his diabetes by not giving him any sugary foods.  In the ED, T 97.8, /87, , RR 16, SpO2 99% on RA. Lab work unrevealing.  (31 Mar 2022 22:36)        REVIEW OF SYSTEMS:  Patient denies any headache, any vision complaints, runny nose. Denies chest pain, shortness of breath, palpitation. Denies nausea, vomiting, abdominal pain or diarrhoea, Denies dysuria. Denies  localized weakness in any part of the body or numbness.   At least 10 systems were reviewed in ROS. All systems reviewed  are within normal limits except for the complaints as described in Subjective.    PAST MEDICAL & SURGICAL HISTORY:  PAST MEDICAL & SURGICAL HISTORY:  DM (diabetes mellitus)    Intellectual disability            VITAL SIGNS:  Vital Signs Last 24 Hrs  T(C): 36.1 (17 Jun 2022 07:38), Max: 37.1 (16 Jun 2022 23:38)  T(F): 97 (17 Jun 2022 07:38), Max: 98.8 (16 Jun 2022 23:38)  HR: 96 (17 Jun 2022 07:38) (93 - 96)  BP: 134/67 (17 Jun 2022 07:38) (129/71 - 134/67)  BP(mean): --  RR: 18 (17 Jun 2022 07:38) (18 - 18)  SpO2: 100% (17 Jun 2022 07:38) (100% - 100%)          PHYSICAL EXAMINATION:  Not in acute distress  General: No icterus  HEENT:   no JVD.  Heart: S1+S2 audible  Lungs: bilateral  moderate air entry, no wheezing, no crepitations.  Abdomen: Soft, non-tender, non-distended , no  rigidity or guarding.  CNS: Awake alert, CN  grossly intact. Intellectual disability  Extremities:  No edema    , onychomycosis toe nails        CONSULTS:  Consultant(s) Notes Reviewed by me.   Care Discussed with Consultants/Other Providers where required.        MEDICATIONS:  MEDICATIONS  (STANDING):  ammonium lactate 12% Lotion 1 Application(s) Topical two times a day  clotrimazole 1% Cream 1 Application(s) Topical two times a day  enoxaparin Injectable 40 milliGRAM(s) SubCutaneous every 24 hours  metFORMIN 1000 milliGRAM(s) Oral two times a day  pantoprazole    Tablet 40 milliGRAM(s) Oral before breakfast  vitamin A &amp; D Ointment 1 Application(s) Topical daily    MEDICATIONS  (PRN):  ondansetron Injectable 4 milliGRAM(s) IV Push every 6 hours PRN Nausea and/or Vomiting            ASSESSMENT:    47 yo M with PMH of Intellectual Disability and DM not on any medications presented with foot wounds.    Covid  PNA  Onychomycosis  Dental caries  DM2  Intellectual disability/autism        Covid PNA- completed RDV  Nail bed wound and onychomycosis. Status post Aseptic debridement and curettage of all fungal and ingrowing nails 1-5 bilateral with sterile nail nipper 06/07  Podiatry- no surgical intervention is advised. outpt podiatry f/u  DM type 2 - uncontrolled. A1C 8.9. Continue  metformin 1000mg BID  Dental Caries-cont clinda for 7 days.Will need outpt f/u for possible multiple extractions    Handoff:  Pending legal issue and guardianship, next  meeting due 06/23

## 2022-06-18 LAB
GLUCOSE BLDC GLUCOMTR-MCNC: 128 MG/DL — HIGH (ref 70–99)
GLUCOSE BLDC GLUCOMTR-MCNC: 263 MG/DL — HIGH (ref 70–99)
GLUCOSE BLDC GLUCOMTR-MCNC: 279 MG/DL — HIGH (ref 70–99)

## 2022-06-18 PROCEDURE — 99231 SBSQ HOSP IP/OBS SF/LOW 25: CPT

## 2022-06-18 RX ADMIN — Medication 1 TABLET(S): at 17:49

## 2022-06-18 RX ADMIN — Medication 1 APPLICATION(S): at 06:30

## 2022-06-18 RX ADMIN — METFORMIN HYDROCHLORIDE 1000 MILLIGRAM(S): 850 TABLET ORAL at 17:50

## 2022-06-18 RX ADMIN — Medication 1 TABLET(S): at 06:33

## 2022-06-18 RX ADMIN — Medication 1 APPLICATION(S): at 06:31

## 2022-06-18 RX ADMIN — FAMOTIDINE 20 MILLIGRAM(S): 10 INJECTION INTRAVENOUS at 11:46

## 2022-06-18 RX ADMIN — Medication 1 APPLICATION(S): at 17:49

## 2022-06-18 RX ADMIN — Medication 1 APPLICATION(S): at 11:48

## 2022-06-18 RX ADMIN — Medication 1 APPLICATION(S): at 17:50

## 2022-06-18 RX ADMIN — Medication 3 MILLIGRAM(S): at 22:19

## 2022-06-18 RX ADMIN — CHLORHEXIDINE GLUCONATE 1 APPLICATION(S): 213 SOLUTION TOPICAL at 06:39

## 2022-06-18 RX ADMIN — INSULIN GLARGINE 10 UNIT(S): 100 INJECTION, SOLUTION SUBCUTANEOUS at 07:59

## 2022-06-18 RX ADMIN — Medication 3: at 11:46

## 2022-06-18 RX ADMIN — METFORMIN HYDROCHLORIDE 1000 MILLIGRAM(S): 850 TABLET ORAL at 06:33

## 2022-06-18 RX ADMIN — Medication 3: at 17:29

## 2022-06-18 NOTE — PROGRESS NOTE ADULT - SUBJECTIVE AND OBJECTIVE BOX
Progress Note:  Provider Speciality                            Hospitalist      CARIN WATSON MRN-[id number] 46y Male     CHIEF PRESENTING COMPLAINT:  Patient is a 46y old  Male who presents with a chief complaint of DFU (06 Apr 2022 12:18)        SUBJECTIVE:  Patient was seen and examined at bedside.  No new complaints described by patient in morning rounds.   HISTORY OF PRESENTING ILLNESS:  HPI:  45 yo M with PMHx of Intellectual Disability, DM not on any medications presents with foot wounds. Pt has very poor foot hygiene and per the sister, has been persistently scratching an open wound on his L second toe, which worsened and became more red, had drainage, malodor. He saw a physician at [facility] and was told to come to the ED for evaluation. Pt is poor historian. Does endorse abdominal pain for a few days, cannot give much more description. Per the sister, pt did not seem to have any recent fevers, however is also not a very good historian and does not seem to have good health literacy. States that her and her mom decided to stop giving pt Metformin a while ago, are treating his diabetes by not giving him any sugary foods.  In the ED, T 97.8, /87, , RR 16, SpO2 99% on RA. Lab work unrevealing.  (31 Mar 2022 22:36)        REVIEW OF SYSTEMS:  Patient denies any headache, any vision complaints, runny nose. Denies chest pain, shortness of breath, palpitation. Denies nausea, vomiting, abdominal pain or diarrhoea, Denies dysuria. Denies  localized weakness in any part of the body or numbness.   At least 10 systems were reviewed in ROS. All systems reviewed  are within normal limits except for the complaints as described in Subjective.    PAST MEDICAL & SURGICAL HISTORY:  PAST MEDICAL & SURGICAL HISTORY:  DM (diabetes mellitus)    Intellectual disability            VITAL SIGNS:  Vital Signs Last 24 Hrs  T(C): 35.7 (18 Jun 2022 07:38), Max: 36.7 (18 Jun 2022 01:26)  T(F): 96.2 (18 Jun 2022 07:38), Max: 98.1 (18 Jun 2022 01:26)  HR: 80 (18 Jun 2022 07:38) (80 - 97)  BP: 146/65 (18 Jun 2022 07:38) (129/64 - 146/65)  BP(mean): --  RR: 19 (18 Jun 2022 07:38) (18 - 19)  SpO2: --        PHYSICAL EXAMINATION:  Not in acute distress  General: No icterus  HEENT:   no JVD.  Heart: S1+S2 audible  Lungs: bilateral  moderate air entry, no wheezing, no crepitations.  Abdomen: Soft, non-tender, non-distended , no  rigidity or guarding.  CNS: Awake alert, CN  grossly intact. Intellectual disability  Extremities:  No edema    , onychomycosis toe nails        CONSULTS:  Consultant(s) Notes Reviewed by me.   Care Discussed with Consultants/Other Providers where required.        MEDICATIONS:  MEDICATIONS  (STANDING):  ammonium lactate 12% Lotion 1 Application(s) Topical two times a day  clotrimazole 1% Cream 1 Application(s) Topical two times a day  enoxaparin Injectable 40 milliGRAM(s) SubCutaneous every 24 hours  metFORMIN 1000 milliGRAM(s) Oral two times a day  pantoprazole    Tablet 40 milliGRAM(s) Oral before breakfast  vitamin A &amp; D Ointment 1 Application(s) Topical daily    MEDICATIONS  (PRN):  ondansetron Injectable 4 milliGRAM(s) IV Push every 6 hours PRN Nausea and/or Vomiting            ASSESSMENT:    45 yo M with PMH of Intellectual Disability and DM not on any medications presented with foot wounds.    Covid  PNA  Onychomycosis  Dental caries  DM2  Intellectual disability/autism        Covid PNA- completed RDV  Nail bed wound and onychomycosis. Status post Aseptic debridement and curettage of all fungal and ingrowing nails 1-5 bilateral with sterile nail nipper 06/07  Podiatry- no surgical intervention is advised. outpt podiatry f/u  DM type 2 - uncontrolled. A1C 8.9. Continue  metformin 1000mg BID  Dental Caries-completed clinda for 7 days. Will need outpt f/u for possible multiple extractions  Insomnia- prn melatonin    Handoff:  Pending legal issue and guardianship, next  meeting due 06/23

## 2022-06-19 LAB
GLUCOSE BLDC GLUCOMTR-MCNC: 157 MG/DL — HIGH (ref 70–99)
GLUCOSE BLDC GLUCOMTR-MCNC: 251 MG/DL — HIGH (ref 70–99)
GLUCOSE BLDC GLUCOMTR-MCNC: 296 MG/DL — HIGH (ref 70–99)
GLUCOSE BLDC GLUCOMTR-MCNC: 302 MG/DL — HIGH (ref 70–99)

## 2022-06-19 PROCEDURE — 99231 SBSQ HOSP IP/OBS SF/LOW 25: CPT

## 2022-06-19 RX ADMIN — INSULIN GLARGINE 10 UNIT(S): 100 INJECTION, SOLUTION SUBCUTANEOUS at 09:12

## 2022-06-19 RX ADMIN — CHLORHEXIDINE GLUCONATE 1 APPLICATION(S): 213 SOLUTION TOPICAL at 06:11

## 2022-06-19 RX ADMIN — METFORMIN HYDROCHLORIDE 1000 MILLIGRAM(S): 850 TABLET ORAL at 06:10

## 2022-06-19 RX ADMIN — Medication 4: at 17:42

## 2022-06-19 RX ADMIN — Medication 1 APPLICATION(S): at 11:48

## 2022-06-19 RX ADMIN — Medication 1: at 09:11

## 2022-06-19 RX ADMIN — Medication 3 MILLIGRAM(S): at 21:34

## 2022-06-19 RX ADMIN — FAMOTIDINE 20 MILLIGRAM(S): 10 INJECTION INTRAVENOUS at 11:48

## 2022-06-19 RX ADMIN — Medication 1 APPLICATION(S): at 06:10

## 2022-06-19 RX ADMIN — Medication 1 APPLICATION(S): at 06:11

## 2022-06-19 RX ADMIN — Medication 1 APPLICATION(S): at 17:42

## 2022-06-19 RX ADMIN — Medication 1 TABLET(S): at 06:11

## 2022-06-19 RX ADMIN — METFORMIN HYDROCHLORIDE 1000 MILLIGRAM(S): 850 TABLET ORAL at 17:43

## 2022-06-19 RX ADMIN — Medication 3: at 11:48

## 2022-06-19 RX ADMIN — Medication 1 TABLET(S): at 17:42

## 2022-06-19 NOTE — PROGRESS NOTE ADULT - SUBJECTIVE AND OBJECTIVE BOX
Progress Note:  Provider Speciality                            Hospitalist      CARIN WATSON MRN-[id number] 46y Male     CHIEF PRESENTING COMPLAINT:  Patient is a 46y old  Male who presents with a chief complaint of DFU (06 Apr 2022 12:18)        SUBJECTIVE:  Patient was seen and examined at bedside.  No new complaints described by patient in morning rounds.   HISTORY OF PRESENTING ILLNESS:  HPI:  45 yo M with PMHx of Intellectual Disability, DM not on any medications presents with foot wounds. Pt has very poor foot hygiene and per the sister, has been persistently scratching an open wound on his L second toe, which worsened and became more red, had drainage, malodor. He saw a physician at [facility] and was told to come to the ED for evaluation. Pt is poor historian. Does endorse abdominal pain for a few days, cannot give much more description. Per the sister, pt did not seem to have any recent fevers, however is also not a very good historian and does not seem to have good health literacy. States that her and her mom decided to stop giving pt Metformin a while ago, are treating his diabetes by not giving him any sugary foods.  In the ED, T 97.8, /87, , RR 16, SpO2 99% on RA. Lab work unrevealing.  (31 Mar 2022 22:36)        REVIEW OF SYSTEMS:  Patient denies any headache, any vision complaints, runny nose. Denies chest pain, shortness of breath, palpitation. Denies nausea, vomiting, abdominal pain or diarrhoea, Denies dysuria. Denies  localized weakness in any part of the body or numbness.   At least 10 systems were reviewed in ROS. All systems reviewed  are within normal limits except for the complaints as described in Subjective.    PAST MEDICAL & SURGICAL HISTORY:  PAST MEDICAL & SURGICAL HISTORY:  DM (diabetes mellitus)    Intellectual disability            VITAL SIGNS:  Vital Signs Last 24 Hrs  T(C): 36.8 (19 Jun 2022 08:00), Max: 36.8 (19 Jun 2022 08:00)  T(F): 98.3 (19 Jun 2022 08:00), Max: 98.3 (19 Jun 2022 08:00)  HR: 83 (19 Jun 2022 08:00) (79 - 83)  BP: 125/67 (19 Jun 2022 08:00) (123/63 - 125/67)  BP(mean): --  RR: 18 (19 Jun 2022 08:00) (18 - 19)  SpO2: 97% (19 Jun 2022 08:00) (97% - 97%)    PHYSICAL EXAMINATION:  Not in acute distress  General: No icterus  HEENT:   no JVD.  Heart: S1+S2 audible  Lungs: bilateral  moderate air entry, no wheezing, no crepitations.  Abdomen: Soft, non-tender, non-distended , no  rigidity or guarding.  CNS: Awake alert, CN  grossly intact. Intellectual disability  Extremities:  No edema    , onychomycosis toe nails        CONSULTS:  Consultant(s) Notes Reviewed by me.   Care Discussed with Consultants/Other Providers where required.        MEDICATIONS:  MEDICATIONS  (STANDING):  ammonium lactate 12% Lotion 1 Application(s) Topical two times a day  clotrimazole 1% Cream 1 Application(s) Topical two times a day  enoxaparin Injectable 40 milliGRAM(s) SubCutaneous every 24 hours  metFORMIN 1000 milliGRAM(s) Oral two times a day  pantoprazole    Tablet 40 milliGRAM(s) Oral before breakfast  vitamin A &amp; D Ointment 1 Application(s) Topical daily    MEDICATIONS  (PRN):  ondansetron Injectable 4 milliGRAM(s) IV Push every 6 hours PRN Nausea and/or Vomiting            ASSESSMENT:    45 yo M with PMH of Intellectual Disability and DM not on any medications presented with foot wounds.    Covid  PNA  Onychomycosis  Dental caries  DM2  Intellectual disability/autism        Covid PNA- completed RDV  Nail bed wound and onychomycosis. Status post Aseptic debridement and curettage of all fungal and ingrowing nails 1-5 bilateral with sterile nail nipper 06/07  Podiatry- no surgical intervention is advised. outpt podiatry f/u  DM type 2 - uncontrolled. A1C 8.9. Continue  metformin 1000mg BID  Dental Caries-completed clinda for 7 days. Will need outpt f/u for possible multiple extractions  Insomnia- prn melatonin    Handoff:  Pending legal issue and guardianship, next  meeting due 06/23

## 2022-06-20 LAB
GLUCOSE BLDC GLUCOMTR-MCNC: 119 MG/DL — HIGH (ref 70–99)
GLUCOSE BLDC GLUCOMTR-MCNC: 141 MG/DL — HIGH (ref 70–99)
GLUCOSE BLDC GLUCOMTR-MCNC: 207 MG/DL — HIGH (ref 70–99)
GLUCOSE BLDC GLUCOMTR-MCNC: 225 MG/DL — HIGH (ref 70–99)

## 2022-06-20 PROCEDURE — 99231 SBSQ HOSP IP/OBS SF/LOW 25: CPT

## 2022-06-20 RX ORDER — GLUCAGON INJECTION, SOLUTION 0.5 MG/.1ML
1 INJECTION, SOLUTION SUBCUTANEOUS ONCE
Refills: 0 | Status: DISCONTINUED | OUTPATIENT
Start: 2022-06-20 | End: 2022-07-30

## 2022-06-20 RX ORDER — DEXTROSE 50 % IN WATER 50 %
25 SYRINGE (ML) INTRAVENOUS ONCE
Refills: 0 | Status: DISCONTINUED | OUTPATIENT
Start: 2022-06-20 | End: 2022-07-30

## 2022-06-20 RX ORDER — DEXTROSE 50 % IN WATER 50 %
12.5 SYRINGE (ML) INTRAVENOUS ONCE
Refills: 0 | Status: DISCONTINUED | OUTPATIENT
Start: 2022-06-20 | End: 2022-07-30

## 2022-06-20 RX ORDER — DEXTROSE 50 % IN WATER 50 %
15 SYRINGE (ML) INTRAVENOUS ONCE
Refills: 0 | Status: DISCONTINUED | OUTPATIENT
Start: 2022-06-20 | End: 2022-07-30

## 2022-06-20 RX ORDER — SODIUM CHLORIDE 9 MG/ML
1000 INJECTION, SOLUTION INTRAVENOUS
Refills: 0 | Status: DISCONTINUED | OUTPATIENT
Start: 2022-06-20 | End: 2022-07-30

## 2022-06-20 RX ORDER — INSULIN LISPRO 100/ML
3 VIAL (ML) SUBCUTANEOUS
Refills: 0 | Status: DISCONTINUED | OUTPATIENT
Start: 2022-06-20 | End: 2022-06-25

## 2022-06-20 RX ADMIN — METFORMIN HYDROCHLORIDE 1000 MILLIGRAM(S): 850 TABLET ORAL at 17:17

## 2022-06-20 RX ADMIN — Medication 2: at 16:58

## 2022-06-20 RX ADMIN — Medication 1 APPLICATION(S): at 11:21

## 2022-06-20 RX ADMIN — Medication 650 MILLIGRAM(S): at 21:41

## 2022-06-20 RX ADMIN — Medication 1 APPLICATION(S): at 05:15

## 2022-06-20 RX ADMIN — Medication 1 TABLET(S): at 17:16

## 2022-06-20 RX ADMIN — CHLORHEXIDINE GLUCONATE 1 APPLICATION(S): 213 SOLUTION TOPICAL at 05:16

## 2022-06-20 RX ADMIN — Medication 2: at 11:38

## 2022-06-20 RX ADMIN — METFORMIN HYDROCHLORIDE 1000 MILLIGRAM(S): 850 TABLET ORAL at 05:11

## 2022-06-20 RX ADMIN — Medication 1 APPLICATION(S): at 05:11

## 2022-06-20 RX ADMIN — Medication 10 MILLILITER(S): at 21:41

## 2022-06-20 RX ADMIN — Medication 1 APPLICATION(S): at 17:06

## 2022-06-20 RX ADMIN — Medication 3 UNIT(S): at 11:41

## 2022-06-20 RX ADMIN — Medication 1 TABLET(S): at 06:15

## 2022-06-20 RX ADMIN — FAMOTIDINE 20 MILLIGRAM(S): 10 INJECTION INTRAVENOUS at 11:38

## 2022-06-20 RX ADMIN — INSULIN GLARGINE 10 UNIT(S): 100 INJECTION, SOLUTION SUBCUTANEOUS at 08:48

## 2022-06-20 RX ADMIN — Medication 3 UNIT(S): at 16:58

## 2022-06-20 RX ADMIN — Medication 3 MILLIGRAM(S): at 21:35

## 2022-06-20 NOTE — PROGRESS NOTE ADULT - SUBJECTIVE AND OBJECTIVE BOX
Progress Note:  Provider Speciality                            Hospitalist      CARIN WATSON MRN-[id number] 46y Male     CHIEF PRESENTING COMPLAINT:  Patient is a 46y old  Male who presents with a chief complaint of DFU (06 Apr 2022 12:18)        SUBJECTIVE:  Patient was seen and examined at bedside.  No new complaints described by patient in morning rounds.   HISTORY OF PRESENTING ILLNESS:  HPI:  45 yo M with PMHx of Intellectual Disability, DM not on any medications presents with foot wounds. Pt has very poor foot hygiene and per the sister, has been persistently scratching an open wound on his L second toe, which worsened and became more red, had drainage, malodor. He saw a physician at [facility] and was told to come to the ED for evaluation. Pt is poor historian. Does endorse abdominal pain for a few days, cannot give much more description. Per the sister, pt did not seem to have any recent fevers, however is also not a very good historian and does not seem to have good health literacy. States that her and her mom decided to stop giving pt Metformin a while ago, are treating his diabetes by not giving him any sugary foods.  In the ED, T 97.8, /87, , RR 16, SpO2 99% on RA. Lab work unrevealing.  (31 Mar 2022 22:36)        REVIEW OF SYSTEMS:  Patient denies any headache, any vision complaints, runny nose. Denies chest pain, shortness of breath, palpitation. Denies nausea, vomiting, abdominal pain or diarrhoea, Denies dysuria. Denies  localized weakness in any part of the body or numbness.   At least 10 systems were reviewed in ROS. All systems reviewed  are within normal limits except for the complaints as described in Subjective.    PAST MEDICAL & SURGICAL HISTORY:  PAST MEDICAL & SURGICAL HISTORY:  DM (diabetes mellitus)    Intellectual disability            VITAL SIGNS:  Vital Signs Last 24 Hrs  T(C): 36.6 (20 Jun 2022 08:00), Max: 37.2 (20 Jun 2022 01:14)  T(F): 97.9 (20 Jun 2022 08:00), Max: 98.9 (20 Jun 2022 01:14)  HR: 99 (20 Jun 2022 08:00) (99 - 109)  BP: 122/60 (20 Jun 2022 08:00) (122/60 - 134/65)  BP(mean): --  RR: 18 (20 Jun 2022 08:00) (18 - 18)  SpO2: 96% (20 Jun 2022 08:00) (96% - 99%)      PHYSICAL EXAMINATION:  Not in acute distress  General: No icterus  HEENT:   no JVD.  Heart: S1+S2 audible  Lungs: bilateral  moderate air entry, no wheezing, no crepitations.  Abdomen: Soft, non-tender, non-distended , no  rigidity or guarding.  CNS: Awake alert, CN  grossly intact. Intellectual disability  Extremities:  No edema    , onychomycosis toe nails        CONSULTS:  Consultant(s) Notes Reviewed by me.   Care Discussed with Consultants/Other Providers where required.        MEDICATIONS:  MEDICATIONS  (STANDING):  ammonium lactate 12% Lotion 1 Application(s) Topical two times a day  clotrimazole 1% Cream 1 Application(s) Topical two times a day  enoxaparin Injectable 40 milliGRAM(s) SubCutaneous every 24 hours  metFORMIN 1000 milliGRAM(s) Oral two times a day  pantoprazole    Tablet 40 milliGRAM(s) Oral before breakfast  vitamin A &amp; D Ointment 1 Application(s) Topical daily    MEDICATIONS  (PRN):  ondansetron Injectable 4 milliGRAM(s) IV Push every 6 hours PRN Nausea and/or Vomiting            ASSESSMENT:    45 yo M with PMH of Intellectual Disability and DM not on any medications presented with foot wounds.    Covid  PNA  Onychomycosis  Dental caries  DM2  Intellectual disability/autism        Covid PNA- completed RDV  Nail bed wound and onychomycosis. Status post Aseptic debridement and curettage of all fungal and ingrowing nails 1-5 bilateral with sterile nail nipper 06/07  Podiatry- no surgical intervention is advised. outpt podiatry f/u  DM type 2 - uncontrolled. A1C 8.9. Continue  metformin 1000mg BID  Dental Caries-completed clinda for 7 days. Will need outpt f/u for possible multiple extractions  Insomnia- prn melatonin    Handoff:  Pending legal issue and guardianship, next  meeting due 06/23

## 2022-06-20 NOTE — PROGRESS NOTE ADULT - ASSESSMENT
45 yo M with PMH of Intellectual Disability and DM not on any medications presented with foot wounds.    Covid  PNA  Onychomycosis  Dental caries  DM2  Intellectual disability/autism    Covid PNA- completed RDV  Nail bed wound and onychomycosis. Status post Aseptic debridement and curettage of all fungal and ingrowing nails 1-5 bilateral with sterile nail nipper 06/07  Podiatry- no surgical intervention is advised. outpt podiatry f/u  DM type 2 - uncontrolled. A1C 8.1. Continue  metformin 1000mg BID  Dental Caries-completed clinda for 7 days. Will need outpt f/u for possible multiple extractions  Insomnia- prn melatonin    Handoff:  Pending legal issue and guardianship, next  meeting due 06/23

## 2022-06-20 NOTE — PROGRESS NOTE ADULT - SUBJECTIVE AND OBJECTIVE BOX
CARIN WATSON 46y Male  MRN#: [id number]     Hospital Day: 81d    Pt is currently admitted with the primary diagnosis of  DM FOOT ULCERS; HYPERGLYCEMIA        SUBJECTIVE     Overnight events  None    Subjective complaints  Pt was evaluated this am. Patient denied any active complaints and per patient his symptoms are improving                                            ----------------------------------------------------------  OBJECTIVE  PAST MEDICAL & SURGICAL HISTORY  DM (diabetes mellitus)    Intellectual disability                                              -----------------------------------------------------------  ALLERGIES:  penicillin (Unknown)                                            ------------------------------------------------------------    HOME MEDICATIONS  Home Medications:  vitamin A and D topical ointment: 1 application topically once a day (12 Apr 2022 11:40)                           MEDICATIONS:  STANDING MEDICATIONS  ammonium lactate 12% Lotion 1 Application(s) Topical two times a day  chlorhexidine 4% Liquid 1 Application(s) Topical <User Schedule>  clotrimazole 1% Cream 1 Application(s) Topical two times a day  dextrose 5%. 1000 milliLiter(s) IV Continuous <Continuous>  dextrose 5%. 1000 milliLiter(s) IV Continuous <Continuous>  dextrose 50% Injectable 25 Gram(s) IV Push once  dextrose 50% Injectable 12.5 Gram(s) IV Push once  dextrose 50% Injectable 25 Gram(s) IV Push once  famotidine    Tablet 20 milliGRAM(s) Oral daily  glucagon  Injectable 1 milliGRAM(s) IntraMuscular once  glucagon  Injectable 1 milliGRAM(s) IntraMuscular once  insulin glargine Injectable (LANTUS) 10 Unit(s) SubCutaneous every morning  insulin lispro (ADMELOG) corrective regimen sliding scale   SubCutaneous three times a day before meals  insulin lispro Injectable (ADMELOG) 3 Unit(s) SubCutaneous three times a day before meals  lactobacillus acidophilus 1 Tablet(s) Oral two times a day  melatonin 3 milliGRAM(s) Oral at bedtime  metFORMIN 1000 milliGRAM(s) Oral two times a day  sodium chloride 0.9%. 1000 milliLiter(s) IV Continuous <Continuous>  vitamin A &amp; D Ointment 1 Application(s) Topical daily    PRN MEDICATIONS  acetaminophen     Tablet .. 650 milliGRAM(s) Oral every 6 hours PRN  dextrose Oral Gel 15 Gram(s) Oral once PRN  guaifenesin/dextromethorphan Oral Liquid 10 milliLiter(s) Oral every 4 hours PRN  metoclopramide Injectable 10 milliGRAM(s) IV Push every 8 hours PRN                                            ------------------------------------------------------------  VITAL SIGNS: Last 24 Hours  T(C): 36.6 (20 Jun 2022 08:00), Max: 37.2 (20 Jun 2022 01:14)  T(F): 97.9 (20 Jun 2022 08:00), Max: 98.9 (20 Jun 2022 01:14)  HR: 99 (20 Jun 2022 08:00) (99 - 101)  BP: 122/60 (20 Jun 2022 08:00) (122/60 - 122/70)  BP(mean): --  RR: 18 (20 Jun 2022 08:00) (18 - 18)  SpO2: 96% (20 Jun 2022 08:00) (96% - 96%)                                             --------------------------------------------------------------  LABS:                                                                    -------------------------------------------------------------  RADIOLOGY:                                            --------------------------------------------------------------    PHYSICAL EXAM:  GENERAL: NAD, lying in bed comfortabl  HEAD:  Atraumatic, Normocephalic  NECK: Supple, No JVD  CHEST/LUNG: Clear to auscultation bilaterally; No rales, rhonchi, wheezing, or rubs. Unlabored respirations  HEART: regular rate and rhythm; No murmurs, rubs, or gallops  ABDOMEN: Soft, Nontender, Nondistended.    EXTREMITIES: No edema    , onychomycosis toe nails  NERVOUS SYSTEM:  Awake alert, CN  grossly intact. Intellectual disability                                            --------------------------------------------------------------

## 2022-06-21 LAB
GLUCOSE BLDC GLUCOMTR-MCNC: 107 MG/DL — HIGH (ref 70–99)
GLUCOSE BLDC GLUCOMTR-MCNC: 151 MG/DL — HIGH (ref 70–99)
GLUCOSE BLDC GLUCOMTR-MCNC: 160 MG/DL — HIGH (ref 70–99)
GLUCOSE BLDC GLUCOMTR-MCNC: 184 MG/DL — HIGH (ref 70–99)

## 2022-06-21 PROCEDURE — 99231 SBSQ HOSP IP/OBS SF/LOW 25: CPT

## 2022-06-21 RX ADMIN — CHLORHEXIDINE GLUCONATE 1 APPLICATION(S): 213 SOLUTION TOPICAL at 05:56

## 2022-06-21 RX ADMIN — Medication 1 APPLICATION(S): at 11:20

## 2022-06-21 RX ADMIN — Medication 1: at 08:13

## 2022-06-21 RX ADMIN — Medication 1 TABLET(S): at 05:54

## 2022-06-21 RX ADMIN — Medication 3 UNIT(S): at 08:13

## 2022-06-21 RX ADMIN — Medication 1 APPLICATION(S): at 17:13

## 2022-06-21 RX ADMIN — Medication 3 MILLIGRAM(S): at 21:22

## 2022-06-21 RX ADMIN — Medication 1 TABLET(S): at 17:13

## 2022-06-21 RX ADMIN — Medication 1 APPLICATION(S): at 05:53

## 2022-06-21 RX ADMIN — FAMOTIDINE 20 MILLIGRAM(S): 10 INJECTION INTRAVENOUS at 11:43

## 2022-06-21 RX ADMIN — Medication 3 UNIT(S): at 11:40

## 2022-06-21 RX ADMIN — METFORMIN HYDROCHLORIDE 1000 MILLIGRAM(S): 850 TABLET ORAL at 17:12

## 2022-06-21 RX ADMIN — METFORMIN HYDROCHLORIDE 1000 MILLIGRAM(S): 850 TABLET ORAL at 05:54

## 2022-06-21 RX ADMIN — Medication 1: at 11:41

## 2022-06-21 RX ADMIN — Medication 1: at 17:11

## 2022-06-21 RX ADMIN — Medication 3 UNIT(S): at 17:12

## 2022-06-21 RX ADMIN — INSULIN GLARGINE 10 UNIT(S): 100 INJECTION, SOLUTION SUBCUTANEOUS at 08:14

## 2022-06-21 NOTE — PROGRESS NOTE ADULT - SUBJECTIVE AND OBJECTIVE BOX
Progress Note:  Provider Speciality                            Hospitalist      CARIN WATSON MRN-[id number] 46y Male     CHIEF PRESENTING COMPLAINT:  Patient is a 46y old  Male who presents with a chief complaint of DFU (06 Apr 2022 12:18)        SUBJECTIVE:  Patient was seen and examined at bedside.  No new complaints described by patient in morning rounds.   HISTORY OF PRESENTING ILLNESS:  HPI:  47 yo M with PMHx of Intellectual Disability, DM not on any medications presents with foot wounds. Pt has very poor foot hygiene and per the sister, has been persistently scratching an open wound on his L second toe, which worsened and became more red, had drainage, malodor. He saw a physician at [facility] and was told to come to the ED for evaluation. Pt is poor historian. Does endorse abdominal pain for a few days, cannot give much more description. Per the sister, pt did not seem to have any recent fevers, however is also not a very good historian and does not seem to have good health literacy. States that her and her mom decided to stop giving pt Metformin a while ago, are treating his diabetes by not giving him any sugary foods.  In the ED, T 97.8, /87, , RR 16, SpO2 99% on RA. Lab work unrevealing.  (31 Mar 2022 22:36)        REVIEW OF SYSTEMS:  Patient denies any headache, any vision complaints, runny nose. Denies chest pain, shortness of breath, palpitation. Denies nausea, vomiting, abdominal pain or diarrhoea, Denies dysuria. Denies  localized weakness in any part of the body or numbness.   At least 10 systems were reviewed in ROS. All systems reviewed  are within normal limits except for the complaints as described in Subjective.    PAST MEDICAL & SURGICAL HISTORY:  PAST MEDICAL & SURGICAL HISTORY:  DM (diabetes mellitus)    Intellectual disability            VITAL SIGNS:  Vital Signs Last 24 Hrs  T(C): 36.6 (20 Jun 2022 23:59), Max: 36.6 (20 Jun 2022 23:59)  T(F): 97.9 (20 Jun 2022 23:59), Max: 97.9 (20 Jun 2022 23:59)  HR: 77 (20 Jun 2022 23:59) (77 - 99)  BP: 113/61 (20 Jun 2022 23:59) (113/61 - 121/59)  BP(mean): --  RR: 18 (20 Jun 2022 23:59) (18 - 18)  SpO2: --    PHYSICAL EXAMINATION:  Not in acute distress  General: No icterus  HEENT:   no JVD.  Heart: S1+S2 audible  Lungs: bilateral  moderate air entry, no wheezing, no crepitations.  Abdomen: Soft, non-tender, non-distended , no  rigidity or guarding.  CNS: Awake alert, CN  grossly intact. Intellectual disability  Extremities:  No edema    , onychomycosis toe nails        CONSULTS:  Consultant(s) Notes Reviewed by me.   Care Discussed with Consultants/Other Providers where required.        MEDICATIONS:  MEDICATIONS  (STANDING):  ammonium lactate 12% Lotion 1 Application(s) Topical two times a day  clotrimazole 1% Cream 1 Application(s) Topical two times a day  enoxaparin Injectable 40 milliGRAM(s) SubCutaneous every 24 hours  metFORMIN 1000 milliGRAM(s) Oral two times a day  pantoprazole    Tablet 40 milliGRAM(s) Oral before breakfast  vitamin A &amp; D Ointment 1 Application(s) Topical daily    MEDICATIONS  (PRN):  ondansetron Injectable 4 milliGRAM(s) IV Push every 6 hours PRN Nausea and/or Vomiting            ASSESSMENT:    47 yo M with PMH of Intellectual Disability and DM not on any medications presented with foot wounds.    Covid  PNA  Onychomycosis  Dental caries  DM2  Intellectual disability/autism        Covid PNA- completed RDV  Nail bed wound and onychomycosis. Status post Aseptic debridement and curettage of all fungal and ingrowing nails 1-5 bilateral with sterile nail nipper 06/07  Podiatry- no surgical intervention is advised. outpt podiatry f/u  DM type 2 - uncontrolled. A1C 8.9. Continue  metformin 1000mg BID  Dental Caries-completed clinda for 7 days. Will need outpt f/u for possible multiple extractions  Insomnia- prn melatonin    Handoff:  Pending legal issue and guardianship, next  meeting due 06/23

## 2022-06-21 NOTE — PROGRESS NOTE ADULT - SUBJECTIVE AND OBJECTIVE BOX
CARIN WATSON 46y Male  MRN#: [id number]     Hospital Day: 82d    Pt is currently admitted with the primary diagnosis of  DM FOOT ULCERS; HYPERGLYCEMIA        SUBJECTIVE     Overnight events  None    Subjective complaints  Pt was evaluated this am. Patient denied any active complaints.                                            ----------------------------------------------------------  OBJECTIVE  PAST MEDICAL & SURGICAL HISTORY  DM (diabetes mellitus)    Intellectual disability                                              -----------------------------------------------------------  ALLERGIES:  penicillin (Unknown)                                            ------------------------------------------------------------    HOME MEDICATIONS  Home Medications:  vitamin A and D topical ointment: 1 application topically once a day (12 Apr 2022 11:40)                           MEDICATIONS:  STANDING MEDICATIONS  ammonium lactate 12% Lotion 1 Application(s) Topical two times a day  chlorhexidine 4% Liquid 1 Application(s) Topical <User Schedule>  clotrimazole 1% Cream 1 Application(s) Topical two times a day  dextrose 5%. 1000 milliLiter(s) IV Continuous <Continuous>  dextrose 5%. 1000 milliLiter(s) IV Continuous <Continuous>  dextrose 50% Injectable 25 Gram(s) IV Push once  dextrose 50% Injectable 25 Gram(s) IV Push once  dextrose 50% Injectable 12.5 Gram(s) IV Push once  famotidine    Tablet 20 milliGRAM(s) Oral daily  glucagon  Injectable 1 milliGRAM(s) IntraMuscular once  glucagon  Injectable 1 milliGRAM(s) IntraMuscular once  insulin glargine Injectable (LANTUS) 10 Unit(s) SubCutaneous every morning  insulin lispro (ADMELOG) corrective regimen sliding scale   SubCutaneous three times a day before meals  insulin lispro Injectable (ADMELOG) 3 Unit(s) SubCutaneous three times a day before meals  lactobacillus acidophilus 1 Tablet(s) Oral two times a day  melatonin 3 milliGRAM(s) Oral at bedtime  metFORMIN 1000 milliGRAM(s) Oral two times a day  sodium chloride 0.9%. 1000 milliLiter(s) IV Continuous <Continuous>  vitamin A &amp; D Ointment 1 Application(s) Topical daily    PRN MEDICATIONS  acetaminophen     Tablet .. 650 milliGRAM(s) Oral every 6 hours PRN  dextrose Oral Gel 15 Gram(s) Oral once PRN  guaifenesin/dextromethorphan Oral Liquid 10 milliLiter(s) Oral every 4 hours PRN  metoclopramide Injectable 10 milliGRAM(s) IV Push every 8 hours PRN                                            ------------------------------------------------------------  VITAL SIGNS: Last 24 Hours  T(C): 36.6 (20 Jun 2022 23:59), Max: 36.6 (20 Jun 2022 23:59)  T(F): 97.9 (20 Jun 2022 23:59), Max: 97.9 (20 Jun 2022 23:59)  HR: 77 (20 Jun 2022 23:59) (77 - 99)  BP: 113/61 (20 Jun 2022 23:59) (113/61 - 121/59)  BP(mean): --  RR: 18 (20 Jun 2022 23:59) (18 - 18)  SpO2: --                                             --------------------------------------------------------------  LABS:                                                                    -------------------------------------------------------------  RADIOLOGY:                                            --------------------------------------------------------------    PHYSICAL EXAM:  GENERAL: NAD, lying in bed comfortably  NECK: Supple, No JVD  CHEST/LUNG: Clear to auscultation bilaterally; No rales, rhonchi, wheezing, or rubs. Unlabored respirations  HEART: regular rate and rhythm; No murmurs, rubs, or gallops  ABDOMEN: Soft, Nontender, Nondistended.    EXTREMITIES: Warm. No clubbing, cyanosis, or edema  NERVOUS SYSTEM:  Alert & Oriented X3. Intellectual disability   SKIN: No edema, onychomycosis on toe nails                                           --------------------------------------------------------------

## 2022-06-21 NOTE — PROGRESS NOTE ADULT - ASSESSMENT
47 yo M with PMH of Intellectual Disability and DM not on any medications presented with foot wounds.    Covid  PNA  Onychomycosis  Dental caries  DM2  Intellectual disability/autism    Covid PNA- completed RDV  Nail bed wound and onychomycosis. Status post Aseptic debridement and curettage of all fungal and ingrowing nails 1-5 bilateral with sterile nail nipper 06/07  Podiatry- no surgical intervention is advised. outpt podiatry f/u  DM type 2 - uncontrolled. A1C 8.9. Continue metformin 1000mg BID  Dental Caries-completed clinda for 7 days. Will need outpt f/u for possible multiple extractions  Insomnia- prn melatonin    Handoff:  Pending legal issue and guardianship, next  meeting due 06/23

## 2022-06-22 LAB
GLUCOSE BLDC GLUCOMTR-MCNC: 107 MG/DL — HIGH (ref 70–99)
GLUCOSE BLDC GLUCOMTR-MCNC: 119 MG/DL — HIGH (ref 70–99)
GLUCOSE BLDC GLUCOMTR-MCNC: 152 MG/DL — HIGH (ref 70–99)
GLUCOSE BLDC GLUCOMTR-MCNC: 178 MG/DL — HIGH (ref 70–99)

## 2022-06-22 PROCEDURE — 99231 SBSQ HOSP IP/OBS SF/LOW 25: CPT

## 2022-06-22 RX ADMIN — Medication 1 APPLICATION(S): at 05:34

## 2022-06-22 RX ADMIN — INSULIN GLARGINE 10 UNIT(S): 100 INJECTION, SOLUTION SUBCUTANEOUS at 08:00

## 2022-06-22 RX ADMIN — Medication 3 UNIT(S): at 17:07

## 2022-06-22 RX ADMIN — Medication 1 APPLICATION(S): at 17:06

## 2022-06-22 RX ADMIN — Medication 1 TABLET(S): at 05:33

## 2022-06-22 RX ADMIN — Medication 1 TABLET(S): at 17:12

## 2022-06-22 RX ADMIN — Medication 3 MILLIGRAM(S): at 21:04

## 2022-06-22 RX ADMIN — CHLORHEXIDINE GLUCONATE 1 APPLICATION(S): 213 SOLUTION TOPICAL at 05:34

## 2022-06-22 RX ADMIN — METFORMIN HYDROCHLORIDE 1000 MILLIGRAM(S): 850 TABLET ORAL at 05:33

## 2022-06-22 RX ADMIN — Medication 1 APPLICATION(S): at 05:33

## 2022-06-22 RX ADMIN — FAMOTIDINE 20 MILLIGRAM(S): 10 INJECTION INTRAVENOUS at 11:37

## 2022-06-22 RX ADMIN — Medication 3 UNIT(S): at 11:37

## 2022-06-22 RX ADMIN — METFORMIN HYDROCHLORIDE 1000 MILLIGRAM(S): 850 TABLET ORAL at 17:07

## 2022-06-22 RX ADMIN — Medication 3 UNIT(S): at 08:00

## 2022-06-22 RX ADMIN — Medication 1: at 17:07

## 2022-06-22 RX ADMIN — Medication 1 APPLICATION(S): at 11:37

## 2022-06-22 NOTE — PROGRESS NOTE ADULT - SUBJECTIVE AND OBJECTIVE BOX
CARIN WATSON  46y, Male  Allergy: penicillin (Unknown)    Hospital Day: 83d    Patient seen and examined. No acute events overnight    PMH/PSH:  PAST MEDICAL & SURGICAL HISTORY:  DM (diabetes mellitus)      Intellectual disability          VITALS:  T(F): 98.5 (06-22-22 @ 07:22), Max: 98.7 (06-21-22 @ 23:54)  HR: 88 (06-22-22 @ 07:22)  BP: 131/68 (06-22-22 @ 07:22) (130/61 - 134/72)  RR: 18 (06-22-22 @ 07:22)  SpO2: 96% (06-22-22 @ 07:22)    TESTS & MEASUREMENTS:  Weight (Kg):                             RADIOLOGY & ADDITIONAL TESTS:    RECENT DIAGNOSTIC ORDERS:      MEDICATIONS:  MEDICATIONS  (STANDING):  ammonium lactate 12% Lotion 1 Application(s) Topical two times a day  chlorhexidine 4% Liquid 1 Application(s) Topical <User Schedule>  clotrimazole 1% Cream 1 Application(s) Topical two times a day  dextrose 5%. 1000 milliLiter(s) (50 mL/Hr) IV Continuous <Continuous>  dextrose 5%. 1000 milliLiter(s) (100 mL/Hr) IV Continuous <Continuous>  dextrose 50% Injectable 25 Gram(s) IV Push once  dextrose 50% Injectable 12.5 Gram(s) IV Push once  dextrose 50% Injectable 25 Gram(s) IV Push once  famotidine    Tablet 20 milliGRAM(s) Oral daily  glucagon  Injectable 1 milliGRAM(s) IntraMuscular once  glucagon  Injectable 1 milliGRAM(s) IntraMuscular once  insulin glargine Injectable (LANTUS) 10 Unit(s) SubCutaneous every morning  insulin lispro (ADMELOG) corrective regimen sliding scale   SubCutaneous three times a day before meals  insulin lispro Injectable (ADMELOG) 3 Unit(s) SubCutaneous three times a day before meals  lactobacillus acidophilus 1 Tablet(s) Oral two times a day  melatonin 3 milliGRAM(s) Oral at bedtime  metFORMIN 1000 milliGRAM(s) Oral two times a day  sodium chloride 0.9%. 1000 milliLiter(s) (75 mL/Hr) IV Continuous <Continuous>  vitamin A &amp; D Ointment 1 Application(s) Topical daily    MEDICATIONS  (PRN):  acetaminophen     Tablet .. 650 milliGRAM(s) Oral every 6 hours PRN Temp greater or equal to 38C (100.4F), Mild Pain (1 - 3)  dextrose Oral Gel 15 Gram(s) Oral once PRN Blood Glucose LESS THAN 70 milliGRAM(s)/deciliter  guaifenesin/dextromethorphan Oral Liquid 10 milliLiter(s) Oral every 4 hours PRN s  metoclopramide Injectable 10 milliGRAM(s) IV Push every 8 hours PRN Hiccups      HOME MEDICATIONS:  vitamin A and D topical ointment (04-12)      REVIEW OF SYSTEMS:  All other review of systems is negative unless indicated above.     PHYSICAL EXAM:  PHYSICAL EXAM:  GENERAL: NAD, well-developed  HEAD:  Atraumatic, Normocephalic  NECK: Supple, No JVD  CHEST/LUNG: Clear to auscultation bilaterally; No wheeze  HEART: Regular rate and rhythm; No murmurs, rubs, or gallops  ABDOMEN: Soft, Nontender, Nondistended; Bowel sounds present  EXTREMITIES:  2+ Peripheral Pulses, No clubbing, cyanosis, or edema  SKIN: No rashes or lesions

## 2022-06-22 NOTE — PROGRESS NOTE ADULT - SUBJECTIVE AND OBJECTIVE BOX
CARIN WATSON 46y Male  MRN#: [id number]     Hospital Day: 83d    Pt is currently admitted with the primary diagnosis of  DM FOOT ULCERS; HYPERGLYCEMIA        SUBJECTIVE     Overnight events  None    Subjective complaints  Pt was evaluated this am. Patient denied any active complaints.                                            ----------------------------------------------------------  OBJECTIVE  PAST MEDICAL & SURGICAL HISTORY  DM (diabetes mellitus)    Intellectual disability                                              -----------------------------------------------------------  ALLERGIES:  penicillin (Unknown)                                            ------------------------------------------------------------    HOME MEDICATIONS  Home Medications:  vitamin A and D topical ointment: 1 application topically once a day (12 Apr 2022 11:40)                           MEDICATIONS:  STANDING MEDICATIONS  ammonium lactate 12% Lotion 1 Application(s) Topical two times a day  chlorhexidine 4% Liquid 1 Application(s) Topical <User Schedule>  clotrimazole 1% Cream 1 Application(s) Topical two times a day  dextrose 5%. 1000 milliLiter(s) IV Continuous <Continuous>  dextrose 5%. 1000 milliLiter(s) IV Continuous <Continuous>  dextrose 50% Injectable 25 Gram(s) IV Push once  dextrose 50% Injectable 12.5 Gram(s) IV Push once  dextrose 50% Injectable 25 Gram(s) IV Push once  famotidine    Tablet 20 milliGRAM(s) Oral daily  glucagon  Injectable 1 milliGRAM(s) IntraMuscular once  glucagon  Injectable 1 milliGRAM(s) IntraMuscular once  insulin glargine Injectable (LANTUS) 10 Unit(s) SubCutaneous every morning  insulin lispro (ADMELOG) corrective regimen sliding scale   SubCutaneous three times a day before meals  insulin lispro Injectable (ADMELOG) 3 Unit(s) SubCutaneous three times a day before meals  lactobacillus acidophilus 1 Tablet(s) Oral two times a day  melatonin 3 milliGRAM(s) Oral at bedtime  metFORMIN 1000 milliGRAM(s) Oral two times a day  sodium chloride 0.9%. 1000 milliLiter(s) IV Continuous <Continuous>  vitamin A &amp; D Ointment 1 Application(s) Topical daily    PRN MEDICATIONS  acetaminophen     Tablet .. 650 milliGRAM(s) Oral every 6 hours PRN  dextrose Oral Gel 15 Gram(s) Oral once PRN  guaifenesin/dextromethorphan Oral Liquid 10 milliLiter(s) Oral every 4 hours PRN  metoclopramide Injectable 10 milliGRAM(s) IV Push every 8 hours PRN                                            ------------------------------------------------------------  VITAL SIGNS: Last 24 Hours  T(C): 36.9 (22 Jun 2022 07:22), Max: 37.1 (21 Jun 2022 23:54)  T(F): 98.5 (22 Jun 2022 07:22), Max: 98.7 (21 Jun 2022 23:54)  HR: 88 (22 Jun 2022 07:22) (88 - 99)  BP: 131/68 (22 Jun 2022 07:22) (130/61 - 134/72)  BP(mean): --  RR: 18 (22 Jun 2022 07:22) (18 - 18)  SpO2: 96% (22 Jun 2022 07:22) (96% - 99%)                                             --------------------------------------------------------------  LABS:                                                                    -------------------------------------------------------------  RADIOLOGY:                                            --------------------------------------------------------------    PHYSICAL EXAM:  GENERAL: NAD, lying in bed comfortably  NECK: Supple, No JVD  CHEST/LUNG: Clear to auscultation bilaterally; No rales, rhonchi, wheezing, or rubs. Unlabored respirations  HEART: regular rate and rhythm; No murmurs, rubs, or gallops  ABDOMEN: Soft, Nontender, Nondistended.    EXTREMITIES: Warm. No clubbing, cyanosis, or edema  NERVOUS SYSTEM:  Alert & Oriented X3. Intellectual disability   SKIN: No edema, onychomycosis on toe nails                                           --------------------------------------------------------------

## 2022-06-22 NOTE — PROGRESS NOTE ADULT - ASSESSMENT
45 yo M with PMH of Intellectual Disability and DM not on any medications presented with foot wounds.    Covid  PNA  Onychomycosis  Dental caries  DM2  Intellectual disability/autism    Covid PNA- completed RDV  Nail bed wound and onychomycosis. Status post Aseptic debridement and curettage of all fungal and ingrowing nails 1-5 bilateral with sterile nail nipper 06/07  Podiatry- no surgical intervention is advised. outpt podiatry f/u  DM type 2 - uncontrolled. A1C 8.9. Continue metformin 1000mg BID  Dental Caries-completed clinda for 7 days. Will need outpt f/u for possible multiple extractions  Insomnia- prn melatonin    Handoff:  Pending legal issue and guardianship, next  meeting due 06/23

## 2022-06-23 LAB
GLUCOSE BLDC GLUCOMTR-MCNC: 133 MG/DL — HIGH (ref 70–99)
GLUCOSE BLDC GLUCOMTR-MCNC: 148 MG/DL — HIGH (ref 70–99)
GLUCOSE BLDC GLUCOMTR-MCNC: 232 MG/DL — HIGH (ref 70–99)

## 2022-06-23 PROCEDURE — 99231 SBSQ HOSP IP/OBS SF/LOW 25: CPT

## 2022-06-23 RX ADMIN — Medication 3 MILLIGRAM(S): at 22:01

## 2022-06-23 RX ADMIN — Medication 1 APPLICATION(S): at 06:20

## 2022-06-23 RX ADMIN — Medication 1 APPLICATION(S): at 17:21

## 2022-06-23 RX ADMIN — FAMOTIDINE 20 MILLIGRAM(S): 10 INJECTION INTRAVENOUS at 12:28

## 2022-06-23 RX ADMIN — INSULIN GLARGINE 10 UNIT(S): 100 INJECTION, SOLUTION SUBCUTANEOUS at 09:18

## 2022-06-23 RX ADMIN — METFORMIN HYDROCHLORIDE 1000 MILLIGRAM(S): 850 TABLET ORAL at 06:18

## 2022-06-23 RX ADMIN — Medication 1 APPLICATION(S): at 13:15

## 2022-06-23 RX ADMIN — Medication 3 UNIT(S): at 17:16

## 2022-06-23 RX ADMIN — Medication 1 TABLET(S): at 17:44

## 2022-06-23 RX ADMIN — Medication 1 APPLICATION(S): at 06:18

## 2022-06-23 RX ADMIN — Medication 2: at 17:16

## 2022-06-23 RX ADMIN — CHLORHEXIDINE GLUCONATE 1 APPLICATION(S): 213 SOLUTION TOPICAL at 06:20

## 2022-06-23 RX ADMIN — Medication 1 TABLET(S): at 06:18

## 2022-06-23 RX ADMIN — Medication 3 UNIT(S): at 08:36

## 2022-06-23 RX ADMIN — Medication 1 APPLICATION(S): at 17:44

## 2022-06-23 NOTE — PROGRESS NOTE ADULT - ASSESSMENT
45 yo M with PMH of Intellectual Disability and DM not on any medications presented with foot wounds.    Covid  PNA  Onychomycosis  Dental caries  DM2  Intellectual disability/autism    Covid PNA- completed RDV  Nail bed wound and onychomycosis. Status post Aseptic debridement and curettage of all fungal and ingrowing nails 1-5 bilateral with sterile nail nipper 06/07  Podiatry- no surgical intervention is advised. outpt podiatry f/u  DM type 2 - uncontrolled. A1C 8.9. Continue metformin 1000mg BID  Dental Caries-completed clinda for 7 days. Will need outpt f/u for possible multiple extractions  Insomnia- prn melatonin    Handoff:  Pending legal issue and guardianship, next  meeting today

## 2022-06-23 NOTE — PROGRESS NOTE ADULT - SUBJECTIVE AND OBJECTIVE BOX
CARIN WATSON 46y Male  MRN#: [id number]     Hospital Day: 84d    Pt is currently admitted with the primary diagnosis of  DM FOOT ULCERS; HYPERGLYCEMIA        SUBJECTIVE     Overnight events  None    Subjective complaints  Pt was evaluated this am. Patient denied any active complaints. His feet pain has bene decreasing                                             ----------------------------------------------------------  OBJECTIVE  PAST MEDICAL & SURGICAL HISTORY  DM (diabetes mellitus)    Intellectual disability                                              -----------------------------------------------------------  ALLERGIES:  penicillin (Unknown)                                            ------------------------------------------------------------    HOME MEDICATIONS  Home Medications:  vitamin A and D topical ointment: 1 application topically once a day (12 Apr 2022 11:40)                           MEDICATIONS:  STANDING MEDICATIONS  ammonium lactate 12% Lotion 1 Application(s) Topical two times a day  chlorhexidine 4% Liquid 1 Application(s) Topical <User Schedule>  clotrimazole 1% Cream 1 Application(s) Topical two times a day  dextrose 5%. 1000 milliLiter(s) IV Continuous <Continuous>  dextrose 5%. 1000 milliLiter(s) IV Continuous <Continuous>  dextrose 50% Injectable 25 Gram(s) IV Push once  dextrose 50% Injectable 12.5 Gram(s) IV Push once  dextrose 50% Injectable 25 Gram(s) IV Push once  famotidine    Tablet 20 milliGRAM(s) Oral daily  glucagon  Injectable 1 milliGRAM(s) IntraMuscular once  glucagon  Injectable 1 milliGRAM(s) IntraMuscular once  insulin glargine Injectable (LANTUS) 10 Unit(s) SubCutaneous every morning  insulin lispro (ADMELOG) corrective regimen sliding scale   SubCutaneous three times a day before meals  insulin lispro Injectable (ADMELOG) 3 Unit(s) SubCutaneous three times a day before meals  lactobacillus acidophilus 1 Tablet(s) Oral two times a day  melatonin 3 milliGRAM(s) Oral at bedtime  metFORMIN 1000 milliGRAM(s) Oral two times a day  sodium chloride 0.9%. 1000 milliLiter(s) IV Continuous <Continuous>  vitamin A &amp; D Ointment 1 Application(s) Topical daily    PRN MEDICATIONS  acetaminophen     Tablet .. 650 milliGRAM(s) Oral every 6 hours PRN  dextrose Oral Gel 15 Gram(s) Oral once PRN  guaifenesin/dextromethorphan Oral Liquid 10 milliLiter(s) Oral every 4 hours PRN  metoclopramide Injectable 10 milliGRAM(s) IV Push every 8 hours PRN                                            ------------------------------------------------------------  VITAL SIGNS: Last 24 Hours  T(C): 35.8 (23 Jun 2022 08:15), Max: 36 (22 Jun 2022 15:41)  T(F): 96.5 (23 Jun 2022 08:15), Max: 96.8 (22 Jun 2022 15:41)  HR: 83 (23 Jun 2022 08:15) (83 - 96)  BP: 98/53 (23 Jun 2022 08:15) (98/53 - 131/65)  BP(mean): --  RR: 18 (23 Jun 2022 08:15) (18 - 18)  SpO2: 97% (22 Jun 2022 15:41) (97% - 97%)                                              --------------------------------------------------------------    PHYSICAL EXAM:  GENERAL: NAD, lying in bed comfortably  CHEST/LUNG: Clear to auscultation bilaterally; No rales, rhonchi, wheezing, or rubs. Unlabored respirations  HEART: regular rate and rhythm; No murmurs, rubs, or gallops  ABDOMEN: Bowel sounds present; Soft, Nontender, Nondistended.    EXTREMITIES: left feet examined. all toes have onychomycosis  NERVOUS SYSTEM:  Alert & Oriented X3.                                             --------------------------------------------------------------

## 2022-06-23 NOTE — PROGRESS NOTE ADULT - TIME BILLING
#Autism/ intellectual disability  pending guardianship  f/u legal  case management/ social work    #Progress Note Handoff:  Pending (specify):  Consults_________, Tests________, Test Results_______, Other_____guardianship____  Family discussion: d/w pt at bedside re: treatment plan, primary dx  Disposition: Home___/SNF___/Other________/Unknown at this time_____x___

## 2022-06-24 LAB
GLUCOSE BLDC GLUCOMTR-MCNC: 247 MG/DL — HIGH (ref 70–99)
GLUCOSE BLDC GLUCOMTR-MCNC: 249 MG/DL — HIGH (ref 70–99)
GLUCOSE BLDC GLUCOMTR-MCNC: 285 MG/DL — HIGH (ref 70–99)
GLUCOSE BLDC GLUCOMTR-MCNC: 335 MG/DL — HIGH (ref 70–99)
GLUCOSE BLDC GLUCOMTR-MCNC: 348 MG/DL — HIGH (ref 70–99)

## 2022-06-24 PROCEDURE — 99233 SBSQ HOSP IP/OBS HIGH 50: CPT

## 2022-06-24 RX ORDER — INSULIN LISPRO 100/ML
5 VIAL (ML) SUBCUTANEOUS ONCE
Refills: 0 | Status: COMPLETED | OUTPATIENT
Start: 2022-06-24 | End: 2022-06-24

## 2022-06-24 RX ORDER — INSULIN GLARGINE 100 [IU]/ML
14 INJECTION, SOLUTION SUBCUTANEOUS EVERY MORNING
Refills: 0 | Status: DISCONTINUED | OUTPATIENT
Start: 2022-06-24 | End: 2022-06-25

## 2022-06-24 RX ADMIN — Medication 1 APPLICATION(S): at 06:13

## 2022-06-24 RX ADMIN — Medication 3 UNIT(S): at 17:52

## 2022-06-24 RX ADMIN — Medication 5 UNIT(S): at 22:40

## 2022-06-24 RX ADMIN — Medication 1 APPLICATION(S): at 17:53

## 2022-06-24 RX ADMIN — FAMOTIDINE 20 MILLIGRAM(S): 10 INJECTION INTRAVENOUS at 11:32

## 2022-06-24 RX ADMIN — INSULIN GLARGINE 10 UNIT(S): 100 INJECTION, SOLUTION SUBCUTANEOUS at 08:19

## 2022-06-24 RX ADMIN — Medication 3 UNIT(S): at 08:17

## 2022-06-24 RX ADMIN — Medication 4: at 16:52

## 2022-06-24 RX ADMIN — Medication 3 UNIT(S): at 11:32

## 2022-06-24 RX ADMIN — Medication 1 TABLET(S): at 06:12

## 2022-06-24 RX ADMIN — Medication 2: at 08:16

## 2022-06-24 RX ADMIN — Medication 1 APPLICATION(S): at 17:52

## 2022-06-24 RX ADMIN — Medication 1 APPLICATION(S): at 06:12

## 2022-06-24 RX ADMIN — Medication 3 MILLIGRAM(S): at 22:20

## 2022-06-24 RX ADMIN — Medication 1 TABLET(S): at 17:52

## 2022-06-24 RX ADMIN — CHLORHEXIDINE GLUCONATE 1 APPLICATION(S): 213 SOLUTION TOPICAL at 06:13

## 2022-06-24 RX ADMIN — Medication 2: at 11:31

## 2022-06-24 NOTE — PROGRESS NOTE ADULT - TIME BILLING
#Dental caries, worsening tooth pain  dental eval  s/p course clinda  diet as tolerated  #Autism/ intellectual disability  family unable to care for pt; pending guardianship  f/u legal  case management/ social work    #Progress Note Handoff:  Pending (specify):  Consults_________, Tests________, Test Results_______, Other_____dental eval; guardianship____  Family discussion: d/w pt at bedside re: treatment plan, primary dx  Disposition: Home___/SNF___/Other________/Unknown at this time_____x___

## 2022-06-24 NOTE — PROGRESS NOTE ADULT - ASSESSMENT
45 yo M with PMH of Intellectual Disability and DM not on any medications presented with foot wounds.    Covid  PNA  Onychomycosis  Dental caries  DM2  Intellectual disability/autism    Covid PNA- completed RDV  Nail bed wound and onychomycosis. Status post Aseptic debridement and curettage of all fungal and ingrowing nails 1-5 bilateral with sterile nail nipper 06/07  Podiatry- no surgical intervention is advised. outpt podiatry f/u  DM type 2 - uncontrolled. A1C 8.9. on isulin  Dental Caries-completed clinda for 7 days. Will need outpt f/u for possible multiple extractions  Insomnia- prn melatonin    Handoff:  Guardianship meeting done on 6/23. Pending placement  45 yo M with PMH of Intellectual Disability and DM not on any medications presented with foot wounds.    Covid  PNA  Onychomycosis  Dental caries  DM2  Intellectual disability/autism    Covid PNA- completed RDV  Nail bed wound and onychomycosis. Status post Aseptic debridement and curettage of all fungal and ingrowing nails 1-5 bilateral with sterile nail nipper 06/07  Podiatry- no surgical intervention is advised. outpt podiatry f/u  DM type 2 - uncontrolled. A1C 8.9. on isulin  Dental Caries-completed clinda for 7 days. Will need outpt f/u for possible multiple extractions  Insomnia- prn melatonin    Handoff:  Guardianship meeting done on 6/23. Pending placement     Plan:   dental workup for cavities once we get 2 doctors to consent

## 2022-06-24 NOTE — PROGRESS NOTE ADULT - SUBJECTIVE AND OBJECTIVE BOX
CARIN WATSON 46y Male  MRN#: [id number]     Hospital Day: 85d    Pt is currently admitted with the primary diagnosis of  DM FOOT ULCERS; HYPERGLYCEMIA        SUBJECTIVE     Patient was seen this morning. Denies any complaints.                                             ----------------------------------------------------------  OBJECTIVE  PAST MEDICAL & SURGICAL HISTORY  DM (diabetes mellitus)    Intellectual disability                                              -----------------------------------------------------------  ALLERGIES:  penicillin (Unknown)                                            ------------------------------------------------------------    HOME MEDICATIONS  Home Medications:  vitamin A and D topical ointment: 1 application topically once a day (12 Apr 2022 11:40)                           MEDICATIONS:  STANDING MEDICATIONS  ammonium lactate 12% Lotion 1 Application(s) Topical two times a day  chlorhexidine 4% Liquid 1 Application(s) Topical <User Schedule>  clotrimazole 1% Cream 1 Application(s) Topical two times a day  dextrose 5%. 1000 milliLiter(s) IV Continuous <Continuous>  dextrose 5%. 1000 milliLiter(s) IV Continuous <Continuous>  dextrose 50% Injectable 25 Gram(s) IV Push once  dextrose 50% Injectable 25 Gram(s) IV Push once  dextrose 50% Injectable 12.5 Gram(s) IV Push once  famotidine    Tablet 20 milliGRAM(s) Oral daily  glucagon  Injectable 1 milliGRAM(s) IntraMuscular once  glucagon  Injectable 1 milliGRAM(s) IntraMuscular once  insulin glargine Injectable (LANTUS) 10 Unit(s) SubCutaneous every morning  insulin lispro (ADMELOG) corrective regimen sliding scale   SubCutaneous three times a day before meals  insulin lispro Injectable (ADMELOG) 3 Unit(s) SubCutaneous three times a day before meals  lactobacillus acidophilus 1 Tablet(s) Oral two times a day  melatonin 3 milliGRAM(s) Oral at bedtime  sodium chloride 0.9%. 1000 milliLiter(s) IV Continuous <Continuous>  vitamin A &amp; D Ointment 1 Application(s) Topical daily    PRN MEDICATIONS  acetaminophen     Tablet .. 650 milliGRAM(s) Oral every 6 hours PRN  dextrose Oral Gel 15 Gram(s) Oral once PRN  guaifenesin/dextromethorphan Oral Liquid 10 milliLiter(s) Oral every 4 hours PRN  metoclopramide Injectable 10 milliGRAM(s) IV Push every 8 hours PRN                                            ------------------------------------------------------------  VITAL SIGNS: Last 24 Hours  T(C): 36.8 (24 Jun 2022 08:28), Max: 36.8 (24 Jun 2022 08:28)  T(F): 98.2 (24 Jun 2022 08:28), Max: 98.2 (24 Jun 2022 08:28)  HR: 90 (24 Jun 2022 08:28) (90 - 99)  BP: 131/67 (24 Jun 2022 08:28) (128/59 - 153/65)  BP(mean): --  RR: 18 (24 Jun 2022 08:28) (18 - 18)  SpO2: --                                             --------------------------------------------------------------  LABS:                                                                    -------------------------------------------------------------  RADIOLOGY:                                            --------------------------------------------------------------    PHYSICAL EXAM:  GENERAL: NAD, lying in bed comfortably  NERVOUS SYSTEM:  Alert & Oriented X3   CHEST/LUNG: Clear to auscultation bilaterally. No wheezes heard  HEART: regular rate and rhythm; No murmurs heard  ABDOMEN: Soft, Nontender, Nondistended, Bowel sounds present  EXTREMITIES: No edema. No clubbing. No cyanosis                                         --------------------------------------------------------------

## 2022-06-25 LAB
GLUCOSE BLDC GLUCOMTR-MCNC: 154 MG/DL — HIGH (ref 70–99)
GLUCOSE BLDC GLUCOMTR-MCNC: 220 MG/DL — HIGH (ref 70–99)
GLUCOSE BLDC GLUCOMTR-MCNC: 230 MG/DL — HIGH (ref 70–99)
GLUCOSE BLDC GLUCOMTR-MCNC: 309 MG/DL — HIGH (ref 70–99)
GLUCOSE BLDC GLUCOMTR-MCNC: 318 MG/DL — HIGH (ref 70–99)

## 2022-06-25 PROCEDURE — 99232 SBSQ HOSP IP/OBS MODERATE 35: CPT

## 2022-06-25 RX ORDER — INSULIN LISPRO 100/ML
5 VIAL (ML) SUBCUTANEOUS
Refills: 0 | Status: DISCONTINUED | OUTPATIENT
Start: 2022-06-26 | End: 2022-07-14

## 2022-06-25 RX ORDER — INSULIN LISPRO 100/ML
VIAL (ML) SUBCUTANEOUS
Refills: 0 | Status: DISCONTINUED | OUTPATIENT
Start: 2022-06-25 | End: 2022-10-11

## 2022-06-25 RX ORDER — INSULIN GLARGINE 100 [IU]/ML
14 INJECTION, SOLUTION SUBCUTANEOUS EVERY MORNING
Refills: 0 | Status: DISCONTINUED | OUTPATIENT
Start: 2022-06-26 | End: 2022-06-27

## 2022-06-25 RX ORDER — INSULIN LISPRO 100/ML
5 VIAL (ML) SUBCUTANEOUS ONCE
Refills: 0 | Status: COMPLETED | OUTPATIENT
Start: 2022-06-25 | End: 2022-06-25

## 2022-06-25 RX ADMIN — Medication 3 UNIT(S): at 11:55

## 2022-06-25 RX ADMIN — Medication 1 TABLET(S): at 17:56

## 2022-06-25 RX ADMIN — Medication 1 APPLICATION(S): at 17:56

## 2022-06-25 RX ADMIN — Medication 1: at 08:27

## 2022-06-25 RX ADMIN — Medication 1 APPLICATION(S): at 06:21

## 2022-06-25 RX ADMIN — Medication 3 UNIT(S): at 11:58

## 2022-06-25 RX ADMIN — FAMOTIDINE 20 MILLIGRAM(S): 10 INJECTION INTRAVENOUS at 11:57

## 2022-06-25 RX ADMIN — Medication 2: at 11:57

## 2022-06-25 RX ADMIN — Medication 5 UNIT(S): at 22:10

## 2022-06-25 RX ADMIN — Medication 4: at 17:57

## 2022-06-25 RX ADMIN — Medication 1 TABLET(S): at 06:24

## 2022-06-25 RX ADMIN — Medication 1 APPLICATION(S): at 11:56

## 2022-06-25 RX ADMIN — INSULIN GLARGINE 14 UNIT(S): 100 INJECTION, SOLUTION SUBCUTANEOUS at 08:25

## 2022-06-25 RX ADMIN — Medication 3 MILLIGRAM(S): at 22:10

## 2022-06-25 RX ADMIN — Medication 3 UNIT(S): at 17:58

## 2022-06-25 NOTE — PROGRESS NOTE ADULT - ASSESSMENT
47 yo M with PMH of Intellectual Disability and DM not on any medications presented with foot wounds.    Covid  PNA  Onychomycosis  Dental caries  DM2  Intellectual disability/autism    Covid PNA- completed RDV  Nail bed wound and onychomycosis. Status post Aseptic debridement and curettage of all fungal and ingrowing nails 1-5 bilateral with sterile nail nipper 06/07  Podiatry- no surgical intervention is advised. outpt podiatry f/u  DM type 2 - uncontrolled. A1C 8.9. Continue metformin 1000mg BID  Dental Caries-completed clinda for 7 days. Dental consulted - awaiting evaluation and recommendations  Insomnia- prn melatonin    Handoff:  Pending legal issue and guardianship,

## 2022-06-25 NOTE — PROGRESS NOTE ADULT - SUBJECTIVE AND OBJECTIVE BOX
CARIN WATSON 46y Male  MRN#: [id number]     Hospital Day: 86d    Pt is currently admitted with the primary diagnosis of  DM FOOT ULCERS; HYPERGLYCEMIA        SUBJECTIVE     Overnight events  None    Subjective complaints  Pt was evaluated this am. Patient denied any active complaints.                                            ----------------------------------------------------------  OBJECTIVE  PAST MEDICAL & SURGICAL HISTORY  DM (diabetes mellitus)    Intellectual disability                                              -----------------------------------------------------------  ALLERGIES:  penicillin (Unknown)                                            ------------------------------------------------------------    HOME MEDICATIONS  Home Medications:  vitamin A and D topical ointment: 1 application topically once a day (12 Apr 2022 11:40)                           MEDICATIONS:  STANDING MEDICATIONS  ammonium lactate 12% Lotion 1 Application(s) Topical two times a day  chlorhexidine 4% Liquid 1 Application(s) Topical <User Schedule>  clotrimazole 1% Cream 1 Application(s) Topical two times a day  dextrose 5%. 1000 milliLiter(s) IV Continuous <Continuous>  dextrose 5%. 1000 milliLiter(s) IV Continuous <Continuous>  dextrose 50% Injectable 25 Gram(s) IV Push once  dextrose 50% Injectable 12.5 Gram(s) IV Push once  dextrose 50% Injectable 25 Gram(s) IV Push once  famotidine    Tablet 20 milliGRAM(s) Oral daily  glucagon  Injectable 1 milliGRAM(s) IntraMuscular once  glucagon  Injectable 1 milliGRAM(s) IntraMuscular once  insulin glargine Injectable (LANTUS) 14 Unit(s) SubCutaneous every morning  insulin lispro (ADMELOG) corrective regimen sliding scale   SubCutaneous three times a day before meals  insulin lispro Injectable (ADMELOG) 3 Unit(s) SubCutaneous three times a day before meals  lactobacillus acidophilus 1 Tablet(s) Oral two times a day  melatonin 3 milliGRAM(s) Oral at bedtime  sodium chloride 0.9%. 1000 milliLiter(s) IV Continuous <Continuous>  vitamin A &amp; D Ointment 1 Application(s) Topical daily    PRN MEDICATIONS  acetaminophen     Tablet .. 650 milliGRAM(s) Oral every 6 hours PRN  dextrose Oral Gel 15 Gram(s) Oral once PRN  guaifenesin/dextromethorphan Oral Liquid 10 milliLiter(s) Oral every 4 hours PRN  metoclopramide Injectable 10 milliGRAM(s) IV Push every 8 hours PRN                                            ------------------------------------------------------------  VITAL SIGNS: Last 24 Hours  T(C): 35.3 (25 Jun 2022 08:10), Max: 36.4 (24 Jun 2022 18:33)  T(F): 95.6 (25 Jun 2022 08:10), Max: 97.5 (24 Jun 2022 18:33)  HR: 84 (25 Jun 2022 08:10) (84 - 110)  BP: 122/74 (25 Jun 2022 08:10) (116/58 - 160/83)  BP(mean): --  RR: 18 (25 Jun 2022 08:10) (18 - 18)  SpO2: --                                             --------------------------------------------------------------  LABS:                                                                    -------------------------------------------------------------  RADIOLOGY:                                            --------------------------------------------------------------    PHYSICAL EXAM:  GENERAL: NAD, lying in bed comfortably  CHEST/LUNG: Clear to auscultation bilaterally; No rales, rhonchi, wheezing, or rubs. Unlabored respirations  HEART: regular rate and rhythm; No murmurs, rubs, or gallops  ABDOMEN: Soft, Nontender, Nondistended.    EXTREMITIES: left feet examined. all toes have onychomycosis                                           --------------------------------------------------------------

## 2022-06-25 NOTE — PROGRESS NOTE ADULT - TIME BILLING
#Dental pain  worsening pain R maxillary molar  dental eval  s/p course clinda  diet as tolerated  #Autism/ intellectual disability  family unable to care for pt; pending guardianship  f/u legal  case management/ social work    #Progress Note Handoff:  Pending (specify):  Consults_________, Tests________, Test Results_______, Other_____dental eval; guardianship____  Family discussion: d/w pt at bedside re: treatment plan, primary dx  Disposition: Home___/SNF___/Other________/Unknown at this time_____x___ #R maxillary molar pain  concern for caries  dental eval  s/p course clinda  diet as tolerated  #Autism/ intellectual disability  family unable to care for pt; pending guardianship  f/u legal  case management/ social work    #Progress Note Handoff:  Pending (specify):  Consults_________, Tests________, Test Results_______, Other_____dental eval; guardianship____  Family discussion: d/w pt at bedside re: treatment plan, primary dx  Disposition: Home___/SNF___/Other________/Unknown at this time_____x___

## 2022-06-26 LAB
GLUCOSE BLDC GLUCOMTR-MCNC: 163 MG/DL — HIGH (ref 70–99)
GLUCOSE BLDC GLUCOMTR-MCNC: 182 MG/DL — HIGH (ref 70–99)
GLUCOSE BLDC GLUCOMTR-MCNC: 232 MG/DL — HIGH (ref 70–99)
GLUCOSE BLDC GLUCOMTR-MCNC: 233 MG/DL — HIGH (ref 70–99)

## 2022-06-26 PROCEDURE — 99232 SBSQ HOSP IP/OBS MODERATE 35: CPT

## 2022-06-26 RX ADMIN — Medication 1 APPLICATION(S): at 17:27

## 2022-06-26 RX ADMIN — Medication 5 UNIT(S): at 07:54

## 2022-06-26 RX ADMIN — Medication 2: at 07:54

## 2022-06-26 RX ADMIN — Medication 2: at 17:29

## 2022-06-26 RX ADMIN — Medication 5 UNIT(S): at 17:29

## 2022-06-26 RX ADMIN — Medication 1 APPLICATION(S): at 05:27

## 2022-06-26 RX ADMIN — INSULIN GLARGINE 14 UNIT(S): 100 INJECTION, SOLUTION SUBCUTANEOUS at 07:55

## 2022-06-26 RX ADMIN — Medication 1 APPLICATION(S): at 11:16

## 2022-06-26 RX ADMIN — Medication 1 TABLET(S): at 05:27

## 2022-06-26 RX ADMIN — Medication 1 TABLET(S): at 19:16

## 2022-06-26 RX ADMIN — FAMOTIDINE 20 MILLIGRAM(S): 10 INJECTION INTRAVENOUS at 11:21

## 2022-06-26 RX ADMIN — Medication 4: at 11:22

## 2022-06-26 RX ADMIN — Medication 5 UNIT(S): at 11:22

## 2022-06-26 NOTE — PROGRESS NOTE ADULT - TIME BILLING
46M PMHx intellectual disability/ autism, DM2 here with foot wound. R maxillary molar pain. Inability to care for self.    #R maxillary molar pain  concern for caries  dental eval  s/p course clinda  diet as tolerated  #Autism/ intellectual disability  family unable to care for pt; pending guardianship  f/u legal  case management/ social work  #DM2  lantus 14  humalog 5 tid  ssi  #DVT ppx  scds    #Progress Note Handoff:  Pending (specify):  Consults_________, Tests________, Test Results_______, Other_____dental eval; guardianship____  Family discussion: d/w pt at bedside re: treatment plan, primary dx  Disposition: Home___/SNF___/Other________/Unknown at this time_____x___.

## 2022-06-26 NOTE — PROGRESS NOTE ADULT - SUBJECTIVE AND OBJECTIVE BOX
INTERVAL HPI/OVERNIGHT EVENTS:    SUBJECTIVE: Patient seen and examined at bedside.     no cp, sob, abd pain, fever  no ha, dizziness, lightheadedness, syncope    OBJECTIVE:    VITAL SIGNS:  Vital Signs Last 24 Hrs  T(C): 36.4 (26 Jun 2022 07:05), Max: 36.4 (26 Jun 2022 07:05)  T(F): 97.5 (26 Jun 2022 07:05), Max: 97.5 (26 Jun 2022 07:05)  HR: 94 (26 Jun 2022 07:05) (91 - 94)  BP: 117/74 (26 Jun 2022 07:05) (117/74 - 125/70)  BP(mean): --  RR: 18 (26 Jun 2022 07:05) (18 - 18)  SpO2: --      PHYSICAL EXAM:    General: NAD  HEENT: NC/AT; PERRL, clear conjunctiva  Neck: supple  Respiratory: CTA b/l  Cardiovascular: +S1/S2; RRR  Abdomen: soft, NT/ND; +BS x4  Extremities: WWP, 2+ peripheral pulses b/l; no LE edema  Skin: normal color and turgor; no rash  Neurological:    MEDICATIONS:  MEDICATIONS  (STANDING):  ammonium lactate 12% Lotion 1 Application(s) Topical two times a day  chlorhexidine 4% Liquid 1 Application(s) Topical <User Schedule>  clotrimazole 1% Cream 1 Application(s) Topical two times a day  dextrose 5%. 1000 milliLiter(s) (50 mL/Hr) IV Continuous <Continuous>  dextrose 5%. 1000 milliLiter(s) (100 mL/Hr) IV Continuous <Continuous>  dextrose 50% Injectable 25 Gram(s) IV Push once  dextrose 50% Injectable 12.5 Gram(s) IV Push once  dextrose 50% Injectable 25 Gram(s) IV Push once  famotidine    Tablet 20 milliGRAM(s) Oral daily  glucagon  Injectable 1 milliGRAM(s) IntraMuscular once  glucagon  Injectable 1 milliGRAM(s) IntraMuscular once  insulin glargine Injectable (LANTUS) 14 Unit(s) SubCutaneous every morning  insulin lispro (ADMELOG) corrective regimen sliding scale   SubCutaneous three times a day before meals  insulin lispro Injectable (ADMELOG) 5 Unit(s) SubCutaneous three times a day before meals  lactobacillus acidophilus 1 Tablet(s) Oral two times a day  melatonin 3 milliGRAM(s) Oral at bedtime  sodium chloride 0.9%. 1000 milliLiter(s) (75 mL/Hr) IV Continuous <Continuous>  vitamin A &amp; D Ointment 1 Application(s) Topical daily    MEDICATIONS  (PRN):  acetaminophen     Tablet .. 650 milliGRAM(s) Oral every 6 hours PRN Temp greater or equal to 38C (100.4F), Mild Pain (1 - 3)  dextrose Oral Gel 15 Gram(s) Oral once PRN Blood Glucose LESS THAN 70 milliGRAM(s)/deciliter  guaifenesin/dextromethorphan Oral Liquid 10 milliLiter(s) Oral every 4 hours PRN s  metoclopramide Injectable 10 milliGRAM(s) IV Push every 8 hours PRN Hiccups      ALLERGIES:  Allergies    penicillin (Unknown)    Intolerances        LABS:              Creatinine Trend: 0.9<--        hs Troponin:              CSF:                      EKG:   MICROBIOLOGY:    IMAGING:      Labs, imaging, EKG personally reviewed    RADIOLOGY & ADDITIONAL TESTS: Reviewed.

## 2022-06-27 LAB
GLUCOSE BLDC GLUCOMTR-MCNC: 101 MG/DL — HIGH (ref 70–99)
GLUCOSE BLDC GLUCOMTR-MCNC: 105 MG/DL — HIGH (ref 70–99)
GLUCOSE BLDC GLUCOMTR-MCNC: 143 MG/DL — HIGH (ref 70–99)
GLUCOSE BLDC GLUCOMTR-MCNC: 201 MG/DL — HIGH (ref 70–99)
GLUCOSE BLDC GLUCOMTR-MCNC: 230 MG/DL — HIGH (ref 70–99)
GLUCOSE BLDC GLUCOMTR-MCNC: 236 MG/DL — HIGH (ref 70–99)
GLUCOSE BLDC GLUCOMTR-MCNC: 254 MG/DL — HIGH (ref 70–99)
GLUCOSE BLDC GLUCOMTR-MCNC: 374 MG/DL — HIGH (ref 70–99)

## 2022-06-27 PROCEDURE — 99233 SBSQ HOSP IP/OBS HIGH 50: CPT

## 2022-06-27 RX ORDER — INSULIN GLARGINE 100 [IU]/ML
18 INJECTION, SOLUTION SUBCUTANEOUS EVERY MORNING
Refills: 0 | Status: DISCONTINUED | OUTPATIENT
Start: 2022-06-27 | End: 2022-07-14

## 2022-06-27 RX ORDER — TUBERCULIN PURIFIED PROTEIN DERIVATIVE 5 [IU]/.1ML
5 INJECTION, SOLUTION INTRADERMAL ONCE
Refills: 0 | Status: COMPLETED | OUTPATIENT
Start: 2022-06-27 | End: 2022-06-28

## 2022-06-27 RX ADMIN — Medication 1 APPLICATION(S): at 12:26

## 2022-06-27 RX ADMIN — Medication 1 APPLICATION(S): at 18:13

## 2022-06-27 RX ADMIN — Medication 4: at 08:13

## 2022-06-27 RX ADMIN — FAMOTIDINE 20 MILLIGRAM(S): 10 INJECTION INTRAVENOUS at 12:25

## 2022-06-27 RX ADMIN — Medication 5 UNIT(S): at 12:24

## 2022-06-27 RX ADMIN — INSULIN GLARGINE 14 UNIT(S): 100 INJECTION, SOLUTION SUBCUTANEOUS at 08:23

## 2022-06-27 RX ADMIN — Medication 1 TABLET(S): at 18:12

## 2022-06-27 RX ADMIN — Medication 1 TABLET(S): at 06:06

## 2022-06-27 RX ADMIN — Medication 1 APPLICATION(S): at 06:06

## 2022-06-27 RX ADMIN — Medication 6: at 12:24

## 2022-06-27 RX ADMIN — Medication 1 APPLICATION(S): at 06:05

## 2022-06-27 RX ADMIN — Medication 3 MILLIGRAM(S): at 00:05

## 2022-06-27 RX ADMIN — Medication 1 APPLICATION(S): at 18:12

## 2022-06-27 RX ADMIN — CHLORHEXIDINE GLUCONATE 1 APPLICATION(S): 213 SOLUTION TOPICAL at 06:06

## 2022-06-27 RX ADMIN — Medication 5 UNIT(S): at 08:14

## 2022-06-27 RX ADMIN — Medication 10: at 18:12

## 2022-06-27 RX ADMIN — Medication 3 MILLIGRAM(S): at 23:24

## 2022-06-27 RX ADMIN — Medication 5 UNIT(S): at 18:12

## 2022-06-27 NOTE — PROGRESS NOTE ADULT - ASSESSMENT
47 yo M with PMH of Intellectual Disability and DM not on any medications presented with foot wounds.    Covid  PNA  Onychomycosis  Dental caries  DM2  Intellectual disability/autism    Covid PNA- completed RDV  Nail bed wound and onychomycosis. Status post Aseptic debridement and curettage of all fungal and ingrowing nails 1-5 bilateral with sterile nail nipper 06/07  Podiatry- no surgical intervention is advised. outpt podiatry f/u  DM type 2 - uncontrolled. A1C 8.9. on isulin  Dental Caries-completed clinda for 7 days. Currently arranging for inpatient dental f/u for possible multiple extractions  Insomnia- prn melatonin    Handoff:  Guardianship meeting done on 6/23. Pending placement

## 2022-06-27 NOTE — PROGRESS NOTE ADULT - TIME BILLING
#R maxillary molar pain  concern for caries  dental eval  s/p course clinda  diet as tolerated  #Autism/ intellectual disability  family unable to care for pt; pending guardianship  f/u legal  case management/ social work  #DM2  lantus increase to 18  humalog 5 tid  ssi      #Progress Note Handoff:  Pending (specify):  Consults_________, Tests________, Test Results_______, Other_____dental eval; guardianship____  Family discussion: d/w pt at bedside re: treatment plan, primary dx  Disposition: Home___/SNF___/Other________/Unknown at this time_____x___.

## 2022-06-27 NOTE — PROGRESS NOTE ADULT - SUBJECTIVE AND OBJECTIVE BOX
CARIN WATSON 46y Male  MRN#: [id number]     Hospital Day: 88d    Pt is currently admitted with the primary diagnosis of  DM FOOT ULCERS; HYPERGLYCEMIA        SUBJECTIVE     Patient was seen this morning. Denies any complaints.                                             ----------------------------------------------------------  OBJECTIVE  PAST MEDICAL & SURGICAL HISTORY  DM (diabetes mellitus)    Intellectual disability                                              -----------------------------------------------------------  ALLERGIES:  penicillin (Unknown)                                            ------------------------------------------------------------    HOME MEDICATIONS  Home Medications:  vitamin A and D topical ointment: 1 application topically once a day (12 Apr 2022 11:40)                           MEDICATIONS:  STANDING MEDICATIONS  ammonium lactate 12% Lotion 1 Application(s) Topical two times a day  chlorhexidine 4% Liquid 1 Application(s) Topical <User Schedule>  clotrimazole 1% Cream 1 Application(s) Topical two times a day  dextrose 5%. 1000 milliLiter(s) IV Continuous <Continuous>  dextrose 5%. 1000 milliLiter(s) IV Continuous <Continuous>  dextrose 50% Injectable 25 Gram(s) IV Push once  dextrose 50% Injectable 12.5 Gram(s) IV Push once  dextrose 50% Injectable 25 Gram(s) IV Push once  famotidine    Tablet 20 milliGRAM(s) Oral daily  glucagon  Injectable 1 milliGRAM(s) IntraMuscular once  glucagon  Injectable 1 milliGRAM(s) IntraMuscular once  insulin glargine Injectable (LANTUS) 14 Unit(s) SubCutaneous every morning  insulin lispro (ADMELOG) corrective regimen sliding scale   SubCutaneous three times a day before meals  insulin lispro Injectable (ADMELOG) 5 Unit(s) SubCutaneous three times a day before meals  lactobacillus acidophilus 1 Tablet(s) Oral two times a day  melatonin 3 milliGRAM(s) Oral at bedtime  vitamin A &amp; D Ointment 1 Application(s) Topical daily    PRN MEDICATIONS  acetaminophen     Tablet .. 650 milliGRAM(s) Oral every 6 hours PRN  dextrose Oral Gel 15 Gram(s) Oral once PRN  guaifenesin/dextromethorphan Oral Liquid 10 milliLiter(s) Oral every 4 hours PRN  metoclopramide Injectable 10 milliGRAM(s) IV Push every 8 hours PRN                                            ------------------------------------------------------------  VITAL SIGNS: Last 24 Hours  T(C): 35.7 (27 Jun 2022 07:43), Max: 36.4 (26 Jun 2022 15:52)  T(F): 96.3 (27 Jun 2022 07:43), Max: 97.6 (26 Jun 2022 15:52)  HR: 87 (27 Jun 2022 07:43) (87 - 106)  BP: 117/65 (27 Jun 2022 07:43) (117/65 - 133/85)  BP(mean): --  RR: 16 (27 Jun 2022 07:43) (16 - 18)  SpO2: 97% (27 Jun 2022 07:43) (97% - 97%)                                                          -------------------------------------------------------------  RADIOLOGY:                                            --------------------------------------------------------------    PHYSICAL EXAM:  GENERAL: NAD,  NERVOUS SYSTEM:  Alert & Oriented X3   CHEST/LUNG: Clear to auscultation bilaterally. No wheezes heard  HEART: regular rate and rhythm; No murmurs heard  ABDOMEN: Soft, Nontender, Nondistended, Bowel sounds present  ------------------

## 2022-06-28 LAB
GLUCOSE BLDC GLUCOMTR-MCNC: 152 MG/DL — HIGH (ref 70–99)
GLUCOSE BLDC GLUCOMTR-MCNC: 177 MG/DL — HIGH (ref 70–99)
GLUCOSE BLDC GLUCOMTR-MCNC: 220 MG/DL — HIGH (ref 70–99)
GLUCOSE BLDC GLUCOMTR-MCNC: 232 MG/DL — HIGH (ref 70–99)

## 2022-06-28 PROCEDURE — 99232 SBSQ HOSP IP/OBS MODERATE 35: CPT

## 2022-06-28 RX ADMIN — CHLORHEXIDINE GLUCONATE 1 APPLICATION(S): 213 SOLUTION TOPICAL at 07:01

## 2022-06-28 RX ADMIN — Medication 1 TABLET(S): at 07:02

## 2022-06-28 RX ADMIN — Medication 5 UNIT(S): at 17:05

## 2022-06-28 RX ADMIN — Medication 1 APPLICATION(S): at 07:01

## 2022-06-28 RX ADMIN — FAMOTIDINE 20 MILLIGRAM(S): 10 INJECTION INTRAVENOUS at 12:02

## 2022-06-28 RX ADMIN — Medication 5 UNIT(S): at 12:03

## 2022-06-28 RX ADMIN — Medication 1 TABLET(S): at 17:01

## 2022-06-28 RX ADMIN — INSULIN GLARGINE 18 UNIT(S): 100 INJECTION, SOLUTION SUBCUTANEOUS at 08:16

## 2022-06-28 RX ADMIN — Medication 2: at 17:04

## 2022-06-28 RX ADMIN — Medication 1 APPLICATION(S): at 12:02

## 2022-06-28 RX ADMIN — Medication 3 MILLIGRAM(S): at 21:42

## 2022-06-28 RX ADMIN — Medication 1 APPLICATION(S): at 17:59

## 2022-06-28 RX ADMIN — TUBERCULIN PURIFIED PROTEIN DERIVATIVE 5 UNIT(S): 5 INJECTION, SOLUTION INTRADERMAL at 12:53

## 2022-06-28 RX ADMIN — Medication 4: at 12:08

## 2022-06-28 RX ADMIN — Medication 2: at 08:18

## 2022-06-28 NOTE — PROGRESS NOTE ADULT - ASSESSMENT
47 yo M with PMH of Intellectual Disability and DM not on any medications presented with foot wounds.    Covid  PNA  Onychomycosis  Dental caries  DM2  Intellectual disability/autism    Covid PNA- completed RDV  Nail bed wound and onychomycosis. Status post Aseptic debridement and curettage of all fungal and ingrowing nails 1-5 bilateral with sterile nail nipper 06/07  Podiatry- no surgical intervention is advised. outpt podiatry f/u  DM type 2 - uncontrolled. A1C 8.9. on isulin, adjust dose when necessary   Dental Caries-completed clinda for 7 days. Currently arranging for inpatient dental f/u for possible multiple extractions  Insomnia- prn melatonin    Do PPD test in preparation for transfer to [facility]     Handoff:  Guardianship meeting done on 6/23. Pending placement

## 2022-06-28 NOTE — PROGRESS NOTE ADULT - TIME BILLING
#R maxillary molar pain  concern for caries  dental eval  s/p course clinda  diet as tolerated  #Autism/ intellectual disability  family unable to care for pt; pending guardianship  f/u legal  case management/ social work  #DM2  lantus 18  humalog 5 tid  ssi      #Progress Note Handoff:  Pending (specify):  Consults_________, Tests________, Test Results_______, Other_____dental eval; guardianship____  Family discussion: d/w pt at bedside re: treatment plan, primary dx  Disposition: Home___/SNF___/Other________/Unknown at this time_____x___.

## 2022-06-28 NOTE — PROGRESS NOTE ADULT - SUBJECTIVE AND OBJECTIVE BOX
CARIN WATSON 46y Male  MRN#: [id number]     Hospital Day: 89d    Pt is currently admitted with the primary diagnosis of  DM FOOT ULCERS; HYPERGLYCEMIA        SUBJECTIVE     Patient was seen this morning. Denies any complaints.                                             ----------------------------------------------------------  OBJECTIVE  PAST MEDICAL & SURGICAL HISTORY  DM (diabetes mellitus)    Intellectual disability                                              -----------------------------------------------------------  ALLERGIES:  penicillin (Unknown)                                            ------------------------------------------------------------    HOME MEDICATIONS  Home Medications:  vitamin A and D topical ointment: 1 application topically once a day (12 Apr 2022 11:40)                           MEDICATIONS:  STANDING MEDICATIONS  ammonium lactate 12% Lotion 1 Application(s) Topical two times a day  chlorhexidine 4% Liquid 1 Application(s) Topical <User Schedule>  clotrimazole 1% Cream 1 Application(s) Topical two times a day  dextrose 5%. 1000 milliLiter(s) IV Continuous <Continuous>  dextrose 5%. 1000 milliLiter(s) IV Continuous <Continuous>  dextrose 50% Injectable 25 Gram(s) IV Push once  dextrose 50% Injectable 12.5 Gram(s) IV Push once  dextrose 50% Injectable 25 Gram(s) IV Push once  famotidine    Tablet 20 milliGRAM(s) Oral daily  glucagon  Injectable 1 milliGRAM(s) IntraMuscular once  glucagon  Injectable 1 milliGRAM(s) IntraMuscular once  insulin glargine Injectable (LANTUS) 18 Unit(s) SubCutaneous every morning  insulin lispro (ADMELOG) corrective regimen sliding scale   SubCutaneous three times a day before meals  insulin lispro Injectable (ADMELOG) 5 Unit(s) SubCutaneous three times a day before meals  lactobacillus acidophilus 1 Tablet(s) Oral two times a day  melatonin 3 milliGRAM(s) Oral at bedtime  PPD  5 Tuberculin Unit(s) Injectable 5 Unit(s) IntraDermal once  vitamin A &amp; D Ointment 1 Application(s) Topical daily    PRN MEDICATIONS  acetaminophen     Tablet .. 650 milliGRAM(s) Oral every 6 hours PRN  dextrose Oral Gel 15 Gram(s) Oral once PRN  guaifenesin/dextromethorphan Oral Liquid 10 milliLiter(s) Oral every 4 hours PRN  metoclopramide Injectable 10 milliGRAM(s) IV Push every 8 hours PRN                                            ------------------------------------------------------------  VITAL SIGNS: Last 24 Hours  T(C): 35.9 (28 Jun 2022 08:30), Max: 36.6 (27 Jun 2022 23:49)  T(F): 96.7 (28 Jun 2022 08:30), Max: 97.9 (27 Jun 2022 23:49)  HR: 85 (28 Jun 2022 08:30) (85 - 107)  BP: 124/72 (28 Jun 2022 08:30) (124/72 - 143/71)  BP(mean): --  RR: 18 (28 Jun 2022 08:30) (16 - 18)  SpO2: --      06-27-22 @ 07:01  -  06-28-22 @ 07:00  --------------------------------------------------------  IN: 800 mL / OUT: 0 mL / NET: 800 mL                                             --------------------------------------------------------------  LABS:                                                                    -------------------------------------------------------------  RADIOLOGY:                                            --------------------------------------------------------------    PHYSICAL EXAM:  not done

## 2022-06-29 LAB
GLUCOSE BLDC GLUCOMTR-MCNC: 160 MG/DL — HIGH (ref 70–99)
GLUCOSE BLDC GLUCOMTR-MCNC: 166 MG/DL — HIGH (ref 70–99)
GLUCOSE BLDC GLUCOMTR-MCNC: 230 MG/DL — HIGH (ref 70–99)
GLUCOSE BLDC GLUCOMTR-MCNC: 244 MG/DL — HIGH (ref 70–99)

## 2022-06-29 PROCEDURE — 99231 SBSQ HOSP IP/OBS SF/LOW 25: CPT

## 2022-06-29 RX ADMIN — Medication 5 UNIT(S): at 08:13

## 2022-06-29 RX ADMIN — INSULIN GLARGINE 18 UNIT(S): 100 INJECTION, SOLUTION SUBCUTANEOUS at 08:14

## 2022-06-29 RX ADMIN — Medication 2: at 12:06

## 2022-06-29 RX ADMIN — Medication 1 TABLET(S): at 05:52

## 2022-06-29 RX ADMIN — FAMOTIDINE 20 MILLIGRAM(S): 10 INJECTION INTRAVENOUS at 12:07

## 2022-06-29 RX ADMIN — Medication 3 MILLIGRAM(S): at 21:53

## 2022-06-29 RX ADMIN — Medication 1 APPLICATION(S): at 17:25

## 2022-06-29 RX ADMIN — Medication 4: at 17:17

## 2022-06-29 RX ADMIN — Medication 1 APPLICATION(S): at 12:09

## 2022-06-29 RX ADMIN — Medication 5 UNIT(S): at 17:17

## 2022-06-29 RX ADMIN — Medication 5 UNIT(S): at 12:06

## 2022-06-29 RX ADMIN — Medication 2: at 08:12

## 2022-06-29 RX ADMIN — Medication 1 APPLICATION(S): at 17:21

## 2022-06-29 RX ADMIN — Medication 1 APPLICATION(S): at 05:52

## 2022-06-29 RX ADMIN — Medication 1 TABLET(S): at 17:22

## 2022-06-29 RX ADMIN — Medication 1 APPLICATION(S): at 05:53

## 2022-06-29 NOTE — PROGRESS NOTE ADULT - ASSESSMENT
45 yo M with PMH of Intellectual Disability and DM not on any medications presented with foot wounds.    Covid  PNA  Onychomycosis  Dental caries  DM2  Intellectual disability/autism    Covid PNA- completed RDV  Nail bed wound and onychomycosis. Status post Aseptic debridement and curettage of all fungal and ingrowing nails 1-5 bilateral with sterile nail nipper 06/07  Podiatry- no surgical intervention is advised. outpt podiatry f/u  DM type 2 - uncontrolled. A1C 8.9. on isulin, adjust dose when necessary   Dental Caries-completed clinda for 7 days. Currently arranging for inpatient dental f/u for possible multiple extractions  Insomnia- prn melatonin    PPD test done on 6/28 in preparation for transfer to [facility] --> FU result on 6/30    Handoff:  Guardianship meeting done on 6/23. Pending placement

## 2022-06-29 NOTE — PROGRESS NOTE ADULT - SUBJECTIVE AND OBJECTIVE BOX
CARIN WATSON 46y Male  MRN#: [id number]     Hospital Day: 90d    Pt is currently admitted with the primary diagnosis of  DM FOOT ULCERS; HYPERGLYCEMIA        SUBJECTIVE     Patient was seen this morning. Denies any complaints.                                             ----------------------------------------------------------  OBJECTIVE  PAST MEDICAL & SURGICAL HISTORY  DM (diabetes mellitus)    Intellectual disability                                              -----------------------------------------------------------  ALLERGIES:  penicillin (Unknown)                                            ------------------------------------------------------------    HOME MEDICATIONS  Home Medications:  vitamin A and D topical ointment: 1 application topically once a day (12 Apr 2022 11:40)                           MEDICATIONS:  STANDING MEDICATIONS  ammonium lactate 12% Lotion 1 Application(s) Topical two times a day  chlorhexidine 4% Liquid 1 Application(s) Topical <User Schedule>  clotrimazole 1% Cream 1 Application(s) Topical two times a day  dextrose 5%. 1000 milliLiter(s) IV Continuous <Continuous>  dextrose 5%. 1000 milliLiter(s) IV Continuous <Continuous>  dextrose 50% Injectable 25 Gram(s) IV Push once  dextrose 50% Injectable 12.5 Gram(s) IV Push once  dextrose 50% Injectable 25 Gram(s) IV Push once  famotidine    Tablet 20 milliGRAM(s) Oral daily  glucagon  Injectable 1 milliGRAM(s) IntraMuscular once  glucagon  Injectable 1 milliGRAM(s) IntraMuscular once  insulin glargine Injectable (LANTUS) 18 Unit(s) SubCutaneous every morning  insulin lispro (ADMELOG) corrective regimen sliding scale   SubCutaneous three times a day before meals  insulin lispro Injectable (ADMELOG) 5 Unit(s) SubCutaneous three times a day before meals  lactobacillus acidophilus 1 Tablet(s) Oral two times a day  melatonin 3 milliGRAM(s) Oral at bedtime  vitamin A &amp; D Ointment 1 Application(s) Topical daily    PRN MEDICATIONS  acetaminophen     Tablet .. 650 milliGRAM(s) Oral every 6 hours PRN  dextrose Oral Gel 15 Gram(s) Oral once PRN  guaifenesin/dextromethorphan Oral Liquid 10 milliLiter(s) Oral every 4 hours PRN  metoclopramide Injectable 10 milliGRAM(s) IV Push every 8 hours PRN                                            ------------------------------------------------------------  VITAL SIGNS: Last 24 Hours  T(C): 36.7 (28 Jun 2022 23:36), Max: 37.1 (28 Jun 2022 15:35)  T(F): 98 (28 Jun 2022 23:36), Max: 98.7 (28 Jun 2022 15:35)  HR: 102 (28 Jun 2022 23:36) (83 - 102)  BP: 108/59 (28 Jun 2022 23:36) (108/59 - 124/72)  BP(mean): --  RR: 18 (28 Jun 2022 23:36) (18 - 18)  SpO2: --                                             --------------------------------------------------------------  LABS:                                                                    -------------------------------------------------------------  RADIOLOGY:                                            --------------------------------------------------------------    PHYSICAL EXAM:  GENERAL: NAD, lying in bed comfortably  NERVOUS SYSTEM:  Alert & Oriented X3   CHEST/LUNG: Clear to auscultation bilaterally. No wheezes heard  HEART: regular rate and rhythm; No murmurs heard  ABDOMEN: Soft, Nontender, Nondistended, Bowel sounds present                                           --------------------------------------------------------------

## 2022-06-29 NOTE — PROGRESS NOTE ADULT - SUBJECTIVE AND OBJECTIVE BOX
Progress Note:  Provider Speciality                            Hospitalist      CARIN WATSON MRN-[id number] 46y Male     CHIEF PRESENTING COMPLAINT:  Patient is a 46y old  Male who presents with a chief complaint of DFU (06 Apr 2022 12:18)        SUBJECTIVE:  Patient was seen and examined at bedside.  No new complaints described by patient in morning rounds.   HISTORY OF PRESENTING ILLNESS:  HPI:  47 yo M with PMHx of Intellectual Disability, DM not on any medications presents with foot wounds. Pt has very poor foot hygiene and per the sister, has been persistently scratching an open wound on his L second toe, which worsened and became more red, had drainage, malodor. He saw a physician at [facility] and was told to come to the ED for evaluation. Pt is poor historian. Does endorse abdominal pain for a few days, cannot give much more description. Per the sister, pt did not seem to have any recent fevers, however is also not a very good historian and does not seem to have good health literacy. States that her and her mom decided to stop giving pt Metformin a while ago, are treating his diabetes by not giving him any sugary foods.  In the ED, T 97.8, /87, , RR 16, SpO2 99% on RA. Lab work unrevealing.  (31 Mar 2022 22:36)        REVIEW OF SYSTEMS:  Patient denies any headache, any vision complaints, runny nose. Denies chest pain, shortness of breath, palpitation. Denies nausea, vomiting, abdominal pain or diarrhoea, Denies dysuria. Denies  localized weakness in any part of the body or numbness.   At least 10 systems were reviewed in ROS. All systems reviewed  are within normal limits except for the complaints as described in Subjective.    PAST MEDICAL & SURGICAL HISTORY:  PAST MEDICAL & SURGICAL HISTORY:  DM (diabetes mellitus)    Intellectual disability            VITAL SIGNS:  Vital Signs Last 24 Hrs  T(C): 35.9 (29 Jun 2022 07:20), Max: 37.1 (28 Jun 2022 15:35)  T(F): 96.6 (29 Jun 2022 07:20), Max: 98.7 (28 Jun 2022 15:35)  HR: 80 (29 Jun 2022 07:20) (80 - 102)  BP: 131/60 (29 Jun 2022 07:20) (108/59 - 131/60)  BP(mean): --  RR: 18 (29 Jun 2022 07:20) (18 - 18)  SpO2: 97% (29 Jun 2022 07:20) (97% - 97%)      PHYSICAL EXAMINATION:  Not in acute distress  General: No icterus  HEENT:   no JVD.  Heart: S1+S2 audible  Lungs: bilateral  moderate air entry, no wheezing, no crepitations.  Abdomen: Soft, non-tender, non-distended , no  rigidity or guarding.  CNS: Awake alert, CN  grossly intact. Intellectual disability  Extremities:  No edema    , onychomycosis toe nails        CONSULTS:  Consultant(s) Notes Reviewed by me.   Care Discussed with Consultants/Other Providers where required.        MEDICATIONS:  MEDICATIONS  (STANDING):  ammonium lactate 12% Lotion 1 Application(s) Topical two times a day  clotrimazole 1% Cream 1 Application(s) Topical two times a day  enoxaparin Injectable 40 milliGRAM(s) SubCutaneous every 24 hours  metFORMIN 1000 milliGRAM(s) Oral two times a day  pantoprazole    Tablet 40 milliGRAM(s) Oral before breakfast  vitamin A &amp; D Ointment 1 Application(s) Topical daily    MEDICATIONS  (PRN):  ondansetron Injectable 4 milliGRAM(s) IV Push every 6 hours PRN Nausea and/or Vomiting            ASSESSMENT:    47 yo M with PMH of Intellectual Disability and DM not on any medications presented with foot wounds.    Covid  PNA  Onychomycosis  Dental caries  DM2  Intellectual disability/autism        Covid PNA- completed RDV  Nail bed wound and onychomycosis. Status post Aseptic debridement and curettage of all fungal and ingrowing nails 1-5 bilateral with sterile nail nipper 06/07  Podiatry- no surgical intervention is advised. outpt podiatry f/u  DM type 2 - uncontrolled. A1C 8.9. Continue  metformin 1000mg BID  Dental Caries-completed clinda for 7 days. Will need outpt f/u for possible multiple extractions  Insomnia- prn melatonin    Handoff:  Pending legal issue and guardianship, else medically stable for discharge

## 2022-06-30 LAB
GLUCOSE BLDC GLUCOMTR-MCNC: 126 MG/DL — HIGH (ref 70–99)
GLUCOSE BLDC GLUCOMTR-MCNC: 184 MG/DL — HIGH (ref 70–99)
GLUCOSE BLDC GLUCOMTR-MCNC: 245 MG/DL — HIGH (ref 70–99)
GLUCOSE BLDC GLUCOMTR-MCNC: 264 MG/DL — HIGH (ref 70–99)

## 2022-06-30 PROCEDURE — 99231 SBSQ HOSP IP/OBS SF/LOW 25: CPT

## 2022-06-30 RX ADMIN — Medication 1 TABLET(S): at 05:31

## 2022-06-30 RX ADMIN — Medication 1 APPLICATION(S): at 05:31

## 2022-06-30 RX ADMIN — Medication 1 APPLICATION(S): at 18:26

## 2022-06-30 RX ADMIN — FAMOTIDINE 20 MILLIGRAM(S): 10 INJECTION INTRAVENOUS at 11:57

## 2022-06-30 RX ADMIN — INSULIN GLARGINE 18 UNIT(S): 100 INJECTION, SOLUTION SUBCUTANEOUS at 11:57

## 2022-06-30 RX ADMIN — Medication 5 UNIT(S): at 08:33

## 2022-06-30 RX ADMIN — Medication 1 APPLICATION(S): at 18:25

## 2022-06-30 RX ADMIN — Medication 4: at 08:30

## 2022-06-30 RX ADMIN — Medication 2: at 12:00

## 2022-06-30 RX ADMIN — Medication 1 TABLET(S): at 18:30

## 2022-06-30 RX ADMIN — Medication 5 UNIT(S): at 18:24

## 2022-06-30 RX ADMIN — Medication 5 UNIT(S): at 11:59

## 2022-06-30 NOTE — PROGRESS NOTE ADULT - ASSESSMENT
47 yo M with PMH of Intellectual Disability and DM not on any medications presented with foot wounds.    Covid  PNA  Onychomycosis  Dental caries  DM2  Intellectual disability/autism    Covid PNA- completed RDV  Nail bed wound and onychomycosis. Status post Aseptic debridement and curettage of all fungal and ingrowing nails 1-5 bilateral with sterile nail nipper 06/07  Podiatry- no surgical intervention is advised. outpt podiatry f/u  DM type 2 - uncontrolled. A1C 8.9. on isulin, adjust dose when necessary   Dental Caries-completed clinda for 7 days. Currently arranging for inpatient dental f/u for possible multiple extractions  Insomnia- prn melatonin    PPD test done on 6/28 in preparation for transfer to [facility] --> FU result on 6/30    Handoff:  Guardianship meeting done on 6/23. Pending placement     Dental Patient is transported to the dental clinic for extractions of #1, #2, #4, and #5. Extraoral exam within normal limit. Intraoral exam noted multiple severely decayed teeth. Attempted to contact legal guardianship  for treatment consent, but was unsuccessful. Patient dismissed and will arrange for return to the dental clinic once consent is obtained.    *PLAN: Patient to return for extractions of #1, #2, #4, and #5 once consent is obtained.    RECOMMENDATIONS:  1) Patient or caregiver to continue practice oral hygiene care.  2) Patient to be transported to dental for treatment once consent is obtained.

## 2022-06-30 NOTE — PROGRESS NOTE ADULT - SUBJECTIVE AND OBJECTIVE BOX
Patient is a 46y old  Male who presents with a chief complaint of DFU (29 Jun 2022 14:38). Patient is transported to the dental clinic for extractions of #1, #2, #4 and #5.      HPI:  45 yo M with PMHx of Intellectual Disability, DM not on any medications presents with foot wounds. Pt has very poor foot hygiene and per the sister, has been persistently scratching an open wound on his L second toe, which worsened and became more red, had drainage, malodor. He saw a physician at [facility] and was told to come to the ED for evaluation. Pt is poor historian. Does endorse abdominal pain for a few days, cannot give much more description. Per the sister, pt did not seem to have any recent fevers, however is also not a very good historian and does not seem to have good health literacy. States that her and her mom decided to stop giving pt Metformin a while ago, are treating his diabetes by not giving him any sugary foods.  In the ED, T 97.8, /87, , RR 16, SpO2 99% on RA. Lab work unrevealing.  (31 Mar 2022 22:36)      PAST MEDICAL & SURGICAL HISTORY:  DM (diabetes mellitus)  Intellectual disability    ( -  ) heart valve replacement  ( -  ) joint replacement  ( -  ) pregnancy    MEDICATIONS  (STANDING):  ammonium lactate 12% Lotion 1 Application(s) Topical two times a day  chlorhexidine 4% Liquid 1 Application(s) Topical <User Schedule>  clotrimazole 1% Cream 1 Application(s) Topical two times a day  dextrose 5%. 1000 milliLiter(s) (50 mL/Hr) IV Continuous <Continuous>  dextrose 5%. 1000 milliLiter(s) (100 mL/Hr) IV Continuous <Continuous>  dextrose 50% Injectable 25 Gram(s) IV Push once  dextrose 50% Injectable 12.5 Gram(s) IV Push once  dextrose 50% Injectable 25 Gram(s) IV Push once  famotidine    Tablet 20 milliGRAM(s) Oral daily  glucagon  Injectable 1 milliGRAM(s) IntraMuscular once  glucagon  Injectable 1 milliGRAM(s) IntraMuscular once  insulin glargine Injectable (LANTUS) 18 Unit(s) SubCutaneous every morning  insulin lispro (ADMELOG) corrective regimen sliding scale   SubCutaneous three times a day before meals  insulin lispro Injectable (ADMELOG) 5 Unit(s) SubCutaneous three times a day before meals  lactobacillus acidophilus 1 Tablet(s) Oral two times a day  melatonin 3 milliGRAM(s) Oral at bedtime  vitamin A &amp; D Ointment 1 Application(s) Topical daily    MEDICATIONS  (PRN):  acetaminophen     Tablet .. 650 milliGRAM(s) Oral every 6 hours PRN Temp greater or equal to 38C (100.4F), Mild Pain (1 - 3)  dextrose Oral Gel 15 Gram(s) Oral once PRN Blood Glucose LESS THAN 70 milliGRAM(s)/deciliter  guaifenesin/dextromethorphan Oral Liquid 10 milliLiter(s) Oral every 4 hours PRN s  metoclopramide Injectable 10 milliGRAM(s) IV Push every 8 hours PRN Hiccups    Allergies  penicillin (Unknown)    *SOCIAL HISTORY: ( -  ) Tobacco; ( -  ) ETOH    Vital Signs Last 24 Hrs  T(C): 36.1 (30 Jun 2022 00:31), Max: 36.1 (29 Jun 2022 15:24)  T(F): 97 (30 Jun 2022 00:31), Max: 97 (30 Jun 2022 00:31)  HR: 99 (30 Jun 2022 00:31) (80 - 100)  BP: 137/76 (30 Jun 2022 00:31) (131/60 - 137/76)  BP(mean): --  RR: 18 (30 Jun 2022 00:31) (17 - 18)  SpO2: 95% (29 Jun 2022 15:24) (95% - 97%)    EOE:  TMJ ( -  ) clicks                     ( -  ) pops                     ( -  ) crepitus             Mandible <<FROM>>             Facial bones and MOM <<grossly intact>>             ( -  ) trismus             ( -  ) lymphadenopathy             ( -  ) swelling             ( -  ) asymmetry             ( -  ) palpation             ( -  ) dyspnea             ( -  ) dysphagia             ( -  ) loss of consciousness    IOE:  <<permanent>> dentition: <<multiple carious teeth>>            hard/soft palate:  ( -  ) palatal torus, <<No pathology noted>>           tongue/FOM <<No pathology noted>>           labial/buccal mucosa <<No pathology noted>>           ( +  ) percussion           ( +  ) palpation           ( -  ) swelling            ( -  ) abscess           ( -  ) sinus tract    *ASSESSMENT: Patient is transported to the dental clinic for extractions of #1, #2, #4, and #5. Extraoral exam within normal limit. Intraoral exam noted multiple severely decayed teeth. Attempted to contact legal guardianship  for treatment consent, but was unsuccessful. Patient dismissed and will arrange for return to the dental clinic once consent is obtained.    *PLAN: Patient to return for extractions of #1, #2, #4, and #5 once consent is obtained.    RECOMMENDATIONS:  1) Patient or caregiver to continue practice oral hygiene care.  2) Patient to be transported to dental for treatment once consent is obtained.    Neeta Ortega DDS, pager #[phone] Patient is a 46y old  Male who presents with a chief complaint of DFU (29 Jun 2022 14:38). Patient is transported to the dental clinic for extractions of #1, #2, #4 and #5.    HPI:  47 yo M with PMHx of Intellectual Disability, DM not on any medications presents with foot wounds. Pt has very poor foot hygiene and per the sister, has been persistently scratching an open wound on his L second toe, which worsened and became more red, had drainage, malodor. He saw a physician at [facility] and was told to come to the ED for evaluation. Pt is poor historian. Does endorse abdominal pain for a few days, cannot give much more description. Per the sister, pt did not seem to have any recent fevers, however is also not a very good historian and does not seem to have good health literacy. States that her and her mom decided to stop giving pt Metformin a while ago, are treating his diabetes by not giving him any sugary foods.  In the ED, T 97.8, /87, , RR 16, SpO2 99% on RA. Lab work unrevealing.  (31 Mar 2022 22:36)      PAST MEDICAL & SURGICAL HISTORY:  DM (diabetes mellitus)  Intellectual disability    ( -  ) heart valve replacement  ( -  ) joint replacement  ( -  ) pregnancy    MEDICATIONS  (STANDING):  ammonium lactate 12% Lotion 1 Application(s) Topical two times a day  chlorhexidine 4% Liquid 1 Application(s) Topical <User Schedule>  clotrimazole 1% Cream 1 Application(s) Topical two times a day  dextrose 5%. 1000 milliLiter(s) (50 mL/Hr) IV Continuous <Continuous>  dextrose 5%. 1000 milliLiter(s) (100 mL/Hr) IV Continuous <Continuous>  dextrose 50% Injectable 25 Gram(s) IV Push once  dextrose 50% Injectable 12.5 Gram(s) IV Push once  dextrose 50% Injectable 25 Gram(s) IV Push once  famotidine    Tablet 20 milliGRAM(s) Oral daily  glucagon  Injectable 1 milliGRAM(s) IntraMuscular once  glucagon  Injectable 1 milliGRAM(s) IntraMuscular once  insulin glargine Injectable (LANTUS) 18 Unit(s) SubCutaneous every morning  insulin lispro (ADMELOG) corrective regimen sliding scale   SubCutaneous three times a day before meals  insulin lispro Injectable (ADMELOG) 5 Unit(s) SubCutaneous three times a day before meals  lactobacillus acidophilus 1 Tablet(s) Oral two times a day  melatonin 3 milliGRAM(s) Oral at bedtime  vitamin A &amp; D Ointment 1 Application(s) Topical daily    MEDICATIONS  (PRN):  acetaminophen     Tablet .. 650 milliGRAM(s) Oral every 6 hours PRN Temp greater or equal to 38C (100.4F), Mild Pain (1 - 3)  dextrose Oral Gel 15 Gram(s) Oral once PRN Blood Glucose LESS THAN 70 milliGRAM(s)/deciliter  guaifenesin/dextromethorphan Oral Liquid 10 milliLiter(s) Oral every 4 hours PRN s  metoclopramide Injectable 10 milliGRAM(s) IV Push every 8 hours PRN Hiccups    Allergies  penicillin (Unknown)    *SOCIAL HISTORY: ( -  ) Tobacco; ( -  ) ETOH    Vital Signs Last 24 Hrs  T(C): 36.1 (30 Jun 2022 00:31), Max: 36.1 (29 Jun 2022 15:24)  T(F): 97 (30 Jun 2022 00:31), Max: 97 (30 Jun 2022 00:31)  HR: 99 (30 Jun 2022 00:31) (80 - 100)  BP: 137/76 (30 Jun 2022 00:31) (131/60 - 137/76)  BP(mean): --  RR: 18 (30 Jun 2022 00:31) (17 - 18)  SpO2: 95% (29 Jun 2022 15:24) (95% - 97%)    EOE:  TMJ ( -  ) clicks                     ( -  ) pops                     ( -  ) crepitus             Mandible <<FROM>>             Facial bones and MOM <<grossly intact>>             ( -  ) trismus             ( -  ) lymphadenopathy             ( -  ) swelling             ( -  ) asymmetry             ( -  ) palpation             ( -  ) dyspnea             ( -  ) dysphagia             ( -  ) loss of consciousness    IOE:  <<permanent>> dentition: <<multiple carious teeth>>            hard/soft palate:  ( -  ) palatal torus, <<No pathology noted>>           tongue/FOM <<No pathology noted>>           labial/buccal mucosa <<No pathology noted>>           ( +  ) percussion           ( +  ) palpation           ( -  ) swelling            ( -  ) abscess           ( -  ) sinus tract    *ASSESSMENT: Patient is transported to the dental clinic for extractions of #1, #2, #4, and #5. Extraoral exam within normal limit. Intraoral exam noted multiple severely decayed teeth. Attempted to contact legal guardianship  for treatment consent, but was unsuccessful. Patient dismissed and will arrange for return to the dental clinic once consent is obtained.    *PLAN: Patient to return for extractions of #1, #2, #4, and #5 once consent is obtained.    RECOMMENDATIONS:  1) Patient or caregiver to continue practice oral hygiene care.  2) Patient to be transported to dental for treatment once consent is obtained.    Neeta Ortega DDS, pager #[phone]

## 2022-06-30 NOTE — PROGRESS NOTE ADULT - SUBJECTIVE AND OBJECTIVE BOX
CARIN WATSON 46y Male  MRN#: [id number]     Hospital Day: 91d    Pt is currently admitted with the primary diagnosis of  DM FOOT ULCERS; HYPERGLYCEMIA        SUBJECTIVE     Patient was seen this morning. Denies any complaints.                                             ----------------------------------------------------------  OBJECTIVE  PAST MEDICAL & SURGICAL HISTORY  DM (diabetes mellitus)    Intellectual disability                                              -----------------------------------------------------------  ALLERGIES:  penicillin (Unknown)                                            ------------------------------------------------------------    HOME MEDICATIONS  Home Medications:  vitamin A and D topical ointment: 1 application topically once a day (12 Apr 2022 11:40)                           MEDICATIONS:  STANDING MEDICATIONS  ammonium lactate 12% Lotion 1 Application(s) Topical two times a day  chlorhexidine 4% Liquid 1 Application(s) Topical <User Schedule>  clotrimazole 1% Cream 1 Application(s) Topical two times a day  dextrose 5%. 1000 milliLiter(s) IV Continuous <Continuous>  dextrose 5%. 1000 milliLiter(s) IV Continuous <Continuous>  dextrose 50% Injectable 25 Gram(s) IV Push once  dextrose 50% Injectable 12.5 Gram(s) IV Push once  dextrose 50% Injectable 25 Gram(s) IV Push once  famotidine    Tablet 20 milliGRAM(s) Oral daily  glucagon  Injectable 1 milliGRAM(s) IntraMuscular once  glucagon  Injectable 1 milliGRAM(s) IntraMuscular once  insulin glargine Injectable (LANTUS) 18 Unit(s) SubCutaneous every morning  insulin lispro (ADMELOG) corrective regimen sliding scale   SubCutaneous three times a day before meals  insulin lispro Injectable (ADMELOG) 5 Unit(s) SubCutaneous three times a day before meals  lactobacillus acidophilus 1 Tablet(s) Oral two times a day  melatonin 3 milliGRAM(s) Oral at bedtime  vitamin A &amp; D Ointment 1 Application(s) Topical daily    PRN MEDICATIONS  acetaminophen     Tablet .. 650 milliGRAM(s) Oral every 6 hours PRN  dextrose Oral Gel 15 Gram(s) Oral once PRN  guaifenesin/dextromethorphan Oral Liquid 10 milliLiter(s) Oral every 4 hours PRN  metoclopramide Injectable 10 milliGRAM(s) IV Push every 8 hours PRN                                            ------------------------------------------------------------  VITAL SIGNS: Last 24 Hours  T(C): 36.2 (30 Jun 2022 07:42), Max: 36.2 (30 Jun 2022 07:42)  T(F): 97.2 (30 Jun 2022 07:42), Max: 97.2 (30 Jun 2022 07:42)  HR: 81 (30 Jun 2022 07:42) (81 - 100)  BP: 106/56 (30 Jun 2022 07:42) (106/56 - 137/76)  BP(mean): --  RR: 18 (30 Jun 2022 07:42) (17 - 18)  SpO2: 97% (30 Jun 2022 07:42) (95% - 97%)                                             --------------------------------------------------------------  LABS:                                                                    -------------------------------------------------------------  RADIOLOGY:                                            --------------------------------------------------------------    PHYSICAL EXAM:  not done. patient walking around and looks comfortable -------

## 2022-06-30 NOTE — PROGRESS NOTE ADULT - SUBJECTIVE AND OBJECTIVE BOX
Progress Note:  Provider Speciality                            Hospitalist      CARIN WATSON MRN-[id number] 46y Male     CHIEF PRESENTING COMPLAINT:  Patient is a 46y old  Male who presents with a chief complaint of DFU (06 Apr 2022 12:18)        SUBJECTIVE:  Patient was seen and examined at bedside.  No new complaints described by patient in morning rounds.   HISTORY OF PRESENTING ILLNESS:  HPI:  47 yo M with PMHx of Intellectual Disability, DM not on any medications presents with foot wounds. Pt has very poor foot hygiene and per the sister, has been persistently scratching an open wound on his L second toe, which worsened and became more red, had drainage, malodor. He saw a physician at [facility] and was told to come to the ED for evaluation. Pt is poor historian. Does endorse abdominal pain for a few days, cannot give much more description. Per the sister, pt did not seem to have any recent fevers, however is also not a very good historian and does not seem to have good health literacy. States that her and her mom decided to stop giving pt Metformin a while ago, are treating his diabetes by not giving him any sugary foods.  In the ED, T 97.8, /87, , RR 16, SpO2 99% on RA. Lab work unrevealing.  (31 Mar 2022 22:36)        REVIEW OF SYSTEMS:  Patient denies any headache, any vision complaints, runny nose. Denies chest pain, shortness of breath, palpitation. Denies nausea, vomiting, abdominal pain or diarrhoea, Denies dysuria. Denies  localized weakness in any part of the body or numbness.   At least 10 systems were reviewed in ROS. All systems reviewed  are within normal limits except for the complaints as described in Subjective.    PAST MEDICAL & SURGICAL HISTORY:  PAST MEDICAL & SURGICAL HISTORY:  DM (diabetes mellitus)    Intellectual disability            VITAL SIGNS:  Vital Signs Last 24 Hrs  T(C): 36.1 (30 Jun 2022 15:22), Max: 36.2 (30 Jun 2022 07:42)  T(F): 96.9 (30 Jun 2022 15:22), Max: 97.2 (30 Jun 2022 07:42)  HR: 83 (30 Jun 2022 15:22) (81 - 99)  BP: 124/66 (30 Jun 2022 15:22) (106/56 - 137/76)  BP(mean): --  RR: 18 (30 Jun 2022 15:22) (18 - 18)  SpO2: 98% (30 Jun 2022 15:22) (97% - 98%)      PHYSICAL EXAMINATION:  Not in acute distress  General: No icterus  HEENT:   no JVD.  Heart: S1+S2 audible  Lungs: bilateral  moderate air entry, no wheezing, no crepitations.  Abdomen: Soft, non-tender, non-distended , no  rigidity or guarding.  CNS: Awake alert, CN  grossly intact. Intellectual disability  Extremities:  No edema    , onychomycosis toe nails        CONSULTS:  Consultant(s) Notes Reviewed by me.   Care Discussed with Consultants/Other Providers where required.        MEDICATIONS:  MEDICATIONS  (STANDING):  ammonium lactate 12% Lotion 1 Application(s) Topical two times a day  clotrimazole 1% Cream 1 Application(s) Topical two times a day  enoxaparin Injectable 40 milliGRAM(s) SubCutaneous every 24 hours  metFORMIN 1000 milliGRAM(s) Oral two times a day  pantoprazole    Tablet 40 milliGRAM(s) Oral before breakfast  vitamin A &amp; D Ointment 1 Application(s) Topical daily    MEDICATIONS  (PRN):  ondansetron Injectable 4 milliGRAM(s) IV Push every 6 hours PRN Nausea and/or Vomiting            ASSESSMENT:    47 yo M with PMH of Intellectual Disability and DM not on any medications presented with foot wounds.    Covid  PNA  Onychomycosis  Dental caries  DM2  Intellectual disability/autism        Covid PNA- completed RDV  Nail bed wound and onychomycosis. Status post Aseptic debridement and curettage of all fungal and ingrowing nails 1-5 bilateral with sterile nail nipper 06/07  Podiatry- no surgical intervention is advised. outpt podiatry f/u  DM type 2 - uncontrolled. A1C 8.9. Continue  metformin 1000mg BID  Dental Caries-completed clinda for 7 days. Will need outpt f/u for possible multiple extractions  Insomnia- prn melatonin    Handoff:  Pending legal issue and guardianship, else medically stable for discharge

## 2022-06-30 NOTE — CHART NOTE - NSCHARTNOTEFT_GEN_A_CORE
PO remains optimum per EMR documentation. 6/22-6/27 intake: %.     Not at risk, will re-assess in 10 days.

## 2022-07-01 LAB
GLUCOSE BLDC GLUCOMTR-MCNC: 165 MG/DL — HIGH (ref 70–99)
GLUCOSE BLDC GLUCOMTR-MCNC: 192 MG/DL — HIGH (ref 70–99)
GLUCOSE BLDC GLUCOMTR-MCNC: 227 MG/DL — HIGH (ref 70–99)
GLUCOSE BLDC GLUCOMTR-MCNC: 262 MG/DL — HIGH (ref 70–99)

## 2022-07-01 PROCEDURE — 99231 SBSQ HOSP IP/OBS SF/LOW 25: CPT

## 2022-07-01 RX ADMIN — Medication 1 TABLET(S): at 17:35

## 2022-07-01 RX ADMIN — Medication 2: at 08:39

## 2022-07-01 RX ADMIN — CHLORHEXIDINE GLUCONATE 1 APPLICATION(S): 213 SOLUTION TOPICAL at 06:25

## 2022-07-01 RX ADMIN — Medication 1 APPLICATION(S): at 17:35

## 2022-07-01 RX ADMIN — INSULIN GLARGINE 18 UNIT(S): 100 INJECTION, SOLUTION SUBCUTANEOUS at 08:38

## 2022-07-01 RX ADMIN — FAMOTIDINE 20 MILLIGRAM(S): 10 INJECTION INTRAVENOUS at 12:29

## 2022-07-01 RX ADMIN — Medication 2: at 17:35

## 2022-07-01 RX ADMIN — Medication 1 TABLET(S): at 06:24

## 2022-07-01 RX ADMIN — Medication 5 UNIT(S): at 17:34

## 2022-07-01 RX ADMIN — Medication 5 UNIT(S): at 08:39

## 2022-07-01 RX ADMIN — Medication 4: at 12:24

## 2022-07-01 RX ADMIN — Medication 1 APPLICATION(S): at 06:25

## 2022-07-01 RX ADMIN — Medication 5 UNIT(S): at 12:23

## 2022-07-01 RX ADMIN — Medication 3 MILLIGRAM(S): at 02:48

## 2022-07-01 RX ADMIN — Medication 3 MILLIGRAM(S): at 22:34

## 2022-07-01 RX ADMIN — Medication 1 APPLICATION(S): at 13:40

## 2022-07-01 NOTE — PROGRESS NOTE ADULT - ASSESSMENT
45 yo M with PMH of Intellectual Disability and DM not on any medications presented with foot wounds.    Covid  PNA  Onychomycosis  Dental caries  DM2  Intellectual disability/autism    Covid PNA- completed RDV  Nail bed wound and onychomycosis. Status post Aseptic debridement and curettage of all fungal and ingrowing nails 1-5 bilateral with sterile nail nipper 06/07  Podiatry- no surgical intervention is advised. outpt podiatry f/u  DM type 2 - uncontrolled. A1C 8.9. on isulin, adjust dose when necessary   Dental Caries-completed clinda for 7 days. Currently arranging for inpatient dental f/u for possible multiple extractions  Insomnia- prn melatonin    PPD test done on 6/28 in preparation for transfer to [facility] --> FU result on 6/30    Handoff:  Guardianship meeting done on 6/23. Pending placement     Dental:  FU with dental clinic with regards to consent from legal guardianship  in order for patient to be sent back to clinic.

## 2022-07-01 NOTE — PROGRESS NOTE ADULT - SUBJECTIVE AND OBJECTIVE BOX
Progress Note:  Provider Speciality                            Hospitalist      CARIN WATSON MRN-[id number] 46y Male     CHIEF PRESENTING COMPLAINT:  Patient is a 46y old  Male who presents with a chief complaint of DFU (06 Apr 2022 12:18)        SUBJECTIVE:  Patient was seen and examined at bedside.  No new complaints described by patient in morning rounds.   HISTORY OF PRESENTING ILLNESS:  HPI:  47 yo M with PMHx of Intellectual Disability, DM not on any medications presents with foot wounds. Pt has very poor foot hygiene and per the sister, has been persistently scratching an open wound on his L second toe, which worsened and became more red, had drainage, malodor. He saw a physician at [facility] and was told to come to the ED for evaluation. Pt is poor historian. Does endorse abdominal pain for a few days, cannot give much more description. Per the sister, pt did not seem to have any recent fevers, however is also not a very good historian and does not seem to have good health literacy. States that her and her mom decided to stop giving pt Metformin a while ago, are treating his diabetes by not giving him any sugary foods.  In the ED, T 97.8, /87, , RR 16, SpO2 99% on RA. Lab work unrevealing.  (31 Mar 2022 22:36)        REVIEW OF SYSTEMS:  Patient denies any headache, any vision complaints, runny nose. Denies chest pain, shortness of breath, palpitation. Denies nausea, vomiting, abdominal pain or diarrhoea, Denies dysuria. Denies  localized weakness in any part of the body or numbness.   At least 10 systems were reviewed in ROS. All systems reviewed  are within normal limits except for the complaints as described in Subjective.    PAST MEDICAL & SURGICAL HISTORY:  PAST MEDICAL & SURGICAL HISTORY:  DM (diabetes mellitus)    Intellectual disability            VITAL SIGNS:  Vital Signs Last 24 Hrs  T(C): 36 (01 Jul 2022 08:00), Max: 37 (01 Jul 2022 00:13)  T(F): 96.8 (01 Jul 2022 08:00), Max: 98.6 (01 Jul 2022 00:13)  HR: 85 (01 Jul 2022 08:00) (83 - 96)  BP: 133/72 (01 Jul 2022 08:00) (124/66 - 133/72)  BP(mean): --  RR: 18 (01 Jul 2022 08:00) (18 - 18)  SpO2: 98% (30 Jun 2022 15:22) (98% - 98%)    PHYSICAL EXAMINATION:  Not in acute distress  General: No icterus  HEENT:   no JVD.  Heart: S1+S2 audible  Lungs: bilateral  moderate air entry, no wheezing, no crepitations.  Abdomen: Soft, non-tender, non-distended , no  rigidity or guarding.  CNS: Awake alert, CN  grossly intact. Intellectual disability  Extremities:  No edema    , onychomycosis toe nails        CONSULTS:  Consultant(s) Notes Reviewed by me.   Care Discussed with Consultants/Other Providers where required.        MEDICATIONS:  MEDICATIONS  (STANDING):  ammonium lactate 12% Lotion 1 Application(s) Topical two times a day  clotrimazole 1% Cream 1 Application(s) Topical two times a day  enoxaparin Injectable 40 milliGRAM(s) SubCutaneous every 24 hours  metFORMIN 1000 milliGRAM(s) Oral two times a day  pantoprazole    Tablet 40 milliGRAM(s) Oral before breakfast  vitamin A &amp; D Ointment 1 Application(s) Topical daily    MEDICATIONS  (PRN):  ondansetron Injectable 4 milliGRAM(s) IV Push every 6 hours PRN Nausea and/or Vomiting            ASSESSMENT:    47 yo M with PMH of Intellectual Disability and DM not on any medications presented with foot wounds.    Covid  PNA  Onychomycosis  Dental caries  DM2  Intellectual disability/autism        Covid PNA- completed RDV  Nail bed wound and onychomycosis. Status post Aseptic debridement and curettage of all fungal and ingrowing nails 1-5 bilateral with sterile nail nipper 06/07  Podiatry- no surgical intervention is advised. outpt podiatry f/u  DM type 2 - uncontrolled. A1C 8.9. Continue  metformin 1000mg BID  Dental Caries-completed clinda for 7 days. Will need outpt f/u for possible multiple extractions  Insomnia- prn melatonin    Handoff:  Pending legal issue and guardianship, else medically stable for discharge

## 2022-07-01 NOTE — PROGRESS NOTE ADULT - SUBJECTIVE AND OBJECTIVE BOX
CARIN WATSON 46y Male  MRN#: [id number]     Hospital Day: 92d    Pt is currently admitted with the primary diagnosis of  DM FOOT ULCERS; HYPERGLYCEMIA        SUBJECTIVE     Patient was seen this morning. Denies any complaints.                                             ----------------------------------------------------------  OBJECTIVE  PAST MEDICAL & SURGICAL HISTORY  DM (diabetes mellitus)    Intellectual disability                                              -----------------------------------------------------------  ALLERGIES:  penicillin (Unknown)                                            ------------------------------------------------------------    HOME MEDICATIONS  Home Medications:  vitamin A and D topical ointment: 1 application topically once a day (12 Apr 2022 11:40)                           MEDICATIONS:  STANDING MEDICATIONS  ammonium lactate 12% Lotion 1 Application(s) Topical two times a day  chlorhexidine 4% Liquid 1 Application(s) Topical <User Schedule>  clotrimazole 1% Cream 1 Application(s) Topical two times a day  dextrose 5%. 1000 milliLiter(s) IV Continuous <Continuous>  dextrose 5%. 1000 milliLiter(s) IV Continuous <Continuous>  dextrose 50% Injectable 25 Gram(s) IV Push once  dextrose 50% Injectable 12.5 Gram(s) IV Push once  dextrose 50% Injectable 25 Gram(s) IV Push once  famotidine    Tablet 20 milliGRAM(s) Oral daily  glucagon  Injectable 1 milliGRAM(s) IntraMuscular once  glucagon  Injectable 1 milliGRAM(s) IntraMuscular once  insulin glargine Injectable (LANTUS) 18 Unit(s) SubCutaneous every morning  insulin lispro (ADMELOG) corrective regimen sliding scale   SubCutaneous three times a day before meals  insulin lispro Injectable (ADMELOG) 5 Unit(s) SubCutaneous three times a day before meals  lactobacillus acidophilus 1 Tablet(s) Oral two times a day  melatonin 3 milliGRAM(s) Oral at bedtime  vitamin A &amp; D Ointment 1 Application(s) Topical daily    PRN MEDICATIONS  acetaminophen     Tablet .. 650 milliGRAM(s) Oral every 6 hours PRN  dextrose Oral Gel 15 Gram(s) Oral once PRN  guaifenesin/dextromethorphan Oral Liquid 10 milliLiter(s) Oral every 4 hours PRN  metoclopramide Injectable 10 milliGRAM(s) IV Push every 8 hours PRN                                            ------------------------------------------------------------  VITAL SIGNS: Last 24 Hours  T(C): 37 (01 Jul 2022 00:13), Max: 37 (01 Jul 2022 00:13)  T(F): 98.6 (01 Jul 2022 00:13), Max: 98.6 (01 Jul 2022 00:13)  HR: 96 (01 Jul 2022 00:13) (83 - 96)  BP: 127/72 (01 Jul 2022 00:13) (124/66 - 127/72)  BP(mean): --  RR: 18 (01 Jul 2022 00:13) (18 - 18)  SpO2: 98% (30 Jun 2022 15:22) (98% - 98%)                                             --------------------------------------------------------------  LABS:                                                                    -------------------------------------------------------------  RADIOLOGY:                                            --------------------------------------------------------------    PHYSICAL EXAM:  GENERAL: NAD, lying in bed comfortably  NERVOUS SYSTEM:  Alert & Oriented X3   CHEST/LUNG: Clear to auscultation bilaterally. No wheezes heard  HEART: regular rate and rhythm; No murmurs heard  ABDOMEN: Soft, Nontender, Nondistended, Bowel sounds present                                           --------------------------------------------------------------

## 2022-07-02 LAB
GLUCOSE BLDC GLUCOMTR-MCNC: 113 MG/DL — HIGH (ref 70–99)
GLUCOSE BLDC GLUCOMTR-MCNC: 218 MG/DL — HIGH (ref 70–99)
GLUCOSE BLDC GLUCOMTR-MCNC: 261 MG/DL — HIGH (ref 70–99)
GLUCOSE BLDC GLUCOMTR-MCNC: 268 MG/DL — HIGH (ref 70–99)

## 2022-07-02 PROCEDURE — 99231 SBSQ HOSP IP/OBS SF/LOW 25: CPT

## 2022-07-02 RX ADMIN — Medication 1 APPLICATION(S): at 12:44

## 2022-07-02 RX ADMIN — Medication 1 TABLET(S): at 06:22

## 2022-07-02 RX ADMIN — Medication 5 UNIT(S): at 12:43

## 2022-07-02 RX ADMIN — Medication 5 UNIT(S): at 17:02

## 2022-07-02 RX ADMIN — Medication 1 APPLICATION(S): at 17:03

## 2022-07-02 RX ADMIN — FAMOTIDINE 20 MILLIGRAM(S): 10 INJECTION INTRAVENOUS at 12:44

## 2022-07-02 RX ADMIN — Medication 1 APPLICATION(S): at 06:16

## 2022-07-02 RX ADMIN — Medication 6: at 12:42

## 2022-07-02 RX ADMIN — Medication 4: at 09:00

## 2022-07-02 RX ADMIN — Medication 5 UNIT(S): at 07:45

## 2022-07-02 RX ADMIN — CHLORHEXIDINE GLUCONATE 1 APPLICATION(S): 213 SOLUTION TOPICAL at 06:18

## 2022-07-02 RX ADMIN — Medication 1 TABLET(S): at 17:03

## 2022-07-02 RX ADMIN — Medication 3 MILLIGRAM(S): at 21:01

## 2022-07-02 RX ADMIN — Medication 1 APPLICATION(S): at 06:17

## 2022-07-02 RX ADMIN — INSULIN GLARGINE 18 UNIT(S): 100 INJECTION, SOLUTION SUBCUTANEOUS at 09:13

## 2022-07-02 NOTE — PROGRESS NOTE ADULT - ASSESSMENT
45 yo M with PMH of Intellectual Disability and DM not on any medications presented with foot wounds.    Covid  PNA  Onychomycosis  Dental caries  DM2  Intellectual disability/autism    Covid PNA- completed RDV  Nail bed wound and onychomycosis. Status post Aseptic debridement and curettage of all fungal and ingrowing nails 1-5 bilateral with sterile nail nipper 06/07  Podiatry- no surgical intervention is advised. outpt podiatry f/u  DM type 2 - uncontrolled. A1C 8.9. on isulin, adjust dose when necessary   Dental Caries-completed clinda for 7 days. Currently arranging for inpatient dental f/u for possible multiple extractions  Insomnia- prn melatonin    PPD test done on 6/28 in preparation for transfer to [facility] --> FU result on 6/30    Handoff:  Guardianship meeting done on 6/23. Pending placement     Dental:  Clinic was contacted on 7/1.They said they are following up with the consent from legal guardianship  in order to send patient back to clinic. They will get back to us.

## 2022-07-02 NOTE — PROGRESS NOTE ADULT - SUBJECTIVE AND OBJECTIVE BOX
CARIN WATSON 46y Male  MRN#: [id number]     Hospital Day: 93d    Pt is currently admitted with the primary diagnosis of  DM FOOT ULCERS; HYPERGLYCEMIA        SUBJECTIVE     Patient was seen this morning. Denies any complaints.                                             ----------------------------------------------------------  OBJECTIVE  PAST MEDICAL & SURGICAL HISTORY  DM (diabetes mellitus)    Intellectual disability                                              -----------------------------------------------------------  ALLERGIES:  penicillin (Unknown)                                            ------------------------------------------------------------    HOME MEDICATIONS  Home Medications:  vitamin A and D topical ointment: 1 application topically once a day (12 Apr 2022 11:40)                           MEDICATIONS:  STANDING MEDICATIONS  ammonium lactate 12% Lotion 1 Application(s) Topical two times a day  chlorhexidine 4% Liquid 1 Application(s) Topical <User Schedule>  clotrimazole 1% Cream 1 Application(s) Topical two times a day  dextrose 5%. 1000 milliLiter(s) IV Continuous <Continuous>  dextrose 5%. 1000 milliLiter(s) IV Continuous <Continuous>  dextrose 50% Injectable 25 Gram(s) IV Push once  dextrose 50% Injectable 12.5 Gram(s) IV Push once  dextrose 50% Injectable 25 Gram(s) IV Push once  famotidine    Tablet 20 milliGRAM(s) Oral daily  glucagon  Injectable 1 milliGRAM(s) IntraMuscular once  glucagon  Injectable 1 milliGRAM(s) IntraMuscular once  insulin glargine Injectable (LANTUS) 18 Unit(s) SubCutaneous every morning  insulin lispro (ADMELOG) corrective regimen sliding scale   SubCutaneous three times a day before meals  insulin lispro Injectable (ADMELOG) 5 Unit(s) SubCutaneous three times a day before meals  lactobacillus acidophilus 1 Tablet(s) Oral two times a day  melatonin 3 milliGRAM(s) Oral at bedtime  vitamin A &amp; D Ointment 1 Application(s) Topical daily    PRN MEDICATIONS  acetaminophen     Tablet .. 650 milliGRAM(s) Oral every 6 hours PRN  dextrose Oral Gel 15 Gram(s) Oral once PRN  guaifenesin/dextromethorphan Oral Liquid 10 milliLiter(s) Oral every 4 hours PRN  metoclopramide Injectable 10 milliGRAM(s) IV Push every 8 hours PRN                                            ------------------------------------------------------------  VITAL SIGNS: Last 24 Hours  T(C): 35.8 (02 Jul 2022 08:00), Max: 36.9 (01 Jul 2022 15:22)  T(F): 96.4 (02 Jul 2022 08:00), Max: 98.4 (01 Jul 2022 15:22)  HR: 86 (02 Jul 2022 08:00) (85 - 104)  BP: 113/58 (02 Jul 2022 08:00) (113/58 - 134/68)  BP(mean): --  RR: 18 (02 Jul 2022 08:00) (18 - 18)  SpO2: --      07-01-22 @ 07:01  -  07-02-22 @ 07:00  --------------------------------------------------------  IN: 550 mL / OUT: 0 mL / NET: 550 mL                                             --------------------------------------------------------------  LABS:                                                                    -------------------------------------------------------------  RADIOLOGY:                                            --------------------------------------------------------------    PHYSICAL EXAM:  not done ------------------------

## 2022-07-02 NOTE — PROGRESS NOTE ADULT - SUBJECTIVE AND OBJECTIVE BOX
Progress Note:  Provider Speciality                            Hospitalist      CARIN WATSON MRN-[id number] 46y Male     CHIEF PRESENTING COMPLAINT:  Patient is a 46y old  Male who presents with a chief complaint of DFU (06 Apr 2022 12:18)        SUBJECTIVE:  Patient was seen and examined at bedside.  No new complaints described by patient in morning rounds.   HISTORY OF PRESENTING ILLNESS:  HPI:  45 yo M with PMHx of Intellectual Disability, DM not on any medications presents with foot wounds. Pt has very poor foot hygiene and per the sister, has been persistently scratching an open wound on his L second toe, which worsened and became more red, had drainage, malodor. He saw a physician at [facility] and was told to come to the ED for evaluation. Pt is poor historian. Does endorse abdominal pain for a few days, cannot give much more description. Per the sister, pt did not seem to have any recent fevers, however is also not a very good historian and does not seem to have good health literacy. States that her and her mom decided to stop giving pt Metformin a while ago, are treating his diabetes by not giving him any sugary foods.  In the ED, T 97.8, /87, , RR 16, SpO2 99% on RA. Lab work unrevealing.  (31 Mar 2022 22:36)        REVIEW OF SYSTEMS:  Patient denies any headache, any vision complaints, runny nose. Denies chest pain, shortness of breath, palpitation. Denies nausea, vomiting, abdominal pain or diarrhoea, Denies dysuria. Denies  localized weakness in any part of the body or numbness.   At least 10 systems were reviewed in ROS. All systems reviewed  are within normal limits except for the complaints as described in Subjective.    PAST MEDICAL & SURGICAL HISTORY:  PAST MEDICAL & SURGICAL HISTORY:  DM (diabetes mellitus)    Intellectual disability            VITAL SIGNS:  Vital Signs Last 24 Hrs  T(C): 35.8 (02 Jul 2022 08:00), Max: 36.9 (01 Jul 2022 15:22)  T(F): 96.4 (02 Jul 2022 08:00), Max: 98.4 (01 Jul 2022 15:22)  HR: 86 (02 Jul 2022 08:00) (85 - 104)  BP: 113/58 (02 Jul 2022 08:00) (113/58 - 134/68)  BP(mean): --  RR: 18 (02 Jul 2022 08:00) (18 - 18)  SpO2: --        PHYSICAL EXAMINATION:  Not in acute distress  General: No icterus  HEENT:   no JVD.  Heart: S1+S2 audible  Lungs: bilateral  moderate air entry, no wheezing, no crepitations.  Abdomen: Soft, non-tender, non-distended , no  rigidity or guarding.  CNS: Awake alert, CN  grossly intact. Intellectual disability  Extremities:  No edema    , onychomycosis toe nails        CONSULTS:  Consultant(s) Notes Reviewed by me.   Care Discussed with Consultants/Other Providers where required.        MEDICATIONS:  MEDICATIONS  (STANDING):  ammonium lactate 12% Lotion 1 Application(s) Topical two times a day  clotrimazole 1% Cream 1 Application(s) Topical two times a day  enoxaparin Injectable 40 milliGRAM(s) SubCutaneous every 24 hours  metFORMIN 1000 milliGRAM(s) Oral two times a day  pantoprazole    Tablet 40 milliGRAM(s) Oral before breakfast  vitamin A &amp; D Ointment 1 Application(s) Topical daily    MEDICATIONS  (PRN):  ondansetron Injectable 4 milliGRAM(s) IV Push every 6 hours PRN Nausea and/or Vomiting            ASSESSMENT:    45 yo M with PMH of Intellectual Disability and DM not on any medications presented with foot wounds.    Covid  PNA  Onychomycosis  Dental caries  DM2  Intellectual disability/autism        Covid PNA- completed RDV  Nail bed wound and onychomycosis. Status post Aseptic debridement and curettage of all fungal and ingrowing nails 1-5 bilateral with sterile nail nipper 06/07  Podiatry- no surgical intervention is advised. outpt podiatry f/u  DM type 2 - uncontrolled. A1C 8.9. Continue  metformin 1000mg BID  Dental Caries-completed clinda for 7 days. Will need outpt f/u for possible multiple extractions  Insomnia- prn melatonin    Handoff:  Pending legal issue and guardianship, else medically stable for discharge

## 2022-07-03 LAB
GLUCOSE BLDC GLUCOMTR-MCNC: 132 MG/DL — HIGH (ref 70–99)
GLUCOSE BLDC GLUCOMTR-MCNC: 186 MG/DL — HIGH (ref 70–99)
GLUCOSE BLDC GLUCOMTR-MCNC: 187 MG/DL — HIGH (ref 70–99)
GLUCOSE BLDC GLUCOMTR-MCNC: 285 MG/DL — HIGH (ref 70–99)

## 2022-07-03 PROCEDURE — 99231 SBSQ HOSP IP/OBS SF/LOW 25: CPT

## 2022-07-03 RX ADMIN — Medication 1 TABLET(S): at 17:50

## 2022-07-03 RX ADMIN — Medication 3 MILLIGRAM(S): at 21:15

## 2022-07-03 RX ADMIN — Medication 1 TABLET(S): at 05:18

## 2022-07-03 RX ADMIN — Medication 2: at 12:41

## 2022-07-03 RX ADMIN — Medication 5 UNIT(S): at 12:40

## 2022-07-03 RX ADMIN — Medication 1 APPLICATION(S): at 05:18

## 2022-07-03 RX ADMIN — FAMOTIDINE 20 MILLIGRAM(S): 10 INJECTION INTRAVENOUS at 12:41

## 2022-07-03 RX ADMIN — INSULIN GLARGINE 18 UNIT(S): 100 INJECTION, SOLUTION SUBCUTANEOUS at 08:08

## 2022-07-03 RX ADMIN — Medication 5 UNIT(S): at 17:50

## 2022-07-03 RX ADMIN — Medication 1 APPLICATION(S): at 12:41

## 2022-07-03 RX ADMIN — CHLORHEXIDINE GLUCONATE 1 APPLICATION(S): 213 SOLUTION TOPICAL at 08:07

## 2022-07-03 RX ADMIN — Medication 1 APPLICATION(S): at 17:50

## 2022-07-03 RX ADMIN — Medication 2: at 17:49

## 2022-07-03 NOTE — PROGRESS NOTE ADULT - SUBJECTIVE AND OBJECTIVE BOX
Progress Note:  Provider Speciality                            Hospitalist      CARIN WATSON MRN-[id number] 46y Male     CHIEF PRESENTING COMPLAINT:  Patient is a 46y old  Male who presents with a chief complaint of DFU (06 Apr 2022 12:18)        SUBJECTIVE:  Patient was seen and examined at bedside.  No new complaints described by patient in morning rounds.   HISTORY OF PRESENTING ILLNESS:  HPI:  47 yo M with PMHx of Intellectual Disability, DM not on any medications presents with foot wounds. Pt has very poor foot hygiene and per the sister, has been persistently scratching an open wound on his L second toe, which worsened and became more red, had drainage, malodor. He saw a physician at [facility] and was told to come to the ED for evaluation. Pt is poor historian. Does endorse abdominal pain for a few days, cannot give much more description. Per the sister, pt did not seem to have any recent fevers, however is also not a very good historian and does not seem to have good health literacy. States that her and her mom decided to stop giving pt Metformin a while ago, are treating his diabetes by not giving him any sugary foods.  In the ED, T 97.8, /87, , RR 16, SpO2 99% on RA. Lab work unrevealing.  (31 Mar 2022 22:36)        REVIEW OF SYSTEMS:  Patient denies any headache, any vision complaints, runny nose. Denies chest pain, shortness of breath, palpitation. Denies nausea, vomiting, abdominal pain or diarrhoea, Denies dysuria. Denies  localized weakness in any part of the body or numbness.   At least 10 systems were reviewed in ROS. All systems reviewed  are within normal limits except for the complaints as described in Subjective.    PAST MEDICAL & SURGICAL HISTORY:  PAST MEDICAL & SURGICAL HISTORY:  DM (diabetes mellitus)    Intellectual disability            VITAL SIGNS:  Vital Signs Last 24 Hrs  T(C): 35.7 (03 Jul 2022 09:20), Max: 36.6 (02 Jul 2022 23:56)  T(F): 96.2 (03 Jul 2022 09:20), Max: 97.9 (02 Jul 2022 23:56)  HR: 80 (03 Jul 2022 09:20) (80 - 98)  BP: 120/58 (03 Jul 2022 09:20) (116/65 - 121/77)  BP(mean): --  RR: 18 (03 Jul 2022 09:20) (18 - 18)  SpO2: --      PHYSICAL EXAMINATION:  Not in acute distress  General: No icterus  HEENT:   no JVD.  Heart: S1+S2 audible  Lungs: bilateral  moderate air entry, no wheezing, no crepitations.  Abdomen: Soft, non-tender, non-distended , no  rigidity or guarding.  CNS: Awake alert, CN  grossly intact. Intellectual disability  Extremities:  No edema    , onychomycosis toe nails        CONSULTS:  Consultant(s) Notes Reviewed by me.   Care Discussed with Consultants/Other Providers where required.        MEDICATIONS:  MEDICATIONS  (STANDING):  ammonium lactate 12% Lotion 1 Application(s) Topical two times a day  clotrimazole 1% Cream 1 Application(s) Topical two times a day  enoxaparin Injectable 40 milliGRAM(s) SubCutaneous every 24 hours  metFORMIN 1000 milliGRAM(s) Oral two times a day  pantoprazole    Tablet 40 milliGRAM(s) Oral before breakfast  vitamin A &amp; D Ointment 1 Application(s) Topical daily    MEDICATIONS  (PRN):  ondansetron Injectable 4 milliGRAM(s) IV Push every 6 hours PRN Nausea and/or Vomiting            ASSESSMENT:    47 yo M with PMH of Intellectual Disability and DM not on any medications presented with foot wounds.    Covid  PNA  Onychomycosis  Dental caries  DM2  Intellectual disability/autism        Covid PNA- completed RDV  Nail bed wound and onychomycosis. Status post Aseptic debridement and curettage of all fungal and ingrowing nails 1-5 bilateral with sterile nail nipper 06/07  Podiatry- no surgical intervention is advised. outpt podiatry f/u  DM type 2 - uncontrolled. A1C 8.9. Continue  metformin 1000mg BID  Dental Caries-completed clinda for 7 days. Will need outpt f/u for possible multiple extractions  Insomnia- prn melatonin    Handoff:  Pending legal issue and guardianship, else medically stable for discharge

## 2022-07-04 LAB
GLUCOSE BLDC GLUCOMTR-MCNC: 185 MG/DL — HIGH (ref 70–99)
GLUCOSE BLDC GLUCOMTR-MCNC: 187 MG/DL — HIGH (ref 70–99)
GLUCOSE BLDC GLUCOMTR-MCNC: 216 MG/DL — HIGH (ref 70–99)
GLUCOSE BLDC GLUCOMTR-MCNC: 249 MG/DL — HIGH (ref 70–99)

## 2022-07-04 PROCEDURE — 99231 SBSQ HOSP IP/OBS SF/LOW 25: CPT

## 2022-07-04 RX ORDER — IBUPROFEN 200 MG
400 TABLET ORAL ONCE
Refills: 0 | Status: COMPLETED | OUTPATIENT
Start: 2022-07-04 | End: 2022-07-04

## 2022-07-04 RX ADMIN — Medication 2: at 15:13

## 2022-07-04 RX ADMIN — Medication 1 TABLET(S): at 17:19

## 2022-07-04 RX ADMIN — Medication 5 UNIT(S): at 15:35

## 2022-07-04 RX ADMIN — Medication 1 APPLICATION(S): at 17:20

## 2022-07-04 RX ADMIN — Medication 3 MILLIGRAM(S): at 22:17

## 2022-07-04 RX ADMIN — Medication 5 UNIT(S): at 08:04

## 2022-07-04 RX ADMIN — Medication 4: at 11:37

## 2022-07-04 RX ADMIN — Medication 400 MILLIGRAM(S): at 17:18

## 2022-07-04 RX ADMIN — INSULIN GLARGINE 18 UNIT(S): 100 INJECTION, SOLUTION SUBCUTANEOUS at 08:05

## 2022-07-04 RX ADMIN — Medication 1 APPLICATION(S): at 05:32

## 2022-07-04 RX ADMIN — Medication 1 TABLET(S): at 05:32

## 2022-07-04 RX ADMIN — Medication 2: at 08:04

## 2022-07-04 RX ADMIN — Medication 1 APPLICATION(S): at 14:06

## 2022-07-04 RX ADMIN — CHLORHEXIDINE GLUCONATE 1 APPLICATION(S): 213 SOLUTION TOPICAL at 05:33

## 2022-07-04 RX ADMIN — Medication 5 UNIT(S): at 11:47

## 2022-07-04 RX ADMIN — FAMOTIDINE 20 MILLIGRAM(S): 10 INJECTION INTRAVENOUS at 17:18

## 2022-07-04 NOTE — PROGRESS NOTE ADULT - SUBJECTIVE AND OBJECTIVE BOX
Progress Note:  Provider Speciality                            Hospitalist      CARIN WATSON MRN-[id number] 46y Male     CHIEF PRESENTING COMPLAINT:  Patient is a 46y old  Male who presents with a chief complaint of DFU (06 Apr 2022 12:18)        SUBJECTIVE:  Patient was seen and examined at bedside.  Reports mild bilateral foot pain  HISTORY OF PRESENTING ILLNESS:  HPI:  45 yo M with PMHx of Intellectual Disability, DM not on any medications presents with foot wounds. Pt has very poor foot hygiene and per the sister, has been persistently scratching an open wound on his L second toe, which worsened and became more red, had drainage, malodor. He saw a physician at [facility] and was told to come to the ED for evaluation. Pt is poor historian. Does endorse abdominal pain for a few days, cannot give much more description. Per the sister, pt did not seem to have any recent fevers, however is also not a very good historian and does not seem to have good health literacy. States that her and her mom decided to stop giving pt Metformin a while ago, are treating his diabetes by not giving him any sugary foods.  In the ED, T 97.8, /87, , RR 16, SpO2 99% on RA. Lab work unrevealing.  (31 Mar 2022 22:36)        REVIEW OF SYSTEMS:  Patient denies any headache, any vision complaints, runny nose. Denies chest pain, shortness of breath, palpitation. Denies nausea, vomiting, abdominal pain or diarrhoea, Denies dysuria. Denies  localized weakness in any part of the body or numbness.   At least 10 systems were reviewed in ROS. All systems reviewed  are within normal limits except for the complaints as described in Subjective.    PAST MEDICAL & SURGICAL HISTORY:  PAST MEDICAL & SURGICAL HISTORY:  DM (diabetes mellitus)    Intellectual disability            VITAL SIGNS:  Vital Signs Last 24 Hrs  T(C): 35.9 (04 Jul 2022 08:00), Max: 36.6 (04 Jul 2022 00:00)  T(F): 96.7 (04 Jul 2022 08:00), Max: 97.8 (04 Jul 2022 00:00)  HR: 96 (04 Jul 2022 08:00) (96 - 99)  BP: 119/57 (04 Jul 2022 08:00) (119/57 - 130/71)  BP(mean): --  RR: 18 (04 Jul 2022 08:00) (18 - 18)  SpO2: --        PHYSICAL EXAMINATION:  Not in acute distress  General: No icterus  HEENT:   no JVD.  Heart: S1+S2 audible  Lungs: bilateral  moderate air entry, no wheezing, no crepitations.  Abdomen: Soft, non-tender, non-distended , no  rigidity or guarding.  CNS: Awake alert, CN  grossly intact. Intellectual disability  Extremities:  No edema    , onychomycosis toe nails        CONSULTS:  Consultant(s) Notes Reviewed by me.   Care Discussed with Consultants/Other Providers where required.        MEDICATIONS:  MEDICATIONS  (STANDING):  ammonium lactate 12% Lotion 1 Application(s) Topical two times a day  clotrimazole 1% Cream 1 Application(s) Topical two times a day  enoxaparin Injectable 40 milliGRAM(s) SubCutaneous every 24 hours  metFORMIN 1000 milliGRAM(s) Oral two times a day  pantoprazole    Tablet 40 milliGRAM(s) Oral before breakfast  vitamin A &amp; D Ointment 1 Application(s) Topical daily    MEDICATIONS  (PRN):  ondansetron Injectable 4 milliGRAM(s) IV Push every 6 hours PRN Nausea and/or Vomiting            ASSESSMENT:    45 yo M with PMH of Intellectual Disability and DM not on any medications presented with foot wounds.    Covid  PNA  Onychomycosis  Dental caries  DM2  Intellectual disability/autism        Covid PNA- completed RDV  Nail bed wound and onychomycosis. Status post Aseptic debridement and curettage of all fungal and ingrowing nails 1-5 bilateral with sterile nail nipper 06/07  Podiatry- no surgical intervention is advised. outpt podiatry f/u  DM type 2 - uncontrolled. A1C 8.9. Continue  metformin 1000mg BID  Dental Caries-completed clinda for 7 days. Will need outpt f/u for possible multiple extractions  Insomnia- prn melatonin    Handoff:  Pending legal issue and guardianship, else medically stable for discharge

## 2022-07-04 NOTE — PROGRESS NOTE ADULT - SUBJECTIVE AND OBJECTIVE BOX
SUBJECTIVE:  HPI:  45 yo M with PMHx of Intellectual Disability, DM not on any medications presents with foot wounds. Pt has very poor foot hygiene and per the sister, has been persistently scratching an open wound on his L second toe, which worsened and became more red, had drainage, malodor. He saw a physician at [facility] and was told to come to the ED for evaluation. Pt is poor historian. Does endorse abdominal pain for a few days, cannot give much more description. Per the sister, pt did not seem to have any recent fevers, however is also not a very good historian and does not seem to have good health literacy. States that her and her mom decided to stop giving pt Metformin a while ago, are treating his diabetes by not giving him any sugary foods.  In the ED, T 97.8, /87, , RR 16, SpO2 99% on RA. Lab work unrevealing.  (31 Mar 2022 22:36)      Patient is a 47y old Male who presents with a chief complaint of DFU (03 Jul 2022 13:26)    Currently admitted to medicine with the primary diagnosis of Onychomycosis       Today is hospital day 95d.     PAST MEDICAL & SURGICAL HISTORY  DM (diabetes mellitus)    Intellectual disability        ALLERGIES:  penicillin (Unknown)    MEDICATIONS:  ACTIVE MEDICATIONS  acetaminophen     Tablet .. 650 milliGRAM(s) Oral every 6 hours PRN  ammonium lactate 12% Lotion 1 Application(s) Topical two times a day  chlorhexidine 4% Liquid 1 Application(s) Topical <User Schedule>  clotrimazole 1% Cream 1 Application(s) Topical two times a day  dextrose 5%. 1000 milliLiter(s) IV Continuous <Continuous>  dextrose 5%. 1000 milliLiter(s) IV Continuous <Continuous>  dextrose 50% Injectable 25 Gram(s) IV Push once  dextrose 50% Injectable 12.5 Gram(s) IV Push once  dextrose 50% Injectable 25 Gram(s) IV Push once  dextrose Oral Gel 15 Gram(s) Oral once PRN  famotidine    Tablet 20 milliGRAM(s) Oral daily  glucagon  Injectable 1 milliGRAM(s) IntraMuscular once  glucagon  Injectable 1 milliGRAM(s) IntraMuscular once  guaifenesin/dextromethorphan Oral Liquid 10 milliLiter(s) Oral every 4 hours PRN  insulin glargine Injectable (LANTUS) 18 Unit(s) SubCutaneous every morning  insulin lispro (ADMELOG) corrective regimen sliding scale   SubCutaneous three times a day before meals  insulin lispro Injectable (ADMELOG) 5 Unit(s) SubCutaneous three times a day before meals  lactobacillus acidophilus 1 Tablet(s) Oral two times a day  melatonin 3 milliGRAM(s) Oral at bedtime  metoclopramide Injectable 10 milliGRAM(s) IV Push every 8 hours PRN  vitamin A &amp; D Ointment 1 Application(s) Topical daily      VITALS:   T(F): 97.8  HR: 99  BP: 123/75  RR: 18  SpO2: --    LABS:                            PHYSICAL EXAM:  GEN: No acute distress  LUNGS: Clear to auscultation bilaterally   HEART: S1/S2 present.    ABD: Soft, non-tender, non-distended.   EXT: No pedal edema, warm to touch, no discoloration  NEURO: AAOX3      A/P:    45 yo M with PMH of Intellectual Disability and DM not on any medications presented with foot wounds.    Covid  PNA  Onychomycosis  Dental caries  DM2  Intellectual disability/autism    Covid PNA- completed RDV  Nail bed wound and onychomycosis. Status post Aseptic debridement and curettage of all fungal and ingrowing nails 1-5 bilateral with sterile nail nipper 06/07  Podiatry- no surgical intervention is advised. outpt podiatry f/u  DM type 2 - uncontrolled. A1C 8.9. on isulin, adjust dose when necessary   Dental Caries-completed clinda for 7 days. Currently arranging for inpatient dental f/u for possible multiple extractions  Insomnia- prn melatonin    PPD test done on 6/28 in preparation for transfer to [facility] --> FU result on 6/30    Handoff:  Guardianship meeting done on 6/23. Pending placement     Dental:  Clinic was contacted on 7/1.They said they are following up with the consent from legal guardianship  in order to send patient back to clinic. They will get back to us.       #Misc  -DVT prophylaxis:  -GI prophylaxis:  -Diet:  -Code status: Full code  -Activity: IAT  -Dispo: pending legal    Handoff: Stable for discharge, pending legal/guardianship     SUBJECTIVE:  HPI:  47 yo M with PMHx of Intellectual Disability, DM not on any medications presents with foot wounds. Pt has very poor foot hygiene and per the sister, has been persistently scratching an open wound on his L second toe, which worsened and became more red, had drainage, malodor. He saw a physician at [facility] and was told to come to the ED for evaluation. Pt is poor historian. Does endorse abdominal pain for a few days, cannot give much more description. Per the sister, pt did not seem to have any recent fevers, however is also not a very good historian and does not seem to have good health literacy. States that her and her mom decided to stop giving pt Metformin a while ago, are treating his diabetes by not giving him any sugary foods.  In the ED, T 97.8, /87, , RR 16, SpO2 99% on RA. Lab work unrevealing.  (31 Mar 2022 22:36)      Patient is a 47y old Male who presents with a chief complaint of DFU (03 Jul 2022 13:26)    Currently admitted to medicine with the primary diagnosis of Onychomycosis       Today is hospital day 95d.     PAST MEDICAL & SURGICAL HISTORY  DM (diabetes mellitus)    Intellectual disability        ALLERGIES:  penicillin (Unknown)    MEDICATIONS:  ACTIVE MEDICATIONS  acetaminophen     Tablet .. 650 milliGRAM(s) Oral every 6 hours PRN  ammonium lactate 12% Lotion 1 Application(s) Topical two times a day  chlorhexidine 4% Liquid 1 Application(s) Topical <User Schedule>  clotrimazole 1% Cream 1 Application(s) Topical two times a day  dextrose 5%. 1000 milliLiter(s) IV Continuous <Continuous>  dextrose 5%. 1000 milliLiter(s) IV Continuous <Continuous>  dextrose 50% Injectable 25 Gram(s) IV Push once  dextrose 50% Injectable 12.5 Gram(s) IV Push once  dextrose 50% Injectable 25 Gram(s) IV Push once  dextrose Oral Gel 15 Gram(s) Oral once PRN  famotidine    Tablet 20 milliGRAM(s) Oral daily  glucagon  Injectable 1 milliGRAM(s) IntraMuscular once  glucagon  Injectable 1 milliGRAM(s) IntraMuscular once  guaifenesin/dextromethorphan Oral Liquid 10 milliLiter(s) Oral every 4 hours PRN  insulin glargine Injectable (LANTUS) 18 Unit(s) SubCutaneous every morning  insulin lispro (ADMELOG) corrective regimen sliding scale   SubCutaneous three times a day before meals  insulin lispro Injectable (ADMELOG) 5 Unit(s) SubCutaneous three times a day before meals  lactobacillus acidophilus 1 Tablet(s) Oral two times a day  melatonin 3 milliGRAM(s) Oral at bedtime  metoclopramide Injectable 10 milliGRAM(s) IV Push every 8 hours PRN  vitamin A &amp; D Ointment 1 Application(s) Topical daily      VITALS:   T(F): 97.8  HR: 99  BP: 123/75  RR: 18  SpO2: --    LABS:                            PHYSICAL EXAM:  GEN: No acute distress  LUNGS: Clear to auscultation bilaterally   HEART: S1/S2 present.    ABD: Soft, non-tender, non-distended.   EXT: No pedal edema, warm to touch, no discoloration  NEURO: AAOX3      A/P:    47 yo M with PMH of Intellectual Disability and DM not on any medications presented with foot wounds.    Covid  PNA  Onychomycosis  Dental caries  DM2  Intellectual disability/autism    Covid PNA- completed RDV  Nail bed wound and onychomycosis. Status post Aseptic debridement and curettage of all fungal and ingrowing nails 1-5 bilateral with sterile nail nipper 06/07  Podiatry- no surgical intervention is advised. outpt podiatry f/u  DM type 2 - uncontrolled. A1C 8.9. on isulin, adjust dose when necessary   Dental Caries-completed clinda for 7 days. Currently arranging for inpatient dental f/u for possible multiple extractions  Insomnia- prn melatonin      Dental:  Clinic was contacted on 7/1.They said they are following up with the consent from legal guardianship  in order to send patient back to clinic. They will get back to us.       #Misc  -DVT prophylaxis: Patient ambulating, dvt px not wneeded  -GI prophylaxis: pepcid  -Diet: Regular  -Code status: Full code  -Activity: IAT  -Dispo: pending legal    Handoff: Stable for discharge, pending legal/guardianship

## 2022-07-05 LAB
GLUCOSE BLDC GLUCOMTR-MCNC: 143 MG/DL — HIGH (ref 70–99)
GLUCOSE BLDC GLUCOMTR-MCNC: 242 MG/DL — HIGH (ref 70–99)
GLUCOSE BLDC GLUCOMTR-MCNC: 274 MG/DL — HIGH (ref 70–99)
GLUCOSE BLDC GLUCOMTR-MCNC: 280 MG/DL — HIGH (ref 70–99)

## 2022-07-05 PROCEDURE — 99231 SBSQ HOSP IP/OBS SF/LOW 25: CPT

## 2022-07-05 RX ADMIN — Medication 5 UNIT(S): at 17:01

## 2022-07-05 RX ADMIN — Medication 1 APPLICATION(S): at 06:40

## 2022-07-05 RX ADMIN — INSULIN GLARGINE 18 UNIT(S): 100 INJECTION, SOLUTION SUBCUTANEOUS at 08:40

## 2022-07-05 RX ADMIN — Medication 3 MILLIGRAM(S): at 21:11

## 2022-07-05 RX ADMIN — FAMOTIDINE 20 MILLIGRAM(S): 10 INJECTION INTRAVENOUS at 11:30

## 2022-07-05 RX ADMIN — CHLORHEXIDINE GLUCONATE 1 APPLICATION(S): 213 SOLUTION TOPICAL at 06:40

## 2022-07-05 RX ADMIN — Medication 1 APPLICATION(S): at 17:03

## 2022-07-05 RX ADMIN — Medication 1 APPLICATION(S): at 11:31

## 2022-07-05 RX ADMIN — Medication 5 UNIT(S): at 08:39

## 2022-07-05 RX ADMIN — Medication 1 TABLET(S): at 06:40

## 2022-07-05 RX ADMIN — Medication 4: at 08:39

## 2022-07-05 RX ADMIN — Medication 1 TABLET(S): at 17:03

## 2022-07-05 RX ADMIN — Medication 6: at 17:01

## 2022-07-05 RX ADMIN — Medication 1 APPLICATION(S): at 17:02

## 2022-07-05 RX ADMIN — Medication 5 UNIT(S): at 11:31

## 2022-07-05 NOTE — PROGRESS NOTE ADULT - SUBJECTIVE AND OBJECTIVE BOX
Progress Note:  Provider Speciality                            Hospitalist      CARIN WATSON MRN-[id number] 46y Male     CHIEF PRESENTING COMPLAINT:  Patient is a 46y old  Male who presents with a chief complaint of DFU (06 Apr 2022 12:18)        SUBJECTIVE:  Patient was seen and examined at bedside.  No new complaints described by patient in morning rounds.   HISTORY OF PRESENTING ILLNESS:  HPI:  47 yo M with PMHx of Intellectual Disability, DM not on any medications presents with foot wounds. Pt has very poor foot hygiene and per the sister, has been persistently scratching an open wound on his L second toe, which worsened and became more red, had drainage, malodor. He saw a physician at [facility] and was told to come to the ED for evaluation. Pt is poor historian. Does endorse abdominal pain for a few days, cannot give much more description. Per the sister, pt did not seem to have any recent fevers, however is also not a very good historian and does not seem to have good health literacy. States that her and her mom decided to stop giving pt Metformin a while ago, are treating his diabetes by not giving him any sugary foods.  In the ED, T 97.8, /87, , RR 16, SpO2 99% on RA. Lab work unrevealing.  (31 Mar 2022 22:36)        REVIEW OF SYSTEMS:  Patient denies any headache, any vision complaints, runny nose. Denies chest pain, shortness of breath, palpitation. Denies nausea, vomiting, abdominal pain or diarrhoea, Denies dysuria. Denies  localized weakness in any part of the body or numbness.   At least 10 systems were reviewed in ROS. All systems reviewed  are within normal limits except for the complaints as described in Subjective.    PAST MEDICAL & SURGICAL HISTORY:  PAST MEDICAL & SURGICAL HISTORY:  DM (diabetes mellitus)    Intellectual disability            VITAL SIGNS:  Vital Signs Last 24 Hrs  T(C): 36.1 (05 Jul 2022 07:39), Max: 36.1 (05 Jul 2022 00:36)  T(F): 97 (05 Jul 2022 07:39), Max: 97 (05 Jul 2022 00:36)  HR: 83 (05 Jul 2022 07:39) (83 - 104)  BP: 123/74 (05 Jul 2022 07:39) (123/74 - 134/65)  BP(mean): --  RR: 18 (05 Jul 2022 07:39) (18 - 18)  SpO2: 96% (05 Jul 2022 00:36) (96% - 96%)      PHYSICAL EXAMINATION:  Not in acute distress  General: No icterus  HEENT:   no JVD.  Heart: S1+S2 audible  Lungs: bilateral  moderate air entry, no wheezing, no crepitations.  Abdomen: Soft, non-tender, non-distended , no  rigidity or guarding.  CNS: Awake alert, CN  grossly intact. Intellectual disability  Extremities:  No edema    , onychomycosis toe nails        CONSULTS:  Consultant(s) Notes Reviewed by me.   Care Discussed with Consultants/Other Providers where required.        MEDICATIONS:  MEDICATIONS  (STANDING):  ammonium lactate 12% Lotion 1 Application(s) Topical two times a day  clotrimazole 1% Cream 1 Application(s) Topical two times a day  enoxaparin Injectable 40 milliGRAM(s) SubCutaneous every 24 hours  metFORMIN 1000 milliGRAM(s) Oral two times a day  pantoprazole    Tablet 40 milliGRAM(s) Oral before breakfast  vitamin A &amp; D Ointment 1 Application(s) Topical daily    MEDICATIONS  (PRN):  ondansetron Injectable 4 milliGRAM(s) IV Push every 6 hours PRN Nausea and/or Vomiting            ASSESSMENT:    47 yo M with PMH of Intellectual Disability and DM not on any medications presented with foot wounds.    Covid  PNA  Onychomycosis  Dental caries  DM2  Intellectual disability/autism        Covid PNA- completed RDV  Nail bed wound and onychomycosis. Status post Aseptic debridement and curettage of all fungal and ingrowing nails 1-5 bilateral with sterile nail nipper 06/07  Podiatry- no surgical intervention is advised. outpt podiatry f/u  DM type 2 - uncontrolled. A1C 8.9. Continue  metformin 1000mg BID  Dental Caries-completed clinda for 7 days. Will need outpt f/u for possible multiple extractions  Insomnia- prn melatonin    Handoff:  Pending legal issue and guardianship, else medically stable for discharge

## 2022-07-05 NOTE — PROGRESS NOTE ADULT - SUBJECTIVE AND OBJECTIVE BOX
CARIN WATSON 47y Male  MRN#: [id number]     Hospital Day: 96d    Pt is currently admitted with the primary diagnosis of  DM FOOT ULCERS; HYPERGLYCEMIA        SUBJECTIVE     Overnight events  None    Subjective complaints  Pt was evaluated this am. Patient denied any active complaints and per patient his symptoms are improving                                            ----------------------------------------------------------  OBJECTIVE  PAST MEDICAL & SURGICAL HISTORY  DM (diabetes mellitus)    Intellectual disability                                              -----------------------------------------------------------  ALLERGIES:  penicillin (Unknown)                                            ------------------------------------------------------------    HOME MEDICATIONS  Home Medications:  vitamin A and D topical ointment: 1 application topically once a day (12 Apr 2022 11:40)                           MEDICATIONS:  STANDING MEDICATIONS  ammonium lactate 12% Lotion 1 Application(s) Topical two times a day  chlorhexidine 4% Liquid 1 Application(s) Topical <User Schedule>  clotrimazole 1% Cream 1 Application(s) Topical two times a day  dextrose 5%. 1000 milliLiter(s) IV Continuous <Continuous>  dextrose 5%. 1000 milliLiter(s) IV Continuous <Continuous>  dextrose 50% Injectable 25 Gram(s) IV Push once  dextrose 50% Injectable 12.5 Gram(s) IV Push once  dextrose 50% Injectable 25 Gram(s) IV Push once  famotidine    Tablet 20 milliGRAM(s) Oral daily  glucagon  Injectable 1 milliGRAM(s) IntraMuscular once  glucagon  Injectable 1 milliGRAM(s) IntraMuscular once  insulin glargine Injectable (LANTUS) 18 Unit(s) SubCutaneous every morning  insulin lispro (ADMELOG) corrective regimen sliding scale   SubCutaneous three times a day before meals  insulin lispro Injectable (ADMELOG) 5 Unit(s) SubCutaneous three times a day before meals  lactobacillus acidophilus 1 Tablet(s) Oral two times a day  melatonin 3 milliGRAM(s) Oral at bedtime  vitamin A &amp; D Ointment 1 Application(s) Topical daily    PRN MEDICATIONS  acetaminophen     Tablet .. 650 milliGRAM(s) Oral every 6 hours PRN  dextrose Oral Gel 15 Gram(s) Oral once PRN                                            ------------------------------------------------------------  VITAL SIGNS: Last 24 Hours  T(C): 36.1 (05 Jul 2022 07:39), Max: 36.1 (05 Jul 2022 00:36)  T(F): 97 (05 Jul 2022 07:39), Max: 97 (05 Jul 2022 00:36)  HR: 83 (05 Jul 2022 07:39) (83 - 104)  BP: 123/74 (05 Jul 2022 07:39) (123/74 - 134/65)  BP(mean): --  RR: 18 (05 Jul 2022 07:39) (18 - 18)  SpO2: 96% (05 Jul 2022 00:36) (96% - 96%)                                             --------------------------------------------------------------  LABS:                                                                    -------------------------------------------------------------  RADIOLOGY:                                            --------------------------------------------------------------    PHYSICAL EXAM:  GENERAL: NAD, lying in bed comfortably  HEAD:  Atraumatic, Normocephalic  EYES: EOMI, conjunctiva and sclera clear  ENT: Moist mucous membranes  NECK: Supple, No JVD  CHEST/LUNG: Clear to auscultation bilaterally; No rales, rhonchi, wheezing, or rubs. Unlabored respirations  HEART: regular rate and rhythm; No murmurs, rubs, or gallops  ABDOMEN: Bowel sounds present; Soft, Nontender, Nondistended.    EXTREMITIES: Warm. No clubbing, cyanosis, or edema. L hallux is healing well, dry, non erythematous, not warm to touch.  NERVOUS SYSTEM:  Alert & Oriented X3. No focal deficits   SKIN: No rashes or lesions                                           --------------------------------------------------------------

## 2022-07-05 NOTE — PROGRESS NOTE ADULT - ASSESSMENT
47 yo M with PMH of Intellectual Disability and DM not on any medications presented with foot wounds.    Covid  PNA  Onychomycosis  Dental caries  DM2  Intellectual disability/autism        Covid PNA- completed RDV  Nail bed wound and onychomycosis. Status post Aseptic debridement and curettage of all fungal and ingrowing nails 1-5 bilateral with sterile nail nipper 06/07  Podiatry- no surgical intervention is advised. outpt podiatry f/u  DM type 2 - uncontrolled. A1C 8.9. Continue  metformin 1000mg BID  Dental Caries-completed clinda for 7 days. Will need outpt f/u for possible multiple extractions  Insomnia- prn melatonin    Handoff:  Pending legal issue and guardianship, else medically stable for discharge

## 2022-07-06 LAB
GLUCOSE BLDC GLUCOMTR-MCNC: 144 MG/DL — HIGH (ref 70–99)
GLUCOSE BLDC GLUCOMTR-MCNC: 203 MG/DL — HIGH (ref 70–99)
GLUCOSE BLDC GLUCOMTR-MCNC: 205 MG/DL — HIGH (ref 70–99)
GLUCOSE BLDC GLUCOMTR-MCNC: 208 MG/DL — HIGH (ref 70–99)

## 2022-07-06 PROCEDURE — 99231 SBSQ HOSP IP/OBS SF/LOW 25: CPT

## 2022-07-06 RX ADMIN — Medication 5 UNIT(S): at 11:45

## 2022-07-06 RX ADMIN — Medication 5 UNIT(S): at 08:07

## 2022-07-06 RX ADMIN — Medication 4: at 11:45

## 2022-07-06 RX ADMIN — Medication 4: at 17:11

## 2022-07-06 RX ADMIN — Medication 1 TABLET(S): at 17:12

## 2022-07-06 RX ADMIN — Medication 1 APPLICATION(S): at 17:11

## 2022-07-06 RX ADMIN — CHLORHEXIDINE GLUCONATE 1 APPLICATION(S): 213 SOLUTION TOPICAL at 05:57

## 2022-07-06 RX ADMIN — FAMOTIDINE 20 MILLIGRAM(S): 10 INJECTION INTRAVENOUS at 11:44

## 2022-07-06 RX ADMIN — Medication 1 APPLICATION(S): at 05:26

## 2022-07-06 RX ADMIN — Medication 1 TABLET(S): at 05:25

## 2022-07-06 RX ADMIN — Medication 5 UNIT(S): at 17:12

## 2022-07-06 RX ADMIN — INSULIN GLARGINE 18 UNIT(S): 100 INJECTION, SOLUTION SUBCUTANEOUS at 08:07

## 2022-07-06 RX ADMIN — Medication 4: at 08:06

## 2022-07-06 RX ADMIN — Medication 1 APPLICATION(S): at 11:52

## 2022-07-06 RX ADMIN — Medication 3 MILLIGRAM(S): at 23:34

## 2022-07-06 NOTE — PROGRESS NOTE ADULT - SUBJECTIVE AND OBJECTIVE BOX
CARIN WATSON 47y Male  MRN#: [id number]     Hospital Day: 97d    Pt is currently admitted with the primary diagnosis of  DM FOOT ULCERS; HYPERGLYCEMIA        SUBJECTIVE     Overnight events  None    Subjective complaints  Pt was evaluated this am. Patient denied any active complaints and per patient his symptoms are improving                                            ----------------------------------------------------------  OBJECTIVE  PAST MEDICAL & SURGICAL HISTORY  DM (diabetes mellitus)    Intellectual disability                                              -----------------------------------------------------------  ALLERGIES:  penicillin (Unknown)                                            ------------------------------------------------------------    HOME MEDICATIONS  Home Medications:  vitamin A and D topical ointment: 1 application topically once a day (12 Apr 2022 11:40)                           MEDICATIONS:  STANDING MEDICATIONS  ammonium lactate 12% Lotion 1 Application(s) Topical two times a day  chlorhexidine 4% Liquid 1 Application(s) Topical <User Schedule>  clotrimazole 1% Cream 1 Application(s) Topical two times a day  dextrose 5%. 1000 milliLiter(s) IV Continuous <Continuous>  dextrose 5%. 1000 milliLiter(s) IV Continuous <Continuous>  dextrose 50% Injectable 25 Gram(s) IV Push once  dextrose 50% Injectable 12.5 Gram(s) IV Push once  dextrose 50% Injectable 25 Gram(s) IV Push once  famotidine    Tablet 20 milliGRAM(s) Oral daily  glucagon  Injectable 1 milliGRAM(s) IntraMuscular once  glucagon  Injectable 1 milliGRAM(s) IntraMuscular once  insulin glargine Injectable (LANTUS) 18 Unit(s) SubCutaneous every morning  insulin lispro (ADMELOG) corrective regimen sliding scale   SubCutaneous three times a day before meals  insulin lispro Injectable (ADMELOG) 5 Unit(s) SubCutaneous three times a day before meals  lactobacillus acidophilus 1 Tablet(s) Oral two times a day  melatonin 3 milliGRAM(s) Oral at bedtime  vitamin A &amp; D Ointment 1 Application(s) Topical daily    PRN MEDICATIONS  acetaminophen     Tablet .. 650 milliGRAM(s) Oral every 6 hours PRN  dextrose Oral Gel 15 Gram(s) Oral once PRN                                            ------------------------------------------------------------  VITAL SIGNS: Last 24 Hours  T(C): 36.4 (06 Jul 2022 08:00), Max: 36.6 (05 Jul 2022 15:41)  T(F): 97.5 (06 Jul 2022 08:00), Max: 97.8 (05 Jul 2022 15:41)  HR: 84 (06 Jul 2022 08:00) (84 - 98)  BP: 122/77 (06 Jul 2022 08:00) (122/77 - 136/67)  BP(mean): --  RR: 18 (06 Jul 2022 08:00) (18 - 18)  SpO2: --                                             --------------------------------------------------------------  LABS:                                                                    -------------------------------------------------------------  RADIOLOGY:                                            --------------------------------------------------------------    PHYSICAL EXAM:  GENERAL: NAD, sitting upright.  HEAD:  Atraumatic, Normocephalic  EYES: EOMI, conjunctiva and sclera clear  ENT: Moist mucous membranes  NECK: Supple, No JVD  CHEST/LUNG: Clear to auscultation bilaterally; No rales, rhonchi, wheezing, or rubs. Unlabored respirations  HEART: regular rate and rhythm; No murmurs, rubs, or gallops  ABDOMEN: Bowel sounds present; Soft, Nontender, Nondistended.    EXTREMITIES: Nailbed of left hallux dry and healing well. Onychomycosis on other toenails.   NERVOUS SYSTEM:  Alert & Oriented X3. No focal deficits   SKIN: No rashes or lesions                                           --------------------------------------------------------------

## 2022-07-07 LAB
GLUCOSE BLDC GLUCOMTR-MCNC: 150 MG/DL — HIGH (ref 70–99)
GLUCOSE BLDC GLUCOMTR-MCNC: 188 MG/DL — HIGH (ref 70–99)
GLUCOSE BLDC GLUCOMTR-MCNC: 276 MG/DL — HIGH (ref 70–99)
GLUCOSE BLDC GLUCOMTR-MCNC: 309 MG/DL — HIGH (ref 70–99)

## 2022-07-07 PROCEDURE — 99231 SBSQ HOSP IP/OBS SF/LOW 25: CPT

## 2022-07-07 RX ADMIN — Medication 1 APPLICATION(S): at 17:10

## 2022-07-07 RX ADMIN — Medication 1 APPLICATION(S): at 17:09

## 2022-07-07 RX ADMIN — Medication 5 UNIT(S): at 17:10

## 2022-07-07 RX ADMIN — Medication 5 UNIT(S): at 08:20

## 2022-07-07 RX ADMIN — Medication 1 APPLICATION(S): at 05:17

## 2022-07-07 RX ADMIN — Medication 8: at 17:10

## 2022-07-07 RX ADMIN — Medication 3 MILLIGRAM(S): at 21:04

## 2022-07-07 RX ADMIN — FAMOTIDINE 20 MILLIGRAM(S): 10 INJECTION INTRAVENOUS at 11:27

## 2022-07-07 RX ADMIN — Medication 5 UNIT(S): at 11:27

## 2022-07-07 RX ADMIN — INSULIN GLARGINE 18 UNIT(S): 100 INJECTION, SOLUTION SUBCUTANEOUS at 08:19

## 2022-07-07 RX ADMIN — Medication 1 TABLET(S): at 06:00

## 2022-07-07 RX ADMIN — Medication 1 TABLET(S): at 17:10

## 2022-07-07 RX ADMIN — Medication 2: at 08:20

## 2022-07-07 RX ADMIN — Medication 1 APPLICATION(S): at 11:28

## 2022-07-07 NOTE — PROGRESS NOTE ADULT - ASSESSMENT
45 yo M with PMH of Intellectual Disability and DM not on any medications presented with foot wounds.    Covid  PNA  Onychomycosis  Dental caries  DM2  Intellectual disability/autism        Covid PNA- completed RDV  Nail bed wound and onychomycosis. Status post Aseptic debridement and curettage of all fungal and ingrowing nails 1-5 bilateral with sterile nail nipper 06/07  Podiatry- no surgical intervention is advised. outpt podiatry f/u  DM type 2 - uncontrolled. A1C 8.9. Continue  metformin 1000mg BIDand insulin  Dental Caries-completed clinda for 7 days. Will need outpt f/u for possible multiple extractions  Insomnia- prn melatonin    Handoff:  Pending legal issue and guardianship, else medically stable for discharge . 47 yo M with PMH of Intellectual Disability and DM not on any medications presented with foot wounds.    Covid  PNA  Onychomycosis  Dental caries  DM2  Intellectual disability/autism        Covid PNA- completed RDV  Nail bed wound and onychomycosis. Status post Aseptic debridement and curettage of all fungal and ingrowing nails 1-5 bilateral with sterile nail nipper 06/07  Podiatry- no surgical intervention is advised. outpt podiatry f/u  DM type 2 - uncontrolled. A1C 8.9. Continue  metformin and insulin  Dental Caries-completed clinda for 7 days. Will need outpt f/u for possible multiple extractions  Insomnia- prn melatonin    Handoff:  Pending legal issue and guardianship, else medically stable for discharge . 47 yo M with PMH of Intellectual Disability and DM not on any medications presented with foot wounds.    Covid  PNA  Onychomycosis  Dental caries  DM2  Intellectual disability/autism        Covid PNA- completed RDV  Nail bed wound and onychomycosis. Status post Aseptic debridement and curettage of all fungal and ingrowing nails 1-5 bilateral with sterile nail nipper 06/07  Podiatry- no surgical intervention is advised. outpt podiatry f/u  DM type 2 - uncontrolled. A1C 8.9. Continue insulin (add metformin 1000 BID on discharge)  Dental Caries-completed clinda for 7 days. Will need outpt f/u for possible multiple extractions  Insomnia- prn melatonin    Handoff:  Pending legal issue and guardianship, else medically stable for discharge . 45 yo M with PMH of Intellectual Disability and DM not on any medications presented with foot wounds.    Covid  PNA  Onychomycosis  Dental caries  DM2  Intellectual disability/autism        Covid PNA- completed RDV  Nail bed wound and onychomycosis. Status post Aseptic debridement and curettage of all fungal and ingrowing nails 1-5 bilateral with sterile nail nipper 06/07  Podiatry- no surgical intervention is advised. outpt podiatry f/u  DM type 2 - uncontrolled. A1C 8.9. Continue insulin (add metformin 1000 mg BID on discharge)  Dental Caries-completed clinda for 7 days. Will need outpt f/u for possible multiple extractions  Insomnia- prn melatonin    Handoff:  Pending legal issue and guardianship, else medically stable for discharge .

## 2022-07-07 NOTE — PROGRESS NOTE ADULT - SUBJECTIVE AND OBJECTIVE BOX
SUBJECTIVE:    Patient is a 47y old Male who presents with a chief complaint of DFU (07 Jul 2022 13:12)    Currently admitted to medicine with the primary diagnosis of Onychomycosis       Today is hospital day 98d.     PAST MEDICAL & SURGICAL HISTORY  DM (diabetes mellitus)    Intellectual disability      ALLERGIES:  penicillin (Unknown)    MEDICATIONS:  STANDING MEDICATIONS  ammonium lactate 12% Lotion 1 Application(s) Topical two times a day  chlorhexidine 4% Liquid 1 Application(s) Topical <User Schedule>  clotrimazole 1% Cream 1 Application(s) Topical two times a day  dextrose 5%. 1000 milliLiter(s) IV Continuous <Continuous>  dextrose 5%. 1000 milliLiter(s) IV Continuous <Continuous>  dextrose 50% Injectable 25 Gram(s) IV Push once  dextrose 50% Injectable 12.5 Gram(s) IV Push once  dextrose 50% Injectable 25 Gram(s) IV Push once  famotidine    Tablet 20 milliGRAM(s) Oral daily  glucagon  Injectable 1 milliGRAM(s) IntraMuscular once  glucagon  Injectable 1 milliGRAM(s) IntraMuscular once  insulin glargine Injectable (LANTUS) 18 Unit(s) SubCutaneous every morning  insulin lispro (ADMELOG) corrective regimen sliding scale   SubCutaneous three times a day before meals  insulin lispro Injectable (ADMELOG) 5 Unit(s) SubCutaneous three times a day before meals  lactobacillus acidophilus 1 Tablet(s) Oral two times a day  melatonin 3 milliGRAM(s) Oral at bedtime  vitamin A &amp; D Ointment 1 Application(s) Topical daily    PRN MEDICATIONS  acetaminophen     Tablet .. 650 milliGRAM(s) Oral every 6 hours PRN  dextrose Oral Gel 15 Gram(s) Oral once PRN    VITALS:   T(F): 97.9  HR: 99  BP: 131/60  RR: 18  SpO2: --    LABS:                        RADIOLOGY:    PHYSICAL EXAM:  GEN: No acute distress  LUNGS: Clear to auscultation bilaterally   HEART: S1/S2 present. RRR.   ABD/ GI: Soft, non-tender, non-distended. Bowel sounds present  EXT: NC/NC/NE/2+PP/LOJA  NEURO: AAOX3

## 2022-07-07 NOTE — PROGRESS NOTE ADULT - SUBJECTIVE AND OBJECTIVE BOX
CARIN WATSON 47y Male  MRN#: [id number]     Hospital Day: 98d    Pt is currently admitted with the primary diagnosis of  DM FOOT ULCERS; HYPERGLYCEMIA        SUBJECTIVE     Overnight events  None    Subjective complaints  Pt was evaluated this am. Patient denied any active complaints and per patient his symptoms are improving                                            ----------------------------------------------------------  OBJECTIVE  PAST MEDICAL & SURGICAL HISTORY  DM (diabetes mellitus)    Intellectual disability                                              -----------------------------------------------------------  ALLERGIES:  penicillin (Unknown)                                            ------------------------------------------------------------    HOME MEDICATIONS  Home Medications:  vitamin A and D topical ointment: 1 application topically once a day (12 Apr 2022 11:40)                           MEDICATIONS:  STANDING MEDICATIONS  ammonium lactate 12% Lotion 1 Application(s) Topical two times a day  chlorhexidine 4% Liquid 1 Application(s) Topical <User Schedule>  clotrimazole 1% Cream 1 Application(s) Topical two times a day  dextrose 5%. 1000 milliLiter(s) IV Continuous <Continuous>  dextrose 5%. 1000 milliLiter(s) IV Continuous <Continuous>  dextrose 50% Injectable 25 Gram(s) IV Push once  dextrose 50% Injectable 12.5 Gram(s) IV Push once  dextrose 50% Injectable 25 Gram(s) IV Push once  famotidine    Tablet 20 milliGRAM(s) Oral daily  glucagon  Injectable 1 milliGRAM(s) IntraMuscular once  glucagon  Injectable 1 milliGRAM(s) IntraMuscular once  insulin glargine Injectable (LANTUS) 18 Unit(s) SubCutaneous every morning  insulin lispro (ADMELOG) corrective regimen sliding scale   SubCutaneous three times a day before meals  insulin lispro Injectable (ADMELOG) 5 Unit(s) SubCutaneous three times a day before meals  lactobacillus acidophilus 1 Tablet(s) Oral two times a day  melatonin 3 milliGRAM(s) Oral at bedtime  vitamin A &amp; D Ointment 1 Application(s) Topical daily    PRN MEDICATIONS  acetaminophen     Tablet .. 650 milliGRAM(s) Oral every 6 hours PRN  dextrose Oral Gel 15 Gram(s) Oral once PRN                                            ------------------------------------------------------------  VITAL SIGNS: Last 24 Hours  T(C): 36.8 (07 Jul 2022 07:39), Max: 36.8 (07 Jul 2022 07:39)  T(F): 98.3 (07 Jul 2022 07:39), Max: 98.3 (07 Jul 2022 07:39)  HR: 109 (07 Jul 2022 07:39) (99 - 109)  BP: 148/81 (07 Jul 2022 07:39) (131/92 - 148/81)  BP(mean): --  RR: 18 (07 Jul 2022 07:39) (18 - 18)  SpO2: --                                             --------------------------------------------------------------  LABS:                                                                    -------------------------------------------------------------  RADIOLOGY:                                            --------------------------------------------------------------    PHYSICAL EXAM:  GENERAL: NAD, sitting up in bedcomfortably  HEAD:  Atraumatic, Normocephalic. Poor dentition.  EYES: EOMI, conjunctiva and sclera clear  ENT: Moist mucous membranes  NECK: Supple, No JVD  CHEST/LUNG: Clear to auscultation bilaterally; No rales, rhonchi, wheezing, or rubs. Unlabored respirations  HEART: regular rate and rhythm; No murmurs, rubs, or gallops  ABDOMEN: Bowel sounds present; Soft, Nontender, Nondistended.    EXTREMITIES: Warm. No cyanosis, or edema. Onchomycosis of toenails, nailbed of left toenail bed healing well. Clubbing of fingernails and toenails.  NERVOUS SYSTEM:  Alert & Oriented X3. No focal deficits   SKIN: No rashes or lesions                                           --------------------------------------------------------------                 CARIN WATSON 47y Male  MRN#: [id number]     Hospital Day: 98d    Pt is currently admitted with the primary diagnosis of  DM FOOT ULCERS; HYPERGLYCEMIA        SUBJECTIVE     Overnight events  None    Subjective complaints  Pt was evaluated this am. Patient denied any active complaints.                                            ----------------------------------------------------------  OBJECTIVE  PAST MEDICAL & SURGICAL HISTORY  DM (diabetes mellitus)    Intellectual disability                                              -----------------------------------------------------------  ALLERGIES:  penicillin (Unknown)                                            ------------------------------------------------------------    HOME MEDICATIONS  Home Medications:  vitamin A and D topical ointment: 1 application topically once a day (12 Apr 2022 11:40)                           MEDICATIONS:  STANDING MEDICATIONS  ammonium lactate 12% Lotion 1 Application(s) Topical two times a day  chlorhexidine 4% Liquid 1 Application(s) Topical <User Schedule>  clotrimazole 1% Cream 1 Application(s) Topical two times a day  dextrose 5%. 1000 milliLiter(s) IV Continuous <Continuous>  dextrose 5%. 1000 milliLiter(s) IV Continuous <Continuous>  dextrose 50% Injectable 25 Gram(s) IV Push once  dextrose 50% Injectable 12.5 Gram(s) IV Push once  dextrose 50% Injectable 25 Gram(s) IV Push once  famotidine    Tablet 20 milliGRAM(s) Oral daily  glucagon  Injectable 1 milliGRAM(s) IntraMuscular once  glucagon  Injectable 1 milliGRAM(s) IntraMuscular once  insulin glargine Injectable (LANTUS) 18 Unit(s) SubCutaneous every morning  insulin lispro (ADMELOG) corrective regimen sliding scale   SubCutaneous three times a day before meals  insulin lispro Injectable (ADMELOG) 5 Unit(s) SubCutaneous three times a day before meals  lactobacillus acidophilus 1 Tablet(s) Oral two times a day  melatonin 3 milliGRAM(s) Oral at bedtime  vitamin A &amp; D Ointment 1 Application(s) Topical daily    PRN MEDICATIONS  acetaminophen     Tablet .. 650 milliGRAM(s) Oral every 6 hours PRN  dextrose Oral Gel 15 Gram(s) Oral once PRN                                            ------------------------------------------------------------  VITAL SIGNS: Last 24 Hours  T(C): 36.8 (07 Jul 2022 07:39), Max: 36.8 (07 Jul 2022 07:39)  T(F): 98.3 (07 Jul 2022 07:39), Max: 98.3 (07 Jul 2022 07:39)  HR: 109 (07 Jul 2022 07:39) (99 - 109)  BP: 148/81 (07 Jul 2022 07:39) (131/92 - 148/81)  BP(mean): --  RR: 18 (07 Jul 2022 07:39) (18 - 18)  SpO2: --                                             --------------------------------------------------------------  LABS:                                                                    -------------------------------------------------------------  RADIOLOGY:                                            --------------------------------------------------------------    PHYSICAL EXAM:  GENERAL: NAD, sitting up in bedcomfortably  HEAD:  Atraumatic, Normocephalic. Poor dentition.  EYES: EOMI, conjunctiva and sclera clear  ENT: Moist mucous membranes  NECK: Supple, No JVD  CHEST/LUNG: Clear to auscultation bilaterally; No rales, rhonchi, wheezing, or rubs. Unlabored respirations  HEART: regular rate and rhythm; No murmurs, rubs, or gallops  ABDOMEN: Bowel sounds present; Soft, Nontender, Nondistended.    EXTREMITIES: Warm. No cyanosis, or edema. Onchomycosis of toenails, nailbed of left toenail bed healing well. Clubbing of fingernails and toenails.  NERVOUS SYSTEM:  Alert & Oriented X3. No focal deficits   SKIN: No rashes or lesions                                           --------------------------------------------------------------

## 2022-07-07 NOTE — PROGRESS NOTE ADULT - ASSESSMENT
47 yo M with PMH of Intellectual Disability and DM not on any medications presented with foot wounds.    Covid 19 positive   Onychomycosis  Dental caries  DM2  Intellectual disability/autism        Covid PNA- completed RDV  Nail bed wound and onychomycosis. Status post Aseptic debridement and curettage of all fungal and ingrowing nails 1-5 bilateral with sterile nail nipper 06/07  Podiatry- no surgical intervention is advised. outpt podiatry f/u  DM type 2 - uncontrolled. A1C 8.9. Continue  metformin 1000mg BID and insulin  Dental Caries-completed clinda for 7 days. Will need outpt f/u for possible multiple extractions  Insomnia- prn melatonin    Handoff:  Pending legal issue and guardianship, else medically stable for discharge .

## 2022-07-08 LAB
GLUCOSE BLDC GLUCOMTR-MCNC: 184 MG/DL — HIGH (ref 70–99)
GLUCOSE BLDC GLUCOMTR-MCNC: 204 MG/DL — HIGH (ref 70–99)
GLUCOSE BLDC GLUCOMTR-MCNC: 254 MG/DL — HIGH (ref 70–99)
GLUCOSE BLDC GLUCOMTR-MCNC: 343 MG/DL — HIGH (ref 70–99)

## 2022-07-08 PROCEDURE — 99231 SBSQ HOSP IP/OBS SF/LOW 25: CPT

## 2022-07-08 RX ORDER — INSULIN LISPRO 100/ML
8 VIAL (ML) SUBCUTANEOUS ONCE
Refills: 0 | Status: COMPLETED | OUTPATIENT
Start: 2022-07-08 | End: 2022-07-08

## 2022-07-08 RX ADMIN — Medication 4: at 11:30

## 2022-07-08 RX ADMIN — FAMOTIDINE 20 MILLIGRAM(S): 10 INJECTION INTRAVENOUS at 11:30

## 2022-07-08 RX ADMIN — Medication 1 APPLICATION(S): at 05:22

## 2022-07-08 RX ADMIN — Medication 1 TABLET(S): at 17:22

## 2022-07-08 RX ADMIN — Medication 6: at 17:23

## 2022-07-08 RX ADMIN — Medication 5 UNIT(S): at 09:27

## 2022-07-08 RX ADMIN — Medication 5 UNIT(S): at 17:23

## 2022-07-08 RX ADMIN — Medication 1 TABLET(S): at 05:22

## 2022-07-08 RX ADMIN — Medication 1 APPLICATION(S): at 17:22

## 2022-07-08 RX ADMIN — Medication 5 UNIT(S): at 11:31

## 2022-07-08 RX ADMIN — Medication 3 MILLIGRAM(S): at 22:05

## 2022-07-08 RX ADMIN — INSULIN GLARGINE 18 UNIT(S): 100 INJECTION, SOLUTION SUBCUTANEOUS at 09:28

## 2022-07-08 RX ADMIN — Medication 8 UNIT(S): at 22:02

## 2022-07-08 RX ADMIN — Medication 2: at 09:27

## 2022-07-08 RX ADMIN — Medication 1 APPLICATION(S): at 11:30

## 2022-07-08 NOTE — PROGRESS NOTE ADULT - SUBJECTIVE AND OBJECTIVE BOX
CARIN WATSON 47y Male  MRN#: [id number]     Hospital Day: 99d    Pt is currently admitted with the primary diagnosis of  DM FOOT ULCERS; HYPERGLYCEMIA        SUBJECTIVE     Overnight events  None    Subjective complaints  Pt was evaluated this am. Patient denied any active complaints and per patient his symptoms are improving                                            ----------------------------------------------------------  OBJECTIVE  PAST MEDICAL & SURGICAL HISTORY  DM (diabetes mellitus)    Intellectual disability                                              -----------------------------------------------------------  ALLERGIES:  penicillin (Unknown)                                            ------------------------------------------------------------    HOME MEDICATIONS  Home Medications:  vitamin A and D topical ointment: 1 application topically once a day (12 Apr 2022 11:40)                           MEDICATIONS:  STANDING MEDICATIONS  ammonium lactate 12% Lotion 1 Application(s) Topical two times a day  chlorhexidine 4% Liquid 1 Application(s) Topical <User Schedule>  clotrimazole 1% Cream 1 Application(s) Topical two times a day  dextrose 5%. 1000 milliLiter(s) IV Continuous <Continuous>  dextrose 5%. 1000 milliLiter(s) IV Continuous <Continuous>  dextrose 50% Injectable 25 Gram(s) IV Push once  dextrose 50% Injectable 12.5 Gram(s) IV Push once  dextrose 50% Injectable 25 Gram(s) IV Push once  famotidine    Tablet 20 milliGRAM(s) Oral daily  glucagon  Injectable 1 milliGRAM(s) IntraMuscular once  glucagon  Injectable 1 milliGRAM(s) IntraMuscular once  insulin glargine Injectable (LANTUS) 18 Unit(s) SubCutaneous every morning  insulin lispro (ADMELOG) corrective regimen sliding scale   SubCutaneous three times a day before meals  insulin lispro Injectable (ADMELOG) 5 Unit(s) SubCutaneous three times a day before meals  lactobacillus acidophilus 1 Tablet(s) Oral two times a day  melatonin 3 milliGRAM(s) Oral at bedtime  vitamin A &amp; D Ointment 1 Application(s) Topical daily    PRN MEDICATIONS  acetaminophen     Tablet .. 650 milliGRAM(s) Oral every 6 hours PRN  dextrose Oral Gel 15 Gram(s) Oral once PRN                                            ------------------------------------------------------------  VITAL SIGNS: Last 24 Hours  T(C): 36 (08 Jul 2022 15:57), Max: 36.8 (08 Jul 2022 00:00)  T(F): 96.8 (08 Jul 2022 15:57), Max: 98.2 (08 Jul 2022 00:00)  HR: 95 (08 Jul 2022 15:57) (87 - 95)  BP: 135/60 (08 Jul 2022 15:57) (121/57 - 135/60)  BP(mean): --  RR: 18 (08 Jul 2022 15:57) (18 - 18)  SpO2: --                                             --------------------------------------------------------------  LABS:                                                                    -------------------------------------------------------------  RADIOLOGY:                                            --------------------------------------------------------------    PHYSICAL EXAM:  GENERAL: NAD, sitting up in bedcomfortably  HEAD:  Atraumatic, Normocephalic. Poor dentition.  EYES: EOMI, conjunctiva and sclera clear  ENT: Moist mucous membranes  NECK: Supple, No JVD  CHEST/LUNG: Clear to auscultation bilaterally; No rales, rhonchi, wheezing, or rubs. Unlabored respirations  HEART: regular rate and rhythm; No murmurs, rubs, or gallops  ABDOMEN: Bowel sounds present; Soft, Nontender, Nondistended.    EXTREMITIES: Warm. No cyanosis, or edema. Onchomycosis of toenails, nailbed of left toenail bed healing well. Clubbing of fingernails and toenails.  NERVOUS SYSTEM:  Alert & Oriented X3. No focal deficits   SKIN: No rashes or lesions                                         --------------------------------------------------------------

## 2022-07-08 NOTE — PROGRESS NOTE ADULT - ASSESSMENT
47 yo M with PMH of Intellectual Disability and DM not on any medications presented with foot wounds.    Covid  PNA  Onychomycosis  Dental caries  DM2  Intellectual disability/autism        Covid PNA- completed RDV  Nail bed wound and onychomycosis. Status post Aseptic debridement and curettage of all fungal and ingrowing nails 1-5 bilateral with sterile nail nipper 06/07  Podiatry- no surgical intervention is advised. outpt podiatry f/u  DM type 2 - uncontrolled. A1C 8.9. Continue insulin (add metformin 1000 mg BID on discharge)  Dental Caries-completed clinda for 7 days. Will need outpt f/u for possible multiple extractions  Insomnia- prn melatonin    Handoff:  Pending legal issue and guardianship, else medically stable for discharge

## 2022-07-08 NOTE — PROGRESS NOTE ADULT - SUBJECTIVE AND OBJECTIVE BOX
SUBJECTIVE:    Patient is a 47y old Male who presents with a chief complaint of DFU (08 Jul 2022 16:13)    Currently admitted to medicine with the primary diagnosis of Onychomycosis       Today is hospital day 99d.     PAST MEDICAL & SURGICAL HISTORY  DM (diabetes mellitus)    Intellectual disability      ALLERGIES:  penicillin (Unknown)    MEDICATIONS:  STANDING MEDICATIONS  ammonium lactate 12% Lotion 1 Application(s) Topical two times a day  chlorhexidine 4% Liquid 1 Application(s) Topical <User Schedule>  clotrimazole 1% Cream 1 Application(s) Topical two times a day  dextrose 5%. 1000 milliLiter(s) IV Continuous <Continuous>  dextrose 5%. 1000 milliLiter(s) IV Continuous <Continuous>  dextrose 50% Injectable 25 Gram(s) IV Push once  dextrose 50% Injectable 12.5 Gram(s) IV Push once  dextrose 50% Injectable 25 Gram(s) IV Push once  famotidine    Tablet 20 milliGRAM(s) Oral daily  glucagon  Injectable 1 milliGRAM(s) IntraMuscular once  glucagon  Injectable 1 milliGRAM(s) IntraMuscular once  insulin glargine Injectable (LANTUS) 18 Unit(s) SubCutaneous every morning  insulin lispro (ADMELOG) corrective regimen sliding scale   SubCutaneous three times a day before meals  insulin lispro Injectable (ADMELOG) 5 Unit(s) SubCutaneous three times a day before meals  lactobacillus acidophilus 1 Tablet(s) Oral two times a day  melatonin 3 milliGRAM(s) Oral at bedtime  vitamin A &amp; D Ointment 1 Application(s) Topical daily    PRN MEDICATIONS  acetaminophen     Tablet .. 650 milliGRAM(s) Oral every 6 hours PRN  dextrose Oral Gel 15 Gram(s) Oral once PRN    VITALS:   T(F): 96.8  HR: 95  BP: 135/60  RR: 18  SpO2: --    LABS:                        RADIOLOGY:    PHYSICAL EXAM:  GEN: No acute distress  LUNGS: Clear to auscultation bilaterally   HEART: S1/S2 present. RRR.   ABD/ GI: Soft, non-tender, non-distended. Bowel sounds present  EXT: NC/NC/NE/2+PP/LOJA  NEURO: AAOX3

## 2022-07-08 NOTE — PROGRESS NOTE ADULT - ASSESSMENT
47 yo M with PMH of Intellectual Disability and DM not on any medications presented with foot wounds.    Covid 19 positive   Onychomycosis  Dental caries  DM2  Intellectual disability/autism        Covid PNA- completed RDV  Nail bed wound and onychomycosis. Status post Aseptic debridement and curettage of all fungal and ingrowing nails 1-5 bilateral with sterile nail nipper 06/07  Podiatry- no surgical intervention is advised. outpt podiatry f/u  DM type 2 - uncontrolled. A1C 8.9. Continue  metformin 1000mg BID and insulin  Dental Caries-completed clinda for 7 days. Will need outpt f/u for possible multiple extractions  Insomnia- prn melatonin    Handoff:  Pending legal issue and guardianship,  medically stable for discharge .

## 2022-07-09 LAB
GLUCOSE BLDC GLUCOMTR-MCNC: 171 MG/DL — HIGH (ref 70–99)
GLUCOSE BLDC GLUCOMTR-MCNC: 191 MG/DL — HIGH (ref 70–99)
GLUCOSE BLDC GLUCOMTR-MCNC: 192 MG/DL — HIGH (ref 70–99)
GLUCOSE BLDC GLUCOMTR-MCNC: 204 MG/DL — HIGH (ref 70–99)

## 2022-07-09 PROCEDURE — 99231 SBSQ HOSP IP/OBS SF/LOW 25: CPT

## 2022-07-09 RX ADMIN — Medication 1 TABLET(S): at 17:12

## 2022-07-09 RX ADMIN — Medication 1 APPLICATION(S): at 05:54

## 2022-07-09 RX ADMIN — Medication 5 UNIT(S): at 17:14

## 2022-07-09 RX ADMIN — Medication 5 UNIT(S): at 12:01

## 2022-07-09 RX ADMIN — Medication 3 MILLIGRAM(S): at 22:12

## 2022-07-09 RX ADMIN — Medication 1 TABLET(S): at 05:53

## 2022-07-09 RX ADMIN — Medication 2: at 08:04

## 2022-07-09 RX ADMIN — Medication 1 APPLICATION(S): at 12:00

## 2022-07-09 RX ADMIN — INSULIN GLARGINE 18 UNIT(S): 100 INJECTION, SOLUTION SUBCUTANEOUS at 08:06

## 2022-07-09 RX ADMIN — Medication 2: at 12:01

## 2022-07-09 RX ADMIN — FAMOTIDINE 20 MILLIGRAM(S): 10 INJECTION INTRAVENOUS at 12:00

## 2022-07-09 RX ADMIN — Medication 5 UNIT(S): at 08:05

## 2022-07-09 RX ADMIN — Medication 2: at 17:13

## 2022-07-09 RX ADMIN — Medication 1 APPLICATION(S): at 17:13

## 2022-07-09 NOTE — PROGRESS NOTE ADULT - SUBJECTIVE AND OBJECTIVE BOX
SUBJECTIVE:    Patient is a 47y old Male who presents with a chief complaint of DFU (09 Jul 2022 13:31)    Currently admitted to medicine with the primary diagnosis of Onychomycosis       Today is hospital day 100d.     PAST MEDICAL & SURGICAL HISTORY  DM (diabetes mellitus)    Intellectual disability      ALLERGIES:  penicillin (Unknown)    MEDICATIONS:  STANDING MEDICATIONS  ammonium lactate 12% Lotion 1 Application(s) Topical two times a day  chlorhexidine 4% Liquid 1 Application(s) Topical <User Schedule>  clotrimazole 1% Cream 1 Application(s) Topical two times a day  dextrose 5%. 1000 milliLiter(s) IV Continuous <Continuous>  dextrose 5%. 1000 milliLiter(s) IV Continuous <Continuous>  dextrose 50% Injectable 25 Gram(s) IV Push once  dextrose 50% Injectable 12.5 Gram(s) IV Push once  dextrose 50% Injectable 25 Gram(s) IV Push once  famotidine    Tablet 20 milliGRAM(s) Oral daily  glucagon  Injectable 1 milliGRAM(s) IntraMuscular once  glucagon  Injectable 1 milliGRAM(s) IntraMuscular once  insulin glargine Injectable (LANTUS) 18 Unit(s) SubCutaneous every morning  insulin lispro (ADMELOG) corrective regimen sliding scale   SubCutaneous three times a day before meals  insulin lispro Injectable (ADMELOG) 5 Unit(s) SubCutaneous three times a day before meals  lactobacillus acidophilus 1 Tablet(s) Oral two times a day  melatonin 3 milliGRAM(s) Oral at bedtime  vitamin A &amp; D Ointment 1 Application(s) Topical daily    PRN MEDICATIONS  acetaminophen     Tablet .. 650 milliGRAM(s) Oral every 6 hours PRN  dextrose Oral Gel 15 Gram(s) Oral once PRN    VITALS:   T(F): 97.9  HR: 95  BP: 140/78  RR: 18  SpO2: --    LABS:                        RADIOLOGY:    PHYSICAL EXAM:  GEN: No acute distress  LUNGS: Clear to auscultation bilaterally   HEART: S1/S2 present. RRR.   ABD/ GI: Soft, non-tender, non-distended. Bowel sounds present  EXT: NC/NC/NE/2+PP/LOJA  NEURO: AAOX3

## 2022-07-09 NOTE — PROGRESS NOTE ADULT - ASSESSMENT
47 yo M with PMH of Intellectual Disability and DM not on any medications presented with foot wounds.    Covid 19 positive   Onychomycosis  Dental caries  DM2  Intellectual disability/autism        Covid PNA- completed RDV  Nail bed wound and onychomycosis. Status post Aseptic debridement and curettage of all fungal and ingrowing nails 1-5 bilateral with sterile nail nipper 06/07  Podiatry- no surgical intervention is advised. outpt podiatry f/u  DM type 2 - uncontrolled. A1C 8.9. Continue  metformin 1000mg BID and insulin glargine and lispro-- sugar is still high-- noncompliant with diet  Dental Caries-completed clinda for 7 days. Will need outpt f/u for possible multiple extractions  Insomnia- prn melatonin    Handoff:  Pending legal issue and guardianship,  medically stable for discharge .

## 2022-07-09 NOTE — PROGRESS NOTE ADULT - SUBJECTIVE AND OBJECTIVE BOX
CARIN WATSON 47y Male  MRN#: [id number]     Hospital Day: 100d    Pt is currently admitted with the primary diagnosis of  DM FOOT ULCERS; HYPERGLYCEMIA        SUBJECTIVE     Overnight events  None    Subjective complaints  Pt was evaluated this am. Patient denied any active complaints and per patient his symptoms are improving                                            ----------------------------------------------------------  OBJECTIVE  PAST MEDICAL & SURGICAL HISTORY  DM (diabetes mellitus)    Intellectual disability                                              -----------------------------------------------------------  ALLERGIES:  penicillin (Unknown)                                            ------------------------------------------------------------    HOME MEDICATIONS  Home Medications:  vitamin A and D topical ointment: 1 application topically once a day (12 Apr 2022 11:40)                           MEDICATIONS:  STANDING MEDICATIONS  ammonium lactate 12% Lotion 1 Application(s) Topical two times a day  chlorhexidine 4% Liquid 1 Application(s) Topical <User Schedule>  clotrimazole 1% Cream 1 Application(s) Topical two times a day  dextrose 5%. 1000 milliLiter(s) IV Continuous <Continuous>  dextrose 5%. 1000 milliLiter(s) IV Continuous <Continuous>  dextrose 50% Injectable 25 Gram(s) IV Push once  dextrose 50% Injectable 12.5 Gram(s) IV Push once  dextrose 50% Injectable 25 Gram(s) IV Push once  famotidine    Tablet 20 milliGRAM(s) Oral daily  glucagon  Injectable 1 milliGRAM(s) IntraMuscular once  glucagon  Injectable 1 milliGRAM(s) IntraMuscular once  insulin glargine Injectable (LANTUS) 18 Unit(s) SubCutaneous every morning  insulin lispro (ADMELOG) corrective regimen sliding scale   SubCutaneous three times a day before meals  insulin lispro Injectable (ADMELOG) 5 Unit(s) SubCutaneous three times a day before meals  lactobacillus acidophilus 1 Tablet(s) Oral two times a day  melatonin 3 milliGRAM(s) Oral at bedtime  vitamin A &amp; D Ointment 1 Application(s) Topical daily    PRN MEDICATIONS  acetaminophen     Tablet .. 650 milliGRAM(s) Oral every 6 hours PRN  dextrose Oral Gel 15 Gram(s) Oral once PRN                                            ------------------------------------------------------------  VITAL SIGNS: Last 24 Hours  T(C): 36.6 (09 Jul 2022 07:58), Max: 36.6 (09 Jul 2022 07:58)  T(F): 97.9 (09 Jul 2022 07:58), Max: 97.9 (09 Jul 2022 07:58)  HR: 95 (09 Jul 2022 07:58) (81 - 95)  BP: 140/78 (09 Jul 2022 07:58) (131/62 - 140/78)  BP(mean): --  RR: 18 (09 Jul 2022 00:00) (18 - 18)  SpO2: --                                             --------------------------------------------------------------  LABS:                                                                    -------------------------------------------------------------  RADIOLOGY:                                            --------------------------------------------------------------    PHYSICAL EXAM:  GENERAL: NAD, sitting up in bedcomfortably  HEAD:  Atraumatic, Normocephalic. Poor dentition.  EYES: EOMI, conjunctiva and sclera clear  ENT: Moist mucous membranes  NECK: Supple, No JVD  CHEST/LUNG: Clear to auscultation bilaterally; No rales, rhonchi, wheezing, or rubs. Unlabored respirations  HEART: regular rate and rhythm; No murmurs, rubs, or gallops  ABDOMEN: Bowel sounds present; Soft, Nontender, Nondistended.    EXTREMITIES: Warm. No cyanosis, or edema. Onchomycosis of toenails, nailbed of left toenail bed healing well. Clubbing of fingernails and toenails.  NERVOUS SYSTEM:  Alert & Oriented X3. No focal deficits   SKIN: No rashes or lesions                                           --------------------------------------------------------------

## 2022-07-10 LAB
GLUCOSE BLDC GLUCOMTR-MCNC: 136 MG/DL — HIGH (ref 70–99)
GLUCOSE BLDC GLUCOMTR-MCNC: 164 MG/DL — HIGH (ref 70–99)
GLUCOSE BLDC GLUCOMTR-MCNC: 269 MG/DL — HIGH (ref 70–99)
GLUCOSE BLDC GLUCOMTR-MCNC: 314 MG/DL — HIGH (ref 70–99)

## 2022-07-10 PROCEDURE — 99231 SBSQ HOSP IP/OBS SF/LOW 25: CPT

## 2022-07-10 RX ADMIN — Medication 1 APPLICATION(S): at 11:54

## 2022-07-10 RX ADMIN — Medication 6: at 11:53

## 2022-07-10 RX ADMIN — Medication 5 UNIT(S): at 08:21

## 2022-07-10 RX ADMIN — Medication 1 APPLICATION(S): at 05:30

## 2022-07-10 RX ADMIN — Medication 5 UNIT(S): at 11:53

## 2022-07-10 RX ADMIN — Medication 3 MILLIGRAM(S): at 21:35

## 2022-07-10 RX ADMIN — Medication 1 TABLET(S): at 17:20

## 2022-07-10 RX ADMIN — INSULIN GLARGINE 18 UNIT(S): 100 INJECTION, SOLUTION SUBCUTANEOUS at 08:22

## 2022-07-10 RX ADMIN — Medication 1 TABLET(S): at 05:31

## 2022-07-10 RX ADMIN — FAMOTIDINE 20 MILLIGRAM(S): 10 INJECTION INTRAVENOUS at 11:54

## 2022-07-10 RX ADMIN — Medication 1 APPLICATION(S): at 17:24

## 2022-07-10 RX ADMIN — CHLORHEXIDINE GLUCONATE 1 APPLICATION(S): 213 SOLUTION TOPICAL at 05:30

## 2022-07-10 RX ADMIN — Medication 2: at 17:19

## 2022-07-10 RX ADMIN — Medication 5 UNIT(S): at 17:19

## 2022-07-10 NOTE — PROGRESS NOTE ADULT - SUBJECTIVE AND OBJECTIVE BOX
SUBJECTIVE:    Patient is a 47y old Male who presents with a chief complaint of DFU (09 Jul 2022 16:26)    Currently admitted to medicine with the primary diagnosis of Onychomycosis       Today is hospital day 101d.     PAST MEDICAL & SURGICAL HISTORY  DM (diabetes mellitus)    Intellectual disability      ALLERGIES:  penicillin (Unknown)    MEDICATIONS:  STANDING MEDICATIONS  ammonium lactate 12% Lotion 1 Application(s) Topical two times a day  chlorhexidine 4% Liquid 1 Application(s) Topical <User Schedule>  clotrimazole 1% Cream 1 Application(s) Topical two times a day  dextrose 5%. 1000 milliLiter(s) IV Continuous <Continuous>  dextrose 5%. 1000 milliLiter(s) IV Continuous <Continuous>  dextrose 50% Injectable 25 Gram(s) IV Push once  dextrose 50% Injectable 12.5 Gram(s) IV Push once  dextrose 50% Injectable 25 Gram(s) IV Push once  famotidine    Tablet 20 milliGRAM(s) Oral daily  glucagon  Injectable 1 milliGRAM(s) IntraMuscular once  glucagon  Injectable 1 milliGRAM(s) IntraMuscular once  insulin glargine Injectable (LANTUS) 18 Unit(s) SubCutaneous every morning  insulin lispro (ADMELOG) corrective regimen sliding scale   SubCutaneous three times a day before meals  insulin lispro Injectable (ADMELOG) 5 Unit(s) SubCutaneous three times a day before meals  lactobacillus acidophilus 1 Tablet(s) Oral two times a day  melatonin 3 milliGRAM(s) Oral at bedtime  vitamin A &amp; D Ointment 1 Application(s) Topical daily    PRN MEDICATIONS  acetaminophen     Tablet .. 650 milliGRAM(s) Oral every 6 hours PRN  dextrose Oral Gel 15 Gram(s) Oral once PRN    VITALS:   T(F): 97.5  HR: 81  BP: 141/77  RR: 17  SpO2: --    LABS:                        RADIOLOGY:    PHYSICAL EXAM:  GEN: No acute distress  LUNGS: Clear to auscultation bilaterally   HEART: S1/S2 present. RRR.   ABD/ GI: Soft, non-tender, non-distended. Bowel sounds present  EXT: NC/NC/NE/2+PP/LOJA  NEURO: AAOX3

## 2022-07-11 LAB
GLUCOSE BLDC GLUCOMTR-MCNC: 180 MG/DL — HIGH (ref 70–99)
GLUCOSE BLDC GLUCOMTR-MCNC: 299 MG/DL — HIGH (ref 70–99)
GLUCOSE BLDC GLUCOMTR-MCNC: 307 MG/DL — HIGH (ref 70–99)
GLUCOSE BLDC GLUCOMTR-MCNC: 317 MG/DL — HIGH (ref 70–99)

## 2022-07-11 PROCEDURE — 99231 SBSQ HOSP IP/OBS SF/LOW 25: CPT

## 2022-07-11 RX ADMIN — Medication 1 TABLET(S): at 18:21

## 2022-07-11 RX ADMIN — Medication 5 UNIT(S): at 08:04

## 2022-07-11 RX ADMIN — FAMOTIDINE 20 MILLIGRAM(S): 10 INJECTION INTRAVENOUS at 11:59

## 2022-07-11 RX ADMIN — Medication 1 TABLET(S): at 05:55

## 2022-07-11 RX ADMIN — Medication 1 APPLICATION(S): at 18:20

## 2022-07-11 RX ADMIN — CHLORHEXIDINE GLUCONATE 1 APPLICATION(S): 213 SOLUTION TOPICAL at 05:54

## 2022-07-11 RX ADMIN — Medication 3 MILLIGRAM(S): at 21:57

## 2022-07-11 RX ADMIN — Medication 2: at 08:04

## 2022-07-11 RX ADMIN — Medication 5 UNIT(S): at 16:52

## 2022-07-11 RX ADMIN — Medication 1 APPLICATION(S): at 14:00

## 2022-07-11 RX ADMIN — Medication 1 APPLICATION(S): at 05:54

## 2022-07-11 RX ADMIN — INSULIN GLARGINE 18 UNIT(S): 100 INJECTION, SOLUTION SUBCUTANEOUS at 08:04

## 2022-07-11 RX ADMIN — Medication 8: at 12:00

## 2022-07-11 RX ADMIN — Medication 6: at 16:52

## 2022-07-11 RX ADMIN — Medication 1 APPLICATION(S): at 05:55

## 2022-07-11 RX ADMIN — Medication 5 UNIT(S): at 11:59

## 2022-07-11 NOTE — PROGRESS NOTE ADULT - SUBJECTIVE AND OBJECTIVE BOX
SUBJECTIVE:    Patient is a 47y old Male who presents with a chief complaint of DFU (11 Jul 2022 12:56)    Currently admitted to medicine with the primary diagnosis of Onychomycosis       Today is hospital day 102d.     PAST MEDICAL & SURGICAL HISTORY  DM (diabetes mellitus)    Intellectual disability      ALLERGIES:  penicillin (Unknown)    MEDICATIONS:  STANDING MEDICATIONS  ammonium lactate 12% Lotion 1 Application(s) Topical two times a day  chlorhexidine 4% Liquid 1 Application(s) Topical <User Schedule>  clotrimazole 1% Cream 1 Application(s) Topical two times a day  dextrose 5%. 1000 milliLiter(s) IV Continuous <Continuous>  dextrose 5%. 1000 milliLiter(s) IV Continuous <Continuous>  dextrose 50% Injectable 25 Gram(s) IV Push once  dextrose 50% Injectable 12.5 Gram(s) IV Push once  dextrose 50% Injectable 25 Gram(s) IV Push once  famotidine    Tablet 20 milliGRAM(s) Oral daily  glucagon  Injectable 1 milliGRAM(s) IntraMuscular once  glucagon  Injectable 1 milliGRAM(s) IntraMuscular once  insulin glargine Injectable (LANTUS) 18 Unit(s) SubCutaneous every morning  insulin lispro (ADMELOG) corrective regimen sliding scale   SubCutaneous three times a day before meals  insulin lispro Injectable (ADMELOG) 5 Unit(s) SubCutaneous three times a day before meals  lactobacillus acidophilus 1 Tablet(s) Oral two times a day  melatonin 3 milliGRAM(s) Oral at bedtime  vitamin A &amp; D Ointment 1 Application(s) Topical daily    PRN MEDICATIONS  acetaminophen     Tablet .. 650 milliGRAM(s) Oral every 6 hours PRN  dextrose Oral Gel 15 Gram(s) Oral once PRN    VITALS:   T(F): 97.6  HR: 110  BP: 154/63  RR: 20  SpO2: --    LABS:                        RADIOLOGY:    PHYSICAL EXAM:  GEN: No acute distress  LUNGS: Clear to auscultation bilaterally   HEART: S1/S2 present. RRR.   ABD/ GI: Soft, non-tender, non-distended. Bowel sounds present  EXT: NC/NC/NE/2+PP/LOJA  NEURO: AAOX3

## 2022-07-11 NOTE — PROGRESS NOTE ADULT - SUBJECTIVE AND OBJECTIVE BOX
CARIN WATSON 47y Male  MRN#: [id number]     Hospital Day: 102d    Pt is currently admitted with the primary diagnosis of  DM FOOT ULCERS; HYPERGLYCEMIA        SUBJECTIVE     Overnight events  None    Subjective complaints  Pt was evaluated this am. Patient states that the distal metatarsal head of his left foot is slightly tender to touch but he is able to walk on it and move it in all directions.                                            ----------------------------------------------------------  OBJECTIVE  PAST MEDICAL & SURGICAL HISTORY  DM (diabetes mellitus)    Intellectual disability                                              -----------------------------------------------------------  ALLERGIES:  penicillin (Unknown)                                            ------------------------------------------------------------    HOME MEDICATIONS  Home Medications:  vitamin A and D topical ointment: 1 application topically once a day (12 Apr 2022 11:40)                           MEDICATIONS:  STANDING MEDICATIONS  ammonium lactate 12% Lotion 1 Application(s) Topical two times a day  chlorhexidine 4% Liquid 1 Application(s) Topical <User Schedule>  clotrimazole 1% Cream 1 Application(s) Topical two times a day  dextrose 5%. 1000 milliLiter(s) IV Continuous <Continuous>  dextrose 5%. 1000 milliLiter(s) IV Continuous <Continuous>  dextrose 50% Injectable 25 Gram(s) IV Push once  dextrose 50% Injectable 12.5 Gram(s) IV Push once  dextrose 50% Injectable 25 Gram(s) IV Push once  famotidine    Tablet 20 milliGRAM(s) Oral daily  glucagon  Injectable 1 milliGRAM(s) IntraMuscular once  glucagon  Injectable 1 milliGRAM(s) IntraMuscular once  insulin glargine Injectable (LANTUS) 18 Unit(s) SubCutaneous every morning  insulin lispro (ADMELOG) corrective regimen sliding scale   SubCutaneous three times a day before meals  insulin lispro Injectable (ADMELOG) 5 Unit(s) SubCutaneous three times a day before meals  lactobacillus acidophilus 1 Tablet(s) Oral two times a day  melatonin 3 milliGRAM(s) Oral at bedtime  vitamin A &amp; D Ointment 1 Application(s) Topical daily    PRN MEDICATIONS  acetaminophen     Tablet .. 650 milliGRAM(s) Oral every 6 hours PRN  dextrose Oral Gel 15 Gram(s) Oral once PRN                                            ------------------------------------------------------------  VITAL SIGNS: Last 24 Hours  T(C): 36.7 (11 Jul 2022 07:59), Max: 36.7 (11 Jul 2022 07:59)  T(F): 98 (11 Jul 2022 07:59), Max: 98 (11 Jul 2022 07:59)  HR: 88 (11 Jul 2022 07:59) (81 - 101)  BP: 132/58 (11 Jul 2022 07:59) (128/75 - 141/77)  BP(mean): --  RR: 18 (11 Jul 2022 00:40) (17 - 18)  SpO2: --                                             --------------------------------------------------------------  LABS:                                                                    -------------------------------------------------------------  RADIOLOGY:                                            --------------------------------------------------------------    PHYSICAL EXAM:  GENERAL: NAD, sitting up in bed comfortably  HEAD:  Atraumatic, Normocephalic. Poor dentition.  EYES: EOMI, conjunctiva and sclera clear  ENT: Moist mucous membranes  NECK: Supple, No JVD  CHEST/LUNG: Clear to auscultation bilaterally; No rales, rhonchi, wheezing, or rubs. Unlabored respirations  HEART: regular rate and rhythm; No murmurs, rubs, or gallops  ABDOMEN: Bowel sounds present; Soft, Nontender, Nondistended.    EXTREMITIES: Warm. No cyanosis, or edema. Onchomycosis of toenails, nailbed of left toenail bed healing well. Clubbing of fingernails and toenails.  NERVOUS SYSTEM:  Alert & Oriented X3. No focal deficits   SKIN: No rashes or lesions                                           --------------------------------------------------------------

## 2022-07-11 NOTE — PROGRESS NOTE ADULT - ASSESSMENT
45 yo M with PMH of Intellectual Disability and DM not on any medications presented with foot wounds.    Covid 19 positive   Onychomycosis  Dental caries  DM2  Intellectual disability/autism        Covid PNA- completed RDV  Nail bed wound and onychomycosis. Status post Aseptic debridement and curettage of all fungal and ingrowing nails 1-5 bilateral with sterile nail nipper 06/07  Podiatry- no surgical intervention is advised. outpt podiatry f/u  DM type 2 - uncontrolled. A1C 8.9. Continue  metformin 1000mg BID and insulin glargine and lispro-- sugar is still high-- noncompliant with diet  Dental Caries-completed clinda for 7 days. Will need outpt f/u for possible multiple extractions  Insomnia- prn melatonin    Handoff:  Pending legal issue and guardianship,  medically stable for discharge .

## 2022-07-12 LAB
GLUCOSE BLDC GLUCOMTR-MCNC: 201 MG/DL — HIGH (ref 70–99)
GLUCOSE BLDC GLUCOMTR-MCNC: 234 MG/DL — HIGH (ref 70–99)
GLUCOSE BLDC GLUCOMTR-MCNC: 237 MG/DL — HIGH (ref 70–99)
GLUCOSE BLDC GLUCOMTR-MCNC: 283 MG/DL — HIGH (ref 70–99)
GLUCOSE BLDC GLUCOMTR-MCNC: 391 MG/DL — HIGH (ref 70–99)

## 2022-07-12 PROCEDURE — 99231 SBSQ HOSP IP/OBS SF/LOW 25: CPT

## 2022-07-12 RX ADMIN — Medication 5 UNIT(S): at 12:34

## 2022-07-12 RX ADMIN — Medication 3 MILLIGRAM(S): at 21:35

## 2022-07-12 RX ADMIN — Medication 1 APPLICATION(S): at 17:49

## 2022-07-12 RX ADMIN — Medication 1 TABLET(S): at 17:36

## 2022-07-12 RX ADMIN — Medication 4: at 07:55

## 2022-07-12 RX ADMIN — Medication 10: at 16:56

## 2022-07-12 RX ADMIN — Medication 5 UNIT(S): at 07:55

## 2022-07-12 RX ADMIN — FAMOTIDINE 20 MILLIGRAM(S): 10 INJECTION INTRAVENOUS at 12:25

## 2022-07-12 RX ADMIN — Medication 1 APPLICATION(S): at 06:43

## 2022-07-12 RX ADMIN — Medication 1 APPLICATION(S): at 06:41

## 2022-07-12 RX ADMIN — Medication 1 TABLET(S): at 06:41

## 2022-07-12 RX ADMIN — Medication 1 APPLICATION(S): at 12:23

## 2022-07-12 RX ADMIN — INSULIN GLARGINE 18 UNIT(S): 100 INJECTION, SOLUTION SUBCUTANEOUS at 08:07

## 2022-07-12 RX ADMIN — Medication 5 UNIT(S): at 16:56

## 2022-07-12 RX ADMIN — Medication 4: at 12:34

## 2022-07-12 NOTE — CHART NOTE - NSCHARTNOTEFT_GEN_A_CORE
PO remains optimum per EMR documentation. 7/1-7/7 intake: %.     Not at risk, will re-assess in 10 days.

## 2022-07-12 NOTE — PROGRESS NOTE ADULT - SUBJECTIVE AND OBJECTIVE BOX
SUBJECTIVE:    Patient is a 47y old Male who presents with a chief complaint of DFU (12 Jul 2022 15:30)    Currently admitted to medicine with the primary diagnosis of Onychomycosis       Today is hospital day 103d.     PAST MEDICAL & SURGICAL HISTORY  DM (diabetes mellitus)    Intellectual disability      ALLERGIES:  penicillin (Unknown)    MEDICATIONS:  STANDING MEDICATIONS  ammonium lactate 12% Lotion 1 Application(s) Topical two times a day  chlorhexidine 4% Liquid 1 Application(s) Topical <User Schedule>  clotrimazole 1% Cream 1 Application(s) Topical two times a day  dextrose 5%. 1000 milliLiter(s) IV Continuous <Continuous>  dextrose 5%. 1000 milliLiter(s) IV Continuous <Continuous>  dextrose 50% Injectable 25 Gram(s) IV Push once  dextrose 50% Injectable 12.5 Gram(s) IV Push once  dextrose 50% Injectable 25 Gram(s) IV Push once  famotidine    Tablet 20 milliGRAM(s) Oral daily  glucagon  Injectable 1 milliGRAM(s) IntraMuscular once  glucagon  Injectable 1 milliGRAM(s) IntraMuscular once  insulin glargine Injectable (LANTUS) 18 Unit(s) SubCutaneous every morning  insulin lispro (ADMELOG) corrective regimen sliding scale   SubCutaneous three times a day before meals  insulin lispro Injectable (ADMELOG) 5 Unit(s) SubCutaneous three times a day before meals  lactobacillus acidophilus 1 Tablet(s) Oral two times a day  melatonin 3 milliGRAM(s) Oral at bedtime  vitamin A &amp; D Ointment 1 Application(s) Topical daily    PRN MEDICATIONS  acetaminophen     Tablet .. 650 milliGRAM(s) Oral every 6 hours PRN  dextrose Oral Gel 15 Gram(s) Oral once PRN    VITALS:   T(F): 98.6  HR: 105  BP: 137/65  RR: 18  SpO2: --    LABS:                        RADIOLOGY:    PHYSICAL EXAM:  GEN: No acute distress  LUNGS: Clear to auscultation bilaterally   HEART: S1/S2 present. RRR.   ABD/ GI: Soft, non-tender, non-distended. Bowel sounds present  EXT: NC/NC/NE/2+PP/LOJA  NEURO: AAOX3

## 2022-07-12 NOTE — PROGRESS NOTE ADULT - SUBJECTIVE AND OBJECTIVE BOX
CARIN WATSON 47y Male  MRN#: [id number]      Pt is currently admitted with the primary diagnosis of      Hospital Day: 103d  HPI:  45 yo M with PMHx of Intellectual Disability, DM not on any medications presents with foot wounds. Pt has very poor foot hygiene and per the sister, has been persistently scratching an open wound on his L second toe, which worsened and became more red, had drainage, malodor. He saw a physician at [facility] and was told to come to the ED for evaluation. Pt is poor historian. Does endorse abdominal pain for a few days, cannot give much more description. Per the sister, pt did not seem to have any recent fevers, however is also not a very good historian and does not seem to have good health literacy. States that her and her mom decided to stop giving pt Metformin a while ago, are treating his diabetes by not giving him any sugary foods.  In the ED, T 97.8, /87, , RR 16, SpO2 99% on RA. Lab work unrevealing.     Overnight events:  No acute events overnight    Subjective complaints:  Pt was seen and examined at bedside. Appears comfortable- eating. No complaints at this time.                                            ----------------------------------------------------------    PAST MEDICAL & SURGICAL HISTORY  DM (diabetes mellitus)    Intellectual disability                                              -----------------------------------------------------------  ALLERGIES:  penicillin (Unknown)                                            ------------------------------------------------------------    HOME MEDICATIONS  Home Medications:  vitamin A and D topical ointment: 1 application topically once a day (12 Apr 2022 11:40)                           MEDICATIONS:  STANDING MEDICATIONS  ammonium lactate 12% Lotion 1 Application(s) Topical two times a day  chlorhexidine 4% Liquid 1 Application(s) Topical <User Schedule>  clotrimazole 1% Cream 1 Application(s) Topical two times a day  dextrose 5%. 1000 milliLiter(s) IV Continuous <Continuous>  dextrose 5%. 1000 milliLiter(s) IV Continuous <Continuous>  dextrose 50% Injectable 25 Gram(s) IV Push once  dextrose 50% Injectable 12.5 Gram(s) IV Push once  dextrose 50% Injectable 25 Gram(s) IV Push once  famotidine    Tablet 20 milliGRAM(s) Oral daily  glucagon  Injectable 1 milliGRAM(s) IntraMuscular once  glucagon  Injectable 1 milliGRAM(s) IntraMuscular once  insulin glargine Injectable (LANTUS) 18 Unit(s) SubCutaneous every morning  insulin lispro (ADMELOG) corrective regimen sliding scale   SubCutaneous three times a day before meals  insulin lispro Injectable (ADMELOG) 5 Unit(s) SubCutaneous three times a day before meals  lactobacillus acidophilus 1 Tablet(s) Oral two times a day  melatonin 3 milliGRAM(s) Oral at bedtime  vitamin A &amp; D Ointment 1 Application(s) Topical daily    PRN MEDICATIONS  acetaminophen     Tablet .. 650 milliGRAM(s) Oral every 6 hours PRN  dextrose Oral Gel 15 Gram(s) Oral once PRN                                            ------------------------------------------------------------  VITAL SIGNS: Last 24 Hours  T(C): 36.7 (12 Jul 2022 07:58), Max: 36.7 (12 Jul 2022 07:58)  T(F): 98.1 (12 Jul 2022 07:58), Max: 98.1 (12 Jul 2022 07:58)  HR: 92 (12 Jul 2022 07:58) (92 - 110)  BP: 137/67 (12 Jul 2022 07:58) (126/66 - 154/63)  BP(mean): --  RR: 18 (11 Jul 2022 23:16) (18 - 20)  SpO2: --                                             --------------------------------------------------------------  LABS:                                              --------------------------------------------------------------    PHYSICAL EXAM:  General: well-appearing in NAD.   HEENT: NCAT  LUNGS: CTAB  HEART: RRR.   ABDOMEN: Nontender, nondistended.   EXT: Nonedematous.  NEURO: Deferred.                                            --------------------------------------------------------------    ASSESSMENT & PLAN:  45 yo M with PMH of Intellectual Disability and DM not on any medications presented with foot wounds.    Covid 19 positive   Onychomycosis  Dental caries  DM2  Intellectual disability/autism      # COVID PNA- S/p RDV (completed 5/26/22)  # Nail bed wound; #Onychomycosis- S/p aseptic debridement and curettage of all fungal and ingrowing nails 1-5 bilateral with sterile nail nipper 06/07; Podiatry- no surgical intervention is advised. Outpt podiatry f/u  #DM II - uncontrolled. A1C 8.9. C/w metformin 1000mg BID and insulin glargine and lispro-- FS remain high-- noncompliant with diet  #Dental Caries- S/p clinda x 7 days (completed 7/16/22). Will need outpt f/u for possible multiple extractions  # Insomnia- C/w prn melatonin    Dispo:  Pending legal issue and guardianship, medically stable for discharge .

## 2022-07-13 LAB
GLUCOSE BLDC GLUCOMTR-MCNC: 156 MG/DL — HIGH (ref 70–99)
GLUCOSE BLDC GLUCOMTR-MCNC: 201 MG/DL — HIGH (ref 70–99)
GLUCOSE BLDC GLUCOMTR-MCNC: 248 MG/DL — HIGH (ref 70–99)
GLUCOSE BLDC GLUCOMTR-MCNC: 366 MG/DL — HIGH (ref 70–99)

## 2022-07-13 PROCEDURE — 99231 SBSQ HOSP IP/OBS SF/LOW 25: CPT

## 2022-07-13 RX ADMIN — Medication 1 APPLICATION(S): at 17:41

## 2022-07-13 RX ADMIN — Medication 10: at 17:40

## 2022-07-13 RX ADMIN — Medication 4: at 07:59

## 2022-07-13 RX ADMIN — Medication 5 UNIT(S): at 17:40

## 2022-07-13 RX ADMIN — Medication 1 APPLICATION(S): at 05:48

## 2022-07-13 RX ADMIN — Medication 1 APPLICATION(S): at 17:49

## 2022-07-13 RX ADMIN — Medication 5 UNIT(S): at 08:00

## 2022-07-13 RX ADMIN — Medication 1 TABLET(S): at 17:41

## 2022-07-13 RX ADMIN — Medication 1 TABLET(S): at 05:48

## 2022-07-13 RX ADMIN — Medication 5 UNIT(S): at 11:37

## 2022-07-13 RX ADMIN — Medication 1 APPLICATION(S): at 11:36

## 2022-07-13 RX ADMIN — Medication 3 MILLIGRAM(S): at 21:35

## 2022-07-13 RX ADMIN — CHLORHEXIDINE GLUCONATE 1 APPLICATION(S): 213 SOLUTION TOPICAL at 05:50

## 2022-07-13 RX ADMIN — FAMOTIDINE 20 MILLIGRAM(S): 10 INJECTION INTRAVENOUS at 11:38

## 2022-07-13 RX ADMIN — Medication 2: at 11:37

## 2022-07-13 RX ADMIN — INSULIN GLARGINE 18 UNIT(S): 100 INJECTION, SOLUTION SUBCUTANEOUS at 07:58

## 2022-07-13 NOTE — PROGRESS NOTE ADULT - ASSESSMENT
45 yo M with PMH of Intellectual Disability and DM not on any medications presented with foot wounds.    Covid  PNA  Onychomycosis  Dental caries  DM2  Intellectual disability/autism        Covid PNA- completed RDV  Nail bed wound and onychomycosis. Status post Aseptic debridement and curettage of all fungal and ingrowing nails 1-5 bilateral with sterile nail nipper 06/07  Podiatry- no surgical intervention is advised. outpt podiatry f/u  DM type 2 - uncontrolled. A1C 8.9. Continue insulin (add metformin 1000 mg BID on discharge)  Dental Caries-completed clinda for 7 days. Will need outpt f/u for possible multiple extractions  Insomnia- prn melatonin    Handoff:  Pending legal issue and guardianship, else medically stable for discharge   45 yo M with PMH of Intellectual Disability and DM not on any medications presented with foot wounds.    Covid  PNA  Onychomycosis  Dental caries  DM2  Intellectual disability/autism        Covid PNA- completed RDV  Nail bed wound and onychomycosis. Status post Aseptic debridement and curettage of all fungal and ingrowing nails 1-5 bilateral with sterile nail nipper 06/07  Podiatry- no surgical intervention is advised. outpt podiatry f/u  DM type 2 - uncontrolled. A1C 8.9. Continue insulin (add metformin 1000 mg BID on discharge)  Dental Caries-completed clinda for 7 days. Will need outpt f/u for possible multiple extractions  Insomnia- prn melatonin    Handoff:  Pending legal issue and guardianship, else medically stable for discharge. Spoke with nurses, they plan on getting him to shower today.

## 2022-07-13 NOTE — PROGRESS NOTE ADULT - SUBJECTIVE AND OBJECTIVE BOX
CARIN WATSON 47y Male  MRN#: [id number]     Hospital Day: 104d    Pt is currently admitted with the primary diagnosis of  DM FOOT ULCERS; HYPERGLYCEMIA        SUBJECTIVE     Overnight events  None    Subjective complaints  Pt was evaluated this am. Patient denied any active complaints and per patient his symptoms are improving                                            ----------------------------------------------------------  OBJECTIVE  PAST MEDICAL & SURGICAL HISTORY  DM (diabetes mellitus)    Intellectual disability                                              -----------------------------------------------------------  ALLERGIES:  penicillin (Unknown)                                            ------------------------------------------------------------    HOME MEDICATIONS  Home Medications:  vitamin A and D topical ointment: 1 application topically once a day (12 Apr 2022 11:40)                           MEDICATIONS:  STANDING MEDICATIONS  ammonium lactate 12% Lotion 1 Application(s) Topical two times a day  chlorhexidine 4% Liquid 1 Application(s) Topical <User Schedule>  clotrimazole 1% Cream 1 Application(s) Topical two times a day  dextrose 5%. 1000 milliLiter(s) IV Continuous <Continuous>  dextrose 5%. 1000 milliLiter(s) IV Continuous <Continuous>  dextrose 50% Injectable 25 Gram(s) IV Push once  dextrose 50% Injectable 12.5 Gram(s) IV Push once  dextrose 50% Injectable 25 Gram(s) IV Push once  famotidine    Tablet 20 milliGRAM(s) Oral daily  glucagon  Injectable 1 milliGRAM(s) IntraMuscular once  glucagon  Injectable 1 milliGRAM(s) IntraMuscular once  insulin glargine Injectable (LANTUS) 18 Unit(s) SubCutaneous every morning  insulin lispro (ADMELOG) corrective regimen sliding scale   SubCutaneous three times a day before meals  insulin lispro Injectable (ADMELOG) 5 Unit(s) SubCutaneous three times a day before meals  lactobacillus acidophilus 1 Tablet(s) Oral two times a day  melatonin 3 milliGRAM(s) Oral at bedtime  vitamin A &amp; D Ointment 1 Application(s) Topical daily    PRN MEDICATIONS  acetaminophen     Tablet .. 650 milliGRAM(s) Oral every 6 hours PRN  dextrose Oral Gel 15 Gram(s) Oral once PRN                                            ------------------------------------------------------------  VITAL SIGNS: Last 24 Hours  T(C): 36 (13 Jul 2022 08:00), Max: 36.7 (13 Jul 2022 00:00)  T(F): 96.8 (13 Jul 2022 08:00), Max: 98 (13 Jul 2022 00:00)  HR: 90 (13 Jul 2022 08:00) (90 - 91)  BP: 125/58 (13 Jul 2022 08:00) (108/53 - 125/58)  BP(mean): --  RR: 18 (13 Jul 2022 08:00) (18 - 18)  SpO2: --                                             --------------------------------------------------------------  LABS:                                                                    -------------------------------------------------------------  RADIOLOGY:                                            --------------------------------------------------------------    PHYSICAL EXAM:     GENERAL: NAD, sitting up in bed comfortably  HEAD:  Atraumatic, Normocephalic. Poor dentition.  EYES: EOMI, conjunctiva and sclera clear  ENT: Moist mucous membranes  NECK: Supple, No JVD  CHEST/LUNG: Clear to auscultation bilaterally; No rales, rhonchi, wheezing, or rubs. Unlabored respirations  HEART: regular rate and rhythm; No murmurs, rubs, or gallops  ABDOMEN: Bowel sounds present; Soft, Nontender, Nondistended.    EXTREMITIES: Warm. No cyanosis, or edema. Onchomycosis of toenails, nailbed of left toenail bed healing well. Clubbing of fingernails and toenails.  NERVOUS SYSTEM:  Alert & Oriented X3. No focal deficits   SKIN: No rashes or lesions                                         --------------------------------------------------------------                 CARIN WATSON 47y Male  MRN#: [id number]     Hospital Day: 104d    Pt is currently admitted with the primary diagnosis of  DM FOOT ULCERS; HYPERGLYCEMIA        SUBJECTIVE     Overnight events  None    Subjective complaints  Pt was evaluated this am. Patient denied any active complaints.                                            ----------------------------------------------------------  OBJECTIVE  PAST MEDICAL & SURGICAL HISTORY  DM (diabetes mellitus)    Intellectual disability                                              -----------------------------------------------------------  ALLERGIES:  penicillin (Unknown)                                            ------------------------------------------------------------    HOME MEDICATIONS  Home Medications:  vitamin A and D topical ointment: 1 application topically once a day (12 Apr 2022 11:40)                           MEDICATIONS:  STANDING MEDICATIONS  ammonium lactate 12% Lotion 1 Application(s) Topical two times a day  chlorhexidine 4% Liquid 1 Application(s) Topical <User Schedule>  clotrimazole 1% Cream 1 Application(s) Topical two times a day  dextrose 5%. 1000 milliLiter(s) IV Continuous <Continuous>  dextrose 5%. 1000 milliLiter(s) IV Continuous <Continuous>  dextrose 50% Injectable 25 Gram(s) IV Push once  dextrose 50% Injectable 12.5 Gram(s) IV Push once  dextrose 50% Injectable 25 Gram(s) IV Push once  famotidine    Tablet 20 milliGRAM(s) Oral daily  glucagon  Injectable 1 milliGRAM(s) IntraMuscular once  glucagon  Injectable 1 milliGRAM(s) IntraMuscular once  insulin glargine Injectable (LANTUS) 18 Unit(s) SubCutaneous every morning  insulin lispro (ADMELOG) corrective regimen sliding scale   SubCutaneous three times a day before meals  insulin lispro Injectable (ADMELOG) 5 Unit(s) SubCutaneous three times a day before meals  lactobacillus acidophilus 1 Tablet(s) Oral two times a day  melatonin 3 milliGRAM(s) Oral at bedtime  vitamin A &amp; D Ointment 1 Application(s) Topical daily    PRN MEDICATIONS  acetaminophen     Tablet .. 650 milliGRAM(s) Oral every 6 hours PRN  dextrose Oral Gel 15 Gram(s) Oral once PRN                                            ------------------------------------------------------------  VITAL SIGNS: Last 24 Hours  T(C): 36 (13 Jul 2022 08:00), Max: 36.7 (13 Jul 2022 00:00)  T(F): 96.8 (13 Jul 2022 08:00), Max: 98 (13 Jul 2022 00:00)  HR: 90 (13 Jul 2022 08:00) (90 - 91)  BP: 125/58 (13 Jul 2022 08:00) (108/53 - 125/58)  BP(mean): --  RR: 18 (13 Jul 2022 08:00) (18 - 18)  SpO2: --                                             --------------------------------------------------------------  LABS:                                                                    -------------------------------------------------------------  RADIOLOGY:                                            --------------------------------------------------------------    PHYSICAL EXAM:     GENERAL: NAD, sitting up in bed comfortably. Patient has not showered in a long time, smells foul.  HEAD:  Atraumatic, Normocephalic. Poor dentition.  EYES: EOMI, conjunctiva and sclera clear  ENT: Moist mucous membranes  NECK: Supple, No JVD  CHEST/LUNG: Clear to auscultation bilaterally; No rales, rhonchi, wheezing, or rubs. Unlabored respirations  HEART: regular rate and rhythm; No murmurs, rubs, or gallops  ABDOMEN: Bowel sounds present; Soft, Nontender, Nondistended.    EXTREMITIES: Warm. No cyanosis, or edema. Onchomycosis of toenails, nailbed of left toenail bed healing well. Clubbing of fingernails and toenails.  NERVOUS SYSTEM:  Alert & Oriented X3. No focal deficits   SKIN: No rashes or lesions                                         --------------------------------------------------------------

## 2022-07-13 NOTE — PROGRESS NOTE ADULT - SUBJECTIVE AND OBJECTIVE BOX
Progress Note:  Provider Speciality                            Hospitalist      CARIN WATSON MRN-[id number] 46y Male     CHIEF PRESENTING COMPLAINT:  Patient is a 46y old  Male who presents with a chief complaint of DFU (06 Apr 2022 12:18)        SUBJECTIVE:  Patient was seen and examined at bedside.  No new complaints described by patient in morning rounds.   HISTORY OF PRESENTING ILLNESS:  HPI:  47 yo M with PMHx of Intellectual Disability, DM not on any medications presents with foot wounds. Pt has very poor foot hygiene and per the sister, has been persistently scratching an open wound on his L second toe, which worsened and became more red, had drainage, malodor. He saw a physician at [facility] and was told to come to the ED for evaluation. Pt is poor historian. Does endorse abdominal pain for a few days, cannot give much more description. Per the sister, pt did not seem to have any recent fevers, however is also not a very good historian and does not seem to have good health literacy. States that her and her mom decided to stop giving pt Metformin a while ago, are treating his diabetes by not giving him any sugary foods.  In the ED, T 97.8, /87, , RR 16, SpO2 99% on RA. Lab work unrevealing.  (31 Mar 2022 22:36)        REVIEW OF SYSTEMS:  Patient denies any headache, any vision complaints, runny nose. Denies chest pain, shortness of breath, palpitation. Denies nausea, vomiting, abdominal pain or diarrhoea, Denies dysuria. Denies  localized weakness in any part of the body or numbness.   At least 10 systems were reviewed in ROS. All systems reviewed  are within normal limits except for the complaints as described in Subjective.    PAST MEDICAL & SURGICAL HISTORY:  PAST MEDICAL & SURGICAL HISTORY:  DM (diabetes mellitus)    Intellectual disability            VITAL SIGNS:  Vital Signs Last 24 Hrs  T(C): 36 (13 Jul 2022 08:00), Max: 37 (12 Jul 2022 15:30)  T(F): 96.8 (13 Jul 2022 08:00), Max: 98.6 (12 Jul 2022 15:30)  HR: 90 (13 Jul 2022 08:00) (90 - 105)  BP: 125/58 (13 Jul 2022 08:00) (108/53 - 137/65)  BP(mean): --  RR: 18 (13 Jul 2022 08:00) (18 - 18)  SpO2: --        PHYSICAL EXAMINATION:  Not in acute distress  General: No icterus  HEENT:   no JVD.  Heart: S1+S2 audible  Lungs: bilateral  moderate air entry, no wheezing, no crepitations.  Abdomen: Soft, non-tender, non-distended , no  rigidity or guarding.  CNS: Awake alert, CN  grossly intact. Intellectual disability  Extremities:  No edema    , onychomycosis toe nails        CONSULTS:  Consultant(s) Notes Reviewed by me.   Care Discussed with Consultants/Other Providers where required.        MEDICATIONS:  MEDICATIONS  (STANDING):  ammonium lactate 12% Lotion 1 Application(s) Topical two times a day  clotrimazole 1% Cream 1 Application(s) Topical two times a day  enoxaparin Injectable 40 milliGRAM(s) SubCutaneous every 24 hours  metFORMIN 1000 milliGRAM(s) Oral two times a day  pantoprazole    Tablet 40 milliGRAM(s) Oral before breakfast  vitamin A &amp; D Ointment 1 Application(s) Topical daily    MEDICATIONS  (PRN):  ondansetron Injectable 4 milliGRAM(s) IV Push every 6 hours PRN Nausea and/or Vomiting            ASSESSMENT:    47 yo M with PMH of Intellectual Disability and DM not on any medications presented with foot wounds.    Covid  PNA  Onychomycosis  Dental caries  DM2  Intellectual disability/autism        Covid PNA- completed RDV  Nail bed wound and onychomycosis. Status post Aseptic debridement and curettage of all fungal and ingrowing nails 1-5 bilateral with sterile nail nipper 06/07  Podiatry- no surgical intervention is advised. outpt podiatry f/u  DM type 2 - uncontrolled. A1C 8.9. Continue  metformin 1000mg BID and insulin glargine and lispro  Dental Caries-completed clinda for 7 days. Will need outpt f/u for possible multiple extractions  Insomnia- prn melatonin    Handoff:  Pending legal issue and guardianship, else medically stable for discharge

## 2022-07-14 LAB
GLUCOSE BLDC GLUCOMTR-MCNC: 160 MG/DL — HIGH (ref 70–99)
GLUCOSE BLDC GLUCOMTR-MCNC: 173 MG/DL — HIGH (ref 70–99)
GLUCOSE BLDC GLUCOMTR-MCNC: 189 MG/DL — HIGH (ref 70–99)
GLUCOSE BLDC GLUCOMTR-MCNC: 254 MG/DL — HIGH (ref 70–99)
GLUCOSE BLDC GLUCOMTR-MCNC: 306 MG/DL — HIGH (ref 70–99)

## 2022-07-14 PROCEDURE — 99231 SBSQ HOSP IP/OBS SF/LOW 25: CPT

## 2022-07-14 RX ORDER — INSULIN GLARGINE 100 [IU]/ML
22 INJECTION, SOLUTION SUBCUTANEOUS EVERY MORNING
Refills: 0 | Status: DISCONTINUED | OUTPATIENT
Start: 2022-07-15 | End: 2022-08-14

## 2022-07-14 RX ORDER — INSULIN LISPRO 100/ML
8 VIAL (ML) SUBCUTANEOUS
Refills: 0 | Status: DISCONTINUED | OUTPATIENT
Start: 2022-07-14 | End: 2022-10-11

## 2022-07-14 RX ADMIN — CHLORHEXIDINE GLUCONATE 1 APPLICATION(S): 213 SOLUTION TOPICAL at 06:49

## 2022-07-14 RX ADMIN — Medication 1 APPLICATION(S): at 06:49

## 2022-07-14 RX ADMIN — Medication 1 TABLET(S): at 06:45

## 2022-07-14 RX ADMIN — Medication 1 APPLICATION(S): at 17:39

## 2022-07-14 RX ADMIN — Medication 5 UNIT(S): at 08:40

## 2022-07-14 RX ADMIN — Medication 8: at 08:39

## 2022-07-14 RX ADMIN — Medication 1 APPLICATION(S): at 12:06

## 2022-07-14 RX ADMIN — Medication 1 TABLET(S): at 17:38

## 2022-07-14 RX ADMIN — Medication 8 UNIT(S): at 17:36

## 2022-07-14 RX ADMIN — Medication 6: at 17:35

## 2022-07-14 RX ADMIN — Medication 1 APPLICATION(S): at 17:37

## 2022-07-14 RX ADMIN — Medication 2: at 12:02

## 2022-07-14 RX ADMIN — Medication 3 MILLIGRAM(S): at 21:37

## 2022-07-14 RX ADMIN — INSULIN GLARGINE 18 UNIT(S): 100 INJECTION, SOLUTION SUBCUTANEOUS at 08:41

## 2022-07-14 RX ADMIN — FAMOTIDINE 20 MILLIGRAM(S): 10 INJECTION INTRAVENOUS at 12:05

## 2022-07-14 NOTE — PROGRESS NOTE ADULT - SUBJECTIVE AND OBJECTIVE BOX
CARIN WATSON 47y Male  MRN#: [id number]     Hospital Day: 105d    Pt is currently admitted with the primary diagnosis of  DM FOOT ULCERS; HYPERGLYCEMIA        SUBJECTIVE     Overnight events  None    Subjective complaints  Pt was evaluated this am. Patient denied any active complaints and per patient his symptoms are improving                                            ----------------------------------------------------------  OBJECTIVE  PAST MEDICAL & SURGICAL HISTORY  DM (diabetes mellitus)    Intellectual disability                                              -----------------------------------------------------------  ALLERGIES:  penicillin (Unknown)                                            ------------------------------------------------------------    HOME MEDICATIONS  Home Medications:  vitamin A and D topical ointment: 1 application topically once a day (12 Apr 2022 11:40)                           MEDICATIONS:  STANDING MEDICATIONS  ammonium lactate 12% Lotion 1 Application(s) Topical two times a day  chlorhexidine 4% Liquid 1 Application(s) Topical <User Schedule>  clotrimazole 1% Cream 1 Application(s) Topical two times a day  dextrose 5%. 1000 milliLiter(s) IV Continuous <Continuous>  dextrose 5%. 1000 milliLiter(s) IV Continuous <Continuous>  dextrose 50% Injectable 25 Gram(s) IV Push once  dextrose 50% Injectable 12.5 Gram(s) IV Push once  dextrose 50% Injectable 25 Gram(s) IV Push once  famotidine    Tablet 20 milliGRAM(s) Oral daily  glucagon  Injectable 1 milliGRAM(s) IntraMuscular once  glucagon  Injectable 1 milliGRAM(s) IntraMuscular once  insulin glargine Injectable (LANTUS) 18 Unit(s) SubCutaneous every morning  insulin lispro (ADMELOG) corrective regimen sliding scale   SubCutaneous three times a day before meals  insulin lispro Injectable (ADMELOG) 5 Unit(s) SubCutaneous three times a day before meals  lactobacillus acidophilus 1 Tablet(s) Oral two times a day  melatonin 3 milliGRAM(s) Oral at bedtime  vitamin A &amp; D Ointment 1 Application(s) Topical daily    PRN MEDICATIONS  acetaminophen     Tablet .. 650 milliGRAM(s) Oral every 6 hours PRN  dextrose Oral Gel 15 Gram(s) Oral once PRN                                            ------------------------------------------------------------  VITAL SIGNS: Last 24 Hours  T(C): 36.1 (14 Jul 2022 00:14), Max: 36.1 (14 Jul 2022 00:14)  T(F): 97 (14 Jul 2022 00:14), Max: 97 (14 Jul 2022 00:14)  HR: 117 (14 Jul 2022 00:14) (90 - 121)  BP: 147/74 (14 Jul 2022 00:14) (125/58 - 147/74)  BP(mean): --  RR: 18 (14 Jul 2022 00:14) (18 - 18)  SpO2: --                                             --------------------------------------------------------------  LABS:                                                                    -------------------------------------------------------------  RADIOLOGY:                                            --------------------------------------------------------------    PHYSICAL EXAM:  GENERAL: NAD, sitting up in bed comfortably  HEAD:  Atraumatic, Normocephalic. Poor dentition.  EYES: EOMI, conjunctiva and sclera clear  ENT: Moist mucous membranes  NECK: Supple, No JVD  CHEST/LUNG: Clear to auscultation bilaterally; No rales, rhonchi, wheezing, or rubs. Unlabored respirations  HEART: regular rate and rhythm; No murmurs, rubs, or gallops  ABDOMEN: Bowel sounds present; Soft, Nontender, Nondistended.    EXTREMITIES: Warm. No cyanosis, or edema. Onchomycosis of toenails, nailbed of left toenail bed healing well. Clubbing of fingernails and toenails.  NERVOUS SYSTEM:  Alert & Oriented X3. No focal deficits   SKIN: No rashes or lesions                                             --------------------------------------------------------------

## 2022-07-14 NOTE — PROGRESS NOTE ADULT - SUBJECTIVE AND OBJECTIVE BOX
Progress Note:  Provider Speciality                            Hospitalist      CARIN WATSON MRN-[id number] 46y Male     CHIEF PRESENTING COMPLAINT:  Patient is a 46y old  Male who presents with a chief complaint of DFU (06 Apr 2022 12:18)        SUBJECTIVE:  Patient was seen and examined at bedside.  No new complaints described by patient in morning rounds.   HISTORY OF PRESENTING ILLNESS:  HPI:  47 yo M with PMHx of Intellectual Disability, DM not on any medications presents with foot wounds. Pt has very poor foot hygiene and per the sister, has been persistently scratching an open wound on his L second toe, which worsened and became more red, had drainage, malodor. He saw a physician at [facility] and was told to come to the ED for evaluation. Pt is poor historian. Does endorse abdominal pain for a few days, cannot give much more description. Per the sister, pt did not seem to have any recent fevers, however is also not a very good historian and does not seem to have good health literacy. States that her and her mom decided to stop giving pt Metformin a while ago, are treating his diabetes by not giving him any sugary foods.  In the ED, T 97.8, /87, , RR 16, SpO2 99% on RA. Lab work unrevealing.  (31 Mar 2022 22:36)        REVIEW OF SYSTEMS:  Patient denies any headache, any vision complaints, runny nose. Denies chest pain, shortness of breath, palpitation. Denies nausea, vomiting, abdominal pain or diarrhoea, Denies dysuria. Denies  localized weakness in any part of the body or numbness.   At least 10 systems were reviewed in ROS. All systems reviewed  are within normal limits except for the complaints as described in Subjective.    PAST MEDICAL & SURGICAL HISTORY:  PAST MEDICAL & SURGICAL HISTORY:  DM (diabetes mellitus)    Intellectual disability            VITAL SIGNS:  Vital Signs Last 24 Hrs  T(C): 36 (13 Jul 2022 08:00), Max: 37 (12 Jul 2022 15:30)  T(F): 96.8 (13 Jul 2022 08:00), Max: 98.6 (12 Jul 2022 15:30)  HR: 90 (13 Jul 2022 08:00) (90 - 105)  BP: 125/58 (13 Jul 2022 08:00) (108/53 - 137/65)  BP(mean): --  RR: 18 (13 Jul 2022 08:00) (18 - 18)  SpO2: --        PHYSICAL EXAMINATION:  Not in acute distress  General: No icterus  HEENT:   no JVD.  Heart: S1+S2 audible  Lungs: bilateral  moderate air entry, no wheezing, no crepitations.  Abdomen: Soft, non-tender, non-distended , no  rigidity or guarding.  CNS: Awake alert, CN  grossly intact. Intellectual disability  Extremities:  No edema    , onychomycosis toe nails        CONSULTS:  Consultant(s) Notes Reviewed by me.   Care Discussed with Consultants/Other Providers where required.        MEDICATIONS:  MEDICATIONS  (STANDING):  ammonium lactate 12% Lotion 1 Application(s) Topical two times a day  clotrimazole 1% Cream 1 Application(s) Topical two times a day  enoxaparin Injectable 40 milliGRAM(s) SubCutaneous every 24 hours  metFORMIN 1000 milliGRAM(s) Oral two times a day  pantoprazole    Tablet 40 milliGRAM(s) Oral before breakfast  vitamin A &amp; D Ointment 1 Application(s) Topical daily    MEDICATIONS  (PRN):  ondansetron Injectable 4 milliGRAM(s) IV Push every 6 hours PRN Nausea and/or Vomiting            ASSESSMENT:    47 yo M with PMH of Intellectual Disability and DM not on any medications presented with foot wounds.    Covid  PNA  Onychomycosis  Dental caries  DM2  Intellectual disability/autism        Covid PNA- completed RDV  Nail bed wound and onychomycosis. Status post Aseptic debridement and curettage of all fungal and ingrowing nails 1-5 bilateral with sterile nail nipper 06/07  Podiatry- no surgical intervention is advised. outpt podiatry f/u  DM type 2 - uncontrolled. A1C 8.9. Continue  metformin 1000mg BID and insulin glargine and lispro ( doses adjusted)  Dental Caries-completed clinda for 7 days. Will need outpt f/u for possible multiple extractions  Insomnia- prn melatonin    Handoff:  Pending legal issue and guardianship, else medically stable for discharge

## 2022-07-14 NOTE — PROGRESS NOTE ADULT - ASSESSMENT
45 yo M with PMH of Intellectual Disability and DM not on any medications presented with foot wounds.    Covid  PNA  Onychomycosis  Dental caries  DM2  Intellectual disability/autism        Covid PNA- completed RDV  Nail bed wound and onychomycosis. Status post Aseptic debridement and curettage of all fungal and ingrowing nails 1-5 bilateral with sterile nail nipper 06/07  Podiatry- no surgical intervention is advised. outpt podiatry f/u  DM type 2 - uncontrolled. A1C 8.9. Continue insulin (add metformin 1000 mg BID on discharge)  Dental Caries-completed clinda for 7 days. Will need outpt f/u for possible multiple extractions  Insomnia- prn melatonin    Handoff:  Pending legal issue and guardianship, else medically stable for discharge

## 2022-07-15 LAB
GLUCOSE BLDC GLUCOMTR-MCNC: 166 MG/DL — HIGH (ref 70–99)
GLUCOSE BLDC GLUCOMTR-MCNC: 228 MG/DL — HIGH (ref 70–99)
GLUCOSE BLDC GLUCOMTR-MCNC: 248 MG/DL — HIGH (ref 70–99)
GLUCOSE BLDC GLUCOMTR-MCNC: 571 MG/DL — CRITICAL HIGH (ref 70–99)
HCV AB S/CO SERPL IA: 0.03 COI — SIGNIFICANT CHANGE UP
HCV AB SERPL-IMP: SIGNIFICANT CHANGE UP

## 2022-07-15 PROCEDURE — 99231 SBSQ HOSP IP/OBS SF/LOW 25: CPT

## 2022-07-15 RX ORDER — INSULIN LISPRO 100/ML
12 VIAL (ML) SUBCUTANEOUS ONCE
Refills: 0 | Status: COMPLETED | OUTPATIENT
Start: 2022-07-15 | End: 2022-07-15

## 2022-07-15 RX ADMIN — Medication 4: at 08:06

## 2022-07-15 RX ADMIN — Medication 1 TABLET(S): at 05:47

## 2022-07-15 RX ADMIN — Medication 3 MILLIGRAM(S): at 21:44

## 2022-07-15 RX ADMIN — Medication 1 APPLICATION(S): at 06:25

## 2022-07-15 RX ADMIN — INSULIN GLARGINE 22 UNIT(S): 100 INJECTION, SOLUTION SUBCUTANEOUS at 08:07

## 2022-07-15 RX ADMIN — Medication 1 APPLICATION(S): at 17:35

## 2022-07-15 RX ADMIN — Medication 2: at 17:36

## 2022-07-15 RX ADMIN — Medication 1 TABLET(S): at 17:35

## 2022-07-15 RX ADMIN — Medication 4: at 12:07

## 2022-07-15 RX ADMIN — CHLORHEXIDINE GLUCONATE 1 APPLICATION(S): 213 SOLUTION TOPICAL at 06:25

## 2022-07-15 RX ADMIN — Medication 8 UNIT(S): at 12:08

## 2022-07-15 RX ADMIN — FAMOTIDINE 20 MILLIGRAM(S): 10 INJECTION INTRAVENOUS at 12:09

## 2022-07-15 RX ADMIN — Medication 8 UNIT(S): at 08:07

## 2022-07-15 RX ADMIN — Medication 12 UNIT(S): at 21:44

## 2022-07-15 RX ADMIN — Medication 1 APPLICATION(S): at 12:08

## 2022-07-15 RX ADMIN — Medication 1 APPLICATION(S): at 06:24

## 2022-07-15 RX ADMIN — Medication 8 UNIT(S): at 17:36

## 2022-07-15 NOTE — PROGRESS NOTE ADULT - SUBJECTIVE AND OBJECTIVE BOX
Progress Note:  Provider Speciality                            Hospitalist      CARIN WATSON MRN-[id number] 46y Male     CHIEF PRESENTING COMPLAINT:  Patient is a 46y old  Male who presents with a chief complaint of DFU (06 Apr 2022 12:18)        SUBJECTIVE:  Patient was seen and examined at bedside.  No new complaints described by patient in morning rounds.   HISTORY OF PRESENTING ILLNESS:  HPI:  45 yo M with PMHx of Intellectual Disability, DM not on any medications presents with foot wounds. Pt has very poor foot hygiene and per the sister, has been persistently scratching an open wound on his L second toe, which worsened and became more red, had drainage, malodor. He saw a physician at [facility] and was told to come to the ED for evaluation. Pt is poor historian. Does endorse abdominal pain for a few days, cannot give much more description. Per the sister, pt did not seem to have any recent fevers, however is also not a very good historian and does not seem to have good health literacy. States that her and her mom decided to stop giving pt Metformin a while ago, are treating his diabetes by not giving him any sugary foods.  In the ED, T 97.8, /87, , RR 16, SpO2 99% on RA. Lab work unrevealing.  (31 Mar 2022 22:36)        REVIEW OF SYSTEMS:  Patient denies any headache, any vision complaints, runny nose. Denies chest pain, shortness of breath, palpitation. Denies nausea, vomiting, abdominal pain or diarrhoea, Denies dysuria. Denies  localized weakness in any part of the body or numbness.   At least 10 systems were reviewed in ROS. All systems reviewed  are within normal limits except for the complaints as described in Subjective.    PAST MEDICAL & SURGICAL HISTORY:  PAST MEDICAL & SURGICAL HISTORY:  DM (diabetes mellitus)    Intellectual disability            VITAL SIGNS:  Vital Signs Last 24 Hrs  T(C): 36.3 (15 Jul 2022 07:50), Max: 36.3 (15 Jul 2022 07:50)  T(F): 97.4 (15 Jul 2022 07:50), Max: 97.4 (15 Jul 2022 07:50)  HR: 92 (15 Jul 2022 07:50) (91 - 108)  BP: 133/75 (15 Jul 2022 07:50) (112/65 - 133/75)  BP(mean): --  RR: 19 (15 Jul 2022 07:50) (18 - 19)  SpO2: --          PHYSICAL EXAMINATION:  Not in acute distress  General: No icterus  HEENT:   no JVD.  Heart: S1+S2 audible  Lungs: bilateral  moderate air entry, no wheezing, no crepitations.  Abdomen: Soft, non-tender, non-distended , no  rigidity or guarding.  CNS: Awake alert, CN  grossly intact. Intellectual disability  Extremities:  No edema    , onychomycosis toe nails        CONSULTS:  Consultant(s) Notes Reviewed by me.   Care Discussed with Consultants/Other Providers where required.        MEDICATIONS:  MEDICATIONS  (STANDING):  ammonium lactate 12% Lotion 1 Application(s) Topical two times a day  clotrimazole 1% Cream 1 Application(s) Topical two times a day  enoxaparin Injectable 40 milliGRAM(s) SubCutaneous every 24 hours  metFORMIN 1000 milliGRAM(s) Oral two times a day  pantoprazole    Tablet 40 milliGRAM(s) Oral before breakfast  vitamin A &amp; D Ointment 1 Application(s) Topical daily    MEDICATIONS  (PRN):  ondansetron Injectable 4 milliGRAM(s) IV Push every 6 hours PRN Nausea and/or Vomiting            ASSESSMENT:    45 yo M with PMH of Intellectual Disability and DM not on any medications presented with foot wounds.    Covid  PNA  Onychomycosis  Dental caries  DM2  Intellectual disability/autism        Covid PNA- completed RDV  Nail bed wound and onychomycosis. Status post Aseptic debridement and curettage of all fungal and ingrowing nails 1-5 bilateral with sterile nail nipper 06/07  Podiatry- no surgical intervention is advised. outpt podiatry f/u  DM type 2 - uncontrolled. A1C 8.9. Continue  metformin 1000mg BID and insulin glargine and lispro ( doses adjusted)  Dental Caries-completed clinda for 7 days. Will need outpt f/u for possible multiple extractions  Insomnia- prn melatonin    Handoff:  Pending legal issue and guardianship, else medically stable for discharge

## 2022-07-15 NOTE — PROGRESS NOTE ADULT - SUBJECTIVE AND OBJECTIVE BOX
CARIN WATSON 47y Male  MRN#: [id number]      Pt is currently admitted with the primary diagnosis of      Hospital Day: 106d  HPI:  45 yo M with PMHx of Intellectual Disability, DM not on any medications presents with foot wounds. Pt has very poor foot hygiene and per the sister, has been persistently scratching an open wound on his L second toe, which worsened and became more red, had drainage, malodor. He saw a physician at [facility] and was told to come to the ED for evaluation. Pt is poor historian. Does endorse abdominal pain for a few days, cannot give much more description. Per the sister, pt did not seem to have any recent fevers, however is also not a very good historian and does not seem to have good health literacy. States that her and her mom decided to stop giving pt Metformin a while ago, are treating his diabetes by not giving him any sugary foods.  In the ED, T 97.8, /87, , RR 16, SpO2 99% on RA. Lab work unrevealing.     Overnight events:  No acute events overnight    Subjective complaints:  Pt was seen and examined at bedside. Appears comfortable. No complaints at this time.      PAST MEDICAL & SURGICAL HISTORY  DM (diabetes mellitus)    Intellectual disability                                              -----------------------------------------------------------  ALLERGIES:  penicillin (Unknown)                                            ------------------------------------------------------------    HOME MEDICATIONS  Home Medications:  vitamin A and D topical ointment: 1 application topically once a day (12 Apr 2022 11:40)                           MEDICATIONS:  STANDING MEDICATIONS  ammonium lactate 12% Lotion 1 Application(s) Topical two times a day  chlorhexidine 4% Liquid 1 Application(s) Topical <User Schedule>  clotrimazole 1% Cream 1 Application(s) Topical two times a day  dextrose 5%. 1000 milliLiter(s) IV Continuous <Continuous>  dextrose 5%. 1000 milliLiter(s) IV Continuous <Continuous>  dextrose 50% Injectable 25 Gram(s) IV Push once  dextrose 50% Injectable 12.5 Gram(s) IV Push once  dextrose 50% Injectable 25 Gram(s) IV Push once  famotidine    Tablet 20 milliGRAM(s) Oral daily  glucagon  Injectable 1 milliGRAM(s) IntraMuscular once  glucagon  Injectable 1 milliGRAM(s) IntraMuscular once  insulin glargine Injectable (LANTUS) 22 Unit(s) SubCutaneous every morning  insulin lispro (ADMELOG) corrective regimen sliding scale   SubCutaneous three times a day before meals  insulin lispro Injectable (ADMELOG) 8 Unit(s) SubCutaneous three times a day before meals  lactobacillus acidophilus 1 Tablet(s) Oral two times a day  melatonin 3 milliGRAM(s) Oral at bedtime  vitamin A &amp; D Ointment 1 Application(s) Topical daily    PRN MEDICATIONS  acetaminophen     Tablet .. 650 milliGRAM(s) Oral every 6 hours PRN  dextrose Oral Gel 15 Gram(s) Oral once PRN                                            ------------------------------------------------------------  VITAL SIGNS: Last 24 Hours  T(C): 35.9 (15 Jul 2022 00:14), Max: 36.2 (14 Jul 2022 15:45)  T(F): 96.7 (15 Jul 2022 00:14), Max: 97.1 (14 Jul 2022 15:45)  HR: 91 (15 Jul 2022 00:14) (91 - 108)  BP: 112/65 (15 Jul 2022 00:14) (112/65 - 123/72)  BP(mean): --  RR: 18 (15 Jul 2022 00:14) (18 - 19)  SpO2: --      07-14-22 @ 07:01  -  07-15-22 @ 07:00  --------------------------------------------------------  IN: 450 mL / OUT: 0 mL / NET: 450 mL                                             --------------------------------------------------------------    ASSESSMENT & PLAN:  45 yo M with PMH of Intellectual Disability and DM not on any medications presented with foot wounds.    Covid 19 positive   Onychomycosis  Dental caries  DM2  Intellectual disability/autism      # COVID PNA- S/p RDV (completed 5/26/22)  # Nail bed wound; #Onychomycosis- S/p aseptic debridement and curettage of all fungal and ingrowing nails 1-5 bilateral with sterile nail nipper 06/07; Podiatry- no surgical intervention is advised. Outpt podiatry f/u  #DM II - uncontrolled. A1C 8.9. C/w metformin 1000mg BID and insulin glargine and lispro-- FS remain high-- noncompliant with diet  #Dental Caries- S/p clinda x 7 days (completed 7/16/22). Will need outpt f/u for possible multiple extractions  # Insomnia- C/w prn melatonin    Dispo:  Pending legal issue and guardianship, medically stable for discharge .

## 2022-07-16 LAB
GLUCOSE BLDC GLUCOMTR-MCNC: 144 MG/DL — HIGH (ref 70–99)
GLUCOSE BLDC GLUCOMTR-MCNC: 230 MG/DL — HIGH (ref 70–99)
GLUCOSE BLDC GLUCOMTR-MCNC: 269 MG/DL — HIGH (ref 70–99)
GLUCOSE BLDC GLUCOMTR-MCNC: 287 MG/DL — HIGH (ref 70–99)
HBV SURFACE AG SER-ACNC: SIGNIFICANT CHANGE UP
HIV 1+2 AB+HIV1 P24 AG SERPL QL IA: SIGNIFICANT CHANGE UP

## 2022-07-16 PROCEDURE — 99231 SBSQ HOSP IP/OBS SF/LOW 25: CPT

## 2022-07-16 RX ADMIN — Medication 8 UNIT(S): at 17:13

## 2022-07-16 RX ADMIN — Medication 1 APPLICATION(S): at 17:16

## 2022-07-16 RX ADMIN — Medication 3 MILLIGRAM(S): at 21:16

## 2022-07-16 RX ADMIN — INSULIN GLARGINE 22 UNIT(S): 100 INJECTION, SOLUTION SUBCUTANEOUS at 08:22

## 2022-07-16 RX ADMIN — Medication 1 APPLICATION(S): at 12:07

## 2022-07-16 RX ADMIN — Medication 8 UNIT(S): at 08:22

## 2022-07-16 RX ADMIN — Medication 1 APPLICATION(S): at 06:03

## 2022-07-16 RX ADMIN — Medication 6: at 12:06

## 2022-07-16 RX ADMIN — Medication 4: at 17:14

## 2022-07-16 RX ADMIN — Medication 1 TABLET(S): at 06:03

## 2022-07-16 RX ADMIN — FAMOTIDINE 20 MILLIGRAM(S): 10 INJECTION INTRAVENOUS at 12:06

## 2022-07-16 RX ADMIN — Medication 8 UNIT(S): at 12:06

## 2022-07-16 RX ADMIN — CHLORHEXIDINE GLUCONATE 1 APPLICATION(S): 213 SOLUTION TOPICAL at 06:03

## 2022-07-16 RX ADMIN — Medication 1 TABLET(S): at 17:15

## 2022-07-16 NOTE — PROGRESS NOTE ADULT - SUBJECTIVE AND OBJECTIVE BOX
Progress Note:  Provider Speciality                            Hospitalist      CARIN WATSON MRN-[id number] 46y Male     CHIEF PRESENTING COMPLAINT:  Patient is a 46y old  Male who presents with a chief complaint of DFU (06 Apr 2022 12:18)        SUBJECTIVE:  Patient was seen and examined at bedside.  No new complaints described by patient in morning rounds.   HISTORY OF PRESENTING ILLNESS:  HPI:  47 yo M with PMHx of Intellectual Disability, DM not on any medications presents with foot wounds. Pt has very poor foot hygiene and per the sister, has been persistently scratching an open wound on his L second toe, which worsened and became more red, had drainage, malodor. He saw a physician at [facility] and was told to come to the ED for evaluation. Pt is poor historian. Does endorse abdominal pain for a few days, cannot give much more description. Per the sister, pt did not seem to have any recent fevers, however is also not a very good historian and does not seem to have good health literacy. States that her and her mom decided to stop giving pt Metformin a while ago, are treating his diabetes by not giving him any sugary foods.  In the ED, T 97.8, /87, , RR 16, SpO2 99% on RA. Lab work unrevealing.  (31 Mar 2022 22:36)        REVIEW OF SYSTEMS:  Patient denies any headache, any vision complaints, runny nose. Denies chest pain, shortness of breath, palpitation. Denies nausea, vomiting, abdominal pain or diarrhoea, Denies dysuria. Denies  localized weakness in any part of the body or numbness.   At least 10 systems were reviewed in ROS. All systems reviewed  are within normal limits except for the complaints as described in Subjective.    PAST MEDICAL & SURGICAL HISTORY:  PAST MEDICAL & SURGICAL HISTORY:  DM (diabetes mellitus)    Intellectual disability            VITAL SIGNS:  Vital Signs Last 24 Hrs  T(C): 36.8 (16 Jul 2022 07:51), Max: 36.8 (16 Jul 2022 07:51)  T(F): 98.3 (16 Jul 2022 07:51), Max: 98.3 (16 Jul 2022 07:51)  HR: 91 (16 Jul 2022 07:51) (71 - 101)  BP: 118/78 (16 Jul 2022 07:51) (118/78 - 141/65)  BP(mean): --  RR: 18 (16 Jul 2022 07:51) (18 - 18)  SpO2: --        PHYSICAL EXAMINATION:  Not in acute distress  General: No icterus  HEENT:   no JVD.  Heart: S1+S2 audible  Lungs: bilateral  moderate air entry, no wheezing, no crepitations.  Abdomen: Soft, non-tender, non-distended , no  rigidity or guarding.  CNS: Awake alert, CN  grossly intact. Intellectual disability  Extremities:  No edema    , onychomycosis toe nails        CONSULTS:  Consultant(s) Notes Reviewed by me.   Care Discussed with Consultants/Other Providers where required.        MEDICATIONS:  MEDICATIONS  (STANDING):  ammonium lactate 12% Lotion 1 Application(s) Topical two times a day  clotrimazole 1% Cream 1 Application(s) Topical two times a day  enoxaparin Injectable 40 milliGRAM(s) SubCutaneous every 24 hours  metFORMIN 1000 milliGRAM(s) Oral two times a day  pantoprazole    Tablet 40 milliGRAM(s) Oral before breakfast  vitamin A &amp; D Ointment 1 Application(s) Topical daily    MEDICATIONS  (PRN):  ondansetron Injectable 4 milliGRAM(s) IV Push every 6 hours PRN Nausea and/or Vomiting            ASSESSMENT:    47 yo M with PMH of Intellectual Disability and DM not on any medications presented with foot wounds.    Covid  PNA  Onychomycosis  Dental caries  DM2 with hyperglycemia due to dietary non-compliance  Intellectual disability/autism        Covid PNA- completed RDV  Nail bed wound and onychomycosis. Status post Aseptic debridement and curettage of all fungal and ingrowing nails 1-5 bilateral with sterile nail nipper 06/07  Podiatry- no surgical intervention is advised. outpt podiatry f/u  DM type 2 - uncontrolled. A1C 8.9. Continue  metformin 1000mg BID and insulin glargine and lispro ( doses adjusted). Not compliant with diabetic diet. Counseling provided  Dental Caries-completed clinda for 7 days. Will need outpt f/u for possible multiple extractions  Insomnia- prn melatonin    Handoff:  Pending legal issue and guardianship, else medically stable for discharge

## 2022-07-17 LAB
ALBUMIN SERPL ELPH-MCNC: 4 G/DL — SIGNIFICANT CHANGE UP (ref 3.5–5.2)
ALP SERPL-CCNC: 101 U/L — SIGNIFICANT CHANGE UP (ref 30–115)
ALT FLD-CCNC: 22 U/L — SIGNIFICANT CHANGE UP (ref 0–41)
ANION GAP SERPL CALC-SCNC: 13 MMOL/L — SIGNIFICANT CHANGE UP (ref 7–14)
AST SERPL-CCNC: 20 U/L — SIGNIFICANT CHANGE UP (ref 0–41)
BILIRUB SERPL-MCNC: 0.7 MG/DL — SIGNIFICANT CHANGE UP (ref 0.2–1.2)
BUN SERPL-MCNC: 17 MG/DL — SIGNIFICANT CHANGE UP (ref 10–20)
CALCIUM SERPL-MCNC: 9 MG/DL — SIGNIFICANT CHANGE UP (ref 8.5–10.1)
CHLORIDE SERPL-SCNC: 101 MMOL/L — SIGNIFICANT CHANGE UP (ref 98–110)
CO2 SERPL-SCNC: 25 MMOL/L — SIGNIFICANT CHANGE UP (ref 17–32)
CREAT SERPL-MCNC: 0.7 MG/DL — SIGNIFICANT CHANGE UP (ref 0.7–1.5)
EGFR: 114 ML/MIN/1.73M2 — SIGNIFICANT CHANGE UP
GLUCOSE BLDC GLUCOMTR-MCNC: 142 MG/DL — HIGH (ref 70–99)
GLUCOSE BLDC GLUCOMTR-MCNC: 179 MG/DL — HIGH (ref 70–99)
GLUCOSE BLDC GLUCOMTR-MCNC: 205 MG/DL — HIGH (ref 70–99)
GLUCOSE BLDC GLUCOMTR-MCNC: 260 MG/DL — HIGH (ref 70–99)
GLUCOSE SERPL-MCNC: 158 MG/DL — HIGH (ref 70–99)
HCT VFR BLD CALC: 42.8 % — SIGNIFICANT CHANGE UP (ref 42–52)
HGB BLD-MCNC: 14.1 G/DL — SIGNIFICANT CHANGE UP (ref 14–18)
MCHC RBC-ENTMCNC: 31.4 PG — HIGH (ref 27–31)
MCHC RBC-ENTMCNC: 32.9 G/DL — SIGNIFICANT CHANGE UP (ref 32–37)
MCV RBC AUTO: 95.3 FL — HIGH (ref 80–94)
NRBC # BLD: 0 /100 WBCS — SIGNIFICANT CHANGE UP (ref 0–0)
PLATELET # BLD AUTO: 316 K/UL — SIGNIFICANT CHANGE UP (ref 130–400)
POTASSIUM SERPL-MCNC: 4.1 MMOL/L — SIGNIFICANT CHANGE UP (ref 3.5–5)
POTASSIUM SERPL-SCNC: 4.1 MMOL/L — SIGNIFICANT CHANGE UP (ref 3.5–5)
PROT SERPL-MCNC: 6.6 G/DL — SIGNIFICANT CHANGE UP (ref 6–8)
RBC # BLD: 4.49 M/UL — LOW (ref 4.7–6.1)
RBC # FLD: 12.1 % — SIGNIFICANT CHANGE UP (ref 11.5–14.5)
SODIUM SERPL-SCNC: 139 MMOL/L — SIGNIFICANT CHANGE UP (ref 135–146)
WBC # BLD: 8.21 K/UL — SIGNIFICANT CHANGE UP (ref 4.8–10.8)
WBC # FLD AUTO: 8.21 K/UL — SIGNIFICANT CHANGE UP (ref 4.8–10.8)

## 2022-07-17 PROCEDURE — 99231 SBSQ HOSP IP/OBS SF/LOW 25: CPT

## 2022-07-17 RX ADMIN — Medication 1 APPLICATION(S): at 05:25

## 2022-07-17 RX ADMIN — INSULIN GLARGINE 22 UNIT(S): 100 INJECTION, SOLUTION SUBCUTANEOUS at 08:06

## 2022-07-17 RX ADMIN — Medication 8 UNIT(S): at 17:20

## 2022-07-17 RX ADMIN — Medication 6: at 11:39

## 2022-07-17 RX ADMIN — Medication 4: at 17:19

## 2022-07-17 RX ADMIN — FAMOTIDINE 20 MILLIGRAM(S): 10 INJECTION INTRAVENOUS at 11:39

## 2022-07-17 RX ADMIN — Medication 8 UNIT(S): at 11:39

## 2022-07-17 RX ADMIN — Medication 1 APPLICATION(S): at 17:19

## 2022-07-17 RX ADMIN — Medication 1 APPLICATION(S): at 11:40

## 2022-07-17 RX ADMIN — Medication 3 MILLIGRAM(S): at 21:06

## 2022-07-17 RX ADMIN — Medication 8 UNIT(S): at 08:06

## 2022-07-17 RX ADMIN — Medication 1 TABLET(S): at 17:19

## 2022-07-17 RX ADMIN — Medication 1 TABLET(S): at 05:21

## 2022-07-17 RX ADMIN — Medication 1 APPLICATION(S): at 05:20

## 2022-07-17 RX ADMIN — CHLORHEXIDINE GLUCONATE 1 APPLICATION(S): 213 SOLUTION TOPICAL at 05:25

## 2022-07-17 NOTE — PROGRESS NOTE ADULT - SUBJECTIVE AND OBJECTIVE BOX
CARIN WATSON 47y Male  MRN#: [id number]     Hospital Day: 108d    Pt is currently admitted with the primary diagnosis of  DM FOOT ULCERS; HYPERGLYCEMIA        SUBJECTIVE     Overnight events  None    Subjective complaints  Pt was evaluated this am. Patient denied any active complaints and feels well.                                            ----------------------------------------------------------  OBJECTIVE  PAST MEDICAL & SURGICAL HISTORY  DM (diabetes mellitus)    Intellectual disability                                              -----------------------------------------------------------  ALLERGIES:  penicillin (Unknown)                                            ------------------------------------------------------------    HOME MEDICATIONS  Home Medications:  vitamin A and D topical ointment: 1 application topically once a day (12 Apr 2022 11:40)                           MEDICATIONS:  STANDING MEDICATIONS  ammonium lactate 12% Lotion 1 Application(s) Topical two times a day  chlorhexidine 4% Liquid 1 Application(s) Topical <User Schedule>  clotrimazole 1% Cream 1 Application(s) Topical two times a day  dextrose 5%. 1000 milliLiter(s) IV Continuous <Continuous>  dextrose 5%. 1000 milliLiter(s) IV Continuous <Continuous>  dextrose 50% Injectable 25 Gram(s) IV Push once  dextrose 50% Injectable 12.5 Gram(s) IV Push once  dextrose 50% Injectable 25 Gram(s) IV Push once  famotidine    Tablet 20 milliGRAM(s) Oral daily  glucagon  Injectable 1 milliGRAM(s) IntraMuscular once  glucagon  Injectable 1 milliGRAM(s) IntraMuscular once  insulin glargine Injectable (LANTUS) 22 Unit(s) SubCutaneous every morning  insulin lispro (ADMELOG) corrective regimen sliding scale   SubCutaneous three times a day before meals  insulin lispro Injectable (ADMELOG) 8 Unit(s) SubCutaneous three times a day before meals  lactobacillus acidophilus 1 Tablet(s) Oral two times a day  melatonin 3 milliGRAM(s) Oral at bedtime  vitamin A &amp; D Ointment 1 Application(s) Topical daily    PRN MEDICATIONS  acetaminophen     Tablet .. 650 milliGRAM(s) Oral every 6 hours PRN  dextrose Oral Gel 15 Gram(s) Oral once PRN                                            ------------------------------------------------------------  VITAL SIGNS: Last 24 Hours  T(C): 36.2 (17 Jul 2022 00:24), Max: 36.8 (16 Jul 2022 07:51)  T(F): 97.2 (17 Jul 2022 00:24), Max: 98.3 (16 Jul 2022 07:51)  HR: 92 (17 Jul 2022 00:24) (91 - 106)  BP: 117/56 (17 Jul 2022 00:24) (117/56 - 120/63)  BP(mean): --  RR: 18 (17 Jul 2022 00:24) (18 - 18)  SpO2: --      07-16-22 @ 07:01  -  07-17-22 @ 06:51  --------------------------------------------------------  IN: 810 mL / OUT: 0 mL / NET: 810 mL                                             --------------------------------------------------------------  LABS:                                                                    -------------------------------------------------------------  RADIOLOGY:                                            --------------------------------------------------------------    PHYSICAL EXAM:    GENERAL: NAD, sitting up in bed comfortably  HEAD:  Atraumatic, Normocephalic. Poor dentition.  EYES: EOMI, conjunctiva and sclera clear  ENT: Moist mucous membranes  NECK: Supple, No JVD  CHEST/LUNG: Clear to auscultation bilaterally; No rales, rhonchi, wheezing, or rubs. Unlabored respirations  HEART: regular rate and rhythm; No murmurs, rubs, or gallops  ABDOMEN: Bowel sounds present; Soft, Nontender, Nondistended.    EXTREMITIES: Warm. No cyanosis, or edema. Clubbing of fingernails and toenails.  NERVOUS SYSTEM:  Alert & Oriented X3. No focal deficits   SKIN: No rashes or lesio                                         --------------------------------------------------------------

## 2022-07-17 NOTE — PROGRESS NOTE ADULT - SUBJECTIVE AND OBJECTIVE BOX
Progress Note:  Provider Speciality                            Hospitalist      CARIN WATSON MRN-[id number] 46y Male     CHIEF PRESENTING COMPLAINT:  Patient is a 46y old  Male who presents with a chief complaint of DFU (06 Apr 2022 12:18)        SUBJECTIVE:  Patient was seen and examined at bedside.  No new complaints described by patient in morning rounds.   HISTORY OF PRESENTING ILLNESS:  HPI:  45 yo M with PMHx of Intellectual Disability, DM not on any medications presents with foot wounds. Pt has very poor foot hygiene and per the sister, has been persistently scratching an open wound on his L second toe, which worsened and became more red, had drainage, malodor. He saw a physician at [facility] and was told to come to the ED for evaluation. Pt is poor historian. Does endorse abdominal pain for a few days, cannot give much more description. Per the sister, pt did not seem to have any recent fevers, however is also not a very good historian and does not seem to have good health literacy. States that her and her mom decided to stop giving pt Metformin a while ago, are treating his diabetes by not giving him any sugary foods.  In the ED, T 97.8, /87, , RR 16, SpO2 99% on RA. Lab work unrevealing.  (31 Mar 2022 22:36)        REVIEW OF SYSTEMS:  Patient denies any headache, any vision complaints, runny nose. Denies chest pain, shortness of breath, palpitation. Denies nausea, vomiting, abdominal pain or diarrhoea, Denies dysuria. Denies  localized weakness in any part of the body or numbness.   At least 10 systems were reviewed in ROS. All systems reviewed  are within normal limits except for the complaints as described in Subjective.    PAST MEDICAL & SURGICAL HISTORY:  PAST MEDICAL & SURGICAL HISTORY:  DM (diabetes mellitus)    Intellectual disability            VITAL SIGNS:  Vital Signs Last 24 Hrs  T(C): 35.6 (17 Jul 2022 08:59), Max: 36.2 (17 Jul 2022 00:24)  T(F): 96.1 (17 Jul 2022 08:59), Max: 97.2 (17 Jul 2022 00:24)  HR: 92 (17 Jul 2022 08:59) (92 - 106)  BP: 128/70 (17 Jul 2022 08:59) (117/56 - 128/70)  BP(mean): --  RR: 18 (17 Jul 2022 08:59) (18 - 18)  SpO2: --          PHYSICAL EXAMINATION:  Not in acute distress  General: No icterus  HEENT:   no JVD.  Heart: S1+S2 audible  Lungs: bilateral  moderate air entry, no wheezing, no crepitations.  Abdomen: Soft, non-tender, non-distended , no  rigidity or guarding.  CNS: Awake alert, CN  grossly intact. Intellectual disability  Extremities:  No edema    , onychomycosis toe nails        CONSULTS:  Consultant(s) Notes Reviewed by me.   Care Discussed with Consultants/Other Providers where required.        MEDICATIONS:  MEDICATIONS  (STANDING):  ammonium lactate 12% Lotion 1 Application(s) Topical two times a day  clotrimazole 1% Cream 1 Application(s) Topical two times a day  enoxaparin Injectable 40 milliGRAM(s) SubCutaneous every 24 hours  metFORMIN 1000 milliGRAM(s) Oral two times a day  pantoprazole    Tablet 40 milliGRAM(s) Oral before breakfast  vitamin A &amp; D Ointment 1 Application(s) Topical daily    MEDICATIONS  (PRN):  ondansetron Injectable 4 milliGRAM(s) IV Push every 6 hours PRN Nausea and/or Vomiting            ASSESSMENT:    45 yo M with PMH of Intellectual Disability and DM not on any medications presented with foot wounds.    Covid  PNA  Onychomycosis  Dental caries  DM2 with hyperglycemia due to dietary non-compliance  Intellectual disability/autism        Covid PNA- completed RDV  Nail bed wound and onychomycosis. Status post Aseptic debridement and curettage of all fungal and ingrowing nails 1-5 bilateral with sterile nail nipper 06/07  Podiatry- no surgical intervention is advised. outpt podiatry f/u  DM type 2 - uncontrolled. A1C 8.9. Continue  metformin 1000mg BID and insulin glargine and lispro ( doses adjusted). Not compliant with diabetic diet. Counseling provided  Dental Caries-completed clinda for 7 days. Will need outpt f/u for possible multiple extractions  Insomnia- prn melatonin    Handoff:  Pending legal issue and guardianship, else medically stable for discharge

## 2022-07-17 NOTE — PROGRESS NOTE ADULT - ASSESSMENT
47 yo M with PMH of Intellectual Disability and DM not on any medications presented with foot wounds.    Covid  PNA  Onychomycosis  Dental caries  DM2  Intellectual disability/autism        Covid PNA- completed RDV  Nail bed wound and onychomycosis. Status post Aseptic debridement and curettage of all fungal and ingrowing nails 1-5 bilateral with sterile nail nipper 06/07  Podiatry- no surgical intervention is advised. outpt podiatry f/u  DM type 2 - uncontrolled. A1C 8.9. Continue  metformin 1000mg BID and insulin glargine and lispro ( doses adjusted)  Dental Caries-completed clinda for 7 days. Will need outpt f/u for possible multiple extractions  Insomnia- prn melatonin    Handoff:  Pending legal issue and guardianship, else medically stable for discharge

## 2022-07-18 LAB
GLUCOSE BLDC GLUCOMTR-MCNC: 125 MG/DL — HIGH (ref 70–99)
GLUCOSE BLDC GLUCOMTR-MCNC: 143 MG/DL — HIGH (ref 70–99)
GLUCOSE BLDC GLUCOMTR-MCNC: 167 MG/DL — HIGH (ref 70–99)
GLUCOSE BLDC GLUCOMTR-MCNC: 232 MG/DL — HIGH (ref 70–99)

## 2022-07-18 PROCEDURE — 99231 SBSQ HOSP IP/OBS SF/LOW 25: CPT

## 2022-07-18 PROCEDURE — 99221 1ST HOSP IP/OBS SF/LOW 40: CPT

## 2022-07-18 RX ADMIN — FAMOTIDINE 20 MILLIGRAM(S): 10 INJECTION INTRAVENOUS at 11:32

## 2022-07-18 RX ADMIN — Medication 1 APPLICATION(S): at 05:43

## 2022-07-18 RX ADMIN — INSULIN GLARGINE 22 UNIT(S): 100 INJECTION, SOLUTION SUBCUTANEOUS at 08:02

## 2022-07-18 RX ADMIN — Medication 1 APPLICATION(S): at 05:41

## 2022-07-18 RX ADMIN — Medication 1 APPLICATION(S): at 17:08

## 2022-07-18 RX ADMIN — Medication 1 APPLICATION(S): at 17:09

## 2022-07-18 RX ADMIN — Medication 2: at 11:33

## 2022-07-18 RX ADMIN — Medication 1 APPLICATION(S): at 11:32

## 2022-07-18 RX ADMIN — Medication 8 UNIT(S): at 17:08

## 2022-07-18 RX ADMIN — Medication 1 TABLET(S): at 05:41

## 2022-07-18 RX ADMIN — Medication 1 TABLET(S): at 17:08

## 2022-07-18 RX ADMIN — Medication 8 UNIT(S): at 08:01

## 2022-07-18 RX ADMIN — Medication 8 UNIT(S): at 11:33

## 2022-07-18 RX ADMIN — Medication 3 MILLIGRAM(S): at 21:55

## 2022-07-18 NOTE — PROGRESS NOTE ADULT - ASSESSMENT
45 yo M with PMH of Intellectual Disability and DM not on any medications presented with foot wounds.    Covid  PNA  Onychomycosis  Dental caries  DM2  Intellectual disability/autism        Covid PNA- completed RDV    Nail bed wound and onychomycosis. Status post Aseptic debridement and curettage of all fungal and ingrowing nails 1-5 bilateral with sterile nail nipper 06/07  #Podiatry  Aseptic debridement and curettage of all fungal and ingrowing nails 1-5 bilateral with sterile nail nipper.  Discussed importance of daily foot examinations, drying of feet after bathing and proper shoe gear.  Patient Would Benefit From Periodic Debridements; Can Follow Up as Outpatient w/ Dr. Bronson Post Discharge       DM type 2 - uncontrolled. A1C 8.9. Continue  metformin 1000mg BID and insulin glargine and lispro ( doses adjusted)  Dental Caries-completed clinda for 7 days. Will need outpt f/u for possible multiple extractions  Insomnia- prn melatonin    Handoff:  Pending legal issue and guardianship, else medically stable for discharge

## 2022-07-18 NOTE — CONSULT NOTE ADULT - SUBJECTIVE AND OBJECTIVE BOX
Podiatry Consult Note    Subjective:    CARIN WATSON is a 47y year old Male seen at bedside with a chief complaint of  painful thickened, dystrophic, ingrowing and long toenails digits 1-5 bilaterally  and preventative foot examination. Patient is medically managed  by Medicine/Hospitalists.  Patient denies any history of trauma to both feet. Patient has no other pedal complaints.  Patient is experiencing pain while standing, walking and in shoe gear.     PMH: DM (diabetes mellitus)    Intellectual disability     PAST MEDICAL & SURGICAL HISTORY:  DM (diabetes mellitus)      Intellectual disability        PSH:  Allergies:penicillin (Unknown)      Labs:                        14.1   8.21  )-----------( 316      ( 17 Jul 2022 07:48 )             42.8       WBC Trend  8.21 Date (07-17 @ 07:48)      Chem  07-17    139  |  101  |  17  ----------------------------<  158<H>  4.1   |  25  |  0.7    Ca    9.0      17 Jul 2022 07:48    TPro  6.6  /  Alb  4.0  /  TBili  0.7  /  DBili  x   /  AST  20  /  ALT  22  /  AlkPhos  101  07-17      T(F): 96.8 (07-17-22 @ 23:55), Max: 96.8 (07-17-22 @ 23:55)  HR: 95 (07-17-22 @ 23:55) (92 - 95)  BP: 137/72 (07-17-22 @ 23:55) (128/70 - 137/72)  RR: 18 (07-17-22 @ 23:55) (18 - 18)  SpO2: --  Wt(kg): --    Objective:   DERM:  Skin warm, dry and supple bilateral.  No open lesions or inter-digital macerations noted bilateral.   Toenails 1-5 Right and Left feet thickened, elongated, discolored, and dystrophic with subungual debris. There is pain upon palpation of all fungal and ingrowing nails 1-5 bilaterally.   VASC: Dorsalis Pedis and Posterior Tibial pulses 2/4.  Capillary re-fill time less than 3 seconds digits 1-5 bilateral.   NEURO: Protective sensation intact to the level of the digits bilateral.  MSK: Muscle strength 5/5 all major muscle groups bilateral. No structural abnormality, bilaterally    Assessment:   Bilateral dystrophic toenails consistent with Onychomycosis.   Pain from Elongated nails, ingrowing, incurvated, and dystrophic nails upon palpation of nails.  Xerosis of bilateral plantar feet.     Plan:   Discussed diagnosis and treatment with patient  Aseptic debridement and curettage of all fungal and ingrowing nails 1-5 bilateral with sterile nail nipper.  Discussed importance of daily foot examinations, drying of feet after bathing and proper shoe gear.  Patient Would Benefit From Periodic Debridements; Can Follow Up as Outpatient w/ Dr. Bronson Post Discharge   Discussed Plan w/ Attending;     Spectra;

## 2022-07-18 NOTE — PROGRESS NOTE ADULT - SUBJECTIVE AND OBJECTIVE BOX
CARIN WATSON 47y Male  MRN#: [id number]     Hospital Day: 109d    Pt is currently admitted with the primary diagnosis of  DM FOOT ULCERS; HYPERGLYCEMIA        SUBJECTIVE     Overnight events  None    Subjective complaints  Pt was evaluated this am. Patient denied any active complaints.                                            ----------------------------------------------------------  OBJECTIVE  PAST MEDICAL & SURGICAL HISTORY  DM (diabetes mellitus)    Intellectual disability                                              -----------------------------------------------------------  ALLERGIES:  penicillin (Unknown)                                            ------------------------------------------------------------    HOME MEDICATIONS  Home Medications:  vitamin A and D topical ointment: 1 application topically once a day (12 Apr 2022 11:40)                           MEDICATIONS:  STANDING MEDICATIONS  ammonium lactate 12% Lotion 1 Application(s) Topical two times a day  chlorhexidine 4% Liquid 1 Application(s) Topical <User Schedule>  clotrimazole 1% Cream 1 Application(s) Topical two times a day  dextrose 5%. 1000 milliLiter(s) IV Continuous <Continuous>  dextrose 5%. 1000 milliLiter(s) IV Continuous <Continuous>  dextrose 50% Injectable 25 Gram(s) IV Push once  dextrose 50% Injectable 12.5 Gram(s) IV Push once  dextrose 50% Injectable 25 Gram(s) IV Push once  famotidine    Tablet 20 milliGRAM(s) Oral daily  glucagon  Injectable 1 milliGRAM(s) IntraMuscular once  glucagon  Injectable 1 milliGRAM(s) IntraMuscular once  insulin glargine Injectable (LANTUS) 22 Unit(s) SubCutaneous every morning  insulin lispro (ADMELOG) corrective regimen sliding scale   SubCutaneous three times a day before meals  insulin lispro Injectable (ADMELOG) 8 Unit(s) SubCutaneous three times a day before meals  lactobacillus acidophilus 1 Tablet(s) Oral two times a day  melatonin 3 milliGRAM(s) Oral at bedtime  vitamin A &amp; D Ointment 1 Application(s) Topical daily    PRN MEDICATIONS  acetaminophen     Tablet .. 650 milliGRAM(s) Oral every 6 hours PRN  dextrose Oral Gel 15 Gram(s) Oral once PRN                                            ------------------------------------------------------------  VITAL SIGNS: Last 24 Hours  T(C): 36 (17 Jul 2022 23:55), Max: 36 (17 Jul 2022 23:55)  T(F): 96.8 (17 Jul 2022 23:55), Max: 96.8 (17 Jul 2022 23:55)  HR: 95 (17 Jul 2022 23:55) (95 - 95)  BP: 137/72 (17 Jul 2022 23:55) (137/72 - 137/72)  BP(mean): --  RR: 18 (17 Jul 2022 23:55) (18 - 18)  SpO2: --      07-17-22 @ 07:01  -  07-18-22 @ 07:00  --------------------------------------------------------  IN: 185 mL / OUT: 0 mL / NET: 185 mL                                             --------------------------------------------------------------  LABS:                        14.1   8.21  )-----------( 316      ( 17 Jul 2022 07:48 )             42.8     07-17    139  |  101  |  17  ----------------------------<  158<H>  4.1   |  25  |  0.7    Ca    9.0      17 Jul 2022 07:48    TPro  6.6  /  Alb  4.0  /  TBili  0.7  /  DBili  x   /  AST  20  /  ALT  22  /  AlkPhos  101  07-17                                                              -------------------------------------------------------------  RADIOLOGY:                                            --------------------------------------------------------------    PHYSICAL EXAM:    GENERAL: NAD, sitting up in bed comfortably  HEAD:  Atraumatic, Normocephalic. Poor dentition.  EYES: EOMI, conjunctiva and sclera clear  ENT: Moist mucous membranes  NECK: Supple, No JVD  CHEST/LUNG: Clear to auscultation bilaterally; No rales, rhonchi, wheezing, or rubs. Unlabored respirations  HEART: regular rate and rhythm; No murmurs, rubs, or gallops  ABDOMEN: Bowel sounds present; Soft, Nontender, Nondistended.    EXTREMITIES: Warm. No cyanosis, or edema. Clubbing of fingernails. Scratch (non purulent, non erythematous) on R calf.  NERVOUS SYSTEM:  Alert & Oriented X3. No focal deficits   SKIN: No rashes or lesions.                                         --------------------------------------------------------------

## 2022-07-18 NOTE — PROGRESS NOTE ADULT - SUBJECTIVE AND OBJECTIVE BOX
Progress Note:  Provider Speciality                            Hospitalist      CARIN WATSON MRN-[id number] 46y Male     CHIEF PRESENTING COMPLAINT:  Patient is a 46y old  Male who presents with a chief complaint of DFU (06 Apr 2022 12:18)        SUBJECTIVE:  Patient was seen and examined at bedside.  No new complaints described by patient in morning rounds.   HISTORY OF PRESENTING ILLNESS:  HPI:  47 yo M with PMHx of Intellectual Disability, DM not on any medications presents with foot wounds. Pt has very poor foot hygiene and per the sister, has been persistently scratching an open wound on his L second toe, which worsened and became more red, had drainage, malodor. He saw a physician at [facility] and was told to come to the ED for evaluation. Pt is poor historian. Does endorse abdominal pain for a few days, cannot give much more description. Per the sister, pt did not seem to have any recent fevers, however is also not a very good historian and does not seem to have good health literacy. States that her and her mom decided to stop giving pt Metformin a while ago, are treating his diabetes by not giving him any sugary foods.  In the ED, T 97.8, /87, , RR 16, SpO2 99% on RA. Lab work unrevealing.  (31 Mar 2022 22:36)        REVIEW OF SYSTEMS:  Patient denies any headache, any vision complaints, runny nose. Denies chest pain, shortness of breath, palpitation. Denies nausea, vomiting, abdominal pain or diarrhoea, Denies dysuria. Denies  localized weakness in any part of the body or numbness.   At least 10 systems were reviewed in ROS. All systems reviewed  are within normal limits except for the complaints as described in Subjective.    PAST MEDICAL & SURGICAL HISTORY:  PAST MEDICAL & SURGICAL HISTORY:  DM (diabetes mellitus)    Intellectual disability            VITAL SIGNS:  Vital Signs Last 24 Hrs  T(C): 36 (17 Jul 2022 23:55), Max: 36 (17 Jul 2022 23:55)  T(F): 96.8 (17 Jul 2022 23:55), Max: 96.8 (17 Jul 2022 23:55)  HR: 95 (17 Jul 2022 23:55) (95 - 95)  BP: 137/72 (17 Jul 2022 23:55) (137/72 - 137/72)  BP(mean): --  RR: 18 (17 Jul 2022 23:55) (18 - 18)  SpO2: --            PHYSICAL EXAMINATION:  Not in acute distress  General: No icterus  HEENT:   no JVD.  Heart: S1+S2 audible  Lungs: bilateral  moderate air entry, no wheezing, no crepitations.  Abdomen: Soft, non-tender, non-distended , no  rigidity or guarding.  CNS: Awake alert, CN  grossly intact. Intellectual disability  Extremities:  No edema    , onychomycosis toe nails        CONSULTS:  Consultant(s) Notes Reviewed by me.   Care Discussed with Consultants/Other Providers where required.        MEDICATIONS:  MEDICATIONS  (STANDING):  ammonium lactate 12% Lotion 1 Application(s) Topical two times a day  clotrimazole 1% Cream 1 Application(s) Topical two times a day  enoxaparin Injectable 40 milliGRAM(s) SubCutaneous every 24 hours  metFORMIN 1000 milliGRAM(s) Oral two times a day  pantoprazole    Tablet 40 milliGRAM(s) Oral before breakfast  vitamin A &amp; D Ointment 1 Application(s) Topical daily    MEDICATIONS  (PRN):  ondansetron Injectable 4 milliGRAM(s) IV Push every 6 hours PRN Nausea and/or Vomiting            ASSESSMENT:    47 yo M with PMH of Intellectual Disability and DM not on any medications presented with foot wounds.    Covid  PNA  Onychomycosis  Dental caries  DM2 with hyperglycemia due to dietary non-compliance  Intellectual disability/autism        Covid PNA- completed RDV  Nail bed wound and onychomycosis. Status post Aseptic debridement and curettage of all fungal and ingrowing nails 1-5 bilateral with sterile nail nipper 06/07  Podiatry- no surgical intervention is advised. outpt podiatry f/u  DM type 2 - uncontrolled. A1C 8.9. Continue  metformin 1000mg BID and insulin glargine and lispro ( doses adjusted). Not compliant with diabetic diet. Counseling provided  Dental Caries-completed clinda for 7 days. Will need outpt f/u for possible multiple extractions  Insomnia- prn melatonin    Handoff:  Pending legal issue and guardianship, else medically stable for discharge

## 2022-07-19 LAB
ALBUMIN SERPL ELPH-MCNC: 4.5 G/DL — SIGNIFICANT CHANGE UP (ref 3.5–5.2)
ALP SERPL-CCNC: 125 U/L — HIGH (ref 30–115)
ALT FLD-CCNC: 27 U/L — SIGNIFICANT CHANGE UP (ref 0–41)
ANION GAP SERPL CALC-SCNC: 13 MMOL/L — SIGNIFICANT CHANGE UP (ref 7–14)
AST SERPL-CCNC: 25 U/L — SIGNIFICANT CHANGE UP (ref 0–41)
BILIRUB SERPL-MCNC: 0.3 MG/DL — SIGNIFICANT CHANGE UP (ref 0.2–1.2)
BUN SERPL-MCNC: 17 MG/DL — SIGNIFICANT CHANGE UP (ref 10–20)
CALCIUM SERPL-MCNC: 10.1 MG/DL — SIGNIFICANT CHANGE UP (ref 8.5–10.1)
CHLORIDE SERPL-SCNC: 99 MMOL/L — SIGNIFICANT CHANGE UP (ref 98–110)
CO2 SERPL-SCNC: 25 MMOL/L — SIGNIFICANT CHANGE UP (ref 17–32)
CREAT SERPL-MCNC: 0.8 MG/DL — SIGNIFICANT CHANGE UP (ref 0.7–1.5)
EGFR: 109 ML/MIN/1.73M2 — SIGNIFICANT CHANGE UP
GLUCOSE BLDC GLUCOMTR-MCNC: 194 MG/DL — HIGH (ref 70–99)
GLUCOSE BLDC GLUCOMTR-MCNC: 248 MG/DL — HIGH (ref 70–99)
GLUCOSE BLDC GLUCOMTR-MCNC: 259 MG/DL — HIGH (ref 70–99)
GLUCOSE BLDC GLUCOMTR-MCNC: 91 MG/DL — SIGNIFICANT CHANGE UP (ref 70–99)
GLUCOSE SERPL-MCNC: 204 MG/DL — HIGH (ref 70–99)
HCT VFR BLD CALC: 44.4 % — SIGNIFICANT CHANGE UP (ref 42–52)
HGB BLD-MCNC: 15.1 G/DL — SIGNIFICANT CHANGE UP (ref 14–18)
MCHC RBC-ENTMCNC: 31.6 PG — HIGH (ref 27–31)
MCHC RBC-ENTMCNC: 34 G/DL — SIGNIFICANT CHANGE UP (ref 32–37)
MCV RBC AUTO: 92.9 FL — SIGNIFICANT CHANGE UP (ref 80–94)
NRBC # BLD: 0 /100 WBCS — SIGNIFICANT CHANGE UP (ref 0–0)
PLATELET # BLD AUTO: 351 K/UL — SIGNIFICANT CHANGE UP (ref 130–400)
POTASSIUM SERPL-MCNC: 4.4 MMOL/L — SIGNIFICANT CHANGE UP (ref 3.5–5)
POTASSIUM SERPL-SCNC: 4.4 MMOL/L — SIGNIFICANT CHANGE UP (ref 3.5–5)
PROT SERPL-MCNC: 7.4 G/DL — SIGNIFICANT CHANGE UP (ref 6–8)
RBC # BLD: 4.78 M/UL — SIGNIFICANT CHANGE UP (ref 4.7–6.1)
RBC # FLD: 11.9 % — SIGNIFICANT CHANGE UP (ref 11.5–14.5)
SODIUM SERPL-SCNC: 137 MMOL/L — SIGNIFICANT CHANGE UP (ref 135–146)
WBC # BLD: 7.18 K/UL — SIGNIFICANT CHANGE UP (ref 4.8–10.8)
WBC # FLD AUTO: 7.18 K/UL — SIGNIFICANT CHANGE UP (ref 4.8–10.8)

## 2022-07-19 PROCEDURE — 99231 SBSQ HOSP IP/OBS SF/LOW 25: CPT

## 2022-07-19 RX ADMIN — Medication 1 APPLICATION(S): at 06:11

## 2022-07-19 RX ADMIN — Medication 1 TABLET(S): at 06:11

## 2022-07-19 RX ADMIN — FAMOTIDINE 20 MILLIGRAM(S): 10 INJECTION INTRAVENOUS at 13:28

## 2022-07-19 RX ADMIN — Medication 1 APPLICATION(S): at 06:12

## 2022-07-19 RX ADMIN — Medication 1 APPLICATION(S): at 18:09

## 2022-07-19 RX ADMIN — Medication 8 UNIT(S): at 08:30

## 2022-07-19 RX ADMIN — Medication 4: at 18:08

## 2022-07-19 RX ADMIN — Medication 1 TABLET(S): at 18:09

## 2022-07-19 RX ADMIN — Medication 3 MILLIGRAM(S): at 23:06

## 2022-07-19 RX ADMIN — Medication 8 UNIT(S): at 18:09

## 2022-07-19 RX ADMIN — Medication 2: at 08:29

## 2022-07-19 RX ADMIN — Medication 1 APPLICATION(S): at 13:28

## 2022-07-19 RX ADMIN — INSULIN GLARGINE 22 UNIT(S): 100 INJECTION, SOLUTION SUBCUTANEOUS at 08:30

## 2022-07-19 NOTE — PROGRESS NOTE ADULT - SUBJECTIVE AND OBJECTIVE BOX
CARIN WATSON 47y Male  MRN#: [id number]     Hospital Day: 110d    Pt is currently admitted with the primary diagnosis of  DM FOOT ULCERS; HYPERGLYCEMIA        SUBJECTIVE     Overnight events  None    Subjective complaints  Pt was evaluated this am. Patient denied any active complaints and per patient his symptoms are improving                                            ----------------------------------------------------------  OBJECTIVE  PAST MEDICAL & SURGICAL HISTORY  DM (diabetes mellitus)    Intellectual disability                                              -----------------------------------------------------------  ALLERGIES:  penicillin (Unknown)                                            ------------------------------------------------------------    HOME MEDICATIONS  Home Medications:  vitamin A and D topical ointment: 1 application topically once a day (12 Apr 2022 11:40)                           MEDICATIONS:  STANDING MEDICATIONS  ammonium lactate 12% Lotion 1 Application(s) Topical two times a day  chlorhexidine 4% Liquid 1 Application(s) Topical <User Schedule>  clotrimazole 1% Cream 1 Application(s) Topical two times a day  dextrose 5%. 1000 milliLiter(s) IV Continuous <Continuous>  dextrose 5%. 1000 milliLiter(s) IV Continuous <Continuous>  dextrose 50% Injectable 25 Gram(s) IV Push once  dextrose 50% Injectable 12.5 Gram(s) IV Push once  dextrose 50% Injectable 25 Gram(s) IV Push once  famotidine    Tablet 20 milliGRAM(s) Oral daily  glucagon  Injectable 1 milliGRAM(s) IntraMuscular once  glucagon  Injectable 1 milliGRAM(s) IntraMuscular once  insulin glargine Injectable (LANTUS) 22 Unit(s) SubCutaneous every morning  insulin lispro (ADMELOG) corrective regimen sliding scale   SubCutaneous three times a day before meals  insulin lispro Injectable (ADMELOG) 8 Unit(s) SubCutaneous three times a day before meals  lactobacillus acidophilus 1 Tablet(s) Oral two times a day  melatonin 3 milliGRAM(s) Oral at bedtime  vitamin A &amp; D Ointment 1 Application(s) Topical daily    PRN MEDICATIONS  acetaminophen     Tablet .. 650 milliGRAM(s) Oral every 6 hours PRN  dextrose Oral Gel 15 Gram(s) Oral once PRN                                            ------------------------------------------------------------  VITAL SIGNS: Last 24 Hours  T(C): 35.9 (18 Jul 2022 15:38), Max: 35.9 (18 Jul 2022 15:38)  T(F): 96.7 (18 Jul 2022 15:38), Max: 96.7 (18 Jul 2022 15:38)  HR: 100 (18 Jul 2022 15:38) (100 - 100)  BP: 137/70 (18 Jul 2022 15:38) (137/70 - 137/70)  BP(mean): --  RR: 18 (18 Jul 2022 15:38) (18 - 18)  SpO2: 96% (18 Jul 2022 15:38) (96% - 96%)                                             --------------------------------------------------------------  LABS:                        14.1   8.21  )-----------( 316      ( 17 Jul 2022 07:48 )             42.8     07-17    139  |  101  |  17  ----------------------------<  158<H>  4.1   |  25  |  0.7    Ca    9.0      17 Jul 2022 07:48    TPro  6.6  /  Alb  4.0  /  TBili  0.7  /  DBili  x   /  AST  20  /  ALT  22  /  AlkPhos  101  07-17                                                              -------------------------------------------------------------  RADIOLOGY:                                            --------------------------------------------------------------    PHYSICAL EXAM:  GENERAL: NAD, lying in bed comfortably  HEAD:  Atraumatic, Normocephalic  EYES: EOMI, conjunctiva and sclera clear  ENT: Moist mucous membranes  NECK: Supple, No JVD  CHEST/LUNG: Clear to auscultation bilaterally; No rales, rhonchi, wheezing, or rubs. Unlabored respirations  HEART: Regular rate and rhythm; No murmurs, rubs, or gallops  ABDOMEN: Soft, Nontender, Nondistended.    EXTREMITIES: Warm. No clubbing, cyanosis, or edema  NERVOUS SYSTEM:  Alert & Oriented X3. No focal deficits   SKIN: No rashes or lesions                                           --------------------------------------------------------------                 CARIN WATSON 47y Male  MRN#: [id number]     Hospital Day: 110d    Pt is currently admitted with the primary diagnosis of  DM FOOT ULCERS; HYPERGLYCEMIA        SUBJECTIVE     Overnight events  None    Subjective complaints  Pt was evaluated this am. Patient denied any active complaints.                                          ----------------------------------------------------------  OBJECTIVE  PAST MEDICAL & SURGICAL HISTORY  DM (diabetes mellitus)    Intellectual disability                                              -----------------------------------------------------------  ALLERGIES:  penicillin (Unknown)                                            ------------------------------------------------------------    HOME MEDICATIONS  Home Medications:  vitamin A and D topical ointment: 1 application topically once a day (12 Apr 2022 11:40)                           MEDICATIONS:  STANDING MEDICATIONS  ammonium lactate 12% Lotion 1 Application(s) Topical two times a day  chlorhexidine 4% Liquid 1 Application(s) Topical <User Schedule>  clotrimazole 1% Cream 1 Application(s) Topical two times a day  dextrose 5%. 1000 milliLiter(s) IV Continuous <Continuous>  dextrose 5%. 1000 milliLiter(s) IV Continuous <Continuous>  dextrose 50% Injectable 25 Gram(s) IV Push once  dextrose 50% Injectable 12.5 Gram(s) IV Push once  dextrose 50% Injectable 25 Gram(s) IV Push once  famotidine    Tablet 20 milliGRAM(s) Oral daily  glucagon  Injectable 1 milliGRAM(s) IntraMuscular once  glucagon  Injectable 1 milliGRAM(s) IntraMuscular once  insulin glargine Injectable (LANTUS) 22 Unit(s) SubCutaneous every morning  insulin lispro (ADMELOG) corrective regimen sliding scale   SubCutaneous three times a day before meals  insulin lispro Injectable (ADMELOG) 8 Unit(s) SubCutaneous three times a day before meals  lactobacillus acidophilus 1 Tablet(s) Oral two times a day  melatonin 3 milliGRAM(s) Oral at bedtime  vitamin A &amp; D Ointment 1 Application(s) Topical daily    PRN MEDICATIONS  acetaminophen     Tablet .. 650 milliGRAM(s) Oral every 6 hours PRN  dextrose Oral Gel 15 Gram(s) Oral once PRN                                            ------------------------------------------------------------  VITAL SIGNS: Last 24 Hours  T(C): 35.9 (18 Jul 2022 15:38), Max: 35.9 (18 Jul 2022 15:38)  T(F): 96.7 (18 Jul 2022 15:38), Max: 96.7 (18 Jul 2022 15:38)  HR: 100 (18 Jul 2022 15:38) (100 - 100)  BP: 137/70 (18 Jul 2022 15:38) (137/70 - 137/70)  BP(mean): --  RR: 18 (18 Jul 2022 15:38) (18 - 18)  SpO2: 96% (18 Jul 2022 15:38) (96% - 96%)                                             --------------------------------------------------------------  LABS:                        14.1   8.21  )-----------( 316      ( 17 Jul 2022 07:48 )             42.8     07-17    139  |  101  |  17  ----------------------------<  158<H>  4.1   |  25  |  0.7    Ca    9.0      17 Jul 2022 07:48    TPro  6.6  /  Alb  4.0  /  TBili  0.7  /  DBili  x   /  AST  20  /  ALT  22  /  AlkPhos  101  07-17                                                              -------------------------------------------------------------  RADIOLOGY:                                            --------------------------------------------------------------    PHYSICAL EXAM:  GENERAL: NAD, sitting up in bed comfortably  HEAD:  Atraumatic, Normocephalic. Poor dentition.  EYES: EOMI, conjunctiva and sclera clear  ENT: Moist mucous membranes  NECK: Supple, No JVD  CHEST/LUNG: Clear to auscultation bilaterally; No rales, rhonchi, wheezing, or rubs. Unlabored respirations  HEART: regular rate and rhythm; No murmurs, rubs, or gallops  ABDOMEN: Bowel sounds present; Soft, Nontender, Nondistended.    EXTREMITIES: Warm. No cyanosis, or edema. Clubbing of fingernails. Scratch (non purulent, non erythematous) on R calf.  NERVOUS SYSTEM:  Alert & Oriented X3. No focal deficits   SKIN: No rashes or lesions.                                           --------------------------------------------------------------

## 2022-07-19 NOTE — PROGRESS NOTE ADULT - SUBJECTIVE AND OBJECTIVE BOX
Progress Note:  Provider Speciality                            Hospitalist      CARIN WATSON MRN-[id number] 46y Male     CHIEF PRESENTING COMPLAINT:  Patient is a 46y old  Male who presents with a chief complaint of DFU (06 Apr 2022 12:18)        SUBJECTIVE:  Patient was seen and examined at bedside.  No new complaints described by patient in morning rounds.   HISTORY OF PRESENTING ILLNESS:  HPI:  45 yo M with PMHx of Intellectual Disability, DM not on any medications presents with foot wounds. Pt has very poor foot hygiene and per the sister, has been persistently scratching an open wound on his L second toe, which worsened and became more red, had drainage, malodor. He saw a physician at [facility] and was told to come to the ED for evaluation. Pt is poor historian. Does endorse abdominal pain for a few days, cannot give much more description. Per the sister, pt did not seem to have any recent fevers, however is also not a very good historian and does not seem to have good health literacy. States that her and her mom decided to stop giving pt Metformin a while ago, are treating his diabetes by not giving him any sugary foods.  In the ED, T 97.8, /87, , RR 16, SpO2 99% on RA. Lab work unrevealing.  (31 Mar 2022 22:36)        REVIEW OF SYSTEMS:  Patient denies any headache, any vision complaints, runny nose. Denies chest pain, shortness of breath, palpitation. Denies nausea, vomiting, abdominal pain or diarrhoea, Denies dysuria. Denies  localized weakness in any part of the body or numbness.   At least 10 systems were reviewed in ROS. All systems reviewed  are within normal limits except for the complaints as described in Subjective.    PAST MEDICAL & SURGICAL HISTORY:  PAST MEDICAL & SURGICAL HISTORY:  DM (diabetes mellitus)    Intellectual disability            VITAL SIGNS:  Vital Signs Last 24 Hrs  T(C): 36.1 (19 Jul 2022 08:07), Max: 36.1 (19 Jul 2022 08:07)  T(F): 97 (19 Jul 2022 08:07), Max: 97 (19 Jul 2022 08:07)  HR: 101 (19 Jul 2022 08:07) (100 - 101)  BP: 140/77 (19 Jul 2022 08:07) (137/70 - 140/77)  BP(mean): --  RR: 18 (19 Jul 2022 08:07) (18 - 18)  SpO2: 96% (18 Jul 2022 15:38) (96% - 96%)            PHYSICAL EXAMINATION:  Not in acute distress  General: No icterus  HEENT:   no JVD.  Heart: S1+S2 audible  Lungs: bilateral  moderate air entry, no wheezing, no crepitations.  Abdomen: Soft, non-tender, non-distended , no  rigidity or guarding.  CNS: Awake alert, CN  grossly intact. Intellectual disability  Extremities:  No edema    , onychomycosis toe nails        CONSULTS:  Consultant(s) Notes Reviewed by me.   Care Discussed with Consultants/Other Providers where required.        MEDICATIONS:  MEDICATIONS  (STANDING):  ammonium lactate 12% Lotion 1 Application(s) Topical two times a day  clotrimazole 1% Cream 1 Application(s) Topical two times a day  enoxaparin Injectable 40 milliGRAM(s) SubCutaneous every 24 hours  metFORMIN 1000 milliGRAM(s) Oral two times a day  pantoprazole    Tablet 40 milliGRAM(s) Oral before breakfast  vitamin A &amp; D Ointment 1 Application(s) Topical daily    MEDICATIONS  (PRN):  ondansetron Injectable 4 milliGRAM(s) IV Push every 6 hours PRN Nausea and/or Vomiting            ASSESSMENT:    45 yo M with PMH of Intellectual Disability and DM not on any medications presented with foot wounds.    Covid  PNA  Onychomycosis  Dental caries  DM2 with hyperglycemia due to dietary non-compliance  Intellectual disability/autism        Covid PNA- completed RDV. Resolved  Nail bed wound and onychomycosis. Status post Aseptic debridement and curettage of all fungal and ingrowing nails 1-5 bilateral with sterile nail nipper 06/07  Podiatry- no surgical intervention is advised. outpt podiatry f/u  DM type 2 - uncontrolled. A1C 8.9.Insulin glargine and lispro ( doses adjusted). Not compliant with diabetic diet. Counseling provided  Dental Caries-completed clinda course. Will need outpt f/u for possible multiple extractions  Insomnia- prn melatonin    Handoff:  Pending legal issue and guardianship, else medically stable for discharge

## 2022-07-19 NOTE — PROGRESS NOTE ADULT - ASSESSMENT
47 yo M with PMH of Intellectual Disability and DM not on any medications presented with foot wounds.    Covid  PNA  Onychomycosis  Dental caries  DM2  Intellectual disability/autism        Covid PNA- completed RDV    Nail bed wound and onychomycosis. Status post Aseptic debridement and curettage of all fungal and ingrowing nails 1-5 bilateral with sterile nail nipper 06/07  #Podiatry  Aseptic debridement and curettage of all fungal and ingrowing nails 1-5 bilateral with sterile nail nipper.  Discussed importance of daily foot examinations, drying of feet after bathing and proper shoe gear.  Patient Would Benefit From Periodic Debridements; Can Follow Up as Outpatient w/ Dr. Bronson Post Discharge       DM type 2 - uncontrolled. A1C 8.9. Continue  metformin 1000mg BID and insulin glargine and lispro ( doses adjusted)  Dental Caries-completed clinda for 7 days. Will need outpt f/u for possible multiple extractions  Insomnia- prn melatonin    Handoff:  Pending legal issue and guardianship, else medically stable for discharge

## 2022-07-20 LAB
ALBUMIN SERPL ELPH-MCNC: 4.1 G/DL — SIGNIFICANT CHANGE UP (ref 3.5–5.2)
ALP SERPL-CCNC: 103 U/L — SIGNIFICANT CHANGE UP (ref 30–115)
ALT FLD-CCNC: 24 U/L — SIGNIFICANT CHANGE UP (ref 0–41)
ANION GAP SERPL CALC-SCNC: 13 MMOL/L — SIGNIFICANT CHANGE UP (ref 7–14)
AST SERPL-CCNC: 20 U/L — SIGNIFICANT CHANGE UP (ref 0–41)
BILIRUB SERPL-MCNC: 0.6 MG/DL — SIGNIFICANT CHANGE UP (ref 0.2–1.2)
BUN SERPL-MCNC: 14 MG/DL — SIGNIFICANT CHANGE UP (ref 10–20)
CALCIUM SERPL-MCNC: 9 MG/DL — SIGNIFICANT CHANGE UP (ref 8.5–10.1)
CHLORIDE SERPL-SCNC: 101 MMOL/L — SIGNIFICANT CHANGE UP (ref 98–110)
CO2 SERPL-SCNC: 24 MMOL/L — SIGNIFICANT CHANGE UP (ref 17–32)
CREAT SERPL-MCNC: 0.6 MG/DL — LOW (ref 0.7–1.5)
EGFR: 119 ML/MIN/1.73M2 — SIGNIFICANT CHANGE UP
GLUCOSE BLDC GLUCOMTR-MCNC: 108 MG/DL — HIGH (ref 70–99)
GLUCOSE BLDC GLUCOMTR-MCNC: 125 MG/DL — HIGH (ref 70–99)
GLUCOSE BLDC GLUCOMTR-MCNC: 138 MG/DL — HIGH (ref 70–99)
GLUCOSE BLDC GLUCOMTR-MCNC: 143 MG/DL — HIGH (ref 70–99)
GLUCOSE SERPL-MCNC: 139 MG/DL — HIGH (ref 70–99)
HCT VFR BLD CALC: 41.9 % — LOW (ref 42–52)
HGB BLD-MCNC: 14.1 G/DL — SIGNIFICANT CHANGE UP (ref 14–18)
MCHC RBC-ENTMCNC: 31.3 PG — HIGH (ref 27–31)
MCHC RBC-ENTMCNC: 33.7 G/DL — SIGNIFICANT CHANGE UP (ref 32–37)
MCV RBC AUTO: 92.9 FL — SIGNIFICANT CHANGE UP (ref 80–94)
NRBC # BLD: 0 /100 WBCS — SIGNIFICANT CHANGE UP (ref 0–0)
PLATELET # BLD AUTO: 321 K/UL — SIGNIFICANT CHANGE UP (ref 130–400)
POTASSIUM SERPL-MCNC: 3.8 MMOL/L — SIGNIFICANT CHANGE UP (ref 3.5–5)
POTASSIUM SERPL-SCNC: 3.8 MMOL/L — SIGNIFICANT CHANGE UP (ref 3.5–5)
PROT SERPL-MCNC: 6.4 G/DL — SIGNIFICANT CHANGE UP (ref 6–8)
RBC # BLD: 4.51 M/UL — LOW (ref 4.7–6.1)
RBC # FLD: 11.9 % — SIGNIFICANT CHANGE UP (ref 11.5–14.5)
SODIUM SERPL-SCNC: 138 MMOL/L — SIGNIFICANT CHANGE UP (ref 135–146)
WBC # BLD: 7.38 K/UL — SIGNIFICANT CHANGE UP (ref 4.8–10.8)
WBC # FLD AUTO: 7.38 K/UL — SIGNIFICANT CHANGE UP (ref 4.8–10.8)

## 2022-07-20 PROCEDURE — 99231 SBSQ HOSP IP/OBS SF/LOW 25: CPT

## 2022-07-20 RX ADMIN — Medication 3 MILLIGRAM(S): at 21:23

## 2022-07-20 RX ADMIN — Medication 1 TABLET(S): at 06:45

## 2022-07-20 RX ADMIN — Medication 8 UNIT(S): at 08:11

## 2022-07-20 RX ADMIN — Medication 1 APPLICATION(S): at 12:23

## 2022-07-20 RX ADMIN — Medication 1 APPLICATION(S): at 18:00

## 2022-07-20 RX ADMIN — Medication 1 APPLICATION(S): at 06:45

## 2022-07-20 RX ADMIN — FAMOTIDINE 20 MILLIGRAM(S): 10 INJECTION INTRAVENOUS at 12:23

## 2022-07-20 RX ADMIN — Medication 8 UNIT(S): at 11:39

## 2022-07-20 RX ADMIN — INSULIN GLARGINE 22 UNIT(S): 100 INJECTION, SOLUTION SUBCUTANEOUS at 08:12

## 2022-07-20 RX ADMIN — Medication 1 TABLET(S): at 18:00

## 2022-07-20 RX ADMIN — Medication 8 UNIT(S): at 17:18

## 2022-07-20 RX ADMIN — CHLORHEXIDINE GLUCONATE 1 APPLICATION(S): 213 SOLUTION TOPICAL at 06:45

## 2022-07-20 NOTE — PROGRESS NOTE ADULT - SUBJECTIVE AND OBJECTIVE BOX
CARIN WATSON 47y Male  MRN#: [id number]     Hospital Day: 111d    Pt is currently admitted with the primary diagnosis of  DM FOOT ULCERS; HYPERGLYCEMIA        SUBJECTIVE     Overnight events  None    Subjective complaints  Pt was evaluated this am. Patient denied any active complaints and per patient his symptoms are improving                                            ----------------------------------------------------------  OBJECTIVE  PAST MEDICAL & SURGICAL HISTORY  DM (diabetes mellitus)    Intellectual disability                                              -----------------------------------------------------------  ALLERGIES:  penicillin (Unknown)                                            ------------------------------------------------------------    HOME MEDICATIONS  Home Medications:  vitamin A and D topical ointment: 1 application topically once a day (12 Apr 2022 11:40)                           MEDICATIONS:  STANDING MEDICATIONS  ammonium lactate 12% Lotion 1 Application(s) Topical two times a day  chlorhexidine 4% Liquid 1 Application(s) Topical <User Schedule>  clotrimazole 1% Cream 1 Application(s) Topical two times a day  dextrose 5%. 1000 milliLiter(s) IV Continuous <Continuous>  dextrose 5%. 1000 milliLiter(s) IV Continuous <Continuous>  dextrose 50% Injectable 25 Gram(s) IV Push once  dextrose 50% Injectable 12.5 Gram(s) IV Push once  dextrose 50% Injectable 25 Gram(s) IV Push once  famotidine    Tablet 20 milliGRAM(s) Oral daily  glucagon  Injectable 1 milliGRAM(s) IntraMuscular once  glucagon  Injectable 1 milliGRAM(s) IntraMuscular once  insulin glargine Injectable (LANTUS) 22 Unit(s) SubCutaneous every morning  insulin lispro (ADMELOG) corrective regimen sliding scale   SubCutaneous three times a day before meals  insulin lispro Injectable (ADMELOG) 8 Unit(s) SubCutaneous three times a day before meals  lactobacillus acidophilus 1 Tablet(s) Oral two times a day  melatonin 3 milliGRAM(s) Oral at bedtime  vitamin A &amp; D Ointment 1 Application(s) Topical daily    PRN MEDICATIONS  acetaminophen     Tablet .. 650 milliGRAM(s) Oral every 6 hours PRN  dextrose Oral Gel 15 Gram(s) Oral once PRN                                            ------------------------------------------------------------  VITAL SIGNS: Last 24 Hours  T(C): 36.1 (19 Jul 2022 23:40), Max: 36.1 (19 Jul 2022 08:07)  T(F): 96.9 (19 Jul 2022 23:40), Max: 97 (19 Jul 2022 08:07)  HR: 101 (19 Jul 2022 23:40) (101 - 122)  BP: 125/53 (19 Jul 2022 23:40) (121/60 - 140/77)  BP(mean): --  RR: 18 (19 Jul 2022 23:40) (18 - 18)  SpO2: --                                             --------------------------------------------------------------  LABS:                        15.1   7.18  )-----------( 351      ( 19 Jul 2022 18:55 )             44.4     07-19    137  |  99  |  17  ----------------------------<  204<H>  4.4   |  25  |  0.8    Ca    10.1      19 Jul 2022 18:55    TPro  7.4  /  Alb  4.5  /  TBili  0.3  /  DBili  x   /  AST  25  /  ALT  27  /  AlkPhos  125<H>  07-19                                                              -------------------------------------------------------------  RADIOLOGY:                                            --------------------------------------------------------------    PHYSICAL EXAM:  GENERAL: NAD, lying in bed comfortably  HEAD:  Atraumatic, Normocephalic  EYES: EOMI, conjunctiva and sclera clear  ENT: Moist mucous membranes  NECK: Supple, No JVD  CHEST/LUNG: Clear to auscultation bilaterally; No rales, rhonchi, wheezing, or rubs. Unlabored respirations  HEART: Regular rate and rhythm; No murmurs, rubs, or gallops  ABDOMEN: Soft, Nontender, Nondistended.    EXTREMITIES: Warm. No clubbing, cyanosis, or edema  NERVOUS SYSTEM:  Alert & Oriented X3. No focal deficits   SKIN: No rashes or lesions                                           --------------------------------------------------------------                 CARIN WATSON 47y Male  MRN#: [id number]     Hospital Day: 111d    Pt is currently admitted with the primary diagnosis of  DM FOOT ULCERS; HYPERGLYCEMIA        SUBJECTIVE     Overnight events  None    Subjective complaints  Pt was evaluated this am. Patient denied any active complaints and per patient his symptoms are improving                                            ----------------------------------------------------------  OBJECTIVE  PAST MEDICAL & SURGICAL HISTORY  DM (diabetes mellitus)    Intellectual disability                                              -----------------------------------------------------------  ALLERGIES:  penicillin (Unknown)                                            ------------------------------------------------------------    HOME MEDICATIONS  Home Medications:  vitamin A and D topical ointment: 1 application topically once a day (12 Apr 2022 11:40)                           MEDICATIONS:  STANDING MEDICATIONS  ammonium lactate 12% Lotion 1 Application(s) Topical two times a day  chlorhexidine 4% Liquid 1 Application(s) Topical <User Schedule>  clotrimazole 1% Cream 1 Application(s) Topical two times a day  dextrose 5%. 1000 milliLiter(s) IV Continuous <Continuous>  dextrose 5%. 1000 milliLiter(s) IV Continuous <Continuous>  dextrose 50% Injectable 25 Gram(s) IV Push once  dextrose 50% Injectable 12.5 Gram(s) IV Push once  dextrose 50% Injectable 25 Gram(s) IV Push once  famotidine    Tablet 20 milliGRAM(s) Oral daily  glucagon  Injectable 1 milliGRAM(s) IntraMuscular once  glucagon  Injectable 1 milliGRAM(s) IntraMuscular once  insulin glargine Injectable (LANTUS) 22 Unit(s) SubCutaneous every morning  insulin lispro (ADMELOG) corrective regimen sliding scale   SubCutaneous three times a day before meals  insulin lispro Injectable (ADMELOG) 8 Unit(s) SubCutaneous three times a day before meals  lactobacillus acidophilus 1 Tablet(s) Oral two times a day  melatonin 3 milliGRAM(s) Oral at bedtime  vitamin A &amp; D Ointment 1 Application(s) Topical daily    PRN MEDICATIONS  acetaminophen     Tablet .. 650 milliGRAM(s) Oral every 6 hours PRN  dextrose Oral Gel 15 Gram(s) Oral once PRN                                            ------------------------------------------------------------  VITAL SIGNS: Last 24 Hours  T(C): 36.1 (19 Jul 2022 23:40), Max: 36.1 (19 Jul 2022 08:07)  T(F): 96.9 (19 Jul 2022 23:40), Max: 97 (19 Jul 2022 08:07)  HR: 101 (19 Jul 2022 23:40) (101 - 122)  BP: 125/53 (19 Jul 2022 23:40) (121/60 - 140/77)  BP(mean): --  RR: 18 (19 Jul 2022 23:40) (18 - 18)  SpO2: --                                             --------------------------------------------------------------  LABS:                        15.1   7.18  )-----------( 351      ( 19 Jul 2022 18:55 )             44.4     07-19    137  |  99  |  17  ----------------------------<  204<H>  4.4   |  25  |  0.8    Ca    10.1      19 Jul 2022 18:55    TPro  7.4  /  Alb  4.5  /  TBili  0.3  /  DBili  x   /  AST  25  /  ALT  27  /  AlkPhos  125<H>  07-19                                                              -------------------------------------------------------------  RADIOLOGY:                                            --------------------------------------------------------------    PHYSICAL EXAM:  GENERAL: NAD, sitting up in bed comfortably  HEAD:  Atraumatic, Normocephalic. Poor dentition.  EYES: EOMI, conjunctiva and sclera clear  ENT: Moist mucous membranes  NECK: Supple, No JVD  CHEST/LUNG: Clear to auscultation bilaterally; No rales, rhonchi, wheezing, or rubs. Unlabored respirations  HEART: regular rate and rhythm; No murmurs, rubs, or gallops  ABDOMEN: Bowel sounds present; Soft, Nontender, Nondistended.    EXTREMITIES: Warm. No cyanosis, or edema. Clubbing of fingernails. Scratch (non purulent, non erythematous) on R calf.  NERVOUS SYSTEM:  Alert & Oriented X3. No focal deficits   SKIN: No rashes or lesions.                                           --------------------------------------------------------------

## 2022-07-20 NOTE — PROGRESS NOTE ADULT - ASSESSMENT
47 yo M with PMH of Intellectual Disability and DM not on any medications presented with foot wounds.    Covid  PNA  Onychomycosis  Dental caries  DM2 with hyperglycemia due to dietary non-compliance  Intellectual disability/autism        Covid PNA- completed RDV. Resolved  Nail bed wound and onychomycosis. Status post Aseptic debridement and curettage of all fungal and ingrowing nails 1-5 bilateral with sterile nail nipper 06/07  Podiatry- no surgical intervention is advised. outpt podiatry f/u  DM type 2 - uncontrolled. A1C 8.9.Insulin glargine and lispro ( doses adjusted). Not compliant with diabetic diet. Counseling provided  Dental Caries-completed clinda course. Will need outpt f/u for possible multiple extractions  Insomnia- prn melatonin    Handoff:  Pending legal issue and guardianship, else medically stable for discharge

## 2022-07-20 NOTE — PROGRESS NOTE ADULT - SUBJECTIVE AND OBJECTIVE BOX
SUBJECTIVE:    Patient is a 47y old Male who presents with a chief complaint of DFU (20 Jul 2022 07:07)    Currently admitted to medicine with the primary diagnosis of Onychomycosis       Today is hospital day 111d.     PAST MEDICAL & SURGICAL HISTORY  DM (diabetes mellitus)    Intellectual disability      ALLERGIES:  penicillin (Unknown)    MEDICATIONS:  STANDING MEDICATIONS  ammonium lactate 12% Lotion 1 Application(s) Topical two times a day  chlorhexidine 4% Liquid 1 Application(s) Topical <User Schedule>  clotrimazole 1% Cream 1 Application(s) Topical two times a day  dextrose 5%. 1000 milliLiter(s) IV Continuous <Continuous>  dextrose 5%. 1000 milliLiter(s) IV Continuous <Continuous>  dextrose 50% Injectable 25 Gram(s) IV Push once  dextrose 50% Injectable 12.5 Gram(s) IV Push once  dextrose 50% Injectable 25 Gram(s) IV Push once  famotidine    Tablet 20 milliGRAM(s) Oral daily  glucagon  Injectable 1 milliGRAM(s) IntraMuscular once  glucagon  Injectable 1 milliGRAM(s) IntraMuscular once  insulin glargine Injectable (LANTUS) 22 Unit(s) SubCutaneous every morning  insulin lispro (ADMELOG) corrective regimen sliding scale   SubCutaneous three times a day before meals  insulin lispro Injectable (ADMELOG) 8 Unit(s) SubCutaneous three times a day before meals  lactobacillus acidophilus 1 Tablet(s) Oral two times a day  melatonin 3 milliGRAM(s) Oral at bedtime  vitamin A &amp; D Ointment 1 Application(s) Topical daily    PRN MEDICATIONS  acetaminophen     Tablet .. 650 milliGRAM(s) Oral every 6 hours PRN  dextrose Oral Gel 15 Gram(s) Oral once PRN    VITALS:   T(F): 97.4  HR: 97  BP: 125/61  RR: 20  SpO2: 98%    LABS:                        14.1   7.38  )-----------( 321      ( 20 Jul 2022 07:29 )             41.9     07-20    138  |  101  |  14  ----------------------------<  139<H>  3.8   |  24  |  0.6<L>    Ca    9.0      20 Jul 2022 07:29    TPro  6.4  /  Alb  4.1  /  TBili  0.6  /  DBili  x   /  AST  20  /  ALT  24  /  AlkPhos  103  07-20                  RADIOLOGY:    PHYSICAL EXAM:  GEN: No acute distress  LUNGS: Clear to auscultation bilaterally   HEART: S1/S2 present. RRR.   ABD/ GI: Soft, non-tender, non-distended. Bowel sounds present  EXT: NC/NC/NE/2+PP/LOJA  NEURO: AAOX3

## 2022-07-21 LAB
GLUCOSE BLDC GLUCOMTR-MCNC: 145 MG/DL — HIGH (ref 70–99)
GLUCOSE BLDC GLUCOMTR-MCNC: 161 MG/DL — HIGH (ref 70–99)
GLUCOSE BLDC GLUCOMTR-MCNC: 204 MG/DL — HIGH (ref 70–99)
GLUCOSE BLDC GLUCOMTR-MCNC: 222 MG/DL — HIGH (ref 70–99)

## 2022-07-21 PROCEDURE — 99231 SBSQ HOSP IP/OBS SF/LOW 25: CPT

## 2022-07-21 RX ADMIN — Medication 1 APPLICATION(S): at 06:24

## 2022-07-21 RX ADMIN — Medication 8 UNIT(S): at 12:13

## 2022-07-21 RX ADMIN — Medication 4: at 17:32

## 2022-07-21 RX ADMIN — Medication 1 APPLICATION(S): at 17:27

## 2022-07-21 RX ADMIN — Medication 1 TABLET(S): at 06:25

## 2022-07-21 RX ADMIN — Medication 8 UNIT(S): at 08:35

## 2022-07-21 RX ADMIN — Medication 1 TABLET(S): at 17:31

## 2022-07-21 RX ADMIN — Medication 3 MILLIGRAM(S): at 22:00

## 2022-07-21 RX ADMIN — INSULIN GLARGINE 22 UNIT(S): 100 INJECTION, SOLUTION SUBCUTANEOUS at 08:34

## 2022-07-21 RX ADMIN — Medication 8 UNIT(S): at 17:33

## 2022-07-21 RX ADMIN — Medication 1 APPLICATION(S): at 17:33

## 2022-07-21 RX ADMIN — Medication 1 APPLICATION(S): at 17:31

## 2022-07-21 RX ADMIN — FAMOTIDINE 20 MILLIGRAM(S): 10 INJECTION INTRAVENOUS at 12:13

## 2022-07-21 RX ADMIN — Medication 1 APPLICATION(S): at 06:25

## 2022-07-21 RX ADMIN — CHLORHEXIDINE GLUCONATE 1 APPLICATION(S): 213 SOLUTION TOPICAL at 06:24

## 2022-07-21 NOTE — PROGRESS NOTE ADULT - SUBJECTIVE AND OBJECTIVE BOX
SUBJECTIVE:    Patient is a 47y old Male who presents with a chief complaint of DFU (21 Jul 2022 06:56)    Currently admitted to medicine with the primary diagnosis of Onychomycosis       Today is hospital day 112d.     PAST MEDICAL & SURGICAL HISTORY  DM (diabetes mellitus)    Intellectual disability      ALLERGIES:  penicillin (Unknown)    MEDICATIONS:  STANDING MEDICATIONS  ammonium lactate 12% Lotion 1 Application(s) Topical two times a day  chlorhexidine 4% Liquid 1 Application(s) Topical <User Schedule>  clotrimazole 1% Cream 1 Application(s) Topical two times a day  dextrose 5%. 1000 milliLiter(s) IV Continuous <Continuous>  dextrose 5%. 1000 milliLiter(s) IV Continuous <Continuous>  dextrose 50% Injectable 25 Gram(s) IV Push once  dextrose 50% Injectable 12.5 Gram(s) IV Push once  dextrose 50% Injectable 25 Gram(s) IV Push once  famotidine    Tablet 20 milliGRAM(s) Oral daily  glucagon  Injectable 1 milliGRAM(s) IntraMuscular once  glucagon  Injectable 1 milliGRAM(s) IntraMuscular once  insulin glargine Injectable (LANTUS) 22 Unit(s) SubCutaneous every morning  insulin lispro (ADMELOG) corrective regimen sliding scale   SubCutaneous three times a day before meals  insulin lispro Injectable (ADMELOG) 8 Unit(s) SubCutaneous three times a day before meals  lactobacillus acidophilus 1 Tablet(s) Oral two times a day  melatonin 3 milliGRAM(s) Oral at bedtime  vitamin A &amp; D Ointment 1 Application(s) Topical daily    PRN MEDICATIONS  acetaminophen     Tablet .. 650 milliGRAM(s) Oral every 6 hours PRN  dextrose Oral Gel 15 Gram(s) Oral once PRN    VITALS:   T(F): 97  HR: 101  BP: 122/60  RR: 20  SpO2: --    LABS:                        14.1   7.38  )-----------( 321      ( 20 Jul 2022 07:29 )             41.9     07-20    138  |  101  |  14  ----------------------------<  139<H>  3.8   |  24  |  0.6<L>    Ca    9.0      20 Jul 2022 07:29    TPro  6.4  /  Alb  4.1  /  TBili  0.6  /  DBili  x   /  AST  20  /  ALT  24  /  AlkPhos  103  07-20                  RADIOLOGY:    PHYSICAL EXAM:  GEN: No acute distress  LUNGS: Clear to auscultation bilaterally   HEART: S1/S2 present. RRR.   ABD/ GI: Soft, non-tender, non-distended. Bowel sounds present  EXT: NC/NC/NE/2+PP/LOJA  NEURO: AAOX3

## 2022-07-21 NOTE — PROGRESS NOTE ADULT - SUBJECTIVE AND OBJECTIVE BOX
CARIN WATSON 47y Male  MRN#: [id number]     Hospital Day: 112d    Pt is currently admitted with the primary diagnosis of  DM FOOT ULCERS; HYPERGLYCEMIA        SUBJECTIVE     Overnight events  None    Subjective complaints  Pt was evaluated this am. Patient denied any active complaints and per patient his symptoms are improving                                            ----------------------------------------------------------  OBJECTIVE  PAST MEDICAL & SURGICAL HISTORY  DM (diabetes mellitus)    Intellectual disability                                              -----------------------------------------------------------  ALLERGIES:  penicillin (Unknown)                                            ------------------------------------------------------------    HOME MEDICATIONS  Home Medications:  vitamin A and D topical ointment: 1 application topically once a day (12 Apr 2022 11:40)                           MEDICATIONS:  STANDING MEDICATIONS  ammonium lactate 12% Lotion 1 Application(s) Topical two times a day  chlorhexidine 4% Liquid 1 Application(s) Topical <User Schedule>  clotrimazole 1% Cream 1 Application(s) Topical two times a day  dextrose 5%. 1000 milliLiter(s) IV Continuous <Continuous>  dextrose 5%. 1000 milliLiter(s) IV Continuous <Continuous>  dextrose 50% Injectable 25 Gram(s) IV Push once  dextrose 50% Injectable 12.5 Gram(s) IV Push once  dextrose 50% Injectable 25 Gram(s) IV Push once  famotidine    Tablet 20 milliGRAM(s) Oral daily  glucagon  Injectable 1 milliGRAM(s) IntraMuscular once  glucagon  Injectable 1 milliGRAM(s) IntraMuscular once  insulin glargine Injectable (LANTUS) 22 Unit(s) SubCutaneous every morning  insulin lispro (ADMELOG) corrective regimen sliding scale   SubCutaneous three times a day before meals  insulin lispro Injectable (ADMELOG) 8 Unit(s) SubCutaneous three times a day before meals  lactobacillus acidophilus 1 Tablet(s) Oral two times a day  melatonin 3 milliGRAM(s) Oral at bedtime  vitamin A &amp; D Ointment 1 Application(s) Topical daily    PRN MEDICATIONS  acetaminophen     Tablet .. 650 milliGRAM(s) Oral every 6 hours PRN  dextrose Oral Gel 15 Gram(s) Oral once PRN                                            ------------------------------------------------------------  VITAL SIGNS: Last 24 Hours  T(C): 36.1 (20 Jul 2022 23:44), Max: 36.6 (20 Jul 2022 08:25)  T(F): 97 (20 Jul 2022 23:44), Max: 97.9 (20 Jul 2022 08:25)  HR: 104 (20 Jul 2022 23:44) (87 - 104)  BP: 117/88 (20 Jul 2022 23:44) (115/61 - 125/61)  BP(mean): --  RR: 18 (20 Jul 2022 23:44) (18 - 20)  SpO2: 98% (20 Jul 2022 10:19) (98% - 98%)      07-20-22 @ 07:01  -  07-21-22 @ 06:57  --------------------------------------------------------  IN: 940 mL / OUT: 0 mL / NET: 940 mL                                             --------------------------------------------------------------  LABS:                        14.1   7.38  )-----------( 321      ( 20 Jul 2022 07:29 )             41.9     07-20    138  |  101  |  14  ----------------------------<  139<H>  3.8   |  24  |  0.6<L>    Ca    9.0      20 Jul 2022 07:29    TPro  6.4  /  Alb  4.1  /  TBili  0.6  /  DBili  x   /  AST  20  /  ALT  24  /  AlkPhos  103  07-20                                                              -------------------------------------------------------------  RADIOLOGY:                                            --------------------------------------------------------------    PHYSICAL EXAM:  GENERAL: NAD, lying in bed comfortably  HEAD:  Atraumatic, Normocephalic  EYES: EOMI, conjunctiva and sclera clear  ENT: Moist mucous membranes  NECK: Supple, No JVD  CHEST/LUNG: Clear to auscultation bilaterally; No rales, rhonchi, wheezing, or rubs. Unlabored respirations  HEART: Regular rate and rhythm; No murmurs, rubs, or gallops  ABDOMEN: Soft, Nontender, Nondistended.    EXTREMITIES: Warm. No clubbing, cyanosis, or edema  NERVOUS SYSTEM:  Alert & Oriented X3. No focal deficits   SKIN: No rashes or lesions                                           --------------------------------------------------------------                 CARIN WATSON 47y Male  MRN#: [id number]     Hospital Day: 112d    Pt is currently admitted with the primary diagnosis of  DM FOOT ULCERS; HYPERGLYCEMIA        SUBJECTIVE     Overnight events  None    Subjective complaints  Pt was evaluated this am. Patient denied any active complaints and per patient his symptoms are improving                                            ----------------------------------------------------------  OBJECTIVE  PAST MEDICAL & SURGICAL HISTORY  DM (diabetes mellitus)    Intellectual disability                                              -----------------------------------------------------------  ALLERGIES:  penicillin (Unknown)                                            ------------------------------------------------------------    HOME MEDICATIONS  Home Medications:  vitamin A and D topical ointment: 1 application topically once a day (12 Apr 2022 11:40)                           MEDICATIONS:  STANDING MEDICATIONS  ammonium lactate 12% Lotion 1 Application(s) Topical two times a day  chlorhexidine 4% Liquid 1 Application(s) Topical <User Schedule>  clotrimazole 1% Cream 1 Application(s) Topical two times a day  dextrose 5%. 1000 milliLiter(s) IV Continuous <Continuous>  dextrose 5%. 1000 milliLiter(s) IV Continuous <Continuous>  dextrose 50% Injectable 25 Gram(s) IV Push once  dextrose 50% Injectable 12.5 Gram(s) IV Push once  dextrose 50% Injectable 25 Gram(s) IV Push once  famotidine    Tablet 20 milliGRAM(s) Oral daily  glucagon  Injectable 1 milliGRAM(s) IntraMuscular once  glucagon  Injectable 1 milliGRAM(s) IntraMuscular once  insulin glargine Injectable (LANTUS) 22 Unit(s) SubCutaneous every morning  insulin lispro (ADMELOG) corrective regimen sliding scale   SubCutaneous three times a day before meals  insulin lispro Injectable (ADMELOG) 8 Unit(s) SubCutaneous three times a day before meals  lactobacillus acidophilus 1 Tablet(s) Oral two times a day  melatonin 3 milliGRAM(s) Oral at bedtime  vitamin A &amp; D Ointment 1 Application(s) Topical daily    PRN MEDICATIONS  acetaminophen     Tablet .. 650 milliGRAM(s) Oral every 6 hours PRN  dextrose Oral Gel 15 Gram(s) Oral once PRN                                            ------------------------------------------------------------  VITAL SIGNS: Last 24 Hours  T(C): 36.1 (20 Jul 2022 23:44), Max: 36.6 (20 Jul 2022 08:25)  T(F): 97 (20 Jul 2022 23:44), Max: 97.9 (20 Jul 2022 08:25)  HR: 104 (20 Jul 2022 23:44) (87 - 104)  BP: 117/88 (20 Jul 2022 23:44) (115/61 - 125/61)  BP(mean): --  RR: 18 (20 Jul 2022 23:44) (18 - 20)  SpO2: 98% (20 Jul 2022 10:19) (98% - 98%)      07-20-22 @ 07:01  -  07-21-22 @ 06:57  --------------------------------------------------------  IN: 940 mL / OUT: 0 mL / NET: 940 mL                                             --------------------------------------------------------------  LABS:                        14.1   7.38  )-----------( 321      ( 20 Jul 2022 07:29 )             41.9     07-20    138  |  101  |  14  ----------------------------<  139<H>  3.8   |  24  |  0.6<L>    Ca    9.0      20 Jul 2022 07:29    TPro  6.4  /  Alb  4.1  /  TBili  0.6  /  DBili  x   /  AST  20  /  ALT  24  /  AlkPhos  103  07-20                                                              -------------------------------------------------------------  RADIOLOGY:                                            --------------------------------------------------------------    PHYSICAL EXAM:  PHYSICAL EXAM:  GENERAL: NAD, sitting up in bed comfortably  HEAD:  Atraumatic, Normocephalic. Poor dentition.  EYES: EOMI, conjunctiva and sclera clear  ENT: Moist mucous membranes  NECK: Supple, No JVD  CHEST/LUNG: Clear to auscultation bilaterally; No rales, rhonchi, wheezing, or rubs. Unlabored respirations  HEART: regular rate and rhythm; No murmurs, rubs, or gallops  ABDOMEN: Bowel sounds present; Soft, Nontender, Nondistended.    EXTREMITIES: Warm. No cyanosis, or edema. Clubbing of fingernails. Scratch (non purulent, non erythematous) on R calf, now scabbed over and healing well.  NERVOUS SYSTEM:  Alert & Oriented X3. No focal deficits   SKIN: No rashes or lesion                                           --------------------------------------------------------------

## 2022-07-22 LAB
GLUCOSE BLDC GLUCOMTR-MCNC: 131 MG/DL — HIGH (ref 70–99)
GLUCOSE BLDC GLUCOMTR-MCNC: 158 MG/DL — HIGH (ref 70–99)
GLUCOSE BLDC GLUCOMTR-MCNC: 181 MG/DL — HIGH (ref 70–99)
GLUCOSE BLDC GLUCOMTR-MCNC: 236 MG/DL — HIGH (ref 70–99)

## 2022-07-22 PROCEDURE — 99231 SBSQ HOSP IP/OBS SF/LOW 25: CPT

## 2022-07-22 RX ADMIN — Medication 1 APPLICATION(S): at 11:42

## 2022-07-22 RX ADMIN — Medication 1 TABLET(S): at 06:16

## 2022-07-22 RX ADMIN — Medication 1 APPLICATION(S): at 06:15

## 2022-07-22 RX ADMIN — Medication 8 UNIT(S): at 08:23

## 2022-07-22 RX ADMIN — Medication 1 TABLET(S): at 17:56

## 2022-07-22 RX ADMIN — Medication 3 MILLIGRAM(S): at 21:38

## 2022-07-22 RX ADMIN — Medication 1 APPLICATION(S): at 17:57

## 2022-07-22 RX ADMIN — Medication 4: at 11:42

## 2022-07-22 RX ADMIN — INSULIN GLARGINE 22 UNIT(S): 100 INJECTION, SOLUTION SUBCUTANEOUS at 08:22

## 2022-07-22 RX ADMIN — Medication 8 UNIT(S): at 17:56

## 2022-07-22 RX ADMIN — FAMOTIDINE 20 MILLIGRAM(S): 10 INJECTION INTRAVENOUS at 11:41

## 2022-07-22 RX ADMIN — Medication 2: at 17:55

## 2022-07-22 RX ADMIN — Medication 8 UNIT(S): at 11:42

## 2022-07-22 NOTE — PROGRESS NOTE ADULT - SUBJECTIVE AND OBJECTIVE BOX
SUBJECTIVE:    Patient is a 47y old Male who presents with a chief complaint of DFU (22 Jul 2022 07:03)    Currently admitted to medicine with the primary diagnosis of Onychomycosis       Today is hospital day 113d.     PAST MEDICAL & SURGICAL HISTORY  DM (diabetes mellitus)    Intellectual disability      ALLERGIES:  penicillin (Unknown)    MEDICATIONS:  STANDING MEDICATIONS  ammonium lactate 12% Lotion 1 Application(s) Topical two times a day  chlorhexidine 4% Liquid 1 Application(s) Topical <User Schedule>  clotrimazole 1% Cream 1 Application(s) Topical two times a day  dextrose 5%. 1000 milliLiter(s) IV Continuous <Continuous>  dextrose 5%. 1000 milliLiter(s) IV Continuous <Continuous>  dextrose 50% Injectable 25 Gram(s) IV Push once  dextrose 50% Injectable 25 Gram(s) IV Push once  dextrose 50% Injectable 12.5 Gram(s) IV Push once  famotidine    Tablet 20 milliGRAM(s) Oral daily  glucagon  Injectable 1 milliGRAM(s) IntraMuscular once  glucagon  Injectable 1 milliGRAM(s) IntraMuscular once  insulin glargine Injectable (LANTUS) 22 Unit(s) SubCutaneous every morning  insulin lispro (ADMELOG) corrective regimen sliding scale   SubCutaneous three times a day before meals  insulin lispro Injectable (ADMELOG) 8 Unit(s) SubCutaneous three times a day before meals  lactobacillus acidophilus 1 Tablet(s) Oral two times a day  melatonin 3 milliGRAM(s) Oral at bedtime  vitamin A &amp; D Ointment 1 Application(s) Topical daily    PRN MEDICATIONS  acetaminophen     Tablet .. 650 milliGRAM(s) Oral every 6 hours PRN  dextrose Oral Gel 15 Gram(s) Oral once PRN    VITALS:   T(F): 98.1  HR: 100  BP: 123/60  RR: 18  SpO2: 96%    LABS:                        RADIOLOGY:    PHYSICAL EXAM:  GEN: No acute distress  LUNGS: Clear to auscultation bilaterally   HEART: S1/S2 present. RRR.   ABD/ GI: Soft, non-tender, non-distended. Bowel sounds present  EXT: NC/NC/NE/2+PP/LOJA  NEURO: AAOX3

## 2022-07-22 NOTE — PROGRESS NOTE ADULT - SUBJECTIVE AND OBJECTIVE BOX
CARIN WATSON 47y Male  MRN#: [id number]     Hospital Day: 113d    Pt is currently admitted with the primary diagnosis of  DM FOOT ULCERS; HYPERGLYCEMIA        SUBJECTIVE     Overnight events  None    Subjective complaints  Pt was evaluated this am. Patient denied any active complaints and per patient his symptoms are improving                                            ----------------------------------------------------------  OBJECTIVE  PAST MEDICAL & SURGICAL HISTORY  DM (diabetes mellitus)    Intellectual disability                                              -----------------------------------------------------------  ALLERGIES:  penicillin (Unknown)                                            ------------------------------------------------------------    HOME MEDICATIONS  Home Medications:  vitamin A and D topical ointment: 1 application topically once a day (12 Apr 2022 11:40)                           MEDICATIONS:  STANDING MEDICATIONS  ammonium lactate 12% Lotion 1 Application(s) Topical two times a day  chlorhexidine 4% Liquid 1 Application(s) Topical <User Schedule>  clotrimazole 1% Cream 1 Application(s) Topical two times a day  dextrose 5%. 1000 milliLiter(s) IV Continuous <Continuous>  dextrose 5%. 1000 milliLiter(s) IV Continuous <Continuous>  dextrose 50% Injectable 25 Gram(s) IV Push once  dextrose 50% Injectable 12.5 Gram(s) IV Push once  dextrose 50% Injectable 25 Gram(s) IV Push once  famotidine    Tablet 20 milliGRAM(s) Oral daily  glucagon  Injectable 1 milliGRAM(s) IntraMuscular once  glucagon  Injectable 1 milliGRAM(s) IntraMuscular once  insulin glargine Injectable (LANTUS) 22 Unit(s) SubCutaneous every morning  insulin lispro (ADMELOG) corrective regimen sliding scale   SubCutaneous three times a day before meals  insulin lispro Injectable (ADMELOG) 8 Unit(s) SubCutaneous three times a day before meals  lactobacillus acidophilus 1 Tablet(s) Oral two times a day  melatonin 3 milliGRAM(s) Oral at bedtime  vitamin A &amp; D Ointment 1 Application(s) Topical daily    PRN MEDICATIONS  acetaminophen     Tablet .. 650 milliGRAM(s) Oral every 6 hours PRN  dextrose Oral Gel 15 Gram(s) Oral once PRN                                            ------------------------------------------------------------  VITAL SIGNS: Last 24 Hours  T(C): 36.7 (21 Jul 2022 23:59), Max: 36.7 (21 Jul 2022 23:59)  T(F): 98 (21 Jul 2022 23:59), Max: 98 (21 Jul 2022 23:59)  HR: 101 (21 Jul 2022 23:59) (94 - 101)  BP: 112/54 (21 Jul 2022 23:59) (112/54 - 127/63)  BP(mean): --  RR: 19 (21 Jul 2022 23:59) (18 - 20)  SpO2: --                                             --------------------------------------------------------------  LABS:                        14.1   7.38  )-----------( 321      ( 20 Jul 2022 07:29 )             41.9     07-20    138  |  101  |  14  ----------------------------<  139<H>  3.8   |  24  |  0.6<L>    Ca    9.0      20 Jul 2022 07:29    TPro  6.4  /  Alb  4.1  /  TBili  0.6  /  DBili  x   /  AST  20  /  ALT  24  /  AlkPhos  103  07-20                                                              -------------------------------------------------------------  RADIOLOGY:                                            --------------------------------------------------------------    PHYSICAL EXAM:  GENERAL: NAD, lying in bed comfortably  HEAD:  Atraumatic, Normocephalic  EYES: EOMI, conjunctiva and sclera clear  ENT: Moist mucous membranes  NECK: Supple, No JVD  CHEST/LUNG: Clear to auscultation bilaterally; No rales, rhonchi, wheezing, or rubs. Unlabored respirations  HEART: Regular rate and rhythm; No murmurs, rubs, or gallops  ABDOMEN: Soft, Nontender, Nondistended.    EXTREMITIES: Warm. No clubbing, cyanosis, or edema  NERVOUS SYSTEM:  Alert & Oriented X3. No focal deficits   SKIN: No rashes or lesions                                           --------------------------------------------------------------                 CARIN WATSON 47y Male  MRN#: [id number]     Hospital Day: 113d    Pt is currently admitted with the primary diagnosis of  DM FOOT ULCERS; HYPERGLYCEMIA        SUBJECTIVE     Overnight events  None    Subjective complaints  Pt was evaluated this am. Patient denied any active complaints and per patient his symptoms are improving                                            ----------------------------------------------------------  OBJECTIVE  PAST MEDICAL & SURGICAL HISTORY  DM (diabetes mellitus)    Intellectual disability                                              -----------------------------------------------------------  ALLERGIES:  penicillin (Unknown)                                            ------------------------------------------------------------    HOME MEDICATIONS  Home Medications:  vitamin A and D topical ointment: 1 application topically once a day (12 Apr 2022 11:40)                           MEDICATIONS:  STANDING MEDICATIONS  ammonium lactate 12% Lotion 1 Application(s) Topical two times a day  chlorhexidine 4% Liquid 1 Application(s) Topical <User Schedule>  clotrimazole 1% Cream 1 Application(s) Topical two times a day  dextrose 5%. 1000 milliLiter(s) IV Continuous <Continuous>  dextrose 5%. 1000 milliLiter(s) IV Continuous <Continuous>  dextrose 50% Injectable 25 Gram(s) IV Push once  dextrose 50% Injectable 12.5 Gram(s) IV Push once  dextrose 50% Injectable 25 Gram(s) IV Push once  famotidine    Tablet 20 milliGRAM(s) Oral daily  glucagon  Injectable 1 milliGRAM(s) IntraMuscular once  glucagon  Injectable 1 milliGRAM(s) IntraMuscular once  insulin glargine Injectable (LANTUS) 22 Unit(s) SubCutaneous every morning  insulin lispro (ADMELOG) corrective regimen sliding scale   SubCutaneous three times a day before meals  insulin lispro Injectable (ADMELOG) 8 Unit(s) SubCutaneous three times a day before meals  lactobacillus acidophilus 1 Tablet(s) Oral two times a day  melatonin 3 milliGRAM(s) Oral at bedtime  vitamin A &amp; D Ointment 1 Application(s) Topical daily    PRN MEDICATIONS  acetaminophen     Tablet .. 650 milliGRAM(s) Oral every 6 hours PRN  dextrose Oral Gel 15 Gram(s) Oral once PRN                                            ------------------------------------------------------------  VITAL SIGNS: Last 24 Hours  T(C): 36.7 (21 Jul 2022 23:59), Max: 36.7 (21 Jul 2022 23:59)  T(F): 98 (21 Jul 2022 23:59), Max: 98 (21 Jul 2022 23:59)  HR: 101 (21 Jul 2022 23:59) (94 - 101)  BP: 112/54 (21 Jul 2022 23:59) (112/54 - 127/63)  BP(mean): --  RR: 19 (21 Jul 2022 23:59) (18 - 20)  SpO2: --                                             --------------------------------------------------------------  LABS:                        14.1   7.38  )-----------( 321      ( 20 Jul 2022 07:29 )             41.9     07-20    138  |  101  |  14  ----------------------------<  139<H>  3.8   |  24  |  0.6<L>    Ca    9.0      20 Jul 2022 07:29    TPro  6.4  /  Alb  4.1  /  TBili  0.6  /  DBili  x   /  AST  20  /  ALT  24  /  AlkPhos  103  07-20                                                              -------------------------------------------------------------  RADIOLOGY:                                            --------------------------------------------------------------    PHYSICAL EXAM:  GENERAL: NAD, lying in bed comfortably  HEAD:  Atraumatic, Normocephalic. Poor dentition.  EYES: EOMI, conjunctiva and sclera clear  ENT: Moist mucous membranes  NECK: Supple, No JVD  CHEST/LUNG: Clear to auscultation bilaterally; No rales, rhonchi, wheezing, or rubs. Unlabored respirations  HEART: regular rate and rhythm; No murmurs, rubs, or gallops  ABDOMEN: Bowel sounds present; Soft, Nontender, Nondistended.    EXTREMITIES: Warm. No cyanosis, or edema. Clubbing of fingernails. Scratch (non purulent, non erythematous) on R calf, now scabbed over and healing well.  NERVOUS SYSTEM:  Alert & Oriented X3. No focal deficits   SKIN: No rashes or lesion                                         --------------------------------------------------------------

## 2022-07-23 LAB
GLUCOSE BLDC GLUCOMTR-MCNC: 117 MG/DL — HIGH (ref 70–99)
GLUCOSE BLDC GLUCOMTR-MCNC: 118 MG/DL — HIGH (ref 70–99)
GLUCOSE BLDC GLUCOMTR-MCNC: 123 MG/DL — HIGH (ref 70–99)
GLUCOSE BLDC GLUCOMTR-MCNC: 98 MG/DL — SIGNIFICANT CHANGE UP (ref 70–99)

## 2022-07-23 PROCEDURE — 99231 SBSQ HOSP IP/OBS SF/LOW 25: CPT

## 2022-07-23 RX ADMIN — Medication 1 APPLICATION(S): at 17:37

## 2022-07-23 RX ADMIN — Medication 8 UNIT(S): at 08:18

## 2022-07-23 RX ADMIN — Medication 3 MILLIGRAM(S): at 22:44

## 2022-07-23 RX ADMIN — Medication 1 TABLET(S): at 06:12

## 2022-07-23 RX ADMIN — Medication 1 APPLICATION(S): at 12:12

## 2022-07-23 RX ADMIN — Medication 1 TABLET(S): at 17:37

## 2022-07-23 RX ADMIN — Medication 8 UNIT(S): at 17:37

## 2022-07-23 RX ADMIN — Medication 8 UNIT(S): at 12:14

## 2022-07-23 RX ADMIN — INSULIN GLARGINE 22 UNIT(S): 100 INJECTION, SOLUTION SUBCUTANEOUS at 08:19

## 2022-07-23 RX ADMIN — FAMOTIDINE 20 MILLIGRAM(S): 10 INJECTION INTRAVENOUS at 12:14

## 2022-07-23 NOTE — PROGRESS NOTE ADULT - SUBJECTIVE AND OBJECTIVE BOX
CARIN WATSON 47y Male  MRN#: [id number]     Hospital Day: 114d    Pt is currently admitted with the primary diagnosis of  DM FOOT ULCERS; HYPERGLYCEMIA        SUBJECTIVE     Overnight events  None    Subjective complaints  Pt was evaluated this am. Patient denied any active complaints.                                            ----------------------------------------------------------  OBJECTIVE  PAST MEDICAL & SURGICAL HISTORY  DM (diabetes mellitus)    Intellectual disability                                              -----------------------------------------------------------  ALLERGIES:  penicillin (Unknown)                                            ------------------------------------------------------------    HOME MEDICATIONS  Home Medications:  vitamin A and D topical ointment: 1 application topically once a day (12 Apr 2022 11:40)                           MEDICATIONS:  STANDING MEDICATIONS  ammonium lactate 12% Lotion 1 Application(s) Topical two times a day  chlorhexidine 4% Liquid 1 Application(s) Topical <User Schedule>  clotrimazole 1% Cream 1 Application(s) Topical two times a day  dextrose 5%. 1000 milliLiter(s) IV Continuous <Continuous>  dextrose 5%. 1000 milliLiter(s) IV Continuous <Continuous>  dextrose 50% Injectable 25 Gram(s) IV Push once  dextrose 50% Injectable 12.5 Gram(s) IV Push once  dextrose 50% Injectable 25 Gram(s) IV Push once  famotidine    Tablet 20 milliGRAM(s) Oral daily  glucagon  Injectable 1 milliGRAM(s) IntraMuscular once  glucagon  Injectable 1 milliGRAM(s) IntraMuscular once  insulin glargine Injectable (LANTUS) 22 Unit(s) SubCutaneous every morning  insulin lispro (ADMELOG) corrective regimen sliding scale   SubCutaneous three times a day before meals  insulin lispro Injectable (ADMELOG) 8 Unit(s) SubCutaneous three times a day before meals  lactobacillus acidophilus 1 Tablet(s) Oral two times a day  melatonin 3 milliGRAM(s) Oral at bedtime  vitamin A &amp; D Ointment 1 Application(s) Topical daily    PRN MEDICATIONS  acetaminophen     Tablet .. 650 milliGRAM(s) Oral every 6 hours PRN  dextrose Oral Gel 15 Gram(s) Oral once PRN                                            ------------------------------------------------------------  VITAL SIGNS: Last 24 Hours  T(C): 36.4 (23 Jul 2022 07:55), Max: 36.7 (22 Jul 2022 16:25)  T(F): 97.6 (23 Jul 2022 07:55), Max: 98.1 (22 Jul 2022 16:25)  HR: 89 (23 Jul 2022 07:55) (89 - 113)  BP: 136/78 (23 Jul 2022 07:55) (123/60 - 142/67)  BP(mean): --  RR: 18 (23 Jul 2022 07:55) (18 - 18)  SpO2: 96% (22 Jul 2022 16:25) (96% - 96%)                                             --------------------------------------------------------------  LABS:                                                                    -------------------------------------------------------------  RADIOLOGY:                                            --------------------------------------------------------------    PHYSICAL EXAM:  GENERAL: NAD, sitting uip in bed in bed comfortably  HEAD:  Atraumatic, Normocephalic. Poor dentition.  EYES: EOMI, conjunctiva and sclera clear  ENT: Moist mucous membranes  NECK: Supple, No JVD  CHEST/LUNG: Clear to auscultation bilaterally; No rales, rhonchi, wheezing, or rubs. Unlabored respirations  HEART: regular rate and rhythm; No murmurs, rubs, or gallops  ABDOMEN: Bowel sounds present; Soft, Nontender, Nondistended.    EXTREMITIES: Warm. No cyanosis, or edema. Clubbing of fingernails. Scab on R calf and healing well.  NERVOUS SYSTEM:  Alert & Oriented X3. No focal deficits   SKIN: No rashes or lesion                                           --------------------------------------------------------------

## 2022-07-24 LAB
GLUCOSE BLDC GLUCOMTR-MCNC: 115 MG/DL — HIGH (ref 70–99)
GLUCOSE BLDC GLUCOMTR-MCNC: 158 MG/DL — HIGH (ref 70–99)
GLUCOSE BLDC GLUCOMTR-MCNC: 193 MG/DL — HIGH (ref 70–99)
GLUCOSE BLDC GLUCOMTR-MCNC: 268 MG/DL — HIGH (ref 70–99)

## 2022-07-24 PROCEDURE — 99231 SBSQ HOSP IP/OBS SF/LOW 25: CPT

## 2022-07-24 RX ADMIN — Medication 6: at 11:21

## 2022-07-24 RX ADMIN — Medication 8 UNIT(S): at 17:05

## 2022-07-24 RX ADMIN — Medication 1 TABLET(S): at 17:06

## 2022-07-24 RX ADMIN — Medication 2: at 17:05

## 2022-07-24 RX ADMIN — CHLORHEXIDINE GLUCONATE 1 APPLICATION(S): 213 SOLUTION TOPICAL at 06:00

## 2022-07-24 RX ADMIN — Medication 1 APPLICATION(S): at 06:00

## 2022-07-24 RX ADMIN — Medication 1 APPLICATION(S): at 17:06

## 2022-07-24 RX ADMIN — INSULIN GLARGINE 22 UNIT(S): 100 INJECTION, SOLUTION SUBCUTANEOUS at 08:40

## 2022-07-24 RX ADMIN — Medication 8 UNIT(S): at 11:22

## 2022-07-24 RX ADMIN — Medication 1 TABLET(S): at 07:00

## 2022-07-24 RX ADMIN — FAMOTIDINE 20 MILLIGRAM(S): 10 INJECTION INTRAVENOUS at 11:22

## 2022-07-24 RX ADMIN — Medication 1 APPLICATION(S): at 11:24

## 2022-07-24 NOTE — PROGRESS NOTE ADULT - SUBJECTIVE AND OBJECTIVE BOX
SUBJECTIVE:    Patient is a 47y old Male who presents with a chief complaint of DFU (23 Jul 2022 11:25)    Currently admitted to medicine with the primary diagnosis of Onychomycosis       Today is hospital day 115d.     PAST MEDICAL & SURGICAL HISTORY  DM (diabetes mellitus)    Intellectual disability      ALLERGIES:  penicillin (Unknown)    MEDICATIONS:  STANDING MEDICATIONS  ammonium lactate 12% Lotion 1 Application(s) Topical two times a day  chlorhexidine 4% Liquid 1 Application(s) Topical <User Schedule>  clotrimazole 1% Cream 1 Application(s) Topical two times a day  dextrose 5%. 1000 milliLiter(s) IV Continuous <Continuous>  dextrose 5%. 1000 milliLiter(s) IV Continuous <Continuous>  dextrose 50% Injectable 25 Gram(s) IV Push once  dextrose 50% Injectable 12.5 Gram(s) IV Push once  dextrose 50% Injectable 25 Gram(s) IV Push once  famotidine    Tablet 20 milliGRAM(s) Oral daily  glucagon  Injectable 1 milliGRAM(s) IntraMuscular once  glucagon  Injectable 1 milliGRAM(s) IntraMuscular once  insulin glargine Injectable (LANTUS) 22 Unit(s) SubCutaneous every morning  insulin lispro (ADMELOG) corrective regimen sliding scale   SubCutaneous three times a day before meals  insulin lispro Injectable (ADMELOG) 8 Unit(s) SubCutaneous three times a day before meals  lactobacillus acidophilus 1 Tablet(s) Oral two times a day  melatonin 3 milliGRAM(s) Oral at bedtime  vitamin A &amp; D Ointment 1 Application(s) Topical daily    PRN MEDICATIONS  acetaminophen     Tablet .. 650 milliGRAM(s) Oral every 6 hours PRN  dextrose Oral Gel 15 Gram(s) Oral once PRN    VITALS:   T(F): 97.1  HR: 75  BP: 131/60  RR: 18  SpO2: --    LABS:                        RADIOLOGY:    PHYSICAL EXAM:  GEN: No acute distress  LUNGS: Clear to auscultation bilaterally   HEART: S1/S2 present. RRR.   ABD/ GI: Soft, non-tender, non-distended. Bowel sounds present  EXT: NC/NC/NE/2+PP/LOJA  NEURO: AAOX3

## 2022-07-25 LAB
GLUCOSE BLDC GLUCOMTR-MCNC: 185 MG/DL — HIGH (ref 70–99)
GLUCOSE BLDC GLUCOMTR-MCNC: 191 MG/DL — HIGH (ref 70–99)
GLUCOSE BLDC GLUCOMTR-MCNC: 270 MG/DL — HIGH (ref 70–99)
GLUCOSE BLDC GLUCOMTR-MCNC: 284 MG/DL — HIGH (ref 70–99)

## 2022-07-25 PROCEDURE — 99231 SBSQ HOSP IP/OBS SF/LOW 25: CPT

## 2022-07-25 RX ADMIN — Medication 3 MILLIGRAM(S): at 22:08

## 2022-07-25 RX ADMIN — Medication 2: at 12:59

## 2022-07-25 RX ADMIN — CHLORHEXIDINE GLUCONATE 1 APPLICATION(S): 213 SOLUTION TOPICAL at 05:57

## 2022-07-25 RX ADMIN — Medication 1 APPLICATION(S): at 12:59

## 2022-07-25 RX ADMIN — Medication 6: at 16:50

## 2022-07-25 RX ADMIN — Medication 8 UNIT(S): at 12:59

## 2022-07-25 RX ADMIN — Medication 1 TABLET(S): at 19:36

## 2022-07-25 RX ADMIN — Medication 1 APPLICATION(S): at 19:36

## 2022-07-25 RX ADMIN — Medication 8 UNIT(S): at 16:50

## 2022-07-25 RX ADMIN — Medication 2: at 08:58

## 2022-07-25 RX ADMIN — Medication 8 UNIT(S): at 08:57

## 2022-07-25 RX ADMIN — Medication 1 APPLICATION(S): at 05:58

## 2022-07-25 RX ADMIN — Medication 1 APPLICATION(S): at 05:57

## 2022-07-25 RX ADMIN — INSULIN GLARGINE 22 UNIT(S): 100 INJECTION, SOLUTION SUBCUTANEOUS at 08:58

## 2022-07-25 NOTE — PROGRESS NOTE ADULT - ASSESSMENT
45 yo M with PMH of Intellectual Disability and DM not on any medications presented with foot wounds.    Covid  PNA  Onychomycosis  Dental caries  DM2 with hyperglycemia due to dietary non-compliance  Intellectual disability/autism        Covid PNA- completed RDV. Resolved  Nail bed wound and onychomycosis. Status post Aseptic debridement and curettage of all fungal and ingrowing nails 1-5 bilateral with sterile nail nipper 06/07  Podiatry- no surgical intervention is advised. outpt podiatry f/u  DM type 2 - uncontrolled. A1C 8.9.Insulin glargine and lispro ( doses adjusted). Not compliant with diabetic diet. Counseling provided  Dental Caries-completed clinda course. Will need outpt f/u for possible multiple extractions  Insomnia- prn melatonin    Handoff:  Pending legal issue and guardianship now established, medically stable for discharge

## 2022-07-25 NOTE — CHART NOTE - NSCHARTNOTEFT_GEN_A_CORE
PO remains optimum per EMR documentation. 7/9-7/24 intake: %.     Not at risk, will re-assess in 10 days.

## 2022-07-25 NOTE — PROGRESS NOTE ADULT - SUBJECTIVE AND OBJECTIVE BOX
SUBJECTIVE:    Patient is a 47y old Male who presents with a chief complaint of DFU (24 Jul 2022 15:19)    Currently admitted to medicine with the primary diagnosis of Onychomycosis       Today is hospital day 116d.     PAST MEDICAL & SURGICAL HISTORY  DM (diabetes mellitus)    Intellectual disability      ALLERGIES:  penicillin (Unknown)    MEDICATIONS:  STANDING MEDICATIONS  ammonium lactate 12% Lotion 1 Application(s) Topical two times a day  chlorhexidine 4% Liquid 1 Application(s) Topical <User Schedule>  clotrimazole 1% Cream 1 Application(s) Topical two times a day  dextrose 5%. 1000 milliLiter(s) IV Continuous <Continuous>  dextrose 5%. 1000 milliLiter(s) IV Continuous <Continuous>  dextrose 50% Injectable 25 Gram(s) IV Push once  dextrose 50% Injectable 12.5 Gram(s) IV Push once  dextrose 50% Injectable 25 Gram(s) IV Push once  famotidine    Tablet 20 milliGRAM(s) Oral daily  glucagon  Injectable 1 milliGRAM(s) IntraMuscular once  glucagon  Injectable 1 milliGRAM(s) IntraMuscular once  insulin glargine Injectable (LANTUS) 22 Unit(s) SubCutaneous every morning  insulin lispro (ADMELOG) corrective regimen sliding scale   SubCutaneous three times a day before meals  insulin lispro Injectable (ADMELOG) 8 Unit(s) SubCutaneous three times a day before meals  lactobacillus acidophilus 1 Tablet(s) Oral two times a day  melatonin 3 milliGRAM(s) Oral at bedtime  vitamin A &amp; D Ointment 1 Application(s) Topical daily    PRN MEDICATIONS  acetaminophen     Tablet .. 650 milliGRAM(s) Oral every 6 hours PRN  dextrose Oral Gel 15 Gram(s) Oral once PRN    VITALS:   T(F): 96.9  HR: 123  BP: 130/78  RR: 18  SpO2: --    LABS:                        RADIOLOGY:    PHYSICAL EXAM:  GEN: No acute distress  LUNGS: Clear to auscultation bilaterally   HEART: S1/S2 present. RRR.   ABD/ GI: Soft, non-tender, non-distended. Bowel sounds present  EXT: NC/NC/NE/2+PP/LOJA  NEURO: AAOX3

## 2022-07-25 NOTE — PROGRESS NOTE ADULT - SUBJECTIVE AND OBJECTIVE BOX
CARIN WATSON 47y Male  MRN#: [id number]   CODE STATUS: Full      SUBJECTIVE  Patient is a 47y old Male who presents with a chief complaint of DFU (24 Jul 2022 15:19)  Currently admitted to medicine with the primary diagnosis of Onychomycosis    Today is hospital day 116d, and this morning he appears well and had no complaints. No acute events overnight.      OBJECTIVE  PAST MEDICAL & SURGICAL HISTORY  DM (diabetes mellitus)  Intellectual disability      ALLERGIES:  penicillin (Unknown)    MEDICATIONS:  STANDING MEDICATIONS  ammonium lactate 12% Lotion 1 Application(s) Topical two times a day  chlorhexidine 4% Liquid 1 Application(s) Topical <User Schedule>  clotrimazole 1% Cream 1 Application(s) Topical two times a day  dextrose 5%. 1000 milliLiter(s) IV Continuous <Continuous>  dextrose 5%. 1000 milliLiter(s) IV Continuous <Continuous>  dextrose 50% Injectable 25 Gram(s) IV Push once  dextrose 50% Injectable 12.5 Gram(s) IV Push once  dextrose 50% Injectable 25 Gram(s) IV Push once  famotidine    Tablet 20 milliGRAM(s) Oral daily  glucagon  Injectable 1 milliGRAM(s) IntraMuscular once  glucagon  Injectable 1 milliGRAM(s) IntraMuscular once  insulin glargine Injectable (LANTUS) 22 Unit(s) SubCutaneous every morning  insulin lispro (ADMELOG) corrective regimen sliding scale   SubCutaneous three times a day before meals  insulin lispro Injectable (ADMELOG) 8 Unit(s) SubCutaneous three times a day before meals  lactobacillus acidophilus 1 Tablet(s) Oral two times a day  melatonin 3 milliGRAM(s) Oral at bedtime  vitamin A &amp; D Ointment 1 Application(s) Topical daily    PRN MEDICATIONS  acetaminophen     Tablet .. 650 milliGRAM(s) Oral every 6 hours PRN  dextrose Oral Gel 15 Gram(s) Oral once PRN      VITAL SIGNS: Last 24 Hours  T(C): 36.6 (25 Jul 2022 09:25), Max: 37.4 (24 Jul 2022 15:28)  T(F): 97.9 (25 Jul 2022 09:25), Max: 99.3 (24 Jul 2022 15:28)  HR: 97 (25 Jul 2022 09:25) (89 - 97)  BP: 128/72 (25 Jul 2022 09:25) (121/59 - 133/76)  RR: 18 (25 Jul 2022 09:25) (16 - 18)      LABS:    RADIOLOGY:      PHYSICAL EXAM:  GENERAL: NAD, well-developed  HEENT:  Atraumatic, Normocephalic. EOMI  PULMONARY: Clear to auscultation bilaterally; No wheeze  CARDIOVASCULAR: Regular rate and rhythm; No murmurs, rubs, or gallops  GASTROINTESTINAL: Soft, Nontender, Nondistended; Bowel sounds present  MUSCULOSKELETAL:  2+ Peripheral Pulses, No clubbing, cyanosis, or edema  NEUROLOGY: non-focal, movement normal throughout  SKIN: No rashes or lesions      ADMISSION SUMMARY  Patient is a 47y old Male who presents with a chief complaint of DFU (24 Jul 2022 15:19)  Currently admitted to medicine with the primary diagnosis of Onychomycosis    ASSESSMENT & PLAN  45 yo M with PMH of Intellectual Disability and DM not on any medications presented with foot wounds.    Covid PNA  Resolved, asymptomatic  Completed RDV    Nail bed wound and onychomycosis.   Status post Aseptic debridement and curettage of all fungal and ingrowing nails 1-5 bilateral with sterile nail nipper 06/07  Aseptic debridement and curettage of all fungal and ingrowing nails 1-5 bilateral with sterile nail nipper.  Discussed importance of daily foot examinations, drying of feet after bathing and proper shoe gear.  Patient Would Benefit From Periodic Debridements; Can Follow Up as Outpatient w/ Dr. Bronson Post Discharge     DM type 2 - uncontrolled.  Continue  metformin 1000mg BID and insulin glargine and lispro ( doses adjusted)  Dental Caries-completed clinda for 7 days. Will need outpt f/u for possible multiple extractions  Insomnia- prn melatonin    Handoff:  Pending legal issue and guardianship, else medically stable for discharge

## 2022-07-26 LAB
GLUCOSE BLDC GLUCOMTR-MCNC: 102 MG/DL — HIGH (ref 70–99)
GLUCOSE BLDC GLUCOMTR-MCNC: 194 MG/DL — HIGH (ref 70–99)
GLUCOSE BLDC GLUCOMTR-MCNC: 239 MG/DL — HIGH (ref 70–99)
GLUCOSE BLDC GLUCOMTR-MCNC: 258 MG/DL — HIGH (ref 70–99)

## 2022-07-26 PROCEDURE — 99232 SBSQ HOSP IP/OBS MODERATE 35: CPT

## 2022-07-26 RX ADMIN — Medication 1 APPLICATION(S): at 06:26

## 2022-07-26 RX ADMIN — Medication 1 APPLICATION(S): at 11:45

## 2022-07-26 RX ADMIN — FAMOTIDINE 20 MILLIGRAM(S): 10 INJECTION INTRAVENOUS at 11:44

## 2022-07-26 RX ADMIN — Medication 1 TABLET(S): at 06:24

## 2022-07-26 RX ADMIN — INSULIN GLARGINE 22 UNIT(S): 100 INJECTION, SOLUTION SUBCUTANEOUS at 08:31

## 2022-07-26 RX ADMIN — Medication 1 TABLET(S): at 17:29

## 2022-07-26 RX ADMIN — Medication 2: at 17:28

## 2022-07-26 RX ADMIN — CHLORHEXIDINE GLUCONATE 1 APPLICATION(S): 213 SOLUTION TOPICAL at 06:25

## 2022-07-26 RX ADMIN — Medication 8 UNIT(S): at 11:45

## 2022-07-26 RX ADMIN — Medication 3 MILLIGRAM(S): at 21:29

## 2022-07-26 RX ADMIN — Medication 1 APPLICATION(S): at 06:24

## 2022-07-26 RX ADMIN — Medication 8 UNIT(S): at 17:28

## 2022-07-26 RX ADMIN — Medication 1 APPLICATION(S): at 18:05

## 2022-07-26 RX ADMIN — Medication 4: at 11:44

## 2022-07-26 RX ADMIN — Medication 1 APPLICATION(S): at 17:29

## 2022-07-26 NOTE — PROGRESS NOTE ADULT - SUBJECTIVE AND OBJECTIVE BOX
CARIN WATSON 47y Male  MRN#: [id number]   CODE STATUS:________full    Hospital Day: 117d    Pt is currently admitted with the primary diagnosis of onychomycosis    SUBJECTIVE  Hospital Course    Overnight events     NAEO    Subjective complaints     Patient was examined and seen by the bedside. No complaints. Denies fever, chills, SOB, nausea, vomiting.                                           OBJECTIVE  PAST MEDICAL & SURGICAL HISTORY  DM (diabetes mellitus)    Intellectual disability                                                ALLERGIES:  penicillin (Unknown)                           HOME MEDICATIONS  Home Medications:  vitamin A and D topical ointment: 1 application topically once a day (12 Apr 2022 11:40)                           MEDICATIONS:  STANDING MEDICATIONS  ammonium lactate 12% Lotion 1 Application(s) Topical two times a day  chlorhexidine 4% Liquid 1 Application(s) Topical <User Schedule>  clotrimazole 1% Cream 1 Application(s) Topical two times a day  dextrose 5%. 1000 milliLiter(s) IV Continuous <Continuous>  dextrose 5%. 1000 milliLiter(s) IV Continuous <Continuous>  dextrose 50% Injectable 25 Gram(s) IV Push once  dextrose 50% Injectable 12.5 Gram(s) IV Push once  dextrose 50% Injectable 25 Gram(s) IV Push once  famotidine    Tablet 20 milliGRAM(s) Oral daily  glucagon  Injectable 1 milliGRAM(s) IntraMuscular once  glucagon  Injectable 1 milliGRAM(s) IntraMuscular once  insulin glargine Injectable (LANTUS) 22 Unit(s) SubCutaneous every morning  insulin lispro (ADMELOG) corrective regimen sliding scale   SubCutaneous three times a day before meals  insulin lispro Injectable (ADMELOG) 8 Unit(s) SubCutaneous three times a day before meals  lactobacillus acidophilus 1 Tablet(s) Oral two times a day  melatonin 3 milliGRAM(s) Oral at bedtime  vitamin A &amp; D Ointment 1 Application(s) Topical daily    PRN MEDICATIONS  acetaminophen     Tablet .. 650 milliGRAM(s) Oral every 6 hours PRN  dextrose Oral Gel 15 Gram(s) Oral once PRN                                            ------------------------------------------------------------  VITAL SIGNS: Last 24 Hours  T(C): 35.9 (26 Jul 2022 00:21), Max: 36.6 (25 Jul 2022 09:25)  T(F): 96.7 (26 Jul 2022 00:21), Max: 97.9 (25 Jul 2022 09:25)  HR: 95 (26 Jul 2022 00:21) (95 - 123)  BP: 120/68 (26 Jul 2022 00:21) (120/68 - 130/78)  BP(mean): --  RR: 18 (26 Jul 2022 00:21) (18 - 18)  SpO2: --                                               LABS:                                                                    RADIOLOGY:        PHYSICAL EXAM:  GENERAL: NAD, lying in bed comfortably  HEAD:  Atraumatic, Normocephalic  EYES: EOMI, PERRLA, conjunctiva and sclera clear  ENT: Moist mucous membranes  NECK: Supple, No JVD  CHEST/LUNG: Clear to auscultation bilaterally; No rales, rhonchi, wheezing, or rubs. Unlabored respirations  HEART: Regular rate and rhythm; No murmurs, rubs, or gallops  ABDOMEN: BSx4; Soft, nontender, nondistended  EXTREMITIES:  No clubbing, cyanosis, or edema  NERVOUS SYSTEM:  A&Ox3, no focal deficits   SKIN: No rashes or lesions                                         ASSESSMENT & PLAN    45 yo M with PMH of Intellectual Disability and DM not on any medications presented with foot wounds.    #Covid PNA  Resolved, asymptomatic  Completed RDV    #Nail bed wound and onychomycosis.   Status post Aseptic debridement and curettage of all fungal and ingrowing nails 1-5 bilateral with sterile nail nipper 06/07  Aseptic debridement and curettage of all fungal and ingrowing nails 1-5 bilateral with sterile nail nipper.  Discussed importance of daily foot examinations, drying of feet after bathing and proper shoe gear.  Patient Would Benefit From Periodic Debridements; Can Follow Up as Outpatient w/ Dr. Bronson Post Discharge     #DM type 2 - uncontrolled.  Continue  metformin 1000mg BID and insulin glargine and lispro ( doses adjusted)    #Dental Caries  -completed clinda for 7 days. Will need outpt f/u for possible multiple extractions    #Insomnia  - prn melatonin    Handoff:  Pending legal issue and guardianship, else medically stable for discharge

## 2022-07-26 NOTE — PROGRESS NOTE ADULT - ASSESSMENT
47 yo M with PMH of Intellectual Disability and DM not on any medications presented with foot wounds.    Covid  PNA  Onychomycosis  Dental caries  DM2 with hyperglycemia due to dietary non-compliance  Intellectual disability/autism        Covid PNA- completed RDV. Resolved  Nail bed wound and onychomycosis. Status post Aseptic debridement and curettage of all fungal and ingrowing nails 1-5 bilateral with sterile nail nipper 06/07  Podiatry- no surgical intervention is advised. outpt podiatry f/u  DM type 2 - uncontrolled. A1C 8.9.Insulin glargine and lispro ( doses adjusted). Not compliant with diabetic diet. Counseling provided  Dental Caries-completed clinda course. Will need outpt f/u for possible multiple extractions  Insomnia- prn melatonin    Handoff:  Pending legal issue and guardianship now established, medically stable for discharge

## 2022-07-26 NOTE — PROGRESS NOTE ADULT - SUBJECTIVE AND OBJECTIVE BOX
SUBJECTIVE:    Patient is a 47y old Male who presents with a chief complaint of DFU (26 Jul 2022 08:46)    Currently admitted to medicine with the primary diagnosis of Onychomycosis       Today is hospital day 117d.     PAST MEDICAL & SURGICAL HISTORY  DM (diabetes mellitus)    Intellectual disability      ALLERGIES:  penicillin (Unknown)    MEDICATIONS:  STANDING MEDICATIONS  ammonium lactate 12% Lotion 1 Application(s) Topical two times a day  chlorhexidine 4% Liquid 1 Application(s) Topical <User Schedule>  clotrimazole 1% Cream 1 Application(s) Topical two times a day  dextrose 5%. 1000 milliLiter(s) IV Continuous <Continuous>  dextrose 5%. 1000 milliLiter(s) IV Continuous <Continuous>  dextrose 50% Injectable 25 Gram(s) IV Push once  dextrose 50% Injectable 12.5 Gram(s) IV Push once  dextrose 50% Injectable 25 Gram(s) IV Push once  famotidine    Tablet 20 milliGRAM(s) Oral daily  glucagon  Injectable 1 milliGRAM(s) IntraMuscular once  glucagon  Injectable 1 milliGRAM(s) IntraMuscular once  insulin glargine Injectable (LANTUS) 22 Unit(s) SubCutaneous every morning  insulin lispro (ADMELOG) corrective regimen sliding scale   SubCutaneous three times a day before meals  insulin lispro Injectable (ADMELOG) 8 Unit(s) SubCutaneous three times a day before meals  lactobacillus acidophilus 1 Tablet(s) Oral two times a day  melatonin 3 milliGRAM(s) Oral at bedtime  vitamin A &amp; D Ointment 1 Application(s) Topical daily    PRN MEDICATIONS  acetaminophen     Tablet .. 650 milliGRAM(s) Oral every 6 hours PRN  dextrose Oral Gel 15 Gram(s) Oral once PRN    VITALS:   T(F): 97  HR: 88  BP: 134/61  RR: 18  SpO2: 100%    LABS:                        RADIOLOGY:    PHYSICAL EXAM:  GEN: No acute distress  LUNGS: Clear to auscultation bilaterally   HEART: S1/S2 present. RRR.   ABD/ GI: Soft, non-tender, non-distended. Bowel sounds present  EXT: NC/NC/NE/2+PP/LOJA  NEURO: AAOX3

## 2022-07-27 LAB
GLUCOSE BLDC GLUCOMTR-MCNC: 131 MG/DL — HIGH (ref 70–99)
GLUCOSE BLDC GLUCOMTR-MCNC: 173 MG/DL — HIGH (ref 70–99)
GLUCOSE BLDC GLUCOMTR-MCNC: 185 MG/DL — HIGH (ref 70–99)
GLUCOSE BLDC GLUCOMTR-MCNC: 260 MG/DL — HIGH (ref 70–99)

## 2022-07-27 PROCEDURE — 99231 SBSQ HOSP IP/OBS SF/LOW 25: CPT

## 2022-07-27 RX ADMIN — Medication 1 APPLICATION(S): at 11:59

## 2022-07-27 RX ADMIN — Medication 1 APPLICATION(S): at 06:03

## 2022-07-27 RX ADMIN — INSULIN GLARGINE 22 UNIT(S): 100 INJECTION, SOLUTION SUBCUTANEOUS at 08:23

## 2022-07-27 RX ADMIN — Medication 2: at 11:39

## 2022-07-27 RX ADMIN — Medication 1 APPLICATION(S): at 18:09

## 2022-07-27 RX ADMIN — Medication 6: at 18:10

## 2022-07-27 RX ADMIN — FAMOTIDINE 20 MILLIGRAM(S): 10 INJECTION INTRAVENOUS at 11:41

## 2022-07-27 RX ADMIN — Medication 3 MILLIGRAM(S): at 21:08

## 2022-07-27 RX ADMIN — Medication 1 TABLET(S): at 06:02

## 2022-07-27 RX ADMIN — Medication 8 UNIT(S): at 18:10

## 2022-07-27 RX ADMIN — Medication 8 UNIT(S): at 11:40

## 2022-07-27 RX ADMIN — Medication 1 TABLET(S): at 18:11

## 2022-07-27 RX ADMIN — Medication 8 UNIT(S): at 08:23

## 2022-07-27 NOTE — PROGRESS NOTE ADULT - SUBJECTIVE AND OBJECTIVE BOX
CARIN WATSON 47y Male  MRN#: [id number]   CODE STATUS:________full    Hospital Day: 118d    Pt is currently admitted with the primary diagnosis of onychomycosis    SUBJECTIVE      Overnight events     NAEO    Subjective complaints     Patient was examined and seen by the bedside. No complaints. Denies fever, chills, SOB, nausea, vomiting.                                             OBJECTIVE  PAST MEDICAL & SURGICAL HISTORY  DM (diabetes mellitus)    Intellectual disability                                                ALLERGIES:  penicillin (Unknown)                           HOME MEDICATIONS  Home Medications:  vitamin A and D topical ointment: 1 application topically once a day (12 Apr 2022 11:40)                           MEDICATIONS:  STANDING MEDICATIONS  ammonium lactate 12% Lotion 1 Application(s) Topical two times a day  chlorhexidine 4% Liquid 1 Application(s) Topical <User Schedule>  clotrimazole 1% Cream 1 Application(s) Topical two times a day  dextrose 5%. 1000 milliLiter(s) IV Continuous <Continuous>  dextrose 5%. 1000 milliLiter(s) IV Continuous <Continuous>  dextrose 50% Injectable 25 Gram(s) IV Push once  dextrose 50% Injectable 12.5 Gram(s) IV Push once  dextrose 50% Injectable 25 Gram(s) IV Push once  famotidine    Tablet 20 milliGRAM(s) Oral daily  glucagon  Injectable 1 milliGRAM(s) IntraMuscular once  glucagon  Injectable 1 milliGRAM(s) IntraMuscular once  insulin glargine Injectable (LANTUS) 22 Unit(s) SubCutaneous every morning  insulin lispro (ADMELOG) corrective regimen sliding scale   SubCutaneous three times a day before meals  insulin lispro Injectable (ADMELOG) 8 Unit(s) SubCutaneous three times a day before meals  lactobacillus acidophilus 1 Tablet(s) Oral two times a day  melatonin 3 milliGRAM(s) Oral at bedtime  vitamin A &amp; D Ointment 1 Application(s) Topical daily    PRN MEDICATIONS  acetaminophen     Tablet .. 650 milliGRAM(s) Oral every 6 hours PRN  dextrose Oral Gel 15 Gram(s) Oral once PRN                                            ------------------------------------------------------------  VITAL SIGNS: Last 24 Hours  T(C): 36.1 (27 Jul 2022 00:01), Max: 36.1 (26 Jul 2022 16:00)  T(F): 96.9 (27 Jul 2022 00:01), Max: 97 (26 Jul 2022 16:00)  HR: 89 (27 Jul 2022 00:01) (88 - 89)  BP: 133/59 (27 Jul 2022 00:01) (133/59 - 134/61)  BP(mean): --  RR: 18 (27 Jul 2022 00:01) (18 - 18)  SpO2: 100% (26 Jul 2022 16:00) (100% - 100%)                                               LABS:                                                                    RADIOLOGY:        PHYSICAL EXAM:  GENERAL: NAD, lying in bed comfortably  HEAD:  Atraumatic, Normocephalic  EYES: EOMI, PERRLA, conjunctiva and sclera clear  ENT: Moist mucous membranes  NECK: Supple, No JVD  CHEST/LUNG: Clear to auscultation bilaterally; No rales, rhonchi, wheezing, or rubs. Unlabored respirations  HEART: Regular rate and rhythm; No murmurs, rubs, or gallops  ABDOMEN: BSx4; Soft, nontender, nondistended  EXTREMITIES:  No clubbing, cyanosis, or edema  NERVOUS SYSTEM:  A&Ox3, no focal deficits   SKIN: No rashes or lesions                                         ASSESSMENT & PLAN    47 yo M with PMH of Intellectual Disability and DM not on any medications presented with foot wounds.    #Covid PNA  Resolved, asymptomatic  Completed RDV    #Nail bed wound and onychomycosis.   Status post Aseptic debridement and curettage of all fungal and ingrowing nails 1-5 bilateral with sterile nail nipper 06/07  Aseptic debridement and curettage of all fungal and ingrowing nails 1-5 bilateral with sterile nail nipper.  Discussed importance of daily foot examinations, drying of feet after bathing and proper shoe gear.  Patient Would Benefit From Periodic Debridements; Can Follow Up as Outpatient w/ Dr. Bronson Post Discharge     #DM type 2 - uncontrolled.  Continue  metformin 1000mg BID and insulin glargine and lispro ( doses adjusted)    #Dental Caries  -completed clinda for 7 days. Will need outpt f/u for possible multiple extractions    #Insomnia  - prn melatonin    Handoff:  Pending legal issue and guardianship, else medically stable for discharge

## 2022-07-27 NOTE — PROGRESS NOTE ADULT - SUBJECTIVE AND OBJECTIVE BOX
Progress Note:  Provider Speciality                            Hospitalist      CARIN WATSON MRN-[id number] 46y Male     CHIEF PRESENTING COMPLAINT:  Patient is a 46y old  Male who presents with a chief complaint of DFU (06 Apr 2022 12:18)        SUBJECTIVE:  Patient was seen and examined at bedside.  No new complaints described by patient in morning rounds.   HISTORY OF PRESENTING ILLNESS:  HPI:  45 yo M with PMHx of Intellectual Disability, DM not on any medications presents with foot wounds. Pt has very poor foot hygiene and per the sister, has been persistently scratching an open wound on his L second toe, which worsened and became more red, had drainage, malodor. He saw a physician at [facility] and was told to come to the ED for evaluation. Pt is poor historian. Does endorse abdominal pain for a few days, cannot give much more description. Per the sister, pt did not seem to have any recent fevers, however is also not a very good historian and does not seem to have good health literacy. States that her and her mom decided to stop giving pt Metformin a while ago, are treating his diabetes by not giving him any sugary foods.  In the ED, T 97.8, /87, , RR 16, SpO2 99% on RA. Lab work unrevealing.  (31 Mar 2022 22:36)        REVIEW OF SYSTEMS:  Patient denies any headache, any vision complaints, runny nose. Denies chest pain, shortness of breath, palpitation. Denies nausea, vomiting, abdominal pain or diarrhoea, Denies dysuria. Denies  localized weakness in any part of the body or numbness.   At least 10 systems were reviewed in ROS. All systems reviewed  are within normal limits except for the complaints as described in Subjective.    PAST MEDICAL & SURGICAL HISTORY:  PAST MEDICAL & SURGICAL HISTORY:  DM (diabetes mellitus)    Intellectual disability            VITAL SIGNS:  Vital Signs Last 24 Hrs  T(C): 35.7 (27 Jul 2022 08:19), Max: 36.1 (26 Jul 2022 16:00)  T(F): 96.2 (27 Jul 2022 08:19), Max: 97 (26 Jul 2022 16:00)  HR: 72 (27 Jul 2022 08:19) (72 - 89)  BP: 127/72 (27 Jul 2022 08:19) (127/72 - 134/61)  BP(mean): --  RR: 18 (27 Jul 2022 08:19) (18 - 18)  SpO2: 100% (26 Jul 2022 16:00) (100% - 100%)    Parameters below as of 26 Jul 2022 16:00  Patient On (Oxygen Delivery Method): room air            PHYSICAL EXAMINATION:  Not in acute distress  General: No icterus  HEENT:   no JVD.  Heart: S1+S2 audible  Lungs: bilateral  moderate air entry, no wheezing, no crepitations.  Abdomen: Soft, non-tender, non-distended , no  rigidity or guarding.  CNS: Awake alert, CN  grossly intact. Intellectual disability  Extremities:  No edema    , onychomycosis toe nails        CONSULTS:  Consultant(s) Notes Reviewed by me.   Care Discussed with Consultants/Other Providers where required.        MEDICATIONS:  MEDICATIONS  (STANDING):  ammonium lactate 12% Lotion 1 Application(s) Topical two times a day  clotrimazole 1% Cream 1 Application(s) Topical two times a day  enoxaparin Injectable 40 milliGRAM(s) SubCutaneous every 24 hours  metFORMIN 1000 milliGRAM(s) Oral two times a day  pantoprazole    Tablet 40 milliGRAM(s) Oral before breakfast  vitamin A &amp; D Ointment 1 Application(s) Topical daily    MEDICATIONS  (PRN):  ondansetron Injectable 4 milliGRAM(s) IV Push every 6 hours PRN Nausea and/or Vomiting            ASSESSMENT:    45 yo M with PMH of Intellectual Disability and DM not on any medications presented with foot wounds.    Covid  PNA  Onychomycosis  Dental caries  DM2 with hyperglycemia due to dietary non-compliance  Intellectual disability/autism        Covid PNA- completed RDV. Resolved  Nail bed wound and onychomycosis. Status post Aseptic debridement and curettage of all fungal and ingrowing nails 1-5 bilateral with sterile nail nipper 06/07  Podiatry- no surgical intervention is advised. outpt podiatry f/u  DM type 2 - uncontrolled. A1C 8.9.Insulin glargine and lispro ( doses adjusted). Not compliant with diabetic diet. Counseling provided  Dental Caries-completed clinda course. Will need outpt f/u for possible multiple extractions  Insomnia- prn melatonin    Handoff:  Pending legal issues , guardianship establised, else medically stable for discharge

## 2022-07-28 LAB
GLUCOSE BLDC GLUCOMTR-MCNC: 109 MG/DL — HIGH (ref 70–99)
GLUCOSE BLDC GLUCOMTR-MCNC: 120 MG/DL — HIGH (ref 70–99)
GLUCOSE BLDC GLUCOMTR-MCNC: 145 MG/DL — HIGH (ref 70–99)
GLUCOSE BLDC GLUCOMTR-MCNC: 173 MG/DL — HIGH (ref 70–99)

## 2022-07-28 PROCEDURE — 99231 SBSQ HOSP IP/OBS SF/LOW 25: CPT

## 2022-07-28 RX ADMIN — Medication 1 APPLICATION(S): at 17:38

## 2022-07-28 RX ADMIN — Medication 1 TABLET(S): at 17:38

## 2022-07-28 RX ADMIN — Medication 2: at 11:29

## 2022-07-28 RX ADMIN — FAMOTIDINE 20 MILLIGRAM(S): 10 INJECTION INTRAVENOUS at 11:28

## 2022-07-28 RX ADMIN — Medication 1 TABLET(S): at 05:22

## 2022-07-28 RX ADMIN — Medication 8 UNIT(S): at 17:37

## 2022-07-28 RX ADMIN — Medication 1 APPLICATION(S): at 05:22

## 2022-07-28 RX ADMIN — INSULIN GLARGINE 22 UNIT(S): 100 INJECTION, SOLUTION SUBCUTANEOUS at 08:08

## 2022-07-28 RX ADMIN — Medication 8 UNIT(S): at 11:27

## 2022-07-28 RX ADMIN — Medication 3 MILLIGRAM(S): at 21:06

## 2022-07-28 RX ADMIN — Medication 1 APPLICATION(S): at 11:30

## 2022-07-28 RX ADMIN — Medication 8 UNIT(S): at 08:16

## 2022-07-28 NOTE — PROGRESS NOTE ADULT - SUBJECTIVE AND OBJECTIVE BOX
CARIN WATSON 47y Male  MRN#: [id number]   CODE STATUS:________full    Hospital Day: 119d    Pt is currently admitted with the primary diagnosis of onychomycosis    SUBJECTIVE  Hospital Course    Overnight events     NAEO    Subjective complaints     Patient was examined and seen by the bedside. No complaints. Denies fever, chills, SOB, nausea, vomiting.                                           OBJECTIVE  PAST MEDICAL & SURGICAL HISTORY  DM (diabetes mellitus)    Intellectual disability                                                ALLERGIES:  penicillin (Unknown)                           HOME MEDICATIONS  Home Medications:  vitamin A and D topical ointment: 1 application topically once a day (12 Apr 2022 11:40)                           MEDICATIONS:  STANDING MEDICATIONS  ammonium lactate 12% Lotion 1 Application(s) Topical two times a day  chlorhexidine 4% Liquid 1 Application(s) Topical <User Schedule>  clotrimazole 1% Cream 1 Application(s) Topical two times a day  dextrose 5%. 1000 milliLiter(s) IV Continuous <Continuous>  dextrose 5%. 1000 milliLiter(s) IV Continuous <Continuous>  dextrose 50% Injectable 25 Gram(s) IV Push once  dextrose 50% Injectable 12.5 Gram(s) IV Push once  dextrose 50% Injectable 25 Gram(s) IV Push once  famotidine    Tablet 20 milliGRAM(s) Oral daily  glucagon  Injectable 1 milliGRAM(s) IntraMuscular once  glucagon  Injectable 1 milliGRAM(s) IntraMuscular once  insulin glargine Injectable (LANTUS) 22 Unit(s) SubCutaneous every morning  insulin lispro (ADMELOG) corrective regimen sliding scale   SubCutaneous three times a day before meals  insulin lispro Injectable (ADMELOG) 8 Unit(s) SubCutaneous three times a day before meals  lactobacillus acidophilus 1 Tablet(s) Oral two times a day  melatonin 3 milliGRAM(s) Oral at bedtime  vitamin A &amp; D Ointment 1 Application(s) Topical daily    PRN MEDICATIONS  acetaminophen     Tablet .. 650 milliGRAM(s) Oral every 6 hours PRN  dextrose Oral Gel 15 Gram(s) Oral once PRN                                            ------------------------------------------------------------  VITAL SIGNS: Last 24 Hours  T(C): 36.1 (27 Jul 2022 23:47), Max: 36.1 (27 Jul 2022 23:47)  T(F): 96.9 (27 Jul 2022 23:47), Max: 96.9 (27 Jul 2022 23:47)  HR: 88 (27 Jul 2022 23:47) (72 - 104)  BP: 142/88 (27 Jul 2022 23:47) (127/72 - 157/71)  BP(mean): --  RR: 18 (27 Jul 2022 23:47) (18 - 18)  SpO2: --                                               LABS:                                                                    RADIOLOGY:        PHYSICAL EXAM:  GENERAL: NAD, lying in bed comfortably  HEAD:  Atraumatic, Normocephalic  EYES: EOMI, PERRLA, conjunctiva and sclera clear  ENT: Moist mucous membranes  NECK: Supple, No JVD  CHEST/LUNG: Clear to auscultation bilaterally; No rales, rhonchi, wheezing, or rubs. Unlabored respirations  HEART: Regular rate and rhythm; No murmurs, rubs, or gallops  ABDOMEN: BSx4; Soft, nontender, nondistended  EXTREMITIES:  No clubbing, cyanosis, or edema  NERVOUS SYSTEM:  A&Ox3, no focal deficits   SKIN: No rashes or lesions                                         ASSESSMENT & PLAN    47 yo M with PMH of Intellectual Disability and DM not on any medications presented with foot wounds.    #Covid PNA  Resolved, asymptomatic  Completed RDV    #Nail bed wound and onychomycosis.   Status post Aseptic debridement and curettage of all fungal and ingrowing nails 1-5 bilateral with sterile nail nipper 06/07  Aseptic debridement and curettage of all fungal and ingrowing nails 1-5 bilateral with sterile nail nipper.  Discussed importance of daily foot examinations, drying of feet after bathing and proper shoe gear.  Patient Would Benefit From Periodic Debridements; Can Follow Up as Outpatient w/ Dr. Bronson Post Discharge     #DM type 2 - uncontrolled.  Continue  metformin 1000mg BID and insulin glargine and lispro ( doses adjusted)    #Dental Caries  -completed clinda for 7 days. Will need outpt f/u for possible multiple extractions    #Insomnia  - prn melatonin    Handoff:  Pending legal issue and guardianship, else medically stable for discharge

## 2022-07-28 NOTE — PROGRESS NOTE ADULT - SUBJECTIVE AND OBJECTIVE BOX
Progress Note:  Provider Speciality                            Hospitalist      CARIN WATSON MRN-[id number] 46y Male     CHIEF PRESENTING COMPLAINT:  Patient is a 46y old  Male who presents with a chief complaint of DFU (06 Apr 2022 12:18)        SUBJECTIVE:  Patient was seen and examined at bedside.  No new complaints described by patient in morning rounds.   HISTORY OF PRESENTING ILLNESS:  HPI:  47 yo M with PMHx of Intellectual Disability, DM not on any medications presents with foot wounds. Pt has very poor foot hygiene and per the sister, has been persistently scratching an open wound on his L second toe, which worsened and became more red, had drainage, malodor. He saw a physician at [facility] and was told to come to the ED for evaluation. Pt is poor historian. Does endorse abdominal pain for a few days, cannot give much more description. Per the sister, pt did not seem to have any recent fevers, however is also not a very good historian and does not seem to have good health literacy. States that her and her mom decided to stop giving pt Metformin a while ago, are treating his diabetes by not giving him any sugary foods.  In the ED, T 97.8, /87, , RR 16, SpO2 99% on RA. Lab work unrevealing.  (31 Mar 2022 22:36)        REVIEW OF SYSTEMS:  Patient denies any headache, any vision complaints, runny nose. Denies chest pain, shortness of breath, palpitation. Denies nausea, vomiting, abdominal pain or diarrhoea, Denies dysuria. Denies  localized weakness in any part of the body or numbness.   At least 10 systems were reviewed in ROS. All systems reviewed  are within normal limits except for the complaints as described in Subjective.    PAST MEDICAL & SURGICAL HISTORY:  PAST MEDICAL & SURGICAL HISTORY:  DM (diabetes mellitus)    Intellectual disability            VITAL SIGNS:  Vital Signs Last 24 Hrs  T(C): 35.9 (28 Jul 2022 08:05), Max: 36.1 (27 Jul 2022 23:47)  T(F): 96.6 (28 Jul 2022 08:05), Max: 96.9 (27 Jul 2022 23:47)  HR: 92 (28 Jul 2022 08:05) (88 - 104)  BP: 124/63 (28 Jul 2022 08:05) (124/63 - 157/71)  BP(mean): --  RR: 18 (28 Jul 2022 08:05) (18 - 18)  SpO2: --              PHYSICAL EXAMINATION:  Not in acute distress  General: No icterus  HEENT:   no JVD.  Heart: S1+S2 audible  Lungs: bilateral  moderate air entry, no wheezing, no crepitations.  Abdomen: Soft, non-tender, non-distended , no  rigidity or guarding.  CNS: Awake alert, CN  grossly intact. Intellectual disability  Extremities:  No edema    , onychomycosis toe nails        CONSULTS:  Consultant(s) Notes Reviewed by me.   Care Discussed with Consultants/Other Providers where required.        MEDICATIONS:  MEDICATIONS  (STANDING):  ammonium lactate 12% Lotion 1 Application(s) Topical two times a day  clotrimazole 1% Cream 1 Application(s) Topical two times a day  enoxaparin Injectable 40 milliGRAM(s) SubCutaneous every 24 hours  metFORMIN 1000 milliGRAM(s) Oral two times a day  pantoprazole    Tablet 40 milliGRAM(s) Oral before breakfast  vitamin A &amp; D Ointment 1 Application(s) Topical daily    MEDICATIONS  (PRN):  ondansetron Injectable 4 milliGRAM(s) IV Push every 6 hours PRN Nausea and/or Vomiting            ASSESSMENT:    47 yo M with PMH of Intellectual Disability and DM not on any medications presented with foot wounds.    Covid  PNA  Onychomycosis  Dental caries  DM2 with hyperglycemia due to dietary non-compliance  Intellectual disability/autism        Covid PNA- completed RDV. Resolved  Nail bed wound and onychomycosis. Status post Aseptic debridement and curettage of all fungal and ingrowing nails 1-5 bilateral with sterile nail nipper 06/07  Podiatry- no surgical intervention is advised. outpt podiatry f/u  DM type 2 - uncontrolled. A1C 8.9.Insulin glargine and lispro ( doses adjusted). Not compliant with diabetic diet. Counseling provided  Dental Caries-completed clinda course. Will need outpt f/u for possible multiple extractions  Insomnia- prn melatonin    Handoff:  Pending placement , guardianship establised,  medically stable for discharge

## 2022-07-29 LAB
GLUCOSE BLDC GLUCOMTR-MCNC: 100 MG/DL — HIGH (ref 70–99)
GLUCOSE BLDC GLUCOMTR-MCNC: 103 MG/DL — HIGH (ref 70–99)
GLUCOSE BLDC GLUCOMTR-MCNC: 143 MG/DL — HIGH (ref 70–99)
GLUCOSE BLDC GLUCOMTR-MCNC: 159 MG/DL — HIGH (ref 70–99)

## 2022-07-29 PROCEDURE — 99231 SBSQ HOSP IP/OBS SF/LOW 25: CPT

## 2022-07-29 RX ADMIN — Medication 1 TABLET(S): at 17:47

## 2022-07-29 RX ADMIN — INSULIN GLARGINE 22 UNIT(S): 100 INJECTION, SOLUTION SUBCUTANEOUS at 08:59

## 2022-07-29 RX ADMIN — Medication 8 UNIT(S): at 08:59

## 2022-07-29 RX ADMIN — Medication 8 UNIT(S): at 17:03

## 2022-07-29 RX ADMIN — Medication 1 APPLICATION(S): at 05:04

## 2022-07-29 RX ADMIN — Medication 8 UNIT(S): at 12:48

## 2022-07-29 RX ADMIN — Medication 3 MILLIGRAM(S): at 21:29

## 2022-07-29 RX ADMIN — Medication 1 TABLET(S): at 05:05

## 2022-07-29 RX ADMIN — Medication 1 APPLICATION(S): at 17:47

## 2022-07-29 RX ADMIN — Medication 1 APPLICATION(S): at 12:48

## 2022-07-29 RX ADMIN — FAMOTIDINE 20 MILLIGRAM(S): 10 INJECTION INTRAVENOUS at 12:48

## 2022-07-29 NOTE — PROGRESS NOTE ADULT - SUBJECTIVE AND OBJECTIVE BOX
CARIN WATSON 47y Male  MRN#: [id number]   CODE STATUS:________full    Hospital Day: 120d    Pt is currently admitted with the primary diagnosis of onychomycosis    SUBJECTIVE  Hospital Course  45 yo M with PMHx of Intellectual Disability, DM not on any medications presents with foot wounds. Pt has very poor foot hygiene and per the sister, has been persistently scratching an open wound on his L second toe, which worsened and became more red, had drainage, malodor. He saw a physician at [facility] and was told to come to the ED for evaluation. Pt is poor historian. Does endorse abdominal pain for a few days, cannot give much more description. Per the sister, pt did not seem to have any recent fevers, however is also not a very good historian and does not seem to have good health literacy. States that her and her mom decided to stop giving pt Metformin a while ago, are treating his diabetes by not giving him any sugary foods.    Overnight events     NAEO    Subjective complaints     Patient was examined and seen by the bedside. No complaints. Denies fever, chills, SOB, nausea, vomiting.                                           OBJECTIVE  PAST MEDICAL & SURGICAL HISTORY  DM (diabetes mellitus)    Intellectual disability                                                ALLERGIES:  penicillin (Unknown)                           HOME MEDICATIONS  Home Medications:  vitamin A and D topical ointment: 1 application topically once a day (12 Apr 2022 11:40)                           MEDICATIONS:  STANDING MEDICATIONS  ammonium lactate 12% Lotion 1 Application(s) Topical two times a day  chlorhexidine 4% Liquid 1 Application(s) Topical <User Schedule>  clotrimazole 1% Cream 1 Application(s) Topical two times a day  dextrose 5%. 1000 milliLiter(s) IV Continuous <Continuous>  dextrose 5%. 1000 milliLiter(s) IV Continuous <Continuous>  dextrose 50% Injectable 25 Gram(s) IV Push once  dextrose 50% Injectable 12.5 Gram(s) IV Push once  dextrose 50% Injectable 25 Gram(s) IV Push once  famotidine    Tablet 20 milliGRAM(s) Oral daily  glucagon  Injectable 1 milliGRAM(s) IntraMuscular once  glucagon  Injectable 1 milliGRAM(s) IntraMuscular once  insulin glargine Injectable (LANTUS) 22 Unit(s) SubCutaneous every morning  insulin lispro (ADMELOG) corrective regimen sliding scale   SubCutaneous three times a day before meals  insulin lispro Injectable (ADMELOG) 8 Unit(s) SubCutaneous three times a day before meals  lactobacillus acidophilus 1 Tablet(s) Oral two times a day  melatonin 3 milliGRAM(s) Oral at bedtime  vitamin A &amp; D Ointment 1 Application(s) Topical daily    PRN MEDICATIONS  acetaminophen     Tablet .. 650 milliGRAM(s) Oral every 6 hours PRN  dextrose Oral Gel 15 Gram(s) Oral once PRN                                            ------------------------------------------------------------  VITAL SIGNS: Last 24 Hours  T(C): 36.1 (28 Jul 2022 23:44), Max: 36.1 (28 Jul 2022 16:11)  T(F): 96.9 (28 Jul 2022 23:44), Max: 97 (28 Jul 2022 16:11)  HR: 92 (28 Jul 2022 23:44) (92 - 97)  BP: 122/61 (28 Jul 2022 23:44) (122/61 - 134/79)  BP(mean): --  RR: 18 (28 Jul 2022 23:44) (18 - 18)  SpO2: --      07-28-22 @ 07:01  -  07-29-22 @ 07:00  --------------------------------------------------------  IN: 900 mL / OUT: 0 mL / NET: 900 mL                                               LABS:                                                                    RADIOLOGY:        PHYSICAL EXAM:  GENERAL: NAD, lying in bed comfortably  HEAD:  Atraumatic, Normocephalic  EYES: EOMI, PERRLA, conjunctiva and sclera clear  ENT: Moist mucous membranes  NECK: Supple, No JVD  CHEST/LUNG: Clear to auscultation bilaterally; No rales, rhonchi, wheezing, or rubs. Unlabored respirations  HEART: Regular rate and rhythm; No murmurs, rubs, or gallops  ABDOMEN: BSx4; Soft, nontender, nondistended  EXTREMITIES:  No clubbing, cyanosis, or edema  NERVOUS SYSTEM:  A&Ox3, no focal deficits   SKIN: No rashes or lesions                                         ASSESSMENT & PLAN    45 yo M with PMH of Intellectual Disability and DM not on any medications presented with foot wounds.    #Covid PNA  Resolved, asymptomatic  Completed RDV    #Nail bed wound and onychomycosis.   Status post Aseptic debridement and curettage of all fungal and ingrowing nails 1-5 bilateral with sterile nail nipper 06/07  Aseptic debridement and curettage of all fungal and ingrowing nails 1-5 bilateral with sterile nail nipper.  Discussed importance of daily foot examinations, drying of feet after bathing and proper shoe gear.  Patient Would Benefit From Periodic Debridements; Can Follow Up as Outpatient w/ Dr. Bronson Post Discharge     #DM type 2 - uncontrolled.  Continue  metformin 1000mg BID and insulin glargine and lispro ( doses adjusted)    #Dental Caries  -completed clinda for 7 days. Will need outpt f/u for possible multiple extractions    #Insomnia  - prn melatonin    Handoff:  Pending legal issue and guardianship, else medically stable for discharge

## 2022-07-29 NOTE — PROGRESS NOTE ADULT - SUBJECTIVE AND OBJECTIVE BOX
Progress Note:  Provider Speciality                            Hospitalist      CARIN WATSON MRN-[id number] 46y Male     CHIEF PRESENTING COMPLAINT:  Patient is a 46y old  Male who presents with a chief complaint of DFU (06 Apr 2022 12:18)        SUBJECTIVE:  Patient was seen and examined at bedside.  No new complaints described by patient in morning rounds.   HISTORY OF PRESENTING ILLNESS:  HPI:  47 yo M with PMHx of Intellectual Disability, DM not on any medications presents with foot wounds. Pt has very poor foot hygiene and per the sister, has been persistently scratching an open wound on his L second toe, which worsened and became more red, had drainage, malodor. He saw a physician at [facility] and was told to come to the ED for evaluation. Pt is poor historian. Does endorse abdominal pain for a few days, cannot give much more description. Per the sister, pt did not seem to have any recent fevers, however is also not a very good historian and does not seem to have good health literacy. States that her and her mom decided to stop giving pt Metformin a while ago, are treating his diabetes by not giving him any sugary foods.  In the ED, T 97.8, /87, , RR 16, SpO2 99% on RA. Lab work unrevealing.  (31 Mar 2022 22:36)        REVIEW OF SYSTEMS:  Patient denies any headache, any vision complaints, runny nose. Denies chest pain, shortness of breath, palpitation. Denies nausea, vomiting, abdominal pain or diarrhoea, Denies dysuria. Denies  localized weakness in any part of the body or numbness.   At least 10 systems were reviewed in ROS. All systems reviewed  are within normal limits except for the complaints as described in Subjective.    PAST MEDICAL & SURGICAL HISTORY:  PAST MEDICAL & SURGICAL HISTORY:  DM (diabetes mellitus)    Intellectual disability            VITAL SIGNS:  Vital Signs Last 24 Hrs  T(C): 36.1 (29 Jul 2022 08:52), Max: 36.1 (28 Jul 2022 16:11)  T(F): 96.9 (29 Jul 2022 08:52), Max: 97 (28 Jul 2022 16:11)  HR: 89 (29 Jul 2022 08:52) (89 - 97)  BP: 116/71 (29 Jul 2022 08:52) (116/71 - 134/79)  BP(mean): --  RR: 18 (29 Jul 2022 08:52) (18 - 18)  SpO2: --                PHYSICAL EXAMINATION:  Not in acute distress  General: No icterus  HEENT:   no JVD.  Heart: S1+S2 audible  Lungs: bilateral  moderate air entry, no wheezing, no crepitations.  Abdomen: Soft, non-tender, non-distended , no  rigidity or guarding.  CNS: Awake alert, CN  grossly intact. Intellectual disability  Extremities:  No edema    , onychomycosis toe nails        CONSULTS:  Consultant(s) Notes Reviewed by me.   Care Discussed with Consultants/Other Providers where required.        MEDICATIONS:  MEDICATIONS  (STANDING):  ammonium lactate 12% Lotion 1 Application(s) Topical two times a day  clotrimazole 1% Cream 1 Application(s) Topical two times a day  enoxaparin Injectable 40 milliGRAM(s) SubCutaneous every 24 hours  metFORMIN 1000 milliGRAM(s) Oral two times a day  pantoprazole    Tablet 40 milliGRAM(s) Oral before breakfast  vitamin A &amp; D Ointment 1 Application(s) Topical daily    MEDICATIONS  (PRN):  ondansetron Injectable 4 milliGRAM(s) IV Push every 6 hours PRN Nausea and/or Vomiting            ASSESSMENT:    47 yo M with PMH of Intellectual Disability and DM not on any medications presented with foot wounds.    Covid  PNA  Onychomycosis  Dental caries  DM2 with hyperglycemia due to dietary non-compliance  Intellectual disability/autism        Covid PNA- completed RDV. Resolved  Nail bed wound and onychomycosis. Status post Aseptic debridement and curettage of all fungal and ingrowing nails 1-5 bilateral with sterile nail nipper 06/07  Podiatry- no surgical intervention is advised. outpt podiatry f/u  DM type 2 - uncontrolled. A1C 8.9.Insulin glargine and lispro ( doses adjusted). Not compliant with diabetic diet. Counseling provided  Dental Caries-completed clinda course. Will need outpt f/u for possible multiple extractions  Insomnia- prn melatonin    Handoff:  Pending placement , guardianship establised,  medically stable for discharge

## 2022-07-30 LAB
GLUCOSE BLDC GLUCOMTR-MCNC: 138 MG/DL — HIGH (ref 70–99)
GLUCOSE BLDC GLUCOMTR-MCNC: 192 MG/DL — HIGH (ref 70–99)
GLUCOSE BLDC GLUCOMTR-MCNC: 209 MG/DL — HIGH (ref 70–99)
GLUCOSE BLDC GLUCOMTR-MCNC: 82 MG/DL — SIGNIFICANT CHANGE UP (ref 70–99)

## 2022-07-30 PROCEDURE — 99231 SBSQ HOSP IP/OBS SF/LOW 25: CPT

## 2022-07-30 RX ADMIN — Medication 1 APPLICATION(S): at 17:09

## 2022-07-30 RX ADMIN — Medication 1 APPLICATION(S): at 17:08

## 2022-07-30 RX ADMIN — Medication 1 TABLET(S): at 17:08

## 2022-07-30 RX ADMIN — INSULIN GLARGINE 22 UNIT(S): 100 INJECTION, SOLUTION SUBCUTANEOUS at 08:15

## 2022-07-30 RX ADMIN — Medication 3 MILLIGRAM(S): at 21:34

## 2022-07-30 RX ADMIN — Medication 4: at 17:08

## 2022-07-30 RX ADMIN — Medication 1 TABLET(S): at 05:01

## 2022-07-30 RX ADMIN — FAMOTIDINE 20 MILLIGRAM(S): 10 INJECTION INTRAVENOUS at 12:01

## 2022-07-30 RX ADMIN — Medication 8 UNIT(S): at 08:14

## 2022-07-30 RX ADMIN — Medication 1 APPLICATION(S): at 05:00

## 2022-07-30 RX ADMIN — Medication 1 APPLICATION(S): at 05:01

## 2022-07-30 RX ADMIN — Medication 1 APPLICATION(S): at 12:02

## 2022-07-30 RX ADMIN — Medication 8 UNIT(S): at 17:08

## 2022-07-30 RX ADMIN — Medication 8 UNIT(S): at 12:01

## 2022-07-30 NOTE — PROGRESS NOTE ADULT - SUBJECTIVE AND OBJECTIVE BOX
CARIN WATSON 47y Male  MRN#: [id number]   CODE STATUS:________full    Hospital Day: 121d    Pt is currently admitted with the primary diagnosis of onychomycosis    SUBJECTIVE  Hospital Course    Overnight events     NAEO    Subjective complaints     Patient was examined and seen by the bedside. No complaints. Denies fever, chills, SOB, nausea, vomiting.                                           OBJECTIVE  PAST MEDICAL & SURGICAL HISTORY  DM (diabetes mellitus)    Intellectual disability                                                ALLERGIES:  penicillin (Unknown)                           HOME MEDICATIONS  Home Medications:  vitamin A and D topical ointment: 1 application topically once a day (12 Apr 2022 11:40)                           MEDICATIONS:  STANDING MEDICATIONS  ammonium lactate 12% Lotion 1 Application(s) Topical two times a day  chlorhexidine 4% Liquid 1 Application(s) Topical <User Schedule>  clotrimazole 1% Cream 1 Application(s) Topical two times a day  dextrose 5%. 1000 milliLiter(s) IV Continuous <Continuous>  dextrose 5%. 1000 milliLiter(s) IV Continuous <Continuous>  dextrose 50% Injectable 25 Gram(s) IV Push once  dextrose 50% Injectable 12.5 Gram(s) IV Push once  dextrose 50% Injectable 25 Gram(s) IV Push once  famotidine    Tablet 20 milliGRAM(s) Oral daily  glucagon  Injectable 1 milliGRAM(s) IntraMuscular once  glucagon  Injectable 1 milliGRAM(s) IntraMuscular once  insulin glargine Injectable (LANTUS) 22 Unit(s) SubCutaneous every morning  insulin lispro (ADMELOG) corrective regimen sliding scale   SubCutaneous three times a day before meals  insulin lispro Injectable (ADMELOG) 8 Unit(s) SubCutaneous three times a day before meals  lactobacillus acidophilus 1 Tablet(s) Oral two times a day  melatonin 3 milliGRAM(s) Oral at bedtime  vitamin A &amp; D Ointment 1 Application(s) Topical daily    PRN MEDICATIONS  acetaminophen     Tablet .. 650 milliGRAM(s) Oral every 6 hours PRN  dextrose Oral Gel 15 Gram(s) Oral once PRN                                            ------------------------------------------------------------  VITAL SIGNS: Last 24 Hours  T(C): 37 (30 Jul 2022 08:00), Max: 37 (30 Jul 2022 08:00)  T(F): 98.6 (30 Jul 2022 08:00), Max: 98.6 (30 Jul 2022 08:00)  HR: 88 (30 Jul 2022 08:00) (88 - 99)  BP: 97/63 (30 Jul 2022 08:00) (97/63 - 153/63)  BP(mean): --  RR: 16 (30 Jul 2022 08:00) (16 - 18)  SpO2: --                                               LABS:                                                                    RADIOLOGY:        PHYSICAL EXAM:  GENERAL: NAD, lying in bed comfortably  HEAD:  Atraumatic, Normocephalic  EYES: EOMI, PERRLA, conjunctiva and sclera clear  ENT: Moist mucous membranes  NECK: Supple, No JVD  CHEST/LUNG: Clear to auscultation bilaterally; No rales, rhonchi, wheezing, or rubs. Unlabored respirations  HEART: Regular rate and rhythm; No murmurs, rubs, or gallops  ABDOMEN: BSx4; Soft, nontender, nondistended  EXTREMITIES:  No clubbing, cyanosis, or edema  NERVOUS SYSTEM:  A&Ox3, no focal deficits   SKIN: No rashes or lesions                                         ASSESSMENT & PLAN    45 yo M with PMH of Intellectual Disability and DM not on any medications presented with foot wounds.    #Covid PNA  Resolved, asymptomatic  Completed RDV    #Nail bed wound and onychomycosis.   Status post Aseptic debridement and curettage of all fungal and ingrowing nails 1-5 bilateral with sterile nail nipper 06/07  Aseptic debridement and curettage of all fungal and ingrowing nails 1-5 bilateral with sterile nail nipper.  Discussed importance of daily foot examinations, drying of feet after bathing and proper shoe gear.  Patient Would Benefit From Periodic Debridements; Can Follow Up as Outpatient w/ Dr. Bronson Post Discharge     #DM type 2 - uncontrolled.  Continue  metformin 1000mg BID and insulin glargine and lispro ( doses adjusted)    #Dental Caries  -completed clinda for 7 days. Will need outpt f/u for possible multiple extractions    #Insomnia  - prn melatonin    Handoff:  Pending legal issue and guardianship, else medically stable for discharge

## 2022-07-30 NOTE — PROGRESS NOTE ADULT - SUBJECTIVE AND OBJECTIVE BOX
Progress Note:  Provider Speciality                            Hospitalist      CARIN WATSON MRN-[id number] 46y Male     CHIEF PRESENTING COMPLAINT:  Patient is a 46y old  Male who presents with a chief complaint of DFU (06 Apr 2022 12:18)        SUBJECTIVE:  Patient was seen and examined at bedside.  No new complaints described by patient in morning rounds.   HISTORY OF PRESENTING ILLNESS:  HPI:  45 yo M with PMHx of Intellectual Disability, DM not on any medications presents with foot wounds. Pt has very poor foot hygiene and per the sister, has been persistently scratching an open wound on his L second toe, which worsened and became more red, had drainage, malodor. He saw a physician at [facility] and was told to come to the ED for evaluation. Pt is poor historian. Does endorse abdominal pain for a few days, cannot give much more description. Per the sister, pt did not seem to have any recent fevers, however is also not a very good historian and does not seem to have good health literacy. States that her and her mom decided to stop giving pt Metformin a while ago, are treating his diabetes by not giving him any sugary foods.  In the ED, T 97.8, /87, , RR 16, SpO2 99% on RA. Lab work unrevealing.  (31 Mar 2022 22:36)        REVIEW OF SYSTEMS:  Patient denies any headache, any vision complaints, runny nose. Denies chest pain, shortness of breath, palpitation. Denies nausea, vomiting, abdominal pain or diarrhoea, Denies dysuria. Denies  localized weakness in any part of the body or numbness.   At least 10 systems were reviewed in ROS. All systems reviewed  are within normal limits except for the complaints as described in Subjective.    PAST MEDICAL & SURGICAL HISTORY:  PAST MEDICAL & SURGICAL HISTORY:  DM (diabetes mellitus)    Intellectual disability            VITAL SIGNS:  Vital Signs Last 24 Hrs  T(C): 37 (30 Jul 2022 08:00), Max: 37 (30 Jul 2022 08:00)  T(F): 98.6 (30 Jul 2022 08:00), Max: 98.6 (30 Jul 2022 08:00)  HR: 88 (30 Jul 2022 08:00) (88 - 99)  BP: 97/63 (30 Jul 2022 08:00) (97/63 - 153/63)  BP(mean): --  RR: 16 (30 Jul 2022 08:00) (16 - 18)  SpO2: --                  PHYSICAL EXAMINATION:  Not in acute distress  General: No icterus  HEENT:   no JVD.  Heart: S1+S2 audible  Lungs: bilateral  moderate air entry, no wheezing, no crepitations.  Abdomen: Soft, non-tender, non-distended , no  rigidity or guarding.  CNS: Awake alert, CN  grossly intact. Intellectual disability  Extremities:  No edema    , onychomycosis toe nails        CONSULTS:  Consultant(s) Notes Reviewed by me.   Care Discussed with Consultants/Other Providers where required.        MEDICATIONS:  MEDICATIONS  (STANDING):  ammonium lactate 12% Lotion 1 Application(s) Topical two times a day  clotrimazole 1% Cream 1 Application(s) Topical two times a day  enoxaparin Injectable 40 milliGRAM(s) SubCutaneous every 24 hours  metFORMIN 1000 milliGRAM(s) Oral two times a day  pantoprazole    Tablet 40 milliGRAM(s) Oral before breakfast  vitamin A &amp; D Ointment 1 Application(s) Topical daily    MEDICATIONS  (PRN):  ondansetron Injectable 4 milliGRAM(s) IV Push every 6 hours PRN Nausea and/or Vomiting            ASSESSMENT:    45 yo M with PMH of Intellectual Disability and DM not on any medications presented with foot wounds.    Covid  PNA  Onychomycosis  Dental caries  DM2 with hyperglycemia due to dietary non-compliance  Intellectual disability/autism        Covid PNA- completed RDV. Resolved  Nail bed wound and onychomycosis. Status post Aseptic debridement and curettage of all fungal and ingrowing nails 1-5 bilateral with sterile nail nipper 06/07  Podiatry- no surgical intervention is advised. outpt podiatry f/u  DM type 2 - uncontrolled. A1C 8.9.Insulin glargine and lispro ( doses adjusted). Not compliant with diabetic diet.   Dental Caries-completed clinda course. Will need outpt f/u for possible multiple extractions  Insomnia- prn melatonin    Handoff:  Pending placement , guardianship establised,  medically stable for discharge

## 2022-07-31 LAB
GLUCOSE BLDC GLUCOMTR-MCNC: 151 MG/DL — HIGH (ref 70–99)
GLUCOSE BLDC GLUCOMTR-MCNC: 152 MG/DL — HIGH (ref 70–99)
GLUCOSE BLDC GLUCOMTR-MCNC: 172 MG/DL — HIGH (ref 70–99)
GLUCOSE BLDC GLUCOMTR-MCNC: 180 MG/DL — HIGH (ref 70–99)

## 2022-07-31 PROCEDURE — 99231 SBSQ HOSP IP/OBS SF/LOW 25: CPT

## 2022-07-31 RX ADMIN — Medication 1 APPLICATION(S): at 17:29

## 2022-07-31 RX ADMIN — Medication 8 UNIT(S): at 16:51

## 2022-07-31 RX ADMIN — Medication 3 MILLIGRAM(S): at 21:08

## 2022-07-31 RX ADMIN — Medication 8 UNIT(S): at 11:10

## 2022-07-31 RX ADMIN — Medication 8 UNIT(S): at 07:59

## 2022-07-31 RX ADMIN — Medication 1 APPLICATION(S): at 06:15

## 2022-07-31 RX ADMIN — Medication 1 APPLICATION(S): at 11:10

## 2022-07-31 RX ADMIN — Medication 1 TABLET(S): at 05:58

## 2022-07-31 RX ADMIN — FAMOTIDINE 20 MILLIGRAM(S): 10 INJECTION INTRAVENOUS at 11:09

## 2022-07-31 RX ADMIN — Medication 1 TABLET(S): at 17:29

## 2022-07-31 RX ADMIN — Medication 2: at 16:51

## 2022-07-31 RX ADMIN — Medication 2: at 11:10

## 2022-07-31 RX ADMIN — INSULIN GLARGINE 22 UNIT(S): 100 INJECTION, SOLUTION SUBCUTANEOUS at 07:59

## 2022-07-31 RX ADMIN — Medication 1 APPLICATION(S): at 05:58

## 2022-07-31 RX ADMIN — Medication 2: at 07:59

## 2022-07-31 NOTE — PROGRESS NOTE ADULT - SUBJECTIVE AND OBJECTIVE BOX
Progress Note:  Provider Speciality                            Hospitalist      CARIN WATSON MRN-[id number] 46y Male     CHIEF PRESENTING COMPLAINT:  Patient is a 46y old  Male who presents with a chief complaint of DFU (06 Apr 2022 12:18)        SUBJECTIVE:  Patient was seen and examined at bedside.  No new complaints described by patient in morning rounds.   HISTORY OF PRESENTING ILLNESS:  HPI:  47 yo M with PMHx of Intellectual Disability, DM not on any medications presents with foot wounds. Pt has very poor foot hygiene and per the sister, has been persistently scratching an open wound on his L second toe, which worsened and became more red, had drainage, malodor. He saw a physician at [facility] and was told to come to the ED for evaluation. Pt is poor historian. Does endorse abdominal pain for a few days, cannot give much more description. Per the sister, pt did not seem to have any recent fevers, however is also not a very good historian and does not seem to have good health literacy. States that her and her mom decided to stop giving pt Metformin a while ago, are treating his diabetes by not giving him any sugary foods.  In the ED, T 97.8, /87, , RR 16, SpO2 99% on RA. Lab work unrevealing.  (31 Mar 2022 22:36)        REVIEW OF SYSTEMS:  Patient denies any headache, any vision complaints, runny nose. Denies chest pain, shortness of breath, palpitation. Denies nausea, vomiting, abdominal pain or diarrhoea, Denies dysuria. Denies  localized weakness in any part of the body or numbness.   At least 10 systems were reviewed in ROS. All systems reviewed  are within normal limits except for the complaints as described in Subjective.    PAST MEDICAL & SURGICAL HISTORY:  PAST MEDICAL & SURGICAL HISTORY:  DM (diabetes mellitus)    Intellectual disability            VITAL SIGNS:  Vital Signs Last 24 Hrs  T(C): 35.9 (31 Jul 2022 08:33), Max: 36.8 (31 Jul 2022 00:00)  T(F): 96.7 (31 Jul 2022 08:33), Max: 98.2 (31 Jul 2022 00:00)  HR: 89 (31 Jul 2022 08:33) (89 - 97)  BP: 126/72 (31 Jul 2022 08:33) (119/66 - 142/63)  BP(mean): --  RR: 18 (31 Jul 2022 08:33) (18 - 18)  SpO2: --              PHYSICAL EXAMINATION:  Not in acute distress  General: No icterus  HEENT:   no JVD.  Heart: S1+S2 audible  Lungs: bilateral  moderate air entry, no wheezing, no crepitations.  Abdomen: Soft, non-tender, non-distended , no  rigidity or guarding.  CNS: Awake alert, CN  grossly intact. Intellectual disability  Extremities:  No edema    , onychomycosis toe nails        CONSULTS:  Consultant(s) Notes Reviewed by me.   Care Discussed with Consultants/Other Providers where required.        MEDICATIONS:  MEDICATIONS  (STANDING):  ammonium lactate 12% Lotion 1 Application(s) Topical two times a day  clotrimazole 1% Cream 1 Application(s) Topical two times a day  enoxaparin Injectable 40 milliGRAM(s) SubCutaneous every 24 hours  metFORMIN 1000 milliGRAM(s) Oral two times a day  pantoprazole    Tablet 40 milliGRAM(s) Oral before breakfast  vitamin A &amp; D Ointment 1 Application(s) Topical daily    MEDICATIONS  (PRN):  ondansetron Injectable 4 milliGRAM(s) IV Push every 6 hours PRN Nausea and/or Vomiting            ASSESSMENT:    47 yo M with PMH of Intellectual Disability and DM not on any medications presented with foot wounds.    Covid  PNA  Onychomycosis  Dental caries  DM2 with hyperglycemia due to dietary non-compliance  Intellectual disability/autism        Covid PNA- completed RDV. Resolved  Nail bed wound and onychomycosis. Status post Aseptic debridement and curettage of all fungal and ingrowing nails 1-5 bilateral with sterile nail nipper 06/07  Podiatry- no surgical intervention is advised. outpt podiatry f/u  DM type 2 - uncontrolled. A1C 8.9.Insulin glargine and lispro ( doses adjusted). Not compliant with diabetic diet.   Dental Caries-completed clinda course. Will need outpt f/u for possible multiple extractions  Insomnia- prn melatonin    Handoff:  Pending placement , guardianship establised,  medically stable for discharge

## 2022-08-01 LAB
GLUCOSE BLDC GLUCOMTR-MCNC: 124 MG/DL — HIGH (ref 70–99)
GLUCOSE BLDC GLUCOMTR-MCNC: 131 MG/DL — HIGH (ref 70–99)
GLUCOSE BLDC GLUCOMTR-MCNC: 193 MG/DL — HIGH (ref 70–99)
GLUCOSE BLDC GLUCOMTR-MCNC: 214 MG/DL — HIGH (ref 70–99)

## 2022-08-01 PROCEDURE — 99231 SBSQ HOSP IP/OBS SF/LOW 25: CPT

## 2022-08-01 RX ADMIN — Medication 8 UNIT(S): at 11:35

## 2022-08-01 RX ADMIN — Medication 1 APPLICATION(S): at 05:15

## 2022-08-01 RX ADMIN — Medication 1 APPLICATION(S): at 17:32

## 2022-08-01 RX ADMIN — Medication 1 APPLICATION(S): at 11:35

## 2022-08-01 RX ADMIN — Medication 8 UNIT(S): at 08:04

## 2022-08-01 RX ADMIN — Medication 8 UNIT(S): at 17:34

## 2022-08-01 RX ADMIN — Medication 2: at 11:35

## 2022-08-01 RX ADMIN — Medication 1 APPLICATION(S): at 17:29

## 2022-08-01 RX ADMIN — Medication 1 TABLET(S): at 05:14

## 2022-08-01 RX ADMIN — FAMOTIDINE 20 MILLIGRAM(S): 10 INJECTION INTRAVENOUS at 11:35

## 2022-08-01 RX ADMIN — Medication 1 APPLICATION(S): at 05:14

## 2022-08-01 RX ADMIN — Medication 1 TABLET(S): at 17:34

## 2022-08-01 RX ADMIN — Medication 3 MILLIGRAM(S): at 21:48

## 2022-08-01 RX ADMIN — INSULIN GLARGINE 22 UNIT(S): 100 INJECTION, SOLUTION SUBCUTANEOUS at 08:04

## 2022-08-01 NOTE — PROGRESS NOTE ADULT - SUBJECTIVE AND OBJECTIVE BOX
Progress Note:  Provider Speciality                            Hospitalist      CARIN WATOSN MRN-[id number] 46y Male     CHIEF PRESENTING COMPLAINT:  Patient is a 46y old  Male who presents with a chief complaint of DFU (06 Apr 2022 12:18)        SUBJECTIVE:  Patient was seen and examined at bedside.  No new complaints described by patient in morning rounds.   HISTORY OF PRESENTING ILLNESS:  HPI:  45 yo M with PMHx of Intellectual Disability, DM not on any medications presents with foot wounds. Pt has very poor foot hygiene and per the sister, has been persistently scratching an open wound on his L second toe, which worsened and became more red, had drainage, malodor. He saw a physician at [facility] and was told to come to the ED for evaluation. Pt is poor historian. Does endorse abdominal pain for a few days, cannot give much more description. Per the sister, pt did not seem to have any recent fevers, however is also not a very good historian and does not seem to have good health literacy. States that her and her mom decided to stop giving pt Metformin a while ago, are treating his diabetes by not giving him any sugary foods.  In the ED, T 97.8, /87, , RR 16, SpO2 99% on RA. Lab work unrevealing.  (31 Mar 2022 22:36)        REVIEW OF SYSTEMS:  Patient denies any headache, any vision complaints, runny nose. Denies chest pain, shortness of breath, palpitation. Denies nausea, vomiting, abdominal pain or diarrhoea, Denies dysuria. Denies  localized weakness in any part of the body or numbness.   At least 10 systems were reviewed in ROS. All systems reviewed  are within normal limits except for the complaints as described in Subjective.    PAST MEDICAL & SURGICAL HISTORY:  PAST MEDICAL & SURGICAL HISTORY:  DM (diabetes mellitus)    Intellectual disability            VITAL SIGNS:  Vital Signs Last 24 Hrs  T(C): 35.6 (01 Aug 2022 08:00), Max: 36.7 (31 Jul 2022 23:49)  T(F): 96 (01 Aug 2022 08:00), Max: 98 (31 Jul 2022 23:49)  HR: 91 (01 Aug 2022 08:00) (74 - 99)  BP: 123/63 (01 Aug 2022 08:00) (101/58 - 123/63)  BP(mean): --  RR: 18 (01 Aug 2022 08:00) (17 - 18)  SpO2: --              PHYSICAL EXAMINATION:  Not in acute distress  General: No icterus  HEENT:   no JVD.  Heart: S1+S2 audible  Lungs: bilateral  moderate air entry, no wheezing, no crepitations.  Abdomen: Soft, non-tender, non-distended , no  rigidity or guarding.  CNS: Awake alert, CN  grossly intact. Intellectual disability  Extremities:  No edema    , onychomycosis toe nails        CONSULTS:  Consultant(s) Notes Reviewed by me.   Care Discussed with Consultants/Other Providers where required.        MEDICATIONS:  MEDICATIONS  (STANDING):  ammonium lactate 12% Lotion 1 Application(s) Topical two times a day  clotrimazole 1% Cream 1 Application(s) Topical two times a day  enoxaparin Injectable 40 milliGRAM(s) SubCutaneous every 24 hours  metFORMIN 1000 milliGRAM(s) Oral two times a day  pantoprazole    Tablet 40 milliGRAM(s) Oral before breakfast  vitamin A &amp; D Ointment 1 Application(s) Topical daily    MEDICATIONS  (PRN):  ondansetron Injectable 4 milliGRAM(s) IV Push every 6 hours PRN Nausea and/or Vomiting            ASSESSMENT:    45 yo M with PMH of Intellectual Disability and DM not on any medications presented with foot wounds.    Covid  PNA  Onychomycosis  Dental caries  DM2 with hyperglycemia due to dietary non-compliance  Intellectual disability/autism        Covid PNA- completed RDV. Resolved  Nail bed wound and onychomycosis. Status post Aseptic debridement and curettage of all fungal and ingrowing nails 1-5 bilateral with sterile nail nipper 06/07  Podiatry- no surgical intervention is advised. outpt podiatry f/u  DM type 2 - uncontrolled. A1C 8.9.Insulin glargine and lispro ( doses adjusted). Not compliant with diabetic diet.   Dental Caries-completed clinda course. Will need outpt f/u for possible multiple extractions  Insomnia- prn melatonin    Handoff:  Pending placement , guardianship establised,  medically stable for discharge

## 2022-08-01 NOTE — PROGRESS NOTE ADULT - SUBJECTIVE AND OBJECTIVE BOX
CARIN WATSON 47y Male  MRN#: [id number]   Hospital Day: 123d    HPI:  45 yo M with PMHx of Intellectual Disability, DM not on any medications presents with foot wounds. Pt has very poor foot hygiene and per the sister, has been persistently scratching an open wound on his L second toe, which worsened and became more red, had drainage, malodor. He saw a physician at [facility] and was told to come to the ED for evaluation. Pt is poor historian. Does endorse abdominal pain for a few days, cannot give much more description. Per the sister, pt did not seem to have any recent fevers, however is also not a very good historian and does not seem to have good health literacy. States that her and her mom decided to stop giving pt Metformin a while ago, are treating his diabetes by not giving him any sugary foods.  In the ED, T 97.8, /87, , RR 16, SpO2 99% on RA. Lab work unrevealing.  (31 Mar 2022 22:36)      SUBJECTIVE  Patient is a 47y old Male who presents with a chief complaint of DFU (31 Jul 2022 11:23)  Currently admitted to medicine with the primary diagnosis of Onychomycosis      INTERVAL HPI AND OVERNIGHT EVENTS:  Patient was examined and seen at bedside. This morning he is resting comfortably in bed and reports no issues or overnight events.      OBJECTIVE  PAST MEDICAL & SURGICAL HISTORY  DM (diabetes mellitus)    Intellectual disability      ALLERGIES:  penicillin (Unknown)    MEDICATIONS:  STANDING MEDICATIONS  ammonium lactate 12% Lotion 1 Application(s) Topical two times a day  chlorhexidine 4% Liquid 1 Application(s) Topical <User Schedule>  clotrimazole 1% Cream 1 Application(s) Topical two times a day  famotidine    Tablet 20 milliGRAM(s) Oral daily  insulin glargine Injectable (LANTUS) 22 Unit(s) SubCutaneous every morning  insulin lispro (ADMELOG) corrective regimen sliding scale   SubCutaneous three times a day before meals  insulin lispro Injectable (ADMELOG) 8 Unit(s) SubCutaneous three times a day before meals  lactobacillus acidophilus 1 Tablet(s) Oral two times a day  melatonin 3 milliGRAM(s) Oral at bedtime  vitamin A &amp; D Ointment 1 Application(s) Topical daily    PRN MEDICATIONS  acetaminophen     Tablet .. 650 milliGRAM(s) Oral every 6 hours PRN      VITAL SIGNS: Last 24 Hours  T(C): 35.6 (01 Aug 2022 08:00), Max: 36.8 (31 Jul 2022 16:00)  T(F): 96 (01 Aug 2022 08:00), Max: 98.3 (31 Jul 2022 16:00)  HR: 91 (01 Aug 2022 08:00) (74 - 99)  BP: 123/63 (01 Aug 2022 08:00) (101/58 - 123/63)  BP(mean): --  RR: 18 (01 Aug 2022 08:00) (17 - 18)  SpO2: --    LABS:      RADIOLOGY:      PHYSICAL EXAM:  GENERAL: NAD, lying in bed comfortably  HEAD:  Atraumatic, Normocephalic  EYES: EOMI, PERRLA, conjunctiva and sclera clear  ENT: Moist mucous membranes  NECK: Supple, No JVD  CHEST/LUNG: Clear to auscultation bilaterally; No rales, rhonchi, wheezing, or rubs. Unlabored respirations  HEART: Regular rate and rhythm; No murmurs, rubs, or gallops  ABDOMEN: BSx4; Soft, nontender, nondistended  EXTREMITIES:  No clubbing, cyanosis, or edema  NERVOUS SYSTEM:  A&Ox3, no focal deficits   SKIN: No rashes or lesions                                         ASSESSMENT & PLAN  45 yo M with PMH of Intellectual Disability and DM not on any medications presented with foot wounds.    #Covid PNA  Resolved, asymptomatic  Completed RDV    #Nail bed wound and onychomycosis.   Status post Aseptic debridement and curettage of all fungal and ingrowing nails 1-5 bilateral with sterile nail nipper 06/07  Aseptic debridement and curettage of all fungal and ingrowing nails 1-5 bilateral with sterile nail nipper.  Patient Would Benefit From Periodic Debridements; Can Follow Up as Outpatient w/ Dr. Bronson Post Discharge     #DM type 2 - uncontrolled.  Continue  metformin 1000mg BID and insulin glargine and lispro ( doses adjusted)    #Dental Caries  -completed clinda for 7 days. Will need outpt f/u for possible multiple extractions    #Insomnia  - prn melatonin    Handoff:  Pending legal issue and guardianship, else medically stable for discharge

## 2022-08-02 LAB
ANION GAP SERPL CALC-SCNC: 10 MMOL/L — SIGNIFICANT CHANGE UP (ref 7–14)
BUN SERPL-MCNC: 14 MG/DL — SIGNIFICANT CHANGE UP (ref 10–20)
CALCIUM SERPL-MCNC: 9.6 MG/DL — SIGNIFICANT CHANGE UP (ref 8.5–10.1)
CHLORIDE SERPL-SCNC: 101 MMOL/L — SIGNIFICANT CHANGE UP (ref 98–110)
CO2 SERPL-SCNC: 28 MMOL/L — SIGNIFICANT CHANGE UP (ref 17–32)
CREAT SERPL-MCNC: 0.8 MG/DL — SIGNIFICANT CHANGE UP (ref 0.7–1.5)
EGFR: 109 ML/MIN/1.73M2 — SIGNIFICANT CHANGE UP
GLUCOSE BLDC GLUCOMTR-MCNC: 136 MG/DL — HIGH (ref 70–99)
GLUCOSE BLDC GLUCOMTR-MCNC: 140 MG/DL — HIGH (ref 70–99)
GLUCOSE BLDC GLUCOMTR-MCNC: 161 MG/DL — HIGH (ref 70–99)
GLUCOSE BLDC GLUCOMTR-MCNC: 178 MG/DL — HIGH (ref 70–99)
GLUCOSE SERPL-MCNC: 163 MG/DL — HIGH (ref 70–99)
HCT VFR BLD CALC: 44.2 % — SIGNIFICANT CHANGE UP (ref 42–52)
HGB BLD-MCNC: 14.7 G/DL — SIGNIFICANT CHANGE UP (ref 14–18)
MAGNESIUM SERPL-MCNC: 1.7 MG/DL — LOW (ref 1.8–2.4)
MCHC RBC-ENTMCNC: 31.8 PG — HIGH (ref 27–31)
MCHC RBC-ENTMCNC: 33.3 G/DL — SIGNIFICANT CHANGE UP (ref 32–37)
MCV RBC AUTO: 95.7 FL — HIGH (ref 80–94)
NRBC # BLD: 0 /100 WBCS — SIGNIFICANT CHANGE UP (ref 0–0)
PLATELET # BLD AUTO: 323 K/UL — SIGNIFICANT CHANGE UP (ref 130–400)
POTASSIUM SERPL-MCNC: 4.2 MMOL/L — SIGNIFICANT CHANGE UP (ref 3.5–5)
POTASSIUM SERPL-SCNC: 4.2 MMOL/L — SIGNIFICANT CHANGE UP (ref 3.5–5)
RBC # BLD: 4.62 M/UL — LOW (ref 4.7–6.1)
RBC # FLD: 12.4 % — SIGNIFICANT CHANGE UP (ref 11.5–14.5)
SODIUM SERPL-SCNC: 139 MMOL/L — SIGNIFICANT CHANGE UP (ref 135–146)
WBC # BLD: 7.02 K/UL — SIGNIFICANT CHANGE UP (ref 4.8–10.8)
WBC # FLD AUTO: 7.02 K/UL — SIGNIFICANT CHANGE UP (ref 4.8–10.8)

## 2022-08-02 PROCEDURE — 99231 SBSQ HOSP IP/OBS SF/LOW 25: CPT

## 2022-08-02 RX ADMIN — Medication 2: at 08:17

## 2022-08-02 RX ADMIN — Medication 8 UNIT(S): at 17:09

## 2022-08-02 RX ADMIN — Medication 1 TABLET(S): at 17:08

## 2022-08-02 RX ADMIN — Medication 3 MILLIGRAM(S): at 22:05

## 2022-08-02 RX ADMIN — Medication 8 UNIT(S): at 08:16

## 2022-08-02 RX ADMIN — INSULIN GLARGINE 22 UNIT(S): 100 INJECTION, SOLUTION SUBCUTANEOUS at 08:17

## 2022-08-02 RX ADMIN — Medication 1 APPLICATION(S): at 12:22

## 2022-08-02 RX ADMIN — Medication 8 UNIT(S): at 12:23

## 2022-08-02 RX ADMIN — Medication 1 APPLICATION(S): at 05:24

## 2022-08-02 RX ADMIN — Medication 1 TABLET(S): at 05:23

## 2022-08-02 RX ADMIN — Medication 2: at 17:09

## 2022-08-02 RX ADMIN — Medication 1 APPLICATION(S): at 17:08

## 2022-08-02 RX ADMIN — FAMOTIDINE 20 MILLIGRAM(S): 10 INJECTION INTRAVENOUS at 12:23

## 2022-08-02 RX ADMIN — CHLORHEXIDINE GLUCONATE 1 APPLICATION(S): 213 SOLUTION TOPICAL at 05:26

## 2022-08-02 RX ADMIN — Medication 1 APPLICATION(S): at 05:26

## 2022-08-02 NOTE — PROGRESS NOTE ADULT - SUBJECTIVE AND OBJECTIVE BOX
CARIN WATSON 47y Male  MRN#: [id number]   Hospital Day: 124d    HPI:  47 yo M with PMHx of Intellectual Disability, DM not on any medications presents with foot wounds. Pt has very poor foot hygiene and per the sister, has been persistently scratching an open wound on his L second toe, which worsened and became more red, had drainage, malodor. He saw a physician at [facility] and was told to come to the ED for evaluation. Pt is poor historian. Does endorse abdominal pain for a few days, cannot give much more description. Per the sister, pt did not seem to have any recent fevers, however is also not a very good historian and does not seem to have good health literacy. States that her and her mom decided to stop giving pt Metformin a while ago, are treating his diabetes by not giving him any sugary foods.  In the ED, T 97.8, /87, , RR 16, SpO2 99% on RA. Lab work unrevealing.  (31 Mar 2022 22:36)      SUBJECTIVE  Patient is a 47y old Male who presents with a chief complaint of DFU (01 Aug 2022 16:24)  Currently admitted to medicine with the primary diagnosis of Onychomycosis      INTERVAL HPI AND OVERNIGHT EVENTS:  Patient was examined and seen at bedside. This morning he is resting comfortably in bed and reports no issues or overnight events.      OBJECTIVE  PAST MEDICAL & SURGICAL HISTORY  DM (diabetes mellitus)  Intellectual disability    ALLERGIES:  penicillin (Unknown)    MEDICATIONS:  STANDING MEDICATIONS  ammonium lactate 12% Lotion 1 Application(s) Topical two times a day  chlorhexidine 4% Liquid 1 Application(s) Topical <User Schedule>  clotrimazole 1% Cream 1 Application(s) Topical two times a day  famotidine    Tablet 20 milliGRAM(s) Oral daily  insulin glargine Injectable (LANTUS) 22 Unit(s) SubCutaneous every morning  insulin lispro (ADMELOG) corrective regimen sliding scale   SubCutaneous three times a day before meals  insulin lispro Injectable (ADMELOG) 8 Unit(s) SubCutaneous three times a day before meals  lactobacillus acidophilus 1 Tablet(s) Oral two times a day  melatonin 3 milliGRAM(s) Oral at bedtime  vitamin A &amp; D Ointment 1 Application(s) Topical daily    PRN MEDICATIONS  acetaminophen     Tablet .. 650 milliGRAM(s) Oral every 6 hours PRN      VITAL SIGNS: Last 24 Hours  T(C): 35.8 (02 Aug 2022 08:00), Max: 36.2 (01 Aug 2022 15:35)  T(F): 96.5 (02 Aug 2022 08:00), Max: 97.1 (01 Aug 2022 15:35)  HR: 97 (02 Aug 2022 08:00) (71 - 97)  BP: 135/70 (02 Aug 2022 08:00) (119/70 - 135/70)  BP(mean): --  RR: 18 (02 Aug 2022 08:00) (18 - 18)  SpO2: --    LABS:                        14.7   7.02  )-----------( 323      ( 02 Aug 2022 08:38 )             44.2     08-02    139  |  101  |  14  ----------------------------<  163<H>  4.2   |  28  |  0.8    Ca    9.6      02 Aug 2022 08:38  Mg     1.7     08-02    RADIOLOGY:    PHYSICAL EXAM:  GENERAL: NAD, lying in bed comfortably  HEAD:  Atraumatic, Normocephalic  EYES: EOMI, PERRLA, conjunctiva and sclera clear  ENT: Moist mucous membranes  NECK: Supple, No JVD  CHEST/LUNG: Clear to auscultation bilaterally; No rales, rhonchi, wheezing, or rubs. Unlabored respirations  HEART: Regular rate and rhythm; No murmurs, rubs, or gallops  ABDOMEN: BSx4; Soft, nontender, nondistended  EXTREMITIES:  No clubbing, cyanosis, or edema  NERVOUS SYSTEM:  A&Ox3, no focal deficits   SKIN: No rashes or lesions             CARIN WATSON 47y Male  MRN#: [id number]   Hospital Day: 124d    HPI:  45 yo M with PMHx of Intellectual Disability, DM not on any medications presents with foot wounds. Pt has very poor foot hygiene and per the sister, has been persistently scratching an open wound on his L second toe, which worsened and became more red, had drainage, malodor. He saw a physician at [facility] and was told to come to the ED for evaluation. Pt is poor historian. Does endorse abdominal pain for a few days, cannot give much more description. Per the sister, pt did not seem to have any recent fevers, however is also not a very good historian and does not seem to have good health literacy. States that her and her mom decided to stop giving pt Metformin a while ago, are treating his diabetes by not giving him any sugary foods.  In the ED, T 97.8, /87, , RR 16, SpO2 99% on RA. Lab work unrevealing.  (31 Mar 2022 22:36)      SUBJECTIVE  Patient is a 47y old Male who presents with a chief complaint of DFU (01 Aug 2022 16:24)  Currently admitted to medicine with the primary diagnosis of Onychomycosis      INTERVAL HPI AND OVERNIGHT EVENTS:  Patient was examined and seen at bedside. This morning he is resting comfortably in bed and reports no issues or overnight events.      OBJECTIVE  PAST MEDICAL & SURGICAL HISTORY  DM (diabetes mellitus)  Intellectual disability    ALLERGIES:  penicillin (Unknown)    MEDICATIONS:  STANDING MEDICATIONS  ammonium lactate 12% Lotion 1 Application(s) Topical two times a day  chlorhexidine 4% Liquid 1 Application(s) Topical <User Schedule>  clotrimazole 1% Cream 1 Application(s) Topical two times a day  famotidine    Tablet 20 milliGRAM(s) Oral daily  insulin glargine Injectable (LANTUS) 22 Unit(s) SubCutaneous every morning  insulin lispro (ADMELOG) corrective regimen sliding scale   SubCutaneous three times a day before meals  insulin lispro Injectable (ADMELOG) 8 Unit(s) SubCutaneous three times a day before meals  lactobacillus acidophilus 1 Tablet(s) Oral two times a day  melatonin 3 milliGRAM(s) Oral at bedtime  vitamin A &amp; D Ointment 1 Application(s) Topical daily    PRN MEDICATIONS  acetaminophen     Tablet .. 650 milliGRAM(s) Oral every 6 hours PRN      VITAL SIGNS: Last 24 Hours  T(C): 35.8 (02 Aug 2022 08:00), Max: 36.2 (01 Aug 2022 15:35)  T(F): 96.5 (02 Aug 2022 08:00), Max: 97.1 (01 Aug 2022 15:35)  HR: 97 (02 Aug 2022 08:00) (71 - 97)  BP: 135/70 (02 Aug 2022 08:00) (119/70 - 135/70)  BP(mean): --  RR: 18 (02 Aug 2022 08:00) (18 - 18)  SpO2: --    LABS:                        14.7   7.02  )-----------( 323      ( 02 Aug 2022 08:38 )             44.2     08-02    139  |  101  |  14  ----------------------------<  163<H>  4.2   |  28  |  0.8    Ca    9.6      02 Aug 2022 08:38  Mg     1.7     08-02    RADIOLOGY:    PHYSICAL EXAM:  Not in acute distress  General: No icterus  HEENT:   no JVD.  Heart: S1+S2 audible  Lungs: bilateral  moderate air entry, no wheezing, no crepitations.  Abdomen: Soft, non-tender, non-distended , no  rigidity or guarding.  CNS: Awake alert, CN  grossly intact. Intellectual disability  Extremities:  No edema    , onychomycosis toe nails

## 2022-08-02 NOTE — PROGRESS NOTE ADULT - NUTRITIONAL ASSESSMENT
47 yo M with PMH of Intellectual Disability and DM not on any medications presented with foot wounds.    #Covid PNA  Resolved, asymptomatic  Completed RDV    #Nail bed wound and onychomycosis.   Status post Aseptic debridement and curettage of all fungal and ingrowing nails 1-5 bilateral with sterile nail nipper 06/07  Aseptic debridement and curettage of all fungal and ingrowing nails 1-5 bilateral with sterile nail nipper.  Patient Would Benefit From Periodic Debridements; Can Follow Up as Outpatient w/ Dr. Bronson Post Discharge     #DM type 2 - uncontrolled.  Continue  metformin 1000mg BID and insulin glargine and lispro ( doses adjusted)    #Dental Caries  -completed clinda for 7 days. Will need outpt f/u for possible multiple extractions    #Insomnia  - prn melatonin    Handoff:  Pending legal issue and guardianship, else medically stable for discharge

## 2022-08-02 NOTE — PROGRESS NOTE ADULT - SUBJECTIVE AND OBJECTIVE BOX
Progress Note:  Provider Speciality                            Hospitalist      CARIN WATSON MRN-[id number] 46y Male     CHIEF PRESENTING COMPLAINT:  Patient is a 46y old  Male who presents with a chief complaint of DFU (06 Apr 2022 12:18)        SUBJECTIVE:  Patient was seen and examined at bedside.  No new complaints described by patient in morning rounds.   HISTORY OF PRESENTING ILLNESS:  HPI:  45 yo M with PMHx of Intellectual Disability, DM not on any medications presents with foot wounds. Pt has very poor foot hygiene and per the sister, has been persistently scratching an open wound on his L second toe, which worsened and became more red, had drainage, malodor. He saw a physician at [facility] and was told to come to the ED for evaluation. Pt is poor historian. Does endorse abdominal pain for a few days, cannot give much more description. Per the sister, pt did not seem to have any recent fevers, however is also not a very good historian and does not seem to have good health literacy. States that her and her mom decided to stop giving pt Metformin a while ago, are treating his diabetes by not giving him any sugary foods.  In the ED, T 97.8, /87, , RR 16, SpO2 99% on RA. Lab work unrevealing.  (31 Mar 2022 22:36)        REVIEW OF SYSTEMS:  Patient denies any headache, any vision complaints, runny nose. Denies chest pain, shortness of breath, palpitation. Denies nausea, vomiting, abdominal pain or diarrhoea, Denies dysuria. Denies  localized weakness in any part of the body or numbness.   At least 10 systems were reviewed in ROS. All systems reviewed  are within normal limits except for the complaints as described in Subjective.    PAST MEDICAL & SURGICAL HISTORY:  PAST MEDICAL & SURGICAL HISTORY:  DM (diabetes mellitus)    Intellectual disability            VITAL SIGNS:  Vital Signs Last 24 Hrs  T(C): 35.8 (02 Aug 2022 08:00), Max: 36.2 (01 Aug 2022 15:35)  T(F): 96.5 (02 Aug 2022 08:00), Max: 97.1 (01 Aug 2022 15:35)  HR: 97 (02 Aug 2022 08:00) (71 - 97)  BP: 135/70 (02 Aug 2022 08:00) (119/70 - 135/70)  BP(mean): --  RR: 18 (02 Aug 2022 08:00) (18 - 18)  SpO2: --                PHYSICAL EXAMINATION:  Not in acute distress  General: No icterus  HEENT:   no JVD.  Heart: S1+S2 audible  Lungs: bilateral  moderate air entry, no wheezing, no crepitations.  Abdomen: Soft, non-tender, non-distended , no  rigidity or guarding.  CNS: Awake alert, CN  grossly intact. Intellectual disability  Extremities:  No edema    , onychomycosis toe nails        CONSULTS:  Consultant(s) Notes Reviewed by me.   Care Discussed with Consultants/Other Providers where required.        MEDICATIONS:  MEDICATIONS  (STANDING):  ammonium lactate 12% Lotion 1 Application(s) Topical two times a day  clotrimazole 1% Cream 1 Application(s) Topical two times a day  enoxaparin Injectable 40 milliGRAM(s) SubCutaneous every 24 hours  metFORMIN 1000 milliGRAM(s) Oral two times a day  pantoprazole    Tablet 40 milliGRAM(s) Oral before breakfast  vitamin A &amp; D Ointment 1 Application(s) Topical daily    MEDICATIONS  (PRN):  ondansetron Injectable 4 milliGRAM(s) IV Push every 6 hours PRN Nausea and/or Vomiting            ASSESSMENT:    45 yo M with PMH of Intellectual Disability and DM not on any medications presented with foot wounds.    Covid  PNA  Onychomycosis  Dental caries  DM2 with hyperglycemia due to dietary non-compliance  Intellectual disability/autism        Covid PNA- completed RDV. Resolved  Nail bed wound and onychomycosis. Status post Aseptic debridement and curettage of all fungal and ingrowing nails 1-5 bilateral with sterile nail nipper 06/07  Podiatry- no surgical intervention is advised. outpt podiatry f/u  DM type 2 - uncontrolled. A1C 8.9.Insulin glargine and lispro ( doses adjusted). Not compliant with diabetic diet.   Dental Caries-completed clinda course. Will need outpt f/u for possible multiple extractions  Insomnia- prn melatonin    Handoff:  Pending placement , guardianship establised,  medically stable for discharge

## 2022-08-03 LAB
ANION GAP SERPL CALC-SCNC: 9 MMOL/L — SIGNIFICANT CHANGE UP (ref 7–14)
BUN SERPL-MCNC: 16 MG/DL — SIGNIFICANT CHANGE UP (ref 10–20)
CALCIUM SERPL-MCNC: 9.2 MG/DL — SIGNIFICANT CHANGE UP (ref 8.5–10.1)
CHLORIDE SERPL-SCNC: 102 MMOL/L — SIGNIFICANT CHANGE UP (ref 98–110)
CO2 SERPL-SCNC: 28 MMOL/L — SIGNIFICANT CHANGE UP (ref 17–32)
CREAT SERPL-MCNC: 0.7 MG/DL — SIGNIFICANT CHANGE UP (ref 0.7–1.5)
EGFR: 114 ML/MIN/1.73M2 — SIGNIFICANT CHANGE UP
GLUCOSE BLDC GLUCOMTR-MCNC: 132 MG/DL — HIGH (ref 70–99)
GLUCOSE BLDC GLUCOMTR-MCNC: 141 MG/DL — HIGH (ref 70–99)
GLUCOSE BLDC GLUCOMTR-MCNC: 166 MG/DL — HIGH (ref 70–99)
GLUCOSE BLDC GLUCOMTR-MCNC: 240 MG/DL — HIGH (ref 70–99)
GLUCOSE SERPL-MCNC: 100 MG/DL — HIGH (ref 70–99)
HCT VFR BLD CALC: 43.1 % — SIGNIFICANT CHANGE UP (ref 42–52)
HGB BLD-MCNC: 14.2 G/DL — SIGNIFICANT CHANGE UP (ref 14–18)
MAGNESIUM SERPL-MCNC: 1.7 MG/DL — LOW (ref 1.8–2.4)
MCHC RBC-ENTMCNC: 31.6 PG — HIGH (ref 27–31)
MCHC RBC-ENTMCNC: 32.9 G/DL — SIGNIFICANT CHANGE UP (ref 32–37)
MCV RBC AUTO: 95.8 FL — HIGH (ref 80–94)
NRBC # BLD: 0 /100 WBCS — SIGNIFICANT CHANGE UP (ref 0–0)
PLATELET # BLD AUTO: 311 K/UL — SIGNIFICANT CHANGE UP (ref 130–400)
POTASSIUM SERPL-MCNC: 3.9 MMOL/L — SIGNIFICANT CHANGE UP (ref 3.5–5)
POTASSIUM SERPL-SCNC: 3.9 MMOL/L — SIGNIFICANT CHANGE UP (ref 3.5–5)
RBC # BLD: 4.5 M/UL — LOW (ref 4.7–6.1)
RBC # FLD: 12.3 % — SIGNIFICANT CHANGE UP (ref 11.5–14.5)
SODIUM SERPL-SCNC: 139 MMOL/L — SIGNIFICANT CHANGE UP (ref 135–146)
WBC # BLD: 6.41 K/UL — SIGNIFICANT CHANGE UP (ref 4.8–10.8)
WBC # FLD AUTO: 6.41 K/UL — SIGNIFICANT CHANGE UP (ref 4.8–10.8)

## 2022-08-03 PROCEDURE — 99231 SBSQ HOSP IP/OBS SF/LOW 25: CPT

## 2022-08-03 RX ADMIN — Medication 1 APPLICATION(S): at 05:32

## 2022-08-03 RX ADMIN — Medication 4: at 17:09

## 2022-08-03 RX ADMIN — Medication 8 UNIT(S): at 17:09

## 2022-08-03 RX ADMIN — Medication 1 APPLICATION(S): at 11:12

## 2022-08-03 RX ADMIN — Medication 2: at 08:08

## 2022-08-03 RX ADMIN — Medication 8 UNIT(S): at 08:09

## 2022-08-03 RX ADMIN — INSULIN GLARGINE 22 UNIT(S): 100 INJECTION, SOLUTION SUBCUTANEOUS at 08:09

## 2022-08-03 RX ADMIN — Medication 1 APPLICATION(S): at 17:16

## 2022-08-03 RX ADMIN — FAMOTIDINE 20 MILLIGRAM(S): 10 INJECTION INTRAVENOUS at 11:15

## 2022-08-03 RX ADMIN — Medication 3 MILLIGRAM(S): at 21:35

## 2022-08-03 RX ADMIN — Medication 1 TABLET(S): at 05:33

## 2022-08-03 RX ADMIN — Medication 8 UNIT(S): at 11:14

## 2022-08-03 RX ADMIN — Medication 1 TABLET(S): at 17:17

## 2022-08-03 NOTE — PROGRESS NOTE ADULT - SUBJECTIVE AND OBJECTIVE BOX
SUBJECTIVE:    Patient is a 47y old Male who presents with a chief complaint of DFU (03 Aug 2022 07:02)    Currently admitted to medicine with the primary diagnosis of Onychomycosis       Today is hospital day 125d.     PAST MEDICAL & SURGICAL HISTORY  DM (diabetes mellitus)    Intellectual disability      ALLERGIES:  penicillin (Unknown)    MEDICATIONS:  STANDING MEDICATIONS  ammonium lactate 12% Lotion 1 Application(s) Topical two times a day  chlorhexidine 4% Liquid 1 Application(s) Topical <User Schedule>  clotrimazole 1% Cream 1 Application(s) Topical two times a day  famotidine    Tablet 20 milliGRAM(s) Oral daily  insulin glargine Injectable (LANTUS) 22 Unit(s) SubCutaneous every morning  insulin lispro (ADMELOG) corrective regimen sliding scale   SubCutaneous three times a day before meals  insulin lispro Injectable (ADMELOG) 8 Unit(s) SubCutaneous three times a day before meals  lactobacillus acidophilus 1 Tablet(s) Oral two times a day  melatonin 3 milliGRAM(s) Oral at bedtime  vitamin A &amp; D Ointment 1 Application(s) Topical daily    PRN MEDICATIONS  acetaminophen     Tablet .. 650 milliGRAM(s) Oral every 6 hours PRN    VITALS:   T(F): 96.9  HR: 98  BP: 116/69  RR: 18  SpO2: --    LABS:                        14.2   6.41  )-----------( 311      ( 03 Aug 2022 11:58 )             43.1     08-03    139  |  102  |  16  ----------------------------<  100<H>  3.9   |  28  |  0.7    Ca    9.2      03 Aug 2022 11:58  Mg     1.7     08-03                    RADIOLOGY:    PHYSICAL EXAM:  GEN: No acute distress  LUNGS: Clear to auscultation bilaterally   HEART: S1/S2 present. RRR.   ABD/ GI: Soft, non-tender, non-distended. Bowel sounds present  EXT: NC/NC/NE/2+PP/LOJA  NEURO: AAOX3

## 2022-08-03 NOTE — PROGRESS NOTE ADULT - SUBJECTIVE AND OBJECTIVE BOX
CARIN WATSON 47y Male  MRN#: [id number]   Hospital Day: 125d    HPI:  45 yo M with PMHx of Intellectual Disability, DM not on any medications presents with foot wounds. Pt has very poor foot hygiene and per the sister, has been persistently scratching an open wound on his L second toe, which worsened and became more red, had drainage, malodor. He saw a physician at [facility] and was told to come to the ED for evaluation. Pt is poor historian. Does endorse abdominal pain for a few days, cannot give much more description. Per the sister, pt did not seem to have any recent fevers, however is also not a very good historian and does not seem to have good health literacy. States that her and her mom decided to stop giving pt Metformin a while ago, are treating his diabetes by not giving him any sugary foods.  In the ED, T 97.8, /87, , RR 16, SpO2 99% on RA. Lab work unrevealing.  (31 Mar 2022 22:36)      SUBJECTIVE  Patient is a 47y old Male who presents with a chief complaint of DFU (02 Aug 2022 11:22)  Currently admitted to medicine with the primary diagnosis of Onychomycosis      INTERVAL HPI AND OVERNIGHT EVENTS:  Patient was examined and seen at bedside. This morning he is resting comfortably in bed and reports no issues or overnight events.    OBJECTIVE  PAST MEDICAL & SURGICAL HISTORY  DM (diabetes mellitus)  Intellectual disability    ALLERGIES:  penicillin (Unknown)    MEDICATIONS:  STANDING MEDICATIONS  ammonium lactate 12% Lotion 1 Application(s) Topical two times a day  chlorhexidine 4% Liquid 1 Application(s) Topical <User Schedule>  clotrimazole 1% Cream 1 Application(s) Topical two times a day  famotidine    Tablet 20 milliGRAM(s) Oral daily  insulin glargine Injectable (LANTUS) 22 Unit(s) SubCutaneous every morning  insulin lispro (ADMELOG) corrective regimen sliding scale   SubCutaneous three times a day before meals  insulin lispro Injectable (ADMELOG) 8 Unit(s) SubCutaneous three times a day before meals  lactobacillus acidophilus 1 Tablet(s) Oral two times a day  melatonin 3 milliGRAM(s) Oral at bedtime  vitamin A &amp; D Ointment 1 Application(s) Topical daily    PRN MEDICATIONS  acetaminophen     Tablet .. 650 milliGRAM(s) Oral every 6 hours PRN      VITAL SIGNS: Last 24 Hours  T(C): 36.1 (03 Aug 2022 00:16), Max: 36.1 (03 Aug 2022 00:16)  T(F): 96.9 (03 Aug 2022 00:16), Max: 96.9 (03 Aug 2022 00:16)  HR: 103 (03 Aug 2022 00:16) (95 - 103)  BP: 128/60 (03 Aug 2022 00:16) (126/68 - 135/70)  BP(mean): --  RR: 18 (03 Aug 2022 00:16) (18 - 18)  SpO2: --    LABS:                        14.7   7.02  )-----------( 323      ( 02 Aug 2022 08:38 )             44.2     08-02    139  |  101  |  14  ----------------------------<  163<H>  4.2   |  28  |  0.8    Ca    9.6      02 Aug 2022 08:38  Mg     1.7     08-02    RADIOLOGY:    PHYSICAL EXAM:  Not in acute distress  General: No icterus  HEENT:   no JVD.  Heart: S1+S2 audible  Lungs: bilateral  moderate air entry, no wheezing, no crepitations.  Abdomen: Soft, non-tender, non-distended , no  rigidity or guarding.  CNS: Awake alert, CN  grossly intact. Intellectual disability  Extremities:  No edema    , onychomycosis toe nails

## 2022-08-03 NOTE — PROGRESS NOTE ADULT - ASSESSMENT
45 yo M with PMH of Intellectual Disability and DM not on any medications presented with foot wounds.    Covid  PNA  Onychomycosis  Dental caries  DM2 with hyperglycemia due to dietary non-compliance  Intellectual disability/autism        Covid PNA- completed RDV. Resolved  Nail bed wound and onychomycosis. Status post Aseptic debridement and curettage of all fungal and ingrowing nails 1-5 bilateral with sterile nail nipper 06/07  Podiatry- no surgical intervention is advised. outpt podiatry f/u  DM type 2 - uncontrolled. A1C 8.9.Insulin glargine and lispro ( doses adjusted). Not compliant with diabetic diet.   Dental Caries-completed clinda course. Will need outpt f/u for possible multiple extractions  Insomnia- prn melatonin    Handoff:  Pending placement , guardianship establised,  medically stable for discharge

## 2022-08-04 LAB
GLUCOSE BLDC GLUCOMTR-MCNC: 153 MG/DL — HIGH (ref 70–99)
GLUCOSE BLDC GLUCOMTR-MCNC: 166 MG/DL — HIGH (ref 70–99)
GLUCOSE BLDC GLUCOMTR-MCNC: 222 MG/DL — HIGH (ref 70–99)
GLUCOSE BLDC GLUCOMTR-MCNC: 243 MG/DL — HIGH (ref 70–99)

## 2022-08-04 PROCEDURE — 99231 SBSQ HOSP IP/OBS SF/LOW 25: CPT

## 2022-08-04 RX ADMIN — Medication 2: at 08:07

## 2022-08-04 RX ADMIN — Medication 1 APPLICATION(S): at 05:10

## 2022-08-04 RX ADMIN — Medication 1 APPLICATION(S): at 17:00

## 2022-08-04 RX ADMIN — Medication 8 UNIT(S): at 11:24

## 2022-08-04 RX ADMIN — Medication 4: at 11:23

## 2022-08-04 RX ADMIN — Medication 3 MILLIGRAM(S): at 21:20

## 2022-08-04 RX ADMIN — FAMOTIDINE 20 MILLIGRAM(S): 10 INJECTION INTRAVENOUS at 11:22

## 2022-08-04 RX ADMIN — Medication 1 APPLICATION(S): at 17:01

## 2022-08-04 RX ADMIN — Medication 8 UNIT(S): at 16:59

## 2022-08-04 RX ADMIN — Medication 2: at 16:58

## 2022-08-04 RX ADMIN — Medication 1 TABLET(S): at 17:01

## 2022-08-04 RX ADMIN — Medication 8 UNIT(S): at 08:08

## 2022-08-04 RX ADMIN — Medication 1 TABLET(S): at 05:11

## 2022-08-04 RX ADMIN — Medication 1 APPLICATION(S): at 11:25

## 2022-08-04 RX ADMIN — INSULIN GLARGINE 22 UNIT(S): 100 INJECTION, SOLUTION SUBCUTANEOUS at 08:07

## 2022-08-04 NOTE — PROGRESS NOTE ADULT - ASSESSMENT
47 yo M with PMH of Intellectual Disability and DM not on any medications presented with foot wounds.    Covid  PNA  Onychomycosis  Dental caries  DM2 with hyperglycemia due to dietary non-compliance  Intellectual disability/autism        Covid PNA- completed RDV. Resolved  Nail bed wound and onychomycosis. Status post Aseptic debridement and curettage of all fungal and ingrowing nails 1-5 bilateral with sterile nail nipper 06/07  Podiatry- no surgical intervention is advised. outpt podiatry f/u  DM type 2 - uncontrolled. A1C 8.9.Insulin glargine and lispro ( doses adjusted). Not compliant with diabetic diet.   Dental Caries-completed clinda course. Will need outpt f/u for possible multiple extractions  Insomnia- prn melatonin    Handoff:  Pending placement , guardianship establised,  medically stable for discharge

## 2022-08-04 NOTE — PROGRESS NOTE ADULT - SUBJECTIVE AND OBJECTIVE BOX
SUBJECTIVE:    Patient is a 47y old Male who presents with a chief complaint of DFU (04 Aug 2022 06:03)    Currently admitted to medicine with the primary diagnosis of Onychomycosis       Today is hospital day 126d.     PAST MEDICAL & SURGICAL HISTORY  DM (diabetes mellitus)    Intellectual disability      ALLERGIES:  penicillin (Unknown)    MEDICATIONS:  STANDING MEDICATIONS  ammonium lactate 12% Lotion 1 Application(s) Topical two times a day  chlorhexidine 4% Liquid 1 Application(s) Topical <User Schedule>  clotrimazole 1% Cream 1 Application(s) Topical two times a day  famotidine    Tablet 20 milliGRAM(s) Oral daily  insulin glargine Injectable (LANTUS) 22 Unit(s) SubCutaneous every morning  insulin lispro (ADMELOG) corrective regimen sliding scale   SubCutaneous three times a day before meals  insulin lispro Injectable (ADMELOG) 8 Unit(s) SubCutaneous three times a day before meals  lactobacillus acidophilus 1 Tablet(s) Oral two times a day  melatonin 3 milliGRAM(s) Oral at bedtime  vitamin A &amp; D Ointment 1 Application(s) Topical daily    PRN MEDICATIONS  acetaminophen     Tablet .. 650 milliGRAM(s) Oral every 6 hours PRN    VITALS:   T(F): 96.9  HR: 91  BP: 109/61  RR: 19  SpO2: --    LABS:                        14.2   6.41  )-----------( 311      ( 03 Aug 2022 11:58 )             43.1     08-03    139  |  102  |  16  ----------------------------<  100<H>  3.9   |  28  |  0.7    Ca    9.2      03 Aug 2022 11:58  Mg     1.7     08-03                    RADIOLOGY:    PHYSICAL EXAM:  GEN: No acute distress  LUNGS: Clear to auscultation bilaterally   HEART: S1/S2 present. RRR.   ABD/ GI: Soft, non-tender, non-distended. Bowel sounds present  EXT: NC/NC/NE/2+PP/LOJA  NEURO: AAOX3

## 2022-08-04 NOTE — PROGRESS NOTE ADULT - SUBJECTIVE AND OBJECTIVE BOX
CARIN WATSON 47y Male  MRN#: [id number]   Hospital Day: 126d    HPI:  47 yo M with PMHx of Intellectual Disability, DM not on any medications presents with foot wounds. Pt has very poor foot hygiene and per the sister, has been persistently scratching an open wound on his L second toe, which worsened and became more red, had drainage, malodor. He saw a physician at [facility] and was told to come to the ED for evaluation. Pt is poor historian. Does endorse abdominal pain for a few days, cannot give much more description. Per the sister, pt did not seem to have any recent fevers, however is also not a very good historian and does not seem to have good health literacy. States that her and her mom decided to stop giving pt Metformin a while ago, are treating his diabetes by not giving him any sugary foods.  In the ED, T 97.8, /87, , RR 16, SpO2 99% on RA. Lab work unrevealing.  (31 Mar 2022 22:36)      SUBJECTIVE  Patient is a 47y old Male who presents with a chief complaint of DFU (03 Aug 2022 18:04)  Currently admitted to medicine with the primary diagnosis of Onychomycosis      INTERVAL HPI AND OVERNIGHT EVENTS:  Patient was examined and seen at bedside. This morning he is resting comfortably in bed and reports no issues or overnight events.    REVIEW OF SYMPTOMS:  CONSTITUTIONAL: No weakness, fevers or chills; No headaches  EYES: No visual changes, eye pain, or discharge  ENT: No vertigo; No ear pain or change in hearing; No sore throat or difficulty swallowing  NECK: No pain or stiffness  RESPIRATORY: No cough, wheezing, or hemoptysis; No shortness of breath  CARDIOVASCULAR: No chest pain or palpitations  GASTROINTESTINAL: No abdominal or epigastric pain; No nausea, vomiting, or hematemesis; No diarrhea or constipation; No melena or hematochezia  GENITOURINARY: No dysuria, frequency or hematuria  MUSCULOSKELETAL: No joint pain, no muscle pain, no weakness  NEUROLOGICAL: No numbness or weakness  SKIN: No itching or rashes    OBJECTIVE  PAST MEDICAL & SURGICAL HISTORY  DM (diabetes mellitus)    Intellectual disability      ALLERGIES:  penicillin (Unknown)    MEDICATIONS:  STANDING MEDICATIONS  ammonium lactate 12% Lotion 1 Application(s) Topical two times a day  chlorhexidine 4% Liquid 1 Application(s) Topical <User Schedule>  clotrimazole 1% Cream 1 Application(s) Topical two times a day  famotidine    Tablet 20 milliGRAM(s) Oral daily  insulin glargine Injectable (LANTUS) 22 Unit(s) SubCutaneous every morning  insulin lispro (ADMELOG) corrective regimen sliding scale   SubCutaneous three times a day before meals  insulin lispro Injectable (ADMELOG) 8 Unit(s) SubCutaneous three times a day before meals  lactobacillus acidophilus 1 Tablet(s) Oral two times a day  melatonin 3 milliGRAM(s) Oral at bedtime  vitamin A &amp; D Ointment 1 Application(s) Topical daily    PRN MEDICATIONS  acetaminophen     Tablet .. 650 milliGRAM(s) Oral every 6 hours PRN      VITAL SIGNS: Last 24 Hours  T(C): 36.1 (03 Aug 2022 23:20), Max: 36.1 (03 Aug 2022 15:32)  T(F): 96.9 (03 Aug 2022 23:20), Max: 96.9 (03 Aug 2022 15:32)  HR: 98 (03 Aug 2022 23:20) (85 - 98)  BP: 136/63 (03 Aug 2022 23:20) (116/69 - 136/63)  BP(mean): --  RR: 18 (03 Aug 2022 23:20) (18 - 18)  SpO2: --    LABS:                        14.2   6.41  )-----------( 311      ( 03 Aug 2022 11:58 )             43.1     08-03    139  |  102  |  16  ----------------------------<  100<H>  3.9   |  28  |  0.7    Ca    9.2      03 Aug 2022 11:58  Mg     1.7     08-03                    RADIOLOGY:      PHYSICAL EXAM:  Not in acute distress  General: No icterus  HEENT:   no JVD.  Heart: S1+S2 audible  Lungs: bilateral  moderate air entry, no wheezing, no crepitations.  Abdomen: Soft, non-tender, non-distended , no  rigidity or guarding.  CNS: Awake alert, CN  grossly intact. Intellectual disability  Extremities:  No edema, onychomycosis toe nails

## 2022-08-05 LAB
GLUCOSE BLDC GLUCOMTR-MCNC: 108 MG/DL — HIGH (ref 70–99)
GLUCOSE BLDC GLUCOMTR-MCNC: 189 MG/DL — HIGH (ref 70–99)
GLUCOSE BLDC GLUCOMTR-MCNC: 216 MG/DL — HIGH (ref 70–99)
GLUCOSE BLDC GLUCOMTR-MCNC: 310 MG/DL — HIGH (ref 70–99)

## 2022-08-05 PROCEDURE — 99232 SBSQ HOSP IP/OBS MODERATE 35: CPT | Mod: GC

## 2022-08-05 RX ADMIN — INSULIN GLARGINE 22 UNIT(S): 100 INJECTION, SOLUTION SUBCUTANEOUS at 12:31

## 2022-08-05 RX ADMIN — Medication 8: at 16:47

## 2022-08-05 RX ADMIN — Medication 1 TABLET(S): at 07:10

## 2022-08-05 RX ADMIN — Medication 1 APPLICATION(S): at 17:26

## 2022-08-05 RX ADMIN — Medication 1 APPLICATION(S): at 12:38

## 2022-08-05 RX ADMIN — Medication 2: at 08:36

## 2022-08-05 RX ADMIN — Medication 1 TABLET(S): at 17:27

## 2022-08-05 RX ADMIN — Medication 3 MILLIGRAM(S): at 21:45

## 2022-08-05 RX ADMIN — Medication 8 UNIT(S): at 11:57

## 2022-08-05 RX ADMIN — Medication 1 APPLICATION(S): at 17:27

## 2022-08-05 RX ADMIN — Medication 8 UNIT(S): at 08:36

## 2022-08-05 RX ADMIN — FAMOTIDINE 20 MILLIGRAM(S): 10 INJECTION INTRAVENOUS at 11:56

## 2022-08-05 RX ADMIN — Medication 4: at 11:56

## 2022-08-05 RX ADMIN — Medication 8 UNIT(S): at 16:47

## 2022-08-06 LAB
GLUCOSE BLDC GLUCOMTR-MCNC: 142 MG/DL — HIGH (ref 70–99)
GLUCOSE BLDC GLUCOMTR-MCNC: 155 MG/DL — HIGH (ref 70–99)
GLUCOSE BLDC GLUCOMTR-MCNC: 213 MG/DL — HIGH (ref 70–99)
GLUCOSE BLDC GLUCOMTR-MCNC: 240 MG/DL — HIGH (ref 70–99)

## 2022-08-06 PROCEDURE — 99232 SBSQ HOSP IP/OBS MODERATE 35: CPT

## 2022-08-06 RX ADMIN — Medication 2: at 08:39

## 2022-08-06 RX ADMIN — Medication 1 TABLET(S): at 17:40

## 2022-08-06 RX ADMIN — Medication 1 APPLICATION(S): at 11:34

## 2022-08-06 RX ADMIN — Medication 8 UNIT(S): at 08:40

## 2022-08-06 RX ADMIN — Medication 8 UNIT(S): at 11:31

## 2022-08-06 RX ADMIN — Medication 4: at 17:33

## 2022-08-06 RX ADMIN — FAMOTIDINE 20 MILLIGRAM(S): 10 INJECTION INTRAVENOUS at 11:33

## 2022-08-06 RX ADMIN — Medication 1 APPLICATION(S): at 17:40

## 2022-08-06 RX ADMIN — Medication 1 APPLICATION(S): at 06:46

## 2022-08-06 RX ADMIN — Medication 1 TABLET(S): at 06:44

## 2022-08-06 RX ADMIN — INSULIN GLARGINE 22 UNIT(S): 100 INJECTION, SOLUTION SUBCUTANEOUS at 08:40

## 2022-08-06 RX ADMIN — Medication 8 UNIT(S): at 17:34

## 2022-08-06 RX ADMIN — Medication 1 APPLICATION(S): at 06:43

## 2022-08-06 RX ADMIN — Medication 1 APPLICATION(S): at 17:41

## 2022-08-06 RX ADMIN — Medication 3 MILLIGRAM(S): at 21:39

## 2022-08-06 RX ADMIN — Medication 4: at 11:31

## 2022-08-06 NOTE — PROGRESS NOTE ADULT - ASSESSMENT
47 yo M with PMH of Intellectual Disability and DM not on any medications presented with foot wounds.    Covid  PNA  Onychomycosis  Dental caries  DM2 with hyperglycemia due to dietary non-compliance  Intellectual disability/autism        Covid PNA- completed RDV. Resolved  Nail bed wound and onychomycosis. Status post Aseptic debridement and curettage of all fungal and ingrowing nails 1-5 bilateral with sterile nail nipper 06/07  Podiatry- no surgical intervention is advised. outpt podiatry f/u  DM type 2 - uncontrolled. A1C 8.9.Insulin glargine and lispro ( doses adjusted). Not compliant with diabetic diet.   Dental Caries-completed clinda course. Will need outpt f/u for possible multiple extractions  Insomnia- prn melatonin    Handoff:  Pending placement , guardianship establised,  medically stable for discharge .

## 2022-08-06 NOTE — PROGRESS NOTE ADULT - SUBJECTIVE AND OBJECTIVE BOX
CARIN WATSON 47y Male  MRN#: [id number]   CODE STATUS: Full      SUBJECTIVE  Patient is a 47y old Male who presents with a chief complaint of DFU (24 Jul 2022 15:19)  Currently admitted to medicine with the primary diagnosis of Onychomycosis    Today is hospital day 128d, and this morning patient appears well and in no acute distress. Patient had no medical complaints. No acute events overnight.      OBJECTIVE  PAST MEDICAL & SURGICAL HISTORY  DM (diabetes mellitus)  Intellectual disability      ALLERGIES:  penicillin (Unknown)    MEDICATIONS:  STANDING MEDICATIONS  ammonium lactate 12% Lotion 1 Application(s) Topical two times a day  chlorhexidine 4% Liquid 1 Application(s) Topical <User Schedule>  clotrimazole 1% Cream 1 Application(s) Topical two times a day  dextrose 5%. 1000 milliLiter(s) IV Continuous <Continuous>  dextrose 5%. 1000 milliLiter(s) IV Continuous <Continuous>  dextrose 50% Injectable 25 Gram(s) IV Push once  dextrose 50% Injectable 12.5 Gram(s) IV Push once  dextrose 50% Injectable 25 Gram(s) IV Push once  famotidine    Tablet 20 milliGRAM(s) Oral daily  glucagon  Injectable 1 milliGRAM(s) IntraMuscular once  glucagon  Injectable 1 milliGRAM(s) IntraMuscular once  insulin glargine Injectable (LANTUS) 22 Unit(s) SubCutaneous every morning  insulin lispro (ADMELOG) corrective regimen sliding scale   SubCutaneous three times a day before meals  insulin lispro Injectable (ADMELOG) 8 Unit(s) SubCutaneous three times a day before meals  lactobacillus acidophilus 1 Tablet(s) Oral two times a day  melatonin 3 milliGRAM(s) Oral at bedtime  vitamin A &amp; D Ointment 1 Application(s) Topical daily    PRN MEDICATIONS  acetaminophen     Tablet .. 650 milliGRAM(s) Oral every 6 hours PRN  dextrose Oral Gel 15 Gram(s) Oral once PRN    VITAL SIGNS:   Vital Signs Last 24 Hrs  T(C): 36.1 (06 Aug 2022 08:00), Max: 36.7 (05 Aug 2022 16:15)  T(F): 97 (06 Aug 2022 08:00), Max: 98 (05 Aug 2022 16:15)  HR: 88 (06 Aug 2022 08:00) (76 - 93)  BP: 131/69 (06 Aug 2022 08:00) (121/68 - 131/69)  BP(mean): 89 (05 Aug 2022 16:15) (89 - 89)  RR: 19 (06 Aug 2022 08:00) (18 - 20)    LABS:    RADIOLOGY:      PHYSICAL EXAM:  GENERAL: NAD, well-developed  HEENT:  Atraumatic, Normocephalic. EOMI  PULMONARY: Clear to auscultation bilaterally; No wheeze  CARDIOVASCULAR: Regular rate and rhythm; No murmurs, rubs, or gallops  GASTROINTESTINAL: Soft, Nontender, Nondistended; Bowel sounds present  MUSCULOSKELETAL:  2+ Peripheral Pulses, No clubbing, cyanosis, or edema  NEUROLOGY: non-focal, normal gait  SKIN: No rashes or lesions      ADMISSION SUMMARY  Patient is a 47y old Male who presents with a chief complaint of DFU (24 Jul 2022 15:19)  Currently admitted to medicine with the primary diagnosis of Onychomycosis    ASSESSMENT & PLAN  45 yo M with PMH of Intellectual Disability and DM not on any medications presented with foot wounds.    Covid PNA, Resolved  Asymptomatic  Completed RDV    Nail bed wound and onychomycosis.   Status post Aseptic debridement and curettage of all fungal and ingrowing nails 1-5 bilateral with sterile nail nipper 06/07  Aseptic debridement and curettage of all fungal and ingrowing nails 1-5 bilateral with sterile nail nipper.  Discussed importance of daily foot examinations, drying of feet after bathing and proper shoe gear.  Patient Would Benefit From Periodic Debridements; Can Follow Up as Outpatient w/ Dr. Bronson Post Discharge     DM type 2 - uncontrolled.  Continue  metformin 1000mg BID and insulin glargine and lispro ( doses adjusted)  Dental Caries-completed clinda for 7 days. Will need outpt f/u for possible multiple extractions  Insomnia- prn melatonin     CARIN WATSON 47y Male  MRN#: [id number]   CODE STATUS: Full      SUBJECTIVE  Patient is a 47y old Male who presents with a chief complaint of DFU (24 Jul 2022 15:19)  Currently admitted to medicine with the primary diagnosis of Onychomycosis    Today is hospital day 128d, and this morning patient appears well and in no acute distress. Patient had no medical complaints. No acute events overnight.      OBJECTIVE  PAST MEDICAL & SURGICAL HISTORY  DM (diabetes mellitus)  Intellectual disability      ALLERGIES:  penicillin (Unknown)    MEDICATIONS:  STANDING MEDICATIONS  ammonium lactate 12% Lotion 1 Application(s) Topical two times a day  chlorhexidine 4% Liquid 1 Application(s) Topical <User Schedule>  clotrimazole 1% Cream 1 Application(s) Topical two times a day  dextrose 5%. 1000 milliLiter(s) IV Continuous <Continuous>  dextrose 5%. 1000 milliLiter(s) IV Continuous <Continuous>  dextrose 50% Injectable 25 Gram(s) IV Push once  dextrose 50% Injectable 12.5 Gram(s) IV Push once  dextrose 50% Injectable 25 Gram(s) IV Push once  famotidine    Tablet 20 milliGRAM(s) Oral daily  glucagon  Injectable 1 milliGRAM(s) IntraMuscular once  glucagon  Injectable 1 milliGRAM(s) IntraMuscular once  insulin glargine Injectable (LANTUS) 22 Unit(s) SubCutaneous every morning  insulin lispro (ADMELOG) corrective regimen sliding scale   SubCutaneous three times a day before meals  insulin lispro Injectable (ADMELOG) 8 Unit(s) SubCutaneous three times a day before meals  lactobacillus acidophilus 1 Tablet(s) Oral two times a day  melatonin 3 milliGRAM(s) Oral at bedtime  vitamin A &amp; D Ointment 1 Application(s) Topical daily    PRN MEDICATIONS  acetaminophen     Tablet .. 650 milliGRAM(s) Oral every 6 hours PRN  dextrose Oral Gel 15 Gram(s) Oral once PRN    VITAL SIGNS:   Vital Signs Last 24 Hrs  T(C): 36.1 (06 Aug 2022 08:00), Max: 36.7 (05 Aug 2022 16:15)  T(F): 97 (06 Aug 2022 08:00), Max: 98 (05 Aug 2022 16:15)  HR: 88 (06 Aug 2022 08:00) (76 - 93)  BP: 131/69 (06 Aug 2022 08:00) (121/68 - 131/69)  BP(mean): 89 (05 Aug 2022 16:15) (89 - 89)  RR: 19 (06 Aug 2022 08:00) (18 - 20)    LABS:    RADIOLOGY:      PHYSICAL EXAM:  GENERAL: NAD, well-developed  HEENT:  Atraumatic, Normocephalic. EOMI  PULMONARY: Clear to auscultation bilaterally; No wheeze  CARDIOVASCULAR: Regular rate and rhythm; No murmurs, rubs, or gallops  GASTROINTESTINAL: Soft, Nontender, Nondistended; Bowel sounds present  MUSCULOSKELETAL:  2+ Peripheral Pulses, No clubbing, cyanosis, or edema  NEUROLOGY: non-focal, normal gait  SKIN: No rashes or lesions      ADMISSION SUMMARY  Patient is a 47y old Male who presents with a chief complaint of DFU (24 Jul 2022 15:19)  Currently admitted to medicine with the primary diagnosis of Onychomycosis    ASSESSMENT & PLAN  45 yo M with PMH of Intellectual Disability and DM not on any medications presented with foot wounds.    #Covid PNA  Resolved, asymptomatic  Completed RDV    #Nail bed wound and onychomycosis.   Status post Aseptic debridement and curettage of all fungal and ingrowing nails 1-5 bilateral with sterile nail nipper 06/07  Aseptic debridement and curettage of all fungal and ingrowing nails 1-5 bilateral with sterile nail nipper.  Patient Would Benefit From Periodic Debridements; Can Follow Up as Outpatient w/ Dr. Bronson Post Discharge     #DM type 2 - uncontrolled.  Continue  metformin 1000mg BID and insulin glargine and lispro ( doses adjusted)    #Dental Caries  -completed clinda for 7 days. Will need outpt f/u for possible multiple extractions    #Insomnia  - prn melatonin    Handoff:  Pending guardianship, else medically stable for discharge

## 2022-08-06 NOTE — PROGRESS NOTE ADULT - SUBJECTIVE AND OBJECTIVE BOX
SUBJECTIVE:    Patient is a 47y old Male who presents with a chief complaint of DFU (06 Aug 2022 11:08)    Currently admitted to medicine with the primary diagnosis of Onychomycosis       Today is hospital day 128d.     PAST MEDICAL & SURGICAL HISTORY  DM (diabetes mellitus)    Intellectual disability      ALLERGIES:  penicillin (Unknown)    MEDICATIONS:  STANDING MEDICATIONS  ammonium lactate 12% Lotion 1 Application(s) Topical two times a day  chlorhexidine 4% Liquid 1 Application(s) Topical <User Schedule>  clotrimazole 1% Cream 1 Application(s) Topical two times a day  famotidine    Tablet 20 milliGRAM(s) Oral daily  insulin glargine Injectable (LANTUS) 22 Unit(s) SubCutaneous every morning  insulin lispro (ADMELOG) corrective regimen sliding scale   SubCutaneous three times a day before meals  insulin lispro Injectable (ADMELOG) 8 Unit(s) SubCutaneous three times a day before meals  lactobacillus acidophilus 1 Tablet(s) Oral two times a day  melatonin 3 milliGRAM(s) Oral at bedtime  vitamin A &amp; D Ointment 1 Application(s) Topical daily    PRN MEDICATIONS  acetaminophen     Tablet .. 650 milliGRAM(s) Oral every 6 hours PRN    VITALS:   T(F): 97  HR: 88  BP: 131/69  RR: 19  SpO2: --    LABS:                        RADIOLOGY:    PHYSICAL EXAM:  GEN: No acute distress  LUNGS: Clear to auscultation bilaterally   HEART: S1/S2 present. RRR.   ABD/ GI: Soft, non-tender, non-distended. Bowel sounds present  EXT: NC/NC/NE/2+PP/LOJA  NEURO: AAOX3

## 2022-08-07 LAB
GLUCOSE BLDC GLUCOMTR-MCNC: 122 MG/DL — HIGH (ref 70–99)
GLUCOSE BLDC GLUCOMTR-MCNC: 193 MG/DL — HIGH (ref 70–99)
GLUCOSE BLDC GLUCOMTR-MCNC: 246 MG/DL — HIGH (ref 70–99)
GLUCOSE BLDC GLUCOMTR-MCNC: 253 MG/DL — HIGH (ref 70–99)

## 2022-08-07 PROCEDURE — 99231 SBSQ HOSP IP/OBS SF/LOW 25: CPT

## 2022-08-07 RX ADMIN — Medication 1 APPLICATION(S): at 05:17

## 2022-08-07 RX ADMIN — Medication 8 UNIT(S): at 17:32

## 2022-08-07 RX ADMIN — Medication 2: at 17:31

## 2022-08-07 RX ADMIN — Medication 8 UNIT(S): at 11:53

## 2022-08-07 RX ADMIN — Medication 3 MILLIGRAM(S): at 21:22

## 2022-08-07 RX ADMIN — Medication 1 APPLICATION(S): at 17:31

## 2022-08-07 RX ADMIN — Medication 1 TABLET(S): at 17:32

## 2022-08-07 RX ADMIN — FAMOTIDINE 20 MILLIGRAM(S): 10 INJECTION INTRAVENOUS at 11:52

## 2022-08-07 RX ADMIN — Medication 6: at 11:52

## 2022-08-07 RX ADMIN — INSULIN GLARGINE 22 UNIT(S): 100 INJECTION, SOLUTION SUBCUTANEOUS at 08:06

## 2022-08-07 RX ADMIN — Medication 1 APPLICATION(S): at 12:19

## 2022-08-07 RX ADMIN — Medication 1 TABLET(S): at 05:18

## 2022-08-07 NOTE — PROGRESS NOTE ADULT - ASSESSMENT
45 yo M with PMH of Intellectual Disability and DM not on any medications presented with foot wounds.    #Covid PNA  Resolved, asymptomatic  Completed RDV    #Nail bed wound and onychomycosis.   Status post Aseptic debridement and curettage of all fungal and ingrowing nails 1-5 bilateral with sterile nail nipper 06/07  Aseptic debridement and curettage of all fungal and ingrowing nails 1-5 bilateral with sterile nail nipper.  Patient Would Benefit From Periodic Debridements; Can Follow Up as Outpatient w/ Dr. Bronson Post Discharge     #DM type 2 - uncontrolled.  Continue  metformin 1000mg BID and insulin glargine and lispro ( doses adjusted)    #Dental Caries  -completed clinda for 7 days. Will need outpt f/u for possible multiple extractions    #Insomnia  - prn melatonin    Handoff:  Pending legal issue and guardianship, else medically stable for discharge

## 2022-08-07 NOTE — PROGRESS NOTE ADULT - SUBJECTIVE AND OBJECTIVE BOX
CARIN WATSON 47y Male  MRN#: [id number]   Hospital Day: 129d    HPI:  47 yo M with PMHx of Intellectual Disability, DM not on any medications presents with foot wounds. Pt has very poor foot hygiene and per the sister, has been persistently scratching an open wound on his L second toe, which worsened and became more red, had drainage, malodor. He saw a physician at [facility] and was told to come to the ED for evaluation. Pt is poor historian. Does endorse abdominal pain for a few days, cannot give much more description. Per the sister, pt did not seem to have any recent fevers, however is also not a very good historian and does not seem to have good health literacy. States that her and her mom decided to stop giving pt Metformin a while ago, are treating his diabetes by not giving him any sugary foods.  In the ED, T 97.8, /87, , RR 16, SpO2 99% on RA. Lab work unrevealing.  (31 Mar 2022 22:36)      SUBJECTIVE  Patient is a 47y old Male who presents with a chief complaint of DFU (06 Aug 2022 15:10)  Currently admitted to medicine with the primary diagnosis of Onychomycosis      INTERVAL HPI AND OVERNIGHT EVENTS:  Patient was examined and seen at bedside. This morning he is resting comfortably in bed and reports no issues or overnight events.    OBJECTIVE  PAST MEDICAL & SURGICAL HISTORY  DM (diabetes mellitus)    Intellectual disability    ALLERGIES:  penicillin (Unknown)    MEDICATIONS:  STANDING MEDICATIONS  ammonium lactate 12% Lotion 1 Application(s) Topical two times a day  chlorhexidine 4% Liquid 1 Application(s) Topical <User Schedule>  clotrimazole 1% Cream 1 Application(s) Topical two times a day  famotidine    Tablet 20 milliGRAM(s) Oral daily  insulin glargine Injectable (LANTUS) 22 Unit(s) SubCutaneous every morning  insulin lispro (ADMELOG) corrective regimen sliding scale   SubCutaneous three times a day before meals  insulin lispro Injectable (ADMELOG) 8 Unit(s) SubCutaneous three times a day before meals  lactobacillus acidophilus 1 Tablet(s) Oral two times a day  melatonin 3 milliGRAM(s) Oral at bedtime  vitamin A &amp; D Ointment 1 Application(s) Topical daily    PRN MEDICATIONS  acetaminophen     Tablet .. 650 milliGRAM(s) Oral every 6 hours PRN    VITAL SIGNS: Last 24 Hours  T(C): 36.3 (07 Aug 2022 00:00), Max: 37.2 (06 Aug 2022 15:59)  T(F): 97.4 (07 Aug 2022 00:00), Max: 98.9 (06 Aug 2022 15:59)  HR: 97 (07 Aug 2022 00:00) (88 - 97)  BP: 122/60 (07 Aug 2022 00:00) (122/60 - 145/60)  BP(mean): --  RR: 18 (07 Aug 2022 00:00) (18 - 19)  SpO2: --    LABS:    RADIOLOGY:    PHYSICAL EXAM:    Not in acute distress  General: No icterus  HEENT:   no JVD.  Heart: S1+S2 audible  Lungs: bilateral  moderate air entry, no wheezing, no crepitations.  Abdomen: Soft, non-tender, non-distended , no  rigidity or guarding.  CNS: Awake alert, CN  grossly intact. Intellectual disability  Extremities:  No edema, onychomycosis toe nails

## 2022-08-07 NOTE — PROGRESS NOTE ADULT - SUBJECTIVE AND OBJECTIVE BOX
SUBJECTIVE:    Patient is a 47y old Male who presents with a chief complaint of DFU (07 Aug 2022 04:24)    Currently admitted to medicine with the primary diagnosis of Onychomycosis       Today is hospital day 129d.     PAST MEDICAL & SURGICAL HISTORY  DM (diabetes mellitus)    Intellectual disability      ALLERGIES:  penicillin (Unknown)    MEDICATIONS:  STANDING MEDICATIONS  ammonium lactate 12% Lotion 1 Application(s) Topical two times a day  chlorhexidine 4% Liquid 1 Application(s) Topical <User Schedule>  clotrimazole 1% Cream 1 Application(s) Topical two times a day  famotidine    Tablet 20 milliGRAM(s) Oral daily  insulin glargine Injectable (LANTUS) 22 Unit(s) SubCutaneous every morning  insulin lispro (ADMELOG) corrective regimen sliding scale   SubCutaneous three times a day before meals  insulin lispro Injectable (ADMELOG) 8 Unit(s) SubCutaneous three times a day before meals  lactobacillus acidophilus 1 Tablet(s) Oral two times a day  melatonin 3 milliGRAM(s) Oral at bedtime  vitamin A &amp; D Ointment 1 Application(s) Topical daily    PRN MEDICATIONS  acetaminophen     Tablet .. 650 milliGRAM(s) Oral every 6 hours PRN    VITALS:   T(F): 98.1  HR: 94  BP: 110/52  RR: 18  SpO2: --    LABS:                        RADIOLOGY:    PHYSICAL EXAM:  GEN: No acute distress  LUNGS: Clear to auscultation bilaterally   HEART: S1/S2 present. RRR.   ABD/ GI: Soft, non-tender, non-distended. Bowel sounds present  EXT: NC/NC/NE/2+PP/LOJA  NEURO: AAOX3

## 2022-08-08 LAB
GLUCOSE BLDC GLUCOMTR-MCNC: 126 MG/DL — HIGH (ref 70–99)
GLUCOSE BLDC GLUCOMTR-MCNC: 189 MG/DL — HIGH (ref 70–99)
GLUCOSE BLDC GLUCOMTR-MCNC: 194 MG/DL — HIGH (ref 70–99)
GLUCOSE BLDC GLUCOMTR-MCNC: 222 MG/DL — HIGH (ref 70–99)

## 2022-08-08 PROCEDURE — 99222 1ST HOSP IP/OBS MODERATE 55: CPT

## 2022-08-08 PROCEDURE — 99231 SBSQ HOSP IP/OBS SF/LOW 25: CPT

## 2022-08-08 RX ADMIN — INSULIN GLARGINE 22 UNIT(S): 100 INJECTION, SOLUTION SUBCUTANEOUS at 08:26

## 2022-08-08 RX ADMIN — Medication 1 TABLET(S): at 05:17

## 2022-08-08 RX ADMIN — Medication 8 UNIT(S): at 11:25

## 2022-08-08 RX ADMIN — FAMOTIDINE 20 MILLIGRAM(S): 10 INJECTION INTRAVENOUS at 11:28

## 2022-08-08 RX ADMIN — Medication 8 UNIT(S): at 08:27

## 2022-08-08 RX ADMIN — Medication 1 TABLET(S): at 17:24

## 2022-08-08 RX ADMIN — Medication 1 APPLICATION(S): at 17:26

## 2022-08-08 RX ADMIN — Medication 2: at 11:24

## 2022-08-08 RX ADMIN — Medication 4: at 17:23

## 2022-08-08 RX ADMIN — Medication 8 UNIT(S): at 17:24

## 2022-08-08 RX ADMIN — Medication 1 APPLICATION(S): at 11:27

## 2022-08-08 RX ADMIN — Medication 1 APPLICATION(S): at 17:25

## 2022-08-08 RX ADMIN — Medication 3 MILLIGRAM(S): at 21:24

## 2022-08-08 RX ADMIN — Medication 1 APPLICATION(S): at 05:17

## 2022-08-08 NOTE — CHART NOTE - NSCHARTNOTEFT_GEN_A_CORE
PO remains optimum per EMR documentation. 7/28-8/6 intake: %.     Not at risk, will re-assess in 10 days.

## 2022-08-08 NOTE — PROGRESS NOTE ADULT - SUBJECTIVE AND OBJECTIVE BOX
SUBJECTIVE:    Patient is a 47y old Male who presents with a chief complaint of DFU (08 Aug 2022 13:42)    Currently admitted to medicine with the primary diagnosis of Onychomycosis       Today is hospital day 130d.     PAST MEDICAL & SURGICAL HISTORY  DM (diabetes mellitus)    Intellectual disability      ALLERGIES:  penicillin (Unknown)    MEDICATIONS:  STANDING MEDICATIONS  ammonium lactate 12% Lotion 1 Application(s) Topical two times a day  chlorhexidine 4% Liquid 1 Application(s) Topical <User Schedule>  clotrimazole 1% Cream 1 Application(s) Topical two times a day  famotidine    Tablet 20 milliGRAM(s) Oral daily  insulin glargine Injectable (LANTUS) 22 Unit(s) SubCutaneous every morning  insulin lispro (ADMELOG) corrective regimen sliding scale   SubCutaneous three times a day before meals  insulin lispro Injectable (ADMELOG) 8 Unit(s) SubCutaneous three times a day before meals  lactobacillus acidophilus 1 Tablet(s) Oral two times a day  melatonin 3 milliGRAM(s) Oral at bedtime  vitamin A &amp; D Ointment 1 Application(s) Topical daily    PRN MEDICATIONS  acetaminophen     Tablet .. 650 milliGRAM(s) Oral every 6 hours PRN    VITALS:   T(F): 96.4  HR: 111  BP: 147/71  RR: 18  SpO2: --    LABS:                        RADIOLOGY:    PHYSICAL EXAM:  GEN: No acute distress  LUNGS: Clear to auscultation bilaterally   HEART: S1/S2 present. RRR.   ABD/ GI: Soft, non-tender, non-distended. Bowel sounds present  EXT: NC/NC/NE/2+PP/LOJA  NEURO: AAOX3

## 2022-08-08 NOTE — PROGRESS NOTE ADULT - SUBJECTIVE AND OBJECTIVE BOX
CARIN WATSON 47y Male  MRN#: [id number]   Hospital Day: 129d    HPI:  45 yo M with PMHx of Intellectual Disability, DM not on any medications presents with foot wounds. Pt has very poor foot hygiene and per the sister, has been persistently scratching an open wound on his L second toe, which worsened and became more red, had drainage, malodor. He saw a physician at [facility] and was told to come to the ED for evaluation. Pt is poor historian. Does endorse abdominal pain for a few days, cannot give much more description. Per the sister, pt did not seem to have any recent fevers, however is also not a very good historian and does not seem to have good health literacy. States that her and her mom decided to stop giving pt Metformin a while ago, are treating his diabetes by not giving him any sugary foods.  In the ED, T 97.8, /87, , RR 16, SpO2 99% on RA. Lab work unrevealing.  (31 Mar 2022 22:36)      SUBJECTIVE  Patient is a 47y old Male who presents with a chief complaint of DFU (06 Aug 2022 15:10)  Currently admitted to medicine with the primary diagnosis of Onychomycosis      INTERVAL HPI AND OVERNIGHT EVENTS:  Patient was examined and seen at bedside. This morning he is resting comfortably in bed and reports no issues or overnight events.    OBJECTIVE  PAST MEDICAL & SURGICAL HISTORY  DM (diabetes mellitus)    Intellectual disability    ALLERGIES:  penicillin (Unknown)    MEDICATIONS:  STANDING MEDICATIONS  ammonium lactate 12% Lotion 1 Application(s) Topical two times a day  chlorhexidine 4% Liquid 1 Application(s) Topical <User Schedule>  clotrimazole 1% Cream 1 Application(s) Topical two times a day  famotidine    Tablet 20 milliGRAM(s) Oral daily  insulin glargine Injectable (LANTUS) 22 Unit(s) SubCutaneous every morning  insulin lispro (ADMELOG) corrective regimen sliding scale   SubCutaneous three times a day before meals  insulin lispro Injectable (ADMELOG) 8 Unit(s) SubCutaneous three times a day before meals  lactobacillus acidophilus 1 Tablet(s) Oral two times a day  melatonin 3 milliGRAM(s) Oral at bedtime  vitamin A &amp; D Ointment 1 Application(s) Topical daily    PRN MEDICATIONS  acetaminophen     Tablet .. 650 milliGRAM(s) Oral every 6 hours PRN    VITAL SIGNS: Last 24 Hours  T(C): 36.3 (07 Aug 2022 00:00), Max: 37.2 (06 Aug 2022 15:59)  T(F): 97.4 (07 Aug 2022 00:00), Max: 98.9 (06 Aug 2022 15:59)  HR: 97 (07 Aug 2022 00:00) (88 - 97)  BP: 122/60 (07 Aug 2022 00:00) (122/60 - 145/60)  BP(mean): --  RR: 18 (07 Aug 2022 00:00) (18 - 19)  SpO2: --    LABS:    RADIOLOGY:    PHYSICAL EXAM:    Not in acute distress  General: No icterus  HEENT:   no JVD.  Heart: S1+S2 audible  Lungs: bilateral  moderate air entry, no wheezing, no crepitations.  Abdomen: Soft, non-tender, non-distended , no  rigidity or guarding.  CNS: Awake alert, CN  grossly intact. Intellectual disability  Extremities:  No edema, onychomycosis toe nails        Assessment and Plan:   · Assessment	  45 yo M with PMH of Intellectual Disability and DM not on any medications presented with foot wounds.    #Covid PNA  Resolved, asymptomatic  Completed RDV    #Nail bed wound and onychomycosis.   Status post Aseptic debridement and curettage of all fungal and ingrowing nails 1-5 bilateral with sterile nail nipper 06/07  Aseptic debridement and curettage of all fungal and ingrowing nails 1-5 bilateral with sterile nail nipper.  Patient Would Benefit From Periodic Debridements; Can Follow Up as Outpatient w/ Dr. Bronson Post Discharge     #DM type 2 - uncontrolled.  Continue  metformin 1000mg BID and insulin glargine and lispro ( doses adjusted)    #Dental Caries  -completed clinda for 7 days. Will need outpt f/u for possible multiple extractions    #Insomnia  - prn melatonin    Handoff:  Pending legal issue and guardianship, else medically stable for discharge CARIN WATSON 47y Male  MRN#: [id number]   CODE STATUS: Full      SUBJECTIVE  Patient is a 47y old Male who presents with a chief complaint of DFU (24 Jul 2022 15:19)  Currently admitted to medicine with the primary diagnosis of Onychomycosis    Today is hospital day 130d, and this morning patient appears well and in no acute distress. Patient had no medical complaints. No acute events overnight.      OBJECTIVE  PAST MEDICAL & SURGICAL HISTORY  DM (diabetes mellitus)  Intellectual disability      ALLERGIES:  penicillin (Unknown)    MEDICATIONS:  STANDING MEDICATIONS  ammonium lactate 12% Lotion 1 Application(s) Topical two times a day  chlorhexidine 4% Liquid 1 Application(s) Topical <User Schedule>  clotrimazole 1% Cream 1 Application(s) Topical two times a day  dextrose 5%. 1000 milliLiter(s) IV Continuous <Continuous>  dextrose 5%. 1000 milliLiter(s) IV Continuous <Continuous>  dextrose 50% Injectable 25 Gram(s) IV Push once  dextrose 50% Injectable 12.5 Gram(s) IV Push once  dextrose 50% Injectable 25 Gram(s) IV Push once  famotidine    Tablet 20 milliGRAM(s) Oral daily  glucagon  Injectable 1 milliGRAM(s) IntraMuscular once  glucagon  Injectable 1 milliGRAM(s) IntraMuscular once  insulin glargine Injectable (LANTUS) 22 Unit(s) SubCutaneous every morning  insulin lispro (ADMELOG) corrective regimen sliding scale   SubCutaneous three times a day before meals  insulin lispro Injectable (ADMELOG) 8 Unit(s) SubCutaneous three times a day before meals  lactobacillus acidophilus 1 Tablet(s) Oral two times a day  melatonin 3 milliGRAM(s) Oral at bedtime  vitamin A &amp; D Ointment 1 Application(s) Topical daily    PRN MEDICATIONS  acetaminophen     Tablet .. 650 milliGRAM(s) Oral every 6 hours PRN  dextrose Oral Gel 15 Gram(s) Oral once PRN    VITAL SIGNS:   Vital Signs Last 24 Hrs  T(C): 36.2 (08 Aug 2022 08:00), Max: 36.5 (07 Aug 2022 15:50)  T(F): 97.2 (08 Aug 2022 08:00), Max: 97.7 (07 Aug 2022 15:50)  HR: 100 (08 Aug 2022 08:00) (75 - 100)  BP: 128/76 (08 Aug 2022 08:00) (118/65 - 139/64)  RR: 18 (08 Aug 2022 08:00) (18 - 19)      LABS:    RADIOLOGY:      PHYSICAL EXAM:  GENERAL: NAD, well-developed  HEENT:  Atraumatic, Normocephalic. EOMI  PULMONARY: Clear to auscultation bilaterally; No wheeze  CARDIOVASCULAR: Regular rate and rhythm; No murmurs, rubs, or gallops  GASTROINTESTINAL: Soft, Nontender, Nondistended; Bowel sounds present  MUSCULOSKELETAL:  2+ Peripheral Pulses, No clubbing, cyanosis, or edema  NEUROLOGY: non-focal, normal gait  SKIN: No rashes or lesions      ADMISSION SUMMARY  Patient is a 47y old Male who presents with a chief complaint of DFU (24 Jul 2022 15:19)  Currently admitted to medicine with the primary diagnosis of Onychomycosis    ASSESSMENT & PLAN  45 yo M with PMH of Intellectual Disability and DM not on any medications presented with foot wounds.    #Covid PNA  Resolved, asymptomatic  Completed RDV    #Nail bed wound and onychomycosis.   Status post Aseptic debridement and curettage of all fungal and ingrowing nails 1-5 bilateral with sterile nail nipper 06/07  Aseptic debridement and curettage of all fungal and ingrowing nails 1-5 bilateral with sterile nail nipper.  Patient Would Benefit From Periodic Debridements; Can Follow Up as Outpatient w/ Dr. Bronson Post Discharge     #DM type 2 - uncontrolled.  Continue insulin glargine and lispro ( doses adjusted)    #Dental Caries  -completed clinda for 7 days. Will need outpt f/u for possible multiple extractions    #Insomnia  - prn melatonin    Handoff:  Pending guardianship, else medically stable for discharge

## 2022-08-09 LAB
GLUCOSE BLDC GLUCOMTR-MCNC: 159 MG/DL — HIGH (ref 70–99)
GLUCOSE BLDC GLUCOMTR-MCNC: 219 MG/DL — HIGH (ref 70–99)
GLUCOSE BLDC GLUCOMTR-MCNC: 227 MG/DL — HIGH (ref 70–99)
GLUCOSE BLDC GLUCOMTR-MCNC: 285 MG/DL — HIGH (ref 70–99)

## 2022-08-09 PROCEDURE — 99231 SBSQ HOSP IP/OBS SF/LOW 25: CPT

## 2022-08-09 RX ADMIN — Medication 1 APPLICATION(S): at 11:40

## 2022-08-09 RX ADMIN — Medication 8 UNIT(S): at 11:41

## 2022-08-09 RX ADMIN — Medication 8 UNIT(S): at 17:47

## 2022-08-09 RX ADMIN — Medication 2: at 17:45

## 2022-08-09 RX ADMIN — Medication 6: at 08:23

## 2022-08-09 RX ADMIN — Medication 1 TABLET(S): at 17:44

## 2022-08-09 RX ADMIN — Medication 4: at 11:41

## 2022-08-09 RX ADMIN — CHLORHEXIDINE GLUCONATE 1 APPLICATION(S): 213 SOLUTION TOPICAL at 07:21

## 2022-08-09 RX ADMIN — Medication 1 APPLICATION(S): at 17:46

## 2022-08-09 RX ADMIN — FAMOTIDINE 20 MILLIGRAM(S): 10 INJECTION INTRAVENOUS at 11:41

## 2022-08-09 RX ADMIN — Medication 1 TABLET(S): at 06:05

## 2022-08-09 RX ADMIN — Medication 8 UNIT(S): at 08:24

## 2022-08-09 RX ADMIN — Medication 3 MILLIGRAM(S): at 21:40

## 2022-08-09 RX ADMIN — Medication 1 APPLICATION(S): at 06:06

## 2022-08-09 RX ADMIN — INSULIN GLARGINE 22 UNIT(S): 100 INJECTION, SOLUTION SUBCUTANEOUS at 08:23

## 2022-08-09 NOTE — PROGRESS NOTE ADULT - SUBJECTIVE AND OBJECTIVE BOX
SUBJECTIVE:    Patient is a 47y old Male who presents with a chief complaint of DFU (09 Aug 2022 10:57)    Currently admitted to medicine with the primary diagnosis of Onychomycosis       Today is hospital day 131d.     PAST MEDICAL & SURGICAL HISTORY  DM (diabetes mellitus)    Intellectual disability      ALLERGIES:  penicillin (Unknown)    MEDICATIONS:  STANDING MEDICATIONS  ammonium lactate 12% Lotion 1 Application(s) Topical two times a day  chlorhexidine 4% Liquid 1 Application(s) Topical <User Schedule>  clotrimazole 1% Cream 1 Application(s) Topical two times a day  famotidine    Tablet 20 milliGRAM(s) Oral daily  insulin glargine Injectable (LANTUS) 22 Unit(s) SubCutaneous every morning  insulin lispro (ADMELOG) corrective regimen sliding scale   SubCutaneous three times a day before meals  insulin lispro Injectable (ADMELOG) 8 Unit(s) SubCutaneous three times a day before meals  lactobacillus acidophilus 1 Tablet(s) Oral two times a day  melatonin 3 milliGRAM(s) Oral at bedtime  vitamin A &amp; D Ointment 1 Application(s) Topical daily    PRN MEDICATIONS  acetaminophen     Tablet .. 650 milliGRAM(s) Oral every 6 hours PRN    VITALS:   T(F): 97.8  HR: 97  BP: 125/63  RR: 18  SpO2: --    LABS:                        RADIOLOGY:    PHYSICAL EXAM:  GEN: No acute distress  LUNGS: Clear to auscultation bilaterally   HEART: S1/S2 present. RRR.   ABD/ GI: Soft, non-tender, non-distended. Bowel sounds present  EXT: NC/NC/NE/2+PP/LOJA  NEURO: AAOX3

## 2022-08-09 NOTE — PROGRESS NOTE ADULT - SUBJECTIVE AND OBJECTIVE BOX
CARIN WATSON 47y Male  MRN#: [id number]   CODE STATUS: Full      SUBJECTIVE  Patient is a 47y old Male who presents with a chief complaint of DFU (24 Jul 2022 15:19)  Currently admitted to medicine with the primary diagnosis of Onychomycosis    Today is hospital day 131d, and this morning patient appears well and in no acute distress. Patient reported scratching his ankles bilaterally yesterday leaving red marks along his ankles and lower bilateral legs below the knee. No acute events overnight.      OBJECTIVE  PAST MEDICAL & SURGICAL HISTORY  DM (diabetes mellitus)  Intellectual disability      ALLERGIES:  penicillin (Unknown)    MEDICATIONS:  STANDING MEDICATIONS  ammonium lactate 12% Lotion 1 Application(s) Topical two times a day  chlorhexidine 4% Liquid 1 Application(s) Topical <User Schedule>  clotrimazole 1% Cream 1 Application(s) Topical two times a day  dextrose 5%. 1000 milliLiter(s) IV Continuous <Continuous>  dextrose 5%. 1000 milliLiter(s) IV Continuous <Continuous>  dextrose 50% Injectable 25 Gram(s) IV Push once  dextrose 50% Injectable 12.5 Gram(s) IV Push once  dextrose 50% Injectable 25 Gram(s) IV Push once  famotidine    Tablet 20 milliGRAM(s) Oral daily  glucagon  Injectable 1 milliGRAM(s) IntraMuscular once  glucagon  Injectable 1 milliGRAM(s) IntraMuscular once  insulin glargine Injectable (LANTUS) 22 Unit(s) SubCutaneous every morning  insulin lispro (ADMELOG) corrective regimen sliding scale   SubCutaneous three times a day before meals  insulin lispro Injectable (ADMELOG) 8 Unit(s) SubCutaneous three times a day before meals  lactobacillus acidophilus 1 Tablet(s) Oral two times a day  melatonin 3 milliGRAM(s) Oral at bedtime  vitamin A &amp; D Ointment 1 Application(s) Topical daily    PRN MEDICATIONS  acetaminophen     Tablet .. 650 milliGRAM(s) Oral every 6 hours PRN  dextrose Oral Gel 15 Gram(s) Oral once PRN    VITAL SIGNS:   Vital Signs Last 24 Hrs  T(C): 36.6 (09 Aug 2022 08:14), Max: 36.6 (09 Aug 2022 08:14)  T(F): 97.9 (09 Aug 2022 08:14), Max: 97.9 (09 Aug 2022 08:14)  HR: 102 (09 Aug 2022 08:14) (91 - 111)  BP: 128/71 (09 Aug 2022 08:14) (128/71 - 147/71)  RR: 18 (09 Aug 2022 08:14) (18 - 18)      Parameters below as of 08 Aug 2022 16:00  Patient On (Oxygen Delivery Method): room air      LABS:    RADIOLOGY:      PHYSICAL EXAM:  GENERAL: NAD, well-developed  HEENT:  Atraumatic, Normocephalic. EOMI  PULMONARY: Clear to auscultation bilaterally; No wheeze  CARDIOVASCULAR: Regular rate and rhythm; No murmurs, rubs, or gallops  GASTROINTESTINAL: Soft, Nontender, Nondistended; Bowel sounds present  MUSCULOSKELETAL:  2+ Peripheral Pulses, No clubbing, cyanosis, or edema  NEUROLOGY: non-focal, normal gait  SKIN: Patient was scratches along his bilateral ankles with noted red marks and irritation      ADMISSION SUMMARY  Patient is a 47y old Male who presents with a chief complaint of DFU (24 Jul 2022 15:19)  Currently admitted to medicine with the primary diagnosis of Onychomycosis    ASSESSMENT & PLAN  45 yo M with PMH of Intellectual Disability and DM not on any medications presented with foot wounds.    #Covid PNA  Resolved, asymptomatic  Completed RDV    #Nail bed wound and onychomycosis.   Status post Aseptic debridement and curettage of all fungal and ingrowing nails 1-5 bilateral with sterile nail nipper 06/07  Aseptic debridement and curettage of all fungal and ingrowing nails 1-5 bilateral with sterile nail nipper.  Patient Would Benefit From Periodic Debridements; Can Follow Up as Outpatient w/ Dr. Bronson Post Discharge     #DM type 2 - uncontrolled.  Continue insulin glargine and lispro ( doses adjusted)    #Dental Caries  -completed clinda for 7 days. Will need outpt f/u for possible multiple extractions    #Insomnia  - prn melatonin    Handoff:  Pending guardianship, else medically stable for discharge

## 2022-08-10 LAB
GLUCOSE BLDC GLUCOMTR-MCNC: 155 MG/DL — HIGH (ref 70–99)
GLUCOSE BLDC GLUCOMTR-MCNC: 173 MG/DL — HIGH (ref 70–99)
GLUCOSE BLDC GLUCOMTR-MCNC: 219 MG/DL — HIGH (ref 70–99)
GLUCOSE BLDC GLUCOMTR-MCNC: 294 MG/DL — HIGH (ref 70–99)

## 2022-08-10 PROCEDURE — 99231 SBSQ HOSP IP/OBS SF/LOW 25: CPT

## 2022-08-10 RX ADMIN — Medication 2: at 08:06

## 2022-08-10 RX ADMIN — Medication 1 APPLICATION(S): at 17:58

## 2022-08-10 RX ADMIN — Medication 650 MILLIGRAM(S): at 22:00

## 2022-08-10 RX ADMIN — Medication 3 MILLIGRAM(S): at 21:10

## 2022-08-10 RX ADMIN — Medication 1 TABLET(S): at 17:56

## 2022-08-10 RX ADMIN — INSULIN GLARGINE 22 UNIT(S): 100 INJECTION, SOLUTION SUBCUTANEOUS at 08:07

## 2022-08-10 RX ADMIN — Medication 8 UNIT(S): at 17:56

## 2022-08-10 RX ADMIN — Medication 4: at 17:56

## 2022-08-10 RX ADMIN — Medication 1 TABLET(S): at 06:33

## 2022-08-10 RX ADMIN — Medication 1 APPLICATION(S): at 11:55

## 2022-08-10 RX ADMIN — Medication 650 MILLIGRAM(S): at 21:10

## 2022-08-10 RX ADMIN — FAMOTIDINE 20 MILLIGRAM(S): 10 INJECTION INTRAVENOUS at 11:55

## 2022-08-10 RX ADMIN — Medication 8 UNIT(S): at 11:50

## 2022-08-10 RX ADMIN — CHLORHEXIDINE GLUCONATE 1 APPLICATION(S): 213 SOLUTION TOPICAL at 06:31

## 2022-08-10 RX ADMIN — Medication 1 APPLICATION(S): at 06:32

## 2022-08-10 RX ADMIN — Medication 1 APPLICATION(S): at 06:31

## 2022-08-10 RX ADMIN — Medication 8 UNIT(S): at 08:06

## 2022-08-10 RX ADMIN — Medication 2: at 11:50

## 2022-08-10 NOTE — PROGRESS NOTE ADULT - SUBJECTIVE AND OBJECTIVE BOX
CARIN WATSON 47y Male  MRN#: [id number]   CODE STATUS: Full      SUBJECTIVE  Patient is a 47y old Male who presents with a chief complaint of DFU (24 Jul 2022 15:19)  Was admitted to medicine with the primary diagnosis of Onychomycosis    Today is hospital day 132d, and this morning patient appears well and in no acute distress. Patient reported itchiness of his bilateral lower legs again. No acute events overnight.      OBJECTIVE  PAST MEDICAL & SURGICAL HISTORY  DM (diabetes mellitus)  Intellectual disability      ALLERGIES:  penicillin (Unknown)    MEDICATIONS:  STANDING MEDICATIONS  ammonium lactate 12% Lotion 1 Application(s) Topical two times a day  chlorhexidine 4% Liquid 1 Application(s) Topical <User Schedule>  clotrimazole 1% Cream 1 Application(s) Topical two times a day  dextrose 5%. 1000 milliLiter(s) IV Continuous <Continuous>  dextrose 5%. 1000 milliLiter(s) IV Continuous <Continuous>  dextrose 50% Injectable 25 Gram(s) IV Push once  dextrose 50% Injectable 12.5 Gram(s) IV Push once  dextrose 50% Injectable 25 Gram(s) IV Push once  famotidine    Tablet 20 milliGRAM(s) Oral daily  glucagon  Injectable 1 milliGRAM(s) IntraMuscular once  glucagon  Injectable 1 milliGRAM(s) IntraMuscular once  insulin glargine Injectable (LANTUS) 22 Unit(s) SubCutaneous every morning  insulin lispro (ADMELOG) corrective regimen sliding scale   SubCutaneous three times a day before meals  insulin lispro Injectable (ADMELOG) 8 Unit(s) SubCutaneous three times a day before meals  lactobacillus acidophilus 1 Tablet(s) Oral two times a day  melatonin 3 milliGRAM(s) Oral at bedtime  vitamin A &amp; D Ointment 1 Application(s) Topical daily    PRN MEDICATIONS  acetaminophen     Tablet .. 650 milliGRAM(s) Oral every 6 hours PRN  dextrose Oral Gel 15 Gram(s) Oral once PRN    VITAL SIGNS:   Vital Signs Last 24 Hrs  T(C): 36.1 (10 Aug 2022 07:47), Max: 36.6 (09 Aug 2022 16:11)  T(F): 97 (10 Aug 2022 07:47), Max: 97.8 (09 Aug 2022 16:11)  HR: 89 (10 Aug 2022 07:47) (89 - 97)  BP: 131/72 (10 Aug 2022 07:47) (121/71 - 131/72)  RR: 18 (10 Aug 2022 07:47) (18 - 18)      LABS:  POCT Blood Glucose (08.10.22 @ 07:33)   POCT Blood Glucose.: 173 mg/dL   POCT Blood Glucose (08.09.22 @ 21:00)   POCT Blood Glucose.: 219 mg/dL   POCT Blood Glucose (08.09.22 @ 16:30)   POCT Blood Glucose.: 159 mg/dL   POCT Blood Glucose (08.09.22 @ 11:02)   POCT Blood Glucose.: 227 mg/dL   POCT Blood Glucose (08.09.22 @ 07:43)   POCT Blood Glucose.: 285 mg/dL   POCT Blood Glucose (08.08.22 @ 21:19)   POCT Blood Glucose.: 189 mg/dL     RADIOLOGY:      PHYSICAL EXAM:  GENERAL: NAD, well-developed  HEENT:  Atraumatic, Normocephalic. EOMI  PULMONARY: Clear to auscultation bilaterally; No wheeze  CARDIOVASCULAR: Regular rate and rhythm; No murmurs, rubs, or gallops  GASTROINTESTINAL: Soft, Nontender, Nondistended; Bowel sounds present  MUSCULOSKELETAL:  2+ Peripheral Pulses, No clubbing, cyanosis, or edema  NEUROLOGY: non-focal, normal gait  SKIN: Patient has scratches along his bilateral ankles with noted red marks and irritation      ADMISSION SUMMARY  Patient is a 47y old Male who presents with a chief complaint of DFU (24 Jul 2022 15:19)  Currently admitted to medicine with the primary diagnosis of Onychomycosis    ASSESSMENT & PLAN  45 yo M with PMH of Intellectual Disability and DM not on any medications presented with foot wounds.    #Covid PNA  Resolved, asymptomatic  Completed RDV    #Nail bed wound and onychomycosis.   Status post Aseptic debridement and curettage of all fungal and ingrowing nails 1-5 bilateral with sterile nail nipper 06/07  Aseptic debridement and curettage of all fungal and ingrowing nails 1-5 bilateral with sterile nail nipper.  Patient Would Benefit From Periodic Debridements; Can Follow Up as Outpatient w/ Dr. Bronson Post Discharge     #DM type 2 - uncontrolled.  Continue insulin glargine and lispro ( doses adjusted)  F/u A1c    #Dental Caries  -completed clinda for 7 days. Will need outpt f/u for possible multiple extractions    #Insomnia  - prn melatonin    Handoff:  Pending guardianship, else medically stable for discharge CARIN WATSON 47y Male  MRN#: [id number]   CODE STATUS: Full      SUBJECTIVE  Patient is a 47y old Male who presents with a chief complaint of DFU (24 Jul 2022 15:19)  Was admitted to medicine with the primary diagnosis of Onychomycosis    Today is hospital day 132d, and this morning patient appears well and in no acute distress. Patient reported itchiness of his bilateral lower legs again. No acute events overnight.    OBJECTIVE  PAST MEDICAL & SURGICAL HISTORY  DM (diabetes mellitus)  Intellectual disability    ALLERGIES:  penicillin (Unknown)    MEDICATIONS:  STANDING MEDICATIONS  ammonium lactate 12% Lotion 1 Application(s) Topical two times a day  chlorhexidine 4% Liquid 1 Application(s) Topical <User Schedule>  clotrimazole 1% Cream 1 Application(s) Topical two times a day  dextrose 5%. 1000 milliLiter(s) IV Continuous <Continuous>  dextrose 5%. 1000 milliLiter(s) IV Continuous <Continuous>  dextrose 50% Injectable 25 Gram(s) IV Push once  dextrose 50% Injectable 12.5 Gram(s) IV Push once  dextrose 50% Injectable 25 Gram(s) IV Push once  famotidine    Tablet 20 milliGRAM(s) Oral daily  glucagon  Injectable 1 milliGRAM(s) IntraMuscular once  glucagon  Injectable 1 milliGRAM(s) IntraMuscular once  insulin glargine Injectable (LANTUS) 22 Unit(s) SubCutaneous every morning  insulin lispro (ADMELOG) corrective regimen sliding scale   SubCutaneous three times a day before meals  insulin lispro Injectable (ADMELOG) 8 Unit(s) SubCutaneous three times a day before meals  lactobacillus acidophilus 1 Tablet(s) Oral two times a day  melatonin 3 milliGRAM(s) Oral at bedtime  vitamin A &amp; D Ointment 1 Application(s) Topical daily    PRN MEDICATIONS  acetaminophen     Tablet .. 650 milliGRAM(s) Oral every 6 hours PRN  dextrose Oral Gel 15 Gram(s) Oral once PRN    VITAL SIGNS:   Vital Signs Last 24 Hrs  T(C): 36.1 (10 Aug 2022 07:47), Max: 36.6 (09 Aug 2022 16:11)  T(F): 97 (10 Aug 2022 07:47), Max: 97.8 (09 Aug 2022 16:11)  HR: 89 (10 Aug 2022 07:47) (89 - 97)  BP: 131/72 (10 Aug 2022 07:47) (121/71 - 131/72)  RR: 18 (10 Aug 2022 07:47) (18 - 18)    LABS:  POCT Blood Glucose (08.10.22 @ 07:33)   POCT Blood Glucose.: 173 mg/dL   POCT Blood Glucose (08.09.22 @ 21:00)   POCT Blood Glucose.: 219 mg/dL   POCT Blood Glucose (08.09.22 @ 16:30)   POCT Blood Glucose.: 159 mg/dL   POCT Blood Glucose (08.09.22 @ 11:02)   POCT Blood Glucose.: 227 mg/dL   POCT Blood Glucose (08.09.22 @ 07:43)   POCT Blood Glucose.: 285 mg/dL   POCT Blood Glucose (08.08.22 @ 21:19)   POCT Blood Glucose.: 189 mg/dL   RADIOLOGY:    PHYSICAL EXAM:  GENERAL: NAD, well-developed  HEENT:  Atraumatic, Normocephalic. EOMI  PULMONARY: Clear to auscultation bilaterally; No wheeze  CARDIOVASCULAR: Regular rate and rhythm; No murmurs, rubs, or gallops  GASTROINTESTINAL: Soft, Nontender, Nondistended; Bowel sounds present  MUSCULOSKELETAL:  2+ Peripheral Pulses, No clubbing, cyanosis, or edema  NEUROLOGY: non-focal, normal gait  SKIN: Patient has scratches along his bilateral ankles with noted red marks and irritation    ADMISSION SUMMARY  Patient is a 47y old Male who presents with a chief complaint of DFU (24 Jul 2022 15:19)  Currently admitted to medicine with the primary diagnosis of Onychomycosis    ASSESSMENT & PLAN  45 yo M with PMH of Intellectual Disability and DM not on any medications presented with foot wounds.    #Covid PNA  Resolved, asymptomatic  Completed RDV    #Nail bed wound and onychomycosis.   Status post Aseptic debridement and curettage of all fungal and ingrowing nails 1-5 bilateral with sterile nail nipper 06/07  Aseptic debridement and curettage of all fungal and ingrowing nails 1-5 bilateral with sterile nail nipper.  Patient Would Benefit From Periodic Debridements; Can Follow Up as Outpatient w/ Dr. Bronson Post Discharge     #DM type 2 - uncontrolled.  Continue insulin glargine and lispro ( doses adjusted)  F/u A1c    #Dental Caries  -completed clinda for 7 days. Will need outpt f/u for possible multiple extractions    #Insomnia  - prn melatonin    Handoff:  Pending guardianship, else medically stable for discharge

## 2022-08-11 LAB
A1C WITH ESTIMATED AVERAGE GLUCOSE RESULT: 7.5 % — HIGH (ref 4–5.6)
ALBUMIN SERPL ELPH-MCNC: 4.3 G/DL — SIGNIFICANT CHANGE UP (ref 3.5–5.2)
ALP SERPL-CCNC: 113 U/L — SIGNIFICANT CHANGE UP (ref 30–115)
ALT FLD-CCNC: 21 U/L — SIGNIFICANT CHANGE UP (ref 0–41)
ANION GAP SERPL CALC-SCNC: 9 MMOL/L — SIGNIFICANT CHANGE UP (ref 7–14)
AST SERPL-CCNC: 19 U/L — SIGNIFICANT CHANGE UP (ref 0–41)
BASOPHILS # BLD AUTO: 0.06 K/UL — SIGNIFICANT CHANGE UP (ref 0–0.2)
BASOPHILS NFR BLD AUTO: 0.7 % — SIGNIFICANT CHANGE UP (ref 0–1)
BILIRUB SERPL-MCNC: 0.3 MG/DL — SIGNIFICANT CHANGE UP (ref 0.2–1.2)
BUN SERPL-MCNC: 19 MG/DL — SIGNIFICANT CHANGE UP (ref 10–20)
CALCIUM SERPL-MCNC: 9.4 MG/DL — SIGNIFICANT CHANGE UP (ref 8.5–10.1)
CHLORIDE SERPL-SCNC: 105 MMOL/L — SIGNIFICANT CHANGE UP (ref 98–110)
CK MB CFR SERPL CALC: 2.7 NG/ML — SIGNIFICANT CHANGE UP (ref 0.6–6.3)
CK SERPL-CCNC: 169 U/L — SIGNIFICANT CHANGE UP (ref 0–225)
CO2 SERPL-SCNC: 26 MMOL/L — SIGNIFICANT CHANGE UP (ref 17–32)
CREAT SERPL-MCNC: 0.7 MG/DL — SIGNIFICANT CHANGE UP (ref 0.7–1.5)
EGFR: 114 ML/MIN/1.73M2 — SIGNIFICANT CHANGE UP
EOSINOPHIL # BLD AUTO: 0.39 K/UL — SIGNIFICANT CHANGE UP (ref 0–0.7)
EOSINOPHIL NFR BLD AUTO: 4.5 % — SIGNIFICANT CHANGE UP (ref 0–8)
ESTIMATED AVERAGE GLUCOSE: 169 MG/DL — HIGH (ref 68–114)
GLUCOSE BLDC GLUCOMTR-MCNC: 114 MG/DL — HIGH (ref 70–99)
GLUCOSE BLDC GLUCOMTR-MCNC: 127 MG/DL — HIGH (ref 70–99)
GLUCOSE BLDC GLUCOMTR-MCNC: 153 MG/DL — HIGH (ref 70–99)
GLUCOSE BLDC GLUCOMTR-MCNC: 240 MG/DL — HIGH (ref 70–99)
GLUCOSE SERPL-MCNC: 115 MG/DL — HIGH (ref 70–99)
HCT VFR BLD CALC: 44.2 % — SIGNIFICANT CHANGE UP (ref 42–52)
HGB BLD-MCNC: 15.3 G/DL — SIGNIFICANT CHANGE UP (ref 14–18)
IMM GRANULOCYTES NFR BLD AUTO: 0.2 % — SIGNIFICANT CHANGE UP (ref 0.1–0.3)
LYMPHOCYTES # BLD AUTO: 3.12 K/UL — SIGNIFICANT CHANGE UP (ref 1.2–3.4)
LYMPHOCYTES # BLD AUTO: 35.7 % — SIGNIFICANT CHANGE UP (ref 20.5–51.1)
MAGNESIUM SERPL-MCNC: 1.9 MG/DL — SIGNIFICANT CHANGE UP (ref 1.8–2.4)
MCHC RBC-ENTMCNC: 32.8 PG — HIGH (ref 27–31)
MCHC RBC-ENTMCNC: 34.6 G/DL — SIGNIFICANT CHANGE UP (ref 32–37)
MCV RBC AUTO: 94.8 FL — HIGH (ref 80–94)
MONOCYTES # BLD AUTO: 0.67 K/UL — HIGH (ref 0.1–0.6)
MONOCYTES NFR BLD AUTO: 7.7 % — SIGNIFICANT CHANGE UP (ref 1.7–9.3)
NEUTROPHILS # BLD AUTO: 4.48 K/UL — SIGNIFICANT CHANGE UP (ref 1.4–6.5)
NEUTROPHILS NFR BLD AUTO: 51.2 % — SIGNIFICANT CHANGE UP (ref 42.2–75.2)
NRBC # BLD: 0 /100 WBCS — SIGNIFICANT CHANGE UP (ref 0–0)
PLATELET # BLD AUTO: 320 K/UL — SIGNIFICANT CHANGE UP (ref 130–400)
POTASSIUM SERPL-MCNC: 4.1 MMOL/L — SIGNIFICANT CHANGE UP (ref 3.5–5)
POTASSIUM SERPL-SCNC: 4.1 MMOL/L — SIGNIFICANT CHANGE UP (ref 3.5–5)
PROT SERPL-MCNC: 7.3 G/DL — SIGNIFICANT CHANGE UP (ref 6–8)
RBC # BLD: 4.66 M/UL — LOW (ref 4.7–6.1)
RBC # FLD: 12.2 % — SIGNIFICANT CHANGE UP (ref 11.5–14.5)
SODIUM SERPL-SCNC: 140 MMOL/L — SIGNIFICANT CHANGE UP (ref 135–146)
TROPONIN T SERPL-MCNC: <0.01 NG/ML — SIGNIFICANT CHANGE UP
WBC # BLD: 8.74 K/UL — SIGNIFICANT CHANGE UP (ref 4.8–10.8)
WBC # FLD AUTO: 8.74 K/UL — SIGNIFICANT CHANGE UP (ref 4.8–10.8)

## 2022-08-11 PROCEDURE — 93010 ELECTROCARDIOGRAM REPORT: CPT | Mod: 76

## 2022-08-11 PROCEDURE — 99222 1ST HOSP IP/OBS MODERATE 55: CPT | Mod: GC

## 2022-08-11 PROCEDURE — 99231 SBSQ HOSP IP/OBS SF/LOW 25: CPT

## 2022-08-11 RX ADMIN — Medication 8 UNIT(S): at 08:28

## 2022-08-11 RX ADMIN — Medication 8 UNIT(S): at 11:10

## 2022-08-11 RX ADMIN — Medication 1 APPLICATION(S): at 05:51

## 2022-08-11 RX ADMIN — Medication 1 TABLET(S): at 05:52

## 2022-08-11 RX ADMIN — Medication 1 APPLICATION(S): at 17:19

## 2022-08-11 RX ADMIN — Medication 1 APPLICATION(S): at 11:09

## 2022-08-11 RX ADMIN — Medication 4: at 11:10

## 2022-08-11 RX ADMIN — INSULIN GLARGINE 22 UNIT(S): 100 INJECTION, SOLUTION SUBCUTANEOUS at 09:55

## 2022-08-11 RX ADMIN — Medication 8 UNIT(S): at 17:19

## 2022-08-11 RX ADMIN — FAMOTIDINE 20 MILLIGRAM(S): 10 INJECTION INTRAVENOUS at 11:09

## 2022-08-11 RX ADMIN — Medication 1 TABLET(S): at 17:20

## 2022-08-11 RX ADMIN — Medication 3 MILLIGRAM(S): at 21:02

## 2022-08-11 NOTE — PROGRESS NOTE ADULT - SUBJECTIVE AND OBJECTIVE BOX
CARIN WATSON 47y Male  MRN#: [id number]   CODE STATUS: Full      SUBJECTIVE  Patient is a 47y old Male who presents with a chief complaint of DFU (24 Jul 2022 15:19)  Was admitted to medicine with the primary diagnosis of Onychomycosis    Today is hospital day 133d, and this morning patient appears well and in no acute distress. No acute events overnight.    OBJECTIVE  PAST MEDICAL & SURGICAL HISTORY  DM (diabetes mellitus)  Intellectual disability    ALLERGIES:  penicillin (Unknown)    MEDICATIONS:  STANDING MEDICATIONS  ammonium lactate 12% Lotion 1 Application(s) Topical two times a day  chlorhexidine 4% Liquid 1 Application(s) Topical <User Schedule>  clotrimazole 1% Cream 1 Application(s) Topical two times a day  dextrose 5%. 1000 milliLiter(s) IV Continuous <Continuous>  dextrose 5%. 1000 milliLiter(s) IV Continuous <Continuous>  dextrose 50% Injectable 25 Gram(s) IV Push once  dextrose 50% Injectable 12.5 Gram(s) IV Push once  dextrose 50% Injectable 25 Gram(s) IV Push once  famotidine    Tablet 20 milliGRAM(s) Oral daily  glucagon  Injectable 1 milliGRAM(s) IntraMuscular once  glucagon  Injectable 1 milliGRAM(s) IntraMuscular once  insulin glargine Injectable (LANTUS) 22 Unit(s) SubCutaneous every morning  insulin lispro (ADMELOG) corrective regimen sliding scale   SubCutaneous three times a day before meals  insulin lispro Injectable (ADMELOG) 8 Unit(s) SubCutaneous three times a day before meals  lactobacillus acidophilus 1 Tablet(s) Oral two times a day  melatonin 3 milliGRAM(s) Oral at bedtime  vitamin A &amp; D Ointment 1 Application(s) Topical daily    PRN MEDICATIONS  acetaminophen     Tablet .. 650 milliGRAM(s) Oral every 6 hours PRN  dextrose Oral Gel 15 Gram(s) Oral once PRN    VITAL SIGNS:   Vital Signs Last 24 Hrs  T(C): 36.6 (11 Aug 2022 07:52), Max: 36.6 (10 Aug 2022 16:00)  T(F): 97.9 (11 Aug 2022 07:52), Max: 97.9 (10 Aug 2022 16:00)  HR: 82 (11 Aug 2022 07:52) (82 - 103)  BP: 113/58 (11 Aug 2022 07:52) (103/59 - 113/63)  RR: 18 (11 Aug 2022 07:52) (18 - 18)    LABS:    RADIOLOGY:    PHYSICAL EXAM:  GENERAL: NAD, well-developed  HEENT:  Atraumatic, Normocephalic. EOMI  PULMONARY: Clear to auscultation bilaterally; No wheeze  CARDIOVASCULAR: Regular rate and rhythm; No murmurs, rubs, or gallops  GASTROINTESTINAL: Soft, Nontender, Nondistended; Bowel sounds present  MUSCULOSKELETAL:  2+ Peripheral Pulses, No clubbing, cyanosis, or edema  NEUROLOGY: non-focal, normal gait  SKIN: Patient has scratches along his bilateral ankles with noted red marks and irritation, but they are improved and healing today    ADMISSION SUMMARY  Patient is a 47y old Male who presents with a chief complaint of DFU (24 Jul 2022 15:19)  Currently admitted to medicine with the primary diagnosis of Onychomycosis    ASSESSMENT & PLAN  47 yo M with PMH of Intellectual Disability and DM not on any medications presented with foot wounds.    #Covid PNA  Resolved, asymptomatic  Completed RDV    #Nail bed wound and onychomycosis.   Status post Aseptic debridement and curettage of all fungal and ingrowing nails 1-5 bilateral with sterile nail nipper 06/07  Aseptic debridement and curettage of all fungal and ingrowing nails 1-5 bilateral with sterile nail nipper.  Patient Would Benefit From Periodic Debridements; Can Follow Up as Outpatient w/ Dr. Bronson Post Discharge     #DM type 2 - uncontrolled.  Continue insulin glargine and lispro ( doses adjusted)  F/u A1c    #Dental Caries  -completed clinda for 7 days. Will need outpt f/u for possible multiple extractions    #Insomnia  - prn melatonin    Handoff:  Pending guardianship, else medically stable for discharge   CARIN WATSON 47y Male  MRN#: [id number]   CODE STATUS: Full      SUBJECTIVE  Patient is a 47y old Male who presents with a chief complaint of DFU (24 Jul 2022 15:19)  Was admitted to medicine with the primary diagnosis of Onychomycosis    Today is hospital day 133d, and this morning patient appears well and in no acute distress. No acute events overnight.    OBJECTIVE  PAST MEDICAL & SURGICAL HISTORY  DM (diabetes mellitus)  Intellectual disability    ALLERGIES:  penicillin (Unknown)    MEDICATIONS:  STANDING MEDICATIONS  ammonium lactate 12% Lotion 1 Application(s) Topical two times a day  chlorhexidine 4% Liquid 1 Application(s) Topical <User Schedule>  clotrimazole 1% Cream 1 Application(s) Topical two times a day  dextrose 5%. 1000 milliLiter(s) IV Continuous <Continuous>  dextrose 5%. 1000 milliLiter(s) IV Continuous <Continuous>  dextrose 50% Injectable 25 Gram(s) IV Push once  dextrose 50% Injectable 12.5 Gram(s) IV Push once  dextrose 50% Injectable 25 Gram(s) IV Push once  famotidine    Tablet 20 milliGRAM(s) Oral daily  glucagon  Injectable 1 milliGRAM(s) IntraMuscular once  glucagon  Injectable 1 milliGRAM(s) IntraMuscular once  insulin glargine Injectable (LANTUS) 22 Unit(s) SubCutaneous every morning  insulin lispro (ADMELOG) corrective regimen sliding scale   SubCutaneous three times a day before meals  insulin lispro Injectable (ADMELOG) 8 Unit(s) SubCutaneous three times a day before meals  lactobacillus acidophilus 1 Tablet(s) Oral two times a day  melatonin 3 milliGRAM(s) Oral at bedtime  vitamin A &amp; D Ointment 1 Application(s) Topical daily    PRN MEDICATIONS  acetaminophen     Tablet .. 650 milliGRAM(s) Oral every 6 hours PRN  dextrose Oral Gel 15 Gram(s) Oral once PRN    VITAL SIGNS:   Vital Signs Last 24 Hrs  T(C): 36.6 (11 Aug 2022 07:52), Max: 36.6 (10 Aug 2022 16:00)  T(F): 97.9 (11 Aug 2022 07:52), Max: 97.9 (10 Aug 2022 16:00)  HR: 82 (11 Aug 2022 07:52) (82 - 103)  BP: 113/58 (11 Aug 2022 07:52) (103/59 - 113/63)  RR: 18 (11 Aug 2022 07:52) (18 - 18)    LABS:    RADIOLOGY:    PHYSICAL EXAM:  GENERAL: NAD, well-developed  HEENT:  Atraumatic, Normocephalic. EOMI  PULMONARY: Clear to auscultation bilaterally; No wheeze  CARDIOVASCULAR: Regular rate and rhythm; No murmurs, rubs, or gallops  GASTROINTESTINAL: Soft, Nontender, Nondistended; Bowel sounds present  MUSCULOSKELETAL:  2+ Peripheral Pulses, No clubbing, cyanosis, or edema  NEUROLOGY: non-focal, normal gait  SKIN: Patient has scratches along his bilateral ankles with noted red marks and irritation, but they are improved and healing today    ADMISSION SUMMARY  Patient is a 47y old Male who presents with a chief complaint of DFU (24 Jul 2022 15:19)  Currently admitted to medicine with the primary diagnosis of Onychomycosis    ASSESSMENT & PLAN  47 yo M with PMH of Intellectual Disability and DM not on any medications presented with foot wounds.    #Covid PNA  Resolved, asymptomatic  Completed RDV    #Nail bed wound and onychomycosis.   Status post Aseptic debridement and curettage of all fungal and ingrowing nails 1-5 bilateral with sterile nail nipper 06/07  Aseptic debridement and curettage of all fungal and ingrowing nails 1-5 bilateral with sterile nail nipper.  Patient Would Benefit From Periodic Debridements; Can Follow Up as Outpatient w/ Dr. Bronson Post Discharge     #DM type 2 - uncontrolled.  Continue insulin glargine and lispro ( doses adjusted)  A1c 7.5 on 8/11    #Dental Caries  -completed clinda for 7 days. Will need outpt f/u for possible multiple extractions    #Insomnia  - prn melatonin    Handoff:  Pending guardianship, else medically stable for discharge

## 2022-08-11 NOTE — CONSULT NOTE ADULT - SUBJECTIVE AND OBJECTIVE BOX
HPI:  45 yo M with PMHx of Intellectual Disability, DM not on any medications presented with foot wounds. Pt has very poor foot hygiene and per the sister, has been persistently scratching an open wound on his L second toe, which worsened and became more red, had drainage, malodor. He saw a physician at [facility] and was told to come to the ED for evaluation. Pt is poor historian. Does endorse abdominal pain for a few days, cannot give much more description. Per the sister, pt did not seem to have any recent fevers, however is also not a very good historian and does not seem to have good health literacy. States that her and her mom decided to stop giving pt Metformin a while ago, are treating his diabetes by not giving him any sugary foods.  In the ED, T 97.8, /87, , RR 16, SpO2 99% on RA. Lab work unrevealing.  (31 Mar 2022 22:36)  hospital stay complicated Hospital acquired COVID s/p RDV, poorly controlled DM. Patient has been in the hospital >100 days, pending guardianship and now placement.   Cardiology consulted for abnormal EKG      PAST MEDICAL & SURGICAL HISTORY  DM (diabetes mellitus)    Intellectual disability        FAMILY HISTORY:  FAMILY HISTORY:      SOCIAL HISTORY:  []smoker  []Alcohol  []Drug    ALLERGIES:  penicillin (Unknown)      MEDICATIONS:  MEDICATIONS  (STANDING):  ammonium lactate 12% Lotion 1 Application(s) Topical two times a day  chlorhexidine 4% Liquid 1 Application(s) Topical <User Schedule>  clotrimazole 1% Cream 1 Application(s) Topical two times a day  famotidine    Tablet 20 milliGRAM(s) Oral daily  insulin glargine Injectable (LANTUS) 22 Unit(s) SubCutaneous every morning  insulin lispro (ADMELOG) corrective regimen sliding scale   SubCutaneous three times a day before meals  insulin lispro Injectable (ADMELOG) 8 Unit(s) SubCutaneous three times a day before meals  lactobacillus acidophilus 1 Tablet(s) Oral two times a day  melatonin 3 milliGRAM(s) Oral at bedtime  vitamin A &amp; D Ointment 1 Application(s) Topical daily    MEDICATIONS  (PRN):  acetaminophen     Tablet .. 650 milliGRAM(s) Oral every 6 hours PRN Temp greater or equal to 38C (100.4F), Mild Pain (1 - 3)      HOME MEDICATIONS:  Home Medications:  vitamin A and D topical ointment: 1 application topically once a day (12 Apr 2022 11:40)      VITALS:   T(F): 97.2 (08-11 @ 16:19), Max: 97.9 (08-09 @ 08:14)  HR: 92 (08-11 @ 16:19) (82 - 111)  BP: 128/61 (08-11 @ 16:19) (103/59 - 147/71)  BP(mean): --  RR: 18 (08-11 @ 16:19) (18 - 18)  SpO2: --    I&O's Summary    10 Aug 2022 07:01  -  11 Aug 2022 07:00  --------------------------------------------------------  IN: 400 mL / OUT: 0 mL / NET: 400 mL        REVIEW OF SYSTEMS:  CONSTITUTIONAL: No weakness, fevers or chills  EYES: No visual changes  ENT: No vertigo or throat pain   NECK: No pain or stiffness  RESPIRATORY: No cough, wheezing, hemoptysis; No shortness of breath  CARDIOVASCULAR: No chest pain or palpitations  GASTROINTESTINAL: No abdominal or epigastric pain. No nausea, vomiting, or hematemesis; No diarrhea or constipation. No melena or hematochezia.  GENITOURINARY: No dysuria, frequency or hematuria  NEUROLOGICAL: No numbness or weakness  SKIN: No itching, no rashes  MSK: No pain    PHYSICAL EXAM:  NEURO: patient is awake , alert and oriented  GEN: Not in acute distress  NECK: no thyroid enlargement, no JVD  LUNGS: Clear to auscultation bilaterally   CARDIOVASCULAR: S1/S2 present, RRR , no murmurs or rubs, no carotid bruits,  + PP bilaterally  ABD: Soft, non-tender, non-distended, +BS  EXT: No ROBINA  SKIN: Intact    LABS:    08-11    140  |  105  |  19  ----------------------------<  115<H>  4.1   |  26  |  0.7    Ca    9.4      11 Aug 2022 17:00  Mg     1.9     08-11    TPro  7.3  /  Alb  4.3  /  TBili  0.3  /  DBili  x   /  AST  19  /  ALT  21  /  AlkPhos  113  08-11      Creatine Kinase, Serum: 169 U/L (08-11-22 @ 17:00)  Troponin T, Serum: <0.01 ng/mL (08-11-22 @ 17:00)    CARDIAC MARKERS ( 11 Aug 2022 17:00 )  x     / <0.01 ng/mL / 169 U/L / x     / 2.7 ng/mL        Troponin trend:            RADIOLOGY:  -CXR:      -TTE:    -CCTA:  -STRESS TEST:    -CATHETERIZATION:    ECG:      TELEMETRY EVENTS:   HPI:  45 yo M with PMHx of Intellectual Disability, DM not on any medications presented with foot wounds. Pt has very poor foot hygiene and per the sister, has been persistently scratching an open wound on his L second toe, which worsened and became more red, had drainage, malodor. He saw a physician at [facility] and was told to come to the ED for evaluation. Pt is poor historian. Does endorse abdominal pain for a few days, cannot give much more description. Per the sister, pt did not seem to have any recent fevers, however is also not a very good historian and does not seem to have good health literacy. States that her and her mom decided to stop giving pt Metformin a while ago, are treating his diabetes by not giving him any sugary foods.  In the ED, T 97.8, /87, , RR 16, SpO2 99% on RA. Lab work unrevealing.  (31 Mar 2022 22:36)  hospital stay complicated Hospital acquired COVID s/p RDV, poorly controlled DM. Patient has been in the hospital >100 days, pending guardianship and now placement.     Cardiology addendum  Cardiology consulted for abnormal EKG. per primary team patient was c/o of abomdinal pain. EKG was done. patient currently denies chest pain. Patient is a poor historian. Troponin negative.       PAST MEDICAL & SURGICAL HISTORY  DM (diabetes mellitus)    Intellectual disability        FAMILY HISTORY:  FAMILY HISTORY:      SOCIAL HISTORY:  []smoker  []Alcohol  []Drug    ALLERGIES:  penicillin (Unknown)      MEDICATIONS:  MEDICATIONS  (STANDING):  ammonium lactate 12% Lotion 1 Application(s) Topical two times a day  chlorhexidine 4% Liquid 1 Application(s) Topical <User Schedule>  clotrimazole 1% Cream 1 Application(s) Topical two times a day  famotidine    Tablet 20 milliGRAM(s) Oral daily  insulin glargine Injectable (LANTUS) 22 Unit(s) SubCutaneous every morning  insulin lispro (ADMELOG) corrective regimen sliding scale   SubCutaneous three times a day before meals  insulin lispro Injectable (ADMELOG) 8 Unit(s) SubCutaneous three times a day before meals  lactobacillus acidophilus 1 Tablet(s) Oral two times a day  melatonin 3 milliGRAM(s) Oral at bedtime  vitamin A &amp; D Ointment 1 Application(s) Topical daily    MEDICATIONS  (PRN):  acetaminophen     Tablet .. 650 milliGRAM(s) Oral every 6 hours PRN Temp greater or equal to 38C (100.4F), Mild Pain (1 - 3)      HOME MEDICATIONS:  Home Medications:  vitamin A and D topical ointment: 1 application topically once a day (12 Apr 2022 11:40)      VITALS:   T(F): 97.2 (08-11 @ 16:19), Max: 97.9 (08-09 @ 08:14)  HR: 92 (08-11 @ 16:19) (82 - 111)  BP: 128/61 (08-11 @ 16:19) (103/59 - 147/71)  BP(mean): --  RR: 18 (08-11 @ 16:19) (18 - 18)  SpO2: --    I&O's Summary    10 Aug 2022 07:01  -  11 Aug 2022 07:00  --------------------------------------------------------  IN: 400 mL / OUT: 0 mL / NET: 400 mL        REVIEW OF SYSTEMS:  unable to be assessed     PHYSICAL EXAM:  NEURO: patient is awake , alert  GEN: Not in acute distress  NECK: no thyroid enlargement, no JVD  LUNGS: Clear to auscultation bilaterally   CARDIOVASCULAR: S1/S2 present, RRR , no murmurs or rubs, no carotid bruits,  + PP bilaterally  ABD: Soft, non-tender, non-distended, +BS  EXT: No ROIBNA  SKIN: Intact    LABS:    08-11    140  |  105  |  19  ----------------------------<  115<H>  4.1   |  26  |  0.7    Ca    9.4      11 Aug 2022 17:00  Mg     1.9     08-11    TPro  7.3  /  Alb  4.3  /  TBili  0.3  /  DBili  x   /  AST  19  /  ALT  21  /  AlkPhos  113  08-11      Creatine Kinase, Serum: 169 U/L (08-11-22 @ 17:00)  Troponin T, Serum: <0.01 ng/mL (08-11-22 @ 17:00)    CARDIAC MARKERS ( 11 Aug 2022 17:00 )  x     / <0.01 ng/mL / 169 U/L / x     / 2.7 ng/mL        Troponin trend:            RADIOLOGY:  -CXR:      -TTE:    -CCTA:  -STRESS TEST:    -CATHETERIZATION:    ECG:      TELEMETRY EVENTS:   HPI:  45 yo M with PMHx of Intellectual Disability, DM not on any medications presented with foot wounds. Pt has very poor foot hygiene and per the sister, has been persistently scratching an open wound on his L second toe, which worsened and became more red, had drainage, malodor. He saw a physician at [facility] and was told to come to the ED for evaluation. Pt is poor historian. Does endorse abdominal pain for a few days, cannot give much more description. Per the sister, pt did not seem to have any recent fevers, however is also not a very good historian and does not seem to have good health literacy. States that her and her mom decided to stop giving pt Metformin a while ago, are treating his diabetes by not giving him any sugary foods.  In the ED, T 97.8, /87, , RR 16, SpO2 99% on RA. Lab work unrevealing.  (31 Mar 2022 22:36)  hospital stay complicated Hospital acquired COVID s/p RDV, poorly controlled DM. Patient has been in the hospital >100 days, pending guardianship and now placement.     Cardiology addendum  Cardiology consulted for abnormal EKG. per primary team patient was c/o of abomdinal pain. EKG was done. patient currently denies chest pain. Patient is a poor historian. Troponin negative.       PAST MEDICAL & SURGICAL HISTORY  DM (diabetes mellitus)    Intellectual disability        FAMILY HISTORY:  FAMILY HISTORY:      SOCIAL HISTORY:  []smoker  []Alcohol  []Drug    ALLERGIES:  penicillin (Unknown)      MEDICATIONS:  MEDICATIONS  (STANDING):  ammonium lactate 12% Lotion 1 Application(s) Topical two times a day  chlorhexidine 4% Liquid 1 Application(s) Topical <User Schedule>  clotrimazole 1% Cream 1 Application(s) Topical two times a day  famotidine    Tablet 20 milliGRAM(s) Oral daily  insulin glargine Injectable (LANTUS) 22 Unit(s) SubCutaneous every morning  insulin lispro (ADMELOG) corrective regimen sliding scale   SubCutaneous three times a day before meals  insulin lispro Injectable (ADMELOG) 8 Unit(s) SubCutaneous three times a day before meals  lactobacillus acidophilus 1 Tablet(s) Oral two times a day  melatonin 3 milliGRAM(s) Oral at bedtime  vitamin A &amp; D Ointment 1 Application(s) Topical daily    MEDICATIONS  (PRN):  acetaminophen     Tablet .. 650 milliGRAM(s) Oral every 6 hours PRN Temp greater or equal to 38C (100.4F), Mild Pain (1 - 3)      HOME MEDICATIONS:  Home Medications:  vitamin A and D topical ointment: 1 application topically once a day (12 Apr 2022 11:40)      VITALS:   T(F): 97.2 (08-11 @ 16:19), Max: 97.9 (08-09 @ 08:14)  HR: 92 (08-11 @ 16:19) (82 - 111)  BP: 128/61 (08-11 @ 16:19) (103/59 - 147/71)  BP(mean): --  RR: 18 (08-11 @ 16:19) (18 - 18)  SpO2: --    I&O's Summary    10 Aug 2022 07:01  -  11 Aug 2022 07:00  --------------------------------------------------------  IN: 400 mL / OUT: 0 mL / NET: 400 mL        REVIEW OF SYSTEMS:  unable to be assessed     PHYSICAL EXAM:  NEURO: patient is awake , alert  GEN: Not in acute distress  NECK: no thyroid enlargement, no JVD  LUNGS: Clear to auscultation bilaterally   CARDIOVASCULAR: S1/S2 present, RRR , no murmurs or rubs, no carotid bruits,  + PP bilaterally  ABD: Soft, non-tender, non-distended, +BS  EXT: No ROBINA  SKIN: Intact    LABS:    08-11    140  |  105  |  19  ----------------------------<  115<H>  4.1   |  26  |  0.7    Ca    9.4      11 Aug 2022 17:00  Mg     1.9     08-11    TPro  7.3  /  Alb  4.3  /  TBili  0.3  /  DBili  x   /  AST  19  /  ALT  21  /  AlkPhos  113  08-11      Creatine Kinase, Serum: 169 U/L (08-11-22 @ 17:00)  Troponin T, Serum: <0.01 ng/mL (08-11-22 @ 17:00)    CARDIAC MARKERS ( 11 Aug 2022 17:00 )  x     / <0.01 ng/mL / 169 U/L / x     / 2.7 ng/mL        Troponin trend:            RADIOLOGY:  -CXR:    < from: Xray Chest 1 View-PORTABLE IMMEDIATE (Xray Chest 1 View-PORTABLE IMMEDIATE .) (05.21.22 @ 11:07) >    IMPRESSION:    No radiographic evidence of acute cardiopulmonary disease.    --- End of Report -    < end of copied text >        ECG:  EKG NSR with LVH and repolarization abnormality

## 2022-08-11 NOTE — CONSULT NOTE ADULT - ASSESSMENT
45 yo M with PMHx of Intellectual Disability, DM not on any medications presented with foot wounds. hospital stay complicated Hospital acquired COVID s/p RDV, poorly controlled DM. Patient has been in the hospital >100 days, pending guardianship and now placement.   Cardiology consulted for abnormal EKG. per primary team patient was c/o of abomdinal pain. EKG was done. patient currently denies chest pain. Patient is a poor historian. Troponin negative.     Chest pain free, Troponin negative    EKG NSR with LVH and repolarization abnormality         plan  2d ECHO   cont same ordered medications  rest of the care per the primary team

## 2022-08-12 LAB
BASOPHILS # BLD AUTO: 0.05 K/UL — SIGNIFICANT CHANGE UP (ref 0–0.2)
BASOPHILS NFR BLD AUTO: 0.6 % — SIGNIFICANT CHANGE UP (ref 0–1)
EOSINOPHIL # BLD AUTO: 0.37 K/UL — SIGNIFICANT CHANGE UP (ref 0–0.7)
EOSINOPHIL NFR BLD AUTO: 4.8 % — SIGNIFICANT CHANGE UP (ref 0–8)
GLUCOSE BLDC GLUCOMTR-MCNC: 159 MG/DL — HIGH (ref 70–99)
GLUCOSE BLDC GLUCOMTR-MCNC: 184 MG/DL — HIGH (ref 70–99)
GLUCOSE BLDC GLUCOMTR-MCNC: 248 MG/DL — HIGH (ref 70–99)
GLUCOSE BLDC GLUCOMTR-MCNC: 347 MG/DL — HIGH (ref 70–99)
HCT VFR BLD CALC: 41.7 % — LOW (ref 42–52)
HGB BLD-MCNC: 13.7 G/DL — LOW (ref 14–18)
IMM GRANULOCYTES NFR BLD AUTO: 0.3 % — SIGNIFICANT CHANGE UP (ref 0.1–0.3)
LYMPHOCYTES # BLD AUTO: 2.36 K/UL — SIGNIFICANT CHANGE UP (ref 1.2–3.4)
LYMPHOCYTES # BLD AUTO: 30.6 % — SIGNIFICANT CHANGE UP (ref 20.5–51.1)
MCHC RBC-ENTMCNC: 31.4 PG — HIGH (ref 27–31)
MCHC RBC-ENTMCNC: 32.9 G/DL — SIGNIFICANT CHANGE UP (ref 32–37)
MCV RBC AUTO: 95.4 FL — HIGH (ref 80–94)
MONOCYTES # BLD AUTO: 0.68 K/UL — HIGH (ref 0.1–0.6)
MONOCYTES NFR BLD AUTO: 8.8 % — SIGNIFICANT CHANGE UP (ref 1.7–9.3)
NEUTROPHILS # BLD AUTO: 4.23 K/UL — SIGNIFICANT CHANGE UP (ref 1.4–6.5)
NEUTROPHILS NFR BLD AUTO: 54.9 % — SIGNIFICANT CHANGE UP (ref 42.2–75.2)
NRBC # BLD: 0 /100 WBCS — SIGNIFICANT CHANGE UP (ref 0–0)
PLATELET # BLD AUTO: 289 K/UL — SIGNIFICANT CHANGE UP (ref 130–400)
RBC # BLD: 4.37 M/UL — LOW (ref 4.7–6.1)
RBC # FLD: 12.1 % — SIGNIFICANT CHANGE UP (ref 11.5–14.5)
WBC # BLD: 7.71 K/UL — SIGNIFICANT CHANGE UP (ref 4.8–10.8)
WBC # FLD AUTO: 7.71 K/UL — SIGNIFICANT CHANGE UP (ref 4.8–10.8)

## 2022-08-12 PROCEDURE — 99232 SBSQ HOSP IP/OBS MODERATE 35: CPT

## 2022-08-12 RX ORDER — INSULIN LISPRO 100/ML
15 VIAL (ML) SUBCUTANEOUS ONCE
Refills: 0 | Status: COMPLETED | OUTPATIENT
Start: 2022-08-12 | End: 2022-08-12

## 2022-08-12 RX ADMIN — Medication 1 APPLICATION(S): at 17:15

## 2022-08-12 RX ADMIN — Medication 1 APPLICATION(S): at 05:21

## 2022-08-12 RX ADMIN — Medication 1 TABLET(S): at 17:15

## 2022-08-12 RX ADMIN — Medication 3 MILLIGRAM(S): at 22:19

## 2022-08-12 RX ADMIN — Medication 8 UNIT(S): at 16:36

## 2022-08-12 RX ADMIN — Medication 2: at 08:20

## 2022-08-12 RX ADMIN — Medication 2: at 11:42

## 2022-08-12 RX ADMIN — FAMOTIDINE 20 MILLIGRAM(S): 10 INJECTION INTRAVENOUS at 11:43

## 2022-08-12 RX ADMIN — Medication 8 UNIT(S): at 08:21

## 2022-08-12 RX ADMIN — Medication 15 UNIT(S): at 20:18

## 2022-08-12 RX ADMIN — Medication 1 APPLICATION(S): at 11:43

## 2022-08-12 RX ADMIN — Medication 4: at 16:36

## 2022-08-12 RX ADMIN — INSULIN GLARGINE 22 UNIT(S): 100 INJECTION, SOLUTION SUBCUTANEOUS at 08:19

## 2022-08-12 RX ADMIN — Medication 1 TABLET(S): at 05:21

## 2022-08-12 RX ADMIN — Medication 8 UNIT(S): at 11:43

## 2022-08-12 NOTE — PROGRESS NOTE ADULT - ASSESSMENT
47 yo M with PMH of Intellectual Disability and DM not on any medications presented with foot wounds.    #Covid PNA  Resolved, asymptomatic  Completed RDV    #Nail bed wound and onychomycosis.   Status post Aseptic debridement and curettage of all fungal and ingrowing nails 1-5 bilateral with sterile nail nipper 06/07  Aseptic debridement and curettage of all fungal and ingrowing nails 1-5 bilateral with sterile nail nipper.  Patient Would Benefit From Periodic Debridements; Can Follow Up as Outpatient w/ Dr. Bronson Post Discharge     #DM type 2 - uncontrolled.  Continue insulin glargine and lispro ( doses adjusted)  A1c 7.5 on 8/11    #Dental Caries  -completed clinda for 7 days. Will need outpt f/u for possible multiple extractions    #Insomnia  - prn melatonin    #Tachycardia on ECG  cardiology saw him 2d ECHO   cont same ordered medications      Handoff:  Pending guardianship, else medically stable for discharge

## 2022-08-12 NOTE — PROGRESS NOTE ADULT - SUBJECTIVE AND OBJECTIVE BOX
CAIRN WATSON 47y Male  MRN#: [id number]   Hospital Day: 134d    HPI:  45 yo M with PMHx of Intellectual Disability, DM not on any medications presents with foot wounds. Pt has very poor foot hygiene and per the sister, has been persistently scratching an open wound on his L second toe, which worsened and became more red, had drainage, malodor. He saw a physician at [facility] and was told to come to the ED for evaluation. Pt is poor historian. Does endorse abdominal pain for a few days, cannot give much more description. Per the sister, pt did not seem to have any recent fevers, however is also not a very good historian and does not seem to have good health literacy. States that her and her mom decided to stop giving pt Metformin a while ago, are treating his diabetes by not giving him any sugary foods.  In the ED, T 97.8, /87, , RR 16, SpO2 99% on RA. Lab work unrevealing.  (31 Mar 2022 22:36)    SUBJECTIVE  Patient is a 47y old Male who presents with a chief complaint of DFU (11 Aug 2022 19:07)  Currently admitted to medicine with the primary diagnosis of Onychomycosis    INTERVAL HPI AND OVERNIGHT EVENTS:  Patient was examined and seen at bedside. This morning he is resting comfortably in bed and reports no issues or overnight events.    OBJECTIVE  PAST MEDICAL & SURGICAL HISTORY  DM (diabetes mellitus)    Intellectual disability    ALLERGIES:  penicillin (Unknown)    MEDICATIONS:  STANDING MEDICATIONS  ammonium lactate 12% Lotion 1 Application(s) Topical two times a day  chlorhexidine 4% Liquid 1 Application(s) Topical <User Schedule>  clotrimazole 1% Cream 1 Application(s) Topical two times a day  famotidine    Tablet 20 milliGRAM(s) Oral daily  insulin glargine Injectable (LANTUS) 22 Unit(s) SubCutaneous every morning  insulin lispro (ADMELOG) corrective regimen sliding scale   SubCutaneous three times a day before meals  insulin lispro Injectable (ADMELOG) 8 Unit(s) SubCutaneous three times a day before meals  lactobacillus acidophilus 1 Tablet(s) Oral two times a day  melatonin 3 milliGRAM(s) Oral at bedtime  vitamin A &amp; D Ointment 1 Application(s) Topical daily    PRN MEDICATIONS  acetaminophen     Tablet .. 650 milliGRAM(s) Oral every 6 hours PRN      VITAL SIGNS: Last 24 Hours  T(C): 36.1 (11 Aug 2022 23:30), Max: 36.6 (11 Aug 2022 07:52)  T(F): 96.9 (11 Aug 2022 23:30), Max: 97.9 (11 Aug 2022 07:52)  HR: 97 (11 Aug 2022 23:30) (82 - 97)  BP: 107/56 (11 Aug 2022 23:30) (107/56 - 128/61)  BP(mean): --  RR: 18 (11 Aug 2022 23:30) (18 - 18)  SpO2: --    LABS:                        13.7   7.71  )-----------( 289      ( 12 Aug 2022 01:04 )             41.7     08-11    140  |  105  |  19  ----------------------------<  115<H>  4.1   |  26  |  0.7    Ca    9.4      11 Aug 2022 17:00  Mg     1.9     08-11    TPro  7.3  /  Alb  4.3  /  TBili  0.3  /  DBili  x   /  AST  19  /  ALT  21  /  AlkPhos  113  08-11          Creatine Kinase, Serum: 169 U/L (08-11-22 @ 17:00)  Troponin T, Serum: <0.01 ng/mL (08-11-22 @ 17:00)      CARDIAC MARKERS ( 11 Aug 2022 17:00 )  x     / <0.01 ng/mL / 169 U/L / x     / 2.7 ng/mL      RADIOLOGY:      PHYSICAL EXAM:  GENERAL: NAD, well-developed  HEENT:  Atraumatic, Normocephalic. EOMI  PULMONARY: Clear to auscultation bilaterally; No wheeze  CARDIOVASCULAR: Regular rate and rhythm; No murmurs, rubs, or gallops  GASTROINTESTINAL: Soft, Nontender, Nondistended; Bowel sounds present  MUSCULOSKELETAL:  2+ Peripheral Pulses, No clubbing, cyanosis, or edema  NEUROLOGY: non-focal, normal gait  SKIN: Patient has scratches along his bilateral ankles with noted red marks and irritation, but they are improved and healing today

## 2022-08-13 LAB
D DIMER BLD IA.RAPID-MCNC: 204 NG/ML DDU — SIGNIFICANT CHANGE UP (ref 0–230)
GLUCOSE BLDC GLUCOMTR-MCNC: 146 MG/DL — HIGH (ref 70–99)
GLUCOSE BLDC GLUCOMTR-MCNC: 263 MG/DL — HIGH (ref 70–99)
GLUCOSE BLDC GLUCOMTR-MCNC: 288 MG/DL — HIGH (ref 70–99)
GLUCOSE BLDC GLUCOMTR-MCNC: 322 MG/DL — HIGH (ref 70–99)

## 2022-08-13 PROCEDURE — 71275 CT ANGIOGRAPHY CHEST: CPT | Mod: 26

## 2022-08-13 PROCEDURE — 99232 SBSQ HOSP IP/OBS MODERATE 35: CPT

## 2022-08-13 PROCEDURE — 99222 1ST HOSP IP/OBS MODERATE 55: CPT

## 2022-08-13 PROCEDURE — 93970 EXTREMITY STUDY: CPT | Mod: 26

## 2022-08-13 RX ADMIN — Medication 6: at 11:45

## 2022-08-13 RX ADMIN — Medication 8 UNIT(S): at 12:19

## 2022-08-13 RX ADMIN — Medication 1 APPLICATION(S): at 05:24

## 2022-08-13 RX ADMIN — Medication 1 TABLET(S): at 18:30

## 2022-08-13 RX ADMIN — Medication 8 UNIT(S): at 18:30

## 2022-08-13 RX ADMIN — Medication 1 TABLET(S): at 05:24

## 2022-08-13 RX ADMIN — Medication 3 MILLIGRAM(S): at 21:43

## 2022-08-13 RX ADMIN — Medication 6: at 18:30

## 2022-08-13 RX ADMIN — INSULIN GLARGINE 22 UNIT(S): 100 INJECTION, SOLUTION SUBCUTANEOUS at 08:10

## 2022-08-13 NOTE — PROGRESS NOTE ADULT - SUBJECTIVE AND OBJECTIVE BOX
CARNI WATSON  47y Male    CHIEF COMPLAINT:    Patient is a 47y old  Male who presents with a chief complaint of DFU (12 Aug 2022 06:06)    INTERVAL HPI/OVERNIGHT EVENTS:    Patient seen and examined. No acute events overnight. intermittently tachycardic    ROS: All other systems are negative.    Vital Signs:    T(F): 96.6 (08-13-22 @ 08:00), Max: 98.1 (08-12-22 @ 23:51)  HR: 84 (08-13-22 @ 08:00) (84 - 95)  BP: 135/61 (08-13-22 @ 08:00) (117/63 - 135/61)  RR: 18 (08-13-22 @ 08:00) (18 - 18)    POCT Blood Glucose.: 263 mg/dL (13 Aug 2022 11:12)  POCT Blood Glucose.: 146 mg/dL (13 Aug 2022 07:29)  POCT Blood Glucose.: 347 mg/dL (12 Aug 2022 20:11)  POCT Blood Glucose.: 248 mg/dL (12 Aug 2022 16:17)    PHYSICAL EXAM:    GENERAL:  NAD  SKIN: No rashes or lesions  HEENT: Atraumatic. Normocephalic.   NECK: Supple, No JVD.    PULMONARY: CTA B/L. No wheezing.   CVS: Normal S1, S2. Rate and Rhythm are regular   ABDOMEN/GI: Soft, Nontender, Nondistended  MSK:  No clubbing or cyanosis   NEUROLOGIC:  moves all extremities   PSYCH: Awake and alert, pleasant     Consultant(s) Notes Reviewed:  [x ] YES  [ ] NO  Care Discussed with Consultants/Other Providers [ x] YES  [ ] NO    LABS:                        13.7   7.71  )-----------( 289      ( 12 Aug 2022 01:04 )             41.7     140  |  105  |  19  ----------------------------<  115<H>  4.1   |  26  |  0.7    Ca    9.4      11 Aug 2022 17:00  Mg     1.9     08-11    TPro  7.3  /  Alb  4.3  /  TBili  0.3  /  DBili  x   /  AST  19  /  ALT  21  /  AlkPhos  113  08-11    Trop <0.01, CKMB 2.7, , 08-11-22 @ 17:00    RADIOLOGY & ADDITIONAL TESTS:  Imaging or report Personally Reviewed:  [x] YES  [ ] NO  EKG reviewed: [x] YES  [ ] NO    Medications:  Standing  ammonium lactate 12% Lotion 1 Application(s) Topical two times a day  chlorhexidine 4% Liquid 1 Application(s) Topical <User Schedule>  clotrimazole 1% Cream 1 Application(s) Topical two times a day  famotidine    Tablet 20 milliGRAM(s) Oral daily  insulin glargine Injectable (LANTUS) 22 Unit(s) SubCutaneous every morning  insulin lispro (ADMELOG) corrective regimen sliding scale   SubCutaneous three times a day before meals  insulin lispro Injectable (ADMELOG) 8 Unit(s) SubCutaneous three times a day before meals  lactobacillus acidophilus 1 Tablet(s) Oral two times a day  melatonin 3 milliGRAM(s) Oral at bedtime  vitamin A &amp; D Ointment 1 Application(s) Topical daily    PRN Meds  acetaminophen     Tablet .. 650 milliGRAM(s) Oral every 6 hours PRN

## 2022-08-13 NOTE — PROGRESS NOTE ADULT - SUBJECTIVE AND OBJECTIVE BOX
CARIN WATSON 47y Male  MRN#: [id number]   Hospital Day: 135d    HPI:  47 yo M with PMHx of Intellectual Disability, DM not on any medications presents with foot wounds. Pt has very poor foot hygiene and per the sister, has been persistently scratching an open wound on his L second toe, which worsened and became more red, had drainage, malodor. He saw a physician at [facility] and was told to come to the ED for evaluation. Pt is poor historian. Does endorse abdominal pain for a few days, cannot give much more description. Per the sister, pt did not seem to have any recent fevers, however is also not a very good historian and does not seem to have good health literacy. States that her and her mom decided to stop giving pt Metformin a while ago, are treating his diabetes by not giving him any sugary foods.  In the ED, T 97.8, /87, , RR 16, SpO2 99% on RA. Lab work unrevealing.  (31 Mar 2022 22:36)    SUBJECTIVE  Patient is a 47y old Male who presents with a chief complaint of DFU (13 Aug 2022 16:09)  Currently admitted to medicine with the primary diagnosis of Onychomycosis    INTERVAL HPI AND OVERNIGHT EVENTS:  Patient was examined and seen at bedside.    OBJECTIVE  PAST MEDICAL & SURGICAL HISTORY  DM (diabetes mellitus)    Intellectual disability    ALLERGIES:  penicillin (Unknown)    MEDICATIONS:  STANDING MEDICATIONS  ammonium lactate 12% Lotion 1 Application(s) Topical two times a day  chlorhexidine 4% Liquid 1 Application(s) Topical <User Schedule>  clotrimazole 1% Cream 1 Application(s) Topical two times a day  famotidine    Tablet 20 milliGRAM(s) Oral daily  insulin glargine Injectable (LANTUS) 22 Unit(s) SubCutaneous every morning  insulin lispro (ADMELOG) corrective regimen sliding scale   SubCutaneous three times a day before meals  insulin lispro Injectable (ADMELOG) 8 Unit(s) SubCutaneous three times a day before meals  lactobacillus acidophilus 1 Tablet(s) Oral two times a day  melatonin 3 milliGRAM(s) Oral at bedtime  vitamin A &amp; D Ointment 1 Application(s) Topical daily    PRN MEDICATIONS  acetaminophen     Tablet .. 650 milliGRAM(s) Oral every 6 hours PRN      VITAL SIGNS: Last 24 Hours  T(C): 36.6 (13 Aug 2022 15:25), Max: 36.7 (12 Aug 2022 23:51)  T(F): 97.9 (13 Aug 2022 15:25), Max: 98.1 (12 Aug 2022 23:51)  HR: 98 (13 Aug 2022 15:25) (84 - 98)  BP: 126/70 (13 Aug 2022 15:25) (117/63 - 135/61)  BP(mean): --  RR: 20 (13 Aug 2022 15:25) (18 - 20)  SpO2: --    LABS:                        13.7   7.71  )-----------( 289      ( 12 Aug 2022 01:04 )             41.7       RADIOLOGY:    PHYSICAL EXAM:  GENERAL: NAD, well-developed  HEENT:  Atraumatic, Normocephalic. EOMI  PULMONARY: Clear to auscultation bilaterally; No wheeze  CARDIOVASCULAR: Regular rate and rhythm; No murmurs, rubs, or gallops  GASTROINTESTINAL: Soft, Nontender, Nondistended; Bowel sounds present  MUSCULOSKELETAL:  2+ Peripheral Pulses, No clubbing, cyanosis, or edema  NEUROLOGY: non-focal, normal gait

## 2022-08-13 NOTE — PROGRESS NOTE ADULT - ASSESSMENT
46 yo M with PMH of Intellectual Disability and DM not on any medications presented with foot wounds. Hospital stay complicated Hospital acquired COVID s/p RDV, poorly controlled DM. Patient has been in the hospital >100 days, pending guardianship and now placement    Tachycardia 8/12 and intermittently- no CP/SOB  stat EKG with new incomplete RBBB and s1q3t3  discussed with Dr Piper, recs r/o DVT/PE  check Ddimer, if elevated CTA chest/LE duplex  f/u echo    Covid  PNA - s/p RDV, DVT ppx    Onychomycosis - s/p debriedment and curettage by podiatry 6/7    Dental caries - sp course of clinda, outpatient f/u for teeth extractions    DM2 with hyperglycemia due to dietary non-compliance - insulin adjusted, c/w monitoring    Intellectual disability/autism    Handoff:  Pending placement , guardianship established,

## 2022-08-13 NOTE — PROGRESS NOTE ADULT - ASSESSMENT
48 yo M with PMH of Intellectual Disability and DM not on any medications presented with foot wounds. Hospital stay complicated Hospital acquired COVID s/p RDV, poorly controlled DM. Patient has been in the hospital >100 days, pending guardianship and now placement    #Tachycardia 8/12 and intermittently  - no CP/SOB  - stat EKG with new incomplete RBBB and s1q3t3  - discussed with Dr Piper, recs r/o DVT/PE  - check Ddimer, if elevated CTA chest/LE duplex  - `f/u echo    #Covid  PNA   - s/p RDV, DVT ppx    #Onychomycosis   - s/p debriedment and curettage by podiatry 6/7    #Dental caries   - sp course of clinda, outpatient f/u for teeth extractions    #DM2   with hyperglycemia due to dietary non-compliance - insulin adjusted, c/w monitoring    #Intellectual disability/autism    Handoff:  Pending placement , guardianship established,

## 2022-08-14 LAB
GLUCOSE BLDC GLUCOMTR-MCNC: 162 MG/DL — HIGH (ref 70–99)
GLUCOSE BLDC GLUCOMTR-MCNC: 186 MG/DL — HIGH (ref 70–99)
GLUCOSE BLDC GLUCOMTR-MCNC: 213 MG/DL — HIGH (ref 70–99)
GLUCOSE BLDC GLUCOMTR-MCNC: 337 MG/DL — HIGH (ref 70–99)

## 2022-08-14 PROCEDURE — 99232 SBSQ HOSP IP/OBS MODERATE 35: CPT

## 2022-08-14 PROCEDURE — 93306 TTE W/DOPPLER COMPLETE: CPT | Mod: 26

## 2022-08-14 RX ORDER — INSULIN GLARGINE 100 [IU]/ML
25 INJECTION, SOLUTION SUBCUTANEOUS EVERY MORNING
Refills: 0 | Status: DISCONTINUED | OUTPATIENT
Start: 2022-08-14 | End: 2022-10-11

## 2022-08-14 RX ORDER — ENOXAPARIN SODIUM 100 MG/ML
40 INJECTION SUBCUTANEOUS EVERY 24 HOURS
Refills: 0 | Status: DISCONTINUED | OUTPATIENT
Start: 2022-08-14 | End: 2022-08-31

## 2022-08-14 RX ADMIN — Medication 8 UNIT(S): at 17:00

## 2022-08-14 RX ADMIN — Medication 1 TABLET(S): at 17:08

## 2022-08-14 RX ADMIN — Medication 1 APPLICATION(S): at 06:05

## 2022-08-14 RX ADMIN — Medication 4: at 09:05

## 2022-08-14 RX ADMIN — Medication 8: at 11:58

## 2022-08-14 RX ADMIN — INSULIN GLARGINE 22 UNIT(S): 100 INJECTION, SOLUTION SUBCUTANEOUS at 09:05

## 2022-08-14 RX ADMIN — Medication 1 APPLICATION(S): at 17:08

## 2022-08-14 RX ADMIN — Medication 1 APPLICATION(S): at 11:56

## 2022-08-14 RX ADMIN — Medication 3 MILLIGRAM(S): at 21:27

## 2022-08-14 RX ADMIN — Medication 1 TABLET(S): at 06:05

## 2022-08-14 RX ADMIN — Medication 8 UNIT(S): at 12:01

## 2022-08-14 RX ADMIN — Medication 2: at 17:00

## 2022-08-14 RX ADMIN — Medication 1 APPLICATION(S): at 06:06

## 2022-08-14 RX ADMIN — FAMOTIDINE 20 MILLIGRAM(S): 10 INJECTION INTRAVENOUS at 11:57

## 2022-08-14 RX ADMIN — Medication 8 UNIT(S): at 09:06

## 2022-08-14 NOTE — PROGRESS NOTE ADULT - SUBJECTIVE AND OBJECTIVE BOX
CARIN WATSON  47y Male    CHIEF COMPLAINT:    Patient is a 47y old  Male who presents with a chief complaint of DFU (13 Aug 2022 22:51)    INTERVAL HPI/OVERNIGHT EVENTS:    Patient seen and examined. No acute events overnight. No active complaints    ROS: All other systems are negative.    Vital Signs:    T(F): 97.3 (08-14-22 @ 09:05), Max: 97.9 (08-13-22 @ 15:25)  HR: 99 (08-14-22 @ 09:05) (98 - 99)  BP: 158/97 (08-14-22 @ 09:05) (126/70 - 158/97)  RR: 19 (08-14-22 @ 09:05) (19 - 20)    POCT Blood Glucose.: 337 mg/dL (14 Aug 2022 11:08)  POCT Blood Glucose.: 213 mg/dL (14 Aug 2022 07:58)  POCT Blood Glucose.: 322 mg/dL (13 Aug 2022 21:21)  POCT Blood Glucose.: 288 mg/dL (13 Aug 2022 16:54)    PHYSICAL EXAM:    GENERAL:  NAD  SKIN: No rashes or lesions  HEENT: Atraumatic. Normocephalic. Poor dentition    NECK: Supple, No JVD.    PULMONARY: CTA B/L. No wheezing.   CVS: Normal S1, S2. Rate and Rhythm are regular.   ABDOMEN/GI: Soft, Nontender, Nondistended   MSK:  No clubbing or cyanosis   NEUROLOGIC: MOves all extrmities   PSYCH: Awake and alert     Consultant(s) Notes Reviewed:  [x ] YES  [ ] NO  Care Discussed with Consultants/Other Providers [ x] YES  [ ] NO    LABS:    Trop <0.01, CKMB 2.7, , 08-11-22 @ 17:00    RADIOLOGY & ADDITIONAL TESTS:  Imaging or report Personally Reviewed:  [x] YES  [ ] NO  EKG reviewed: [x] YES  [ ] NO    Medications:  Standing  ammonium lactate 12% Lotion 1 Application(s) Topical two times a day  chlorhexidine 4% Liquid 1 Application(s) Topical <User Schedule>  clotrimazole 1% Cream 1 Application(s) Topical two times a day  enoxaparin Injectable 40 milliGRAM(s) SubCutaneous every 24 hours  famotidine    Tablet 20 milliGRAM(s) Oral daily  insulin glargine Injectable (LANTUS) 25 Unit(s) SubCutaneous every morning  insulin lispro (ADMELOG) corrective regimen sliding scale   SubCutaneous three times a day before meals  insulin lispro Injectable (ADMELOG) 8 Unit(s) SubCutaneous three times a day before meals  lactobacillus acidophilus 1 Tablet(s) Oral two times a day  melatonin 3 milliGRAM(s) Oral at bedtime  vitamin A &amp; D Ointment 1 Application(s) Topical daily    PRN Meds  acetaminophen     Tablet .. 650 milliGRAM(s) Oral every 6 hours PRN

## 2022-08-14 NOTE — PROGRESS NOTE ADULT - ASSESSMENT
46 yo M with PMH of Intellectual Disability and DM not on any medications presented with foot wounds. Hospital stay complicated Hospital acquired COVID s/p RDV, poorly controlled DM. Patient has been in the hospital >100 days, pending guardianship and now placement    Tachycardia 8/12 and intermittently- no CP/SOB  stat EKG with new incomplete RBBB and s1q3t3  discussed with Dr Piper, recs r/o DVT/PE  CTA and LE duplex negative, c/w eliquis for DVT ppx  f/u echo    Covid  PNA - s/p RDV, DVT ppx    Onychomycosis - s/p debriedment and curettage by podiatry 6/7    Dental caries - sp course of clinda, outpatient f/u for teeth extractions    DM2 with hyperglycemia due to dietary non-compliance - insulin adjusted, c/w monitoring    Intellectual disability/autism    Handoff:  Pending placement , guardianship established, medically stable pending 2d echo

## 2022-08-15 LAB
GLUCOSE BLDC GLUCOMTR-MCNC: 157 MG/DL — HIGH (ref 70–99)
GLUCOSE BLDC GLUCOMTR-MCNC: 178 MG/DL — HIGH (ref 70–99)
GLUCOSE BLDC GLUCOMTR-MCNC: 265 MG/DL — HIGH (ref 70–99)
GLUCOSE BLDC GLUCOMTR-MCNC: 276 MG/DL — HIGH (ref 70–99)
TSH SERPL-MCNC: 5.11 UIU/ML — HIGH (ref 0.27–4.2)

## 2022-08-15 PROCEDURE — 99231 SBSQ HOSP IP/OBS SF/LOW 25: CPT

## 2022-08-15 RX ADMIN — Medication 1 APPLICATION(S): at 11:58

## 2022-08-15 RX ADMIN — INSULIN GLARGINE 25 UNIT(S): 100 INJECTION, SOLUTION SUBCUTANEOUS at 08:19

## 2022-08-15 RX ADMIN — ENOXAPARIN SODIUM 40 MILLIGRAM(S): 100 INJECTION SUBCUTANEOUS at 06:30

## 2022-08-15 RX ADMIN — Medication 8 UNIT(S): at 11:57

## 2022-08-15 RX ADMIN — Medication 1 APPLICATION(S): at 06:30

## 2022-08-15 RX ADMIN — Medication 2: at 17:42

## 2022-08-15 RX ADMIN — Medication 6: at 11:57

## 2022-08-15 RX ADMIN — Medication 8 UNIT(S): at 08:20

## 2022-08-15 RX ADMIN — Medication 1 TABLET(S): at 06:36

## 2022-08-15 RX ADMIN — FAMOTIDINE 20 MILLIGRAM(S): 10 INJECTION INTRAVENOUS at 11:57

## 2022-08-15 RX ADMIN — Medication 2: at 08:19

## 2022-08-15 RX ADMIN — Medication 3 MILLIGRAM(S): at 22:05

## 2022-08-15 RX ADMIN — Medication 8 UNIT(S): at 17:45

## 2022-08-15 NOTE — PROGRESS NOTE ADULT - ASSESSMENT
48 yo M with PMH of Intellectual Disability and DM not on any medications presented with foot wounds. Hospital stay complicated Hospital acquired COVID s/p RDV, poorly controlled DM. Patient has been in the hospital >100 days, pending guardianship and now placement    Tachycardia 8/12 and intermittently- no CP/SOB  stat EKG with new incomplete RBBB and s1q3t3  discussed with Dr Piper, recs r/o DVT/PE  CTA and LE duplex negative, c/w eliquis for DVT ppx  2d echo 8/14- EF 66%, GIDD    Covid  PNA - s/p RDV, DVT ppx    Onychomycosis - s/p debriedment and curettage by podiatry 6/7    Dental caries - sp course of clinda, outpatient f/u for teeth extractions    DM2 with hyperglycemia due to dietary non-compliance - insulin adjusted, c/w monitoring    Intellectual disability/autism    Handoff:  Pending placement , guardianship established, medically stable for discharge

## 2022-08-15 NOTE — PROGRESS NOTE ADULT - ASSESSMENT
48 yo M with PMH of Intellectual Disability and DM not on any medications presented with foot wounds. Hospital stay complicated Hospital acquired COVID s/p RDV, poorly controlled DM. Patient has been in the hospital >100 days, pending guardianship and now placement    #Tachycardia 8/12 and intermittently  - no CP/SOB  - stat EKG with new incomplete RBBB and s1q3t3  - discussed with Dr Piper, recs r/o DVT/PE  - CTA and LE duplex negative, c/w eliquis for DVT ppx  - f/u echo    #Covid  PNA   - s/p RDV, DVT ppx    #Onychomycosis   - s/p debriedment and curettage by podiatry 6/7    #Dental caries   - sp course of clinda, outpatient f/u for teeth extractions    #DM2   with hyperglycemia due to dietary non-compliance - insulin adjusted, c/w monitoring    #Intellectual disability/autism    Handoff:  Pending placement , guardianship established,   48 yo M with PMH of Intellectual Disability and DM not on any medications presented with foot wounds. Hospital stay complicated Hospital acquired COVID s/p RDV, poorly controlled DM. Patient has been in the hospital >100 days, pending guardianship and now placement    #Tachycardia 8/12 and intermittently  - no CP/SOB  - stat EKG with new incomplete RBBB and s1q3t3  - discussed with Dr Piper, recs r/o DVT/PE  - CTA and LE duplex negative, c/w eliquis for DVT ppx  - f/u echo: 1. LV Ejection Fraction by Ramirez's Method with a biplane EF of 66 %.                       2. Spectral Doppler shows impaired relaxation pattern of left ventricular myocardial filling (Grade I diastolic dysfunction).      #Covid  PNA   - s/p RDV, DVT ppx    #Onychomycosis   - s/p debriedment and curettage by podiatry 6/7    #Dental caries   - sp course of clinda, outpatient f/u for teeth extractions    #DM2   with hyperglycemia due to dietary non-compliance - insulin adjusted, c/w monitoring    #Intellectual disability/autism    Handoff:  Pending placement , guardianship established,

## 2022-08-15 NOTE — PROGRESS NOTE ADULT - SUBJECTIVE AND OBJECTIVE BOX
WALTERTYLORANUSHARUTH  47y Male    CHIEF COMPLAINT:    Patient is a 47y old  Male who presents with a chief complaint of DFU (15 Aug 2022 07:13)      INTERVAL HPI/OVERNIGHT EVENTS:    Patient seen and examined. No acute events overnight.     ROS: All other systems are negative.    Vital Signs:    T(F): 97.5 (08-15-22 @ 08:34), Max: 97.5 (08-15-22 @ 08:34)  HR: 86 (08-15-22 @ 08:34) (86 - 95)  BP: 127/62 (08-15-22 @ 08:34) (127/62 - 128/61)  RR: 18 (08-14-22 @ 23:54) (18 - 18)  SpO2: --  I&O's Summary    14 Aug 2022 07:01  -  15 Aug 2022 07:00  --------------------------------------------------------  IN: 480 mL / OUT: 0 mL / NET: 480 mL      Daily     Daily   CAPILLARY BLOOD GLUCOSE      POCT Blood Glucose.: 265 mg/dL (15 Aug 2022 10:57)  POCT Blood Glucose.: 157 mg/dL (15 Aug 2022 08:10)  POCT Blood Glucose.: 162 mg/dL (14 Aug 2022 21:10)      PHYSICAL EXAM:    GENERAL:  NAD  SKIN: No rashes or lesions  HEENT: Atraumatic. Normocephalic. PERRL. Moist membranes.  NECK: Supple, No JVD. No lymphadenopathy.  PULMONARY: CTA B/L. No wheezing. No rales  CVS: Normal S1, S2. Rate and Rhythm are regular. No murmurs.  ABDOMEN/GI: Soft, Nontender, Nondistended; BS present  MSK:  No edema B/L LE.  NEUROLOGIC:  No motor or sensory deficit.  PSYCH: Alert & oriented x 3, normal affect    Consultant(s) Notes Reviewed:  [x ] YES  [ ] NO  Care Discussed with Consultants/Other Providers [ x] YES  [ ] NO    LABS:                    RADIOLOGY & ADDITIONAL TESTS:      Imaging or report Personally Reviewed:  [x] YES  [ ] NO  EKG reviewed: [x] YES  [ ] NO    Medications:  Standing  ammonium lactate 12% Lotion 1 Application(s) Topical two times a day  chlorhexidine 4% Liquid 1 Application(s) Topical <User Schedule>  clotrimazole 1% Cream 1 Application(s) Topical two times a day  enoxaparin Injectable 40 milliGRAM(s) SubCutaneous every 24 hours  famotidine    Tablet 20 milliGRAM(s) Oral daily  insulin glargine Injectable (LANTUS) 25 Unit(s) SubCutaneous every morning  insulin lispro (ADMELOG) corrective regimen sliding scale   SubCutaneous three times a day before meals  insulin lispro Injectable (ADMELOG) 8 Unit(s) SubCutaneous three times a day before meals  lactobacillus acidophilus 1 Tablet(s) Oral two times a day  melatonin 3 milliGRAM(s) Oral at bedtime  vitamin A &amp; D Ointment 1 Application(s) Topical daily    PRN Meds  acetaminophen     Tablet .. 650 milliGRAM(s) Oral every 6 hours PRN             CARIN WATSON  47y Male    CHIEF COMPLAINT:    Patient is a 47y old  Male who presents with a chief complaint of DFU (15 Aug 2022 07:13)    INTERVAL HPI/OVERNIGHT EVENTS:    Patient seen and examined. No acute events overnight. No active complaints     ROS: All other systems are negative.    Vital Signs:    T(F): 97.5 (08-15-22 @ 08:34), Max: 97.5 (08-15-22 @ 08:34)  HR: 86 (08-15-22 @ 08:34) (86 - 95)  BP: 127/62 (08-15-22 @ 08:34) (127/62 - 128/61)  RR: 18 (08-14-22 @ 23:54) (18 - 18)    14 Aug 2022 07:01  -  15 Aug 2022 07:00  --------------------------------------------------------  IN: 480 mL / OUT: 0 mL / NET: 480 mL    POCT Blood Glucose.: 265 mg/dL (15 Aug 2022 10:57)  POCT Blood Glucose.: 157 mg/dL (15 Aug 2022 08:10)  POCT Blood Glucose.: 162 mg/dL (14 Aug 2022 21:10)    PHYSICAL EXAM:    GENERAL:  NAD  SKIN: No rashes or lesions  HEENT: Atraumatic. Normocephalic.   NECK: Supple, No JVD.   PULMONARY: CTA B/L. No wheezing.   CVS: Normal S1, S2. Rate and Rhythm are regular.   ABDOMEN/GI: Soft, Nontender, Nondistended   MSK:  No clubbing or cyanosis   NEUROLOGIC: Moves all extremities   PSYCH: Awake and alert    Consultant(s) Notes Reviewed:  [x ] YES  [ ] NO  Care Discussed with Consultants/Other Providers [ x] YES  [ ] NO    LABS:  RADIOLOGY & ADDITIONAL TESTS:  Imaging or report Personally Reviewed:  [x] YES  [ ] NO  EKG reviewed: [x] YES  [ ] NO    Medications:  Standing  ammonium lactate 12% Lotion 1 Application(s) Topical two times a day  chlorhexidine 4% Liquid 1 Application(s) Topical <User Schedule>  clotrimazole 1% Cream 1 Application(s) Topical two times a day  enoxaparin Injectable 40 milliGRAM(s) SubCutaneous every 24 hours  famotidine    Tablet 20 milliGRAM(s) Oral daily  insulin glargine Injectable (LANTUS) 25 Unit(s) SubCutaneous every morning  insulin lispro (ADMELOG) corrective regimen sliding scale   SubCutaneous three times a day before meals  insulin lispro Injectable (ADMELOG) 8 Unit(s) SubCutaneous three times a day before meals  lactobacillus acidophilus 1 Tablet(s) Oral two times a day  melatonin 3 milliGRAM(s) Oral at bedtime  vitamin A &amp; D Ointment 1 Application(s) Topical daily    PRN Meds  acetaminophen     Tablet .. 650 milliGRAM(s) Oral every 6 hours PRN

## 2022-08-15 NOTE — PROGRESS NOTE ADULT - SUBJECTIVE AND OBJECTIVE BOX
CARIN WATSON 47y Male  MRN#: [id number]   Hospital Day: 137d    HPI:  45 yo M with PMHx of Intellectual Disability, DM not on any medications presents with foot wounds. Pt has very poor foot hygiene and per the sister, has been persistently scratching an open wound on his L second toe, which worsened and became more red, had drainage, malodor. He saw a physician at [facility] and was told to come to the ED for evaluation. Pt is poor historian. Does endorse abdominal pain for a few days, cannot give much more description. Per the sister, pt did not seem to have any recent fevers, however is also not a very good historian and does not seem to have good health literacy. States that her and her mom decided to stop giving pt Metformin a while ago, are treating his diabetes by not giving him any sugary foods.  In the ED, T 97.8, /87, , RR 16, SpO2 99% on RA. Lab work unrevealing.  (31 Mar 2022 22:36)    SUBJECTIVE  Patient is a 47y old Male who presents with a chief complaint of DFU (14 Aug 2022 11:26)  Currently admitted to medicine with the primary diagnosis of Onychomycosis    INTERVAL HPI AND OVERNIGHT EVENTS:  Patient was examined and seen at bedside. This morning he is resting comfortably in bed and reports no issues or overnight events.    OBJECTIVE  PAST MEDICAL & SURGICAL HISTORY  DM (diabetes mellitus)    Intellectual disability    ALLERGIES:  penicillin (Unknown)    MEDICATIONS:  STANDING MEDICATIONS  ammonium lactate 12% Lotion 1 Application(s) Topical two times a day  chlorhexidine 4% Liquid 1 Application(s) Topical <User Schedule>  clotrimazole 1% Cream 1 Application(s) Topical two times a day  enoxaparin Injectable 40 milliGRAM(s) SubCutaneous every 24 hours  famotidine    Tablet 20 milliGRAM(s) Oral daily  insulin glargine Injectable (LANTUS) 25 Unit(s) SubCutaneous every morning  insulin lispro (ADMELOG) corrective regimen sliding scale   SubCutaneous three times a day before meals  insulin lispro Injectable (ADMELOG) 8 Unit(s) SubCutaneous three times a day before meals  lactobacillus acidophilus 1 Tablet(s) Oral two times a day  melatonin 3 milliGRAM(s) Oral at bedtime  vitamin A &amp; D Ointment 1 Application(s) Topical daily    PRN MEDICATIONS  acetaminophen     Tablet .. 650 milliGRAM(s) Oral every 6 hours PRN    VITAL SIGNS: Last 24 Hours  T(C): 35.7 (14 Aug 2022 23:54), Max: 36.3 (14 Aug 2022 09:05)  T(F): 96.2 (14 Aug 2022 23:54), Max: 97.3 (14 Aug 2022 09:05)  HR: 95 (14 Aug 2022 23:54) (95 - 100)  BP: 128/61 (14 Aug 2022 23:54) (128/61 - 158/97)  BP(mean): --  RR: 18 (14 Aug 2022 23:54) (18 - 20)  SpO2: --    LABS:    RADIOLOGY:    PHYSICAL EXAM:    GENERAL: NAD, well-developed  HEENT:  Atraumatic, Normocephalic. EOMI  PULMONARY: Clear to auscultation bilaterally; No wheeze  CARDIOVASCULAR: Regular rate and rhythm; No murmurs, rubs, or gallops  GASTROINTESTINAL: Soft, Nontender, Nondistended; Bowel sounds present  MUSCULOSKELETAL:  2+ Peripheral Pulses, No clubbing, cyanosis, or edema  NEUROLOGY: non-focal, normal gait

## 2022-08-16 LAB
GLUCOSE BLDC GLUCOMTR-MCNC: 133 MG/DL — HIGH (ref 70–99)
GLUCOSE BLDC GLUCOMTR-MCNC: 176 MG/DL — HIGH (ref 70–99)
GLUCOSE BLDC GLUCOMTR-MCNC: 187 MG/DL — HIGH (ref 70–99)
GLUCOSE BLDC GLUCOMTR-MCNC: 198 MG/DL — HIGH (ref 70–99)

## 2022-08-16 PROCEDURE — 99231 SBSQ HOSP IP/OBS SF/LOW 25: CPT

## 2022-08-16 RX ADMIN — ENOXAPARIN SODIUM 40 MILLIGRAM(S): 100 INJECTION SUBCUTANEOUS at 05:50

## 2022-08-16 RX ADMIN — Medication 1 TABLET(S): at 16:45

## 2022-08-16 RX ADMIN — Medication 1 APPLICATION(S): at 11:38

## 2022-08-16 RX ADMIN — Medication 1 TABLET(S): at 05:50

## 2022-08-16 RX ADMIN — Medication 2: at 11:36

## 2022-08-16 RX ADMIN — Medication 1 APPLICATION(S): at 05:49

## 2022-08-16 RX ADMIN — Medication 2: at 16:45

## 2022-08-16 RX ADMIN — Medication 8 UNIT(S): at 16:44

## 2022-08-16 RX ADMIN — FAMOTIDINE 20 MILLIGRAM(S): 10 INJECTION INTRAVENOUS at 11:37

## 2022-08-16 RX ADMIN — Medication 8 UNIT(S): at 08:40

## 2022-08-16 RX ADMIN — Medication 8 UNIT(S): at 11:35

## 2022-08-16 RX ADMIN — Medication 3 MILLIGRAM(S): at 21:09

## 2022-08-16 RX ADMIN — Medication 1 APPLICATION(S): at 05:50

## 2022-08-16 RX ADMIN — INSULIN GLARGINE 25 UNIT(S): 100 INJECTION, SOLUTION SUBCUTANEOUS at 08:41

## 2022-08-16 NOTE — PROGRESS NOTE ADULT - ASSESSMENT
46 y/o man with PMH of Intellectual Disability and DM not on any medications presented with foot wounds which were addressed by podiatry. Hospital course complicated by Hospital acquired COVID s/p RDV and poorly controlled DM. He has been in the hospital >100 days, pending guardianship and now placement.    1. Tachycardia 8/12 and intermittently- no CP/SOB  stat EKG with new incomplete RBBB and s1q3t3  case was discussed with Dr Piper who recommended to rule out VTE   CTA and LE duplex negative  2d echo 8/14- EF 66%, GIDD  on lovenox for DVT prophylaxis    2. Covid  PNA - s/p RDV, DVT ppx    3. Onychomycosis - s/p debridement and curettage by podiatry 6/7    4. Dental caries - sp course of clinda, outpatient f/u for teeth extractions    5. DM2 with hyperglycemia due to dietary non-compliance - on insulin and adjust accordingly        PROGRESS NOTE HANDOFF    Disposition:  Pending placement , guardianship established, medically stable for discharge

## 2022-08-16 NOTE — PROGRESS NOTE ADULT - ASSESSMENT
46 yo M with PMH of Intellectual Disability and DM not on any medications presented with foot wounds. Hospital stay complicated Hospital acquired COVID s/p RDV, poorly controlled DM. Patient has been in the hospital >100 days, pending guardianship and now placement    #Tachycardia 8/12 and intermittently  - no CP/SOB  - stat EKG with new incomplete RBBB and s1q3t3  - discussed with Dr Piper, recs r/o DVT/PE  - CTA and LE duplex negative, c/w eliquis for DVT ppx  - f/u echo: 1. LV Ejection Fraction by Ramirez's Method with a biplane EF of 66 %.                       2. Spectral Doppler shows impaired relaxation pattern of left ventricular myocardial filling (Grade I diastolic dysfunction).      #Covid  PNA   - s/p RDV, DVT ppx    #Onychomycosis   - s/p debriedment and curettage by podiatry 6/7    #Dental caries   - sp course of clinda, outpatient f/u for teeth extractions    #DM2   with hyperglycemia due to dietary non-compliance - insulin adjusted, c/w monitoring    #Intellectual disability/autism    Handoff:  Pending placement , guardianship established,

## 2022-08-16 NOTE — PROGRESS NOTE ADULT - SUBJECTIVE AND OBJECTIVE BOX
CARIN WATSON 47y Male  MRN#: [id number]   Hospital Day: 138d    HPI:  47 yo M with PMHx of Intellectual Disability, DM not on any medications presents with foot wounds. Pt has very poor foot hygiene and per the sister, has been persistently scratching an open wound on his L second toe, which worsened and became more red, had drainage, malodor. He saw a physician at [facility] and was told to come to the ED for evaluation. Pt is poor historian. Does endorse abdominal pain for a few days, cannot give much more description. Per the sister, pt did not seem to have any recent fevers, however is also not a very good historian and does not seem to have good health literacy. States that her and her mom decided to stop giving pt Metformin a while ago, are treating his diabetes by not giving him any sugary foods.  In the ED, T 97.8, /87, , RR 16, SpO2 99% on RA. Lab work unrevealing.  (31 Mar 2022 22:36)    SUBJECTIVE  Patient is a 47y old Male who presents with a chief complaint of DFU (15 Aug 2022 16:45)  Currently admitted to medicine with the primary diagnosis of Onychomycosis    INTERVAL HPI AND OVERNIGHT EVENTS:  Patient was examined and seen at bedside. This morning he is resting comfortably in bed and reports no issues or overnight events.    OBJECTIVE  PAST MEDICAL & SURGICAL HISTORY  DM (diabetes mellitus)    Intellectual disability    ALLERGIES:  penicillin (Unknown)    MEDICATIONS:  STANDING MEDICATIONS  ammonium lactate 12% Lotion 1 Application(s) Topical two times a day  chlorhexidine 4% Liquid 1 Application(s) Topical <User Schedule>  clotrimazole 1% Cream 1 Application(s) Topical two times a day  enoxaparin Injectable 40 milliGRAM(s) SubCutaneous every 24 hours  famotidine    Tablet 20 milliGRAM(s) Oral daily  insulin glargine Injectable (LANTUS) 25 Unit(s) SubCutaneous every morning  insulin lispro (ADMELOG) corrective regimen sliding scale   SubCutaneous three times a day before meals  insulin lispro Injectable (ADMELOG) 8 Unit(s) SubCutaneous three times a day before meals  lactobacillus acidophilus 1 Tablet(s) Oral two times a day  melatonin 3 milliGRAM(s) Oral at bedtime  vitamin A &amp; D Ointment 1 Application(s) Topical daily    PRN MEDICATIONS  acetaminophen     Tablet .. 650 milliGRAM(s) Oral every 6 hours PRN      VITAL SIGNS: Last 24 Hours  T(C): 36.6 (15 Aug 2022 23:42), Max: 36.6 (15 Aug 2022 23:42)  T(F): 97.9 (15 Aug 2022 23:42), Max: 97.9 (15 Aug 2022 23:42)  HR: 98 (15 Aug 2022 23:42) (86 - 98)  BP: 120/68 (15 Aug 2022 23:42) (120/68 - 127/62)  BP(mean): --  RR: 18 (15 Aug 2022 23:42) (18 - 18)  SpO2: --    LABS:    RADIOLOGY:    PHYSICAL EXAM:    GENERAL: NAD, well-developed  HEENT:  Atraumatic, Normocephalic. EOMI  PULMONARY: Clear to auscultation bilaterally; No wheeze  CARDIOVASCULAR: Regular rate and rhythm; No murmurs, rubs, or gallops  GASTROINTESTINAL: Soft, Nontender, Nondistended; Bowel sounds present  MUSCULOSKELETAL:  2+ Peripheral Pulses, No clubbing, cyanosis, or edema  NEUROLOGY: non-focal, normal gait

## 2022-08-16 NOTE — PROGRESS NOTE ADULT - SUBJECTIVE AND OBJECTIVE BOX
CARIN WATSON  47y Male    INTERVAL HPI/OVERNIGHT EVENTS:    pt in bed in no distress  ambulating  no fever  ROS negative    T(F): 97 (08-16-22 @ 08:42), Max: 97.9 (08-15-22 @ 23:42)  HR: 65 (08-16-22 @ 08:42) (65 - 98)  BP: 122/55 (08-16-22 @ 08:42) (120/68 - 122/59)  RR: 18 (08-16-22 @ 08:42) (18 - 18)  SpO2: --  I&O's Summary    CAPILLARY BLOOD GLUCOSE      POCT Blood Glucose.: 198 mg/dL (16 Aug 2022 11:31)  POCT Blood Glucose.: 133 mg/dL (16 Aug 2022 08:14)  POCT Blood Glucose.: 276 mg/dL (15 Aug 2022 21:43)  POCT Blood Glucose.: 178 mg/dL (15 Aug 2022 17:15)        PHYSICAL EXAM:  GENERAL: NAD  HEAD:  Normocephalic  EYES:  conjunctiva and sclera clear  ENMT: Moist mucous membranes  NERVOUS SYSTEM:  Alert, awake, Good concentration  CHEST/LUNG: CTA b/l  HEART: Regular rate and rhythm  ABDOMEN: Soft, Nontender, Nondistended  EXTREMITIES:   No edema  SKIN: excoriations of LE    Consultant(s) Notes Reviewed:  [x ] YES  [ ] NO  Care Discussed with Consultants/Other Providers [ x] YES  [ ] NO    MEDICATIONS  (STANDING):  ammonium lactate 12% Lotion 1 Application(s) Topical two times a day  chlorhexidine 4% Liquid 1 Application(s) Topical <User Schedule>  clotrimazole 1% Cream 1 Application(s) Topical two times a day  enoxaparin Injectable 40 milliGRAM(s) SubCutaneous every 24 hours  famotidine    Tablet 20 milliGRAM(s) Oral daily  insulin glargine Injectable (LANTUS) 25 Unit(s) SubCutaneous every morning  insulin lispro (ADMELOG) corrective regimen sliding scale   SubCutaneous three times a day before meals  insulin lispro Injectable (ADMELOG) 8 Unit(s) SubCutaneous three times a day before meals  lactobacillus acidophilus 1 Tablet(s) Oral two times a day  melatonin 3 milliGRAM(s) Oral at bedtime  vitamin A &amp; D Ointment 1 Application(s) Topical daily    MEDICATIONS  (PRN):  acetaminophen     Tablet .. 650 milliGRAM(s) Oral every 6 hours PRN Temp greater or equal to 38C (100.4F), Mild Pain (1 - 3)        Case discussed with residents and RN on rounds today    Care discussed with pt

## 2022-08-17 LAB
GLUCOSE BLDC GLUCOMTR-MCNC: 124 MG/DL — HIGH (ref 70–99)
GLUCOSE BLDC GLUCOMTR-MCNC: 183 MG/DL — HIGH (ref 70–99)
GLUCOSE BLDC GLUCOMTR-MCNC: 183 MG/DL — HIGH (ref 70–99)

## 2022-08-17 PROCEDURE — 99231 SBSQ HOSP IP/OBS SF/LOW 25: CPT

## 2022-08-17 RX ADMIN — FAMOTIDINE 20 MILLIGRAM(S): 10 INJECTION INTRAVENOUS at 12:01

## 2022-08-17 RX ADMIN — Medication 2: at 17:30

## 2022-08-17 RX ADMIN — Medication 1 APPLICATION(S): at 12:05

## 2022-08-17 RX ADMIN — INSULIN GLARGINE 25 UNIT(S): 100 INJECTION, SOLUTION SUBCUTANEOUS at 08:20

## 2022-08-17 RX ADMIN — Medication 1 APPLICATION(S): at 06:06

## 2022-08-17 RX ADMIN — Medication 3 MILLIGRAM(S): at 20:28

## 2022-08-17 RX ADMIN — Medication 8 UNIT(S): at 08:21

## 2022-08-17 RX ADMIN — CHLORHEXIDINE GLUCONATE 1 APPLICATION(S): 213 SOLUTION TOPICAL at 06:06

## 2022-08-17 RX ADMIN — Medication 2: at 08:21

## 2022-08-17 RX ADMIN — Medication 1 TABLET(S): at 17:29

## 2022-08-17 RX ADMIN — Medication 8 UNIT(S): at 17:30

## 2022-08-17 RX ADMIN — Medication 8 UNIT(S): at 12:01

## 2022-08-17 RX ADMIN — Medication 1 TABLET(S): at 05:41

## 2022-08-17 RX ADMIN — ENOXAPARIN SODIUM 40 MILLIGRAM(S): 100 INJECTION SUBCUTANEOUS at 05:42

## 2022-08-17 NOTE — PROGRESS NOTE ADULT - SUBJECTIVE AND OBJECTIVE BOX
CARIN WATSON  47y Male    INTERVAL HPI/OVERNIGHT EVENTS:    no complaints - ambulating   no fever  ROS negative    T(F): 96.7 (08-17-22 @ 08:45), Max: 97 (08-16-22 @ 23:05)  HR: 83 (08-17-22 @ 08:45) (83 - 119)  BP: 151/69 (08-17-22 @ 08:45) (118/59 - 151/69)  RR: 18 (08-17-22 @ 08:45) (18 - 19)  SpO2: --  I&O's Summary    CAPILLARY BLOOD GLUCOSE      POCT Blood Glucose.: 124 mg/dL (17 Aug 2022 11:09)  POCT Blood Glucose.: 183 mg/dL (17 Aug 2022 07:56)  POCT Blood Glucose.: 176 mg/dL (16 Aug 2022 21:28)  POCT Blood Glucose.: 187 mg/dL (16 Aug 2022 16:20)        PHYSICAL EXAM:  GENERAL: NAD  HEAD:  Normocephalic  EYES:  conjunctiva and sclera clear  ENMT: Moist mucous membranes  NERVOUS SYSTEM:  Alert, awake, Good concentration    Consultant(s) Notes Reviewed:  [x ] YES  [ ] NO  Care Discussed with Consultants/Other Providers [ x] YES  [ ] NO    MEDICATIONS  (STANDING):  ammonium lactate 12% Lotion 1 Application(s) Topical two times a day  chlorhexidine 4% Liquid 1 Application(s) Topical <User Schedule>  clotrimazole 1% Cream 1 Application(s) Topical two times a day  enoxaparin Injectable 40 milliGRAM(s) SubCutaneous every 24 hours  famotidine    Tablet 20 milliGRAM(s) Oral daily  insulin glargine Injectable (LANTUS) 25 Unit(s) SubCutaneous every morning  insulin lispro (ADMELOG) corrective regimen sliding scale   SubCutaneous three times a day before meals  insulin lispro Injectable (ADMELOG) 8 Unit(s) SubCutaneous three times a day before meals  lactobacillus acidophilus 1 Tablet(s) Oral two times a day  melatonin 3 milliGRAM(s) Oral at bedtime  vitamin A &amp; D Ointment 1 Application(s) Topical daily    MEDICATIONS  (PRN):  acetaminophen     Tablet .. 650 milliGRAM(s) Oral every 6 hours PRN Temp greater or equal to 38C (100.4F), Mild Pain (1 - 3)        Case discussed with residents and RN on rounds today    Care discussed with pt

## 2022-08-17 NOTE — PROGRESS NOTE ADULT - SUBJECTIVE AND OBJECTIVE BOX
CARIN WATSON 47y Male  MRN#: [id number]   Hospital Day: 139d    HPI:  47 yo M with PMHx of Intellectual Disability, DM not on any medications presents with foot wounds. Pt has very poor foot hygiene and per the sister, has been persistently scratching an open wound on his L second toe, which worsened and became more red, had drainage, malodor. He saw a physician at [facility] and was told to come to the ED for evaluation. Pt is poor historian. Does endorse abdominal pain for a few days, cannot give much more description. Per the sister, pt did not seem to have any recent fevers, however is also not a very good historian and does not seem to have good health literacy. States that her and her mom decided to stop giving pt Metformin a while ago, are treating his diabetes by not giving him any sugary foods.  In the ED, T 97.8, /87, , RR 16, SpO2 99% on RA. Lab work unrevealing.  (31 Mar 2022 22:36)      SUBJECTIVE  Patient is a 47y old Male who presents with a chief complaint of DFU (17 Aug 2022 13:29)  Currently admitted to medicine with the primary diagnosis of Onychomycosis      INTERVAL HPI AND OVERNIGHT EVENTS:  Patient was examined and seen at bedside. This morning he is resting comfortably in bed and reports no issues or overnight events.    REVIEW OF SYMPTOMS:  CONSTITUTIONAL: No weakness, fevers or chills; No headaches  EYES: No visual changes, eye pain, or discharge  ENT: No vertigo; No ear pain or change in hearing; No sore throat or difficulty swallowing  NECK: No pain or stiffness  RESPIRATORY: No cough, wheezing, or hemoptysis; No shortness of breath  CARDIOVASCULAR: No chest pain or palpitations  GASTROINTESTINAL: No abdominal or epigastric pain; No nausea, vomiting, or hematemesis; No diarrhea or constipation; No melena or hematochezia  GENITOURINARY: No dysuria, frequency or hematuria  MUSCULOSKELETAL: No joint pain, no muscle pain, no weakness  NEUROLOGICAL: No numbness or weakness  SKIN: No itching or rashes    OBJECTIVE  PAST MEDICAL & SURGICAL HISTORY  DM (diabetes mellitus)    Intellectual disability      ALLERGIES:  penicillin (Unknown)    MEDICATIONS:  STANDING MEDICATIONS  ammonium lactate 12% Lotion 1 Application(s) Topical two times a day  chlorhexidine 4% Liquid 1 Application(s) Topical <User Schedule>  clotrimazole 1% Cream 1 Application(s) Topical two times a day  enoxaparin Injectable 40 milliGRAM(s) SubCutaneous every 24 hours  famotidine    Tablet 20 milliGRAM(s) Oral daily  insulin glargine Injectable (LANTUS) 25 Unit(s) SubCutaneous every morning  insulin lispro (ADMELOG) corrective regimen sliding scale   SubCutaneous three times a day before meals  insulin lispro Injectable (ADMELOG) 8 Unit(s) SubCutaneous three times a day before meals  lactobacillus acidophilus 1 Tablet(s) Oral two times a day  melatonin 3 milliGRAM(s) Oral at bedtime  vitamin A &amp; D Ointment 1 Application(s) Topical daily    PRN MEDICATIONS  acetaminophen     Tablet .. 650 milliGRAM(s) Oral every 6 hours PRN      VITAL SIGNS: Last 24 Hours  T(C): 35.9 (17 Aug 2022 08:45), Max: 36.1 (16 Aug 2022 16:04)  T(F): 96.7 (17 Aug 2022 08:45), Max: 97 (16 Aug 2022 23:05)  HR: 83 (17 Aug 2022 08:45) (83 - 119)  BP: 151/69 (17 Aug 2022 08:45) (118/59 - 151/69)  BP(mean): --  RR: 18 (17 Aug 2022 08:45) (18 - 19)  SpO2: --    LABS:                        RADIOLOGY:      PHYSICAL EXAM:    GENERAL: NAD, well-developed  HEENT:  Atraumatic, Normocephalic. EOMI  PULMONARY: Clear to auscultation bilaterally; No wheeze  CARDIOVASCULAR: Regular rate and rhythm; No murmurs, rubs, or gallops  GASTROINTESTINAL: Soft, Nontender, Nondistended; Bowel sounds present  MUSCULOSKELETAL:  2+ Peripheral Pulses, No clubbing, cyanosis, or edema  NEUROLOGY: non-focal, normal gait

## 2022-08-17 NOTE — PROGRESS NOTE ADULT - ASSESSMENT
46 y/o man with PMH of Intellectual Disability and DM not on any medications presented with foot wounds which were addressed by podiatry. Hospital course complicated by Hospital acquired COVID s/p RDV and poorly controlled DM. He has been in the hospital >100 days, pending guardianship and now placement.    1. Tachycardia 8/12 and intermittently- no CP/SOB  stat EKG with new incomplete RBBB and s1q3t3  case was discussed with Dr Piper who recommended to rule out VTE   CTA and LE duplex negative  2d echo 8/14- EF 66%, GIDD  on lovenox for DVT prophylaxis    2. Covid  PNA - s/p RDV, DVT ppx    3. Onychomycosis - s/p debridement and curettage by podiatry 6/7    4. Dental caries - sp course of clinda, outpatient f/u for teeth extractions    5. DM2 with hyperglycemia due to dietary non-compliance - on insulin and adjust accordingly - better controlled today        PROGRESS NOTE HANDOFF    Disposition:  Pending placement , guardianship established, medically stable for discharge

## 2022-08-17 NOTE — PROGRESS NOTE ADULT - ASSESSMENT
48 yo M with PMH of Intellectual Disability and DM not on any medications presented with foot wounds. Hospital stay complicated Hospital acquired COVID s/p RDV, poorly controlled DM. Patient has been in the hospital >100 days, pending guardianship and now placement    #Tachycardia 8/12 and intermittently  - no CP/SOB  - stat EKG with new incomplete RBBB and s1q3t3  - discussed with Dr Piper, recs r/o DVT/PE  - CTA and LE duplex negative, c/w eliquis for DVT ppx  - f/u echo: 1. LV Ejection Fraction by Ramirez's Method with a biplane EF of 66 %.                       2. Spectral Doppler shows impaired relaxation pattern of left ventricular myocardial filling (Grade I diastolic dysfunction).      #Covid  PNA   - s/p RDV, DVT ppx    #Onychomycosis   - s/p debriedment and curettage by podiatry 6/7    #Dental caries   - sp course of clinda, outpatient f/u for teeth extractions    #DM2   with hyperglycemia due to dietary non-compliance - insulin adjusted, c/w monitoring    #Intellectual disability/autism    Handoff:  Pending placement , guardianship established,

## 2022-08-18 LAB
GLUCOSE BLDC GLUCOMTR-MCNC: 111 MG/DL — HIGH (ref 70–99)
GLUCOSE BLDC GLUCOMTR-MCNC: 219 MG/DL — HIGH (ref 70–99)
GLUCOSE BLDC GLUCOMTR-MCNC: 225 MG/DL — HIGH (ref 70–99)
GLUCOSE BLDC GLUCOMTR-MCNC: 63 MG/DL — LOW (ref 70–99)

## 2022-08-18 PROCEDURE — 99231 SBSQ HOSP IP/OBS SF/LOW 25: CPT

## 2022-08-18 RX ADMIN — Medication 1 APPLICATION(S): at 17:07

## 2022-08-18 RX ADMIN — FAMOTIDINE 20 MILLIGRAM(S): 10 INJECTION INTRAVENOUS at 11:44

## 2022-08-18 RX ADMIN — Medication 8 UNIT(S): at 17:14

## 2022-08-18 RX ADMIN — INSULIN GLARGINE 25 UNIT(S): 100 INJECTION, SOLUTION SUBCUTANEOUS at 08:37

## 2022-08-18 RX ADMIN — ENOXAPARIN SODIUM 40 MILLIGRAM(S): 100 INJECTION SUBCUTANEOUS at 05:40

## 2022-08-18 RX ADMIN — Medication 1 APPLICATION(S): at 05:40

## 2022-08-18 RX ADMIN — Medication 1 TABLET(S): at 05:40

## 2022-08-18 RX ADMIN — Medication 8 UNIT(S): at 11:45

## 2022-08-18 RX ADMIN — Medication 1 APPLICATION(S): at 17:06

## 2022-08-18 RX ADMIN — Medication 1 TABLET(S): at 17:06

## 2022-08-18 RX ADMIN — Medication 3 MILLIGRAM(S): at 22:03

## 2022-08-18 RX ADMIN — Medication 1 APPLICATION(S): at 11:45

## 2022-08-18 RX ADMIN — Medication 4: at 17:13

## 2022-08-18 RX ADMIN — Medication 4: at 11:44

## 2022-08-18 NOTE — PROGRESS NOTE ADULT - ASSESSMENT
48 y/o man with PMH of Intellectual Disability and DM not on any medications presented with foot wounds which were addressed by podiatry. Hospital course complicated by Hospital acquired COVID s/p RDV and poorly controlled DM. He has been in the hospital >100 days, pending guardianship and now placement.    1. Tachycardia 8/12 and intermittently- no CP/SOB  stat EKG with new incomplete RBBB and s1q3t3  case was discussed with Dr Piper who recommended to rule out VTE   CTA and LE duplex negative  2d echo 8/14- EF 66%, GIDD  on lovenox for DVT prophylaxis    2. Covid  PNA - s/p RDV, DVT ppx    3. Onychomycosis - s/p debridement and curettage by podiatry 6/7    4. Dental caries - sp course of clinda, outpatient f/u for teeth extractions    5. DM2 with hyperglycemia due to dietary non-compliance - on insulin and adjust accordingly - better controlled today        PROGRESS NOTE HANDOFF    Disposition:  Pending placement , guardianship established, medically stable for discharge    46 y/o man with PMH of Intellectual Disability and DM not on any medications presented with foot wounds which were addressed by podiatry. Hospital course complicated by Hospital acquired COVID s/p RDV and poorly controlled DM. He has been in the hospital >100 days, pending guardianship and now placement.    1. Tachycardia 8/12 and intermittently- no CP/SOB  stat EKG with new incomplete RBBB and s1q3t3  Cardiology recommended to rule out VTE   CTA and LE duplex negative  2d echo 8/14- EF 66%, GIDD  on lovenox for DVT prophylaxis    2. Covid  PNA - s/p RDV, DVT ppx    3. Onychomycosis - s/p debridement and curettage by podiatry 6/7    4. Dental caries - sp course of clinda, outpatient f/u for teeth extractions    5. DM2 with hyperglycemia due to dietary non-compliance - on insulin and adjust accordingly         PROGRESS NOTE HANDOFF    Disposition:  Pending placement , guardianship established, medically stable for discharge

## 2022-08-18 NOTE — PROGRESS NOTE ADULT - SUBJECTIVE AND OBJECTIVE BOX
CARIN WATSON  47y Male    INTERVAL HPI/OVERNIGHT EVENTS:    no complaints  comfortable in bed  ROS negative    T(F): 96.1 (08-18-22 @ 07:40), Max: 97.4 (08-17-22 @ 23:55)  HR: 84 (08-18-22 @ 07:40) (84 - 90)  BP: 124/84 (08-18-22 @ 07:40) (124/84 - 132/71)  RR: 18 (08-18-22 @ 07:40) (18 - 18)  SpO2: --  I&O's Summary    17 Aug 2022 07:01  -  18 Aug 2022 07:00  --------------------------------------------------------  IN: 720 mL / OUT: 0 mL / NET: 720 mL      CAPILLARY BLOOD GLUCOSE      POCT Blood Glucose.: 225 mg/dL (18 Aug 2022 11:32)  POCT Blood Glucose.: 111 mg/dL (18 Aug 2022 07:53)  POCT Blood Glucose.: 63 mg/dL (18 Aug 2022 07:51)  POCT Blood Glucose.: 183 mg/dL (17 Aug 2022 16:40)        PHYSICAL EXAM:  GENERAL: NAD  HEAD:  Normocephalic  EYES:  conjunctiva and sclera clear  ENMT: Moist mucous membranes  NERVOUS SYSTEM:  Alert, awake, Good concentration    Consultant(s) Notes Reviewed:  [x ] YES  [ ] NO  Care Discussed with Consultants/Other Providers [ x] YES  [ ] NO    MEDICATIONS  (STANDING):  ammonium lactate 12% Lotion 1 Application(s) Topical two times a day  chlorhexidine 4% Liquid 1 Application(s) Topical <User Schedule>  clotrimazole 1% Cream 1 Application(s) Topical two times a day  enoxaparin Injectable 40 milliGRAM(s) SubCutaneous every 24 hours  famotidine    Tablet 20 milliGRAM(s) Oral daily  insulin glargine Injectable (LANTUS) 25 Unit(s) SubCutaneous every morning  insulin lispro (ADMELOG) corrective regimen sliding scale   SubCutaneous three times a day before meals  insulin lispro Injectable (ADMELOG) 8 Unit(s) SubCutaneous three times a day before meals  lactobacillus acidophilus 1 Tablet(s) Oral two times a day  melatonin 3 milliGRAM(s) Oral at bedtime  vitamin A &amp; D Ointment 1 Application(s) Topical daily    MEDICATIONS  (PRN):  acetaminophen     Tablet .. 650 milliGRAM(s) Oral every 6 hours PRN Temp greater or equal to 38C (100.4F), Mild Pain (1 - 3)        Case discussed with residents and RN on rounds today

## 2022-08-19 LAB
GLUCOSE BLDC GLUCOMTR-MCNC: 116 MG/DL — HIGH (ref 70–99)
GLUCOSE BLDC GLUCOMTR-MCNC: 181 MG/DL — HIGH (ref 70–99)
GLUCOSE BLDC GLUCOMTR-MCNC: 201 MG/DL — HIGH (ref 70–99)
GLUCOSE BLDC GLUCOMTR-MCNC: 258 MG/DL — HIGH (ref 70–99)

## 2022-08-19 PROCEDURE — 99231 SBSQ HOSP IP/OBS SF/LOW 25: CPT

## 2022-08-19 RX ADMIN — Medication 1 APPLICATION(S): at 17:14

## 2022-08-19 RX ADMIN — Medication 4: at 17:11

## 2022-08-19 RX ADMIN — Medication 1 APPLICATION(S): at 17:16

## 2022-08-19 RX ADMIN — Medication 8 UNIT(S): at 11:35

## 2022-08-19 RX ADMIN — Medication 1 APPLICATION(S): at 11:36

## 2022-08-19 RX ADMIN — Medication 1 APPLICATION(S): at 05:43

## 2022-08-19 RX ADMIN — INSULIN GLARGINE 25 UNIT(S): 100 INJECTION, SOLUTION SUBCUTANEOUS at 08:11

## 2022-08-19 RX ADMIN — Medication 6: at 11:35

## 2022-08-19 RX ADMIN — Medication 2: at 08:15

## 2022-08-19 RX ADMIN — FAMOTIDINE 20 MILLIGRAM(S): 10 INJECTION INTRAVENOUS at 11:36

## 2022-08-19 RX ADMIN — Medication 1 APPLICATION(S): at 05:00

## 2022-08-19 RX ADMIN — Medication 1 TABLET(S): at 05:43

## 2022-08-19 RX ADMIN — ENOXAPARIN SODIUM 40 MILLIGRAM(S): 100 INJECTION SUBCUTANEOUS at 05:43

## 2022-08-19 RX ADMIN — Medication 8 UNIT(S): at 17:11

## 2022-08-19 RX ADMIN — Medication 1 TABLET(S): at 17:13

## 2022-08-19 RX ADMIN — Medication 8 UNIT(S): at 08:16

## 2022-08-19 RX ADMIN — Medication 3 MILLIGRAM(S): at 21:55

## 2022-08-19 NOTE — PROGRESS NOTE ADULT - ASSESSMENT
48 y/o man with PMH of Intellectual Disability and DM not on any medications presented with foot wounds which were addressed by podiatry. Hospital course complicated by Hospital acquired COVID s/p RDV and poorly controlled DM. He has been in the hospital >100 days, pending guardianship and now placement.    1. Tachycardia 8/12 and intermittently- no CP/SOB  stat EKG with new incomplete RBBB and s1q3t3  Cardiology recommended to rule out VTE   CTA and LE duplex negative  2d echo 8/14- EF 66%, GIDD  on lovenox for DVT prophylaxis    2. Covid  PNA - s/p RDV, DVT ppx    3. Onychomycosis - s/p debridement and curettage by podiatry 6/7    4. Dental caries - sp course of clindamycin, outpatient f/u for teeth extractions    5. DM2 with hyperglycemia due to dietary non-compliance - on insulin and adjust accordingly         PROGRESS NOTE HANDOFF    Disposition: Pending placement, guardianship established, medically stable for discharge

## 2022-08-19 NOTE — PROGRESS NOTE ADULT - SUBJECTIVE AND OBJECTIVE BOX
CARIN WATSON  47y Male    INTERVAL HPI/OVERNIGHT EVENTS:    resting comfortably  no fever    T(F): 96 (08-19-22 @ 08:24), Max: 98.7 (08-18-22 @ 15:34)  HR: 89 (08-19-22 @ 08:24) (89 - 98)  BP: 133/74 (08-19-22 @ 08:24) (118/62 - 133/74)  RR: 20 (08-19-22 @ 08:24) (18 - 20)  SpO2: --  I&O's Summary    18 Aug 2022 07:01  -  19 Aug 2022 07:00  --------------------------------------------------------  IN: 720 mL / OUT: 0 mL / NET: 720 mL      CAPILLARY BLOOD GLUCOSE      POCT Blood Glucose.: 258 mg/dL (19 Aug 2022 11:11)  POCT Blood Glucose.: 181 mg/dL (19 Aug 2022 08:05)  POCT Blood Glucose.: 219 mg/dL (18 Aug 2022 16:59)        PHYSICAL EXAM:  GENERAL: NAD, ambulating in the hallway    Consultant(s) Notes Reviewed:  [x ] YES  [ ] NO  Care Discussed with Consultants/Other Providers [ x] YES  [ ] NO    MEDICATIONS  (STANDING):  ammonium lactate 12% Lotion 1 Application(s) Topical two times a day  chlorhexidine 4% Liquid 1 Application(s) Topical <User Schedule>  clotrimazole 1% Cream 1 Application(s) Topical two times a day  enoxaparin Injectable 40 milliGRAM(s) SubCutaneous every 24 hours  famotidine    Tablet 20 milliGRAM(s) Oral daily  insulin glargine Injectable (LANTUS) 25 Unit(s) SubCutaneous every morning  insulin lispro (ADMELOG) corrective regimen sliding scale   SubCutaneous three times a day before meals  insulin lispro Injectable (ADMELOG) 8 Unit(s) SubCutaneous three times a day before meals  lactobacillus acidophilus 1 Tablet(s) Oral two times a day  melatonin 3 milliGRAM(s) Oral at bedtime  vitamin A &amp; D Ointment 1 Application(s) Topical daily    MEDICATIONS  (PRN):  acetaminophen     Tablet .. 650 milliGRAM(s) Oral every 6 hours PRN Temp greater or equal to 38C (100.4F), Mild Pain (1 - 3)      Case discussed with residents and RN on rounds today

## 2022-08-19 NOTE — CHART NOTE - NSCHARTNOTEFT_GEN_A_CORE
PO remains optimum per EMR documentation. 8/9-8/18 intake: %.     Not at risk, will re-assess in 10 days.

## 2022-08-19 NOTE — PROGRESS NOTE ADULT - SUBJECTIVE AND OBJECTIVE BOX
CARIN WATSON 47y Male  MRN#: [id number]   Hospital Day: 141d    HPI:  45 yo M with PMHx of Intellectual Disability, DM not on any medications presents with foot wounds. Pt has very poor foot hygiene and per the sister, has been persistently scratching an open wound on his L second toe, which worsened and became more red, had drainage, malodor. He saw a physician at [facility] and was told to come to the ED for evaluation. Pt is poor historian. Does endorse abdominal pain for a few days, cannot give much more description. Per the sister, pt did not seem to have any recent fevers, however is also not a very good historian and does not seem to have good health literacy. States that her and her mom decided to stop giving pt Metformin a while ago, are treating his diabetes by not giving him any sugary foods.  In the ED, T 97.8, /87, , RR 16, SpO2 99% on RA. Lab work unrevealing.  (31 Mar 2022 22:36)    SUBJECTIVE  Patient is a 47y old Male who presents with a chief complaint of DFU (18 Aug 2022 15:37)  Currently admitted to medicine with the primary diagnosis of Onychomycosis    INTERVAL HPI AND OVERNIGHT EVENTS:  Patient was examined and seen at bedside. This morning he is resting comfortably in bed and reports no issues or overnight events.    OBJECTIVE  PAST MEDICAL & SURGICAL HISTORY  DM (diabetes mellitus)    Intellectual disability    ALLERGIES:  penicillin (Unknown)    MEDICATIONS:  STANDING MEDICATIONS  ammonium lactate 12% Lotion 1 Application(s) Topical two times a day  chlorhexidine 4% Liquid 1 Application(s) Topical <User Schedule>  clotrimazole 1% Cream 1 Application(s) Topical two times a day  enoxaparin Injectable 40 milliGRAM(s) SubCutaneous every 24 hours  famotidine    Tablet 20 milliGRAM(s) Oral daily  insulin glargine Injectable (LANTUS) 25 Unit(s) SubCutaneous every morning  insulin lispro (ADMELOG) corrective regimen sliding scale   SubCutaneous three times a day before meals  insulin lispro Injectable (ADMELOG) 8 Unit(s) SubCutaneous three times a day before meals  lactobacillus acidophilus 1 Tablet(s) Oral two times a day  melatonin 3 milliGRAM(s) Oral at bedtime  vitamin A &amp; D Ointment 1 Application(s) Topical daily    PRN MEDICATIONS  acetaminophen     Tablet .. 650 milliGRAM(s) Oral every 6 hours PRN      VITAL SIGNS: Last 24 Hours  T(C): 36.1 (18 Aug 2022 23:59), Max: 37.1 (18 Aug 2022 15:34)  T(F): 96.9 (18 Aug 2022 23:59), Max: 98.7 (18 Aug 2022 15:34)  HR: 98 (18 Aug 2022 23:59) (84 - 98)  BP: 118/62 (18 Aug 2022 23:59) (118/62 - 131/62)  BP(mean): --  RR: 18 (18 Aug 2022 23:59) (18 - 18)  SpO2: --    LABS:    RADIOLOGY:    PHYSICAL EXAM:    GENERAL: NAD, well-developed  HEENT:  Atraumatic, Normocephalic. EOMI  PULMONARY: Clear to auscultation bilaterally; No wheeze  CARDIOVASCULAR: Regular rate and rhythm; No murmurs, rubs, or gallops  GASTROINTESTINAL: Soft, Nontender, Nondistended; Bowel sounds present  MUSCULOSKELETAL:  2+ Peripheral Pulses, No clubbing, cyanosis, or edema  NEUROLOGY: non-focal, normal gait

## 2022-08-20 LAB
GLUCOSE BLDC GLUCOMTR-MCNC: 109 MG/DL — HIGH (ref 70–99)
GLUCOSE BLDC GLUCOMTR-MCNC: 149 MG/DL — HIGH (ref 70–99)
GLUCOSE BLDC GLUCOMTR-MCNC: 165 MG/DL — HIGH (ref 70–99)
GLUCOSE BLDC GLUCOMTR-MCNC: 177 MG/DL — HIGH (ref 70–99)

## 2022-08-20 PROCEDURE — 99231 SBSQ HOSP IP/OBS SF/LOW 25: CPT

## 2022-08-20 RX ADMIN — Medication 2: at 11:48

## 2022-08-20 RX ADMIN — Medication 8 UNIT(S): at 17:06

## 2022-08-20 RX ADMIN — Medication 3 MILLIGRAM(S): at 22:06

## 2022-08-20 RX ADMIN — INSULIN GLARGINE 25 UNIT(S): 100 INJECTION, SOLUTION SUBCUTANEOUS at 08:04

## 2022-08-20 RX ADMIN — Medication 1 TABLET(S): at 17:05

## 2022-08-20 RX ADMIN — Medication 1 APPLICATION(S): at 17:05

## 2022-08-20 RX ADMIN — Medication 8 UNIT(S): at 11:49

## 2022-08-20 RX ADMIN — Medication 1 TABLET(S): at 06:19

## 2022-08-20 RX ADMIN — FAMOTIDINE 20 MILLIGRAM(S): 10 INJECTION INTRAVENOUS at 11:53

## 2022-08-20 RX ADMIN — Medication 8 UNIT(S): at 08:04

## 2022-08-20 RX ADMIN — Medication 1 APPLICATION(S): at 06:28

## 2022-08-20 RX ADMIN — ENOXAPARIN SODIUM 40 MILLIGRAM(S): 100 INJECTION SUBCUTANEOUS at 06:19

## 2022-08-20 RX ADMIN — Medication 1 APPLICATION(S): at 06:19

## 2022-08-20 NOTE — PROGRESS NOTE ADULT - SUBJECTIVE AND OBJECTIVE BOX
CARIN WATSON 47y Male  MRN#: [id number]   Hospital Day: 142d    HPI:  47 yo M with PMHx of Intellectual Disability, DM not on any medications presents with foot wounds. Pt has very poor foot hygiene and per the sister, has been persistently scratching an open wound on his L second toe, which worsened and became more red, had drainage, malodor. He saw a physician at [facility] and was told to come to the ED for evaluation. Pt is poor historian. Does endorse abdominal pain for a few days, cannot give much more description. Per the sister, pt did not seem to have any recent fevers, however is also not a very good historian and does not seem to have good health literacy. States that her and her mom decided to stop giving pt Metformin a while ago, are treating his diabetes by not giving him any sugary foods.  In the ED, T 97.8, /87, , RR 16, SpO2 99% on RA. Lab work unrevealing.  (31 Mar 2022 22:36)    SUBJECTIVE  Patient is a 47y old Male who presents with a chief complaint of DFU (19 Aug 2022 12:34)  Currently admitted to medicine with the primary diagnosis of Onychomycosis    INTERVAL HPI AND OVERNIGHT EVENTS:  Patient was examined and seen at bedside.    OBJECTIVE  PAST MEDICAL & SURGICAL HISTORY  DM (diabetes mellitus)    Intellectual disability    ALLERGIES:  penicillin (Unknown)    MEDICATIONS:  STANDING MEDICATIONS  ammonium lactate 12% Lotion 1 Application(s) Topical two times a day  chlorhexidine 4% Liquid 1 Application(s) Topical <User Schedule>  clotrimazole 1% Cream 1 Application(s) Topical two times a day  enoxaparin Injectable 40 milliGRAM(s) SubCutaneous every 24 hours  famotidine    Tablet 20 milliGRAM(s) Oral daily  insulin glargine Injectable (LANTUS) 25 Unit(s) SubCutaneous every morning  insulin lispro (ADMELOG) corrective regimen sliding scale   SubCutaneous three times a day before meals  insulin lispro Injectable (ADMELOG) 8 Unit(s) SubCutaneous three times a day before meals  lactobacillus acidophilus 1 Tablet(s) Oral two times a day  melatonin 3 milliGRAM(s) Oral at bedtime  vitamin A &amp; D Ointment 1 Application(s) Topical daily    PRN MEDICATIONS  acetaminophen     Tablet .. 650 milliGRAM(s) Oral every 6 hours PRN    VITAL SIGNS: Last 24 Hours  T(C): 36.1 (19 Aug 2022 23:49), Max: 36.7 (19 Aug 2022 15:24)  T(F): 96.9 (19 Aug 2022 23:49), Max: 98 (19 Aug 2022 15:24)  HR: 98 (19 Aug 2022 23:49) (89 - 105)  BP: 111/58 (19 Aug 2022 23:49) (111/58 - 133/74)  BP(mean): --  RR: 18 (19 Aug 2022 23:49) (18 - 20)  SpO2: --    LABS:    RADIOLOGY:    PHYSICAL EXAM:    GENERAL: NAD, well-developed  HEENT:  Atraumatic, Normocephalic. EOMI  PULMONARY: Clear to auscultation bilaterally; No wheeze  CARDIOVASCULAR: Regular rate and rhythm; No murmurs, rubs, or gallops  GASTROINTESTINAL: Soft, Nontender, Nondistended; Bowel sounds present  MUSCULOSKELETAL:  2+ Peripheral Pulses, No clubbing, cyanosis, or edema  NEUROLOGY: non-focal, normal gait

## 2022-08-20 NOTE — PROGRESS NOTE ADULT - ASSESSMENT
46 y/o man with PMH of Intellectual Disability and DM not on any medications presented with foot wounds which were addressed by podiatry. Hospital course complicated by Hospital acquired COVID s/p RDV and poorly controlled DM. He has been in the hospital >100 days, pending guardianship and now placement.    1. Tachycardia 8/12 and intermittently- no CP/SOB  stat EKG with new incomplete RBBB and s1q3t3  Cardiology recommended to rule out VTE   CTA and LE duplex negative  2d echo 8/14- EF 66%, GIDD  on lovenox for DVT prophylaxis    2. Covid  PNA - s/p RDV, DVT ppx    3. Onychomycosis - s/p debridement and curettage by podiatry 6/7    4. Dental caries - sp course of clindamycin, outpatient f/u for teeth extractions    5. DM2 with hyperglycemia due to dietary non-compliance - on insulin and adjust accordingly         PROGRESS NOTE HANDOFF    Disposition: Pending placement, guardianship established, medically stable for discharge

## 2022-08-20 NOTE — PROGRESS NOTE ADULT - SUBJECTIVE AND OBJECTIVE BOX
FARHADDAVIDCARIN 47y Male        Patient is a 47y old  Male who presents with a chief complaint of DFU (20 Aug 2022 07:21)      HPI:  HPI:  45 yo M with PMHx of Intellectual Disability, DM not on any medications presents with foot wounds. Pt has very poor foot hygiene and per the sister, has been persistently scratching an open wound on his L second toe, which worsened and became more red, had drainage, malodor. He saw a physician at [facility] and was told to come to the ED for evaluation. Pt is poor historian. Does endorse abdominal pain for a few days, cannot give much more description. Per the sister, pt did not seem to have any recent fevers, however is also not a very good historian and does not seem to have good health literacy. States that her and her mom decided to stop giving pt Metformin a while ago, are treating his diabetes by not giving him any sugary foods.  In the ED, T 97.8, /87, , RR 16, SpO2 99% on RA. Lab work unrevealing.  (31 Mar 2022 22:36)      PAST MEDICAL & SURGICAL HISTORY:  DM (diabetes mellitus)      Intellectual disability          Father: - at age - with history of   Mother: - at age - with history of           Substance Use (street drugs): (  ) never used  (  ) other:  Tobacco Usage:  (   ) never smoked   (   ) former smoker   (   ) current smoker  (     ) pack year  Alcohol Usage:  Sexual History:   Recent Travel:      Allergies    penicillin (Unknown)    Intolerances        acetaminophen     Tablet .. 650 milliGRAM(s) Oral every 6 hours PRN  ammonium lactate 12% Lotion 1 Application(s) Topical two times a day  chlorhexidine 4% Liquid 1 Application(s) Topical <User Schedule>  clotrimazole 1% Cream 1 Application(s) Topical two times a day  enoxaparin Injectable 40 milliGRAM(s) SubCutaneous every 24 hours  famotidine    Tablet 20 milliGRAM(s) Oral daily  insulin glargine Injectable (LANTUS) 25 Unit(s) SubCutaneous every morning  insulin lispro (ADMELOG) corrective regimen sliding scale   SubCutaneous three times a day before meals  insulin lispro Injectable (ADMELOG) 8 Unit(s) SubCutaneous three times a day before meals  lactobacillus acidophilus 1 Tablet(s) Oral two times a day  melatonin 3 milliGRAM(s) Oral at bedtime  vitamin A &amp; D Ointment 1 Application(s) Topical daily      REVIEW OF SYSTEMS:  CONSTITUTIONAL: No fever, weight loss, or fatigue  EYES: No eye pain, visual disturbances, or discharge  ENMT:  No difficulty hearing, tinnitus, vertigo; No sinus or throat pain  NECK: No pain or stiffness  BREASTS: No pain, masses, or nipple discharge  RESPIRATORY: No cough, wheezing, chills or hemoptysis; No shortness of breath  CARDIOVASCULAR: No chest pain, palpitations, dizziness, or leg swelling  GASTROINTESTINAL: No abdominal or epigastric pain. No nausea, vomiting, or hematemesis; No diarrhea or constipation. No melena or hematochezia.  GENITOURINARY: No dysuria, frequency, hematuria, or incontinence  NEUROLOGICAL: No headaches, memory loss, loss of strength, numbness, or tremors  SKIN: No itching, burning, rashes, or lesions   LYMPH NODES: No enlarged glands  ENDOCRINE: No heat or cold intolerance; No hair loss  MUSCULOSKELETAL: No joint pain or swelling; No muscle, back, or extremity pain  PSYCHIATRIC: No depression, anxiety, mood swings, or difficulty sleeping  HEME/LYMPH: No easy bruising, or bleeding gums  ALLERY AND IMMUNOLOGIC: No hives or eczema    ALL ROS REVIEWED AND NORMAL EXCEPT AS STATED ABOVE    T(C): 36.7 (08-20-22 @ 08:13), Max: 36.7 (08-19-22 @ 15:24)  HR: 109 (08-20-22 @ 08:13) (98 - 109)  BP: 134/66 (08-20-22 @ 08:13) (111/58 - 134/66)  RR: 18 (08-20-22 @ 08:13) (18 - 20)  SpO2: --  Wt(kg): --Vital Signs Last 24 Hrs  T(C): 36.7 (20 Aug 2022 08:13), Max: 36.7 (19 Aug 2022 15:24)  T(F): 98.1 (20 Aug 2022 08:13), Max: 98.1 (20 Aug 2022 08:13)  HR: 109 (20 Aug 2022 08:13) (98 - 109)  BP: 134/66 (20 Aug 2022 08:13) (111/58 - 134/66)  BP(mean): --  RR: 18 (20 Aug 2022 08:13) (18 - 20)  SpO2: --        PHYSICAL EXAM:  GENERAL: NAD, well-groomed, well-developed  HEAD:  Atraumatic, Normocephalic  EYES: EOMI, PERRLA, conjunctiva and sclera clear  ENMT: No tonsillar erythema, exudates, or enlargement; Moist mucous membranes, Good dentition, No lesions  NECK: Supple, No JVD, Normal thyroid  NERVOUS SYSTEM:  Alert & Oriented X3, Good concentration; Motor Strength 5/5 B/L upper and lower extremities; DTRs 2+ intact and symmetric  CHEST/LUNG: Clear to percussion bilaterally; No rales, rhonchi, wheezing, or rubs  HEART: Regular rate and rhythm; No murmurs, rubs, or gallops  ABDOMEN: Soft, Nontender, Nondistended; Bowel sounds present  EXTREMITIES:  2+ Peripheral Pulses, No clubbing, cyanosis, or edema  LYMPH: No lymphadenopathy noted  SKIN: No rashes or lesions        LABS:               CAPILLARY BLOOD GLUCOSE      POCT Blood Glucose.: 165 mg/dL (20 Aug 2022 11:32)  POCT Blood Glucose.: 149 mg/dL (20 Aug 2022 07:42)  POCT Blood Glucose.: 116 mg/dL (19 Aug 2022 21:48)  POCT Blood Glucose.: 201 mg/dL (19 Aug 2022 16:29)            RADIOLOGY & ADDITIONAL TESTS:    Consultant(s) Notes Reviewed:  [x ] YES  [ ] NO  Care Discussed with Consultants/Other Providers [ x] YES  [ ] NO  Imaging Personally Reviewed:  [ ] YES  [ ] NO

## 2022-08-21 LAB
GLUCOSE BLDC GLUCOMTR-MCNC: 155 MG/DL — HIGH (ref 70–99)
GLUCOSE BLDC GLUCOMTR-MCNC: 188 MG/DL — HIGH (ref 70–99)
GLUCOSE BLDC GLUCOMTR-MCNC: 193 MG/DL — HIGH (ref 70–99)
GLUCOSE BLDC GLUCOMTR-MCNC: 205 MG/DL — HIGH (ref 70–99)

## 2022-08-21 PROCEDURE — 99231 SBSQ HOSP IP/OBS SF/LOW 25: CPT

## 2022-08-21 RX ADMIN — Medication 1 APPLICATION(S): at 17:47

## 2022-08-21 RX ADMIN — Medication 1 APPLICATION(S): at 17:50

## 2022-08-21 RX ADMIN — Medication 3 MILLIGRAM(S): at 22:01

## 2022-08-21 RX ADMIN — Medication 8 UNIT(S): at 17:46

## 2022-08-21 RX ADMIN — Medication 1 APPLICATION(S): at 12:08

## 2022-08-21 RX ADMIN — Medication 1 TABLET(S): at 06:45

## 2022-08-21 RX ADMIN — Medication 2: at 08:39

## 2022-08-21 RX ADMIN — FAMOTIDINE 20 MILLIGRAM(S): 10 INJECTION INTRAVENOUS at 12:07

## 2022-08-21 RX ADMIN — Medication 2: at 12:05

## 2022-08-21 RX ADMIN — ENOXAPARIN SODIUM 40 MILLIGRAM(S): 100 INJECTION SUBCUTANEOUS at 06:45

## 2022-08-21 RX ADMIN — Medication 8 UNIT(S): at 12:06

## 2022-08-21 RX ADMIN — Medication 1 TABLET(S): at 17:47

## 2022-08-21 RX ADMIN — Medication 8 UNIT(S): at 08:39

## 2022-08-21 RX ADMIN — INSULIN GLARGINE 25 UNIT(S): 100 INJECTION, SOLUTION SUBCUTANEOUS at 08:40

## 2022-08-21 RX ADMIN — Medication 2: at 17:45

## 2022-08-21 NOTE — PROGRESS NOTE ADULT - SUBJECTIVE AND OBJECTIVE BOX
Patient is a 47y old  Male who presents with a chief complaint of DFU (20 Aug 2022 15:05)      Patient seen and examined at bedside.    ALLERGIES:  penicillin (Unknown)    MEDICATIONS:  acetaminophen     Tablet .. 650 milliGRAM(s) Oral every 6 hours PRN  ammonium lactate 12% Lotion 1 Application(s) Topical two times a day  chlorhexidine 4% Liquid 1 Application(s) Topical <User Schedule>  clotrimazole 1% Cream 1 Application(s) Topical two times a day  enoxaparin Injectable 40 milliGRAM(s) SubCutaneous every 24 hours  famotidine    Tablet 20 milliGRAM(s) Oral daily  insulin glargine Injectable (LANTUS) 25 Unit(s) SubCutaneous every morning  insulin lispro (ADMELOG) corrective regimen sliding scale   SubCutaneous three times a day before meals  insulin lispro Injectable (ADMELOG) 8 Unit(s) SubCutaneous three times a day before meals  lactobacillus acidophilus 1 Tablet(s) Oral two times a day  melatonin 3 milliGRAM(s) Oral at bedtime  vitamin A &amp; D Ointment 1 Application(s) Topical daily    Vital Signs Last 24 Hrs  T(F): 97.7 (21 Aug 2022 08:24), Max: 97.7 (21 Aug 2022 08:24)  HR: 97 (21 Aug 2022 08:24) (97 - 104)  BP: 126/60 (21 Aug 2022 08:24) (124/67 - 134/76)  RR: 18 (21 Aug 2022 08:24) (18 - 19)  SpO2: 99% (21 Aug 2022 08:24) (99% - 99%)  I&O's Summary      PHYSICAL EXAM:  General: NAD, A/O x 3  ENT: MMM  Neck: Supple, No JVD  Lungs: Clear to auscultation bilaterally  Cardio: RRR, S1/S2, No murmurs  Abdomen: Soft, Nontender, Nondistended; Bowel sounds present  Extremities: No cyanosis, No edema    LABS:                                  POCT Blood Glucose.: 155 mg/dL (21 Aug 2022 11:20)  POCT Blood Glucose.: 193 mg/dL (21 Aug 2022 08:37)  POCT Blood Glucose.: 177 mg/dL (20 Aug 2022 21:31)  POCT Blood Glucose.: 109 mg/dL (20 Aug 2022 16:21)              RADIOLOGY & ADDITIONAL TESTS:    Care Discussed with Consultants/Other Providers:

## 2022-08-22 LAB
GLUCOSE BLDC GLUCOMTR-MCNC: 115 MG/DL — HIGH (ref 70–99)
GLUCOSE BLDC GLUCOMTR-MCNC: 119 MG/DL — HIGH (ref 70–99)
GLUCOSE BLDC GLUCOMTR-MCNC: 175 MG/DL — HIGH (ref 70–99)
GLUCOSE BLDC GLUCOMTR-MCNC: 195 MG/DL — HIGH (ref 70–99)

## 2022-08-22 PROCEDURE — 99231 SBSQ HOSP IP/OBS SF/LOW 25: CPT

## 2022-08-22 RX ADMIN — Medication 8 UNIT(S): at 11:54

## 2022-08-22 RX ADMIN — ENOXAPARIN SODIUM 40 MILLIGRAM(S): 100 INJECTION SUBCUTANEOUS at 06:09

## 2022-08-22 RX ADMIN — Medication 8 UNIT(S): at 18:23

## 2022-08-22 RX ADMIN — Medication 8 UNIT(S): at 07:59

## 2022-08-22 RX ADMIN — INSULIN GLARGINE 25 UNIT(S): 100 INJECTION, SOLUTION SUBCUTANEOUS at 08:01

## 2022-08-22 RX ADMIN — Medication 1 TABLET(S): at 18:24

## 2022-08-22 RX ADMIN — Medication 1 APPLICATION(S): at 18:24

## 2022-08-22 RX ADMIN — Medication 1 APPLICATION(S): at 06:09

## 2022-08-22 RX ADMIN — Medication 3 MILLIGRAM(S): at 21:33

## 2022-08-22 RX ADMIN — Medication 1 TABLET(S): at 06:09

## 2022-08-22 RX ADMIN — Medication 2: at 11:54

## 2022-08-22 RX ADMIN — FAMOTIDINE 20 MILLIGRAM(S): 10 INJECTION INTRAVENOUS at 11:57

## 2022-08-22 RX ADMIN — Medication 2: at 07:58

## 2022-08-22 RX ADMIN — Medication 1 APPLICATION(S): at 11:57

## 2022-08-22 NOTE — PROGRESS NOTE ADULT - SUBJECTIVE AND OBJECTIVE BOX
SUBJECTIVE:    Patient is a 47y old Male who presents with a chief complaint of DFU (21 Aug 2022 14:16)    Currently admitted to medicine with the primary diagnosis of Onychomycosis    Today is hospital day 144d. This morning he is resting comfortably in bed and reports no new issues or overnight events.     PAST MEDICAL & SURGICAL HISTORY  DM (diabetes mellitus)    Intellectual disability    SOCIAL HISTORY:  Negative for smoking/alcohol/drug use.     ALLERGIES:  penicillin (Unknown)    MEDICATIONS:  STANDING MEDICATIONS  ammonium lactate 12% Lotion 1 Application(s) Topical two times a day  chlorhexidine 4% Liquid 1 Application(s) Topical <User Schedule>  clotrimazole 1% Cream 1 Application(s) Topical two times a day  enoxaparin Injectable 40 milliGRAM(s) SubCutaneous every 24 hours  famotidine    Tablet 20 milliGRAM(s) Oral daily  insulin glargine Injectable (LANTUS) 25 Unit(s) SubCutaneous every morning  insulin lispro (ADMELOG) corrective regimen sliding scale   SubCutaneous three times a day before meals  insulin lispro Injectable (ADMELOG) 8 Unit(s) SubCutaneous three times a day before meals  lactobacillus acidophilus 1 Tablet(s) Oral two times a day  melatonin 3 milliGRAM(s) Oral at bedtime  vitamin A &amp; D Ointment 1 Application(s) Topical daily    PRN MEDICATIONS  acetaminophen     Tablet .. 650 milliGRAM(s) Oral every 6 hours PRN    VITALS:   T(F): 97.4  HR: 99  BP: 131/80  RR: 18  SpO2: --    PHYSICAL EXAM:  GEN: No acute distress  LUNGS: Clear to auscultation bilaterally   HEART: S1/S2 present. RRR.   ABD: Soft, non-tender, non-distended. Bowel sounds present  EXT: NC/NC/NE/2+PP/LOJA  NEURO: AAOX3

## 2022-08-22 NOTE — PROGRESS NOTE ADULT - SUBJECTIVE AND OBJECTIVE BOX
Patient is a 47y old  Male who presents with a chief complaint of DFU (22 Aug 2022 16:36)      Patient seen and examined at bedside.    ALLERGIES:  penicillin (Unknown)    MEDICATIONS:  acetaminophen     Tablet .. 650 milliGRAM(s) Oral every 6 hours PRN  ammonium lactate 12% Lotion 1 Application(s) Topical two times a day  chlorhexidine 4% Liquid 1 Application(s) Topical <User Schedule>  clotrimazole 1% Cream 1 Application(s) Topical two times a day  enoxaparin Injectable 40 milliGRAM(s) SubCutaneous every 24 hours  famotidine    Tablet 20 milliGRAM(s) Oral daily  insulin glargine Injectable (LANTUS) 25 Unit(s) SubCutaneous every morning  insulin lispro (ADMELOG) corrective regimen sliding scale   SubCutaneous three times a day before meals  insulin lispro Injectable (ADMELOG) 8 Unit(s) SubCutaneous three times a day before meals  lactobacillus acidophilus 1 Tablet(s) Oral two times a day  melatonin 3 milliGRAM(s) Oral at bedtime  vitamin A &amp; D Ointment 1 Application(s) Topical daily    Vital Signs Last 24 Hrs  T(F): 97.4 (22 Aug 2022 16:01), Max: 97.6 (21 Aug 2022 23:12)  HR: 99 (22 Aug 2022 16:01) (94 - 99)  BP: 131/80 (22 Aug 2022 16:01) (122/84 - 131/80)  RR: 18 (22 Aug 2022 16:01) (18 - 18)  SpO2: --  I&O's Summary      PHYSICAL EXAM:  General: NAD, A/O x 3  ENT: MMM  Neck: Supple, No JVD  Lungs: Clear to auscultation bilaterally  Cardio: RRR, S1/S2, No murmurs  Abdomen: Soft, Nontender, Nondistended; Bowel sounds present  Extremities: No cyanosis, No edema    LABS:                                  POCT Blood Glucose.: 195 mg/dL (22 Aug 2022 11:07)  POCT Blood Glucose.: 175 mg/dL (22 Aug 2022 07:57)  POCT Blood Glucose.: 205 mg/dL (21 Aug 2022 21:20)  POCT Blood Glucose.: 188 mg/dL (21 Aug 2022 17:12)              RADIOLOGY & ADDITIONAL TESTS:    Care Discussed with Consultants/Other Providers:

## 2022-08-23 LAB
GLUCOSE BLDC GLUCOMTR-MCNC: 134 MG/DL — HIGH (ref 70–99)
GLUCOSE BLDC GLUCOMTR-MCNC: 151 MG/DL — HIGH (ref 70–99)
GLUCOSE BLDC GLUCOMTR-MCNC: 196 MG/DL — HIGH (ref 70–99)
GLUCOSE BLDC GLUCOMTR-MCNC: 92 MG/DL — SIGNIFICANT CHANGE UP (ref 70–99)

## 2022-08-23 PROCEDURE — 99232 SBSQ HOSP IP/OBS MODERATE 35: CPT

## 2022-08-23 RX ADMIN — FAMOTIDINE 20 MILLIGRAM(S): 10 INJECTION INTRAVENOUS at 12:24

## 2022-08-23 RX ADMIN — Medication 1 APPLICATION(S): at 17:26

## 2022-08-23 RX ADMIN — Medication 8 UNIT(S): at 07:58

## 2022-08-23 RX ADMIN — Medication 1 APPLICATION(S): at 05:53

## 2022-08-23 RX ADMIN — CHLORHEXIDINE GLUCONATE 1 APPLICATION(S): 213 SOLUTION TOPICAL at 05:53

## 2022-08-23 RX ADMIN — ENOXAPARIN SODIUM 40 MILLIGRAM(S): 100 INJECTION SUBCUTANEOUS at 05:54

## 2022-08-23 RX ADMIN — INSULIN GLARGINE 25 UNIT(S): 100 INJECTION, SOLUTION SUBCUTANEOUS at 07:58

## 2022-08-23 RX ADMIN — Medication 8 UNIT(S): at 12:24

## 2022-08-23 RX ADMIN — Medication 8 UNIT(S): at 17:24

## 2022-08-23 RX ADMIN — Medication 1 TABLET(S): at 17:25

## 2022-08-23 RX ADMIN — Medication 3 MILLIGRAM(S): at 21:24

## 2022-08-23 RX ADMIN — Medication 1 APPLICATION(S): at 17:27

## 2022-08-23 RX ADMIN — Medication 2: at 17:24

## 2022-08-23 RX ADMIN — Medication 2: at 07:57

## 2022-08-23 RX ADMIN — Medication 1 TABLET(S): at 05:48

## 2022-08-23 RX ADMIN — Medication 1 APPLICATION(S): at 12:25

## 2022-08-23 NOTE — PROGRESS NOTE ADULT - ASSESSMENT
48 yo M with PMH of Intellectual Disability and DM not on any medications presented with foot wounds. Hospital stay complicated Hospital acquired COVID s/p RDV, poorly controlled DM.  EKG with new incomplete RBBB and s1q3t3,  EF of 66 %, G1DD,CTA and LE duplex negative, Patient has been in the hospital >100 days, pending guardianship and  placement.    #Intellectual disability/autism  #DM type 2  #Tachycardia 8/12 and intermittently resolved  #Covid PNA resolved  #Onychomycosis  #Dental caries    PLAN:  - s/p RDV, DVT ppx  - c/w eliquis for DVT ppx  - s/p debridement and curettage by podiatry 6/7  - sp course of clinda, outpatient f/u for teeth extractions  - with hyperglycemia due to dietary non-compliance - insulin adjusted, c/w monitoring    #Progress Note Handoff  Pending (specify): placement

## 2022-08-23 NOTE — PROGRESS NOTE ADULT - SUBJECTIVE AND OBJECTIVE BOX
CARIN WATSON 47y Male  MRN#: [id number]   Hospital Day: 145d    SUBJECTIVE  Patient is a 47y old Male who presents with a chief complaint of DFU (23 Aug 2022 07:03)  Currently admitted to medicine with the primary diagnosis of Onychomycosis    INTERVAL HPI AND OVERNIGHT EVENTS:  Patient was examined and seen at bedside. This morning he is resting comfortably in bed and reports no issues or overnight events.  denies chest pain , sob, n/v/d/c, hematuria or bloody stool.     REVIEW OF SYMPTOMS:  10 point ROS negative except as above.    OBJECTIVE  PAST MEDICAL & SURGICAL HISTORY  DM (diabetes mellitus)  Intellectual disability    ALLERGIES:  penicillin (Unknown)    MEDICATIONS:  STANDING MEDICATIONS  ammonium lactate 12% Lotion 1 Application(s) Topical two times a day  chlorhexidine 4% Liquid 1 Application(s) Topical <User Schedule>  clotrimazole 1% Cream 1 Application(s) Topical two times a day  enoxaparin Injectable 40 milliGRAM(s) SubCutaneous every 24 hours  famotidine    Tablet 20 milliGRAM(s) Oral daily  insulin glargine Injectable (LANTUS) 25 Unit(s) SubCutaneous every morning  insulin lispro (ADMELOG) corrective regimen sliding scale   SubCutaneous three times a day before meals  insulin lispro Injectable (ADMELOG) 8 Unit(s) SubCutaneous three times a day before meals  lactobacillus acidophilus 1 Tablet(s) Oral two times a day  melatonin 3 milliGRAM(s) Oral at bedtime  vitamin A &amp; D Ointment 1 Application(s) Topical daily    PRN MEDICATIONS  acetaminophen     Tablet .. 650 milliGRAM(s) Oral every 6 hours PRN    VITAL SIGNS: Last 24 Hours  T(C): 36 (23 Aug 2022 16:00), Max: 36.3 (22 Aug 2022 23:27)  T(F): 96.8 (23 Aug 2022 16:00), Max: 97.3 (22 Aug 2022 23:27)  HR: 92 (23 Aug 2022 16:00) (86 - 100)  BP: 112/58 (23 Aug 2022 16:00) (112/58 - 144/78)  BP(mean): --  RR: 17 (23 Aug 2022 16:00) (16 - 18)  SpO2: 95% (22 Aug 2022 23:27) (95% - 95%)    PHYSICAL EXAM:  CONSTITUTIONAL: No acute distress, well-developed, well-groomed, AAOx3  HEAD: Atraumatic, normocephalic  ENT: Supple, no masses, no thyromegaly,  PULMONARY: Clear to auscultation bilaterally; no wheezes, rales, or rhonchi  CARDIOVASCULAR: Regular rate and rhythm; no murmurs, rubs, or gallops  GASTROINTESTINAL: Soft, non-tender, non-distended; bowel sounds present  MUSCULOSKELETAL: 2+ peripheral pulses; no clubbing, no cyanosis, no edema  NEUROLOGY: non-focal, Intellectual disability  SKIN: No rashes or lesions; warm and dry    LABS:  no new labs    Radiology  no imagine needed                      RADIOLOGY:  Chest X-ray:  CARIN WATSON  CT  47y  MRI  Male

## 2022-08-23 NOTE — PROGRESS NOTE ADULT - SUBJECTIVE AND OBJECTIVE BOX
CARIN WATSON 47y Male  MRN#: [id number]   CODE STATUS:________    Hospital Day: 145d    Pt is currently admitted with the primary diagnosis of     Overnight events   -No major overnight events                                          ----------------------------------------------------------  OBJECTIVE  PAST MEDICAL & SURGICAL HISTORY  DM (diabetes mellitus)    Intellectual disability                                              -----------------------------------------------------------  ALLERGIES:  penicillin (Unknown)                                            ------------------------------------------------------------    HOME MEDICATIONS  Home Medications:  vitamin A and D topical ointment: 1 application topically once a day (12 Apr 2022 11:40)                           MEDICATIONS:  STANDING MEDICATIONS  ammonium lactate 12% Lotion 1 Application(s) Topical two times a day  chlorhexidine 4% Liquid 1 Application(s) Topical <User Schedule>  clotrimazole 1% Cream 1 Application(s) Topical two times a day  enoxaparin Injectable 40 milliGRAM(s) SubCutaneous every 24 hours  famotidine    Tablet 20 milliGRAM(s) Oral daily  insulin glargine Injectable (LANTUS) 25 Unit(s) SubCutaneous every morning  insulin lispro (ADMELOG) corrective regimen sliding scale   SubCutaneous three times a day before meals  insulin lispro Injectable (ADMELOG) 8 Unit(s) SubCutaneous three times a day before meals  lactobacillus acidophilus 1 Tablet(s) Oral two times a day  melatonin 3 milliGRAM(s) Oral at bedtime  vitamin A &amp; D Ointment 1 Application(s) Topical daily    PRN MEDICATIONS  acetaminophen     Tablet .. 650 milliGRAM(s) Oral every 6 hours PRN                                            ------------------------------------------------------------  VITAL SIGNS: Last 24 Hours  T(C): 36.3 (22 Aug 2022 23:27), Max: 36.4 (22 Aug 2022 08:15)  T(F): 97.3 (22 Aug 2022 23:27), Max: 97.6 (22 Aug 2022 08:15)  HR: 100 (22 Aug 2022 23:27) (94 - 100)  BP: 144/78 (22 Aug 2022 23:27) (122/84 - 144/78)  BP(mean): --  RR: 18 (22 Aug 2022 23:27) (18 - 18)  SpO2: 95% (22 Aug 2022 23:27) (95% - 95%)                                             --------------------------------------------------------------  LABS:                                                                      --------------------------------------------------------------    PHYSICAL EXAM:  GENERAL: Awake, alert, oriented to person, place, time, situation. Well-nourished, laying in bed appearing in no acute distress  HEENT: No FNDs, atraumatic, normocephalic  LUNGS: Clear to auscultation bilaterally  HEART: S1/S2. No heaves or thrills  ABD: Soft, non-tender, non-distended.  EXT/NEURO: 5/5 strength in all extremity joints. Sensation and ROM grossly intact.  SKIN: No edema      PLAN:  48 yo M with PMH of Intellectual Disability and DM not on any medications presented with foot wounds. Hospital stay complicated Hospital acquired COVID s/p RDV, poorly controlled DM. Patient has been in the hospital >100 days, pending guardianship and now placement    #Tachycardia 8/12 and intermittently  - no CP/SOB  - stat EKG with new incomplete RBBB and s1q3t3  - discussed with Dr Piper, recs r/o DVT/PE  - CTA and LE duplex negative, c/w eliquis for DVT ppx  - f/u echo: 1. LV Ejection Fraction by Ramirez's Method with a biplane EF of 66 %.                       2. Spectral Doppler shows impaired relaxation pattern of left ventricular myocardial filling (Grade I diastolic dysfunction).    #Covid  PNA   - s/p RDV, DVT ppx    #Onychomycosis   - s/p debriedment and curettage by podiatry 6/7    #Dental caries   - sp course of clinda, outpatient f/u for teeth extractions    #DM2   with hyperglycemia due to dietary non-compliance - insulin adjusted, c/w monitoring    #Intellectual disability/autism    Handoff:  Pending placement , guardianship established,    #Progress Note Handoff  Pending (specify):  Family discussion:   Disposition:        CARIN WATSON 47y Male  MRN#: [id number]   CODE STATUS:________    Hospital Day: 145d    Pt is currently admitted with the primary diagnosis of onycomycosis    Overnight events   -No major overnight events                                          ----------------------------------------------------------  OBJECTIVE  PAST MEDICAL & SURGICAL HISTORY  DM (diabetes mellitus)    Intellectual disability                                              -----------------------------------------------------------  ALLERGIES:  penicillin (Unknown)                                            ------------------------------------------------------------    HOME MEDICATIONS  Home Medications:  vitamin A and D topical ointment: 1 application topically once a day (12 Apr 2022 11:40)                           MEDICATIONS:  STANDING MEDICATIONS  ammonium lactate 12% Lotion 1 Application(s) Topical two times a day  chlorhexidine 4% Liquid 1 Application(s) Topical <User Schedule>  clotrimazole 1% Cream 1 Application(s) Topical two times a day  enoxaparin Injectable 40 milliGRAM(s) SubCutaneous every 24 hours  famotidine    Tablet 20 milliGRAM(s) Oral daily  insulin glargine Injectable (LANTUS) 25 Unit(s) SubCutaneous every morning  insulin lispro (ADMELOG) corrective regimen sliding scale   SubCutaneous three times a day before meals  insulin lispro Injectable (ADMELOG) 8 Unit(s) SubCutaneous three times a day before meals  lactobacillus acidophilus 1 Tablet(s) Oral two times a day  melatonin 3 milliGRAM(s) Oral at bedtime  vitamin A &amp; D Ointment 1 Application(s) Topical daily    PRN MEDICATIONS  acetaminophen     Tablet .. 650 milliGRAM(s) Oral every 6 hours PRN                                            ------------------------------------------------------------  VITAL SIGNS: Last 24 Hours  T(C): 36.3 (22 Aug 2022 23:27), Max: 36.4 (22 Aug 2022 08:15)  T(F): 97.3 (22 Aug 2022 23:27), Max: 97.6 (22 Aug 2022 08:15)  HR: 100 (22 Aug 2022 23:27) (94 - 100)  BP: 144/78 (22 Aug 2022 23:27) (122/84 - 144/78)  BP(mean): --  RR: 18 (22 Aug 2022 23:27) (18 - 18)  SpO2: 95% (22 Aug 2022 23:27) (95% - 95%)                                             --------------------------------------------------------------  LABS:                                                                      --------------------------------------------------------------    PHYSICAL EXAM:  GENERAL: Awake, alert, oriented to person, place, time, situation. Well-nourished, laying in bed appearing in no acute distress  HEENT: No FNDs, atraumatic, normocephalic  LUNGS: Clear to auscultation bilaterally  HEART: S1/S2. No heaves or thrills  ABD: Soft, non-tender, non-distended.  EXT/NEURO: 5/5 strength in all extremity joints. Sensation and ROM grossly intact.  SKIN: Bilateral onycomycosis of 1st and 2nd toes      PLAN:  46 yo M with PMH of Intellectual Disability and DM not on any medications presented with foot wounds. Hospital stay complicated Hospital acquired COVID s/p RDV, poorly controlled DM. Patient has been in the hospital >100 days, pending guardianship and now placement    #Tachycardia 8/12 and intermittently  - no CP/SOB  - stat EKG with new incomplete RBBB and s1q3t3  - discussed with Dr Piper, recs r/o DVT/PE  - CTA and LE duplex negative, c/w eliquis for DVT ppx  - TTE: 1. EF of 66 %, G1DD    #Covid  PNA   - s/p RDV, DVT ppx    #Onychomycosis   - s/p debriedment and curettage by podiatry 6/7    #Dental caries   - sp course of clinda, outpatient f/u for teeth extractions    #DM2   with hyperglycemia due to dietary non-compliance - insulin adjusted, c/w monitoring    #Intellectual disability/autism    #Progress Note Handoff  Pending (specify): placement  Family discussion:   Disposition:

## 2022-08-24 LAB
GLUCOSE BLDC GLUCOMTR-MCNC: 128 MG/DL — HIGH (ref 70–99)
GLUCOSE BLDC GLUCOMTR-MCNC: 170 MG/DL — HIGH (ref 70–99)
GLUCOSE BLDC GLUCOMTR-MCNC: 198 MG/DL — HIGH (ref 70–99)
GLUCOSE BLDC GLUCOMTR-MCNC: 251 MG/DL — HIGH (ref 70–99)

## 2022-08-24 PROCEDURE — 99231 SBSQ HOSP IP/OBS SF/LOW 25: CPT

## 2022-08-24 RX ADMIN — Medication 1 APPLICATION(S): at 17:02

## 2022-08-24 RX ADMIN — Medication 1 TABLET(S): at 17:01

## 2022-08-24 RX ADMIN — FAMOTIDINE 20 MILLIGRAM(S): 10 INJECTION INTRAVENOUS at 11:23

## 2022-08-24 RX ADMIN — Medication 8 UNIT(S): at 11:22

## 2022-08-24 RX ADMIN — CHLORHEXIDINE GLUCONATE 1 APPLICATION(S): 213 SOLUTION TOPICAL at 05:57

## 2022-08-24 RX ADMIN — Medication 8 UNIT(S): at 17:02

## 2022-08-24 RX ADMIN — ENOXAPARIN SODIUM 40 MILLIGRAM(S): 100 INJECTION SUBCUTANEOUS at 06:01

## 2022-08-24 RX ADMIN — Medication 1 TABLET(S): at 06:20

## 2022-08-24 RX ADMIN — Medication 3 MILLIGRAM(S): at 21:45

## 2022-08-24 RX ADMIN — Medication 6: at 17:01

## 2022-08-24 RX ADMIN — Medication 2: at 11:22

## 2022-08-24 RX ADMIN — Medication 1 APPLICATION(S): at 05:57

## 2022-08-24 RX ADMIN — Medication 8 UNIT(S): at 07:55

## 2022-08-24 RX ADMIN — Medication 1 APPLICATION(S): at 05:56

## 2022-08-24 RX ADMIN — INSULIN GLARGINE 25 UNIT(S): 100 INJECTION, SOLUTION SUBCUTANEOUS at 07:56

## 2022-08-24 RX ADMIN — Medication 1 APPLICATION(S): at 11:25

## 2022-08-24 NOTE — PROGRESS NOTE ADULT - SUBJECTIVE AND OBJECTIVE BOX
CARIN WATSON 47y Male  MRN#: [id number]   CODE STATUS:________    Hospital Day: 146d    Pt is currently admitted with the primary diagnosis of onycomycosis    Overnight events   -No major overnight events                                            ----------------------------------------------------------  OBJECTIVE  PAST MEDICAL & SURGICAL HISTORY  DM (diabetes mellitus)    Intellectual disability                                              -----------------------------------------------------------  ALLERGIES:  penicillin (Unknown)                                            ------------------------------------------------------------    HOME MEDICATIONS  Home Medications:  vitamin A and D topical ointment: 1 application topically once a day (12 Apr 2022 11:40)                           MEDICATIONS:  STANDING MEDICATIONS  ammonium lactate 12% Lotion 1 Application(s) Topical two times a day  chlorhexidine 4% Liquid 1 Application(s) Topical <User Schedule>  clotrimazole 1% Cream 1 Application(s) Topical two times a day  enoxaparin Injectable 40 milliGRAM(s) SubCutaneous every 24 hours  famotidine    Tablet 20 milliGRAM(s) Oral daily  insulin glargine Injectable (LANTUS) 25 Unit(s) SubCutaneous every morning  insulin lispro (ADMELOG) corrective regimen sliding scale   SubCutaneous three times a day before meals  insulin lispro Injectable (ADMELOG) 8 Unit(s) SubCutaneous three times a day before meals  lactobacillus acidophilus 1 Tablet(s) Oral two times a day  melatonin 3 milliGRAM(s) Oral at bedtime  vitamin A &amp; D Ointment 1 Application(s) Topical daily    PRN MEDICATIONS  acetaminophen     Tablet .. 650 milliGRAM(s) Oral every 6 hours PRN                                            ------------------------------------------------------------  VITAL SIGNS: Last 24 Hours  T(C): 36.8 (23 Aug 2022 23:40), Max: 36.8 (23 Aug 2022 23:40)  T(F): 98.3 (23 Aug 2022 23:40), Max: 98.3 (23 Aug 2022 23:40)  HR: 94 (23 Aug 2022 23:40) (86 - 94)  BP: 112/57 (23 Aug 2022 23:40) (112/57 - 119/70)  BP(mean): --  RR: 18 (23 Aug 2022 23:40) (16 - 18)  SpO2: --                                             --------------------------------------------------------------  LABS:                                                                      --------------------------------------------------------------    PHYSICAL EXAM:  GENERAL: Awake, alert, oriented to person, place, time, situation. Well-nourished, laying in bed appearing in no acute distress  HEENT: No FNDs, atraumatic, normocephalic  LUNGS: Clear to auscultation bilaterally  HEART: S1/S2. No heaves or thrills  ABD: Soft, non-tender, non-distended.  EXT/NEURO: 5/5 strength in all extremity joints. Sensation and ROM grossly intact.  SKIN: Bilateral onycomycosis of 1st and 2nd toes      PLAN:  46 yo M with PMH of Intellectual Disability and DM not on any medications presented with foot wounds. Hospital stay complicated Hospital acquired COVID s/p RDV, poorly controlled DM. Patient has been in the hospital >100 days, pending guardianship and now placement    #Tachycardia 8/12 and intermittently  - no CP/SOB  - stat EKG with new incomplete RBBB and s1q3t3  - discussed with Dr Piper, recs r/o DVT/PE  - CTA and LE duplex negative, c/w eliquis for DVT ppx  - TTE: 1. EF of 66 %, G1DD    #Covid  PNA   - s/p RDV, DVT ppx    #Onychomycosis   - s/p debriedment and curettage by podiatry 6/7    #Dental caries   - sp course of clinda, outpatient f/u for teeth extractions    #DM2   with hyperglycemia due to dietary non-compliance - insulin adjusted, c/w monitoring    #Intellectual disability/autism    #Progress Note Handoff  Pending (specify): placement  Family discussion:   Disposition:

## 2022-08-24 NOTE — PROGRESS NOTE ADULT - SUBJECTIVE AND OBJECTIVE BOX
Progress Note:  Provider Speciality                            Hospitalist      CARIN WATSON MRN-[id number] 47y Male     CHIEF PRESENTING COMPLAINT:  Patient is a 47y old  Male who presents with a chief complaint of DFU (24 Aug 2022 06:56)        SUBJECTIVE:  Patient was seen and examined at bedside. No new complaints described by patient in morning rounds.   No significant overnight events reported.     HISTORY OF PRESENTING ILLNESS:  HPI:  47 yo M with PMHx of Intellectual Disability, DM not on any medications presents with foot wounds. Pt has very poor foot hygiene and per the sister, has been persistently scratching an open wound on his L second toe, which worsened and became more red, had drainage, malodor. He saw a physician at [facility] and was told to come to the ED for evaluation. Pt is poor historian. Does endorse abdominal pain for a few days, cannot give much more description. Per the sister, pt did not seem to have any recent fevers, however is also not a very good historian and does not seem to have good health literacy. States that her and her mom decided to stop giving pt Metformin a while ago, are treating his diabetes by not giving him any sugary foods.  In the ED, T 97.8, /87, , RR 16, SpO2 99% on RA. Lab work unrevealing.  (31 Mar 2022 22:36)        REVIEW OF SYSTEMS:  Patient denies any headache, any vision complaints, runny nose, fever, chills. Denies chest pain, shortness of breath, palpitation. Denies nausea, vomiting, abdominal pain or diarrhoea, Denies dysuria. Denies  localized weakness in any part of the body or numbness.   At least 10 systems were reviewed in ROS. All systems reviewed  are within normal limits except for the complaints as described in Subjective.    PAST MEDICAL & SURGICAL HISTORY:  PAST MEDICAL & SURGICAL HISTORY:  DM (diabetes mellitus)      Intellectual disability              VITAL SIGNS:  Vital Signs Last 24 Hrs  T(C): 36.3 (24 Aug 2022 07:25), Max: 36.8 (23 Aug 2022 23:40)  T(F): 97.3 (24 Aug 2022 07:25), Max: 98.3 (23 Aug 2022 23:40)  HR: 81 (24 Aug 2022 07:25) (81 - 94)  BP: 149/65 (24 Aug 2022 07:25) (112/57 - 149/65)  BP(mean): --  RR: 18 (24 Aug 2022 07:25) (17 - 18)  SpO2: --              PHYSICAL EXAMINATION:  Not in acute distress  General: No icterus  HEENT:   no JVD.  Heart: S1+S2 audible  Lungs: bilateral  moderate air entry, no wheezing, no crepitations.  Abdomen: Soft, non-tender, non-distended , no  rigidity or guarding.  CNS: Awake alert, CN  grossly intact.  Extremities:  No edema            CONSULTS:  Consultant(s) Notes Reviewed by me.   Care Discussed with Consultants/Other Providers where required.        MEDICATIONS:  MEDICATIONS  (STANDING):  ammonium lactate 12% Lotion 1 Application(s) Topical two times a day  chlorhexidine 4% Liquid 1 Application(s) Topical <User Schedule>  clotrimazole 1% Cream 1 Application(s) Topical two times a day  enoxaparin Injectable 40 milliGRAM(s) SubCutaneous every 24 hours  famotidine    Tablet 20 milliGRAM(s) Oral daily  insulin glargine Injectable (LANTUS) 25 Unit(s) SubCutaneous every morning  insulin lispro (ADMELOG) corrective regimen sliding scale   SubCutaneous three times a day before meals  insulin lispro Injectable (ADMELOG) 8 Unit(s) SubCutaneous three times a day before meals  lactobacillus acidophilus 1 Tablet(s) Oral two times a day  melatonin 3 milliGRAM(s) Oral at bedtime  vitamin A &amp; D Ointment 1 Application(s) Topical daily    MEDICATIONS  (PRN):  acetaminophen     Tablet .. 650 milliGRAM(s) Oral every 6 hours PRN Temp greater or equal to 38C (100.4F), Mild Pain (1 - 3)            ASSESSMENT:    47 yo M with PMH of Intellectual Disability and DM not on any medications presented with foot wounds.    Covid  PNA  Onychomycosis  Dental caries  DM2 with hyperglycemia due to dietary non-compliance  Intellectual disability/autism        Covid PNA- completed RDV. Resolved  Nail bed wound and onychomycosis. Status post Aseptic debridement and curettage of all fungal and ingrowing nails 1-5 bilateral with sterile nail nipper 06/07  Podiatry- no surgical intervention is advised. outpt podiatry f/u  DM type 2 - uncontrolled. A1C 8.9.Insulin glargine and lispro ( doses adjusted). Not compliant with diabetic diet.   Dental Caries-completed clinda course. Will need outpt f/u for possible multiple extractions  Insomnia- prn melatonin    Handoff:  Pending placement , guardianship established  medically stable for discharge    Total time spent to complete patient's bedside assessment, physical examination, review medical chart including labs & imaging, discuss medical plan of care with housestaff was more than 35 minutes

## 2022-08-25 LAB
GLUCOSE BLDC GLUCOMTR-MCNC: 147 MG/DL — HIGH (ref 70–99)
GLUCOSE BLDC GLUCOMTR-MCNC: 166 MG/DL — HIGH (ref 70–99)
GLUCOSE BLDC GLUCOMTR-MCNC: 176 MG/DL — HIGH (ref 70–99)
GLUCOSE BLDC GLUCOMTR-MCNC: 184 MG/DL — HIGH (ref 70–99)

## 2022-08-25 PROCEDURE — 99231 SBSQ HOSP IP/OBS SF/LOW 25: CPT

## 2022-08-25 RX ADMIN — Medication 3 MILLIGRAM(S): at 21:09

## 2022-08-25 RX ADMIN — Medication 1 APPLICATION(S): at 05:49

## 2022-08-25 RX ADMIN — ENOXAPARIN SODIUM 40 MILLIGRAM(S): 100 INJECTION SUBCUTANEOUS at 05:47

## 2022-08-25 RX ADMIN — Medication 8 UNIT(S): at 08:00

## 2022-08-25 RX ADMIN — Medication 1 TABLET(S): at 05:47

## 2022-08-25 RX ADMIN — Medication 1 APPLICATION(S): at 05:48

## 2022-08-25 RX ADMIN — Medication 2: at 17:27

## 2022-08-25 RX ADMIN — FAMOTIDINE 20 MILLIGRAM(S): 10 INJECTION INTRAVENOUS at 11:26

## 2022-08-25 RX ADMIN — Medication 1 APPLICATION(S): at 11:28

## 2022-08-25 RX ADMIN — Medication 1 TABLET(S): at 17:29

## 2022-08-25 RX ADMIN — Medication 1 APPLICATION(S): at 17:29

## 2022-08-25 RX ADMIN — Medication 8 UNIT(S): at 17:28

## 2022-08-25 RX ADMIN — Medication 8 UNIT(S): at 11:26

## 2022-08-25 RX ADMIN — INSULIN GLARGINE 25 UNIT(S): 100 INJECTION, SOLUTION SUBCUTANEOUS at 08:00

## 2022-08-25 RX ADMIN — Medication 2: at 08:00

## 2022-08-25 NOTE — PROGRESS NOTE ADULT - SUBJECTIVE AND OBJECTIVE BOX
Progress Note:  Provider Speciality                            Hospitalist      CARIN WATSON MRN-[id number] 47y Male     CHIEF PRESENTING COMPLAINT:  Patient is a 47y old  Male who presents with a chief complaint of DFU (24 Aug 2022 06:56)        SUBJECTIVE:  Patient was seen and examined at bedside. No new complaints described by patient in morning rounds.   No significant overnight events reported.     HISTORY OF PRESENTING ILLNESS:  HPI:  45 yo M with PMHx of Intellectual Disability, DM not on any medications presents with foot wounds. Pt has very poor foot hygiene and per the sister, has been persistently scratching an open wound on his L second toe, which worsened and became more red, had drainage, malodor. He saw a physician at [facility] and was told to come to the ED for evaluation. Pt is poor historian. Does endorse abdominal pain for a few days, cannot give much more description. Per the sister, pt did not seem to have any recent fevers, however is also not a very good historian and does not seem to have good health literacy. States that her and her mom decided to stop giving pt Metformin a while ago, are treating his diabetes by not giving him any sugary foods.  In the ED, T 97.8, /87, , RR 16, SpO2 99% on RA. Lab work unrevealing.  (31 Mar 2022 22:36)        REVIEW OF SYSTEMS:  Patient denies any headache, any vision complaints, runny nose, fever, chills. Denies chest pain, shortness of breath, palpitation. Denies nausea, vomiting, abdominal pain or diarrhoea, Denies dysuria. Denies  localized weakness in any part of the body or numbness.   At least 10 systems were reviewed in ROS. All systems reviewed  are within normal limits except for the complaints as described in Subjective.    PAST MEDICAL & SURGICAL HISTORY:  PAST MEDICAL & SURGICAL HISTORY:  DM (diabetes mellitus)      Intellectual disability              VITAL SIGNS:  Vital Signs Last 24 Hrs  T(C): 35.9 (25 Aug 2022 08:00), Max: 36.4 (24 Aug 2022 15:42)  T(F): 96.7 (25 Aug 2022 08:00), Max: 97.5 (24 Aug 2022 15:42)  HR: 87 (25 Aug 2022 08:00) (87 - 107)  BP: 127/73 (25 Aug 2022 08:00) (111/65 - 154/66)  BP(mean): --  RR: 18 (25 Aug 2022 08:00) (18 - 18)  SpO2: --              PHYSICAL EXAMINATION:  Not in acute distress  General: No icterus  HEENT:   no JVD.  Heart: S1+S2 audible  Lungs: bilateral  moderate air entry, no wheezing, no crepitations.  Abdomen: Soft, non-tender, non-distended , no  rigidity or guarding.  CNS: Awake alert, CN  grossly intact.  Extremities:  No edema            CONSULTS:  Consultant(s) Notes Reviewed by me.   Care Discussed with Consultants/Other Providers where required.        MEDICATIONS:  MEDICATIONS  (STANDING):  ammonium lactate 12% Lotion 1 Application(s) Topical two times a day  chlorhexidine 4% Liquid 1 Application(s) Topical <User Schedule>  clotrimazole 1% Cream 1 Application(s) Topical two times a day  enoxaparin Injectable 40 milliGRAM(s) SubCutaneous every 24 hours  famotidine    Tablet 20 milliGRAM(s) Oral daily  insulin glargine Injectable (LANTUS) 25 Unit(s) SubCutaneous every morning  insulin lispro (ADMELOG) corrective regimen sliding scale   SubCutaneous three times a day before meals  insulin lispro Injectable (ADMELOG) 8 Unit(s) SubCutaneous three times a day before meals  lactobacillus acidophilus 1 Tablet(s) Oral two times a day  melatonin 3 milliGRAM(s) Oral at bedtime  vitamin A &amp; D Ointment 1 Application(s) Topical daily    MEDICATIONS  (PRN):  acetaminophen     Tablet .. 650 milliGRAM(s) Oral every 6 hours PRN Temp greater or equal to 38C (100.4F), Mild Pain (1 - 3)            ASSESSMENT:    45 yo M with PMH of Intellectual Disability and DM not on any medications presented with foot wounds.    Covid  PNA  Onychomycosis  Dental caries  DM2 with hyperglycemia due to dietary non-compliance  Intellectual disability/autism        Covid PNA- completed RDV. Resolved  Nail bed wound and onychomycosis. Status post Aseptic debridement and curettage of all fungal and ingrowing nails 1-5 bilateral with sterile nail nipper 06/07  Podiatry- no surgical intervention is advised. outpt podiatry f/u  DM type 2 - uncontrolled. A1C 8.9.Insulin glargine and lispro ( doses adjusted). Not compliant with diabetic diet.   Dental Caries-completed clinda course. Will need outpt f/u for possible multiple extractions  Insomnia- prn melatonin    Handoff:  Pending placement , guardianship established  medically stable for discharge    Total time spent to complete patient's bedside assessment, physical examination, review medical chart including labs & imaging, discuss medical plan of care with housestaff was more than 35 minutes

## 2022-08-25 NOTE — PROGRESS NOTE ADULT - SUBJECTIVE AND OBJECTIVE BOX
CARIN WATSON 47y Male  MRN#: [id number]   CODE STATUS:________    Hospital Day: 147d    Pt is currently admitted with the primary diagnosis of onycomycosis    Overnight events   -No major overnight events                                            ----------------------------------------------------------  OBJECTIVE  PAST MEDICAL & SURGICAL HISTORY  DM (diabetes mellitus)    Intellectual disability                                              -----------------------------------------------------------  ALLERGIES:  penicillin (Unknown)                                            ------------------------------------------------------------    HOME MEDICATIONS  Home Medications:  vitamin A and D topical ointment: 1 application topically once a day (12 Apr 2022 11:40)                           MEDICATIONS:  STANDING MEDICATIONS  ammonium lactate 12% Lotion 1 Application(s) Topical two times a day  chlorhexidine 4% Liquid 1 Application(s) Topical <User Schedule>  clotrimazole 1% Cream 1 Application(s) Topical two times a day  enoxaparin Injectable 40 milliGRAM(s) SubCutaneous every 24 hours  famotidine    Tablet 20 milliGRAM(s) Oral daily  insulin glargine Injectable (LANTUS) 25 Unit(s) SubCutaneous every morning  insulin lispro (ADMELOG) corrective regimen sliding scale   SubCutaneous three times a day before meals  insulin lispro Injectable (ADMELOG) 8 Unit(s) SubCutaneous three times a day before meals  lactobacillus acidophilus 1 Tablet(s) Oral two times a day  melatonin 3 milliGRAM(s) Oral at bedtime  vitamin A &amp; D Ointment 1 Application(s) Topical daily    PRN MEDICATIONS  acetaminophen     Tablet .. 650 milliGRAM(s) Oral every 6 hours PRN                                            ------------------------------------------------------------  VITAL SIGNS: Last 24 Hours  T(C): 35.9 (25 Aug 2022 08:00), Max: 36.4 (24 Aug 2022 15:42)  T(F): 96.7 (25 Aug 2022 08:00), Max: 97.5 (24 Aug 2022 15:42)  HR: 87 (25 Aug 2022 08:00) (87 - 107)  BP: 127/73 (25 Aug 2022 08:00) (111/65 - 154/66)  BP(mean): --  RR: 18 (25 Aug 2022 08:00) (18 - 18)  SpO2: --                                             --------------------------------------------------------------  LABS:                                                                      --------------------------------------------------------------    PHYSICAL EXAM:  GENERAL: Awake, alert, oriented to person, place, time, situation. Well-nourished, laying in bed appearing in no acute distress  HEENT: No FNDs, atraumatic, normocephalic  LUNGS: Clear to auscultation bilaterally  HEART: S1/S2. No heaves or thrills  ABD: Soft, non-tender, non-distended.  EXT/NEURO: 5/5 strength in all extremity joints. Sensation and ROM grossly intact.  SKIN: Bilateral onycomycosis of 1st and 2nd toes      PLAN:  46 yo M with PMH of Intellectual Disability and DM not on any medications presented with foot wounds. Hospital stay complicated Hospital acquired COVID s/p RDV, poorly controlled DM. Patient has been in the hospital >100 days, pending guardianship and now placement    #Tachycardia 8/12 and intermittently  - no CP/SOB  - stat EKG with new incomplete RBBB and s1q3t3  - discussed with Dr Piper, recs r/o DVT/PE  - CTA and LE duplex negative, c/w eliquis for DVT ppx  - TTE: 1. EF of 66 %, G1DD    #Covid  PNA   - s/p RDV, DVT ppx    #Onychomycosis   - s/p debriedment and curettage by podiatry 6/7    #Dental caries   - sp course of clinda, outpatient f/u for teeth extractions    #DM2   with hyperglycemia due to dietary non-compliance - insulin adjusted, c/w monitoring    #Intellectual disability/autism    #Progress Note Handoff  Pending (specify): placement  Family discussion:   Disposition:

## 2022-08-26 LAB
GLUCOSE BLDC GLUCOMTR-MCNC: 118 MG/DL — HIGH (ref 70–99)
GLUCOSE BLDC GLUCOMTR-MCNC: 142 MG/DL — HIGH (ref 70–99)
GLUCOSE BLDC GLUCOMTR-MCNC: 165 MG/DL — HIGH (ref 70–99)
GLUCOSE BLDC GLUCOMTR-MCNC: 197 MG/DL — HIGH (ref 70–99)

## 2022-08-26 PROCEDURE — 99231 SBSQ HOSP IP/OBS SF/LOW 25: CPT

## 2022-08-26 RX ADMIN — INSULIN GLARGINE 25 UNIT(S): 100 INJECTION, SOLUTION SUBCUTANEOUS at 08:02

## 2022-08-26 RX ADMIN — Medication 1 APPLICATION(S): at 06:35

## 2022-08-26 RX ADMIN — FAMOTIDINE 20 MILLIGRAM(S): 10 INJECTION INTRAVENOUS at 12:10

## 2022-08-26 RX ADMIN — Medication 1 TABLET(S): at 06:18

## 2022-08-26 RX ADMIN — Medication 8 UNIT(S): at 17:21

## 2022-08-26 RX ADMIN — Medication 1 APPLICATION(S): at 17:21

## 2022-08-26 RX ADMIN — Medication 8 UNIT(S): at 08:02

## 2022-08-26 RX ADMIN — Medication 8 UNIT(S): at 12:10

## 2022-08-26 RX ADMIN — Medication 1 APPLICATION(S): at 13:33

## 2022-08-26 RX ADMIN — ENOXAPARIN SODIUM 40 MILLIGRAM(S): 100 INJECTION SUBCUTANEOUS at 06:19

## 2022-08-26 RX ADMIN — Medication 1 TABLET(S): at 17:20

## 2022-08-26 RX ADMIN — Medication 3 MILLIGRAM(S): at 22:21

## 2022-08-26 RX ADMIN — Medication 2: at 12:10

## 2022-08-26 NOTE — PROGRESS NOTE ADULT - SUBJECTIVE AND OBJECTIVE BOX
Progress Note:  Provider Speciality                            Hospitalist      CARIN WATSON MRN-[id number] 47y Male     CHIEF PRESENTING COMPLAINT:  Patient is a 47y old  Male who presents with a chief complaint of DFU (24 Aug 2022 06:56)        SUBJECTIVE:  Patient was seen and examined at bedside. No new complaints described by patient in morning rounds.   No significant overnight events reported.     HISTORY OF PRESENTING ILLNESS:  HPI:  45 yo M with PMHx of Intellectual Disability, DM not on any medications presents with foot wounds. Pt has very poor foot hygiene and per the sister, has been persistently scratching an open wound on his L second toe, which worsened and became more red, had drainage, malodor. He saw a physician at [facility] and was told to come to the ED for evaluation. Pt is poor historian. Does endorse abdominal pain for a few days, cannot give much more description. Per the sister, pt did not seem to have any recent fevers, however is also not a very good historian and does not seem to have good health literacy. States that her and her mom decided to stop giving pt Metformin a while ago, are treating his diabetes by not giving him any sugary foods.  In the ED, T 97.8, /87, , RR 16, SpO2 99% on RA. Lab work unrevealing.  (31 Mar 2022 22:36)        REVIEW OF SYSTEMS:  Patient denies any headache, any vision complaints, runny nose, fever, chills. Denies chest pain, shortness of breath, palpitation. Denies nausea, vomiting, abdominal pain or diarrhoea, Denies dysuria. Denies  localized weakness in any part of the body or numbness.   At least 10 systems were reviewed in ROS. All systems reviewed  are within normal limits except for the complaints as described in Subjective.    PAST MEDICAL & SURGICAL HISTORY:  PAST MEDICAL & SURGICAL HISTORY:  DM (diabetes mellitus)      Intellectual disability              VITAL SIGNS:  Vital Signs Last 24 Hrs  T(C): 36.3 (26 Aug 2022 08:00), Max: 36.3 (26 Aug 2022 08:00)  T(F): 97.3 (26 Aug 2022 08:00), Max: 97.3 (26 Aug 2022 08:00)  HR: 85 (26 Aug 2022 08:00) (85 - 101)  BP: 113/53 (26 Aug 2022 08:00) (112/59 - 158/65)  BP(mean): --  RR: 18 (26 Aug 2022 08:00) (18 - 18)  SpO2: --                PHYSICAL EXAMINATION:  Not in acute distress  General: No icterus  HEENT:   no JVD.  Heart: S1+S2 audible  Lungs: bilateral  moderate air entry, no wheezing, no crepitations.  Abdomen: Soft, non-tender, non-distended , no  rigidity or guarding.  CNS: Awake alert, CN  grossly intact.  Extremities:  No edema            CONSULTS:  Consultant(s) Notes Reviewed by me.   Care Discussed with Consultants/Other Providers where required.        MEDICATIONS:  MEDICATIONS  (STANDING):  ammonium lactate 12% Lotion 1 Application(s) Topical two times a day  chlorhexidine 4% Liquid 1 Application(s) Topical <User Schedule>  clotrimazole 1% Cream 1 Application(s) Topical two times a day  enoxaparin Injectable 40 milliGRAM(s) SubCutaneous every 24 hours  famotidine    Tablet 20 milliGRAM(s) Oral daily  insulin glargine Injectable (LANTUS) 25 Unit(s) SubCutaneous every morning  insulin lispro (ADMELOG) corrective regimen sliding scale   SubCutaneous three times a day before meals  insulin lispro Injectable (ADMELOG) 8 Unit(s) SubCutaneous three times a day before meals  lactobacillus acidophilus 1 Tablet(s) Oral two times a day  melatonin 3 milliGRAM(s) Oral at bedtime  vitamin A &amp; D Ointment 1 Application(s) Topical daily    MEDICATIONS  (PRN):  acetaminophen     Tablet .. 650 milliGRAM(s) Oral every 6 hours PRN Temp greater or equal to 38C (100.4F), Mild Pain (1 - 3)            ASSESSMENT:    45 yo M with PMH of Intellectual Disability and DM not on any medications presented with foot wounds.    Covid  PNA  Onychomycosis  Dental caries  DM2 with hyperglycemia due to dietary non-compliance  Intellectual disability/autism        Covid PNA- completed RDV. Resolved  Nail bed wound and onychomycosis. Status post Aseptic debridement and curettage of all fungal and ingrowing nails 1-5 bilateral with sterile nail nipper 06/07  Podiatry- no surgical intervention is advised. outpt podiatry f/u  DM type 2 - uncontrolled. A1C 8.9.Insulin glargine and lispro ( doses adjusted). Not compliant with diabetic diet.   Dental Caries-completed clinda course. Will need outpt f/u for possible multiple extractions  Insomnia- prn melatonin    Handoff:  Pending placement , guardianship established  medically stable for discharge    Total time spent to complete patient's bedside assessment, physical examination, review medical chart including labs & imaging, discuss medical plan of care with housestaff was more than 35 minutes

## 2022-08-27 LAB
GLUCOSE BLDC GLUCOMTR-MCNC: 111 MG/DL — HIGH (ref 70–99)
GLUCOSE BLDC GLUCOMTR-MCNC: 165 MG/DL — HIGH (ref 70–99)
GLUCOSE BLDC GLUCOMTR-MCNC: 201 MG/DL — HIGH (ref 70–99)
GLUCOSE BLDC GLUCOMTR-MCNC: 209 MG/DL — HIGH (ref 70–99)

## 2022-08-27 PROCEDURE — 99231 SBSQ HOSP IP/OBS SF/LOW 25: CPT

## 2022-08-27 RX ADMIN — Medication 1 TABLET(S): at 06:42

## 2022-08-27 RX ADMIN — Medication 1 APPLICATION(S): at 17:33

## 2022-08-27 RX ADMIN — Medication 2: at 17:31

## 2022-08-27 RX ADMIN — Medication 3 MILLIGRAM(S): at 21:19

## 2022-08-27 RX ADMIN — Medication 8 UNIT(S): at 11:14

## 2022-08-27 RX ADMIN — Medication 4: at 11:14

## 2022-08-27 RX ADMIN — INSULIN GLARGINE 25 UNIT(S): 100 INJECTION, SOLUTION SUBCUTANEOUS at 07:50

## 2022-08-27 RX ADMIN — Medication 8 UNIT(S): at 17:32

## 2022-08-27 RX ADMIN — ENOXAPARIN SODIUM 40 MILLIGRAM(S): 100 INJECTION SUBCUTANEOUS at 06:42

## 2022-08-27 RX ADMIN — Medication 1 TABLET(S): at 17:31

## 2022-08-27 RX ADMIN — FAMOTIDINE 20 MILLIGRAM(S): 10 INJECTION INTRAVENOUS at 11:15

## 2022-08-27 RX ADMIN — Medication 1 APPLICATION(S): at 11:15

## 2022-08-27 RX ADMIN — Medication 1 APPLICATION(S): at 17:32

## 2022-08-27 RX ADMIN — Medication 1 APPLICATION(S): at 06:44

## 2022-08-27 NOTE — PROGRESS NOTE ADULT - SUBJECTIVE AND OBJECTIVE BOX
Progress Note:  Provider Speciality                            Hospitalist      ACRIN WATSON MRN-[id number] 47y Male     CHIEF PRESENTING COMPLAINT:  Patient is a 47y old  Male who presents with a chief complaint of DFU (24 Aug 2022 06:56)        SUBJECTIVE:  Patient was seen and examined at bedside. No new complaints described by patient in morning rounds.   No significant overnight events reported.     HISTORY OF PRESENTING ILLNESS:  HPI:  47 yo M with PMHx of Intellectual Disability, DM not on any medications presents with foot wounds. Pt has very poor foot hygiene and per the sister, has been persistently scratching an open wound on his L second toe, which worsened and became more red, had drainage, malodor. He saw a physician at [facility] and was told to come to the ED for evaluation. Pt is poor historian. Does endorse abdominal pain for a few days, cannot give much more description. Per the sister, pt did not seem to have any recent fevers, however is also not a very good historian and does not seem to have good health literacy. States that her and her mom decided to stop giving pt Metformin a while ago, are treating his diabetes by not giving him any sugary foods.  In the ED, T 97.8, /87, , RR 16, SpO2 99% on RA. Lab work unrevealing.  (31 Mar 2022 22:36)        REVIEW OF SYSTEMS:  Patient denies any headache, any vision complaints, runny nose, fever, chills. Denies chest pain, shortness of breath, palpitation. Denies nausea, vomiting, abdominal pain or diarrhoea, Denies dysuria. Denies  localized weakness in any part of the body or numbness.   At least 10 systems were reviewed in ROS. All systems reviewed  are within normal limits except for the complaints as described in Subjective.    PAST MEDICAL & SURGICAL HISTORY:  PAST MEDICAL & SURGICAL HISTORY:  DM (diabetes mellitus)      Intellectual disability              VITAL SIGNS:  Vital Signs Last 24 Hrs  T(C): 35.7 (27 Aug 2022 08:54), Max: 36.2 (26 Aug 2022 17:26)  T(F): 96.2 (27 Aug 2022 08:54), Max: 97.2 (26 Aug 2022 23:46)  HR: 88 (27 Aug 2022 08:54) (88 - 101)  BP: 124/65 (27 Aug 2022 08:54) (124/65 - 144/80)  BP(mean): --  RR: 18 (27 Aug 2022 08:54) (18 - 18)  SpO2: --              PHYSICAL EXAMINATION:  Not in acute distress  General: No icterus  HEENT:   no JVD.  Heart: S1+S2 audible  Lungs: bilateral  moderate air entry, no wheezing, no crepitations.  Abdomen: Soft, non-tender, non-distended , no  rigidity or guarding.  CNS: Awake alert, CN  grossly intact.  Extremities:  No edema            CONSULTS:  Consultant(s) Notes Reviewed by me.   Care Discussed with Consultants/Other Providers where required.        MEDICATIONS:  MEDICATIONS  (STANDING):  ammonium lactate 12% Lotion 1 Application(s) Topical two times a day  chlorhexidine 4% Liquid 1 Application(s) Topical <User Schedule>  clotrimazole 1% Cream 1 Application(s) Topical two times a day  enoxaparin Injectable 40 milliGRAM(s) SubCutaneous every 24 hours  famotidine    Tablet 20 milliGRAM(s) Oral daily  insulin glargine Injectable (LANTUS) 25 Unit(s) SubCutaneous every morning  insulin lispro (ADMELOG) corrective regimen sliding scale   SubCutaneous three times a day before meals  insulin lispro Injectable (ADMELOG) 8 Unit(s) SubCutaneous three times a day before meals  lactobacillus acidophilus 1 Tablet(s) Oral two times a day  melatonin 3 milliGRAM(s) Oral at bedtime  vitamin A &amp; D Ointment 1 Application(s) Topical daily    MEDICATIONS  (PRN):  acetaminophen     Tablet .. 650 milliGRAM(s) Oral every 6 hours PRN Temp greater or equal to 38C (100.4F), Mild Pain (1 - 3)            ASSESSMENT:    47 yo M with PMH of Intellectual Disability and DM not on any medications presented with foot wounds.    Covid  PNA  Onychomycosis  Dental caries  DM2 with hyperglycemia due to dietary non-compliance  Intellectual disability/autism        Covid PNA- completed RDV. Resolved  Nail bed wound and onychomycosis. Status post Aseptic debridement and curettage of all fungal and ingrowing nails 1-5 bilateral with sterile nail nipper 06/07  Podiatry- no surgical intervention is advised. outpt podiatry f/u  DM type 2 - uncontrolled. A1C 8.9.Insulin glargine and lispro ( doses adjusted). Not compliant with diabetic diet.   Dental Caries-completed clinda course. Will need outpt f/u for possible multiple extractions  Insomnia- prn melatonin    Handoff:  Pending placement , guardianship established  medically stable for discharge    Total time spent to complete patient's bedside assessment, physical examination, review medical chart including labs & imaging, discuss medical plan of care with housestaff was more than 35 minutes

## 2022-08-27 NOTE — PROGRESS NOTE ADULT - SUBJECTIVE AND OBJECTIVE BOX
CARIN WATSON 47y Male  MRN#: [id number]   CODE STATUS:________    Hospital Day: 149d    Pt is currently admitted with the primary diagnosis of onycomycosis    Overnight events   -No major overnight events                                          ----------------------------------------------------------  OBJECTIVE  PAST MEDICAL & SURGICAL HISTORY  DM (diabetes mellitus)    Intellectual disability                                              -----------------------------------------------------------  ALLERGIES:  penicillin (Unknown)                                            ------------------------------------------------------------    HOME MEDICATIONS  Home Medications:  vitamin A and D topical ointment: 1 application topically once a day (12 Apr 2022 11:40)                           MEDICATIONS:  STANDING MEDICATIONS  ammonium lactate 12% Lotion 1 Application(s) Topical two times a day  chlorhexidine 4% Liquid 1 Application(s) Topical <User Schedule>  clotrimazole 1% Cream 1 Application(s) Topical two times a day  enoxaparin Injectable 40 milliGRAM(s) SubCutaneous every 24 hours  famotidine    Tablet 20 milliGRAM(s) Oral daily  insulin glargine Injectable (LANTUS) 25 Unit(s) SubCutaneous every morning  insulin lispro (ADMELOG) corrective regimen sliding scale   SubCutaneous three times a day before meals  insulin lispro Injectable (ADMELOG) 8 Unit(s) SubCutaneous three times a day before meals  lactobacillus acidophilus 1 Tablet(s) Oral two times a day  melatonin 3 milliGRAM(s) Oral at bedtime  vitamin A &amp; D Ointment 1 Application(s) Topical daily    PRN MEDICATIONS  acetaminophen     Tablet .. 650 milliGRAM(s) Oral every 6 hours PRN                                            ------------------------------------------------------------  VITAL SIGNS: Last 24 Hours  T(C): 35.7 (27 Aug 2022 08:54), Max: 36.2 (26 Aug 2022 17:26)  T(F): 96.2 (27 Aug 2022 08:54), Max: 97.2 (26 Aug 2022 23:46)  HR: 88 (27 Aug 2022 08:54) (88 - 101)  BP: 124/65 (27 Aug 2022 08:54) (124/65 - 144/80)  BP(mean): --  RR: 18 (27 Aug 2022 08:54) (18 - 18)  SpO2: --                                             --------------------------------------------------------------  LABS:                                                                      --------------------------------------------------------------    PHYSICAL EXAM:  GENERAL: Awake, alert, oriented to person, place, time, situation. Well-nourished, laying in bed appearing in no acute distress  HEENT: No FNDs, atraumatic, normocephalic  LUNGS: Clear to auscultation bilaterally  HEART: S1/S2. No heaves or thrills  ABD: Soft, non-tender, non-distended.  EXT/NEURO: 5/5 strength in all extremity joints. Sensation and ROM grossly intact.  SKIN: Bilateral onycomycosis of 1st and 2nd toes      PLAN:  48 yo M with PMH of Intellectual Disability and DM not on any medications presented with foot wounds. Hospital stay complicated Hospital acquired COVID s/p RDV, poorly controlled DM. Patient has been in the hospital >100 days, pending guardianship and now placement    #Tachycardia 8/12 and intermittently  - no CP/SOB  - stat EKG with new incomplete RBBB and s1q3t3  - discussed with Dr Piper, recs r/o DVT/PE  - CTA and LE duplex negative, c/w eliquis for DVT ppx  - TTE: 1. EF of 66 %, G1DD    #Covid  PNA   - s/p RDV, DVT ppx    #Onychomycosis   - s/p debriedment and curettage by podiatry 6/7    #Dental caries   - sp course of clinda, outpatient f/u for teeth extractions    #DM2   with hyperglycemia due to dietary non-compliance - insulin adjusted, c/w monitoring    #Intellectual disability/autism    #Progress Note Handoff  Pending (specify): placement, SS with project guardianship  Family discussion:   Disposition:

## 2022-08-28 LAB
GLUCOSE BLDC GLUCOMTR-MCNC: 118 MG/DL — HIGH (ref 70–99)
GLUCOSE BLDC GLUCOMTR-MCNC: 142 MG/DL — HIGH (ref 70–99)
GLUCOSE BLDC GLUCOMTR-MCNC: 205 MG/DL — HIGH (ref 70–99)

## 2022-08-28 PROCEDURE — 99231 SBSQ HOSP IP/OBS SF/LOW 25: CPT

## 2022-08-28 RX ADMIN — ENOXAPARIN SODIUM 40 MILLIGRAM(S): 100 INJECTION SUBCUTANEOUS at 06:21

## 2022-08-28 RX ADMIN — Medication 8 UNIT(S): at 11:22

## 2022-08-28 RX ADMIN — Medication 1 APPLICATION(S): at 17:07

## 2022-08-28 RX ADMIN — Medication 1 TABLET(S): at 17:20

## 2022-08-28 RX ADMIN — Medication 3 MILLIGRAM(S): at 21:52

## 2022-08-28 RX ADMIN — INSULIN GLARGINE 25 UNIT(S): 100 INJECTION, SOLUTION SUBCUTANEOUS at 07:51

## 2022-08-28 RX ADMIN — Medication 1 TABLET(S): at 06:21

## 2022-08-28 RX ADMIN — Medication 8 UNIT(S): at 17:07

## 2022-08-28 RX ADMIN — Medication 1 APPLICATION(S): at 11:22

## 2022-08-28 RX ADMIN — Medication 1 APPLICATION(S): at 06:21

## 2022-08-28 RX ADMIN — Medication 8 UNIT(S): at 07:52

## 2022-08-28 RX ADMIN — Medication 4: at 11:21

## 2022-08-28 RX ADMIN — FAMOTIDINE 20 MILLIGRAM(S): 10 INJECTION INTRAVENOUS at 11:19

## 2022-08-28 NOTE — PROGRESS NOTE ADULT - SUBJECTIVE AND OBJECTIVE BOX
Progress Note:  Provider Speciality                            Hospitalist      CARIN WATSON MRN-[id number] 47y Male     CHIEF PRESENTING COMPLAINT:  Patient is a 47y old  Male who presents with a chief complaint of DFU (24 Aug 2022 06:56)        SUBJECTIVE:  Patient was seen and examined at bedside. No new complaints described by patient in morning rounds.   No significant overnight events reported.     HISTORY OF PRESENTING ILLNESS:  HPI:  47 yo M with PMHx of Intellectual Disability, DM not on any medications presents with foot wounds. Pt has very poor foot hygiene and per the sister, has been persistently scratching an open wound on his L second toe, which worsened and became more red, had drainage, malodor. He saw a physician at [facility] and was told to come to the ED for evaluation. Pt is poor historian. Does endorse abdominal pain for a few days, cannot give much more description. Per the sister, pt did not seem to have any recent fevers, however is also not a very good historian and does not seem to have good health literacy. States that her and her mom decided to stop giving pt Metformin a while ago, are treating his diabetes by not giving him any sugary foods.  In the ED, T 97.8, /87, , RR 16, SpO2 99% on RA. Lab work unrevealing.  (31 Mar 2022 22:36)        REVIEW OF SYSTEMS:  Patient denies any headache, any vision complaints, runny nose, fever, chills. Denies chest pain, shortness of breath, palpitation. Denies nausea, vomiting, abdominal pain or diarrhoea, Denies dysuria. Denies  localized weakness in any part of the body or numbness.   At least 10 systems were reviewed in ROS. All systems reviewed  are within normal limits except for the complaints as described in Subjective.    PAST MEDICAL & SURGICAL HISTORY:  PAST MEDICAL & SURGICAL HISTORY:  DM (diabetes mellitus)      Intellectual disability              VITAL SIGNS:  Vital Signs Last 24 Hrs  T(C): 35.6 (28 Aug 2022 08:00), Max: 36.5 (27 Aug 2022 15:32)  T(F): 96.1 (28 Aug 2022 08:00), Max: 97.7 (27 Aug 2022 15:32)  HR: 88 (28 Aug 2022 08:00) (85 - 97)  BP: 123/73 (28 Aug 2022 08:00) (122/65 - 132/76)  BP(mean): --  RR: 19 (28 Aug 2022 08:00) (18 - 19)  SpO2: --              PHYSICAL EXAMINATION:  Not in acute distress  General: No icterus  HEENT:   no JVD.  Heart: S1+S2 audible  Lungs: bilateral  moderate air entry, no wheezing, no crepitations.  Abdomen: Soft, non-tender, non-distended , no  rigidity or guarding.  CNS: Awake alert, CN  grossly intact.  Extremities:  No edema            CONSULTS:  Consultant(s) Notes Reviewed by me.   Care Discussed with Consultants/Other Providers where required.        MEDICATIONS:  MEDICATIONS  (STANDING):  ammonium lactate 12% Lotion 1 Application(s) Topical two times a day  chlorhexidine 4% Liquid 1 Application(s) Topical <User Schedule>  clotrimazole 1% Cream 1 Application(s) Topical two times a day  enoxaparin Injectable 40 milliGRAM(s) SubCutaneous every 24 hours  famotidine    Tablet 20 milliGRAM(s) Oral daily  insulin glargine Injectable (LANTUS) 25 Unit(s) SubCutaneous every morning  insulin lispro (ADMELOG) corrective regimen sliding scale   SubCutaneous three times a day before meals  insulin lispro Injectable (ADMELOG) 8 Unit(s) SubCutaneous three times a day before meals  lactobacillus acidophilus 1 Tablet(s) Oral two times a day  melatonin 3 milliGRAM(s) Oral at bedtime  vitamin A &amp; D Ointment 1 Application(s) Topical daily    MEDICATIONS  (PRN):  acetaminophen     Tablet .. 650 milliGRAM(s) Oral every 6 hours PRN Temp greater or equal to 38C (100.4F), Mild Pain (1 - 3)            ASSESSMENT:    47 yo M with PMH of Intellectual Disability and DM not on any medications presented with foot wounds.    Covid  PNA  Onychomycosis  Dental caries  DM2 with hyperglycemia due to dietary non-compliance  Intellectual disability/autism        Covid PNA- completed RDV. Resolved  Nail bed wound and onychomycosis. Status post Aseptic debridement and curettage of all fungal and ingrowing nails 1-5 bilateral with sterile nail nipper 06/07  Podiatry- no surgical intervention is advised. outpt podiatry f/u  DM type 2 - uncontrolled. A1C 8.9.Insulin glargine and lispro ( doses adjusted). Not compliant with diabetic diet.   Dental Caries-completed clinda course. Will need outpt f/u for possible multiple extractions  Insomnia- prn melatonin    Handoff:  Pending placement , guardianship established  medically stable for discharge    Total time spent to complete patient's bedside assessment, physical examination, review medical chart including labs & imaging, discuss medical plan of care with housestaff was more than 35 minutes

## 2022-08-29 LAB
GLUCOSE BLDC GLUCOMTR-MCNC: 134 MG/DL — HIGH (ref 70–99)
GLUCOSE BLDC GLUCOMTR-MCNC: 161 MG/DL — HIGH (ref 70–99)
GLUCOSE BLDC GLUCOMTR-MCNC: 177 MG/DL — HIGH (ref 70–99)
GLUCOSE BLDC GLUCOMTR-MCNC: 254 MG/DL — HIGH (ref 70–99)

## 2022-08-29 PROCEDURE — 99231 SBSQ HOSP IP/OBS SF/LOW 25: CPT

## 2022-08-29 RX ADMIN — FAMOTIDINE 20 MILLIGRAM(S): 10 INJECTION INTRAVENOUS at 12:16

## 2022-08-29 RX ADMIN — Medication 1 APPLICATION(S): at 17:56

## 2022-08-29 RX ADMIN — Medication 1 TABLET(S): at 05:26

## 2022-08-29 RX ADMIN — Medication 8 UNIT(S): at 17:57

## 2022-08-29 RX ADMIN — Medication 8 UNIT(S): at 12:17

## 2022-08-29 RX ADMIN — Medication 1 APPLICATION(S): at 05:29

## 2022-08-29 RX ADMIN — Medication 1 APPLICATION(S): at 14:35

## 2022-08-29 RX ADMIN — ENOXAPARIN SODIUM 40 MILLIGRAM(S): 100 INJECTION SUBCUTANEOUS at 06:56

## 2022-08-29 RX ADMIN — Medication 1 APPLICATION(S): at 17:57

## 2022-08-29 RX ADMIN — Medication 1 APPLICATION(S): at 05:28

## 2022-08-29 RX ADMIN — INSULIN GLARGINE 25 UNIT(S): 100 INJECTION, SOLUTION SUBCUTANEOUS at 08:29

## 2022-08-29 RX ADMIN — Medication 1 TABLET(S): at 17:56

## 2022-08-29 RX ADMIN — Medication 8 UNIT(S): at 08:29

## 2022-08-29 RX ADMIN — Medication 2: at 12:16

## 2022-08-29 RX ADMIN — Medication 2: at 17:56

## 2022-08-29 RX ADMIN — Medication 3 MILLIGRAM(S): at 21:54

## 2022-08-29 NOTE — PROGRESS NOTE ADULT - ASSESSMENT
48 yo M with PMH of Intellectual Disability and DM not on any medications presented with foot wounds. Hospital stay complicated Hospital acquired COVID s/p RDV, poorly controlled DM. Patient has been in the hospital >100 days, pending guardianship and now placement    #Tachycardia 8/12 and intermittently  - no CP/SOB  - stat EKG with new incomplete RBBB and s1q3t3  - discussed with Dr Piper, recs r/o DVT/PE  - CTA and LE duplex negative, c/w eliquis for DVT ppx  - TTE: 1. EF of 66 %, G1DD    #Covid  PNA   - s/p RDV, DVT ppx    #Onychomycosis   - s/p debriedment and curettage by podiatry 6/7    #Dental caries   - sp course of clinda, outpatient f/u for teeth extractions    #DM2   with hyperglycemia due to dietary non-compliance - insulin adjusted, c/w monitoring    #Intellectual disability/autism    #Progress Note Handoff  Pending (specify): placement  Family discussion:   Disposition:

## 2022-08-29 NOTE — PROGRESS NOTE ADULT - SUBJECTIVE AND OBJECTIVE BOX
SUBJECTIVE:    Patient is a 47y old Male who presents with a chief complaint of DFU (29 Aug 2022 13:38)    Currently admitted to medicine with the primary diagnosis of Onychomycosis       Today is hospital day 151d. This morning he is resting comfortably in bed and reports no new issues or overnight events.     PAST MEDICAL & SURGICAL HISTORY  DM (diabetes mellitus)    Intellectual disability    SOCIAL HISTORY:  Negative for smoking/alcohol/drug use.     ALLERGIES:  penicillin (Unknown)    MEDICATIONS:  STANDING MEDICATIONS  ammonium lactate 12% Lotion 1 Application(s) Topical two times a day  chlorhexidine 4% Liquid 1 Application(s) Topical <User Schedule>  clotrimazole 1% Cream 1 Application(s) Topical two times a day  enoxaparin Injectable 40 milliGRAM(s) SubCutaneous every 24 hours  famotidine    Tablet 20 milliGRAM(s) Oral daily  insulin glargine Injectable (LANTUS) 25 Unit(s) SubCutaneous every morning  insulin lispro (ADMELOG) corrective regimen sliding scale   SubCutaneous three times a day before meals  insulin lispro Injectable (ADMELOG) 8 Unit(s) SubCutaneous three times a day before meals  lactobacillus acidophilus 1 Tablet(s) Oral two times a day  melatonin 3 milliGRAM(s) Oral at bedtime  vitamin A &amp; D Ointment 1 Application(s) Topical daily    PRN MEDICATIONS  acetaminophen     Tablet .. 650 milliGRAM(s) Oral every 6 hours PRN    VITALS:   T(F): 98.1  HR: 87  BP: 120/65  RR: 18  SpO2: --    PHYSICAL EXAM:  GEN: No acute distress  LUNGS: Clear to auscultation bilaterally   HEART: S1/S2 present. RRR.   ABD: Soft, non-tender, non-distended. Bowel sounds present  EXT: NC/NC/NE/2+PP/LOJA  NEURO: AAOX3

## 2022-08-29 NOTE — PROGRESS NOTE ADULT - SUBJECTIVE AND OBJECTIVE BOX
Progress Note:  Provider Speciality                            Hospitalist      CARIN WATSON MRN-[id number] 47y Male     CHIEF PRESENTING COMPLAINT:  Patient is a 47y old  Male who presents with a chief complaint of DFU (24 Aug 2022 06:56)        SUBJECTIVE:  Patient was seen and examined at bedside. No new complaints described by patient in morning rounds.   No significant overnight events reported.     HISTORY OF PRESENTING ILLNESS:  HPI:  47 yo M with PMHx of Intellectual Disability, DM not on any medications presents with foot wounds. Pt has very poor foot hygiene and per the sister, has been persistently scratching an open wound on his L second toe, which worsened and became more red, had drainage, malodor. He saw a physician at [facility] and was told to come to the ED for evaluation. Pt is poor historian. Does endorse abdominal pain for a few days, cannot give much more description. Per the sister, pt did not seem to have any recent fevers, however is also not a very good historian and does not seem to have good health literacy. States that her and her mom decided to stop giving pt Metformin a while ago, are treating his diabetes by not giving him any sugary foods.  In the ED, T 97.8, /87, , RR 16, SpO2 99% on RA. Lab work unrevealing.  (31 Mar 2022 22:36)        REVIEW OF SYSTEMS:  Patient denies any headache, any vision complaints, runny nose, fever, chills. Denies chest pain, shortness of breath, palpitation. Denies nausea, vomiting, abdominal pain or diarrhoea, Denies dysuria. Denies  localized weakness in any part of the body or numbness.   At least 10 systems were reviewed in ROS. All systems reviewed  are within normal limits except for the complaints as described in Subjective.    PAST MEDICAL & SURGICAL HISTORY:  PAST MEDICAL & SURGICAL HISTORY:  DM (diabetes mellitus)      Intellectual disability              VITAL SIGNS:  Vital Signs Last 24 Hrs  T(C): 36.7 (29 Aug 2022 07:43), Max: 36.7 (28 Aug 2022 15:48)  T(F): 98.1 (29 Aug 2022 07:43), Max: 98.1 (29 Aug 2022 07:43)  HR: 87 (29 Aug 2022 07:43) (87 - 109)  BP: 120/65 (29 Aug 2022 07:43) (120/65 - 130/68)  BP(mean): --  RR: 18 (29 Aug 2022 07:43) (18 - 19)  SpO2: --              PHYSICAL EXAMINATION:  Not in acute distress  General: No icterus  HEENT:   no JVD.  Heart: S1+S2 audible  Lungs: bilateral  moderate air entry, no wheezing, no crepitations.  Abdomen: Soft, non-tender, non-distended , no  rigidity or guarding.  CNS: Awake alert, CN  grossly intact.  Extremities:  No edema            CONSULTS:  Consultant(s) Notes Reviewed by me.   Care Discussed with Consultants/Other Providers where required.        MEDICATIONS:  MEDICATIONS  (STANDING):  ammonium lactate 12% Lotion 1 Application(s) Topical two times a day  chlorhexidine 4% Liquid 1 Application(s) Topical <User Schedule>  clotrimazole 1% Cream 1 Application(s) Topical two times a day  enoxaparin Injectable 40 milliGRAM(s) SubCutaneous every 24 hours  famotidine    Tablet 20 milliGRAM(s) Oral daily  insulin glargine Injectable (LANTUS) 25 Unit(s) SubCutaneous every morning  insulin lispro (ADMELOG) corrective regimen sliding scale   SubCutaneous three times a day before meals  insulin lispro Injectable (ADMELOG) 8 Unit(s) SubCutaneous three times a day before meals  lactobacillus acidophilus 1 Tablet(s) Oral two times a day  melatonin 3 milliGRAM(s) Oral at bedtime  vitamin A &amp; D Ointment 1 Application(s) Topical daily    MEDICATIONS  (PRN):  acetaminophen     Tablet .. 650 milliGRAM(s) Oral every 6 hours PRN Temp greater or equal to 38C (100.4F), Mild Pain (1 - 3)            ASSESSMENT:    47 yo M with PMH of Intellectual Disability and DM not on any medications presented with foot wounds.    Covid  PNA  Onychomycosis  Dental caries  DM2 with hyperglycemia due to dietary non-compliance  Intellectual disability/autism        Covid PNA- completed RDV. Resolved  Nail bed wound and onychomycosis. Status post Aseptic debridement and curettage of all fungal and ingrowing nails 1-5 bilateral with sterile nail nipper 06/07  Podiatry- no surgical intervention is advised. outpt podiatry f/u  DM type 2 - uncontrolled. A1C 8.9.Insulin glargine and lispro ( doses adjusted). Not compliant with diabetic diet.   Dental Caries-completed clinda course. Will need outpt f/u for possible multiple extractions  Insomnia- prn melatonin    Handoff:  Pending placement , guardianship established  medically stable for discharge    Total time spent to complete patient's bedside assessment, physical examination, review medical chart including labs & imaging, discuss medical plan of care with housestaff was more than 35 minutes

## 2022-08-30 LAB
ALBUMIN SERPL ELPH-MCNC: 3.9 G/DL — SIGNIFICANT CHANGE UP (ref 3.5–5.2)
ALP SERPL-CCNC: 100 U/L — SIGNIFICANT CHANGE UP (ref 30–115)
ALT FLD-CCNC: 24 U/L — SIGNIFICANT CHANGE UP (ref 0–41)
ANION GAP SERPL CALC-SCNC: 11 MMOL/L — SIGNIFICANT CHANGE UP (ref 7–14)
AST SERPL-CCNC: 20 U/L — SIGNIFICANT CHANGE UP (ref 0–41)
BASOPHILS # BLD AUTO: 0.04 K/UL — SIGNIFICANT CHANGE UP (ref 0–0.2)
BASOPHILS NFR BLD AUTO: 0.5 % — SIGNIFICANT CHANGE UP (ref 0–1)
BILIRUB SERPL-MCNC: 0.5 MG/DL — SIGNIFICANT CHANGE UP (ref 0.2–1.2)
BUN SERPL-MCNC: 17 MG/DL — SIGNIFICANT CHANGE UP (ref 10–20)
CALCIUM SERPL-MCNC: 8.9 MG/DL — SIGNIFICANT CHANGE UP (ref 8.5–10.1)
CHLORIDE SERPL-SCNC: 106 MMOL/L — SIGNIFICANT CHANGE UP (ref 98–110)
CO2 SERPL-SCNC: 22 MMOL/L — SIGNIFICANT CHANGE UP (ref 17–32)
CREAT SERPL-MCNC: 0.7 MG/DL — SIGNIFICANT CHANGE UP (ref 0.7–1.5)
EGFR: 114 ML/MIN/1.73M2 — SIGNIFICANT CHANGE UP
EOSINOPHIL # BLD AUTO: 0.34 K/UL — SIGNIFICANT CHANGE UP (ref 0–0.7)
EOSINOPHIL NFR BLD AUTO: 4.7 % — SIGNIFICANT CHANGE UP (ref 0–8)
GLUCOSE BLDC GLUCOMTR-MCNC: 146 MG/DL — HIGH (ref 70–99)
GLUCOSE BLDC GLUCOMTR-MCNC: 155 MG/DL — HIGH (ref 70–99)
GLUCOSE BLDC GLUCOMTR-MCNC: 188 MG/DL — HIGH (ref 70–99)
GLUCOSE BLDC GLUCOMTR-MCNC: 223 MG/DL — HIGH (ref 70–99)
GLUCOSE SERPL-MCNC: 151 MG/DL — HIGH (ref 70–99)
HCT VFR BLD CALC: 42.7 % — SIGNIFICANT CHANGE UP (ref 42–52)
HGB BLD-MCNC: 14.4 G/DL — SIGNIFICANT CHANGE UP (ref 14–18)
IMM GRANULOCYTES NFR BLD AUTO: 0.1 % — SIGNIFICANT CHANGE UP (ref 0.1–0.3)
LYMPHOCYTES # BLD AUTO: 2.08 K/UL — SIGNIFICANT CHANGE UP (ref 1.2–3.4)
LYMPHOCYTES # BLD AUTO: 28.5 % — SIGNIFICANT CHANGE UP (ref 20.5–51.1)
MAGNESIUM SERPL-MCNC: 1.8 MG/DL — SIGNIFICANT CHANGE UP (ref 1.8–2.4)
MCHC RBC-ENTMCNC: 31.4 PG — HIGH (ref 27–31)
MCHC RBC-ENTMCNC: 33.7 G/DL — SIGNIFICANT CHANGE UP (ref 32–37)
MCV RBC AUTO: 93.2 FL — SIGNIFICANT CHANGE UP (ref 80–94)
MONOCYTES # BLD AUTO: 0.53 K/UL — SIGNIFICANT CHANGE UP (ref 0.1–0.6)
MONOCYTES NFR BLD AUTO: 7.3 % — SIGNIFICANT CHANGE UP (ref 1.7–9.3)
NEUTROPHILS # BLD AUTO: 4.31 K/UL — SIGNIFICANT CHANGE UP (ref 1.4–6.5)
NEUTROPHILS NFR BLD AUTO: 58.9 % — SIGNIFICANT CHANGE UP (ref 42.2–75.2)
NRBC # BLD: 0 /100 WBCS — SIGNIFICANT CHANGE UP (ref 0–0)
PLATELET # BLD AUTO: 305 K/UL — SIGNIFICANT CHANGE UP (ref 130–400)
POTASSIUM SERPL-MCNC: 4 MMOL/L — SIGNIFICANT CHANGE UP (ref 3.5–5)
POTASSIUM SERPL-SCNC: 4 MMOL/L — SIGNIFICANT CHANGE UP (ref 3.5–5)
PROT SERPL-MCNC: 6.5 G/DL — SIGNIFICANT CHANGE UP (ref 6–8)
RBC # BLD: 4.58 M/UL — LOW (ref 4.7–6.1)
RBC # FLD: 11.7 % — SIGNIFICANT CHANGE UP (ref 11.5–14.5)
SODIUM SERPL-SCNC: 139 MMOL/L — SIGNIFICANT CHANGE UP (ref 135–146)
WBC # BLD: 7.31 K/UL — SIGNIFICANT CHANGE UP (ref 4.8–10.8)
WBC # FLD AUTO: 7.31 K/UL — SIGNIFICANT CHANGE UP (ref 4.8–10.8)

## 2022-08-30 PROCEDURE — 99231 SBSQ HOSP IP/OBS SF/LOW 25: CPT

## 2022-08-30 RX ADMIN — Medication 1 TABLET(S): at 18:01

## 2022-08-30 RX ADMIN — Medication 4: at 17:18

## 2022-08-30 RX ADMIN — Medication 8 UNIT(S): at 17:18

## 2022-08-30 RX ADMIN — Medication 8 UNIT(S): at 12:03

## 2022-08-30 RX ADMIN — INSULIN GLARGINE 25 UNIT(S): 100 INJECTION, SOLUTION SUBCUTANEOUS at 07:53

## 2022-08-30 RX ADMIN — ENOXAPARIN SODIUM 40 MILLIGRAM(S): 100 INJECTION SUBCUTANEOUS at 05:42

## 2022-08-30 RX ADMIN — FAMOTIDINE 20 MILLIGRAM(S): 10 INJECTION INTRAVENOUS at 12:03

## 2022-08-30 RX ADMIN — Medication 2: at 12:02

## 2022-08-30 RX ADMIN — Medication 1 APPLICATION(S): at 05:39

## 2022-08-30 RX ADMIN — Medication 1 APPLICATION(S): at 18:00

## 2022-08-30 RX ADMIN — CHLORHEXIDINE GLUCONATE 1 APPLICATION(S): 213 SOLUTION TOPICAL at 05:39

## 2022-08-30 RX ADMIN — Medication 8 UNIT(S): at 07:52

## 2022-08-30 RX ADMIN — Medication 1 APPLICATION(S): at 12:03

## 2022-08-30 RX ADMIN — Medication 1 TABLET(S): at 05:42

## 2022-08-30 RX ADMIN — Medication 3 MILLIGRAM(S): at 21:41

## 2022-08-30 NOTE — PROGRESS NOTE ADULT - SUBJECTIVE AND OBJECTIVE BOX
SUBJECTIVE:    Patient is a 47y old Male who presents with a chief complaint of DFU (30 Aug 2022 14:21)    Currently admitted to medicine with the primary diagnosis of Onychomycosis    Today is hospital day 152d. This morning he is resting comfortably in bed and reports no new issues or overnight events.     PAST MEDICAL & SURGICAL HISTORY  DM (diabetes mellitus)    Intellectual disability      SOCIAL HISTORY:  Negative for smoking/alcohol/drug use.     ALLERGIES:  penicillin (Unknown)    MEDICATIONS:  STANDING MEDICATIONS  ammonium lactate 12% Lotion 1 Application(s) Topical two times a day  chlorhexidine 4% Liquid 1 Application(s) Topical <User Schedule>  clotrimazole 1% Cream 1 Application(s) Topical two times a day  enoxaparin Injectable 40 milliGRAM(s) SubCutaneous every 24 hours  famotidine    Tablet 20 milliGRAM(s) Oral daily  insulin glargine Injectable (LANTUS) 25 Unit(s) SubCutaneous every morning  insulin lispro (ADMELOG) corrective regimen sliding scale   SubCutaneous three times a day before meals  insulin lispro Injectable (ADMELOG) 8 Unit(s) SubCutaneous three times a day before meals  lactobacillus acidophilus 1 Tablet(s) Oral two times a day  melatonin 3 milliGRAM(s) Oral at bedtime  vitamin A &amp; D Ointment 1 Application(s) Topical daily    PRN MEDICATIONS  acetaminophen     Tablet .. 650 milliGRAM(s) Oral every 6 hours PRN    VITALS:   T(F): 98.6  HR: 101  BP: 119/56  RR: 18  SpO2: --    LABS:                        14.4   7.31  )-----------( 305      ( 30 Aug 2022 07:12 )             42.7     08-30    139  |  106  |  17  ----------------------------<  151<H>  4.0   |  22  |  0.7    Ca    8.9      30 Aug 2022 07:12  Mg     1.8     08-30    TPro  6.5  /  Alb  3.9  /  TBili  0.5  /  DBili  x   /  AST  20  /  ALT  24  /  AlkPhos  100  08-30    PHYSICAL EXAM:  GEN: No acute distress  LUNGS: Clear to auscultation bilaterally   HEART: S1/S2 present. RRR.   ABD: Soft, non-tender, non-distended. Bowel sounds present  EXT: NC/NC/NE/2+PP/LOJA  NEURO: AAOX3

## 2022-08-30 NOTE — PROGRESS NOTE ADULT - ASSESSMENT
48 yo M with PMH of Intellectual Disability and DM not on any medications presented with foot wounds. Hospital stay complicated Hospital acquired COVID s/p RDV, poorly controlled DM. Patient has been in the hospital >100 days, pending guardianship and now placement.    #Tachycardia 8/12 and intermittently  - no CP/SOB  - stat EKG with new incomplete RBBB and s1q3t3  - discussed with Dr Piper, recs r/o DVT/PE  - CTA and LE duplex negative, c/w eliquis for DVT ppx  - TTE: 1. EF of 66 %, G1DD    #Covid  PNA   - s/p RDV, DVT ppx    #Onychomycosis   - s/p debriedment and curettage by podiatry 6/7    #Dental caries   - sp course of clinda, outpatient f/u for teeth extractions    #DM2   with hyperglycemia due to dietary non-compliance - insulin adjusted, c/w monitoring    #Intellectual disability/autism    #Progress Note Handoff  Pending (specify): placement  Family discussion:   Disposition:

## 2022-08-30 NOTE — PROGRESS NOTE ADULT - SUBJECTIVE AND OBJECTIVE BOX
Progress Note:  Provider Speciality                            Hospitalist      CARIN WATSON MRN-[id number] 47y Male     CHIEF PRESENTING COMPLAINT:  Patient is a 47y old  Male who presents with a chief complaint of DFU (24 Aug 2022 06:56)        SUBJECTIVE:  Patient was seen and examined at bedside. No new complaints described by patient in morning rounds.   No significant overnight events reported.     HISTORY OF PRESENTING ILLNESS:  HPI:  47 yo M with PMHx of Intellectual Disability, DM not on any medications presents with foot wounds. Pt has very poor foot hygiene and per the sister, has been persistently scratching an open wound on his L second toe, which worsened and became more red, had drainage, malodor. He saw a physician at [facility] and was told to come to the ED for evaluation. Pt is poor historian. Does endorse abdominal pain for a few days, cannot give much more description. Per the sister, pt did not seem to have any recent fevers, however is also not a very good historian and does not seem to have good health literacy. States that her and her mom decided to stop giving pt Metformin a while ago, are treating his diabetes by not giving him any sugary foods.  In the ED, T 97.8, /87, , RR 16, SpO2 99% on RA. Lab work unrevealing.  (31 Mar 2022 22:36)        REVIEW OF SYSTEMS:  Patient denies any headache, any vision complaints, runny nose, fever, chills. Denies chest pain, shortness of breath, palpitation. Denies nausea, vomiting, abdominal pain or diarrhoea, Denies dysuria. Denies  localized weakness in any part of the body or numbness.   At least 10 systems were reviewed in ROS. All systems reviewed  are within normal limits except for the complaints as described in Subjective.    PAST MEDICAL & SURGICAL HISTORY:  PAST MEDICAL & SURGICAL HISTORY:  DM (diabetes mellitus)      Intellectual disability              VITAL SIGNS:  Vital Signs Last 24 Hrs  T(C): 36.1 (30 Aug 2022 08:17), Max: 36.1 (29 Aug 2022 15:24)  T(F): 97 (30 Aug 2022 08:17), Max: 97 (30 Aug 2022 08:17)  HR: 81 (30 Aug 2022 08:17) (81 - 93)  BP: 145/83 (30 Aug 2022 08:17) (109/53 - 145/83)  BP(mean): --  RR: 18 (29 Aug 2022 15:24) (18 - 18)  SpO2: --                PHYSICAL EXAMINATION:  Not in acute distress  General: No icterus  HEENT:   no JVD.  Heart: S1+S2 audible  Lungs: bilateral  moderate air entry, no wheezing, no crepitations.  Abdomen: Soft, non-tender, non-distended , no  rigidity or guarding.  CNS: Awake alert, CN  grossly intact.  Extremities:  No edema            CONSULTS:  Consultant(s) Notes Reviewed by me.   Care Discussed with Consultants/Other Providers where required.        MEDICATIONS:  MEDICATIONS  (STANDING):  ammonium lactate 12% Lotion 1 Application(s) Topical two times a day  chlorhexidine 4% Liquid 1 Application(s) Topical <User Schedule>  clotrimazole 1% Cream 1 Application(s) Topical two times a day  enoxaparin Injectable 40 milliGRAM(s) SubCutaneous every 24 hours  famotidine    Tablet 20 milliGRAM(s) Oral daily  insulin glargine Injectable (LANTUS) 25 Unit(s) SubCutaneous every morning  insulin lispro (ADMELOG) corrective regimen sliding scale   SubCutaneous three times a day before meals  insulin lispro Injectable (ADMELOG) 8 Unit(s) SubCutaneous three times a day before meals  lactobacillus acidophilus 1 Tablet(s) Oral two times a day  melatonin 3 milliGRAM(s) Oral at bedtime  vitamin A &amp; D Ointment 1 Application(s) Topical daily    MEDICATIONS  (PRN):  acetaminophen     Tablet .. 650 milliGRAM(s) Oral every 6 hours PRN Temp greater or equal to 38C (100.4F), Mild Pain (1 - 3)            ASSESSMENT:    47 yo M with PMH of Intellectual Disability and DM not on any medications presented with foot wounds.    Covid  PNA  Onychomycosis  Dental caries  DM2 with hyperglycemia due to dietary non-compliance  Intellectual disability/autism        Covid PNA- completed RDV. Resolved  Nail bed wound and onychomycosis. Status post Aseptic debridement and curettage of all fungal and ingrowing nails 1-5 bilateral with sterile nail nipper 06/07  Podiatry- no surgical intervention is advised. outpt podiatry f/u  DM type 2 - uncontrolled. A1C 8.9.Insulin glargine and lispro ( doses adjusted). Not compliant with diabetic diet.   Dental Caries-completed clinda course. Will need outpt f/u for possible multiple extractions  Insomnia- prn melatonin    Handoff:  Pending placement , guardianship established  medically stable for discharge    Total time spent to complete patient's bedside assessment, physical examination, review medical chart including labs & imaging, discuss medical plan of care with housestaff was more than 35 minutes

## 2022-08-31 LAB
GLUCOSE BLDC GLUCOMTR-MCNC: 141 MG/DL — HIGH (ref 70–99)
GLUCOSE BLDC GLUCOMTR-MCNC: 149 MG/DL — HIGH (ref 70–99)
GLUCOSE BLDC GLUCOMTR-MCNC: 160 MG/DL — HIGH (ref 70–99)
GLUCOSE BLDC GLUCOMTR-MCNC: 161 MG/DL — HIGH (ref 70–99)
GLUCOSE BLDC GLUCOMTR-MCNC: 180 MG/DL — HIGH (ref 70–99)

## 2022-08-31 PROCEDURE — 99231 SBSQ HOSP IP/OBS SF/LOW 25: CPT

## 2022-08-31 RX ADMIN — ENOXAPARIN SODIUM 40 MILLIGRAM(S): 100 INJECTION SUBCUTANEOUS at 05:32

## 2022-08-31 RX ADMIN — Medication 1 TABLET(S): at 17:33

## 2022-08-31 RX ADMIN — Medication 2: at 12:04

## 2022-08-31 RX ADMIN — Medication 1 APPLICATION(S): at 05:33

## 2022-08-31 RX ADMIN — Medication 2: at 17:30

## 2022-08-31 RX ADMIN — Medication 8 UNIT(S): at 17:30

## 2022-08-31 RX ADMIN — CHLORHEXIDINE GLUCONATE 1 APPLICATION(S): 213 SOLUTION TOPICAL at 05:38

## 2022-08-31 RX ADMIN — Medication 1 TABLET(S): at 05:32

## 2022-08-31 RX ADMIN — INSULIN GLARGINE 25 UNIT(S): 100 INJECTION, SOLUTION SUBCUTANEOUS at 07:50

## 2022-08-31 RX ADMIN — Medication 8 UNIT(S): at 07:50

## 2022-08-31 RX ADMIN — Medication 1 APPLICATION(S): at 12:06

## 2022-08-31 RX ADMIN — Medication 8 UNIT(S): at 12:05

## 2022-08-31 RX ADMIN — Medication 1 APPLICATION(S): at 17:19

## 2022-08-31 RX ADMIN — FAMOTIDINE 20 MILLIGRAM(S): 10 INJECTION INTRAVENOUS at 12:05

## 2022-08-31 RX ADMIN — Medication 1 APPLICATION(S): at 17:20

## 2022-08-31 RX ADMIN — Medication 3 MILLIGRAM(S): at 21:34

## 2022-08-31 NOTE — PROGRESS NOTE ADULT - SUBJECTIVE AND OBJECTIVE BOX
SUBJECTIVE:    Patient is a 47y old Male who presents with a chief complaint of DFU (30 Aug 2022 17:56)    Currently admitted to medicine with the primary diagnosis of Onychomycosis     Today is hospital day 153d. This morning he is resting comfortably in bed and reports no new issues or overnight events.     PAST MEDICAL & SURGICAL HISTORY  DM (diabetes mellitus)    Intellectual disability      SOCIAL HISTORY:  Negative for smoking/alcohol/drug use.     ALLERGIES:  penicillin (Unknown)    MEDICATIONS:  STANDING MEDICATIONS  ammonium lactate 12% Lotion 1 Application(s) Topical two times a day  chlorhexidine 4% Liquid 1 Application(s) Topical <User Schedule>  clotrimazole 1% Cream 1 Application(s) Topical two times a day  famotidine    Tablet 20 milliGRAM(s) Oral daily  insulin glargine Injectable (LANTUS) 25 Unit(s) SubCutaneous every morning  insulin lispro (ADMELOG) corrective regimen sliding scale   SubCutaneous three times a day before meals  insulin lispro Injectable (ADMELOG) 8 Unit(s) SubCutaneous three times a day before meals  lactobacillus acidophilus 1 Tablet(s) Oral two times a day  melatonin 3 milliGRAM(s) Oral at bedtime  vitamin A &amp; D Ointment 1 Application(s) Topical daily    PRN MEDICATIONS  acetaminophen     Tablet .. 650 milliGRAM(s) Oral every 6 hours PRN    VITALS:   T(F): 98.6  HR: 87  BP: 128/68  RR: 18  SpO2: 100%    LABS:                        14.4   7.31  )-----------( 305      ( 30 Aug 2022 07:12 )             42.7     08-30    139  |  106  |  17  ----------------------------<  151<H>  4.0   |  22  |  0.7    Ca    8.9      30 Aug 2022 07:12  Mg     1.8     08-30    TPro  6.5  /  Alb  3.9  /  TBili  0.5  /  DBili  x   /  AST  20  /  ALT  24  /  AlkPhos  100  08-30    PHYSICAL EXAM:  GEN: No acute distress  LUNGS: Clear to auscultation bilaterally   HEART: S1/S2 present. RRR.   ABD: Soft, non-tender, non-distended. Bowel sounds present  EXT: NC/NC/NE/2+PP/LOJA  NEURO: AAOX3

## 2022-09-01 LAB
GLUCOSE BLDC GLUCOMTR-MCNC: 127 MG/DL — HIGH (ref 70–99)
GLUCOSE BLDC GLUCOMTR-MCNC: 131 MG/DL — HIGH (ref 70–99)
GLUCOSE BLDC GLUCOMTR-MCNC: 207 MG/DL — HIGH (ref 70–99)
GLUCOSE BLDC GLUCOMTR-MCNC: 341 MG/DL — HIGH (ref 70–99)
GLUCOSE BLDC GLUCOMTR-MCNC: 95 MG/DL — SIGNIFICANT CHANGE UP (ref 70–99)

## 2022-09-01 PROCEDURE — 99231 SBSQ HOSP IP/OBS SF/LOW 25: CPT

## 2022-09-01 RX ORDER — INSULIN LISPRO 100/ML
8 VIAL (ML) SUBCUTANEOUS ONCE
Refills: 0 | Status: COMPLETED | OUTPATIENT
Start: 2022-09-01 | End: 2022-09-01

## 2022-09-01 RX ORDER — RIVAROXABAN 15 MG-20MG
5 KIT ORAL DAILY
Refills: 0 | Status: DISCONTINUED | OUTPATIENT
Start: 2022-09-01 | End: 2022-10-02

## 2022-09-01 RX ADMIN — Medication 8 UNIT(S): at 11:53

## 2022-09-01 RX ADMIN — CHLORHEXIDINE GLUCONATE 1 APPLICATION(S): 213 SOLUTION TOPICAL at 07:34

## 2022-09-01 RX ADMIN — Medication 3 MILLIGRAM(S): at 21:41

## 2022-09-01 RX ADMIN — Medication 8 UNIT(S): at 08:14

## 2022-09-01 RX ADMIN — FAMOTIDINE 20 MILLIGRAM(S): 10 INJECTION INTRAVENOUS at 11:55

## 2022-09-01 RX ADMIN — Medication 1 TABLET(S): at 17:31

## 2022-09-01 RX ADMIN — Medication 1 TABLET(S): at 07:31

## 2022-09-01 RX ADMIN — INSULIN GLARGINE 25 UNIT(S): 100 INJECTION, SOLUTION SUBCUTANEOUS at 08:14

## 2022-09-01 RX ADMIN — Medication 8 UNIT(S): at 21:54

## 2022-09-01 RX ADMIN — Medication 1 APPLICATION(S): at 11:54

## 2022-09-01 RX ADMIN — Medication 1 APPLICATION(S): at 07:34

## 2022-09-01 RX ADMIN — Medication 1 APPLICATION(S): at 17:31

## 2022-09-01 RX ADMIN — Medication 1 APPLICATION(S): at 17:32

## 2022-09-01 RX ADMIN — Medication 1 APPLICATION(S): at 07:31

## 2022-09-01 NOTE — CHART NOTE - NSCHARTNOTEFT_GEN_A_CORE
PO remains optimum per EMR documentation. 8/23-8/30 intake: %.     Not at risk, will re-assess in 10 days.

## 2022-09-01 NOTE — PROGRESS NOTE ADULT - SUBJECTIVE AND OBJECTIVE BOX
SUBJECTIVE:    Patient is a 47y old Male who presents with a chief complaint of DFU (31 Aug 2022 16:52)    Currently admitted to medicine with the primary diagnosis of Onychomycosis     Today is hospital day 154d. This morning he is resting comfortably in bed and reports no new issues or overnight events.     PAST MEDICAL & SURGICAL HISTORY  DM (diabetes mellitus)    Intellectual disability      SOCIAL HISTORY:  Negative for smoking/alcohol/drug use.     ALLERGIES:  penicillin (Unknown)    MEDICATIONS:  STANDING MEDICATIONS  ammonium lactate 12% Lotion 1 Application(s) Topical two times a day  chlorhexidine 4% Liquid 1 Application(s) Topical <User Schedule>  clotrimazole 1% Cream 1 Application(s) Topical two times a day  famotidine    Tablet 20 milliGRAM(s) Oral daily  insulin glargine Injectable (LANTUS) 25 Unit(s) SubCutaneous every morning  insulin lispro (ADMELOG) corrective regimen sliding scale   SubCutaneous three times a day before meals  insulin lispro Injectable (ADMELOG) 8 Unit(s) SubCutaneous three times a day before meals  lactobacillus acidophilus 1 Tablet(s) Oral two times a day  melatonin 3 milliGRAM(s) Oral at bedtime  vitamin A &amp; D Ointment 1 Application(s) Topical daily    PRN MEDICATIONS  acetaminophen     Tablet .. 650 milliGRAM(s) Oral every 6 hours PRN    VITALS:   T(F): 97.3  HR: 88  BP: 113/55  RR: 18  SpO2: 96%    PHYSICAL EXAM:  GEN: No acute distress  LUNGS: Clear to auscultation bilaterally   HEART: S1/S2 present. RRR.   ABD: Soft, non-tender, non-distended. Bowel sounds present  EXT: NC/NC/NE/2+PP/LOJA  NEURO: AAOX3

## 2022-09-01 NOTE — PROGRESS NOTE ADULT - ASSESSMENT
46 yo M with PMH of Intellectual Disability and DM not on any medications presented with foot wounds. Hospital stay complicated Hospital acquired COVID s/p RDV, poorly controlled DM. Patient has been in the hospital >100 days, pending guardianship and now placement. no change in status today.    #Tachycardia 8/12 and intermittently  - no CP/SOB  - stat EKG with new incomplete RBBB and s1q3t3  - discussed with Dr Piper, recs r/o DVT/PE  - CTA and LE duplex negative, c/w eliquis for DVT ppx  - TTE: 1. EF of 66 %, G1DD    #Covid  PNA   - s/p RDV, DVT ppx    #Onychomycosis   - s/p debriedment and curettage by podiatry 6/7    #Dental caries   - sp course of clinda, outpatient f/u for teeth extractions    #DM2   with hyperglycemia due to dietary non-compliance - insulin adjusted, c/w monitoring    #Intellectual disability/autism    #Progress Note Handoff  Pending (specify): placement  Family discussion:   Disposition:

## 2022-09-02 LAB
GLUCOSE BLDC GLUCOMTR-MCNC: 129 MG/DL — HIGH (ref 70–99)
GLUCOSE BLDC GLUCOMTR-MCNC: 142 MG/DL — HIGH (ref 70–99)
GLUCOSE BLDC GLUCOMTR-MCNC: 167 MG/DL — HIGH (ref 70–99)
GLUCOSE BLDC GLUCOMTR-MCNC: 229 MG/DL — HIGH (ref 70–99)

## 2022-09-02 PROCEDURE — 99231 SBSQ HOSP IP/OBS SF/LOW 25: CPT

## 2022-09-02 RX ADMIN — Medication 3 MILLIGRAM(S): at 21:07

## 2022-09-02 RX ADMIN — Medication 1 APPLICATION(S): at 06:22

## 2022-09-02 RX ADMIN — Medication 8 UNIT(S): at 18:10

## 2022-09-02 RX ADMIN — Medication 1 APPLICATION(S): at 06:21

## 2022-09-02 RX ADMIN — CHLORHEXIDINE GLUCONATE 1 APPLICATION(S): 213 SOLUTION TOPICAL at 06:23

## 2022-09-02 RX ADMIN — Medication 1 TABLET(S): at 08:03

## 2022-09-02 RX ADMIN — Medication 1 TABLET(S): at 18:09

## 2022-09-02 RX ADMIN — Medication 8 UNIT(S): at 11:36

## 2022-09-02 RX ADMIN — INSULIN GLARGINE 25 UNIT(S): 100 INJECTION, SOLUTION SUBCUTANEOUS at 08:02

## 2022-09-02 RX ADMIN — FAMOTIDINE 20 MILLIGRAM(S): 10 INJECTION INTRAVENOUS at 11:30

## 2022-09-02 RX ADMIN — Medication 8 UNIT(S): at 08:10

## 2022-09-02 RX ADMIN — Medication 4: at 11:36

## 2022-09-02 RX ADMIN — RIVAROXABAN 5 MILLIGRAM(S): KIT at 11:30

## 2022-09-02 RX ADMIN — Medication 2: at 18:08

## 2022-09-02 NOTE — PROGRESS NOTE ADULT - ASSESSMENT
46 yo M with PMH of Intellectual Disability and DM not on any medications presented with foot wounds. Hospital stay complicated Hospital acquired COVID s/p RDV, poorly controlled DM. Patient has been in the hospital >100 days, pending guardianship and now placement. no change in status today.    # Tachycardia 8/12 and intermittently  - No CP/SOB  - EKG 8/11 with new incomplete RBBB and s1q3t3  - Discussed with Dr Piper, recs r/o DVT/PE; CTA + LE duplex negative  - TTE: 1. EF of 66 %, G1DD    # Covid PNA - resolved  - S/p RDV    # Onychomycosis  - S/p debridement and curettage by podiatry 7/18  - F/u outpatient with Dr. Bronson    # Dental caries  - S/p course of clindamycin, outpatient f/u for teeth extractions    # DM2   - ISS    # Intellectual disability/autism    # Misc  - DVT Prophylaxis: Xarelto  - GI Prophylaxis: famotidine    Tablet 20 milliGRAM(s) Oral daily  - Diet: Diet, DASH/TLC  - Code Status: Full    Evan Clark MD  PGY1, Internal Medicine   [facility]

## 2022-09-02 NOTE — PROGRESS NOTE ADULT - ASSESSMENT
45 yo M with PMH of Intellectual Disability and DM not on any medications presented with foot wounds.    Covid  PNA  Onychomycosis  Dental caries  DM2 with hyperglycemia due to dietary non-compliance  Intellectual disability/autism        Covid PNA- completed RDV. Resolved  Nail bed wound and onychomycosis. Status post Aseptic debridement and curettage of all fungal and ingrowing nails 1-5 bilateral with sterile nail nipper 06/07  Podiatry- no surgical intervention is advised. outpt podiatry f/u  DM type 2 - uncontrolled. A1C 8.9.Insulin glargine and lispro ( doses adjusted). Not compliant with diabetic diet.   Dental Caries-completed clinda course. Will need outpt f/u for possible multiple extractions  Insomnia- prn melatonin    Handoff:  Pending placement , guardianship established  medically stable for discharge

## 2022-09-02 NOTE — PROGRESS NOTE ADULT - SUBJECTIVE AND OBJECTIVE BOX
HPI:  47 yo M with PMHx of Intellectual Disability, DM not on any medications presents with foot wounds. Pt has very poor foot hygiene and per the sister, has been persistently scratching an open wound on his L second toe, which worsened and became more red, had drainage, malodor. He saw a physician at [facility] and was told to come to the ED for evaluation. Pt is poor historian. Does endorse abdominal pain for a few days, cannot give much more description. Per the sister, pt did not seem to have any recent fevers, however is also not a very good historian and does not seem to have good health literacy. States that her and her mom decided to stop giving pt Metformin a while ago, are treating his diabetes by not giving him any sugary foods.  In the ED, T 97.8, /87, , RR 16, SpO2 99% on RA. Lab work unrevealing.  (31 Mar 2022 22:36)    Currently admitted to medicine with the primary diagnosis of Onychomycosis       Today is hospital day 155d.     INTERVAL HPI / OVERNIGHT EVENTS:  Patient was examined and seen at bedside. This morning he is resting comfortably in bed and reports no new issues or overnight events.     ROS: Otherwise unremarkable     PAST MEDICAL & SURGICAL HISTORY  DM (diabetes mellitus)    Intellectual disability      ALLERGIES  penicillin (Unknown)    MEDICATIONS  STANDING MEDICATIONS  ammonium lactate 12% Lotion 1 Application(s) Topical two times a day  chlorhexidine 4% Liquid 1 Application(s) Topical <User Schedule>  clotrimazole 1% Cream 1 Application(s) Topical two times a day  famotidine    Tablet 20 milliGRAM(s) Oral daily  insulin glargine Injectable (LANTUS) 25 Unit(s) SubCutaneous every morning  insulin lispro (ADMELOG) corrective regimen sliding scale   SubCutaneous three times a day before meals  insulin lispro Injectable (ADMELOG) 8 Unit(s) SubCutaneous three times a day before meals  lactobacillus acidophilus 1 Tablet(s) Oral two times a day  melatonin 3 milliGRAM(s) Oral at bedtime  rivaroxaban 5 milliGRAM(s) Oral daily  vitamin A &amp; D Ointment 1 Application(s) Topical daily    PRN MEDICATIONS  acetaminophen     Tablet .. 650 milliGRAM(s) Oral every 6 hours PRN    VITALS:  T(F): 97.9  HR: 87  BP: 121/58  RR: 18    PHYSICAL EXAM  GEN: NAD, Resting comfortably in bed  PULM: Clear to auscultation bilaterally, No wheezes  CVS: Regular rate and rhythm, S1-S2, no murmurs  ABD: Soft, non-tender, non-distended, no guarding  EXT: No edema  NEURO: A&Ox3, no focal deficits

## 2022-09-03 LAB
GLUCOSE BLDC GLUCOMTR-MCNC: 142 MG/DL — HIGH (ref 70–99)
GLUCOSE BLDC GLUCOMTR-MCNC: 227 MG/DL — HIGH (ref 70–99)
GLUCOSE BLDC GLUCOMTR-MCNC: 229 MG/DL — HIGH (ref 70–99)
GLUCOSE BLDC GLUCOMTR-MCNC: 229 MG/DL — HIGH (ref 70–99)

## 2022-09-03 PROCEDURE — 99231 SBSQ HOSP IP/OBS SF/LOW 25: CPT

## 2022-09-03 RX ADMIN — Medication 8 UNIT(S): at 08:19

## 2022-09-03 RX ADMIN — INSULIN GLARGINE 25 UNIT(S): 100 INJECTION, SOLUTION SUBCUTANEOUS at 08:19

## 2022-09-03 RX ADMIN — Medication 4: at 11:10

## 2022-09-03 RX ADMIN — FAMOTIDINE 20 MILLIGRAM(S): 10 INJECTION INTRAVENOUS at 11:11

## 2022-09-03 RX ADMIN — Medication 1 APPLICATION(S): at 05:03

## 2022-09-03 RX ADMIN — Medication 8 UNIT(S): at 17:07

## 2022-09-03 RX ADMIN — Medication 1 APPLICATION(S): at 05:02

## 2022-09-03 RX ADMIN — RIVAROXABAN 5 MILLIGRAM(S): KIT at 11:10

## 2022-09-03 RX ADMIN — Medication 1 APPLICATION(S): at 17:06

## 2022-09-03 RX ADMIN — Medication 1 APPLICATION(S): at 11:12

## 2022-09-03 RX ADMIN — Medication 8 UNIT(S): at 11:10

## 2022-09-03 RX ADMIN — Medication 4: at 17:07

## 2022-09-03 RX ADMIN — Medication 1 TABLET(S): at 05:03

## 2022-09-03 RX ADMIN — Medication 3 MILLIGRAM(S): at 21:12

## 2022-09-03 RX ADMIN — Medication 1 TABLET(S): at 17:07

## 2022-09-03 NOTE — PROGRESS NOTE ADULT - SUBJECTIVE AND OBJECTIVE BOX
SUBJECTIVE:    Patient is a 47y old Male who presents with a chief complaint of DFU (02 Sep 2022 17:57)    Currently admitted to medicine with the primary diagnosis of Onychomycosis       Today is hospital day 156d.     PAST MEDICAL & SURGICAL HISTORY  DM (diabetes mellitus)    Intellectual disability      ALLERGIES:  penicillin (Unknown)    MEDICATIONS:  STANDING MEDICATIONS  ammonium lactate 12% Lotion 1 Application(s) Topical two times a day  chlorhexidine 4% Liquid 1 Application(s) Topical <User Schedule>  clotrimazole 1% Cream 1 Application(s) Topical two times a day  famotidine    Tablet 20 milliGRAM(s) Oral daily  insulin glargine Injectable (LANTUS) 25 Unit(s) SubCutaneous every morning  insulin lispro (ADMELOG) corrective regimen sliding scale   SubCutaneous three times a day before meals  insulin lispro Injectable (ADMELOG) 8 Unit(s) SubCutaneous three times a day before meals  lactobacillus acidophilus 1 Tablet(s) Oral two times a day  melatonin 3 milliGRAM(s) Oral at bedtime  rivaroxaban 5 milliGRAM(s) Oral daily  vitamin A &amp; D Ointment 1 Application(s) Topical daily    PRN MEDICATIONS  acetaminophen     Tablet .. 650 milliGRAM(s) Oral every 6 hours PRN    VITALS:   T(F): 99  HR: 107  BP: 134/71  RR: 18  SpO2: --    LABS:                        RADIOLOGY:    PHYSICAL EXAM:  GEN: No acute distress  LUNGS: Clear to auscultation bilaterally   HEART: S1/S2 present. RRR.   ABD/ GI: Soft, non-tender, non-distended. Bowel sounds present  EXT: NC/NC/NE/2+PP/LOJA  NEURO: AAOX3

## 2022-09-04 LAB
GLUCOSE BLDC GLUCOMTR-MCNC: 137 MG/DL — HIGH (ref 70–99)
GLUCOSE BLDC GLUCOMTR-MCNC: 146 MG/DL — HIGH (ref 70–99)
GLUCOSE BLDC GLUCOMTR-MCNC: 149 MG/DL — HIGH (ref 70–99)
GLUCOSE BLDC GLUCOMTR-MCNC: 180 MG/DL — HIGH (ref 70–99)
GLUCOSE BLDC GLUCOMTR-MCNC: 187 MG/DL — HIGH (ref 70–99)

## 2022-09-04 PROCEDURE — 99231 SBSQ HOSP IP/OBS SF/LOW 25: CPT

## 2022-09-04 RX ADMIN — Medication 8 UNIT(S): at 08:05

## 2022-09-04 RX ADMIN — FAMOTIDINE 20 MILLIGRAM(S): 10 INJECTION INTRAVENOUS at 11:35

## 2022-09-04 RX ADMIN — Medication 2: at 11:36

## 2022-09-04 RX ADMIN — Medication 1 APPLICATION(S): at 11:36

## 2022-09-04 RX ADMIN — INSULIN GLARGINE 25 UNIT(S): 100 INJECTION, SOLUTION SUBCUTANEOUS at 08:06

## 2022-09-04 RX ADMIN — RIVAROXABAN 5 MILLIGRAM(S): KIT at 11:35

## 2022-09-04 RX ADMIN — Medication 1 TABLET(S): at 05:14

## 2022-09-04 RX ADMIN — Medication 1 APPLICATION(S): at 17:01

## 2022-09-04 RX ADMIN — Medication 1 APPLICATION(S): at 05:14

## 2022-09-04 RX ADMIN — Medication 1 TABLET(S): at 17:01

## 2022-09-04 RX ADMIN — Medication 8 UNIT(S): at 17:01

## 2022-09-04 RX ADMIN — Medication 8 UNIT(S): at 11:35

## 2022-09-04 RX ADMIN — Medication 3 MILLIGRAM(S): at 21:11

## 2022-09-04 NOTE — PROGRESS NOTE ADULT - SUBJECTIVE AND OBJECTIVE BOX
SUBJECTIVE:    Patient is a 47y old Male who presents with a chief complaint of DFU (04 Sep 2022 09:24)    Currently admitted to medicine with the primary diagnosis of Onychomycosis       Today is hospital day 157d.     PAST MEDICAL & SURGICAL HISTORY  DM (diabetes mellitus)    Intellectual disability      ALLERGIES:  penicillin (Unknown)    MEDICATIONS:  STANDING MEDICATIONS  ammonium lactate 12% Lotion 1 Application(s) Topical two times a day  chlorhexidine 4% Liquid 1 Application(s) Topical <User Schedule>  clotrimazole 1% Cream 1 Application(s) Topical two times a day  famotidine    Tablet 20 milliGRAM(s) Oral daily  insulin glargine Injectable (LANTUS) 25 Unit(s) SubCutaneous every morning  insulin lispro (ADMELOG) corrective regimen sliding scale   SubCutaneous three times a day before meals  insulin lispro Injectable (ADMELOG) 8 Unit(s) SubCutaneous three times a day before meals  lactobacillus acidophilus 1 Tablet(s) Oral two times a day  melatonin 3 milliGRAM(s) Oral at bedtime  rivaroxaban 5 milliGRAM(s) Oral daily  vitamin A &amp; D Ointment 1 Application(s) Topical daily    PRN MEDICATIONS  acetaminophen     Tablet .. 650 milliGRAM(s) Oral every 6 hours PRN    VITALS:   T(F): 97.1  HR: 97  BP: 140/79  RR: 18  SpO2: --    LABS:                        RADIOLOGY:    PHYSICAL EXAM:  GEN: No acute distress  LUNGS: Clear to auscultation bilaterally   HEART: S1/S2 present. RRR.   ABD/ GI: Soft, non-tender, non-distended. Bowel sounds present  EXT: NC/NC/NE/2+PP/LOJA  NEURO: AAOX3

## 2022-09-04 NOTE — PROGRESS NOTE ADULT - SUBJECTIVE AND OBJECTIVE BOX
HPI:  47 yo M with PMHx of Intellectual Disability, DM not on any medications presents with foot wounds. Pt has very poor foot hygiene and per the sister, has been persistently scratching an open wound on his L second toe, which worsened and became more red, had drainage, malodor. He saw a physician at [facility] and was told to come to the ED for evaluation. Pt is poor historian. Does endorse abdominal pain for a few days, cannot give much more description. Per the sister, pt did not seem to have any recent fevers, however is also not a very good historian and does not seem to have good health literacy. States that her and her mom decided to stop giving pt Metformin a while ago, are treating his diabetes by not giving him any sugary foods.  In the ED, T 97.8, /87, , RR 16, SpO2 99% on RA. Lab work unrevealing.  (31 Mar 2022 22:36)    Currently admitted to medicine with the primary diagnosis of Onychomycosis       Today is hospital day 157d.     INTERVAL HPI / OVERNIGHT EVENTS:  Patient was examined and seen at bedside. This morning he is resting comfortably in bed and reports no new issues or overnight events.     ROS: Otherwise unremarkable     PAST MEDICAL & SURGICAL HISTORY  DM (diabetes mellitus)    Intellectual disability      ALLERGIES  penicillin (Unknown)    MEDICATIONS  STANDING MEDICATIONS  ammonium lactate 12% Lotion 1 Application(s) Topical two times a day  chlorhexidine 4% Liquid 1 Application(s) Topical <User Schedule>  clotrimazole 1% Cream 1 Application(s) Topical two times a day  famotidine    Tablet 20 milliGRAM(s) Oral daily  insulin glargine Injectable (LANTUS) 25 Unit(s) SubCutaneous every morning  insulin lispro (ADMELOG) corrective regimen sliding scale   SubCutaneous three times a day before meals  insulin lispro Injectable (ADMELOG) 8 Unit(s) SubCutaneous three times a day before meals  lactobacillus acidophilus 1 Tablet(s) Oral two times a day  melatonin 3 milliGRAM(s) Oral at bedtime  rivaroxaban 5 milliGRAM(s) Oral daily  vitamin A &amp; D Ointment 1 Application(s) Topical daily    PRN MEDICATIONS  acetaminophen     Tablet .. 650 milliGRAM(s) Oral every 6 hours PRN    VITALS:  T(F): 96.4  HR: 62  BP: 133/58  RR: 18    PHYSICAL EXAM  GEN: NAD, Resting comfortably in bed  PULM: Clear to auscultation bilaterally, No wheezes  CVS: Regular rate and rhythm, S1-S2, no murmurs  ABD: Soft, non-tender, non-distended, no guarding  EXT: No edema  NEURO: A&Ox3, no focal deficits

## 2022-09-04 NOTE — PROGRESS NOTE ADULT - ASSESSMENT
46 yo M with PMH of Intellectual Disability and DM not on any medications presented with foot wounds. Hospital stay complicated Hospital acquired COVID s/p RDV, poorly controlled DM. Patient has been in the hospital >100 days, pending guardianship and now placement. no change in status today.    # Tachycardia 8/12 and intermittently  - No CP/SOB  - EKG 8/11 with new incomplete RBBB and s1q3t3  - Discussed with Dr Piper, recs r/o DVT/PE; CTA + LE duplex negative  - TTE: 1. EF of 66 %, G1DD    # Covid PNA - resolved  - S/p RDV    # Onychomycosis  - S/p debridement and curettage by podiatry 7/18  - F/u outpatient with Dr. Bronson    # Dental caries  - S/p course of clindamycin, outpatient f/u for teeth extractions    # DM2   - ISS    # Intellectual disability/autism    # Misc  - DVT Prophylaxis: Xarelto  - GI Prophylaxis: famotidine    Tablet 20 milliGRAM(s) Oral daily  - Diet: Diet, DASH/TLC  - Code Status: Full    Evan Clark MD  PGY1, Internal Medicine   [facility]

## 2022-09-05 LAB
GLUCOSE BLDC GLUCOMTR-MCNC: 110 MG/DL — HIGH (ref 70–99)
GLUCOSE BLDC GLUCOMTR-MCNC: 150 MG/DL — HIGH (ref 70–99)
GLUCOSE BLDC GLUCOMTR-MCNC: 185 MG/DL — HIGH (ref 70–99)
GLUCOSE BLDC GLUCOMTR-MCNC: 279 MG/DL — HIGH (ref 70–99)

## 2022-09-05 PROCEDURE — 99231 SBSQ HOSP IP/OBS SF/LOW 25: CPT

## 2022-09-05 RX ORDER — INSULIN LISPRO 100/ML
4 VIAL (ML) SUBCUTANEOUS ONCE
Refills: 0 | Status: COMPLETED | OUTPATIENT
Start: 2022-09-05 | End: 2022-09-05

## 2022-09-05 RX ADMIN — Medication 8 UNIT(S): at 17:23

## 2022-09-05 RX ADMIN — Medication 4 UNIT(S): at 21:46

## 2022-09-05 RX ADMIN — Medication 1 TABLET(S): at 05:07

## 2022-09-05 RX ADMIN — INSULIN GLARGINE 25 UNIT(S): 100 INJECTION, SOLUTION SUBCUTANEOUS at 08:22

## 2022-09-05 RX ADMIN — Medication 1 APPLICATION(S): at 05:07

## 2022-09-05 RX ADMIN — Medication 1 TABLET(S): at 17:25

## 2022-09-05 RX ADMIN — Medication 2: at 11:49

## 2022-09-05 RX ADMIN — RIVAROXABAN 5 MILLIGRAM(S): KIT at 11:51

## 2022-09-05 RX ADMIN — Medication 8 UNIT(S): at 11:50

## 2022-09-05 RX ADMIN — Medication 1 APPLICATION(S): at 17:27

## 2022-09-05 RX ADMIN — FAMOTIDINE 20 MILLIGRAM(S): 10 INJECTION INTRAVENOUS at 11:51

## 2022-09-05 RX ADMIN — Medication 1 APPLICATION(S): at 17:24

## 2022-09-05 RX ADMIN — Medication 8 UNIT(S): at 08:23

## 2022-09-05 RX ADMIN — Medication 1 APPLICATION(S): at 11:52

## 2022-09-05 RX ADMIN — Medication 3 MILLIGRAM(S): at 21:09

## 2022-09-05 NOTE — PROGRESS NOTE ADULT - ASSESSMENT
45 yo M with PMH of Intellectual Disability and DM not on any medications presented with foot wounds.    Covid  PNA  Onychomycosis  Dental caries  DM2 with hyperglycemia due to dietary non-compliance  Intellectual disability/autism        Covid PNA- completed RDV. Resolved  Nail bed wound and onychomycosis. Status post Aseptic debridement and curettage of all fungal and ingrowing nails 1-5 bilateral with sterile nail nipper 06/07  Podiatry- no surgical intervention is advised. outpt podiatry f/u  DM type 2 - uncontrolled. A1C 8.9.Insulin glargine and lispro ( doses adjusted). Not compliant with diabetic diet.   Dental Caries-completed clinda course. Will need outpt f/u for possible multiple extractions  Insomnia- prn melatonin  multiple scratch wounds-- ammonium lactate    Handoff:  Pending placement , guardianship established  medically stable for discharge

## 2022-09-05 NOTE — PROGRESS NOTE ADULT - ASSESSMENT
46 yo M with PMH of Intellectual Disability and DM not on any medications presented with foot wounds. Hospital stay complicated Hospital acquired COVID s/p RDV, poorly controlled DM. Patient has been in the hospital >100 days, pending guardianship and now placement. no change in status today.    # Tachycardia 8/12 and intermittently  - No CP/SOB  - EKG 8/11 with new incomplete RBBB and s1q3t3  - Discussed with Dr Piper, recs r/o DVT/PE; CTA + LE duplex negative  - TTE: 1. EF of 66 %, G1DD    # Covid PNA - resolved  - S/p RDV    # Onychomycosis  - S/p debridement and curettage by podiatry 7/18  - F/u outpatient with Dr. Bronson    # Dental caries  - S/p course of clindamycin, outpatient f/u for teeth extractions    # DM2   - ISS    # Intellectual disability/autism    # Misc  - DVT Prophylaxis: Xarelto  - GI Prophylaxis: famotidine    Tablet 20 milliGRAM(s) Oral daily  - Diet: Diet, DASH/TLC  - Code Status: Full    Evan Clark MD  PGY1, Internal Medicine   [facility]

## 2022-09-05 NOTE — PROGRESS NOTE ADULT - SUBJECTIVE AND OBJECTIVE BOX
SUBJECTIVE:    Patient is a 47y old Male who presents with a chief complaint of DFU (05 Sep 2022 15:51)    Currently admitted to medicine with the primary diagnosis of Onychomycosis     Today is hospital day 158d. This morning he is resting comfortably in bed and reports no new issues or overnight events. Pt status unchanged.    PAST MEDICAL & SURGICAL HISTORY  DM (diabetes mellitus)    Intellectual disability    SOCIAL HISTORY:  Negative for smoking/alcohol/drug use.     ALLERGIES:  penicillin (Unknown)    MEDICATIONS:  STANDING MEDICATIONS  ammonium lactate 12% Lotion 1 Application(s) Topical two times a day  chlorhexidine 4% Liquid 1 Application(s) Topical <User Schedule>  clotrimazole 1% Cream 1 Application(s) Topical two times a day  famotidine    Tablet 20 milliGRAM(s) Oral daily  insulin glargine Injectable (LANTUS) 25 Unit(s) SubCutaneous every morning  insulin lispro (ADMELOG) corrective regimen sliding scale   SubCutaneous three times a day before meals  insulin lispro Injectable (ADMELOG) 8 Unit(s) SubCutaneous three times a day before meals  lactobacillus acidophilus 1 Tablet(s) Oral two times a day  melatonin 3 milliGRAM(s) Oral at bedtime  rivaroxaban 5 milliGRAM(s) Oral daily  vitamin A &amp; D Ointment 1 Application(s) Topical daily    PRN MEDICATIONS  acetaminophen     Tablet .. 650 milliGRAM(s) Oral every 6 hours PRN    VITALS:   T(F): 96.6  HR: 109  BP: 135/75  RR: 18  SpO2: --    PHYSICAL EXAM:  GEN: No acute distress  LUNGS: Clear to auscultation bilaterally   HEART: S1/S2 present. RRR.   ABD: Soft, non-tender, non-distended. Bowel sounds present  EXT: NC/NC/NE/2+PP/LOJA  NEURO: AAOX3

## 2022-09-05 NOTE — PROGRESS NOTE ADULT - SUBJECTIVE AND OBJECTIVE BOX
SUBJECTIVE:    Patient is a 47y old Male who presents with a chief complaint of DFU (04 Sep 2022 16:32)    Currently admitted to medicine with the primary diagnosis of Onychomycosis       Today is hospital day 158d.     PAST MEDICAL & SURGICAL HISTORY  DM (diabetes mellitus)    Intellectual disability      ALLERGIES:  penicillin (Unknown)    MEDICATIONS:  STANDING MEDICATIONS  ammonium lactate 12% Lotion 1 Application(s) Topical two times a day  chlorhexidine 4% Liquid 1 Application(s) Topical <User Schedule>  clotrimazole 1% Cream 1 Application(s) Topical two times a day  famotidine    Tablet 20 milliGRAM(s) Oral daily  insulin glargine Injectable (LANTUS) 25 Unit(s) SubCutaneous every morning  insulin lispro (ADMELOG) corrective regimen sliding scale   SubCutaneous three times a day before meals  insulin lispro Injectable (ADMELOG) 8 Unit(s) SubCutaneous three times a day before meals  lactobacillus acidophilus 1 Tablet(s) Oral two times a day  melatonin 3 milliGRAM(s) Oral at bedtime  rivaroxaban 5 milliGRAM(s) Oral daily  vitamin A &amp; D Ointment 1 Application(s) Topical daily    PRN MEDICATIONS  acetaminophen     Tablet .. 650 milliGRAM(s) Oral every 6 hours PRN    VITALS:   T(F): 97.9  HR: 103  BP: 134/70  RR: 18  SpO2: --    LABS:                        RADIOLOGY:    PHYSICAL EXAM:  GEN: No acute distress  LUNGS: Clear to auscultation bilaterally   HEART: S1/S2 present. RRR.   ABD/ GI: Soft, non-tender, non-distended. Bowel sounds present  EXT: NC/NC/NE/2+PP/LOJA  NEURO: AAOX3

## 2022-09-06 LAB
ALBUMIN SERPL ELPH-MCNC: 4.1 G/DL — SIGNIFICANT CHANGE UP (ref 3.5–5.2)
ALP SERPL-CCNC: 109 U/L — SIGNIFICANT CHANGE UP (ref 30–115)
ALT FLD-CCNC: 20 U/L — SIGNIFICANT CHANGE UP (ref 0–41)
ANION GAP SERPL CALC-SCNC: 10 MMOL/L — SIGNIFICANT CHANGE UP (ref 7–14)
AST SERPL-CCNC: 18 U/L — SIGNIFICANT CHANGE UP (ref 0–41)
BASOPHILS # BLD AUTO: 0.03 K/UL — SIGNIFICANT CHANGE UP (ref 0–0.2)
BASOPHILS NFR BLD AUTO: 0.5 % — SIGNIFICANT CHANGE UP (ref 0–1)
BILIRUB SERPL-MCNC: 0.4 MG/DL — SIGNIFICANT CHANGE UP (ref 0.2–1.2)
BUN SERPL-MCNC: 17 MG/DL — SIGNIFICANT CHANGE UP (ref 10–20)
CALCIUM SERPL-MCNC: 8.8 MG/DL — SIGNIFICANT CHANGE UP (ref 8.5–10.1)
CHLORIDE SERPL-SCNC: 102 MMOL/L — SIGNIFICANT CHANGE UP (ref 98–110)
CO2 SERPL-SCNC: 22 MMOL/L — SIGNIFICANT CHANGE UP (ref 17–32)
CREAT SERPL-MCNC: 0.6 MG/DL — LOW (ref 0.7–1.5)
EGFR: 119 ML/MIN/1.73M2 — SIGNIFICANT CHANGE UP
EOSINOPHIL # BLD AUTO: 0.42 K/UL — SIGNIFICANT CHANGE UP (ref 0–0.7)
EOSINOPHIL NFR BLD AUTO: 6.4 % — SIGNIFICANT CHANGE UP (ref 0–8)
GLUCOSE BLDC GLUCOMTR-MCNC: 161 MG/DL — HIGH (ref 70–99)
GLUCOSE BLDC GLUCOMTR-MCNC: 174 MG/DL — HIGH (ref 70–99)
GLUCOSE BLDC GLUCOMTR-MCNC: 180 MG/DL — HIGH (ref 70–99)
GLUCOSE BLDC GLUCOMTR-MCNC: 254 MG/DL — HIGH (ref 70–99)
GLUCOSE SERPL-MCNC: 190 MG/DL — HIGH (ref 70–99)
HCT VFR BLD CALC: 41.5 % — LOW (ref 42–52)
HGB BLD-MCNC: 14.3 G/DL — SIGNIFICANT CHANGE UP (ref 14–18)
IMM GRANULOCYTES NFR BLD AUTO: 0.3 % — SIGNIFICANT CHANGE UP (ref 0.1–0.3)
LYMPHOCYTES # BLD AUTO: 2.14 K/UL — SIGNIFICANT CHANGE UP (ref 1.2–3.4)
LYMPHOCYTES # BLD AUTO: 32.6 % — SIGNIFICANT CHANGE UP (ref 20.5–51.1)
MCHC RBC-ENTMCNC: 32.2 PG — HIGH (ref 27–31)
MCHC RBC-ENTMCNC: 34.5 G/DL — SIGNIFICANT CHANGE UP (ref 32–37)
MCV RBC AUTO: 93.5 FL — SIGNIFICANT CHANGE UP (ref 80–94)
MONOCYTES # BLD AUTO: 0.57 K/UL — SIGNIFICANT CHANGE UP (ref 0.1–0.6)
MONOCYTES NFR BLD AUTO: 8.7 % — SIGNIFICANT CHANGE UP (ref 1.7–9.3)
NEUTROPHILS # BLD AUTO: 3.39 K/UL — SIGNIFICANT CHANGE UP (ref 1.4–6.5)
NEUTROPHILS NFR BLD AUTO: 51.5 % — SIGNIFICANT CHANGE UP (ref 42.2–75.2)
NRBC # BLD: 0 /100 WBCS — SIGNIFICANT CHANGE UP (ref 0–0)
PLATELET # BLD AUTO: 261 K/UL — SIGNIFICANT CHANGE UP (ref 130–400)
POTASSIUM SERPL-MCNC: 4 MMOL/L — SIGNIFICANT CHANGE UP (ref 3.5–5)
POTASSIUM SERPL-SCNC: 4 MMOL/L — SIGNIFICANT CHANGE UP (ref 3.5–5)
PROT SERPL-MCNC: 6.6 G/DL — SIGNIFICANT CHANGE UP (ref 6–8)
RBC # BLD: 4.44 M/UL — LOW (ref 4.7–6.1)
RBC # FLD: 11.7 % — SIGNIFICANT CHANGE UP (ref 11.5–14.5)
SODIUM SERPL-SCNC: 134 MMOL/L — LOW (ref 135–146)
WBC # BLD: 6.57 K/UL — SIGNIFICANT CHANGE UP (ref 4.8–10.8)
WBC # FLD AUTO: 6.57 K/UL — SIGNIFICANT CHANGE UP (ref 4.8–10.8)

## 2022-09-06 PROCEDURE — 99231 SBSQ HOSP IP/OBS SF/LOW 25: CPT

## 2022-09-06 RX ADMIN — Medication 1 TABLET(S): at 05:21

## 2022-09-06 RX ADMIN — RIVAROXABAN 5 MILLIGRAM(S): KIT at 11:57

## 2022-09-06 RX ADMIN — Medication 2: at 11:58

## 2022-09-06 RX ADMIN — Medication 2: at 17:09

## 2022-09-06 RX ADMIN — Medication 8 UNIT(S): at 08:11

## 2022-09-06 RX ADMIN — Medication 1 APPLICATION(S): at 17:11

## 2022-09-06 RX ADMIN — Medication 8 UNIT(S): at 17:10

## 2022-09-06 RX ADMIN — FAMOTIDINE 20 MILLIGRAM(S): 10 INJECTION INTRAVENOUS at 11:56

## 2022-09-06 RX ADMIN — Medication 2: at 08:11

## 2022-09-06 RX ADMIN — Medication 3 MILLIGRAM(S): at 21:16

## 2022-09-06 RX ADMIN — Medication 1 TABLET(S): at 17:08

## 2022-09-06 RX ADMIN — Medication 1 APPLICATION(S): at 05:21

## 2022-09-06 RX ADMIN — INSULIN GLARGINE 25 UNIT(S): 100 INJECTION, SOLUTION SUBCUTANEOUS at 08:10

## 2022-09-06 RX ADMIN — Medication 1 APPLICATION(S): at 12:00

## 2022-09-06 RX ADMIN — Medication 8 UNIT(S): at 11:58

## 2022-09-06 NOTE — PROGRESS NOTE ADULT - ASSESSMENT
48 yo M with PMH of Intellectual Disability and DM not on any medications presented with foot wounds. Hospital stay complicated Hospital acquired COVID s/p RDV, poorly controlled DM. Patient has been in the hospital >100 days, pending guardianship and now placement. no change in status today.    # Tachycardia 8/12 and intermittently  - No CP/SOB  - EKG 8/11 with new incomplete RBBB and s1q3t3  - Discussed with Dr Piper, recs r/o DVT/PE; CTA + LE duplex negative  - TTE: 1. EF of 66 %, G1DD    # Covid PNA - resolved  - S/p RDV    # Onychomycosis  - S/p debridement and curettage by podiatry 7/18  - F/u outpatient with Dr. Bronson    # Dental caries  - S/p course of clindamycin, outpatient f/u for teeth extractions    # DM2   - ISS    # Intellectual disability/autism    # Misc  - DVT Prophylaxis: Xarelto  - GI Prophylaxis: famotidine    Tablet 20 milliGRAM(s) Oral daily  - Diet: Diet, DASH/TLC  - Code Status: Full    Evan Clark MD  PGY1, Internal Medicine

## 2022-09-06 NOTE — PROGRESS NOTE ADULT - SUBJECTIVE AND OBJECTIVE BOX
HPI:  45 yo M with PMHx of Intellectual Disability, DM not on any medications presents with foot wounds. Pt has very poor foot hygiene and per the sister, has been persistently scratching an open wound on his L second toe, which worsened and became more red, had drainage, malodor. He saw a physician at [facility] and was told to come to the ED for evaluation. Pt is poor historian. Does endorse abdominal pain for a few days, cannot give much more description. Per the sister, pt did not seem to have any recent fevers, however is also not a very good historian and does not seem to have good health literacy. States that her and her mom decided to stop giving pt Metformin a while ago, are treating his diabetes by not giving him any sugary foods.  In the ED, T 97.8, /87, , RR 16, SpO2 99% on RA. Lab work unrevealing.  (31 Mar 2022 22:36)    Currently admitted to medicine with the primary diagnosis of Onychomycosis       Today is hospital day 159d.     INTERVAL HPI / OVERNIGHT EVENTS:  Patient was examined and seen at bedside. This morning he is resting comfortably in bed and reports no new issues or overnight events.     ROS: Otherwise unremarkable     PAST MEDICAL & SURGICAL HISTORY  DM (diabetes mellitus)    Intellectual disability      ALLERGIES  penicillin (Unknown)    MEDICATIONS  STANDING MEDICATIONS  ammonium lactate 12% Lotion 1 Application(s) Topical two times a day  chlorhexidine 4% Liquid 1 Application(s) Topical <User Schedule>  clotrimazole 1% Cream 1 Application(s) Topical two times a day  famotidine    Tablet 20 milliGRAM(s) Oral daily  insulin glargine Injectable (LANTUS) 25 Unit(s) SubCutaneous every morning  insulin lispro (ADMELOG) corrective regimen sliding scale   SubCutaneous three times a day before meals  insulin lispro Injectable (ADMELOG) 8 Unit(s) SubCutaneous three times a day before meals  lactobacillus acidophilus 1 Tablet(s) Oral two times a day  melatonin 3 milliGRAM(s) Oral at bedtime  rivaroxaban 5 milliGRAM(s) Oral daily  vitamin A &amp; D Ointment 1 Application(s) Topical daily    PRN MEDICATIONS  acetaminophen     Tablet .. 650 milliGRAM(s) Oral every 6 hours PRN    VITALS:  T(F): 97.5  HR: 93  BP: 140/81  RR: 18  SpO2: --    PHYSICAL EXAM  GEN: NAD, walking comfortably in the unit  PULM: No wheezes  ABD: Soft, non-tender, non-distended, no guarding  EXT: No edema  NEURO: A&Ox3, no focal deficits    LABS                        14.3   6.57  )-----------( 261      ( 06 Sep 2022 06:56 )             41.5     09-06    134<L>  |  102  |  17  ----------------------------<  190<H>  4.0   |  22  |  0.6<L>    Ca    8.8      06 Sep 2022 06:56    TPro  6.6  /  Alb  4.1  /  TBili  0.4  /  DBili  x   /  AST  18  /  ALT  20  /  AlkPhos  109  09-06

## 2022-09-07 LAB
GLUCOSE BLDC GLUCOMTR-MCNC: 163 MG/DL — HIGH (ref 70–99)
GLUCOSE BLDC GLUCOMTR-MCNC: 178 MG/DL — HIGH (ref 70–99)
GLUCOSE BLDC GLUCOMTR-MCNC: 206 MG/DL — HIGH (ref 70–99)
GLUCOSE BLDC GLUCOMTR-MCNC: 222 MG/DL — HIGH (ref 70–99)

## 2022-09-07 PROCEDURE — 99231 SBSQ HOSP IP/OBS SF/LOW 25: CPT

## 2022-09-07 RX ADMIN — Medication 8 UNIT(S): at 17:24

## 2022-09-07 RX ADMIN — Medication 8 UNIT(S): at 11:28

## 2022-09-07 RX ADMIN — Medication 1 APPLICATION(S): at 17:25

## 2022-09-07 RX ADMIN — Medication 3 MILLIGRAM(S): at 21:57

## 2022-09-07 RX ADMIN — Medication 1 APPLICATION(S): at 11:29

## 2022-09-07 RX ADMIN — Medication 2: at 08:10

## 2022-09-07 RX ADMIN — FAMOTIDINE 20 MILLIGRAM(S): 10 INJECTION INTRAVENOUS at 11:28

## 2022-09-07 RX ADMIN — CHLORHEXIDINE GLUCONATE 1 APPLICATION(S): 213 SOLUTION TOPICAL at 05:40

## 2022-09-07 RX ADMIN — RIVAROXABAN 5 MILLIGRAM(S): KIT at 11:28

## 2022-09-07 RX ADMIN — Medication 1 APPLICATION(S): at 05:39

## 2022-09-07 RX ADMIN — Medication 1 TABLET(S): at 05:34

## 2022-09-07 RX ADMIN — Medication 1 APPLICATION(S): at 17:24

## 2022-09-07 RX ADMIN — Medication 1 TABLET(S): at 17:25

## 2022-09-07 RX ADMIN — Medication 2: at 11:27

## 2022-09-07 RX ADMIN — Medication 4: at 17:24

## 2022-09-07 RX ADMIN — INSULIN GLARGINE 25 UNIT(S): 100 INJECTION, SOLUTION SUBCUTANEOUS at 08:11

## 2022-09-07 RX ADMIN — Medication 8 UNIT(S): at 08:11

## 2022-09-07 NOTE — PROGRESS NOTE ADULT - SUBJECTIVE AND OBJECTIVE BOX
Progress Note:  Provider Speciality                            Hospitalist      CARIN WATSON MRN-[id number] 47y Male     CHIEF PRESENTING COMPLAINT:  Patient is a 47y old  Male who presents with a chief complaint of DFU (24 Aug 2022 06:56)        SUBJECTIVE:  Patient was seen and examined at bedside. No new complaints described by patient in morning rounds except pruritis  No significant overnight events reported.     HISTORY OF PRESENTING ILLNESS:  HPI:  45 yo M with PMHx of Intellectual Disability, DM not on any medications presents with foot wounds. Pt has very poor foot hygiene and per the sister, has been persistently scratching an open wound on his L second toe, which worsened and became more red, had drainage, malodor. He saw a physician at [facility] and was told to come to the ED for evaluation. Pt is poor historian. Does endorse abdominal pain for a few days, cannot give much more description. Per the sister, pt did not seem to have any recent fevers, however is also not a very good historian and does not seem to have good health literacy. States that her and her mom decided to stop giving pt Metformin a while ago, are treating his diabetes by not giving him any sugary foods.  In the ED, T 97.8, /87, , RR 16, SpO2 99% on RA. Lab work unrevealing.  (31 Mar 2022 22:36)        REVIEW OF SYSTEMS:  Patient denies any headache, any vision complaints, runny nose, fever, chills. Denies chest pain, shortness of breath, palpitation. Denies nausea, vomiting, abdominal pain or diarrhoea, Denies dysuria. Denies  localized weakness in any part of the body or numbness.   At least 10 systems were reviewed in ROS. All systems reviewed  are within normal limits except for the complaints as described in Subjective.    PAST MEDICAL & SURGICAL HISTORY:  PAST MEDICAL & SURGICAL HISTORY:  DM (diabetes mellitus)      Intellectual disability              VITAL SIGNS:  Vital Signs Last 24 Hrs  T(C): 36.1 (07 Sep 2022 15:21), Max: 36.8 (06 Sep 2022 23:42)  T(F): 96.9 (07 Sep 2022 15:21), Max: 98.3 (06 Sep 2022 23:42)  HR: 112 (07 Sep 2022 15:21) (80 - 112)  BP: 124/71 (07 Sep 2022 15:21) (113/58 - 124/71)  BP(mean): --  RR: 18 (07 Sep 2022 15:21) (16 - 18)  SpO2: --                  PHYSICAL EXAMINATION:  Not in acute distress  General: No icterus  HEENT:   no JVD.  Heart: S1+S2 audible  Lungs: bilateral  moderate air entry, no wheezing, no crepitations.  Abdomen: Soft, non-tender, non-distended , no  rigidity or guarding.  CNS: Awake alert, CN  grossly intact.  Extremities:  No edema            CONSULTS:  Consultant(s) Notes Reviewed by me.   Care Discussed with Consultants/Other Providers where required.        MEDICATIONS:  MEDICATIONS  (STANDING):  ammonium lactate 12% Lotion 1 Application(s) Topical two times a day  chlorhexidine 4% Liquid 1 Application(s) Topical <User Schedule>  clotrimazole 1% Cream 1 Application(s) Topical two times a day  enoxaparin Injectable 40 milliGRAM(s) SubCutaneous every 24 hours  famotidine    Tablet 20 milliGRAM(s) Oral daily  insulin glargine Injectable (LANTUS) 25 Unit(s) SubCutaneous every morning  insulin lispro (ADMELOG) corrective regimen sliding scale   SubCutaneous three times a day before meals  insulin lispro Injectable (ADMELOG) 8 Unit(s) SubCutaneous three times a day before meals  lactobacillus acidophilus 1 Tablet(s) Oral two times a day  melatonin 3 milliGRAM(s) Oral at bedtime  vitamin A &amp; D Ointment 1 Application(s) Topical daily    MEDICATIONS  (PRN):  acetaminophen     Tablet .. 650 milliGRAM(s) Oral every 6 hours PRN Temp greater or equal to 38C (100.4F), Mild Pain (1 - 3)            ASSESSMENT:    45 yo M with PMH of Intellectual Disability and DM not on any medications presented with foot wounds.    Covid  PNA  Onychomycosis  Dental caries  DM2 with hyperglycemia due to dietary non-compliance  Intellectual disability/autism      Intermittemt tachycardia without hypoxia. Recently ruled out for PE and DVT, TTE unremarkable  Covid PNA- completed RDV. Resolved  Nail bed wound and onychomycosis. Status post Aseptic debridement and curettage of all fungal and ingrowing nails 1-5 bilateral with sterile nail nipper 06/07  Podiatry- no surgical intervention is advised. outpt podiatry f/u  DM type 2 - uncontrolled. A1C 8.9.Insulin glargine and lispro ( doses adjusted). Not compliant with diabetic diet.   Dental Caries-completed clinda course. Will need outpt f/u for possible multiple extractions  Lac-hydrin added for skin dryness. if pruritis gets worse ,add topical triamcinolone  Insomnia- prn melatonin    Handoff:  Pending placement & IQ evaluation by psyche , guardianship established  medically stable for discharge    Total time spent to complete patient's bedside assessment, physical examination, review medical chart including labs & imaging, discuss medical plan of care with housestaff was more than 35 minutes

## 2022-09-07 NOTE — PROGRESS NOTE ADULT - ASSESSMENT
46 yo M with PMH of Intellectual Disability and DM not on any medications presented with foot wounds. Hospital stay complicated Hospital acquired COVID s/p RDV, poorly controlled DM. Patient has been in the hospital >100 days, pending guardianship and now placement. no change in status today.    # Tachycardia 8/12 and intermittently  - No CP/SOB  - EKG 8/11 with new incomplete RBBB and s1q3t3  - Discussed with Dr Piper, recs r/o DVT/PE; CTA + LE duplex negative  - TTE: 1. EF of 66 %, G1DD    # Covid PNA - resolved  - S/p RDV    # Onychomycosis  - S/p debridement and curettage by podiatry 7/18  - F/u outpatient with Dr. Bronson    # Dental caries  - S/p course of clindamycin, outpatient f/u for teeth extractions    # DM2   - ISS    # Intellectual disability/autism    # Misc  - DVT Prophylaxis: Xarelto  - GI Prophylaxis: famotidine    Tablet 20 milliGRAM(s) Oral daily  - Diet: Diet, DASH/TLC  - Code Status: Full    Evan Clark MD  PGY1, Internal Medicine   [facility]

## 2022-09-07 NOTE — PROGRESS NOTE ADULT - SUBJECTIVE AND OBJECTIVE BOX
HPI:  47 yo M with PMHx of Intellectual Disability, DM not on any medications presents with foot wounds. Pt has very poor foot hygiene and per the sister, has been persistently scratching an open wound on his L second toe, which worsened and became more red, had drainage, malodor. He saw a physician at [facility] and was told to come to the ED for evaluation. Pt is poor historian. Does endorse abdominal pain for a few days, cannot give much more description. Per the sister, pt did not seem to have any recent fevers, however is also not a very good historian and does not seem to have good health literacy. States that her and her mom decided to stop giving pt Metformin a while ago, are treating his diabetes by not giving him any sugary foods.  In the ED, T 97.8, /87, , RR 16, SpO2 99% on RA. Lab work unrevealing.  (31 Mar 2022 22:36)    Currently admitted to medicine with the primary diagnosis of Onychomycosis       Today is hospital day 160d.     INTERVAL HPI / OVERNIGHT EVENTS:  Patient was examined and seen at bedside. This morning he is resting comfortably in bed and reports no new issues or overnight events.     ROS: Otherwise unremarkable     PAST MEDICAL & SURGICAL HISTORY  DM (diabetes mellitus)    Intellectual disability      ALLERGIES  penicillin (Unknown)    MEDICATIONS  STANDING MEDICATIONS  ammonium lactate 12% Lotion 1 Application(s) Topical two times a day  chlorhexidine 4% Liquid 1 Application(s) Topical <User Schedule>  clotrimazole 1% Cream 1 Application(s) Topical two times a day  famotidine    Tablet 20 milliGRAM(s) Oral daily  insulin glargine Injectable (LANTUS) 25 Unit(s) SubCutaneous every morning  insulin lispro (ADMELOG) corrective regimen sliding scale   SubCutaneous three times a day before meals  insulin lispro Injectable (ADMELOG) 8 Unit(s) SubCutaneous three times a day before meals  lactobacillus acidophilus 1 Tablet(s) Oral two times a day  melatonin 3 milliGRAM(s) Oral at bedtime  rivaroxaban 5 milliGRAM(s) Oral daily  vitamin A &amp; D Ointment 1 Application(s) Topical daily    PRN MEDICATIONS  acetaminophen     Tablet .. 650 milliGRAM(s) Oral every 6 hours PRN    VITALS:  T(F): 98.3  HR: 97  BP: 113/58  RR: 18  SpO2: --    PHYSICAL EXAM  GEN: NAD, Resting comfortably in bed  PULM: Clear to auscultation bilaterally, No wheezes  CVS: Regular rate and rhythm, S1-S2, no murmurs  ABD: Soft, non-tender, non-distended, no guarding  EXT: No edema  NEURO: A&Ox3, no focal deficits    LABS                        14.3   6.57  )-----------( 261      ( 06 Sep 2022 06:56 )             41.5     09-06    134<L>  |  102  |  17  ----------------------------<  190<H>  4.0   |  22  |  0.6<L>    Ca    8.8      06 Sep 2022 06:56    TPro  6.6  /  Alb  4.1  /  TBili  0.4  /  DBili  x   /  AST  18  /  ALT  20  /  AlkPhos  109  09-06

## 2022-09-08 LAB
GLUCOSE BLDC GLUCOMTR-MCNC: 165 MG/DL — HIGH (ref 70–99)
GLUCOSE BLDC GLUCOMTR-MCNC: 170 MG/DL — HIGH (ref 70–99)
GLUCOSE BLDC GLUCOMTR-MCNC: 193 MG/DL — HIGH (ref 70–99)
GLUCOSE BLDC GLUCOMTR-MCNC: 285 MG/DL — HIGH (ref 70–99)

## 2022-09-08 PROCEDURE — 99231 SBSQ HOSP IP/OBS SF/LOW 25: CPT

## 2022-09-08 RX ADMIN — Medication 1 APPLICATION(S): at 05:56

## 2022-09-08 RX ADMIN — Medication 8 UNIT(S): at 11:46

## 2022-09-08 RX ADMIN — Medication 1 APPLICATION(S): at 17:35

## 2022-09-08 RX ADMIN — RIVAROXABAN 5 MILLIGRAM(S): KIT at 11:46

## 2022-09-08 RX ADMIN — Medication 8 UNIT(S): at 08:35

## 2022-09-08 RX ADMIN — Medication 1 TABLET(S): at 17:30

## 2022-09-08 RX ADMIN — FAMOTIDINE 20 MILLIGRAM(S): 10 INJECTION INTRAVENOUS at 11:46

## 2022-09-08 RX ADMIN — Medication 1 TABLET(S): at 05:57

## 2022-09-08 RX ADMIN — Medication 3 MILLIGRAM(S): at 21:36

## 2022-09-08 RX ADMIN — Medication 8 UNIT(S): at 17:30

## 2022-09-08 RX ADMIN — CHLORHEXIDINE GLUCONATE 1 APPLICATION(S): 213 SOLUTION TOPICAL at 05:21

## 2022-09-08 RX ADMIN — Medication 2: at 08:34

## 2022-09-08 RX ADMIN — Medication 2: at 11:46

## 2022-09-08 RX ADMIN — Medication 1 APPLICATION(S): at 11:48

## 2022-09-08 RX ADMIN — Medication 1 APPLICATION(S): at 05:57

## 2022-09-08 RX ADMIN — INSULIN GLARGINE 25 UNIT(S): 100 INJECTION, SOLUTION SUBCUTANEOUS at 08:35

## 2022-09-08 RX ADMIN — Medication 2: at 17:30

## 2022-09-08 NOTE — PROGRESS NOTE ADULT - SUBJECTIVE AND OBJECTIVE BOX
HPI:  47 yo M with PMHx of Intellectual Disability, DM not on any medications presents with foot wounds. Pt has very poor foot hygiene and per the sister, has been persistently scratching an open wound on his L second toe, which worsened and became more red, had drainage, malodor. He saw a physician at [facility] and was told to come to the ED for evaluation. Pt is poor historian. Does endorse abdominal pain for a few days, cannot give much more description. Per the sister, pt did not seem to have any recent fevers, however is also not a very good historian and does not seem to have good health literacy. States that her and her mom decided to stop giving pt Metformin a while ago, are treating his diabetes by not giving him any sugary foods.  In the ED, T 97.8, /87, , RR 16, SpO2 99% on RA. Lab work unrevealing.  (31 Mar 2022 22:36)    Currently admitted to medicine with the primary diagnosis of Onychomycosis       Today is hospital day 161d.     INTERVAL HPI / OVERNIGHT EVENTS:  Patient was examined and seen at bedside. This morning he is resting comfortably in bed and reports no new issues or overnight events.     ROS: Otherwise unremarkable     PAST MEDICAL & SURGICAL HISTORY  DM (diabetes mellitus)    Intellectual disability      ALLERGIES  penicillin (Unknown)    MEDICATIONS  STANDING MEDICATIONS  ammonium lactate 12% Lotion 1 Application(s) Topical two times a day  chlorhexidine 4% Liquid 1 Application(s) Topical <User Schedule>  clotrimazole 1% Cream 1 Application(s) Topical two times a day  famotidine    Tablet 20 milliGRAM(s) Oral daily  insulin glargine Injectable (LANTUS) 25 Unit(s) SubCutaneous every morning  insulin lispro (ADMELOG) corrective regimen sliding scale   SubCutaneous three times a day before meals  insulin lispro Injectable (ADMELOG) 8 Unit(s) SubCutaneous three times a day before meals  lactobacillus acidophilus 1 Tablet(s) Oral two times a day  melatonin 3 milliGRAM(s) Oral at bedtime  rivaroxaban 5 milliGRAM(s) Oral daily  vitamin A &amp; D Ointment 1 Application(s) Topical daily    PRN MEDICATIONS  acetaminophen     Tablet .. 650 milliGRAM(s) Oral every 6 hours PRN    VITALS:  T(F): 96.4  HR: 91  BP: 152/66  RR: 18    PHYSICAL EXAM  GEN: NAD, Resting comfortably in bed  PULM: Clear to auscultation bilaterally, No wheezes  CVS: Regular rate and rhythm, S1-S2, no murmurs  ABD: Soft, non-tender, non-distended, no guarding  EXT: No edema; bilateral UE excoriations appreciated  NEURO: A&Ox3, no focal deficits

## 2022-09-08 NOTE — PROGRESS NOTE ADULT - ASSESSMENT
48 yo M with PMH of Intellectual Disability and DM not on any medications presented with foot wounds. Hospital stay complicated Hospital acquired COVID s/p RDV, poorly controlled DM. Patient has been in the hospital >100 days, pending guardianship and now placement. no change in status today.    # Excoriations likely 2/2 skin dryness  - Continue with lac-hydrin and topical triamcinolone    # Tachycardia 8/12 and intermittently - resolved  - No CP/SOB  - EKG 8/11 with new incomplete RBBB and s1q3t3  - Discussed with Dr Piper, recs r/o DVT/PE; CTA + LE duplex negative  - TTE: 1. EF of 66 %, G1DD    # Covid PNA - resolved  - S/p RDV    # Onychomycosis  - S/p debridement and curettage by podiatry 7/18  - F/u outpatient with Dr. Bronson    # Dental caries  - S/p course of clindamycin, outpatient f/u for teeth extractions    # DM2   - ISS    # Intellectual disability/autism    # Misc  - DVT Prophylaxis: Xarelto  - GI Prophylaxis: famotidine    Tablet 20 milliGRAM(s) Oral daily  - Diet: Diet, DASH/TLC  - Code Status: Full    Evan Clark MD  PGY1, Internal Medicine   [facility]   48 yo M with PMH of Intellectual Disability and DM not on any medications presented with foot wounds. Hospital stay complicated Hospital acquired COVID s/p RDV, poorly controlled DM. Patient has been in the hospital >100 days, pending guardianship and now placement. no change in status today.    # Excoriations likely 2/2 skin dryness  - Continue with lac-hydrin and topical triamcinolone    # Tachycardia 8/12 and intermittently - resolved  - No CP/SOB  - EKG 8/11 with new incomplete RBBB and s1q3t3  - Discussed with Dr Piper, recs r/o DVT/PE; CTA + LE duplex negative  - TTE: 1. EF of 66 %, G1DD    # Covid PNA - resolved  - S/p RDV    # Onychomycosis  - S/p debridement and curettage by podiatry 7/18  - F/u outpatient with Dr. Bronson    # Dental caries  - S/p course of clindamycin, outpatient f/u for teeth extractions    # DM2   - ISS    # Intellectual disability/autism    # To follow up  - Placement    # Misc  - DVT Prophylaxis: rivaroxaban  - GI Prophylaxis: famotidine    Tablet 20 milliGRAM(s) Oral daily  - Diet: Diet, DASH/TLC  - Activity: Activity - Ambulate as Tolerated  - Code Status: Full    Evan Clark MD  PGY1, Internal Medicine   [facility]

## 2022-09-08 NOTE — PROGRESS NOTE ADULT - SUBJECTIVE AND OBJECTIVE BOX
Progress Note:  Provider Speciality                            Hospitalist      CARIN WATSON MRN-[id number] 47y Male     CHIEF PRESENTING COMPLAINT:  Patient is a 47y old  Male who presents with a chief complaint of DFU (24 Aug 2022 06:56)        SUBJECTIVE:  Patient was seen and examined at bedside. No new complaints described by patient in morning rounds  No significant overnight events reported.     HISTORY OF PRESENTING ILLNESS:  HPI:  45 yo M with PMHx of Intellectual Disability, DM not on any medications presents with foot wounds. Pt has very poor foot hygiene and per the sister, has been persistently scratching an open wound on his L second toe, which worsened and became more red, had drainage, malodor. He saw a physician at [facility] and was told to come to the ED for evaluation. Pt is poor historian. Does endorse abdominal pain for a few days, cannot give much more description. Per the sister, pt did not seem to have any recent fevers, however is also not a very good historian and does not seem to have good health literacy. States that her and her mom decided to stop giving pt Metformin a while ago, are treating his diabetes by not giving him any sugary foods.  In the ED, T 97.8, /87, , RR 16, SpO2 99% on RA. Lab work unrevealing.  (31 Mar 2022 22:36)        REVIEW OF SYSTEMS:  Patient denies any headache, any vision complaints, runny nose, fever, chills. Denies chest pain, shortness of breath, palpitation. Denies nausea, vomiting, abdominal pain or diarrhoea, Denies dysuria. Denies  localized weakness in any part of the body or numbness.   At least 10 systems were reviewed in ROS. All systems reviewed  are within normal limits except for the complaints as described in Subjective.    PAST MEDICAL & SURGICAL HISTORY:  PAST MEDICAL & SURGICAL HISTORY:  DM (diabetes mellitus)      Intellectual disability              VITAL SIGNS:  Vital Signs Last 24 Hrs  T(C): 36.4 (08 Sep 2022 16:24), Max: 36.4 (08 Sep 2022 16:24)  T(F): 97.5 (08 Sep 2022 16:24), Max: 97.5 (08 Sep 2022 16:24)  HR: 107 (08 Sep 2022 16:24) (91 - 107)  BP: 122/59 (08 Sep 2022 16:24) (122/59 - 152/66)  BP(mean): --  RR: 18 (08 Sep 2022 16:24) (18 - 18)  SpO2: --                    PHYSICAL EXAMINATION:  Not in acute distress  General: No icterus  HEENT:   no JVD.  Heart: S1+S2 audible  Lungs: bilateral  moderate air entry, no wheezing, no crepitations.  Abdomen: Soft, non-tender, non-distended , no  rigidity or guarding.  CNS: Awake alert, CN  grossly intact.  Extremities:  No edema            CONSULTS:  Consultant(s) Notes Reviewed by me.   Care Discussed with Consultants/Other Providers where required.        MEDICATIONS:  MEDICATIONS  (STANDING):  ammonium lactate 12% Lotion 1 Application(s) Topical two times a day  chlorhexidine 4% Liquid 1 Application(s) Topical <User Schedule>  clotrimazole 1% Cream 1 Application(s) Topical two times a day  enoxaparin Injectable 40 milliGRAM(s) SubCutaneous every 24 hours  famotidine    Tablet 20 milliGRAM(s) Oral daily  insulin glargine Injectable (LANTUS) 25 Unit(s) SubCutaneous every morning  insulin lispro (ADMELOG) corrective regimen sliding scale   SubCutaneous three times a day before meals  insulin lispro Injectable (ADMELOG) 8 Unit(s) SubCutaneous three times a day before meals  lactobacillus acidophilus 1 Tablet(s) Oral two times a day  melatonin 3 milliGRAM(s) Oral at bedtime  vitamin A &amp; D Ointment 1 Application(s) Topical daily    MEDICATIONS  (PRN):  acetaminophen     Tablet .. 650 milliGRAM(s) Oral every 6 hours PRN Temp greater or equal to 38C (100.4F), Mild Pain (1 - 3)            ASSESSMENT:    45 yo M with PMH of Intellectual Disability and DM not on any medications presented with foot wounds.    Covid  PNA  Onychomycosis  Dental caries  DM2 with hyperglycemia due to dietary non-compliance  Intellectual disability/autism      Intermittent tachycardia without hypoxia. Recently ruled out for PE and DVT, TTE unremarkable  Covid PNA- completed RDV. Resolved  Nail bed wound and onychomycosis. Status post Aseptic debridement and curettage of all fungal and ingrowing nails 1-5 bilateral with sterile nail nipper 06/07  Podiatry- no surgical intervention is advised. outpt podiatry f/u  DM type 2 - uncontrolled. A1C 8.9.Insulin glargine and lispro ( doses adjusted). Not compliant with diabetic diet.   Dental Caries-completed clinda course. Will need outpt f/u for possible multiple extractions  Lac-hydrin added for skin dryness. if pruritis gets worse ,add topical triamcinolone  Insomnia- prn melatonin  IQ evaluation pending    Handoff:  Pending placement , guardianship established  medically stable for discharge    Total time spent to complete patient's bedside assessment, physical examination, review medical chart including labs & imaging, discuss medical plan of care with housestaff was more than 35 minutes

## 2022-09-09 LAB
GLUCOSE BLDC GLUCOMTR-MCNC: 132 MG/DL — HIGH (ref 70–99)
GLUCOSE BLDC GLUCOMTR-MCNC: 147 MG/DL — HIGH (ref 70–99)
GLUCOSE BLDC GLUCOMTR-MCNC: 169 MG/DL — HIGH (ref 70–99)
GLUCOSE BLDC GLUCOMTR-MCNC: 223 MG/DL — HIGH (ref 70–99)

## 2022-09-09 PROCEDURE — 99231 SBSQ HOSP IP/OBS SF/LOW 25: CPT

## 2022-09-09 RX ADMIN — FAMOTIDINE 20 MILLIGRAM(S): 10 INJECTION INTRAVENOUS at 11:43

## 2022-09-09 RX ADMIN — RIVAROXABAN 5 MILLIGRAM(S): KIT at 11:42

## 2022-09-09 RX ADMIN — Medication 8 UNIT(S): at 08:39

## 2022-09-09 RX ADMIN — Medication 1 APPLICATION(S): at 05:56

## 2022-09-09 RX ADMIN — INSULIN GLARGINE 25 UNIT(S): 100 INJECTION, SOLUTION SUBCUTANEOUS at 08:39

## 2022-09-09 RX ADMIN — Medication 1 TABLET(S): at 17:16

## 2022-09-09 RX ADMIN — Medication 1 TABLET(S): at 05:55

## 2022-09-09 RX ADMIN — Medication 2: at 11:42

## 2022-09-09 RX ADMIN — Medication 3 MILLIGRAM(S): at 21:21

## 2022-09-09 RX ADMIN — Medication 8 UNIT(S): at 17:16

## 2022-09-09 RX ADMIN — Medication 8 UNIT(S): at 11:42

## 2022-09-09 RX ADMIN — Medication 1 APPLICATION(S): at 11:43

## 2022-09-09 RX ADMIN — Medication 1 APPLICATION(S): at 17:17

## 2022-09-09 RX ADMIN — Medication 1 APPLICATION(S): at 17:16

## 2022-09-09 NOTE — PROGRESS NOTE ADULT - SUBJECTIVE AND OBJECTIVE BOX
HPI:  47 yo M with PMHx of Intellectual Disability, DM not on any medications presents with foot wounds. Pt has very poor foot hygiene and per the sister, has been persistently scratching an open wound on his L second toe, which worsened and became more red, had drainage, malodor. He saw a physician at [facility] and was told to come to the ED for evaluation. Pt is poor historian. Does endorse abdominal pain for a few days, cannot give much more description. Per the sister, pt did not seem to have any recent fevers, however is also not a very good historian and does not seem to have good health literacy. States that her and her mom decided to stop giving pt Metformin a while ago, are treating his diabetes by not giving him any sugary foods.  In the ED, T 97.8, /87, , RR 16, SpO2 99% on RA. Lab work unrevealing.  (31 Mar 2022 22:36)    Currently admitted to medicine with the primary diagnosis of Onychomycosis       Today is hospital day 162d.     INTERVAL HPI / OVERNIGHT EVENTS:  Patient was examined and seen at bedside. This morning he is resting comfortably in bed and reports no new issues or overnight events.     ROS: Otherwise unremarkable     PAST MEDICAL & SURGICAL HISTORY  DM (diabetes mellitus)    Intellectual disability      ALLERGIES  penicillin (Unknown)    MEDICATIONS  STANDING MEDICATIONS  ammonium lactate 12% Lotion 1 Application(s) Topical two times a day  chlorhexidine 4% Liquid 1 Application(s) Topical <User Schedule>  clotrimazole 1% Cream 1 Application(s) Topical two times a day  famotidine    Tablet 20 milliGRAM(s) Oral daily  insulin glargine Injectable (LANTUS) 25 Unit(s) SubCutaneous every morning  insulin lispro (ADMELOG) corrective regimen sliding scale   SubCutaneous three times a day before meals  insulin lispro Injectable (ADMELOG) 8 Unit(s) SubCutaneous three times a day before meals  lactobacillus acidophilus 1 Tablet(s) Oral two times a day  melatonin 3 milliGRAM(s) Oral at bedtime  rivaroxaban 5 milliGRAM(s) Oral daily  vitamin A &amp; D Ointment 1 Application(s) Topical daily    PRN MEDICATIONS  acetaminophen     Tablet .. 650 milliGRAM(s) Oral every 6 hours PRN    VITALS:  T(F): 97  HR: 95  BP: 124/75  RR: 18    PHYSICAL EXAM  GEN: NAD, Resting comfortably in bed  PULM: Clear to auscultation bilaterally, No wheezes  CVS: Regular rate and rhythm, S1-S2, no murmurs  ABD: Soft, non-tender, non-distended, no guarding  EXT: No edema; bilateral UE excoriations appreciated  NEURO: A&Ox3, no focal deficits

## 2022-09-09 NOTE — PROGRESS NOTE ADULT - SUBJECTIVE AND OBJECTIVE BOX
Progress Note:  Provider Speciality                            Hospitalist      CARIN WATSON MRN-[id number] 47y Male     CHIEF PRESENTING COMPLAINT:  Patient is a 47y old  Male who presents with a chief complaint of DFU (24 Aug 2022 06:56)        SUBJECTIVE:  Patient was seen and examined at bedside. No new complaints described by patient in morning rounds  No significant overnight events reported.     HISTORY OF PRESENTING ILLNESS:  HPI:  47 yo M with PMHx of Intellectual Disability, DM not on any medications presents with foot wounds. Pt has very poor foot hygiene and per the sister, has been persistently scratching an open wound on his L second toe, which worsened and became more red, had drainage, malodor. He saw a physician at [facility] and was told to come to the ED for evaluation. Pt is poor historian. Does endorse abdominal pain for a few days, cannot give much more description. Per the sister, pt did not seem to have any recent fevers, however is also not a very good historian and does not seem to have good health literacy. States that her and her mom decided to stop giving pt Metformin a while ago, are treating his diabetes by not giving him any sugary foods.  In the ED, T 97.8, /87, , RR 16, SpO2 99% on RA. Lab work unrevealing.  (31 Mar 2022 22:36)        REVIEW OF SYSTEMS:  Patient denies any headache, any vision complaints, runny nose, fever, chills. Denies chest pain, shortness of breath, palpitation. Denies nausea, vomiting, abdominal pain or diarrhoea, Denies dysuria. Denies  localized weakness in any part of the body or numbness.   At least 10 systems were reviewed in ROS. All systems reviewed  are within normal limits except for the complaints as described in Subjective.    PAST MEDICAL & SURGICAL HISTORY:  PAST MEDICAL & SURGICAL HISTORY:  DM (diabetes mellitus)      Intellectual disability              VITAL SIGNS:  Vital Signs Last 24 Hrs  T(C): 35.9 (09 Sep 2022 08:25), Max: 36.4 (08 Sep 2022 16:24)  T(F): 96.6 (09 Sep 2022 08:25), Max: 97.5 (08 Sep 2022 16:24)  HR: 97 (09 Sep 2022 08:25) (95 - 107)  BP: 138/88 (09 Sep 2022 08:25) (122/59 - 138/88)  BP(mean): --  RR: 19 (09 Sep 2022 08:25) (18 - 19)  SpO2: --                    PHYSICAL EXAMINATION:  Not in acute distress  General: No icterus  HEENT:   no JVD.  Heart: S1+S2 audible  Lungs: bilateral  moderate air entry, no wheezing, no crepitations.  Abdomen: Soft, non-tender, non-distended , no  rigidity or guarding.  CNS: Awake alert, CN  grossly intact.  Extremities:  No edema            CONSULTS:  Consultant(s) Notes Reviewed by me.   Care Discussed with Consultants/Other Providers where required.        MEDICATIONS:  MEDICATIONS  (STANDING):  ammonium lactate 12% Lotion 1 Application(s) Topical two times a day  chlorhexidine 4% Liquid 1 Application(s) Topical <User Schedule>  clotrimazole 1% Cream 1 Application(s) Topical two times a day  enoxaparin Injectable 40 milliGRAM(s) SubCutaneous every 24 hours  famotidine    Tablet 20 milliGRAM(s) Oral daily  insulin glargine Injectable (LANTUS) 25 Unit(s) SubCutaneous every morning  insulin lispro (ADMELOG) corrective regimen sliding scale   SubCutaneous three times a day before meals  insulin lispro Injectable (ADMELOG) 8 Unit(s) SubCutaneous three times a day before meals  lactobacillus acidophilus 1 Tablet(s) Oral two times a day  melatonin 3 milliGRAM(s) Oral at bedtime  vitamin A &amp; D Ointment 1 Application(s) Topical daily    MEDICATIONS  (PRN):  acetaminophen     Tablet .. 650 milliGRAM(s) Oral every 6 hours PRN Temp greater or equal to 38C (100.4F), Mild Pain (1 - 3)            ASSESSMENT:    47 yo M with PMH of Intellectual Disability and DM not on any medications presented with foot wounds.    Covid  PNA  Onychomycosis  Dental caries  DM2 with hyperglycemia due to dietary non-compliance  Intellectual disability/autism      Intermittent tachycardia without hypoxia. Recently ruled out for PE and DVT, TTE unremarkable  Covid PNA- completed RDV. Resolved  Nail bed wound and onychomycosis. Status post Aseptic debridement and curettage of all fungal and ingrowing nails 1-5 bilateral with sterile nail nipper 06/07  Podiatry- no surgical intervention is advised. outpt podiatry f/u  DM type 2 - uncontrolled. A1C 8.9.Insulin glargine and lispro ( doses adjusted). Not compliant with diabetic diet.   Dental Caries-completed clinda course. Will need outpt f/u for possible multiple extractions  Lac-hydrin added for skin dryness. if pruritis gets worse ,add topical triamcinolone  Insomnia- prn melatonin  IQ evaluation pending    Handoff:  Pending placement , guardianship established  medically stable for discharge    Total time spent to complete patient's bedside assessment, physical examination, review medical chart including labs & imaging, discuss medical plan of care with housestaff was more than 35 minutes

## 2022-09-09 NOTE — PROGRESS NOTE ADULT - ASSESSMENT
48 yo M with PMH of Intellectual Disability and DM not on any medications presented with foot wounds. Hospital stay complicated Hospital acquired COVID s/p RDV, poorly controlled DM. Patient has been in the hospital >100 days, pending guardianship and now placement. no change in status today.    # Excoriations likely 2/2 skin dryness  - Continue with lac-hydrin and topical triamcinolone    # Tachycardia 8/12 and intermittently - resolved  - No CP/SOB  - EKG 8/11 with new incomplete RBBB and s1q3t3  - Discussed with Dr Piper, recs r/o DVT/PE; CTA + LE duplex negative  - TTE: 1. EF of 66 %, G1DD    # Covid PNA - resolved  - S/p RDV    # Onychomycosis - stable  - S/p debridement and curettage by podiatry 7/18  - F/u outpatient with Dr. Bronson    # Dental caries - stable  - S/p course of clindamycin, outpatient f/u for teeth extractions    # DM2   - ISS    # Intellectual disability/autism    # To follow up  - Placement    # Misc  - DVT Prophylaxis: rivaroxaban  - GI Prophylaxis: famotidine    Tablet 20 milliGRAM(s) Oral daily  - Diet: Diet, DASH/TLC  - Activity: Activity - Ambulate as Tolerated  - Code Status: Full    Evan Clark MD  PGY1, Internal Medicine   [facility]

## 2022-09-10 LAB
GLUCOSE BLDC GLUCOMTR-MCNC: 135 MG/DL — HIGH (ref 70–99)
GLUCOSE BLDC GLUCOMTR-MCNC: 136 MG/DL — HIGH (ref 70–99)
GLUCOSE BLDC GLUCOMTR-MCNC: 170 MG/DL — HIGH (ref 70–99)
GLUCOSE BLDC GLUCOMTR-MCNC: 217 MG/DL — HIGH (ref 70–99)

## 2022-09-10 PROCEDURE — 99231 SBSQ HOSP IP/OBS SF/LOW 25: CPT

## 2022-09-10 RX ADMIN — Medication 1 APPLICATION(S): at 17:07

## 2022-09-10 RX ADMIN — FAMOTIDINE 20 MILLIGRAM(S): 10 INJECTION INTRAVENOUS at 12:53

## 2022-09-10 RX ADMIN — Medication 1 APPLICATION(S): at 12:54

## 2022-09-10 RX ADMIN — Medication 1 APPLICATION(S): at 19:25

## 2022-09-10 RX ADMIN — Medication 8 UNIT(S): at 17:09

## 2022-09-10 RX ADMIN — Medication 2: at 17:09

## 2022-09-10 RX ADMIN — RIVAROXABAN 5 MILLIGRAM(S): KIT at 12:53

## 2022-09-10 RX ADMIN — Medication 3 MILLIGRAM(S): at 21:35

## 2022-09-10 RX ADMIN — Medication 8 UNIT(S): at 12:53

## 2022-09-10 RX ADMIN — Medication 8 UNIT(S): at 08:20

## 2022-09-10 RX ADMIN — Medication 1 APPLICATION(S): at 05:28

## 2022-09-10 RX ADMIN — Medication 1 TABLET(S): at 17:08

## 2022-09-10 RX ADMIN — INSULIN GLARGINE 25 UNIT(S): 100 INJECTION, SOLUTION SUBCUTANEOUS at 08:21

## 2022-09-10 RX ADMIN — Medication 4: at 12:52

## 2022-09-10 RX ADMIN — Medication 1 TABLET(S): at 05:28

## 2022-09-10 NOTE — PROGRESS NOTE ADULT - ASSESSMENT
46 yo M with PMH of Intellectual Disability and DM not on any medications presented with foot wounds. Hospital stay complicated Hospital acquired COVID s/p RDV, poorly controlled DM. Patient has been in the hospital >100 days, pending guardianship and now placement. no change in status today.    # Excoriations likely 2/2 skin dryness  - Continue with lac-hydrin and topical triamcinolone    # Tachycardia 8/12 and intermittently - resolved  - No CP/SOB  - EKG 8/11 with new incomplete RBBB and s1q3t3  - Discussed with Dr Piper, recs r/o DVT/PE; CTA + LE duplex negative  - TTE: 1. EF of 66 %, G1DD    # Covid PNA - resolved  - S/p RDV    # Onychomycosis - stable  - S/p debridement and curettage by podiatry 7/18  - F/u outpatient with Dr. Bronson    # Dental caries - stable  - S/p course of clindamycin, outpatient f/u for teeth extractions    # DM2   - ISS    # Intellectual disability/autism    # To follow up  - Placement    # Misc  - DVT Prophylaxis: rivaroxaban  - GI Prophylaxis: famotidine    Tablet 20 milliGRAM(s) Oral daily  - Diet: Diet, DASH/TLC  - Activity: Activity - Ambulate as Tolerated  - Code Status: Full    Evan Clark MD  PGY1, Internal Medicine   [facility]

## 2022-09-10 NOTE — PROGRESS NOTE ADULT - ATTENDING SUPERVISION STATEMENT
Resident

## 2022-09-10 NOTE — PROGRESS NOTE ADULT - ATTENDING COMMENTS
45 yo M with PMHx of Intellectual Disability, DM not on any medications presents with foot wounds.    #COVID+  Asymptomatic  - Cont RDV to prevent progression  - No indication for steroids    #Onychomycosis w/ possible superimposed bacterial infection  s/p course of clindamycin  Podiatry- no surgical intervention is advised. outpt podiatry f/u    #DM2  - hba1c is 8.9  - Cont metformin 1000 BID    # Autism   - Pending guardianship    #Insomnia  - Melatonin 5 mg PO qhs    Handoff:  Pending legal issue and guardianship, next meeting due 06/23
45 yo M with PMH of Intellectual Disability and DM not on any medications presented with foot wounds.    Onychomycosis  DM2  Intellectual disability/autism    Nail bed wound and onychomycosis  Completed clindamycin  Podiatry noted- no surgical intervention is advised. outpt podiatry f/u  DM type 2 - uncontrolled. A1C 8.9. Continue  metformin 1000mg BID  Handoff: Pending: l(lost appeal & now HCP intends to review but we are not able to reach HCP) - case management following. Legal will obtain guardianship.  psychiatry eval is done.   patient is walking around on the floor.
45 yo M with PMH of Intellectual Disability and DM not on any medications presented with foot wounds.    Onychomycosis  DM2  Intellectual disability/autism    Nail bed wound and onychomycosis  Completed clindamycin  Podiatry noted- no surgical intervention is advised. outpt podiatry f/u  DM type 2 - uncontrolled. A1C 8.9. Continue  metformin 1000mg BID  Handoff: Pending: l(lost appeal & now HCP intends to review but we are not able to reach HCP) - case management following. Legal will obtain guardianship.  psychiatry eval is done.   patient is walking around on the floor.
45 yo M with PMH of Intellectual Disability and DM not on any medications presented with foot wounds.    Onychomycosis  DM2  Intellectual disability/autism    Nail bed wound and onychomycosis  Completed clindamycin  Podiatry noted- no surgical intervention is advised. outpt podiatry f/u  DM type 2 - uncontrolled. A1C 8.9. Continue  metformin 1000mg BID  Handoff: Pending: l(lost appeal & now HCP intends to review but we are not able to reach HCP) - case management following. Legal will obtain guardianship.  psychiatry eval is pending.
45 yo M with PMHx of Intellectual Disability, DM not on any medications presents with foot wounds.    #COVID+  Asymptomatic  - Cont RDV to prevent progression  - No indication for steroids    #Onychomycosis w/ possible superimposed bacterial infection  s/p course of clindamycin  Podiatry- no surgical intervention is advised. outpt podiatry f/u    #DM2  - hba1c is 8.9  - Cont metformin 1000 BID    # Autism   - Pending guardianship    #Insomnia  - Melatonin 5 mg PO qhs    Handoff:  Pending legal issue and guardianship, next meeting due 06/23,
45 yo M with PMHx of Intellectual Disability, DM not on any medications presents with foot wounds. Pt has very poor foot hygiene and per the sister, has been persistently scratching an open wound on his L second toe, which worsened and became more red, had drainage, malodor. He saw a physician at [facility] and was told to come to the ED for eval    #onchymosis of finger nails-- anifungal ABX po likely due to severe infection-- seen by ID and podiatry eval-- started on po clindamycin changed iv clindamycin. PO terbinafine not suggested by ID or podiatry  # uncontrolled diabetes mellitus-- hba1c is 8.9--patient does not take any medication at home-- started on metformin-- dose increased to 1000mg BID.  # mildly autistic-- patient is high functioning but unable to take care of basics.  # hypomagnesemia-- replace IV  spoke with podiatry-- no surgical intervention is advised.   spoke with patient's family-- mother and sister live in a hotel and are unable to take care of him. They will not take him for a week and refused discharge--I told them that we will not do any further treatment inpatient but they want to talk with .    DC plan -- family did not appeal discharge. spent more than 30 mins
46 yo M with PMH of Intellectual Disability and DM not on any medications presented with foot wounds. Hospital stay complicated Hospital acquired COVID s/p RDV, poorly controlled DM. Patient has been in the hospital >100 days, pending guardianship and now placement    Covid  PNA - s/p RDV   Onychomycosis - s/p debriedment and curettage by podiatry 6/7  Dental caries - sp course of clinda, outpatient f/u for teeth extractions  DM2 with hyperglycemia due to dietary non-compliance - insulin adjusted, c/w monitoring  Intellectual disability/autism    Handoff:  Pending placement , guardianship established,  medically stable for discharge .
47 yo M with PMH of Intellectual Disability and DM not on any medications presented with foot wounds.    Covid  PNA  Onychomycosis  Dental caries  DM2  Intellectual disability/autism        Covid PNA- completed RDV  Nail bed wound and onychomycosis. Status post Aseptic debridement and curettage of all fungal and ingrowing nails 1-5 bilateral with sterile nail nipper 06/07  Podiatry- no surgical intervention is advised. outpt podiatry f/u  DM type 2 - uncontrolled. A1C 8.9. Continue  metformin 1000mg BIDand insulin  Dental Caries-completed clinda for 7 days. Will need outpt f/u for possible multiple extractions  Insomnia- prn melatonin    Handoff:  Pending legal issue and guardianship, else medically stable for discharge
47 yo M with PMH of Intellectual Disability and DM not on any medications presented with foot wounds.    Covid  PNA  Onychomycosis  Dental caries  DM2 with hyperglycemia due to dietary non-compliance  Intellectual disability/autism        Covid PNA- completed RDV. Resolved  Nail bed wound and onychomycosis. Status post Aseptic debridement and curettage of all fungal and ingrowing nails 1-5 bilateral with sterile nail nipper 06/07  Podiatry- no surgical intervention is advised. outpt podiatry f/u  DM type 2 - uncontrolled. A1C 8.9.Insulin glargine and lispro ( doses adjusted). Not compliant with diabetic diet.   Dental Caries-completed clinda course. Will need outpt f/u for possible multiple extractions  Insomnia- prn melatonin    Handoff:  Pending placement , guardianship establised,  medically stable for discharge
47 yo M with PMH of Intellectual Disability and DM not on any medications presented with foot wounds.    Onychomycosis  DM2  Intellectual disability/autism    Nail bed wound and onychomycosis  Completed clindamycin  Podiatry noted- no surgical intervention is advised. outpt podiatry f/u    DM type 2 - uncontrolled. A1C 8.9. Continue  metformin 1000mg BID  -- DASH diet     Handoff:  Pending legal issue and guardianship, guardianship meeting 6/23 .
47 yo M with PMH of Intellectual Disability and DM not on any medications presented with foot wounds.    Onychomycosis  DM2  Intellectual disability/autism    Nail bed wound and onychomycosis  Completed clindamycin  Podiatry noted- no surgical intervention is advised. outpt podiatry f/u  DM type 2 - uncontrolled. A1C 8.9. Continue  metformin 1000mg BID  Handoff:  Pending legal issue and guardianship, meeting with case management on friday .
47 yo M with PMH of Intellectual Disability and DM not on any medications presented with foot wounds.    Onychomycosis  DM2  Intellectual disability/autism    Nail bed wound and onychomycosis  Completed clindamycin  Podiatry noted- no surgical intervention is advised. outpt podiatry f/u  DM type 2 - uncontrolled. A1C 8.9. Continue  metformin 1000mg BID  Handoff: Pending legal issue and guardianship, otherwise medically stable for discharge
47 yo M with PMH of Intellectual Disability and DM not on any medications presented with foot wounds.    Onychomycosis  DM2  Intellectual disability/autism    Nail bed wound and onychomycosis  Completed clindamycin  Podiatry noted- no surgical intervention is advised. outpt podiatry f/u  DM type 2 - uncontrolled. A1C 8.9. Continue  metformin 1000mg BID--diet adjusted to diabetic-- blood sugars are high  Handoff:  Pending legal issue and guardianship, guardianship meeting 6/23
47 yo M with PMHx of Intellectual Disability, DM not on any medications presents with foot wounds.    #COVID+  Asymptomatic  - Cont RDV to prevent progression  - No indication for steroids    #Onychomycosis w/ possible superimposed bacterial infection  s/p course of clindamycin  Podiatry- no surgical intervention is advised. outpt podiatry f/u    #DM2  - hba1c is 8.9  - Cont metformin 1000 BID    # Autism   - Pending guardianship    #Insomnia  - Melatonin 5 mg PO qhs    Handoff:  Pending legal issue and guardianship, next meeting due 06/23,
5 yo M with PMH of Intellectual Disability and DM not on any medications presented with foot wounds. Hospital stay complicated Hospital acquired COVID s/p RDV, poorly controlled DM. Patient has been in the hospital >100 days, pending guardianship and now placement    Covid  PNA - s/p RDV   Onychomycosis - s/p debriedment and curettage by podiatry 6/7  Dental caries - sp course of clinda, outpatient f/u for teeth extractions  DM2 with hyperglycemia due to dietary non-compliance - insulin adjusted, c/w monitoring  Intellectual disability/autism    Handoff:  Pending placement , guardianship established,  medically stable for discharge
Patient examined and discussed with HO.  F/up of this 46 yo M with intellectual disability, DM, onychomycosis of feet with prolonged hosp course s/p tx for covid PNA and infected DM skin wounds feet remains hospitalized pending social clearance.  Guardianship apparently has been established and SW /  is working on disposition.  Medically pt remains stable.  No c/o's noted this AM.  Vitals have been stable and PE as documented above by HO.  Will continue tx and nutrition support.  F/up POCs.  Wound care as ordered.
Patient is awaiting placement currently--  legal guardianship is obtained  needs po DVT prophylaxis-- hesitation with lovenox--
Patient is awaiting placement currently--  legal guardianship is obtained  needs po DVT prophylaxis-- hesitation with lovenox-- .
Patient is now waiting for IQ analysis-- he has a guardian and needs placement in group home.
as below
# DM blood sugar well controlled on insulin  patient is waiting for guardianship and transfer to [facility] living
45 yo M with PMH of Intellectual Disability and DM not on any medications presented with foot wounds.    Onychomycosis  DM2  Intellectual disability/autism    Nail bed wound and onychomycosis  Completed clindamycin  Podiatry noted- no surgical intervention is advised. outpt podiatry f/u  DM type 2 - uncontrolled. A1C 8.9. Continue  metformin 1000mg BID  Handoff: Pending: l(lost appeal & now HCP intends to review but we are not able to reach HCP) - case management following. Legal will obtain guardianship.
45 yo M with PMH of Intellectual Disability and DM not on any medications presented with foot wounds.    Onychomycosis  DM2  Intellectual disability/autism    Nail bed wound and onychomycosis  Completed clindamycin  Podiatry noted- no surgical intervention is advised. outpt podiatry f/u  DM type 2 - uncontrolled. A1C 8.9. Continue  metformin 1000mg BID  Handoff: Pending: l(lost appeal & now HCP intends to review but we are not able to reach HCP) - case management following. Legal will obtain guardianship.  psychiatry eval is done.
45 yo M with PMH of Intellectual Disability and DM not on any medications presented with foot wounds.    Onychomycosis  DM2  Intellectual disability/autism    Nail bed wound and onychomycosis  Completed clindamycin  Podiatry noted- no surgical intervention is advised. outpt podiatry f/u  DM type 2 - uncontrolled. A1C 8.9. Continue  metformin 1000mg BID-- insulin sliding scale.  Handoff: Pending legal issue and guardianship, otherwise medically stable for discharge .
45 yo M with PMH of Intellectual Disability and DM not on any medications presented with foot wounds.    Onychomycosis  DM2  Intellectual disability/autism    Nail bed wound and onychomycosis  Completed clindamycin  Podiatry noted- no surgical intervention is advised. outpt podiatry f/u  DM type 2 - uncontrolled. A1C 8.9. Continue  metformin 1000mg BID-- insulin sliding scale.  Handoff: Pending legal issue and guardianship, otherwise medically stable for discharge .
46 yo M with PMH of Intellectual Disability and DM not on any medications presented with foot wounds. Hospital stay complicated Hospital acquired COVID s/p RDV, poorly controlled DM. Patient has been in the hospital >100 days, pending guardianship and now placement    Tachycardia 8/12 - no CP/SOB  stat EKG with NSR/LVH with repolarization changes, no stemi  cardio consulted, pending 2d echo, no change in meds    Covid  PNA - s/p RDV   Onychomycosis - s/p debriedment and curettage by podiatry 6/7  Dental caries - sp course of clinda, outpatient f/u for teeth extractions  DM2 with hyperglycemia due to dietary non-compliance - insulin adjusted, c/w monitoring  Intellectual disability/autism    Handoff:  Pending placement , guardianship established,  medically stable for discharge after Echo.
47 yo M with PMH of Intellectual Disability and DM not on any medications presented with foot wounds.    Onychomycosis  DM2  Intellectual disability/autism    Nail bed wound and onychomycosis  Completed clindamycin  Podiatry noted- no surgical intervention is advised. outpt podiatry f/u  DM type 2 - uncontrolled. A1C 8.9. Continue  metformin 1000mg BID  Handoff: Pending: l(lost appeal & now HCP intends to review but we are not able to reach HCP) - case management following. Legal will obtain guardianship.  psychiatry eval is done.   patient is walking around on the floor.
47 yo M with PMH of Intellectual Disability and DM not on any medications presented with foot wounds.    Onychomycosis  DM2  Intellectual disability/autism    Nail bed wound and onychomycosis  Completed clindamycin  Podiatry noted- no surgical intervention is advised. outpt podiatry f/u  DM type 2 - uncontrolled. A1C 8.9. Continue  metformin 1000mg BID-- insulin sliding scale.  Handoff: Pending legal issue and guardianship, meeting with case management on friday
47 yo M with PMH of Intellectual Disability and DM not on any medications presented with foot wounds.    Onychomycosis  DM2  Intellectual disability/autism    Nail bed wound and onychomycosis  Completed clindamycin  Podiatry noted- no surgical intervention is advised. outpt podiatry f/u  DM type 2 - uncontrolled. A1C 8.9. Continue  metformin 1000mg BID-- insulin sliding scale.  Handoff: Pending legal issue and guardianship, otherwise medically stable for discharge .
47 yo M with PMHx of Intellectual Disability, DM not on any medications presents with foot wounds.    #COVID+  Asymptomatic  - Cont RDV to prevent progression  - No indication for steroids    #Onychomycosis w/ possible superimposed bacterial infection  s/p course of clindamycin  Podiatry- no surgical intervention is advised. outpt podiatry f/u    #DM2  - hba1c is 8.9  - Cont metformin 1000 BID    # Autism   - Pending guardianship    #Insomnia  - Melatonin 5 mg PO qhs    Handoff:  Pending legal issue and guardianship, next meeting due 06/23,
as below
as below

## 2022-09-10 NOTE — PROGRESS NOTE ADULT - SUBJECTIVE AND OBJECTIVE BOX
SUBJECTIVE:    Patient is a 47y old Male who presents with a chief complaint of DFU (09 Sep 2022 16:18)    Currently admitted to medicine with the primary diagnosis of Onychomycosis    Today is hospital day 163d. This morning he is resting comfortably in bed and reports no new issues or overnight events.     PAST MEDICAL & SURGICAL HISTORY  DM (diabetes mellitus)    Intellectual disability      SOCIAL HISTORY:  Negative for smoking/alcohol/drug use.     ALLERGIES:  penicillin (Unknown)    MEDICATIONS:  STANDING MEDICATIONS  ammonium lactate 12% Lotion 1 Application(s) Topical two times a day  chlorhexidine 4% Liquid 1 Application(s) Topical <User Schedule>  clotrimazole 1% Cream 1 Application(s) Topical two times a day  famotidine    Tablet 20 milliGRAM(s) Oral daily  insulin glargine Injectable (LANTUS) 25 Unit(s) SubCutaneous every morning  insulin lispro (ADMELOG) corrective regimen sliding scale   SubCutaneous three times a day before meals  insulin lispro Injectable (ADMELOG) 8 Unit(s) SubCutaneous three times a day before meals  lactobacillus acidophilus 1 Tablet(s) Oral two times a day  melatonin 3 milliGRAM(s) Oral at bedtime  rivaroxaban 5 milliGRAM(s) Oral daily  triamcinolone 0.1% Oral Paste 1 Application(s) Topical two times a day  vitamin A &amp; D Ointment 1 Application(s) Topical daily    PRN MEDICATIONS  acetaminophen     Tablet .. 650 milliGRAM(s) Oral every 6 hours PRN    VITALS:   T(F): 96.9  HR: 82  BP: 116/58  RR: 18  SpO2: --    PHYSICAL EXAM:  GEN: NAD, Resting comfortably in bed  PULM: Clear to auscultation bilaterally, No wheezes  CVS: Regular rate and rhythm, S1-S2, no murmurs  ABD: Soft, non-tender, non-distended, no guarding  EXT: No edema; bilateral UE excoriations appreciated  NEURO: A&Ox3, no focal deficits

## 2022-09-11 LAB
ALBUMIN SERPL ELPH-MCNC: 4.1 G/DL — SIGNIFICANT CHANGE UP (ref 3.5–5.2)
ALP SERPL-CCNC: 108 U/L — SIGNIFICANT CHANGE UP (ref 30–115)
ALT FLD-CCNC: 21 U/L — SIGNIFICANT CHANGE UP (ref 0–41)
ANION GAP SERPL CALC-SCNC: 11 MMOL/L — SIGNIFICANT CHANGE UP (ref 7–14)
AST SERPL-CCNC: 16 U/L — SIGNIFICANT CHANGE UP (ref 0–41)
BASOPHILS # BLD AUTO: 0.03 K/UL — SIGNIFICANT CHANGE UP (ref 0–0.2)
BASOPHILS NFR BLD AUTO: 0.4 % — SIGNIFICANT CHANGE UP (ref 0–1)
BILIRUB SERPL-MCNC: 0.4 MG/DL — SIGNIFICANT CHANGE UP (ref 0.2–1.2)
BUN SERPL-MCNC: 16 MG/DL — SIGNIFICANT CHANGE UP (ref 10–20)
CALCIUM SERPL-MCNC: 9.1 MG/DL — SIGNIFICANT CHANGE UP (ref 8.5–10.1)
CHLORIDE SERPL-SCNC: 103 MMOL/L — SIGNIFICANT CHANGE UP (ref 98–110)
CO2 SERPL-SCNC: 24 MMOL/L — SIGNIFICANT CHANGE UP (ref 17–32)
CREAT SERPL-MCNC: 0.7 MG/DL — SIGNIFICANT CHANGE UP (ref 0.7–1.5)
EGFR: 114 ML/MIN/1.73M2 — SIGNIFICANT CHANGE UP
EOSINOPHIL # BLD AUTO: 0.48 K/UL — SIGNIFICANT CHANGE UP (ref 0–0.7)
EOSINOPHIL NFR BLD AUTO: 6.9 % — SIGNIFICANT CHANGE UP (ref 0–8)
GLUCOSE BLDC GLUCOMTR-MCNC: 138 MG/DL — HIGH (ref 70–99)
GLUCOSE BLDC GLUCOMTR-MCNC: 149 MG/DL — HIGH (ref 70–99)
GLUCOSE BLDC GLUCOMTR-MCNC: 177 MG/DL — HIGH (ref 70–99)
GLUCOSE BLDC GLUCOMTR-MCNC: 199 MG/DL — HIGH (ref 70–99)
GLUCOSE SERPL-MCNC: 185 MG/DL — HIGH (ref 70–99)
HCT VFR BLD CALC: 44.5 % — SIGNIFICANT CHANGE UP (ref 42–52)
HGB BLD-MCNC: 15 G/DL — SIGNIFICANT CHANGE UP (ref 14–18)
IMM GRANULOCYTES NFR BLD AUTO: 0.3 % — SIGNIFICANT CHANGE UP (ref 0.1–0.3)
LYMPHOCYTES # BLD AUTO: 2.14 K/UL — SIGNIFICANT CHANGE UP (ref 1.2–3.4)
LYMPHOCYTES # BLD AUTO: 30.8 % — SIGNIFICANT CHANGE UP (ref 20.5–51.1)
MAGNESIUM SERPL-MCNC: 1.6 MG/DL — LOW (ref 1.8–2.4)
MCHC RBC-ENTMCNC: 31.5 PG — HIGH (ref 27–31)
MCHC RBC-ENTMCNC: 33.7 G/DL — SIGNIFICANT CHANGE UP (ref 32–37)
MCV RBC AUTO: 93.5 FL — SIGNIFICANT CHANGE UP (ref 80–94)
MONOCYTES # BLD AUTO: 0.54 K/UL — SIGNIFICANT CHANGE UP (ref 0.1–0.6)
MONOCYTES NFR BLD AUTO: 7.8 % — SIGNIFICANT CHANGE UP (ref 1.7–9.3)
NEUTROPHILS # BLD AUTO: 3.74 K/UL — SIGNIFICANT CHANGE UP (ref 1.4–6.5)
NEUTROPHILS NFR BLD AUTO: 53.8 % — SIGNIFICANT CHANGE UP (ref 42.2–75.2)
NRBC # BLD: 0 /100 WBCS — SIGNIFICANT CHANGE UP (ref 0–0)
PLATELET # BLD AUTO: 272 K/UL — SIGNIFICANT CHANGE UP (ref 130–400)
POTASSIUM SERPL-MCNC: 4.2 MMOL/L — SIGNIFICANT CHANGE UP (ref 3.5–5)
POTASSIUM SERPL-SCNC: 4.2 MMOL/L — SIGNIFICANT CHANGE UP (ref 3.5–5)
PROT SERPL-MCNC: 6.6 G/DL — SIGNIFICANT CHANGE UP (ref 6–8)
RBC # BLD: 4.76 M/UL — SIGNIFICANT CHANGE UP (ref 4.7–6.1)
RBC # FLD: 11.7 % — SIGNIFICANT CHANGE UP (ref 11.5–14.5)
SODIUM SERPL-SCNC: 138 MMOL/L — SIGNIFICANT CHANGE UP (ref 135–146)
WBC # BLD: 6.95 K/UL — SIGNIFICANT CHANGE UP (ref 4.8–10.8)
WBC # FLD AUTO: 6.95 K/UL — SIGNIFICANT CHANGE UP (ref 4.8–10.8)

## 2022-09-11 PROCEDURE — 99231 SBSQ HOSP IP/OBS SF/LOW 25: CPT

## 2022-09-11 RX ADMIN — Medication 2: at 11:49

## 2022-09-11 RX ADMIN — Medication 1 APPLICATION(S): at 17:27

## 2022-09-11 RX ADMIN — Medication 1 APPLICATION(S): at 06:41

## 2022-09-11 RX ADMIN — Medication 8 UNIT(S): at 11:49

## 2022-09-11 RX ADMIN — INSULIN GLARGINE 25 UNIT(S): 100 INJECTION, SOLUTION SUBCUTANEOUS at 09:13

## 2022-09-11 RX ADMIN — Medication 8 UNIT(S): at 07:56

## 2022-09-11 RX ADMIN — Medication 1 APPLICATION(S): at 17:25

## 2022-09-11 RX ADMIN — Medication 8 UNIT(S): at 17:24

## 2022-09-11 RX ADMIN — Medication 1 APPLICATION(S): at 06:53

## 2022-09-11 RX ADMIN — RIVAROXABAN 5 MILLIGRAM(S): KIT at 12:58

## 2022-09-11 RX ADMIN — Medication 3 MILLIGRAM(S): at 21:23

## 2022-09-11 RX ADMIN — Medication 1 TABLET(S): at 06:40

## 2022-09-11 RX ADMIN — Medication 1 TABLET(S): at 17:26

## 2022-09-11 RX ADMIN — Medication 1 APPLICATION(S): at 13:02

## 2022-09-11 RX ADMIN — Medication 2: at 07:56

## 2022-09-11 RX ADMIN — FAMOTIDINE 20 MILLIGRAM(S): 10 INJECTION INTRAVENOUS at 12:58

## 2022-09-11 NOTE — PROGRESS NOTE ADULT - SUBJECTIVE AND OBJECTIVE BOX
CARIN WATSON  47y, Male  Allergy: penicillin (Unknown)    OVERNIGHT EVENTS:   None reported.    PAST MEDICAL & SURGICAL HISTORY:  DM (diabetes mellitus)  Intellectual disability    VITALS:  T(F): 96.3 (09-11-22 @ 08:36), Max: 98 (09-11-22 @ 00:00)  HR: 84 (09-11-22 @ 08:36)  BP: 118/71 (09-11-22 @ 08:36) (118/71 - 145/69)  RR: 18 (09-11-22 @ 08:36)    No c/o noted  Chest CTA b/l  Cor Reg S1S2  Abd soft and NT                        15.0   6.95  )-----------( 272      ( 11 Sep 2022 04:30 )             44.5       09-11    138  |  103  |  16  ----------------------------<  185<H>  4.2   |  24  |  0.7    Ca    9.1      11 Sep 2022 04:30  Mg     1.6     09-11    TPro  6.6  /  Alb  4.1  /  TBili  0.4  /  DBili  x   /  AST  16  /  ALT  21  /  AlkPhos  108  09-11    LIVER FUNCTIONS - ( 11 Sep 2022 04:30 )  Alb: 4.1 g/dL / Pro: 6.6 g/dL / ALK PHOS: 108 U/L / ALT: 21 U/L / AST: 16 U/L / GGT: x             MEDICATIONS:  MEDICATIONS  (STANDING):  ammonium lactate 12% Lotion 1 Application(s) Topical two times a day  chlorhexidine 4% Liquid 1 Application(s) Topical <User Schedule>  clotrimazole 1% Cream 1 Application(s) Topical two times a day  famotidine    Tablet 20 milliGRAM(s) Oral daily  insulin glargine Injectable (LANTUS) 25 Unit(s) SubCutaneous every morning  insulin lispro (ADMELOG) corrective regimen sliding scale   SubCutaneous three times a day before meals  insulin lispro Injectable (ADMELOG) 8 Unit(s) SubCutaneous three times a day before meals  lactobacillus acidophilus 1 Tablet(s) Oral two times a day  melatonin 3 milliGRAM(s) Oral at bedtime  rivaroxaban 5 milliGRAM(s) Oral daily  triamcinolone 0.1% Oral Paste 1 Application(s) Topical two times a day  vitamin A &amp; D Ointment 1 Application(s) Topical daily    MEDICATIONS  (PRN):  acetaminophen     Tablet .. 650 milliGRAM(s) Oral every 6 hours PRN Temp greater or equal to 38C (100.4F), Mild Pain (1 - 3)

## 2022-09-11 NOTE — PROGRESS NOTE ADULT - ASSESSMENT
"F/up of this 48 yo M with intellectual disability, DM, onychomycosis of feet with prolonged hosp course s/p tx for covid PNA and infected DM skin wounds feet remains hospitalized pending social clearance.  Guardianship apparently has been established and SW /  is working on disposition.     Medically pt remains stable.  No c/o's noted.  Vitals have been stable and PE unchanged.  Awaiting placement as discussed with care team.   Continue meds and monitoring as ordered.

## 2022-09-12 LAB
GLUCOSE BLDC GLUCOMTR-MCNC: 160 MG/DL — HIGH (ref 70–99)
GLUCOSE BLDC GLUCOMTR-MCNC: 161 MG/DL — HIGH (ref 70–99)
GLUCOSE BLDC GLUCOMTR-MCNC: 165 MG/DL — HIGH (ref 70–99)
GLUCOSE BLDC GLUCOMTR-MCNC: 195 MG/DL — HIGH (ref 70–99)
GLUCOSE BLDC GLUCOMTR-MCNC: 260 MG/DL — HIGH (ref 70–99)
GLUCOSE BLDC GLUCOMTR-MCNC: <10 MG/DL — CRITICAL LOW (ref 70–99)

## 2022-09-12 PROCEDURE — 99231 SBSQ HOSP IP/OBS SF/LOW 25: CPT

## 2022-09-12 RX ORDER — INSULIN LISPRO 100/ML
4 VIAL (ML) SUBCUTANEOUS ONCE
Refills: 0 | Status: COMPLETED | OUTPATIENT
Start: 2022-09-12 | End: 2022-09-12

## 2022-09-12 RX ADMIN — Medication 1 APPLICATION(S): at 05:43

## 2022-09-12 RX ADMIN — FAMOTIDINE 20 MILLIGRAM(S): 10 INJECTION INTRAVENOUS at 11:11

## 2022-09-12 RX ADMIN — INSULIN GLARGINE 25 UNIT(S): 100 INJECTION, SOLUTION SUBCUTANEOUS at 08:25

## 2022-09-12 RX ADMIN — Medication 1 APPLICATION(S): at 17:51

## 2022-09-12 RX ADMIN — Medication 8 UNIT(S): at 08:27

## 2022-09-12 RX ADMIN — RIVAROXABAN 5 MILLIGRAM(S): KIT at 11:12

## 2022-09-12 RX ADMIN — Medication 8 UNIT(S): at 11:13

## 2022-09-12 RX ADMIN — Medication 1 APPLICATION(S): at 17:53

## 2022-09-12 RX ADMIN — Medication 2: at 08:26

## 2022-09-12 RX ADMIN — Medication 1 APPLICATION(S): at 11:12

## 2022-09-12 RX ADMIN — Medication 1 TABLET(S): at 17:52

## 2022-09-12 RX ADMIN — Medication 8 UNIT(S): at 17:52

## 2022-09-12 RX ADMIN — Medication 1 TABLET(S): at 05:43

## 2022-09-12 RX ADMIN — Medication 3 MILLIGRAM(S): at 21:14

## 2022-09-12 RX ADMIN — Medication 2: at 17:52

## 2022-09-12 RX ADMIN — Medication 4 UNIT(S): at 22:10

## 2022-09-12 RX ADMIN — Medication 1 APPLICATION(S): at 05:44

## 2022-09-12 RX ADMIN — Medication 2: at 11:12

## 2022-09-12 RX ADMIN — CHLORHEXIDINE GLUCONATE 1 APPLICATION(S): 213 SOLUTION TOPICAL at 06:43

## 2022-09-12 NOTE — PROGRESS NOTE ADULT - SUBJECTIVE AND OBJECTIVE BOX
Progress Note:  Provider Speciality                            Hospitalist      CARIN WATSON MRN-[id number] 47y Male     CHIEF PRESENTING COMPLAINT:  Patient is a 47y old  Male who presents with a chief complaint of DFU (24 Aug 2022 06:56)        SUBJECTIVE:  Patient was seen and examined at bedside. No new complaints described by patient in morning rounds  No significant overnight events reported.     HISTORY OF PRESENTING ILLNESS:  HPI:  45 yo M with PMHx of Intellectual Disability, DM not on any medications presents with foot wounds. Pt has very poor foot hygiene and per the sister, has been persistently scratching an open wound on his L second toe, which worsened and became more red, had drainage, malodor. He saw a physician at [facility] and was told to come to the ED for evaluation. Pt is poor historian. Does endorse abdominal pain for a few days, cannot give much more description. Per the sister, pt did not seem to have any recent fevers, however is also not a very good historian and does not seem to have good health literacy. States that her and her mom decided to stop giving pt Metformin a while ago, are treating his diabetes by not giving him any sugary foods.  In the ED, T 97.8, /87, , RR 16, SpO2 99% on RA. Lab work unrevealing.  (31 Mar 2022 22:36)        REVIEW OF SYSTEMS:  Patient denies any headache, any vision complaints, runny nose, fever, chills. Denies chest pain, shortness of breath, palpitation. Denies nausea, vomiting, abdominal pain or diarrhoea, Denies dysuria. Denies  localized weakness in any part of the body or numbness.   At least 10 systems were reviewed in ROS. All systems reviewed  are within normal limits except for the complaints as described in Subjective.    PAST MEDICAL & SURGICAL HISTORY:  PAST MEDICAL & SURGICAL HISTORY:  DM (diabetes mellitus)      Intellectual disability              VITAL SIGNS:  Vital Signs Last 24 Hrs  T(C): 35.4 (12 Sep 2022 07:16), Max: 36.4 (11 Sep 2022 15:35)  T(F): 95.7 (12 Sep 2022 07:16), Max: 97.6 (11 Sep 2022 15:35)  HR: 84 (12 Sep 2022 07:16) (84 - 91)  BP: 112/88 (12 Sep 2022 07:16) (112/88 - 142/65)  BP(mean): --  RR: 18 (12 Sep 2022 07:16) (18 - 18)  SpO2: 96% (11 Sep 2022 15:35) (96% - 96%)                      PHYSICAL EXAMINATION:  Not in acute distress  General: No icterus  HEENT:   no JVD.  Heart: S1+S2 audible  Lungs: bilateral  moderate air entry, no wheezing, no crepitations.  Abdomen: Soft, non-tender, non-distended , no  rigidity or guarding.  CNS: Awake alert, CN  grossly intact.  Extremities:  No edema            CONSULTS:  Consultant(s) Notes Reviewed by me.   Care Discussed with Consultants/Other Providers where required.        MEDICATIONS:  MEDICATIONS  (STANDING):  ammonium lactate 12% Lotion 1 Application(s) Topical two times a day  chlorhexidine 4% Liquid 1 Application(s) Topical <User Schedule>  clotrimazole 1% Cream 1 Application(s) Topical two times a day  enoxaparin Injectable 40 milliGRAM(s) SubCutaneous every 24 hours  famotidine    Tablet 20 milliGRAM(s) Oral daily  insulin glargine Injectable (LANTUS) 25 Unit(s) SubCutaneous every morning  insulin lispro (ADMELOG) corrective regimen sliding scale   SubCutaneous three times a day before meals  insulin lispro Injectable (ADMELOG) 8 Unit(s) SubCutaneous three times a day before meals  lactobacillus acidophilus 1 Tablet(s) Oral two times a day  melatonin 3 milliGRAM(s) Oral at bedtime  vitamin A &amp; D Ointment 1 Application(s) Topical daily    MEDICATIONS  (PRN):  acetaminophen     Tablet .. 650 milliGRAM(s) Oral every 6 hours PRN Temp greater or equal to 38C (100.4F), Mild Pain (1 - 3)            ASSESSMENT:    45 yo M with PMH of Intellectual Disability and DM not on any medications presented with foot wounds.    Covid  PNA  Onychomycosis  Dental caries  DM2 with hyperglycemia due to dietary non-compliance  Intellectual disability/autism      Intermittent tachycardia without hypoxia. Recently ruled out for PE and DVT, TTE unremarkable  Covid PNA- completed RDV. Resolved  Nail bed wound and onychomycosis. Status post Aseptic debridement and curettage of all fungal and ingrowing nails 1-5 bilateral with sterile nail nipper 06/07  Podiatry- no surgical intervention is advised. outpt podiatry f/u  DM type 2 - uncontrolled. A1C 8.9.Insulin glargine and lispro ( doses adjusted). Not compliant with diabetic diet.   Dental Caries-completed clinda course. Will need outpt f/u for possible multiple extractions  Lac-hydrin added for skin dryness. if pruritis gets worse ,add topical triamcinolone  Insomnia- prn melatonin  IQ evaluation pending    Handoff:  Pending placement , guardianship established  medically stable for discharge    Total time spent to complete patient's bedside assessment, physical examination, review medical chart including labs & imaging, discuss medical plan of care with housestaff was more than 20 minutes

## 2022-09-12 NOTE — PROGRESS NOTE ADULT - SUBJECTIVE AND OBJECTIVE BOX
CARIN WATSON 47y Male  MRN#: [id number]   Hospital Day: 165d    HPI:  45 yo M with PMHx of Intellectual Disability, DM not on any medications presents with foot wounds. Pt has very poor foot hygiene and per the sister, has been persistently scratching an open wound on his L second toe, which worsened and became more red, had drainage, malodor. He saw a physician at [facility] and was told to come to the ED for evaluation. Pt is poor historian. Does endorse abdominal pain for a few days, cannot give much more description. Per the sister, pt did not seem to have any recent fevers, however is also not a very good historian and does not seem to have good health literacy. States that her and her mom decided to stop giving pt Metformin a while ago, are treating his diabetes by not giving him any sugary foods.  In the ED, T 97.8, /87, , RR 16, SpO2 99% on RA. Lab work unrevealing.  (31 Mar 2022 22:36)      SUBJECTIVE  Patient is a 47y old Male who presents with a chief complaint of DFU (12 Sep 2022 13:28)  Currently admitted to medicine with the primary diagnosis of Onychomycosis      INTERVAL HPI AND OVERNIGHT EVENTS:  Patient was examined and seen at bedside. This morning he is resting comfortably in bed and reports no issues or overnight events.      OBJECTIVE  PAST MEDICAL & SURGICAL HISTORY  DM (diabetes mellitus)    Intellectual disability      ALLERGIES:  penicillin (Unknown)    MEDICATIONS:  STANDING MEDICATIONS  ammonium lactate 12% Lotion 1 Application(s) Topical two times a day  chlorhexidine 4% Liquid 1 Application(s) Topical <User Schedule>  clotrimazole 1% Cream 1 Application(s) Topical two times a day  famotidine    Tablet 20 milliGRAM(s) Oral daily  insulin glargine Injectable (LANTUS) 25 Unit(s) SubCutaneous every morning  insulin lispro (ADMELOG) corrective regimen sliding scale   SubCutaneous three times a day before meals  insulin lispro Injectable (ADMELOG) 8 Unit(s) SubCutaneous three times a day before meals  lactobacillus acidophilus 1 Tablet(s) Oral two times a day  melatonin 3 milliGRAM(s) Oral at bedtime  rivaroxaban 5 milliGRAM(s) Oral daily  triamcinolone 0.1% Oral Paste 1 Application(s) Topical two times a day  vitamin A &amp; D Ointment 1 Application(s) Topical daily    PRN MEDICATIONS  acetaminophen     Tablet .. 650 milliGRAM(s) Oral every 6 hours PRN      VITAL SIGNS: Last 24 Hours  T(C): 35.4 (12 Sep 2022 07:16), Max: 36.4 (11 Sep 2022 15:35)  T(F): 95.7 (12 Sep 2022 07:16), Max: 97.6 (11 Sep 2022 15:35)  HR: 84 (12 Sep 2022 07:16) (84 - 91)  BP: 112/88 (12 Sep 2022 07:16) (112/88 - 142/65)  BP(mean): --  RR: 18 (12 Sep 2022 07:16) (18 - 18)  SpO2: 96% (11 Sep 2022 15:35) (96% - 96%)    LABS:                        15.0   6.95  )-----------( 272      ( 11 Sep 2022 04:30 )             44.5     09-11    138  |  103  |  16  ----------------------------<  185<H>  4.2   |  24  |  0.7    Ca    9.1      11 Sep 2022 04:30  Mg     1.6     09-11    TPro  6.6  /  Alb  4.1  /  TBili  0.4  /  DBili  x   /  AST  16  /  ALT  21  /  AlkPhos  108  09-11                PHYSICAL EXAM:  GEN: NAD, Resting comfortably in bed  PULM: Clear to auscultation bilaterally, No wheezes  CVS: Regular rate and rhythm, S1-S2, no murmurs  ABD: Soft, non-tender, non-distended, no guarding  EXT: No edema; bilateral UE excoriations appreciated  NEURO: A&Ox3, no focal deficits    Assessment and Plan:   · Assessment	  48 yo M with PMH of Intellectual Disability and DM not on any medications presented with foot wounds. Hospital stay complicated Hospital acquired COVID s/p RDV, poorly controlled DM. Patient has been in the hospital >100 days, pending guardianship and now placement. no change in status today.    # Excoriations likely 2/2 skin dryness  - Continue with lac-hydrin and topical triamcinolone    # Tachycardia 8/12 and intermittently - resolved  - No CP/SOB  - EKG 8/11 with new incomplete RBBB and s1q3t3  - Discussed with Dr Piper, recs r/o DVT/PE; CTA + LE duplex negative  - TTE: 1. EF of 66 %, G1DD    # Covid PNA - resolved  - S/p RDV    # Onychomycosis - stable  - S/p debridement and curettage by podiatry 7/18  - F/u outpatient with Dr. Bronson    # Dental caries - stable  - S/p course of clindamycin, outpatient f/u for teeth extractions    # DM2   - ISS    # Intellectual disability/autism    # To follow up  - Placement    # Misc  - DVT Prophylaxis: rivaroxaban  - GI Prophylaxis: famotidine    Tablet 20 milliGRAM(s) Oral daily  - Diet: Diet, DASH/TLC  - Activity: Activity - Ambulate as Tolerated  - Code Status: Full  - DVT Prophylaxis:  - Diet:  - GI Prophylaxis:  - Activity:  - IV Fluids:  - Code Status:    Dispo: CARIN WATSON 47y Male  MRN#: [id number]   Hospital Day: 165d    HPI:  45 yo M with PMHx of Intellectual Disability, DM not on any medications presents with foot wounds. Pt has very poor foot hygiene and per the sister, has been persistently scratching an open wound on his L second toe, which worsened and became more red, had drainage, malodor. He saw a physician at [facility] and was told to come to the ED for evaluation. Pt is poor historian. Does endorse abdominal pain for a few days, cannot give much more description. Per the sister, pt did not seem to have any recent fevers, however is also not a very good historian and does not seem to have good health literacy. States that her and her mom decided to stop giving pt Metformin a while ago, are treating his diabetes by not giving him any sugary foods.  In the ED, T 97.8, /87, , RR 16, SpO2 99% on RA. Lab work unrevealing.  (31 Mar 2022 22:36)      SUBJECTIVE  Patient is a 47y old Male who presents with a chief complaint of DFU (12 Sep 2022 13:28)  Currently admitted to medicine with the primary diagnosis of Onychomycosis      INTERVAL HPI AND OVERNIGHT EVENTS:  Patient was examined and seen at bedside. This morning he is resting comfortably in bed and reports no issues or overnight events.      OBJECTIVE  PAST MEDICAL & SURGICAL HISTORY  DM (diabetes mellitus)    Intellectual disability      ALLERGIES:  penicillin (Unknown)    MEDICATIONS:  STANDING MEDICATIONS  ammonium lactate 12% Lotion 1 Application(s) Topical two times a day  chlorhexidine 4% Liquid 1 Application(s) Topical <User Schedule>  clotrimazole 1% Cream 1 Application(s) Topical two times a day  famotidine    Tablet 20 milliGRAM(s) Oral daily  insulin glargine Injectable (LANTUS) 25 Unit(s) SubCutaneous every morning  insulin lispro (ADMELOG) corrective regimen sliding scale   SubCutaneous three times a day before meals  insulin lispro Injectable (ADMELOG) 8 Unit(s) SubCutaneous three times a day before meals  lactobacillus acidophilus 1 Tablet(s) Oral two times a day  melatonin 3 milliGRAM(s) Oral at bedtime  rivaroxaban 5 milliGRAM(s) Oral daily  triamcinolone 0.1% Oral Paste 1 Application(s) Topical two times a day  vitamin A &amp; D Ointment 1 Application(s) Topical daily    PRN MEDICATIONS  acetaminophen     Tablet .. 650 milliGRAM(s) Oral every 6 hours PRN      VITAL SIGNS: Last 24 Hours  T(C): 35.4 (12 Sep 2022 07:16), Max: 36.4 (11 Sep 2022 15:35)  T(F): 95.7 (12 Sep 2022 07:16), Max: 97.6 (11 Sep 2022 15:35)  HR: 84 (12 Sep 2022 07:16) (84 - 91)  BP: 112/88 (12 Sep 2022 07:16) (112/88 - 142/65)  BP(mean): --  RR: 18 (12 Sep 2022 07:16) (18 - 18)  SpO2: 96% (11 Sep 2022 15:35) (96% - 96%)    LABS:                        15.0   6.95  )-----------( 272      ( 11 Sep 2022 04:30 )             44.5     09-11    138  |  103  |  16  ----------------------------<  185<H>  4.2   |  24  |  0.7    Ca    9.1      11 Sep 2022 04:30  Mg     1.6     09-11    TPro  6.6  /  Alb  4.1  /  TBili  0.4  /  DBili  x   /  AST  16  /  ALT  21  /  AlkPhos  108  09-11                PHYSICAL EXAM:  GEN: NAD, Resting comfortably in bed. intellectual disability.  PULM: Clear to auscultation bilaterally, No wheezes  CVS: Regular rate and rhythm, S1-S2, no murmurs  ABD: Soft, non-tender, non-distended, no guarding  EXT: No edema; bilateral UE excoriations appreciated. improved from before  NEURO: A&Ox3, no focal deficits    Assessment and Plan: 48 yo M with PMH of Intellectual Disability and DM not on any medications presented with foot wounds. Hospital stay complicated Hospital acquired COVID s/p RDV, poorly controlled DM. Patient has been in the hospital >100 days, pending guardianship and now placement. no change in status today.    # Excoriations likely 2/2 skin dryness  - lesions still itchy but improving on lac-hydrin and topical triamcinolone.    # Tachycardia 8/12 and intermittently - resolved  - No CP/SOB  - EKG 8/11 with new incomplete RBBB and s1q3t3  - Discussed with Dr Piper, recs r/o DVT/PE; CTA + LE duplex negative  - TTE: 1. EF of 66 %, G1DD    # Covid PNA - resolved  - S/p RDV    # Onychomycosis - stable  - S/p debridement and curettage by podiatry 7/18  - F/u outpatient with Dr. Bronson    # Dental caries - stable  - S/p course of clindamycin, outpatient f/u for teeth extractions    # DM2   - ISS    # Intellectual disability/autism  - pending psychological test with IQ and [place] scores for placement  - Neuropsych consult pending      # Misc  - DVT Prophylaxis: rivaroxaban  - GI Prophylaxis: famotidine Tablet 20 milliGRAM(s) Oral daily  - Diet: Diet, DASH/TLC  - Activity: Activity - Ambulate as Tolerated  - Code Status: Full.  - pending: IQ test for placement

## 2022-09-13 LAB
GLUCOSE BLDC GLUCOMTR-MCNC: 167 MG/DL — HIGH (ref 70–99)
GLUCOSE BLDC GLUCOMTR-MCNC: 190 MG/DL — HIGH (ref 70–99)
GLUCOSE BLDC GLUCOMTR-MCNC: 237 MG/DL — HIGH (ref 70–99)
GLUCOSE BLDC GLUCOMTR-MCNC: 270 MG/DL — HIGH (ref 70–99)
GLUCOSE BLDC GLUCOMTR-MCNC: 303 MG/DL — HIGH (ref 70–99)

## 2022-09-13 PROCEDURE — 99231 SBSQ HOSP IP/OBS SF/LOW 25: CPT

## 2022-09-13 RX ADMIN — Medication 4: at 11:52

## 2022-09-13 RX ADMIN — Medication 1 APPLICATION(S): at 17:28

## 2022-09-13 RX ADMIN — Medication 1 TABLET(S): at 05:23

## 2022-09-13 RX ADMIN — Medication 1 APPLICATION(S): at 05:23

## 2022-09-13 RX ADMIN — Medication 1 TABLET(S): at 17:28

## 2022-09-13 RX ADMIN — RIVAROXABAN 5 MILLIGRAM(S): KIT at 11:57

## 2022-09-13 RX ADMIN — Medication 8 UNIT(S): at 17:29

## 2022-09-13 RX ADMIN — Medication 3 MILLIGRAM(S): at 21:14

## 2022-09-13 RX ADMIN — Medication 8: at 17:29

## 2022-09-13 RX ADMIN — Medication 1 APPLICATION(S): at 11:58

## 2022-09-13 RX ADMIN — FAMOTIDINE 20 MILLIGRAM(S): 10 INJECTION INTRAVENOUS at 11:57

## 2022-09-13 RX ADMIN — Medication 8 UNIT(S): at 08:11

## 2022-09-13 RX ADMIN — Medication 2: at 08:11

## 2022-09-13 RX ADMIN — Medication 8 UNIT(S): at 11:59

## 2022-09-13 RX ADMIN — Medication 1 APPLICATION(S): at 17:29

## 2022-09-13 RX ADMIN — INSULIN GLARGINE 25 UNIT(S): 100 INJECTION, SOLUTION SUBCUTANEOUS at 08:11

## 2022-09-13 NOTE — PROGRESS NOTE ADULT - SUBJECTIVE AND OBJECTIVE BOX
Progress Note:  Provider Speciality                            Hospitalist      CARIN WATSON MRN-[id number] 47y Male     CHIEF PRESENTING COMPLAINT:  Patient is a 47y old  Male who presents with a chief complaint of DFU (24 Aug 2022 06:56)        SUBJECTIVE:  Patient was seen and examined at bedside. No new complaints described by patient in morning rounds  No significant overnight events reported.     HISTORY OF PRESENTING ILLNESS:  HPI:  47 yo M with PMHx of Intellectual Disability, DM not on any medications presents with foot wounds. Pt has very poor foot hygiene and per the sister, has been persistently scratching an open wound on his L second toe, which worsened and became more red, had drainage, malodor. He saw a physician at [facility] and was told to come to the ED for evaluation. Pt is poor historian. Does endorse abdominal pain for a few days, cannot give much more description. Per the sister, pt did not seem to have any recent fevers, however is also not a very good historian and does not seem to have good health literacy. States that her and her mom decided to stop giving pt Metformin a while ago, are treating his diabetes by not giving him any sugary foods.  In the ED, T 97.8, /87, , RR 16, SpO2 99% on RA. Lab work unrevealing.  (31 Mar 2022 22:36)        REVIEW OF SYSTEMS:  Patient denies any headache, any vision complaints, runny nose, fever, chills. Denies chest pain, shortness of breath, palpitation. Denies nausea, vomiting, abdominal pain or diarrhoea, Denies dysuria. Denies  localized weakness in any part of the body or numbness.   At least 10 systems were reviewed in ROS. All systems reviewed  are within normal limits except for the complaints as described in Subjective.    PAST MEDICAL & SURGICAL HISTORY:  PAST MEDICAL & SURGICAL HISTORY:  DM (diabetes mellitus)      Intellectual disability              VITAL SIGNS:  Vital Signs Last 24 Hrs  T(C): 36.6 (13 Sep 2022 07:47), Max: 36.6 (13 Sep 2022 07:47)  T(F): 97.9 (13 Sep 2022 07:47), Max: 97.9 (13 Sep 2022 07:47)  HR: 90 (13 Sep 2022 07:47) (90 - 101)  BP: 121/52 (13 Sep 2022 07:47) (121/52 - 136/75)  BP(mean): --  RR: 18 (13 Sep 2022 07:47) (18 - 18)  SpO2: --                      PHYSICAL EXAMINATION:  Not in acute distress  General: No icterus  HEENT:   no JVD.  Heart: S1+S2 audible  Lungs: bilateral  moderate air entry, no wheezing, no crepitations.  Abdomen: Soft, non-tender, non-distended , no  rigidity or guarding.  CNS: Awake alert, CN  grossly intact.  Extremities:  No edema            CONSULTS:  Consultant(s) Notes Reviewed by me.   Care Discussed with Consultants/Other Providers where required.        MEDICATIONS:  MEDICATIONS  (STANDING):  ammonium lactate 12% Lotion 1 Application(s) Topical two times a day  chlorhexidine 4% Liquid 1 Application(s) Topical <User Schedule>  clotrimazole 1% Cream 1 Application(s) Topical two times a day  enoxaparin Injectable 40 milliGRAM(s) SubCutaneous every 24 hours  famotidine    Tablet 20 milliGRAM(s) Oral daily  insulin glargine Injectable (LANTUS) 25 Unit(s) SubCutaneous every morning  insulin lispro (ADMELOG) corrective regimen sliding scale   SubCutaneous three times a day before meals  insulin lispro Injectable (ADMELOG) 8 Unit(s) SubCutaneous three times a day before meals  lactobacillus acidophilus 1 Tablet(s) Oral two times a day  melatonin 3 milliGRAM(s) Oral at bedtime  vitamin A &amp; D Ointment 1 Application(s) Topical daily    MEDICATIONS  (PRN):  acetaminophen     Tablet .. 650 milliGRAM(s) Oral every 6 hours PRN Temp greater or equal to 38C (100.4F), Mild Pain (1 - 3)            ASSESSMENT:    47 yo M with PMH of Intellectual Disability and DM not on any medications presented with foot wounds.    Covid  PNA  Onychomycosis  Dental caries  DM2 with hyperglycemia due to dietary non-compliance  Intellectual disability/autism      Intermittent tachycardia without hypoxia. Recently ruled out for PE and DVT, TTE unremarkable  Covid PNA- completed RDV. Resolved  Nail bed wound and onychomycosis. Status post Aseptic debridement and curettage of all fungal and ingrowing nails 1-5 bilateral with sterile nail nipper 06/07  Podiatry- no surgical intervention is advised. outpt podiatry f/u  DM type 2 - uncontrolled. A1C 8.9.Insulin glargine and lispro . Not compliant with diabetic diet.   Dental Caries-completed clinda course. Will need outpt f/u for possible multiple extractions  Lac-hydrin added for skin dryness &  pruritis ,added topical triamcinolone  Insomnia- prn melatonin  IQ evaluation pending. Neuropsychiatry requetsed    Handoff:  Pending placement , guardianship established  medically stable for discharge    Total time spent to complete patient's bedside assessment, physical examination, review medical chart including labs & imaging, discuss medical plan of care with housestaff was more than 20 minutes

## 2022-09-13 NOTE — PROGRESS NOTE ADULT - SUBJECTIVE AND OBJECTIVE BOX
CARIN WATSON 47y Male  MRN#: [id number]   Hospital Day: 166d    HPI:  47 yo M with PMHx of Intellectual Disability, DM not on any medications presents with foot wounds. Pt has very poor foot hygiene and per the sister, has been persistently scratching an open wound on his L second toe, which worsened and became more red, had drainage, malodor. He saw a physician at [facility] and was told to come to the ED for evaluation. Pt is poor historian. Does endorse abdominal pain for a few days, cannot give much more description. Per the sister, pt did not seem to have any recent fevers, however is also not a very good historian and does not seem to have good health literacy. States that her and her mom decided to stop giving pt Metformin a while ago, are treating his diabetes by not giving him any sugary foods.  In the ED, T 97.8, /87, , RR 16, SpO2 99% on RA. Lab work unrevealing.  (31 Mar 2022 22:36)      SUBJECTIVE  Patient is a 47y old Male who presents with a chief complaint of DFU (12 Sep 2022 14:55)  Currently admitted to medicine with the primary diagnosis of Onychomycosis      INTERVAL HPI AND OVERNIGHT EVENTS:  Patient was examined and seen at bedside. This morning he is resting comfortably in bed and reports no issues or overnight events.      OBJECTIVE  PAST MEDICAL & SURGICAL HISTORY  DM (diabetes mellitus)    Intellectual disability      ALLERGIES:  penicillin (Unknown)    MEDICATIONS:  STANDING MEDICATIONS  ammonium lactate 12% Lotion 1 Application(s) Topical two times a day  chlorhexidine 4% Liquid 1 Application(s) Topical <User Schedule>  clotrimazole 1% Cream 1 Application(s) Topical two times a day  famotidine    Tablet 20 milliGRAM(s) Oral daily  insulin glargine Injectable (LANTUS) 25 Unit(s) SubCutaneous every morning  insulin lispro (ADMELOG) corrective regimen sliding scale   SubCutaneous three times a day before meals  insulin lispro Injectable (ADMELOG) 8 Unit(s) SubCutaneous three times a day before meals  lactobacillus acidophilus 1 Tablet(s) Oral two times a day  melatonin 3 milliGRAM(s) Oral at bedtime  rivaroxaban 5 milliGRAM(s) Oral daily  triamcinolone 0.1% Oral Paste 1 Application(s) Topical two times a day  vitamin A &amp; D Ointment 1 Application(s) Topical daily    PRN MEDICATIONS  acetaminophen     Tablet .. 650 milliGRAM(s) Oral every 6 hours PRN      VITAL SIGNS: Last 24 Hours  T(C): 36.6 (13 Sep 2022 07:47), Max: 36.6 (13 Sep 2022 07:47)  T(F): 97.9 (13 Sep 2022 07:47), Max: 97.9 (13 Sep 2022 07:47)  HR: 90 (13 Sep 2022 07:47) (90 - 101)  BP: 121/52 (13 Sep 2022 07:47) (121/52 - 136/75)  BP(mean): --  RR: 18 (13 Sep 2022 07:47) (18 - 18)  SpO2: --    LABS:              PHYSICAL EXAM:  GEN: NAD, Resting comfortably in bed. intellectual disability.  PULM: Clear to auscultation bilaterally, No wheezes  CVS: Regular rate and rhythm, S1-S2, no murmurs  ABD: Soft, non-tender, non-distended, no guarding  EXT: No edema; bilateral UE excoriations appreciated. improved from before  NEURO: A&Ox3, no focal deficits    Assessment and Plan: 48 yo M with PMH of Intellectual Disability and DM not on any medications presented with foot wounds. Hospital stay complicated Hospital acquired COVID s/p RDV, poorly controlled DM. Patient has been in the hospital >100 days, pending guardianship and now placement. no change in status today.    # Excoriations likely 2/2 skin dryness  - lesions still itchy but improving on lac-hydrin and topical triamcinolone.    # Tachycardia 8/12 and intermittently - resolved  - No CP/SOB  - EKG 8/11 with new incomplete RBBB and s1q3t3  - Discussed with Dr Piper, recs r/o DVT/PE; CTA + LE duplex negative  - TTE: 1. EF of 66 %, G1DD    # Covid PNA - resolved  - S/p RDV    # Onychomycosis - stable  - S/p debridement and curettage by podiatry 7/18  - F/u outpatient with Dr. Bronson    # Dental caries - stable  - S/p course of clindamycin, outpatient f/u for teeth extractions    # DM2   - ISS    # Intellectual disability/autism  - pending psychological test with IQ and [place] scores for placement  - Neuropsych consult pending      # Misc  - DVT Prophylaxis: rivaroxaban  - GI Prophylaxis: famotidine Tablet 20 milliGRAM(s) Oral daily  - Diet: Diet, DASH/TLC  - Activity: Activity - Ambulate as Tolerated  - Code Status: Full.  - pending: IQ test for placement   CARIN WATSON 47y Male  MRN#: [id number]   Hospital Day: 166d    HPI:  47 yo M with PMHx of Intellectual Disability, DM not on any medications presents with foot wounds. Pt has very poor foot hygiene and per the sister, has been persistently scratching an open wound on his L second toe, which worsened and became more red, had drainage, malodor. He saw a physician at [facility] and was told to come to the ED for evaluation. Pt is poor historian. Does endorse abdominal pain for a few days, cannot give much more description. Per the sister, pt did not seem to have any recent fevers, however is also not a very good historian and does not seem to have good health literacy. States that her and her mom decided to stop giving pt Metformin a while ago, are treating his diabetes by not giving him any sugary foods.  In the ED, T 97.8, /87, , RR 16, SpO2 99% on RA. Lab work unrevealing.  (31 Mar 2022 22:36)      SUBJECTIVE  Patient is a 47y old Male who presents with a chief complaint of DFU (12 Sep 2022 14:55)  Currently admitted to medicine with the primary diagnosis of Onychomycosis      INTERVAL HPI AND OVERNIGHT EVENTS:  Patient was examined and seen at bedside. This morning he is resting comfortably in bed and reports no issues or overnight events.      OBJECTIVE  PAST MEDICAL & SURGICAL HISTORY  DM (diabetes mellitus)    Intellectual disability      ALLERGIES:  penicillin (Unknown)    MEDICATIONS:  STANDING MEDICATIONS  ammonium lactate 12% Lotion 1 Application(s) Topical two times a day  chlorhexidine 4% Liquid 1 Application(s) Topical <User Schedule>  clotrimazole 1% Cream 1 Application(s) Topical two times a day  famotidine    Tablet 20 milliGRAM(s) Oral daily  insulin glargine Injectable (LANTUS) 25 Unit(s) SubCutaneous every morning  insulin lispro (ADMELOG) corrective regimen sliding scale   SubCutaneous three times a day before meals  insulin lispro Injectable (ADMELOG) 8 Unit(s) SubCutaneous three times a day before meals  lactobacillus acidophilus 1 Tablet(s) Oral two times a day  melatonin 3 milliGRAM(s) Oral at bedtime  rivaroxaban 5 milliGRAM(s) Oral daily  triamcinolone 0.1% Oral Paste 1 Application(s) Topical two times a day  vitamin A &amp; D Ointment 1 Application(s) Topical daily    PRN MEDICATIONS  acetaminophen     Tablet .. 650 milliGRAM(s) Oral every 6 hours PRN      VITAL SIGNS: Last 24 Hours  T(C): 36.6 (13 Sep 2022 07:47), Max: 36.6 (13 Sep 2022 07:47)  T(F): 97.9 (13 Sep 2022 07:47), Max: 97.9 (13 Sep 2022 07:47)  HR: 90 (13 Sep 2022 07:47) (90 - 101)  BP: 121/52 (13 Sep 2022 07:47) (121/52 - 136/75)  BP(mean): --  RR: 18 (13 Sep 2022 07:47) (18 - 18)  SpO2: --    LABS:              PHYSICAL EXAM:  GEN: NAD, Resting comfortably in bed. intellectual disability.  PULM: Clear to auscultation bilaterally, No wheezes  CVS: Regular rate and rhythm, S1-S2, no murmurs  ABD: Soft, non-tender, non-distended, no guarding  EXT: No edema; bilateral UE excoriations appreciated. improved from before  NEURO: A&Ox3, no focal deficits    Assessment and Plan: 46 yo M with PMH of Intellectual Disability and DM not on any medications presented with foot wounds. Hospital stay complicated Hospital acquired COVID s/p RDV, poorly controlled DM. Patient has been in the hospital >100 days, pending guardianship and now placement. no change in status today.    # Excoriations likely 2/2 skin dryness  - lesions still itchy but improving on lac-hydrin and topical triamcinolone.    # Tachycardia 8/12 and intermittently - resolved  - No CP/SOB  - EKG 8/11 with new incomplete RBBB and s1q3t3  - Discussed with Dr Piper, recs r/o DVT/PE; CTA + LE duplex negative  - TTE: 1. EF of 66 %, G1DD    # Covid PNA - resolved  - S/p RDV    # Onychomycosis - stable  - S/p debridement and curettage by podiatry 7/18  - F/u outpatient with Dr. Bronson    # Dental caries - stable  - S/p course of clindamycin, outpatient f/u for teeth extractions    # DM2   - ISS    # Intellectual disability/autism  - pending psychological test with IQ and [place] scores for placement ( these tests take around 4 hours)  - Neuropsych consult ordered and they were contacted: Dr Deborah Weller ([employer]) and her assistant Cheryl ([phone])      # Misc  - DVT Prophylaxis: rivaroxaban  - GI Prophylaxis: famotidine Tablet 20 milliGRAM(s) Oral daily  - Diet: Diet, DASH/TLC  - Activity: Activity - Ambulate as Tolerated  - Code Status: Full.  - pending: IQ test for placement   CARIN WATSON 47y Male  MRN#: [id number]   Hospital Day: 166d    HPI:  47 yo M with PMHx of Intellectual Disability, DM not on any medications presents with foot wounds. Pt has very poor foot hygiene and per the sister, has been persistently scratching an open wound on his L second toe, which worsened and became more red, had drainage, malodor. He saw a physician at [facility] ([place], [place]) and was told to come to the ED for evaluation. Pt is poor historian. Does endorse abdominal pain for a few days, cannot give much more description. Per the sister, pt did not seem to have any recent fevers, however is also not a very good historian and does not seem to have good health literacy. States that her and her mom decided to stop giving pt Metformin a while ago, are treating his diabetes by not giving him any sugary foods.  In the ED, T 97.8, /87, , RR 16, SpO2 99% on RA. Lab work unrevealing.  (31 Mar 2022 22:36)      SUBJECTIVE  Patient is a 47y old Male who presents with a chief complaint of DFU (12 Sep 2022 14:55)  Currently admitted to medicine with the primary diagnosis of Onychomycosis      INTERVAL HPI AND OVERNIGHT EVENTS:  Patient was examined and seen at bedside. This morning he is resting comfortably in bed and reports no issues or overnight events.      OBJECTIVE  PAST MEDICAL & SURGICAL HISTORY  DM (diabetes mellitus)    Intellectual disability      ALLERGIES:  penicillin (Unknown)    MEDICATIONS:  STANDING MEDICATIONS  ammonium lactate 12% Lotion 1 Application(s) Topical two times a day  chlorhexidine 4% Liquid 1 Application(s) Topical <User Schedule>  clotrimazole 1% Cream 1 Application(s) Topical two times a day  famotidine    Tablet 20 milliGRAM(s) Oral daily  insulin glargine Injectable (LANTUS) 25 Unit(s) SubCutaneous every morning  insulin lispro (ADMELOG) corrective regimen sliding scale   SubCutaneous three times a day before meals  insulin lispro Injectable (ADMELOG) 8 Unit(s) SubCutaneous three times a day before meals  lactobacillus acidophilus 1 Tablet(s) Oral two times a day  melatonin 3 milliGRAM(s) Oral at bedtime  rivaroxaban 5 milliGRAM(s) Oral daily  triamcinolone 0.1% Oral Paste 1 Application(s) Topical two times a day  vitamin A &amp; D Ointment 1 Application(s) Topical daily    PRN MEDICATIONS  acetaminophen     Tablet .. 650 milliGRAM(s) Oral every 6 hours PRN      VITAL SIGNS: Last 24 Hours  T(C): 36.6 (13 Sep 2022 07:47), Max: 36.6 (13 Sep 2022 07:47)  T(F): 97.9 (13 Sep 2022 07:47), Max: 97.9 (13 Sep 2022 07:47)  HR: 90 (13 Sep 2022 07:47) (90 - 101)  BP: 121/52 (13 Sep 2022 07:47) (121/52 - 136/75)  BP(mean): --  RR: 18 (13 Sep 2022 07:47) (18 - 18)  SpO2: --    LABS:              PHYSICAL EXAM:  GEN: NAD, Resting comfortably in bed. intellectual disability.  PULM: Clear to auscultation bilaterally, No wheezes  CVS: Regular rate and rhythm, S1-S2, no murmurs  ABD: Soft, non-tender, non-distended, no guarding  EXT: No edema; bilateral UE excoriations appreciated. improved from before  NEURO: A&Ox3, no focal deficits    Assessment and Plan: 48 yo M with PMH of Intellectual Disability and DM not on any medications presented with foot wounds. Hospital stay complicated Hospital acquired COVID s/p RDV, poorly controlled DM. Patient has been in the hospital >100 days, pending guardianship and now placement. no change in status today.    # Excoriations likely 2/2 skin dryness  - lesions still itchy but improving on lac-hydrin and topical triamcinolone.    # Tachycardia 8/12 and intermittently - resolved  - No CP/SOB  - EKG 8/11 with new incomplete RBBB and s1q3t3  - Discussed with Dr Piper, recs r/o DVT/PE; CTA + LE duplex negative  - TTE: 1. EF of 66 %, G1DD    # Covid PNA - resolved  - S/p RDV    # Onychomycosis - stable  - S/p debridement and curettage by podiatry 7/18  - F/u outpatient with Dr. Bronson    # Dental caries - stable  - S/p course of clindamycin, outpatient f/u for teeth extractions    # DM2   - ISS    # Intellectual disability/autism  - pending psychological test with IQ and [place] scores for placement ( these tests take around 4 hours)  - Neuropsych consult ordered and they were contacted: Dr Deborah Weller ([employer]) and her assistant Cheryl ([phone])  - they will do the test today and tomorrow      # Misc  - DVT Prophylaxis: rivaroxaban  - GI Prophylaxis: famotidine Tablet 20 milliGRAM(s) Oral daily  - Diet: Diet, DASH/TLC  - Activity: Activity - Ambulate as Tolerated  - Code Status: Full.  - pending: IQ test for placement. anticipated for tomorrow

## 2022-09-13 NOTE — PROGRESS NOTE ADULT - ASSESSMENT
Patient was seen 9/13/22 responding to a neuropsychology consult requesting IQ and functional daily living/activities skills testing for a diagnosis of intellectual disability.     Initial evaluation was completed today, team will proceed to do IQ testing tomorrow.    We attempt to contact family regarding the daily activities skills test but team was unsuccessful - intern will try again tomorrow.

## 2022-09-13 NOTE — CHART NOTE - NSCHARTNOTEFT_GEN_A_CORE
PO remains optimum per EMR documentation. 9/1-9/7 intake: %.     Not at risk, will re-assess in 10 days.

## 2022-09-14 LAB
ALBUMIN SERPL ELPH-MCNC: 4 G/DL — SIGNIFICANT CHANGE UP (ref 3.5–5.2)
ALP SERPL-CCNC: 110 U/L — SIGNIFICANT CHANGE UP (ref 30–115)
ALT FLD-CCNC: 20 U/L — SIGNIFICANT CHANGE UP (ref 0–41)
ANION GAP SERPL CALC-SCNC: 13 MMOL/L — SIGNIFICANT CHANGE UP (ref 7–14)
AST SERPL-CCNC: 16 U/L — SIGNIFICANT CHANGE UP (ref 0–41)
BASOPHILS # BLD AUTO: 0.05 K/UL — SIGNIFICANT CHANGE UP (ref 0–0.2)
BASOPHILS NFR BLD AUTO: 0.7 % — SIGNIFICANT CHANGE UP (ref 0–1)
BILIRUB SERPL-MCNC: 0.4 MG/DL — SIGNIFICANT CHANGE UP (ref 0.2–1.2)
BUN SERPL-MCNC: 15 MG/DL — SIGNIFICANT CHANGE UP (ref 10–20)
CALCIUM SERPL-MCNC: 9 MG/DL — SIGNIFICANT CHANGE UP (ref 8.4–10.5)
CHLORIDE SERPL-SCNC: 105 MMOL/L — SIGNIFICANT CHANGE UP (ref 98–110)
CO2 SERPL-SCNC: 22 MMOL/L — SIGNIFICANT CHANGE UP (ref 17–32)
CREAT SERPL-MCNC: 0.6 MG/DL — LOW (ref 0.7–1.5)
EGFR: 119 ML/MIN/1.73M2 — SIGNIFICANT CHANGE UP
EOSINOPHIL # BLD AUTO: 0.38 K/UL — SIGNIFICANT CHANGE UP (ref 0–0.7)
EOSINOPHIL NFR BLD AUTO: 5.6 % — SIGNIFICANT CHANGE UP (ref 0–8)
GLUCOSE BLDC GLUCOMTR-MCNC: 139 MG/DL — HIGH (ref 70–99)
GLUCOSE BLDC GLUCOMTR-MCNC: 170 MG/DL — HIGH (ref 70–99)
GLUCOSE BLDC GLUCOMTR-MCNC: 256 MG/DL — HIGH (ref 70–99)
GLUCOSE BLDC GLUCOMTR-MCNC: 300 MG/DL — HIGH (ref 70–99)
GLUCOSE SERPL-MCNC: 189 MG/DL — HIGH (ref 70–99)
HCT VFR BLD CALC: 42.5 % — SIGNIFICANT CHANGE UP (ref 42–52)
HGB BLD-MCNC: 14.7 G/DL — SIGNIFICANT CHANGE UP (ref 14–18)
IMM GRANULOCYTES NFR BLD AUTO: 0.3 % — SIGNIFICANT CHANGE UP (ref 0.1–0.3)
LYMPHOCYTES # BLD AUTO: 1.99 K/UL — SIGNIFICANT CHANGE UP (ref 1.2–3.4)
LYMPHOCYTES # BLD AUTO: 29.3 % — SIGNIFICANT CHANGE UP (ref 20.5–51.1)
MAGNESIUM SERPL-MCNC: 1.9 MG/DL — SIGNIFICANT CHANGE UP (ref 1.8–2.4)
MCHC RBC-ENTMCNC: 32.3 PG — HIGH (ref 27–31)
MCHC RBC-ENTMCNC: 34.6 G/DL — SIGNIFICANT CHANGE UP (ref 32–37)
MCV RBC AUTO: 93.4 FL — SIGNIFICANT CHANGE UP (ref 80–94)
MONOCYTES # BLD AUTO: 0.59 K/UL — SIGNIFICANT CHANGE UP (ref 0.1–0.6)
MONOCYTES NFR BLD AUTO: 8.7 % — SIGNIFICANT CHANGE UP (ref 1.7–9.3)
NEUTROPHILS # BLD AUTO: 3.76 K/UL — SIGNIFICANT CHANGE UP (ref 1.4–6.5)
NEUTROPHILS NFR BLD AUTO: 55.4 % — SIGNIFICANT CHANGE UP (ref 42.2–75.2)
NRBC # BLD: 0 /100 WBCS — SIGNIFICANT CHANGE UP (ref 0–0)
PLATELET # BLD AUTO: 291 K/UL — SIGNIFICANT CHANGE UP (ref 130–400)
POTASSIUM SERPL-MCNC: 4.2 MMOL/L — SIGNIFICANT CHANGE UP (ref 3.5–5)
POTASSIUM SERPL-SCNC: 4.2 MMOL/L — SIGNIFICANT CHANGE UP (ref 3.5–5)
PROT SERPL-MCNC: 6.7 G/DL — SIGNIFICANT CHANGE UP (ref 6–8)
RBC # BLD: 4.55 M/UL — LOW (ref 4.7–6.1)
RBC # FLD: 11.9 % — SIGNIFICANT CHANGE UP (ref 11.5–14.5)
SODIUM SERPL-SCNC: 140 MMOL/L — SIGNIFICANT CHANGE UP (ref 135–146)
WBC # BLD: 6.79 K/UL — SIGNIFICANT CHANGE UP (ref 4.8–10.8)
WBC # FLD AUTO: 6.79 K/UL — SIGNIFICANT CHANGE UP (ref 4.8–10.8)

## 2022-09-14 PROCEDURE — 99231 SBSQ HOSP IP/OBS SF/LOW 25: CPT

## 2022-09-14 RX ORDER — PERMETHRIN CREAM 5% W/W 50 MG/G
1 CREAM TOPICAL ONCE
Refills: 0 | Status: COMPLETED | OUTPATIENT
Start: 2022-09-14 | End: 2022-09-15

## 2022-09-14 RX ORDER — INSULIN LISPRO 100/ML
4 VIAL (ML) SUBCUTANEOUS ONCE
Refills: 0 | Status: COMPLETED | OUTPATIENT
Start: 2022-09-14 | End: 2022-09-14

## 2022-09-14 RX ADMIN — Medication 1 APPLICATION(S): at 18:01

## 2022-09-14 RX ADMIN — Medication 8 UNIT(S): at 09:38

## 2022-09-14 RX ADMIN — Medication 8 UNIT(S): at 11:50

## 2022-09-14 RX ADMIN — Medication 1 TABLET(S): at 18:02

## 2022-09-14 RX ADMIN — Medication 4 UNIT(S): at 22:05

## 2022-09-14 RX ADMIN — Medication 8 UNIT(S): at 18:01

## 2022-09-14 RX ADMIN — Medication 3 MILLIGRAM(S): at 21:14

## 2022-09-14 RX ADMIN — Medication 1 TABLET(S): at 09:38

## 2022-09-14 RX ADMIN — FAMOTIDINE 20 MILLIGRAM(S): 10 INJECTION INTRAVENOUS at 11:50

## 2022-09-14 RX ADMIN — Medication 1 APPLICATION(S): at 18:02

## 2022-09-14 RX ADMIN — Medication 6: at 11:50

## 2022-09-14 RX ADMIN — Medication 1 APPLICATION(S): at 11:51

## 2022-09-14 RX ADMIN — Medication 2: at 09:40

## 2022-09-14 RX ADMIN — INSULIN GLARGINE 25 UNIT(S): 100 INJECTION, SOLUTION SUBCUTANEOUS at 09:37

## 2022-09-14 RX ADMIN — RIVAROXABAN 5 MILLIGRAM(S): KIT at 11:51

## 2022-09-14 NOTE — PROGRESS NOTE ADULT - SUBJECTIVE AND OBJECTIVE BOX
SUBJECTIVE:    Patient is a 47y old Male who presents with a chief complaint of DFU (14 Sep 2022 16:32)    Currently admitted to medicine with the primary diagnosis of Onychomycosis       Today is hospital day 167d.     PAST MEDICAL & SURGICAL HISTORY  DM (diabetes mellitus)    Intellectual disability      ALLERGIES:  penicillin (Unknown)    MEDICATIONS:  STANDING MEDICATIONS  ammonium lactate 12% Lotion 1 Application(s) Topical two times a day  chlorhexidine 4% Liquid 1 Application(s) Topical <User Schedule>  clotrimazole 1% Cream 1 Application(s) Topical two times a day  famotidine    Tablet 20 milliGRAM(s) Oral daily  insulin glargine Injectable (LANTUS) 25 Unit(s) SubCutaneous every morning  insulin lispro (ADMELOG) corrective regimen sliding scale   SubCutaneous three times a day before meals  insulin lispro Injectable (ADMELOG) 8 Unit(s) SubCutaneous three times a day before meals  lactobacillus acidophilus 1 Tablet(s) Oral two times a day  melatonin 3 milliGRAM(s) Oral at bedtime  permethrin 1% Rinse 1 Application(s) Topical once  rivaroxaban 5 milliGRAM(s) Oral daily  triamcinolone 0.1% Oral Paste 1 Application(s) Topical two times a day  vitamin A &amp; D Ointment 1 Application(s) Topical daily    PRN MEDICATIONS  acetaminophen     Tablet .. 650 milliGRAM(s) Oral every 6 hours PRN    VITALS:   T(F): 98.8  HR: 99  BP: 144/71  RR: 18  SpO2: --    LABS:                        14.7   6.79  )-----------( 291      ( 14 Sep 2022 08:26 )             42.5     09-14    140  |  105  |  15  ----------------------------<  189<H>  4.2   |  22  |  0.6<L>    Ca    9.0      14 Sep 2022 08:26  Mg     1.9     09-14    TPro  6.7  /  Alb  4.0  /  TBili  0.4  /  DBili  x   /  AST  16  /  ALT  20  /  AlkPhos  110  09-14                  RADIOLOGY:    PHYSICAL EXAM:  GEN: No acute distress  LUNGS: Clear to auscultation bilaterally   HEART: S1/S2 present. RRR.   ABD/ GI: Soft, non-tender, non-distended. Bowel sounds present  EXT: NC/NC/NE/2+PP/LOJA  NEURO: AAOX3

## 2022-09-14 NOTE — PROGRESS NOTE ADULT - ASSESSMENT
Neuropsychology Assessment  Wechsler Abbreviated Scale of Intelligence | 2nd Ed     Observations:  Mr. Maurer was pleasant and cooperative throughout the testing. In addition, he was generally proficient in his spontaneous speech despite English being his second language. Mr. Maurer showed some difficulty staying on task and required frequent redirection. He interrupts while speaking to him or providing instructions. He also evidenced poor insight and judgment into safety and limitations. He had difficulty understanding more complex task instructions, and expressed confusion about his plans for future treatment and discharge.   Orientation:  He was oriented and aware that he was in the hospital, but unsure about future treatment plans.   Testing Results Please see the Data Summary at the end of the report for information regarding tests administered, raw scores, and percentiles.   Psychometric Note:  Patient was evaluated in a quiet room with minimal distractions. Psychometric scores are interpreted relative to the general population of individuals in the patient’s age group, and where possible, are corrected for gender and years of formal education.   IQ:  Rich Maurer’s FSIQ falls in the exceptionally low range relative to his same-aged peers. This is consistent with his reported personal history of intellectual disability. Although English is not his first language, his performance on the Vocabulary subtest (which is most susceptible to differences in English language proficiency) was in fact higher than his performance on certain non-verbal tasks (Block Design). His exceptionally low performance across all subtests far exceeds the level of impairment that would be expected from reduced language proficiency alone.   Daily Living Functioning:  Per his self-report and his medical records, he is dependent on others for IADLS including cooking, transportation, and managing medications. He reported that he is able to perform ADLS with some supervision such a preparing a simple meal (sandwich). However, his medical record indicates a severe infection which occurred due to poor hygiene. This suggests that he does not have the ability to perform certain ADLS (e.g., showering and grooming) to a sufficient level to maintain his health.    Summary:  Based on Mr. Maurer’s measured full-scale IQ (FSIQ = 60) and his inability to perform IADLs and certain ADLS on his own, he meets criteria for Mild Intellectual Disability (F70.) Following that patient does not have a support system nor family to care for him, it is crucial for his safety, that he should be placed in a supervised living environment. Ideally, this placement should be long-standing, as his disability is permanent and he will continue to require support and supervision for the remainder of his life.

## 2022-09-14 NOTE — PROGRESS NOTE ADULT - SUBJECTIVE AND OBJECTIVE BOX
CARIN WATSON 47y Male  MRN#: [id number]   Hospital Day: 167d    HPI:  45 yo M with PMHx of Intellectual Disability, DM not on any medications presents with foot wounds. Pt has very poor foot hygiene and per the sister, has been persistently scratching an open wound on his L second toe, which worsened and became more red, had drainage, malodor. He saw a physician at [facility] and was told to come to the ED for evaluation. Pt is poor historian. Does endorse abdominal pain for a few days, cannot give much more description. Per the sister, pt did not seem to have any recent fevers, however is also not a very good historian and does not seem to have good health literacy. States that her and her mom decided to stop giving pt Metformin a while ago, are treating his diabetes by not giving him any sugary foods.  In the ED, T 97.8, /87, , RR 16, SpO2 99% on RA. Lab work unrevealing.  (31 Mar 2022 22:36)      SUBJECTIVE  Patient is a 47y old Male who presents with a chief complaint of DFU (13 Sep 2022 17:40)  Currently admitted to medicine with the primary diagnosis of Onychomycosis      INTERVAL HPI AND OVERNIGHT EVENTS:  Patient was examined and seen at bedside. This morning he is resting comfortably in bed and reports no issues or overnight events.      OBJECTIVE  PAST MEDICAL & SURGICAL HISTORY  DM (diabetes mellitus)    Intellectual disability      ALLERGIES:  penicillin (Unknown)    MEDICATIONS:  STANDING MEDICATIONS  ammonium lactate 12% Lotion 1 Application(s) Topical two times a day  chlorhexidine 4% Liquid 1 Application(s) Topical <User Schedule>  clotrimazole 1% Cream 1 Application(s) Topical two times a day  famotidine    Tablet 20 milliGRAM(s) Oral daily  insulin glargine Injectable (LANTUS) 25 Unit(s) SubCutaneous every morning  insulin lispro (ADMELOG) corrective regimen sliding scale   SubCutaneous three times a day before meals  insulin lispro Injectable (ADMELOG) 8 Unit(s) SubCutaneous three times a day before meals  lactobacillus acidophilus 1 Tablet(s) Oral two times a day  melatonin 3 milliGRAM(s) Oral at bedtime  permethrin 1% Rinse 1 Application(s) Topical once  rivaroxaban 5 milliGRAM(s) Oral daily  triamcinolone 0.1% Oral Paste 1 Application(s) Topical two times a day  vitamin A &amp; D Ointment 1 Application(s) Topical daily    PRN MEDICATIONS  acetaminophen     Tablet .. 650 milliGRAM(s) Oral every 6 hours PRN      VITAL SIGNS: Last 24 Hours  T(C): 35.2 (14 Sep 2022 08:49), Max: 37.1 (13 Sep 2022 15:57)  T(F): 95.3 (14 Sep 2022 08:49), Max: 98.8 (13 Sep 2022 15:57)  HR: 95 (14 Sep 2022 08:49) (95 - 110)  BP: 113/55 (14 Sep 2022 08:49) (113/55 - 127/67)  BP(mean): --  RR: 18 (14 Sep 2022 08:49) (18 - 18)  SpO2: --    LABS:                        14.7   6.79  )-----------( 291      ( 14 Sep 2022 08:26 )             42.5     09-14    140  |  105  |  15  ----------------------------<  189<H>  4.2   |  22  |  0.6<L>    Ca    9.0      14 Sep 2022 08:26  Mg     1.9     09-14    TPro  6.7  /  Alb  4.0  /  TBili  0.4  /  DBili  x   /  AST  16  /  ALT  20  /  AlkPhos  110  09-14                  RADIOLOGY:      PHYSICAL EXAM:  GEN: NAD, Resting comfortably in bed. intellectual disability.  PULM: Clear to auscultation bilaterally, No wheezes  CVS: Regular rate and rhythm, S1-S2, no murmurs  ABD: Soft, non-tender, non-distended, no guarding  EXT: No edema; bilateral UE excoriations appreciated. improved from before  NEURO: A&Ox3, no focal deficits    Assessment and Plan: 48 yo M with PMH of Intellectual Disability and DM not on any medications presented with foot wounds. Hospital stay complicated Hospital acquired COVID s/p RDV, poorly controlled DM. Patient has been in the hospital >100 days, guardianship established and now pending placement after IQ tests done by neuropsych. Patient still has itchy skin excoriations all over his body.    # Excoriations likely 2/2 skin dryness  - lesions still itchy but improving on lac-hydrin and topical triamcinolone.  - Derm consulted since not resolving lesions. pending recs  - Patient given on 9/14 permethrin 1% full body wash.    # Tachycardia 8/12 and intermittently - resolved  - No CP/SOB  - EKG 8/11 with new incomplete RBBB and s1q3t3  - Discussed with Dr Piper, recs r/o DVT/PE; CTA + LE duplex negative  - TTE: 1. EF of 66 %, G1DD    # Covid PNA - resolved  - S/p RDV    # Onychomycosis - stable  - S/p debridement and curettage by podiatry 7/18  - F/u outpatient with Dr. Bronson    # Dental caries - stable  - S/p course of clindamycin, outpatient f/u for teeth extractions    # DM2   - ISS    # Intellectual disability/autism  - pending psychological test with IQ and [place] scores for placement ( these tests take around 4 hours)  - Neuropsych consult ordered and they were contacted: Dr Deborah Weller ([facility]) and her assistant Cheryl ([phone])  - They will complete the test started 9/13 will be completed 9/14.      # Misc  - DVT Prophylaxis: rivaroxaban  - GI Prophylaxis: famotidine Tablet 20 milliGRAM(s) Oral daily  - Diet: Diet, DASH/TLC  - Activity: Activity - Ambulate as Tolerated  - Code Status: Full.  - pending: IQ test for placement, derm consult, IQ test results and dx.

## 2022-09-15 LAB
GLUCOSE BLDC GLUCOMTR-MCNC: 170 MG/DL — HIGH (ref 70–99)
GLUCOSE BLDC GLUCOMTR-MCNC: 170 MG/DL — HIGH (ref 70–99)
GLUCOSE BLDC GLUCOMTR-MCNC: 208 MG/DL — HIGH (ref 70–99)
GLUCOSE BLDC GLUCOMTR-MCNC: 231 MG/DL — HIGH (ref 70–99)
GLUCOSE BLDC GLUCOMTR-MCNC: 237 MG/DL — HIGH (ref 70–99)

## 2022-09-15 PROCEDURE — 99231 SBSQ HOSP IP/OBS SF/LOW 25: CPT

## 2022-09-15 RX ADMIN — Medication 8 UNIT(S): at 11:21

## 2022-09-15 RX ADMIN — Medication 1 TABLET(S): at 05:40

## 2022-09-15 RX ADMIN — Medication 8 UNIT(S): at 16:47

## 2022-09-15 RX ADMIN — Medication 1 APPLICATION(S): at 05:40

## 2022-09-15 RX ADMIN — Medication 1 APPLICATION(S): at 05:39

## 2022-09-15 RX ADMIN — Medication 1 TABLET(S): at 17:06

## 2022-09-15 RX ADMIN — Medication 1 APPLICATION(S): at 17:02

## 2022-09-15 RX ADMIN — Medication 2: at 07:35

## 2022-09-15 RX ADMIN — Medication 4: at 11:20

## 2022-09-15 RX ADMIN — PERMETHRIN CREAM 5% W/W 1 APPLICATION(S): 50 CREAM TOPICAL at 11:23

## 2022-09-15 RX ADMIN — Medication 8 UNIT(S): at 07:36

## 2022-09-15 RX ADMIN — Medication 4: at 16:47

## 2022-09-15 RX ADMIN — INSULIN GLARGINE 25 UNIT(S): 100 INJECTION, SOLUTION SUBCUTANEOUS at 07:37

## 2022-09-15 RX ADMIN — FAMOTIDINE 20 MILLIGRAM(S): 10 INJECTION INTRAVENOUS at 11:21

## 2022-09-15 RX ADMIN — Medication 3 MILLIGRAM(S): at 21:33

## 2022-09-15 RX ADMIN — RIVAROXABAN 5 MILLIGRAM(S): KIT at 11:22

## 2022-09-15 RX ADMIN — Medication 1 APPLICATION(S): at 11:23

## 2022-09-15 NOTE — PROGRESS NOTE ADULT - SUBJECTIVE AND OBJECTIVE BOX
SUBJECTIVE:    Patient is a 47y old Male who presents with a chief complaint of DFU (15 Sep 2022 14:04)    Currently admitted to medicine with the primary diagnosis of Onychomycosis       Today is hospital day 168d.     PAST MEDICAL & SURGICAL HISTORY  DM (diabetes mellitus)    Intellectual disability      ALLERGIES:  penicillin (Unknown)    MEDICATIONS:  STANDING MEDICATIONS  ammonium lactate 12% Lotion 1 Application(s) Topical two times a day  chlorhexidine 4% Liquid 1 Application(s) Topical <User Schedule>  clotrimazole 1% Cream 1 Application(s) Topical two times a day  famotidine    Tablet 20 milliGRAM(s) Oral daily  insulin glargine Injectable (LANTUS) 25 Unit(s) SubCutaneous every morning  insulin lispro (ADMELOG) corrective regimen sliding scale   SubCutaneous three times a day before meals  insulin lispro Injectable (ADMELOG) 8 Unit(s) SubCutaneous three times a day before meals  lactobacillus acidophilus 1 Tablet(s) Oral two times a day  melatonin 3 milliGRAM(s) Oral at bedtime  rivaroxaban 5 milliGRAM(s) Oral daily  triamcinolone 0.1% Oral Paste 1 Application(s) Topical two times a day  vitamin A &amp; D Ointment 1 Application(s) Topical daily    PRN MEDICATIONS  acetaminophen     Tablet .. 650 milliGRAM(s) Oral every 6 hours PRN    VITALS:   T(F): 97.9  HR: 100  BP: 145/68  RR: 18  SpO2: --    LABS:                        14.7   6.79  )-----------( 291      ( 14 Sep 2022 08:26 )             42.5     09-14    140  |  105  |  15  ----------------------------<  189<H>  4.2   |  22  |  0.6<L>    Ca    9.0      14 Sep 2022 08:26  Mg     1.9     09-14    TPro  6.7  /  Alb  4.0  /  TBili  0.4  /  DBili  x   /  AST  16  /  ALT  20  /  AlkPhos  110  09-14                  RADIOLOGY:    PHYSICAL EXAM:  GEN: No acute distress  LUNGS: Clear to auscultation bilaterally   HEART: S1/S2 present. RRR.   ABD/ GI: Soft, non-tender, non-distended. Bowel sounds present  EXT: NC/NC/NE/2+PP/LOJA  NEURO: AAOX3

## 2022-09-15 NOTE — PROGRESS NOTE ADULT - SUBJECTIVE AND OBJECTIVE BOX
CARIN WATSON 47y Male  MRN#: [id number]   Hospital Day: 168d    HPI:  45 yo M with PMHx of Intellectual Disability, DM not on any medications presents with foot wounds. Pt has very poor foot hygiene and per the sister, has been persistently scratching an open wound on his L second toe, which worsened and became more red, had drainage, malodor. He saw a physician at [facility] and was told to come to the ED for evaluation. Pt is poor historian. Does endorse abdominal pain for a few days, cannot give much more description. Per the sister, pt did not seem to have any recent fevers, however is also not a very good historian and does not seem to have good health literacy. States that her and her mom decided to stop giving pt Metformin a while ago, are treating his diabetes by not giving him any sugary foods.  In the ED, T 97.8, /87, , RR 16, SpO2 99% on RA. Lab work unrevealing.  (31 Mar 2022 22:36)      SUBJECTIVE  Patient is a 47y old Male who presents with a chief complaint of painful bilateral toenails  (14 Sep 2022 17:08)  Currently admitted to medicine with the primary diagnosis of Onychomycosis.      INTERVAL HPI AND OVERNIGHT EVENTS:  Patient was examined and seen at bedside. This morning he is resting comfortably in bed and reports no issues or overnight events.  The patient today was happy smiling not complaining of anything.       OBJECTIVE  PAST MEDICAL & SURGICAL HISTORY  DM (diabetes mellitus)    Intellectual disability      ALLERGIES:  penicillin (Unknown)    MEDICATIONS:  STANDING MEDICATIONS  ammonium lactate 12% Lotion 1 Application(s) Topical two times a day  chlorhexidine 4% Liquid 1 Application(s) Topical <User Schedule>  clotrimazole 1% Cream 1 Application(s) Topical two times a day  famotidine    Tablet 20 milliGRAM(s) Oral daily  insulin glargine Injectable (LANTUS) 25 Unit(s) SubCutaneous every morning  insulin lispro (ADMELOG) corrective regimen sliding scale   SubCutaneous three times a day before meals  insulin lispro Injectable (ADMELOG) 8 Unit(s) SubCutaneous three times a day before meals  lactobacillus acidophilus 1 Tablet(s) Oral two times a day  melatonin 3 milliGRAM(s) Oral at bedtime  rivaroxaban 5 milliGRAM(s) Oral daily  triamcinolone 0.1% Oral Paste 1 Application(s) Topical two times a day  vitamin A &amp; D Ointment 1 Application(s) Topical daily    PRN MEDICATIONS  acetaminophen     Tablet .. 650 milliGRAM(s) Oral every 6 hours PRN      VITAL SIGNS: Last 24 Hours  T(C): 35.9 (15 Sep 2022 07:55), Max: 37.1 (14 Sep 2022 15:59)  T(F): 96.7 (15 Sep 2022 07:55), Max: 98.8 (14 Sep 2022 15:59)  HR: 83 (15 Sep 2022 07:55) (83 - 101)  BP: 130/76 (15 Sep 2022 07:55) (130/76 - 144/71)  BP(mean): --  RR: 18 (15 Sep 2022 07:55) (18 - 18)  SpO2: --    LABS:                        14.7   6.79  )-----------( 291      ( 14 Sep 2022 08:26 )             42.5     09-14    140  |  105  |  15  ----------------------------<  189<H>  4.2   |  22  |  0.6<L>    Ca    9.0      14 Sep 2022 08:26  Mg     1.9     09-14    TPro  6.7  /  Alb  4.0  /  TBili  0.4  /  DBili  x   /  AST  16  /  ALT  20  /  AlkPhos  110  09-14                  RADIOLOGY:      PHYSICAL EXAM:  GEN: NAD, Resting comfortably in bed. intellectual disability.  PULM: Clear to auscultation bilaterally, No wheezes  CVS: Regular rate and rhythm, S1-S2, no murmurs  ABD: Soft, non-tender, non-distended, no guarding  EXT: No edema; bilateral UE excoriations appreciated. improved from before  NEURO: A&Ox3, no focal deficits    Assessment and Plan: 48 yo M with PMH of Intellectual Disability and DM not on any medications presented with foot wounds. Hospital stay complicated Hospital acquired COVID s/p RDV, poorly controlled DM. Patient has been in the hospital >100 days, guardianship established and now pending placement after IQ tests done by neuropsych. Patient still has itchy skin excoriations all over his body.    # Excoriations likely 2/2 skin dryness- Active  - lesions still itchy but improving on lac-hydrin and topical triamcinolone.  - Derm consulted since not resolving lesions. pending recs. They're expected to see the patient today  - Patient given on 9/15 permethrin 1% full body wash.     # sinus Tachycardia 8/12 and intermittently - resolved  - No CP/SOB  - EKG 8/11 with new incomplete RBBB and s1q3t3  - Discussed with Dr Piper, recs r/o DVT/PE;   - DVT and PE were ruled out on 8/13 : CTA + LE duplex negative  - TTE on 8/14: 1. EF of 66 % Grade I diastolic dysfunction, no valvular dysfunction    # Covid PNA 5/21- resolved  - S/p RDV    # bilateral Onychomycosis - stable  - S/p debridement and curettage by podiatry 7/18  - F/u outpatient with Dr. Bronson    # Dental caries - stable  - S/p course of clindamycin, outpatient f/u for teeth extractions    # DM2- stable  - LAntus, lispro and SS  - A1c 7.5 in 8/11. well controlled.    # Intellectual disability/autism- stable  - Neuropsych consulted Dr Deborah Weller ([facility]) and her assistant Cheryl ([phone])  - Psychological test with IQ and [place] scores done for placement completed 9/14 and showed:  Based on full-scale IQ (FSIQ = 60) and his inability to perform IADLs and certain ADLS on his own, he meets criteria for Mild Intellectual Disability (F70.) Following that patient does not have a support system nor family to care for him, it is crucial for his safety, that he should be placed in a supervised living environment. Ideally, this placement should be long-standing, as his disability is permanent and he will continue to require support and supervision for the remainder of his life.       # Misc  - DVT Prophylaxis: rivaroxaban  - GI Prophylaxis: famotidine Tablet 20 milliGRAM(s) Oral daily  - Diet: Diet, DASH/TLC  - Activity: Activity - Ambulate as Tolerated  - Code Status: Full.  - pending: derm consult and placement. Labs weekly.

## 2022-09-15 NOTE — PROGRESS NOTE ADULT - ASSESSMENT
47 yo M with PMH of Intellectual Disability and DM not on any medications presented with foot wounds.    Covid  PNA  Onychomycosis  Dental caries  DM2 with hyperglycemia due to dietary non-compliance  Intellectual disability/autism      Intermittent tachycardia without hypoxia. Recently ruled out for PE and DVT, TTE unremarkable  Covid PNA- completed RDV. Resolved  Nail bed wound and onychomycosis. Status post Aseptic debridement and curettage of all fungal and ingrowing nails 1-5 bilateral with sterile nail nipper 06/07  Podiatry- no surgical intervention is advised. outpt podiatry f/u  DM type 2 - uncontrolled. A1C 8.9.Insulin glargine and lispro . Not compliant with diabetic diet.   Dental Caries-completed clinda course. Will need outpt f/u for possible multiple extractions  Lac-hydrin added for skin dryness &  pruritis ,added topical triamcinolone  Insomnia- prn melatonin  IQ evaluation pending. Neuropsychiatry eval done.  permethrin treatment done --- as patient is itching a lot-- dermatology eval pending

## 2022-09-16 LAB
ALBUMIN SERPL ELPH-MCNC: 4.2 G/DL — SIGNIFICANT CHANGE UP (ref 3.5–5.2)
ALP SERPL-CCNC: 112 U/L — SIGNIFICANT CHANGE UP (ref 30–115)
ALT FLD-CCNC: 22 U/L — SIGNIFICANT CHANGE UP (ref 0–41)
ANION GAP SERPL CALC-SCNC: 11 MMOL/L — SIGNIFICANT CHANGE UP (ref 7–14)
AST SERPL-CCNC: 20 U/L — SIGNIFICANT CHANGE UP (ref 0–41)
BASOPHILS # BLD AUTO: 0.03 K/UL — SIGNIFICANT CHANGE UP (ref 0–0.2)
BASOPHILS NFR BLD AUTO: 0.4 % — SIGNIFICANT CHANGE UP (ref 0–1)
BILIRUB SERPL-MCNC: 0.5 MG/DL — SIGNIFICANT CHANGE UP (ref 0.2–1.2)
BUN SERPL-MCNC: 16 MG/DL — SIGNIFICANT CHANGE UP (ref 10–20)
CALCIUM SERPL-MCNC: 9 MG/DL — SIGNIFICANT CHANGE UP (ref 8.4–10.5)
CHLORIDE SERPL-SCNC: 102 MMOL/L — SIGNIFICANT CHANGE UP (ref 98–110)
CO2 SERPL-SCNC: 24 MMOL/L — SIGNIFICANT CHANGE UP (ref 17–32)
CREAT SERPL-MCNC: 0.7 MG/DL — SIGNIFICANT CHANGE UP (ref 0.7–1.5)
EGFR: 114 ML/MIN/1.73M2 — SIGNIFICANT CHANGE UP
EOSINOPHIL # BLD AUTO: 0.42 K/UL — SIGNIFICANT CHANGE UP (ref 0–0.7)
EOSINOPHIL NFR BLD AUTO: 6.2 % — SIGNIFICANT CHANGE UP (ref 0–8)
GLUCOSE BLDC GLUCOMTR-MCNC: 182 MG/DL — HIGH (ref 70–99)
GLUCOSE BLDC GLUCOMTR-MCNC: 209 MG/DL — HIGH (ref 70–99)
GLUCOSE BLDC GLUCOMTR-MCNC: 258 MG/DL — HIGH (ref 70–99)
GLUCOSE BLDC GLUCOMTR-MCNC: 316 MG/DL — HIGH (ref 70–99)
GLUCOSE SERPL-MCNC: 208 MG/DL — HIGH (ref 70–99)
HCT VFR BLD CALC: 43.7 % — SIGNIFICANT CHANGE UP (ref 42–52)
HGB BLD-MCNC: 14.7 G/DL — SIGNIFICANT CHANGE UP (ref 14–18)
IMM GRANULOCYTES NFR BLD AUTO: 0.3 % — SIGNIFICANT CHANGE UP (ref 0.1–0.3)
LYMPHOCYTES # BLD AUTO: 2.23 K/UL — SIGNIFICANT CHANGE UP (ref 1.2–3.4)
LYMPHOCYTES # BLD AUTO: 32.7 % — SIGNIFICANT CHANGE UP (ref 20.5–51.1)
MAGNESIUM SERPL-MCNC: 1.7 MG/DL — LOW (ref 1.8–2.4)
MCHC RBC-ENTMCNC: 31.1 PG — HIGH (ref 27–31)
MCHC RBC-ENTMCNC: 33.6 G/DL — SIGNIFICANT CHANGE UP (ref 32–37)
MCV RBC AUTO: 92.4 FL — SIGNIFICANT CHANGE UP (ref 80–94)
MONOCYTES # BLD AUTO: 0.54 K/UL — SIGNIFICANT CHANGE UP (ref 0.1–0.6)
MONOCYTES NFR BLD AUTO: 7.9 % — SIGNIFICANT CHANGE UP (ref 1.7–9.3)
NEUTROPHILS # BLD AUTO: 3.58 K/UL — SIGNIFICANT CHANGE UP (ref 1.4–6.5)
NEUTROPHILS NFR BLD AUTO: 52.5 % — SIGNIFICANT CHANGE UP (ref 42.2–75.2)
NRBC # BLD: 0 /100 WBCS — SIGNIFICANT CHANGE UP (ref 0–0)
PLATELET # BLD AUTO: 308 K/UL — SIGNIFICANT CHANGE UP (ref 130–400)
POTASSIUM SERPL-MCNC: 4.3 MMOL/L — SIGNIFICANT CHANGE UP (ref 3.5–5)
POTASSIUM SERPL-SCNC: 4.3 MMOL/L — SIGNIFICANT CHANGE UP (ref 3.5–5)
PROT SERPL-MCNC: 6.8 G/DL — SIGNIFICANT CHANGE UP (ref 6–8)
RBC # BLD: 4.73 M/UL — SIGNIFICANT CHANGE UP (ref 4.7–6.1)
RBC # FLD: 11.5 % — SIGNIFICANT CHANGE UP (ref 11.5–14.5)
SODIUM SERPL-SCNC: 137 MMOL/L — SIGNIFICANT CHANGE UP (ref 135–146)
WBC # BLD: 6.82 K/UL — SIGNIFICANT CHANGE UP (ref 4.8–10.8)
WBC # FLD AUTO: 6.82 K/UL — SIGNIFICANT CHANGE UP (ref 4.8–10.8)

## 2022-09-16 PROCEDURE — 99231 SBSQ HOSP IP/OBS SF/LOW 25: CPT

## 2022-09-16 RX ORDER — INSULIN LISPRO 100/ML
4 VIAL (ML) SUBCUTANEOUS ONCE
Refills: 0 | Status: COMPLETED | OUTPATIENT
Start: 2022-09-16 | End: 2022-09-16

## 2022-09-16 RX ADMIN — Medication 1 APPLICATION(S): at 11:46

## 2022-09-16 RX ADMIN — Medication 8 UNIT(S): at 17:01

## 2022-09-16 RX ADMIN — Medication 4 UNIT(S): at 22:18

## 2022-09-16 RX ADMIN — Medication 1 APPLICATION(S): at 18:12

## 2022-09-16 RX ADMIN — FAMOTIDINE 20 MILLIGRAM(S): 10 INJECTION INTRAVENOUS at 11:53

## 2022-09-16 RX ADMIN — Medication 4: at 17:00

## 2022-09-16 RX ADMIN — RIVAROXABAN 5 MILLIGRAM(S): KIT at 11:53

## 2022-09-16 RX ADMIN — INSULIN GLARGINE 25 UNIT(S): 100 INJECTION, SOLUTION SUBCUTANEOUS at 08:15

## 2022-09-16 RX ADMIN — Medication 1 TABLET(S): at 18:35

## 2022-09-16 RX ADMIN — Medication 1 APPLICATION(S): at 18:13

## 2022-09-16 RX ADMIN — Medication 1 APPLICATION(S): at 06:46

## 2022-09-16 RX ADMIN — Medication 2: at 08:14

## 2022-09-16 RX ADMIN — Medication 8 UNIT(S): at 08:14

## 2022-09-16 RX ADMIN — Medication 6: at 11:51

## 2022-09-16 RX ADMIN — Medication 1 TABLET(S): at 06:46

## 2022-09-16 RX ADMIN — Medication 8 UNIT(S): at 11:52

## 2022-09-16 RX ADMIN — Medication 3 MILLIGRAM(S): at 22:18

## 2022-09-16 NOTE — PROGRESS NOTE ADULT - SUBJECTIVE AND OBJECTIVE BOX
CARIN WATSON 47y Male  MRN#: [id number]   Hospital Day: 169d    HPI:  47 yo M with PMHx of Intellectual Disability, DM not on any medications presents with foot wounds. Pt has very poor foot hygiene and per the sister, has been persistently scratching an open wound on his L second toe, which worsened and became more red, had drainage, malodor. He saw a physician at [facility] and was told to come to the ED for evaluation. Pt is poor historian. Does endorse abdominal pain for a few days, cannot give much more description. Per the sister, pt did not seem to have any recent fevers, however is also not a very good historian and does not seem to have good health literacy. States that her and her mom decided to stop giving pt Metformin a while ago, are treating his diabetes by not giving him any sugary foods.  In the ED, T 97.8, /87, , RR 16, SpO2 99% on RA. Lab work unrevealing.  (31 Mar 2022 22:36)      SUBJECTIVE  Patient is a 47y old Male who presents with a chief complaint of DFU (15 Sep 2022 17:20)  Currently admitted to medicine with the primary diagnosis of Onychomycosis      INTERVAL HPI AND OVERNIGHT EVENTS:  Patient was examined and seen at bedside. This morning he is resting comfortably in bed and reports no issues or overnight events.  The patient has less excoriations today and it seems he's self inducing scratching so i instructed him to try and stop. he agreed.    OBJECTIVE  PAST MEDICAL & SURGICAL HISTORY  DM (diabetes mellitus)    Intellectual disability      ALLERGIES:  penicillin (Unknown)    MEDICATIONS:  STANDING MEDICATIONS  ammonium lactate 12% Lotion 1 Application(s) Topical two times a day  chlorhexidine 4% Liquid 1 Application(s) Topical <User Schedule>  clotrimazole 1% Cream 1 Application(s) Topical two times a day  famotidine    Tablet 20 milliGRAM(s) Oral daily  insulin glargine Injectable (LANTUS) 25 Unit(s) SubCutaneous every morning  insulin lispro (ADMELOG) corrective regimen sliding scale   SubCutaneous three times a day before meals  insulin lispro Injectable (ADMELOG) 8 Unit(s) SubCutaneous three times a day before meals  lactobacillus acidophilus 1 Tablet(s) Oral two times a day  melatonin 3 milliGRAM(s) Oral at bedtime  rivaroxaban 5 milliGRAM(s) Oral daily  vitamin A &amp; D Ointment 1 Application(s) Topical daily    PRN MEDICATIONS  acetaminophen     Tablet .. 650 milliGRAM(s) Oral every 6 hours PRN      VITAL SIGNS: Last 24 Hours  T(C): 36.4 (16 Sep 2022 08:00), Max: 36.7 (16 Sep 2022 00:00)  T(F): 97.5 (16 Sep 2022 08:00), Max: 98 (16 Sep 2022 00:00)  HR: 84 (16 Sep 2022 08:00) (84 - 100)  BP: 114/58 (16 Sep 2022 08:00) (114/58 - 145/68)  BP(mean): --  RR: 18 (16 Sep 2022 08:00) (18 - 18)  SpO2: 98% (16 Sep 2022 00:00) (98% - 98%)    LABS:                        14.7   6.82  )-----------( 308      ( 16 Sep 2022 07:30 )             43.7     09-16    137  |  102  |  16  ----------------------------<  208<H>  4.3   |  24  |  0.7    Ca    9.0      16 Sep 2022 07:30  Mg     1.7     09-16    TPro  6.8  /  Alb  4.2  /  TBili  0.5  /  DBili  x   /  AST  20  /  ALT  22  /  AlkPhos  112  09-16                  RADIOLOGY:      PHYSICAL EXAM:  GEN: NAD, Resting comfortably in bed. intellectual disability.  PULM: Clear to auscultation bilaterally, No wheezes  CVS: Regular rate and rhythm, S1-S2, no murmurs  ABD: Soft, non-tender, non-distended, no guarding  EXT: No edema; bilateral UE excoriations appreciated. improved from before  NEURO: A&Ox3, no focal deficits    Assessment and Plan:   48 yo M with PMH of Intellectual Disability and DM not on any medications presented with foot wounds. Hospital stay complicated Hospital acquired COVID s/p RDV, poorly controlled DM. Patient has been in the hospital >100 days, guardianship established and now pending placement after IQ tests done by neuropsych. Patient still has itchy skin excoriations all over his body.    # Excoriations likely 2/2 skin dryness- Active  - lesions still itchy but improving on lac-hydrin and topical triamcinolone.  - Derm consulted since not resolving lesions. pending recs. They're expected to see the patient today  - Patient given on 9/15 permethrin 1% full body wash.     # sinus Tachycardia 8/12 and intermittently - resolved  - No CP/SOB  - EKG 8/11 with new incomplete RBBB and s1q3t3  - Discussed with Dr Piper, recs r/o DVT/PE;   - DVT and PE were ruled out on 8/13 : CTA + LE duplex negative  - TTE on 8/14: 1. EF of 66 % Grade I diastolic dysfunction, no valvular dysfunction    # Covid PNA 5/21- resolved  - S/p RDV    # bilateral Onychomycosis - stable  - S/p debridement and curettage by podiatry 7/18  - F/u outpatient with Dr. Bronson    # Dental caries - stable  - S/p course of clindamycin, outpatient f/u for teeth extractions    # DM2- stable  - LAntus, lispro and SS  - A1c 7.5 in 8/11. well controlled.    # Intellectual disability/autism- stable  - Neuropsych consulted Dr Deborah Weller ([facility]) and her assistant Cheryl ([phone])  - Psychological test with IQ and [place] scores done for placement completed 9/14 and showed:  Based on full-scale IQ (FSIQ = 60) and his inability to perform IADLs and certain ADLS on his own, he meets criteria for Mild Intellectual Disability (F70.) Following that patient does not have a support system nor family to care for him, it is crucial for his safety, that he should be placed in a supervised living environment. Ideally, this placement should be long-standing, as his disability is permanent and he will continue to require support and supervision for the remainder of his life.       # Misc  - DVT Prophylaxis: rivaroxaban  - GI Prophylaxis: famotidine Tablet 20 milliGRAM(s) Oral daily  - Diet: Diet, DASH/TLC  - Activity: Activity - Ambulate as Tolerated  - Code Status: Full.  - pending: derm consult and placement. Labs weekly.

## 2022-09-16 NOTE — PROGRESS NOTE ADULT - ASSESSMENT
45 yo M with PMH of Intellectual Disability and DM not on any medications presented with foot wounds.    Covid  PNA  Onychomycosis  Dental caries  DM2 with hyperglycemia due to dietary non-compliance  Intellectual disability/autism      Intermittent tachycardia without hypoxia. Recently ruled out for PE and DVT, TTE unremarkable  Covid PNA- completed RDV. Resolved  Nail bed wound and onychomycosis. Status post Aseptic debridement and curettage of all fungal and ingrowing nails 1-5 bilateral with sterile nail nipper 06/07  Podiatry- no surgical intervention is advised. outpt podiatry f/u  DM type 2 - uncontrolled. A1C 8.9.Insulin glargine and lispro . Not compliant with diabetic diet.   Dental Caries-completed clinda course. Will need outpt f/u for possible multiple extractions  Lac-hydrin added for skin dryness &  pruritis ,added topical triamcinolone  Insomnia- prn melatonin  IQ evaluation pending. Neuropsychiatry eval done.  permethrin treatment done --- as patient is itching less-- dermatology eval pending

## 2022-09-16 NOTE — CONSULT NOTE ADULT - SUBJECTIVE AND OBJECTIVE BOX
SUBJECTIVE:  HPI:  47 yo M with PMHx of Intellectual Disability, DM not on any medications presents with foot wounds. Pt has very poor foot hygiene and per the sister, has been persistently scratching an open wound on his L second toe, which worsened and became more red, had drainage, malodor. He saw a physician at [facility] and was told to come to the ED for evaluation. Pt is poor historian. Does endorse abdominal pain for a few days, cannot give much more description. Per the sister, pt did not seem to have any recent fevers, however is also not a very good historian and does not seem to have good health literacy. States that her and her mom decided to stop giving pt Metformin a while ago, are treating his diabetes by not giving him any sugary foods.  In the ED, T 97.8, /87, , RR 16, SpO2 99% on RA. Lab work unrevealing.  (31 Mar 2022 22:36)      PAST MEDICAL & SURGICAL HISTORY  PAST MEDICAL & SURGICAL HISTORY:  DM (diabetes mellitus)      Intellectual disability        SOCIAL HISTORY:    ALLERGIES:  penicillin (Unknown)    MEDICATIONS:  STANDING MEDICATIONS  ammonium lactate 12% Lotion 1 Application(s) Topical two times a day  chlorhexidine 4% Liquid 1 Application(s) Topical <User Schedule>  clotrimazole 1% Cream 1 Application(s) Topical two times a day  famotidine    Tablet 20 milliGRAM(s) Oral daily  insulin glargine Injectable (LANTUS) 25 Unit(s) SubCutaneous every morning  insulin lispro (ADMELOG) corrective regimen sliding scale   SubCutaneous three times a day before meals  insulin lispro Injectable (ADMELOG) 8 Unit(s) SubCutaneous three times a day before meals  lactobacillus acidophilus 1 Tablet(s) Oral two times a day  melatonin 3 milliGRAM(s) Oral at bedtime  rivaroxaban 5 milliGRAM(s) Oral daily  vitamin A &amp; D Ointment 1 Application(s) Topical daily    PRN MEDICATIONS  acetaminophen     Tablet .. 650 milliGRAM(s) Oral every 6 hours PRN    VITALS:   T(F): 98.3  HR: 91  BP: 146/81  RR: 18  SpO2: 98%    LABS:                        14.7   6.82  )-----------( 308      ( 16 Sep 2022 07:30 )             43.7     09-16    137  |  102  |  16  ----------------------------<  208<H>  4.3   |  24  |  0.7    Ca    9.0      16 Sep 2022 07:30  Mg     1.7     09-16    TPro  6.8  /  Alb  4.2  /  TBili  0.5  /  DBili  x   /  AST  20  /  ALT  22  /  AlkPhos  112  09-16                  RADIOLOGY:    PHYSICAL EXAM:  GEN: No acute distress  HEENT: AT/NC PEERLA, EOMI  LUNGS: Clear to auscultation bilaterally  HEART: S1/S2 present  ABD: Soft, non-tender, non-distended. Bowel sounds present in all 4 quadrants  EXT: b/l maculopapular excoriations  NEURO: AAOX3

## 2022-09-16 NOTE — PROGRESS NOTE ADULT - SUBJECTIVE AND OBJECTIVE BOX
SUBJECTIVE:    Patient is a 47y old Male who presents with a chief complaint of DFU (16 Sep 2022 10:21)    Currently admitted to medicine with the primary diagnosis of Onychomycosis       Today is hospital day 169d.     PAST MEDICAL & SURGICAL HISTORY  DM (diabetes mellitus)    Intellectual disability      ALLERGIES:  penicillin (Unknown)    MEDICATIONS:  STANDING MEDICATIONS  ammonium lactate 12% Lotion 1 Application(s) Topical two times a day  chlorhexidine 4% Liquid 1 Application(s) Topical <User Schedule>  clotrimazole 1% Cream 1 Application(s) Topical two times a day  famotidine    Tablet 20 milliGRAM(s) Oral daily  insulin glargine Injectable (LANTUS) 25 Unit(s) SubCutaneous every morning  insulin lispro (ADMELOG) corrective regimen sliding scale   SubCutaneous three times a day before meals  insulin lispro Injectable (ADMELOG) 8 Unit(s) SubCutaneous three times a day before meals  lactobacillus acidophilus 1 Tablet(s) Oral two times a day  melatonin 3 milliGRAM(s) Oral at bedtime  rivaroxaban 5 milliGRAM(s) Oral daily  vitamin A &amp; D Ointment 1 Application(s) Topical daily    PRN MEDICATIONS  acetaminophen     Tablet .. 650 milliGRAM(s) Oral every 6 hours PRN    VITALS:   T(F): 97.5  HR: 84  BP: 114/58  RR: 18  SpO2: 98%    LABS:                        14.7   6.82  )-----------( 308      ( 16 Sep 2022 07:30 )             43.7     09-16    137  |  102  |  16  ----------------------------<  208<H>  4.3   |  24  |  0.7    Ca    9.0      16 Sep 2022 07:30  Mg     1.7     09-16    TPro  6.8  /  Alb  4.2  /  TBili  0.5  /  DBili  x   /  AST  20  /  ALT  22  /  AlkPhos  112  09-16                  RADIOLOGY:    PHYSICAL EXAM:  GEN: No acute distress  LUNGS: Clear to auscultation bilaterally   HEART: S1/S2 present. RRR.   ABD/ GI: Soft, non-tender, non-distended. Bowel sounds present  EXT: NC/NC/NE/2+PP/LOJA  NEURO: AAOX3

## 2022-09-16 NOTE — CONSULT NOTE ADULT - ASSESSMENT
45 yo M with PMHx of Intellectual Disability, DM not on any medications presents with foot wounds.    #b/l UE Excoriations  - Recc Triamcinolone 0.1% topical cream  - Start moisturizing cream to affected area  - Only way to improve excoriations is patient must avoid stretching site

## 2022-09-17 LAB
GLUCOSE BLDC GLUCOMTR-MCNC: 186 MG/DL — HIGH (ref 70–99)
GLUCOSE BLDC GLUCOMTR-MCNC: 225 MG/DL — HIGH (ref 70–99)
GLUCOSE BLDC GLUCOMTR-MCNC: 239 MG/DL — HIGH (ref 70–99)
GLUCOSE BLDC GLUCOMTR-MCNC: 253 MG/DL — HIGH (ref 70–99)

## 2022-09-17 PROCEDURE — 99231 SBSQ HOSP IP/OBS SF/LOW 25: CPT

## 2022-09-17 RX ADMIN — INSULIN GLARGINE 25 UNIT(S): 100 INJECTION, SOLUTION SUBCUTANEOUS at 08:27

## 2022-09-17 RX ADMIN — RIVAROXABAN 5 MILLIGRAM(S): KIT at 11:09

## 2022-09-17 RX ADMIN — Medication 1 APPLICATION(S): at 07:10

## 2022-09-17 RX ADMIN — Medication 3 MILLIGRAM(S): at 23:28

## 2022-09-17 RX ADMIN — Medication 1 TABLET(S): at 06:59

## 2022-09-17 RX ADMIN — FAMOTIDINE 20 MILLIGRAM(S): 10 INJECTION INTRAVENOUS at 11:08

## 2022-09-17 RX ADMIN — Medication 8 UNIT(S): at 11:08

## 2022-09-17 RX ADMIN — Medication 6: at 11:08

## 2022-09-17 RX ADMIN — Medication 8 UNIT(S): at 08:28

## 2022-09-17 RX ADMIN — Medication 1 APPLICATION(S): at 11:09

## 2022-09-17 RX ADMIN — CHLORHEXIDINE GLUCONATE 1 APPLICATION(S): 213 SOLUTION TOPICAL at 07:10

## 2022-09-17 RX ADMIN — Medication 2: at 08:28

## 2022-09-17 NOTE — PROGRESS NOTE ADULT - SUBJECTIVE AND OBJECTIVE BOX
SUBJECTIVE:    Patient is a 47y old Male who presents with a chief complaint of DFU (16 Sep 2022 21:37)    Currently admitted to medicine with the primary diagnosis of Onychomycosis       Today is hospital day 170d.     PAST MEDICAL & SURGICAL HISTORY  DM (diabetes mellitus)    Intellectual disability      ALLERGIES:  penicillin (Unknown)    MEDICATIONS:  STANDING MEDICATIONS  ammonium lactate 12% Lotion 1 Application(s) Topical two times a day  chlorhexidine 4% Liquid 1 Application(s) Topical <User Schedule>  clotrimazole 1% Cream 1 Application(s) Topical two times a day  famotidine    Tablet 20 milliGRAM(s) Oral daily  insulin glargine Injectable (LANTUS) 25 Unit(s) SubCutaneous every morning  insulin lispro (ADMELOG) corrective regimen sliding scale   SubCutaneous three times a day before meals  insulin lispro Injectable (ADMELOG) 8 Unit(s) SubCutaneous three times a day before meals  lactobacillus acidophilus 1 Tablet(s) Oral two times a day  melatonin 3 milliGRAM(s) Oral at bedtime  rivaroxaban 5 milliGRAM(s) Oral daily  vitamin A &amp; D Ointment 1 Application(s) Topical daily    PRN MEDICATIONS  acetaminophen     Tablet .. 650 milliGRAM(s) Oral every 6 hours PRN    VITALS:   T(F): 97  HR: 97  BP: 152/93  RR: 18  SpO2: --    LABS:                        14.7   6.82  )-----------( 308      ( 16 Sep 2022 07:30 )             43.7     09-16    137  |  102  |  16  ----------------------------<  208<H>  4.3   |  24  |  0.7    Ca    9.0      16 Sep 2022 07:30  Mg     1.7     09-16    TPro  6.8  /  Alb  4.2  /  TBili  0.5  /  DBili  x   /  AST  20  /  ALT  22  /  AlkPhos  112  09-16                  RADIOLOGY:    PHYSICAL EXAM:  GEN: No acute distress  LUNGS: Clear to auscultation bilaterally   HEART: S1/S2 present. RRR.   ABD/ GI: Soft, non-tender, non-distended. Bowel sounds present  EXT: NC/NC/NE/2+PP/LOJA  NEURO: AAOX3

## 2022-09-17 NOTE — PROGRESS NOTE ADULT - ASSESSMENT
45 yo M with PMH of Intellectual Disability and DM not on any medications presented with foot wounds.    Covid  PNA  Onychomycosis  Dental caries  DM2 with hyperglycemia due to dietary non-compliance  Intellectual disability/autism      Intermittent tachycardia without hypoxia. Recently ruled out for PE and DVT, TTE unremarkable  Covid PNA- completed RDV. Resolved  Nail bed wound and onychomycosis. Status post Aseptic debridement and curettage of all fungal and ingrowing nails 1-5 bilateral with sterile nail nipper 06/07  Podiatry- no surgical intervention is advised. outpt podiatry f/u  DM type 2 - uncontrolled. A1C 8.9.Insulin glargine and lispro . Not compliant with diabetic diet.   Dental Caries-completed clinda course. Will need outpt f/u for possible multiple extractions  Lac-hydrin added for skin dryness &  pruritis ,added topical triamcinolone  Insomnia- prn melatonin  IQ evaluation pending. Neuropsychiatry eval done.  permethrin treatment done --- as patient is itching less-- dermatology eval avoid scratching--on triamcinolone and lachydrin.

## 2022-09-18 LAB
ALBUMIN SERPL ELPH-MCNC: 4 G/DL — SIGNIFICANT CHANGE UP (ref 3.5–5.2)
ALP SERPL-CCNC: 113 U/L — SIGNIFICANT CHANGE UP (ref 30–115)
ALT FLD-CCNC: 23 U/L — SIGNIFICANT CHANGE UP (ref 0–41)
ANION GAP SERPL CALC-SCNC: 12 MMOL/L — SIGNIFICANT CHANGE UP (ref 7–14)
AST SERPL-CCNC: 19 U/L — SIGNIFICANT CHANGE UP (ref 0–41)
BASOPHILS # BLD AUTO: 0.04 K/UL — SIGNIFICANT CHANGE UP (ref 0–0.2)
BASOPHILS NFR BLD AUTO: 0.5 % — SIGNIFICANT CHANGE UP (ref 0–1)
BILIRUB SERPL-MCNC: 0.5 MG/DL — SIGNIFICANT CHANGE UP (ref 0.2–1.2)
BUN SERPL-MCNC: 22 MG/DL — HIGH (ref 10–20)
CALCIUM SERPL-MCNC: 9.4 MG/DL — SIGNIFICANT CHANGE UP (ref 8.4–10.5)
CHLORIDE SERPL-SCNC: 102 MMOL/L — SIGNIFICANT CHANGE UP (ref 98–110)
CO2 SERPL-SCNC: 24 MMOL/L — SIGNIFICANT CHANGE UP (ref 17–32)
CREAT SERPL-MCNC: 0.7 MG/DL — SIGNIFICANT CHANGE UP (ref 0.7–1.5)
EGFR: 114 ML/MIN/1.73M2 — SIGNIFICANT CHANGE UP
EOSINOPHIL # BLD AUTO: 0.36 K/UL — SIGNIFICANT CHANGE UP (ref 0–0.7)
EOSINOPHIL NFR BLD AUTO: 4.9 % — SIGNIFICANT CHANGE UP (ref 0–8)
GLUCOSE BLDC GLUCOMTR-MCNC: 153 MG/DL — HIGH (ref 70–99)
GLUCOSE BLDC GLUCOMTR-MCNC: 205 MG/DL — HIGH (ref 70–99)
GLUCOSE BLDC GLUCOMTR-MCNC: 225 MG/DL — HIGH (ref 70–99)
GLUCOSE BLDC GLUCOMTR-MCNC: 230 MG/DL — HIGH (ref 70–99)
GLUCOSE SERPL-MCNC: 277 MG/DL — HIGH (ref 70–99)
HCT VFR BLD CALC: 42.5 % — SIGNIFICANT CHANGE UP (ref 42–52)
HGB BLD-MCNC: 14.7 G/DL — SIGNIFICANT CHANGE UP (ref 14–18)
IMM GRANULOCYTES NFR BLD AUTO: 0.3 % — SIGNIFICANT CHANGE UP (ref 0.1–0.3)
LYMPHOCYTES # BLD AUTO: 1.81 K/UL — SIGNIFICANT CHANGE UP (ref 1.2–3.4)
LYMPHOCYTES # BLD AUTO: 24.8 % — SIGNIFICANT CHANGE UP (ref 20.5–51.1)
MAGNESIUM SERPL-MCNC: 1.7 MG/DL — LOW (ref 1.8–2.4)
MCHC RBC-ENTMCNC: 32.2 PG — HIGH (ref 27–31)
MCHC RBC-ENTMCNC: 34.6 G/DL — SIGNIFICANT CHANGE UP (ref 32–37)
MCV RBC AUTO: 93 FL — SIGNIFICANT CHANGE UP (ref 80–94)
MONOCYTES # BLD AUTO: 0.62 K/UL — HIGH (ref 0.1–0.6)
MONOCYTES NFR BLD AUTO: 8.5 % — SIGNIFICANT CHANGE UP (ref 1.7–9.3)
NEUTROPHILS # BLD AUTO: 4.44 K/UL — SIGNIFICANT CHANGE UP (ref 1.4–6.5)
NEUTROPHILS NFR BLD AUTO: 61 % — SIGNIFICANT CHANGE UP (ref 42.2–75.2)
NRBC # BLD: 0 /100 WBCS — SIGNIFICANT CHANGE UP (ref 0–0)
PLATELET # BLD AUTO: 299 K/UL — SIGNIFICANT CHANGE UP (ref 130–400)
POTASSIUM SERPL-MCNC: 3.8 MMOL/L — SIGNIFICANT CHANGE UP (ref 3.5–5)
POTASSIUM SERPL-SCNC: 3.8 MMOL/L — SIGNIFICANT CHANGE UP (ref 3.5–5)
PROT SERPL-MCNC: 6.8 G/DL — SIGNIFICANT CHANGE UP (ref 6–8)
RBC # BLD: 4.57 M/UL — LOW (ref 4.7–6.1)
RBC # FLD: 11.9 % — SIGNIFICANT CHANGE UP (ref 11.5–14.5)
SODIUM SERPL-SCNC: 138 MMOL/L — SIGNIFICANT CHANGE UP (ref 135–146)
WBC # BLD: 7.29 K/UL — SIGNIFICANT CHANGE UP (ref 4.8–10.8)
WBC # FLD AUTO: 7.29 K/UL — SIGNIFICANT CHANGE UP (ref 4.8–10.8)

## 2022-09-18 PROCEDURE — 99231 SBSQ HOSP IP/OBS SF/LOW 25: CPT

## 2022-09-18 RX ADMIN — Medication 8 UNIT(S): at 11:39

## 2022-09-18 RX ADMIN — INSULIN GLARGINE 25 UNIT(S): 100 INJECTION, SOLUTION SUBCUTANEOUS at 08:09

## 2022-09-18 RX ADMIN — Medication 1 APPLICATION(S): at 05:26

## 2022-09-18 RX ADMIN — Medication 4: at 17:00

## 2022-09-18 RX ADMIN — Medication 1 APPLICATION(S): at 11:40

## 2022-09-18 RX ADMIN — Medication 4: at 11:39

## 2022-09-18 RX ADMIN — FAMOTIDINE 20 MILLIGRAM(S): 10 INJECTION INTRAVENOUS at 11:40

## 2022-09-18 RX ADMIN — Medication 8 UNIT(S): at 17:01

## 2022-09-18 RX ADMIN — Medication 1 APPLICATION(S): at 17:01

## 2022-09-18 RX ADMIN — Medication 1 TABLET(S): at 17:01

## 2022-09-18 RX ADMIN — RIVAROXABAN 5 MILLIGRAM(S): KIT at 11:40

## 2022-09-18 RX ADMIN — Medication 1 APPLICATION(S): at 17:02

## 2022-09-18 RX ADMIN — Medication 1 APPLICATION(S): at 05:27

## 2022-09-18 RX ADMIN — Medication 1 TABLET(S): at 05:27

## 2022-09-18 RX ADMIN — Medication 8 UNIT(S): at 08:08

## 2022-09-18 RX ADMIN — Medication 3 MILLIGRAM(S): at 21:09

## 2022-09-18 RX ADMIN — Medication 2: at 08:08

## 2022-09-18 NOTE — PROGRESS NOTE ADULT - SUBJECTIVE AND OBJECTIVE BOX
SUBJECTIVE:    Patient is a 47y old Male who presents with a chief complaint of DFU (18 Sep 2022 03:26)    Currently admitted to medicine with the primary diagnosis of Onychomycosis       Today is hospital day 171d.     PAST MEDICAL & SURGICAL HISTORY  DM (diabetes mellitus)    Intellectual disability      ALLERGIES:  penicillin (Unknown)    MEDICATIONS:  STANDING MEDICATIONS  ammonium lactate 12% Lotion 1 Application(s) Topical two times a day  chlorhexidine 4% Liquid 1 Application(s) Topical <User Schedule>  clotrimazole 1% Cream 1 Application(s) Topical two times a day  famotidine    Tablet 20 milliGRAM(s) Oral daily  insulin glargine Injectable (LANTUS) 25 Unit(s) SubCutaneous every morning  insulin lispro (ADMELOG) corrective regimen sliding scale   SubCutaneous three times a day before meals  insulin lispro Injectable (ADMELOG) 8 Unit(s) SubCutaneous three times a day before meals  lactobacillus acidophilus 1 Tablet(s) Oral two times a day  melatonin 3 milliGRAM(s) Oral at bedtime  rivaroxaban 5 milliGRAM(s) Oral daily  triamcinolone 0.1% Oral Paste 1 Application(s) Topical two times a day  vitamin A &amp; D Ointment 1 Application(s) Topical daily    PRN MEDICATIONS  acetaminophen     Tablet .. 650 milliGRAM(s) Oral every 6 hours PRN    VITALS:   T(F): 97.2  HR: 100  BP: 126/69  RR: 18  SpO2: --    LABS:                        14.7   7.29  )-----------( 299      ( 18 Sep 2022 04:30 )             42.5     09-18    138  |  102  |  22<H>  ----------------------------<  277<H>  3.8   |  24  |  0.7    Ca    9.4      18 Sep 2022 04:30  Mg     1.7     09-18    TPro  6.8  /  Alb  4.0  /  TBili  0.5  /  DBili  x   /  AST  19  /  ALT  23  /  AlkPhos  113  09-18                  RADIOLOGY:    PHYSICAL EXAM:  GEN: No acute distress  LUNGS: Clear to auscultation bilaterally   HEART: S1/S2 present. RRR.   ABD/ GI: Soft, non-tender, non-distended. Bowel sounds present  EXT: NC/NC/NE/2+PP/LOJA  NEURO: AAOX3

## 2022-09-18 NOTE — PROGRESS NOTE ADULT - ASSESSMENT
47 yo M with PMH of Intellectual Disability and DM not on any medications presented with foot wounds.    Covid  PNA  Onychomycosis  Dental caries  DM2 with hyperglycemia due to dietary non-compliance  Intellectual disability/autism      Intermittent tachycardia without hypoxia. Recently ruled out for PE and DVT, TTE unremarkable  Covid PNA- completed RDV. Resolved  Nail bed wound and onychomycosis. Status post Aseptic debridement and curettage of all fungal and ingrowing nails 1-5 bilateral with sterile nail nipper 06/07  Podiatry- no surgical intervention is advised. outpt podiatry f/u  DM type 2 - uncontrolled. A1C 8.9.Insulin glargine and lispro . Not compliant with diabetic diet.   Dental Caries-completed clinda course. Will need outpt f/u for possible multiple extractions  Lac-hydrin added for skin dryness &  pruritis ,added topical triamcinolone  Insomnia- prn melatonin  IQ evaluation pending. Neuropsychiatry eval done.  permethrin treatment done --- as patient is itching less-- dermatology eval avoid scratching--on triamcinolone and lachydrin.

## 2022-09-18 NOTE — PROGRESS NOTE ADULT - SUBJECTIVE AND OBJECTIVE BOX
CARIN WATSON 47y Male  MRN#: [id number]   Hospital Day: 171d    HPI:  45 yo M with PMHx of Intellectual Disability, DM not on any medications presents with foot wounds. Pt has very poor foot hygiene and per the sister, has been persistently scratching an open wound on his L second toe, which worsened and became more red, had drainage, malodor. He saw a physician at [facility] and was told to come to the ED for evaluation. Pt is poor historian. Does endorse abdominal pain for a few days, cannot give much more description. Per the sister, pt did not seem to have any recent fevers, however is also not a very good historian and does not seem to have good health literacy. States that her and her mom decided to stop giving pt Metformin a while ago, are treating his diabetes by not giving him any sugary foods.  In the ED, T 97.8, /87, , RR 16, SpO2 99% on RA. Lab work unrevealing.  (31 Mar 2022 22:36)      SUBJECTIVE  Patient is a 47y old Male who presents with a chief complaint of DFU (17 Sep 2022 16:40)  Currently admitted to medicine with the primary diagnosis of Onychomycosis      INTERVAL HPI AND OVERNIGHT EVENTS:  Patient was examined and seen at bedside. This morning he is resting comfortably in bed and reports no issues or overnight events.      OBJECTIVE  PAST MEDICAL & SURGICAL HISTORY  DM (diabetes mellitus)    Intellectual disability      ALLERGIES:  penicillin (Unknown)    MEDICATIONS:  STANDING MEDICATIONS  ammonium lactate 12% Lotion 1 Application(s) Topical two times a day  chlorhexidine 4% Liquid 1 Application(s) Topical <User Schedule>  clotrimazole 1% Cream 1 Application(s) Topical two times a day  famotidine    Tablet 20 milliGRAM(s) Oral daily  insulin glargine Injectable (LANTUS) 25 Unit(s) SubCutaneous every morning  insulin lispro (ADMELOG) corrective regimen sliding scale   SubCutaneous three times a day before meals  insulin lispro Injectable (ADMELOG) 8 Unit(s) SubCutaneous three times a day before meals  lactobacillus acidophilus 1 Tablet(s) Oral two times a day  melatonin 3 milliGRAM(s) Oral at bedtime  rivaroxaban 5 milliGRAM(s) Oral daily  vitamin A &amp; D Ointment 1 Application(s) Topical daily    PRN MEDICATIONS  acetaminophen     Tablet .. 650 milliGRAM(s) Oral every 6 hours PRN      VITAL SIGNS: Last 24 Hours  T(C): 36.6 (17 Sep 2022 23:59), Max: 36.6 (17 Sep 2022 15:50)  T(F): 97.9 (17 Sep 2022 23:59), Max: 97.9 (17 Sep 2022 15:50)  HR: 101 (17 Sep 2022 23:59) (97 - 101)  BP: 140/78 (17 Sep 2022 23:59) (132/74 - 152/93)  BP(mean): --  RR: 18 (17 Sep 2022 23:59) (18 - 18)  SpO2: --    LABS:                        14.7   6.82  )-----------( 308      ( 16 Sep 2022 07:30 )             43.7     09-16    137  |  102  |  16  ----------------------------<  208<H>  4.3   |  24  |  0.7    Ca    9.0      16 Sep 2022 07:30  Mg     1.7     09-16    TPro  6.8  /  Alb  4.2  /  TBili  0.5  /  DBili  x   /  AST  20  /  ALT  22  /  AlkPhos  112  09-16                  RADIOLOGY:      PHYSICAL EXAM:    GEN: NAD, Resting comfortably in bed. intellectual disability.  PULM: Clear to auscultation bilaterally, No wheezes  CVS: Regular rate and rhythm, S1-S2, no murmurs  ABD: Soft, non-tender, non-distended, no guarding  EXT: No edema; bilateral UE excoriations appreciated. improved from before  NEURO: A&Ox3, no focal deficits    Assessment and Plan:   48 yo M with PMH of Intellectual Disability and DM not on any medications presented with foot wounds. Hospital stay complicated Hospital acquired COVID s/p RDV, poorly controlled DM. Patient has been in the hospital >100 days, guardianship established and now pending placement after IQ tests done by neuropsych. Patient still has itchy skin excoriations all over his body.    # Excoriations likely 2/2 skin dryness- Active  - lesions still itchy but improving on lac-hydrin and topical triamcinolone.  - Derm consut 9/16 apreciated:cw triamcinolone 0.1% topical cream, moisturizing cream, and only way to improve excoriations is pt must avoid scratching  - Patient given on 9/15 permethrin 1% full body wash.     # sinus Tachycardia 8/12 and intermittently - resolved  - No CP/SOB  - EKG 8/11 with new incomplete RBBB and s1q3t3  - Discussed with Dr Piper, recs r/o DVT/PE;   - DVT and PE were ruled out on 8/13 : CTA + LE duplex negative  - TTE on 8/14: 1. EF of 66 % Grade I diastolic dysfunction, no valvular dysfunction    # Covid PNA 5/21- resolved  - S/p RDV    # bilateral Onychomycosis - stable  - S/p debridement and curettage by podiatry 7/18  - F/u outpatient with Dr. Bronson    # Dental caries - stable  - S/p course of clindamycin, outpatient f/u for teeth extractions    # DM2- stable  - LAntus, lispro and SS  - A1c 7.5 in 8/11. well controlled.    # Intellectual disability/autism- stable  - Neuropsych consulted Dr Deborah Weller ([facility]) and her assistant Cheryl ([phone])  - Psychological test with IQ and [place] scores done for placement completed 9/14 and showed:  Based on full-scale IQ (FSIQ = 60) and his inability to perform IADLs and certain ADLS on his own, he meets criteria for Mild Intellectual Disability (F70.) Following that patient does not have a support system nor family to care for him, it is crucial for his safety, that he should be placed in a supervised living environment. Ideally, this placement should be long-standing, as his disability is permanent and he will continue to require support and supervision for the remainder of his life.       # Misc  - DVT Prophylaxis: rivaroxaban  - GI Prophylaxis: famotidine Tablet 20 milliGRAM(s) Oral daily  - Diet: Diet, DASH/TLC  - Activity: Activity - Ambulate as Tolerated  - Code Status: Full.  - pending: placement. Labs weekly.

## 2022-09-19 LAB
GLUCOSE BLDC GLUCOMTR-MCNC: 173 MG/DL — HIGH (ref 70–99)
GLUCOSE BLDC GLUCOMTR-MCNC: 182 MG/DL — HIGH (ref 70–99)
GLUCOSE BLDC GLUCOMTR-MCNC: 232 MG/DL — HIGH (ref 70–99)
GLUCOSE BLDC GLUCOMTR-MCNC: 300 MG/DL — HIGH (ref 70–99)

## 2022-09-19 PROCEDURE — 99231 SBSQ HOSP IP/OBS SF/LOW 25: CPT

## 2022-09-19 RX ADMIN — Medication 1 APPLICATION(S): at 11:57

## 2022-09-19 RX ADMIN — Medication 1 APPLICATION(S): at 05:04

## 2022-09-19 RX ADMIN — INSULIN GLARGINE 25 UNIT(S): 100 INJECTION, SOLUTION SUBCUTANEOUS at 08:20

## 2022-09-19 RX ADMIN — Medication 8 UNIT(S): at 17:26

## 2022-09-19 RX ADMIN — Medication 1 TABLET(S): at 05:04

## 2022-09-19 RX ADMIN — Medication 8 UNIT(S): at 08:21

## 2022-09-19 RX ADMIN — RIVAROXABAN 5 MILLIGRAM(S): KIT at 11:57

## 2022-09-19 RX ADMIN — Medication 1 APPLICATION(S): at 17:25

## 2022-09-19 RX ADMIN — Medication 1 TABLET(S): at 17:26

## 2022-09-19 RX ADMIN — Medication 4: at 11:58

## 2022-09-19 RX ADMIN — Medication 1 APPLICATION(S): at 17:27

## 2022-09-19 RX ADMIN — Medication 2: at 08:20

## 2022-09-19 RX ADMIN — Medication 8 UNIT(S): at 11:58

## 2022-09-19 RX ADMIN — Medication 3 MILLIGRAM(S): at 22:03

## 2022-09-19 RX ADMIN — FAMOTIDINE 20 MILLIGRAM(S): 10 INJECTION INTRAVENOUS at 11:57

## 2022-09-19 RX ADMIN — Medication 2: at 17:26

## 2022-09-19 NOTE — PROGRESS NOTE ADULT - ASSESSMENT
46 yo M with PMH of Intellectual Disability and DM not on any medications presented with foot wounds. Hospital stay complicated Hospital acquired COVID s/p RDV, poorly controlled DM. Patient has been in the hospital >100 days, guardianship established and now pending placement after IQ tests done by neuropsych. Patient still has itchy skin excoriations all over his body.    # Excoriations likely 2/2 skin dryness- Active  - lesions still itchy but improving on lac-hydrin and topical triamcinolone.  - Derm consut 9/16 apreciated:cw triamcinolone 0.1% topical cream, moisturizing cream, and only way to improve excoriations is pt must avoid scratching  - Patient given on 9/15 permethrin 1% full body wash.     # sinus Tachycardia 8/12 and intermittently - resolved  - No CP/SOB  - EKG 8/11 with new incomplete RBBB and s1q3t3  - Discussed with Dr Piper, recs r/o DVT/PE;   - DVT and PE were ruled out on 8/13 : CTA + LE duplex negative  - TTE on 8/14: 1. EF of 66 % Grade I diastolic dysfunction, no valvular dysfunction    # Covid PNA 5/21- resolved  - S/p RDV    # bilateral Onychomycosis - stable  - S/p debridement and curettage by podiatry 7/18  - F/u outpatient with Dr. Bronson    # Dental caries - stable  - S/p course of clindamycin, outpatient f/u for teeth extractions    # DM2- stable  - LAntus, lispro and SS  - A1c 7.5 in 8/11. well controlled.    # Intellectual disability/autism- stable  - Neuropsych consulted Dr Deborah Weller ([employer]) and her assistant Cheryl ([phone])  - Psychological test with IQ and [place] scores done for placement completed 9/14 and showed:  Based on full-scale IQ (FSIQ = 60) and his inability to perform IADLs and certain ADLS on his own, he meets criteria for Mild Intellectual Disability (F70.) Following that patient does not have a support system nor family to care for him, it is crucial for his safety, that he should be placed in a supervised living environment. Ideally, this placement should be long-standing, as his disability is permanent and he will continue to require support and supervision for the remainder of his life.       # Misc  - DVT Prophylaxis: rivaroxaban  - GI Prophylaxis: famotidine Tablet 20 milliGRAM(s) Oral daily  - Diet: Diet, DASH/TLC  - Activity: Activity - Ambulate as Tolerated  - Code Status: Full.  - pending: placement. Labs weekly.

## 2022-09-19 NOTE — PROGRESS NOTE ADULT - SUBJECTIVE AND OBJECTIVE BOX
CARIN WATSON 47y Male  MRN#: [id number]   CODE STATUS:    Hospital Day: 172d    SUBJECTIVE  No acute events overnight.                                               ----------------------------------------------------------  OBJECTIVE  PAST MEDICAL & SURGICAL HISTORY  DM (diabetes mellitus)    Intellectual disability                                              -----------------------------------------------------------  ALLERGIES:  penicillin (Unknown)                                            ------------------------------------------------------------    HOME MEDICATIONS  Home Medications:  vitamin A and D topical ointment: 1 application topically once a day (12 Apr 2022 11:40)                           MEDICATIONS:  STANDING MEDICATIONS  ammonium lactate 12% Lotion 1 Application(s) Topical two times a day  chlorhexidine 4% Liquid 1 Application(s) Topical <User Schedule>  clotrimazole 1% Cream 1 Application(s) Topical two times a day  famotidine    Tablet 20 milliGRAM(s) Oral daily  insulin glargine Injectable (LANTUS) 25 Unit(s) SubCutaneous every morning  insulin lispro (ADMELOG) corrective regimen sliding scale   SubCutaneous three times a day before meals  insulin lispro Injectable (ADMELOG) 8 Unit(s) SubCutaneous three times a day before meals  lactobacillus acidophilus 1 Tablet(s) Oral two times a day  melatonin 3 milliGRAM(s) Oral at bedtime  rivaroxaban 5 milliGRAM(s) Oral daily  triamcinolone 0.1% Oral Paste 1 Application(s) Topical two times a day  vitamin A &amp; D Ointment 1 Application(s) Topical daily    PRN MEDICATIONS  acetaminophen     Tablet .. 650 milliGRAM(s) Oral every 6 hours PRN                                            ------------------------------------------------------------  VITAL SIGNS: Last 24 Hours  T(C): 36.1 (19 Sep 2022 07:26), Max: 36.2 (18 Sep 2022 15:54)  T(F): 97 (19 Sep 2022 07:26), Max: 97.2 (18 Sep 2022 15:54)  HR: 96 (19 Sep 2022 07:26) (96 - 106)  BP: 167/70 (19 Sep 2022 07:26) (126/69 - 167/70)  BP(mean): --  RR: 18 (19 Sep 2022 07:26) (18 - 18)  SpO2: 95% (19 Sep 2022 07:26) (95% - 95%)      09-18-22 @ 07:01  -  09-19-22 @ 07:00  --------------------------------------------------------  IN: 240 mL / OUT: 0 mL / NET: 240 mL                                             --------------------------------------------------------------  LABS:                        14.7   7.29  )-----------( 299      ( 18 Sep 2022 04:30 )             42.5     09-18    138  |  102  |  22<H>  ----------------------------<  277<H>  3.8   |  24  |  0.7    Ca    9.4      18 Sep 2022 04:30  Mg     1.7     09-18    TPro  6.8  /  Alb  4.0  /  TBili  0.5  /  DBili  x   /  AST  19  /  ALT  23  /  AlkPhos  113  09-18                        PHYSICAL EXAM:  General: well-appearing in NAD. AAO x 3.  HEENT: Normocephalic, nontraumatic.   LUNGS: Clear to auscultation b/l  HEART: RRR. S1/S2 present.  ABDOMEN: Nontender, nondistended. + bowel sounds.   EXT: Pulses palpable. Nonedematous.   SKIN: Warm, dry.

## 2022-09-19 NOTE — PROGRESS NOTE ADULT - SUBJECTIVE AND OBJECTIVE BOX
SUBJECTIVE:    Patient is a 47y old Male who presents with a chief complaint of DFU (19 Sep 2022 10:11)    Currently admitted to medicine with the primary diagnosis of Onychomycosis       Today is hospital day 172d.     PAST MEDICAL & SURGICAL HISTORY  DM (diabetes mellitus)    Intellectual disability      ALLERGIES:  penicillin (Unknown)    MEDICATIONS:  STANDING MEDICATIONS  ammonium lactate 12% Lotion 1 Application(s) Topical two times a day  chlorhexidine 4% Liquid 1 Application(s) Topical <User Schedule>  clotrimazole 1% Cream 1 Application(s) Topical two times a day  famotidine    Tablet 20 milliGRAM(s) Oral daily  insulin glargine Injectable (LANTUS) 25 Unit(s) SubCutaneous every morning  insulin lispro (ADMELOG) corrective regimen sliding scale   SubCutaneous three times a day before meals  insulin lispro Injectable (ADMELOG) 8 Unit(s) SubCutaneous three times a day before meals  lactobacillus acidophilus 1 Tablet(s) Oral two times a day  melatonin 3 milliGRAM(s) Oral at bedtime  rivaroxaban 5 milliGRAM(s) Oral daily  triamcinolone 0.1% Oral Paste 1 Application(s) Topical two times a day  vitamin A &amp; D Ointment 1 Application(s) Topical daily    PRN MEDICATIONS  acetaminophen     Tablet .. 650 milliGRAM(s) Oral every 6 hours PRN    VITALS:   T(F): 97  HR: 96  BP: 167/70  RR: 18  SpO2: 95%    LABS:                        14.7   7.29  )-----------( 299      ( 18 Sep 2022 04:30 )             42.5     09-18    138  |  102  |  22<H>  ----------------------------<  277<H>  3.8   |  24  |  0.7    Ca    9.4      18 Sep 2022 04:30  Mg     1.7     09-18    TPro  6.8  /  Alb  4.0  /  TBili  0.5  /  DBili  x   /  AST  19  /  ALT  23  /  AlkPhos  113  09-18                  RADIOLOGY:    PHYSICAL EXAM:  GEN: No acute distress  LUNGS: Clear to auscultation bilaterally   HEART: S1/S2 present. RRR.   ABD/ GI: Soft, non-tender, non-distended. Bowel sounds present  EXT: NC/NC/NE/2+PP/LOJA  NEURO: AAOX3

## 2022-09-20 LAB
ALBUMIN SERPL ELPH-MCNC: 4.1 G/DL — SIGNIFICANT CHANGE UP (ref 3.5–5.2)
ALP SERPL-CCNC: 116 U/L — HIGH (ref 30–115)
ALT FLD-CCNC: 20 U/L — SIGNIFICANT CHANGE UP (ref 0–41)
ANION GAP SERPL CALC-SCNC: 13 MMOL/L — SIGNIFICANT CHANGE UP (ref 7–14)
AST SERPL-CCNC: 17 U/L — SIGNIFICANT CHANGE UP (ref 0–41)
BASOPHILS # BLD AUTO: 0.06 K/UL — SIGNIFICANT CHANGE UP (ref 0–0.2)
BASOPHILS NFR BLD AUTO: 0.8 % — SIGNIFICANT CHANGE UP (ref 0–1)
BILIRUB SERPL-MCNC: 0.7 MG/DL — SIGNIFICANT CHANGE UP (ref 0.2–1.2)
BUN SERPL-MCNC: 16 MG/DL — SIGNIFICANT CHANGE UP (ref 10–20)
CALCIUM SERPL-MCNC: 9.2 MG/DL — SIGNIFICANT CHANGE UP (ref 8.4–10.5)
CHLORIDE SERPL-SCNC: 103 MMOL/L — SIGNIFICANT CHANGE UP (ref 98–110)
CO2 SERPL-SCNC: 24 MMOL/L — SIGNIFICANT CHANGE UP (ref 17–32)
CREAT SERPL-MCNC: 0.7 MG/DL — SIGNIFICANT CHANGE UP (ref 0.7–1.5)
EGFR: 114 ML/MIN/1.73M2 — SIGNIFICANT CHANGE UP
EOSINOPHIL # BLD AUTO: 0.47 K/UL — SIGNIFICANT CHANGE UP (ref 0–0.7)
EOSINOPHIL NFR BLD AUTO: 6.3 % — SIGNIFICANT CHANGE UP (ref 0–8)
GLUCOSE BLDC GLUCOMTR-MCNC: 134 MG/DL — HIGH (ref 70–99)
GLUCOSE BLDC GLUCOMTR-MCNC: 135 MG/DL — HIGH (ref 70–99)
GLUCOSE BLDC GLUCOMTR-MCNC: 142 MG/DL — HIGH (ref 70–99)
GLUCOSE BLDC GLUCOMTR-MCNC: 166 MG/DL — HIGH (ref 70–99)
GLUCOSE BLDC GLUCOMTR-MCNC: 189 MG/DL — HIGH (ref 70–99)
GLUCOSE SERPL-MCNC: 151 MG/DL — HIGH (ref 70–99)
HCT VFR BLD CALC: 43.6 % — SIGNIFICANT CHANGE UP (ref 42–52)
HGB BLD-MCNC: 14.9 G/DL — SIGNIFICANT CHANGE UP (ref 14–18)
IMM GRANULOCYTES NFR BLD AUTO: 0.4 % — HIGH (ref 0.1–0.3)
LYMPHOCYTES # BLD AUTO: 2.53 K/UL — SIGNIFICANT CHANGE UP (ref 1.2–3.4)
LYMPHOCYTES # BLD AUTO: 34 % — SIGNIFICANT CHANGE UP (ref 20.5–51.1)
MAGNESIUM SERPL-MCNC: 1.7 MG/DL — LOW (ref 1.8–2.4)
MCHC RBC-ENTMCNC: 32.1 PG — HIGH (ref 27–31)
MCHC RBC-ENTMCNC: 34.2 G/DL — SIGNIFICANT CHANGE UP (ref 32–37)
MCV RBC AUTO: 94 FL — SIGNIFICANT CHANGE UP (ref 80–94)
MONOCYTES # BLD AUTO: 0.61 K/UL — HIGH (ref 0.1–0.6)
MONOCYTES NFR BLD AUTO: 8.2 % — SIGNIFICANT CHANGE UP (ref 1.7–9.3)
NEUTROPHILS # BLD AUTO: 3.75 K/UL — SIGNIFICANT CHANGE UP (ref 1.4–6.5)
NEUTROPHILS NFR BLD AUTO: 50.3 % — SIGNIFICANT CHANGE UP (ref 42.2–75.2)
NRBC # BLD: 0 /100 WBCS — SIGNIFICANT CHANGE UP (ref 0–0)
PLATELET # BLD AUTO: 317 K/UL — SIGNIFICANT CHANGE UP (ref 130–400)
POTASSIUM SERPL-MCNC: 3.8 MMOL/L — SIGNIFICANT CHANGE UP (ref 3.5–5)
POTASSIUM SERPL-SCNC: 3.8 MMOL/L — SIGNIFICANT CHANGE UP (ref 3.5–5)
PROT SERPL-MCNC: 6.7 G/DL — SIGNIFICANT CHANGE UP (ref 6–8)
RBC # BLD: 4.64 M/UL — LOW (ref 4.7–6.1)
RBC # FLD: 11.7 % — SIGNIFICANT CHANGE UP (ref 11.5–14.5)
SODIUM SERPL-SCNC: 140 MMOL/L — SIGNIFICANT CHANGE UP (ref 135–146)
WBC # BLD: 7.45 K/UL — SIGNIFICANT CHANGE UP (ref 4.8–10.8)
WBC # FLD AUTO: 7.45 K/UL — SIGNIFICANT CHANGE UP (ref 4.8–10.8)

## 2022-09-20 PROCEDURE — 99231 SBSQ HOSP IP/OBS SF/LOW 25: CPT

## 2022-09-20 RX ORDER — MAGNESIUM OXIDE 400 MG ORAL TABLET 241.3 MG
400 TABLET ORAL ONCE
Refills: 0 | Status: COMPLETED | OUTPATIENT
Start: 2022-09-20 | End: 2022-09-20

## 2022-09-20 RX ADMIN — Medication 1 APPLICATION(S): at 06:27

## 2022-09-20 RX ADMIN — MAGNESIUM OXIDE 400 MG ORAL TABLET 400 MILLIGRAM(S): 241.3 TABLET ORAL at 12:08

## 2022-09-20 RX ADMIN — FAMOTIDINE 20 MILLIGRAM(S): 10 INJECTION INTRAVENOUS at 12:08

## 2022-09-20 RX ADMIN — Medication 3 MILLIGRAM(S): at 21:55

## 2022-09-20 RX ADMIN — RIVAROXABAN 5 MILLIGRAM(S): KIT at 12:08

## 2022-09-20 RX ADMIN — Medication 1 APPLICATION(S): at 17:59

## 2022-09-20 RX ADMIN — Medication 2: at 12:09

## 2022-09-20 RX ADMIN — Medication 1 TABLET(S): at 17:58

## 2022-09-20 RX ADMIN — Medication 8 UNIT(S): at 08:03

## 2022-09-20 RX ADMIN — Medication 1 APPLICATION(S): at 12:30

## 2022-09-20 RX ADMIN — Medication 8 UNIT(S): at 12:10

## 2022-09-20 RX ADMIN — Medication 1 APPLICATION(S): at 06:25

## 2022-09-20 RX ADMIN — Medication 1 APPLICATION(S): at 06:26

## 2022-09-20 RX ADMIN — Medication 1 TABLET(S): at 06:26

## 2022-09-20 RX ADMIN — Medication 8 UNIT(S): at 17:58

## 2022-09-20 RX ADMIN — INSULIN GLARGINE 25 UNIT(S): 100 INJECTION, SOLUTION SUBCUTANEOUS at 08:03

## 2022-09-20 RX ADMIN — Medication 1 APPLICATION(S): at 17:58

## 2022-09-20 NOTE — PROGRESS NOTE ADULT - SUBJECTIVE AND OBJECTIVE BOX
CARIN WATSON 47y Male  MRN#: [id number]   CODE STATUS:    Hospital Day: 173d    SUBJECTIVE  No acute events overnight.                                               ----------------------------------------------------------  OBJECTIVE  PAST MEDICAL & SURGICAL HISTORY  DM (diabetes mellitus)    Intellectual disability                                              -----------------------------------------------------------  ALLERGIES:  penicillin (Unknown)                                            ------------------------------------------------------------    HOME MEDICATIONS  Home Medications:  vitamin A and D topical ointment: 1 application topically once a day (12 Apr 2022 11:40)                           MEDICATIONS:  STANDING MEDICATIONS  ammonium lactate 12% Lotion 1 Application(s) Topical two times a day  chlorhexidine 4% Liquid 1 Application(s) Topical <User Schedule>  clotrimazole 1% Cream 1 Application(s) Topical two times a day  famotidine    Tablet 20 milliGRAM(s) Oral daily  insulin glargine Injectable (LANTUS) 25 Unit(s) SubCutaneous every morning  insulin lispro (ADMELOG) corrective regimen sliding scale   SubCutaneous three times a day before meals  insulin lispro Injectable (ADMELOG) 8 Unit(s) SubCutaneous three times a day before meals  lactobacillus acidophilus 1 Tablet(s) Oral two times a day  melatonin 3 milliGRAM(s) Oral at bedtime  rivaroxaban 5 milliGRAM(s) Oral daily  triamcinolone 0.1% Oral Paste 1 Application(s) Topical two times a day  vitamin A &amp; D Ointment 1 Application(s) Topical daily    PRN MEDICATIONS  acetaminophen     Tablet .. 650 milliGRAM(s) Oral every 6 hours PRN                                            ------------------------------------------------------------  VITAL SIGNS: Last 24 Hours  T(C): 35.6 (20 Sep 2022 07:24), Max: 36 (19 Sep 2022 23:14)  T(F): 96.1 (20 Sep 2022 07:24), Max: 96.8 (19 Sep 2022 23:14)  HR: 93 (20 Sep 2022 07:24) (93 - 101)  BP: 126/74 (20 Sep 2022 07:24) (126/74 - 146/69)  BP(mean): --  RR: 18 (20 Sep 2022 07:24) (18 - 18)  SpO2: 95% (20 Sep 2022 07:24) (95% - 95%)                                             --------------------------------------------------------------  LABS:                        14.9   7.45  )-----------( 317      ( 20 Sep 2022 07:37 )             43.6     09-20    140  |  103  |  16  ----------------------------<  151<H>  3.8   |  24  |  0.7    Ca    9.2      20 Sep 2022 07:37  Mg     1.7     09-20    TPro  6.7  /  Alb  4.1  /  TBili  0.7  /  DBili  x   /  AST  17  /  ALT  20  /  AlkPhos  116<H>  09-20                        PHYSICAL EXAM:  General: well-appearing in NAD. AAO x 3.  HEENT: Normocephalic, nontraumatic.   LUNGS: Clear to auscultation b/l.  HEART: RRR. S1/S2 present   ABDOMEN: Nontender, nondistended. + bowel sounds.   EXT: Pulses palpable. Nonedematous.   SKIN: Warm, dry.                                                                                     CARIN WATSON 47y Male  MRN#: [id number]   CODE STATUS:    Hospital Day: 173d    SUBJECTIVE  No acute events overnight.                                               ----------------------------------------------------------  OBJECTIVE  PAST MEDICAL & SURGICAL HISTORY  DM (diabetes mellitus)    Intellectual disability                                              -----------------------------------------------------------  ALLERGIES:  penicillin (Unknown)                                            ------------------------------------------------------------    HOME MEDICATIONS  Home Medications:  vitamin A and D topical ointment: 1 application topically once a day (12 Apr 2022 11:40)                           MEDICATIONS:  STANDING MEDICATIONS  ammonium lactate 12% Lotion 1 Application(s) Topical two times a day  chlorhexidine 4% Liquid 1 Application(s) Topical <User Schedule>  clotrimazole 1% Cream 1 Application(s) Topical two times a day  famotidine    Tablet 20 milliGRAM(s) Oral daily  insulin glargine Injectable (LANTUS) 25 Unit(s) SubCutaneous every morning  insulin lispro (ADMELOG) corrective regimen sliding scale   SubCutaneous three times a day before meals  insulin lispro Injectable (ADMELOG) 8 Unit(s) SubCutaneous three times a day before meals  lactobacillus acidophilus 1 Tablet(s) Oral two times a day  melatonin 3 milliGRAM(s) Oral at bedtime  rivaroxaban 5 milliGRAM(s) Oral daily  triamcinolone 0.1% Oral Paste 1 Application(s) Topical two times a day  vitamin A &amp; D Ointment 1 Application(s) Topical daily    PRN MEDICATIONS  acetaminophen     Tablet .. 650 milliGRAM(s) Oral every 6 hours PRN                                            ------------------------------------------------------------  VITAL SIGNS: Last 24 Hours  T(C): 35.6 (20 Sep 2022 07:24), Max: 36 (19 Sep 2022 23:14)  T(F): 96.1 (20 Sep 2022 07:24), Max: 96.8 (19 Sep 2022 23:14)  HR: 93 (20 Sep 2022 07:24) (93 - 101)  BP: 126/74 (20 Sep 2022 07:24) (126/74 - 146/69)  BP(mean): --  RR: 18 (20 Sep 2022 07:24) (18 - 18)  SpO2: 95% (20 Sep 2022 07:24) (95% - 95%)                                             --------------------------------------------------------------  LABS:                        14.9   7.45  )-----------( 317      ( 20 Sep 2022 07:37 )             43.6     09-20    140  |  103  |  16  ----------------------------<  151<H>  3.8   |  24  |  0.7    Ca    9.2      20 Sep 2022 07:37  Mg     1.7     09-20    TPro  6.7  /  Alb  4.1  /  TBili  0.7  /  DBili  x   /  AST  17  /  ALT  20  /  AlkPhos  116<H>  09-20                        PHYSICAL EXAM:  General: well-appearing in NAD. AAO x 3.  HEENT: Normocephalic, nontraumatic.   LUNGS: Clear to auscultation b/l.  HEART: RRR. S1/S2 present   ABDOMEN: Nontender, nondistended. + bowel sounds.   EXT: Pulses palpable. Nonedematous.   SKIN: Warm, dry. multiple excoriations on b/l LE and UE

## 2022-09-20 NOTE — PROGRESS NOTE ADULT - SUBJECTIVE AND OBJECTIVE BOX
SUBJECTIVE:    Patient is a 47y old Male who presents with a chief complaint of DFU (20 Sep 2022 09:49)    Currently admitted to medicine with the primary diagnosis of Onychomycosis       Today is hospital day 173d.     PAST MEDICAL & SURGICAL HISTORY  DM (diabetes mellitus)    Intellectual disability      ALLERGIES:  penicillin (Unknown)    MEDICATIONS:  STANDING MEDICATIONS  ammonium lactate 12% Lotion 1 Application(s) Topical two times a day  chlorhexidine 4% Liquid 1 Application(s) Topical <User Schedule>  clotrimazole 1% Cream 1 Application(s) Topical two times a day  famotidine    Tablet 20 milliGRAM(s) Oral daily  insulin glargine Injectable (LANTUS) 25 Unit(s) SubCutaneous every morning  insulin lispro (ADMELOG) corrective regimen sliding scale   SubCutaneous three times a day before meals  insulin lispro Injectable (ADMELOG) 8 Unit(s) SubCutaneous three times a day before meals  lactobacillus acidophilus 1 Tablet(s) Oral two times a day  melatonin 3 milliGRAM(s) Oral at bedtime  rivaroxaban 5 milliGRAM(s) Oral daily  triamcinolone 0.1% Oral Paste 1 Application(s) Topical two times a day  vitamin A &amp; D Ointment 1 Application(s) Topical daily    PRN MEDICATIONS  acetaminophen     Tablet .. 650 milliGRAM(s) Oral every 6 hours PRN    VITALS:   T(F): 97.4  HR: 103  BP: 125/72  RR: 18  SpO2: 95%    LABS:                        14.9   7.45  )-----------( 317      ( 20 Sep 2022 07:37 )             43.6     09-20    140  |  103  |  16  ----------------------------<  151<H>  3.8   |  24  |  0.7    Ca    9.2      20 Sep 2022 07:37  Mg     1.7     09-20    TPro  6.7  /  Alb  4.1  /  TBili  0.7  /  DBili  x   /  AST  17  /  ALT  20  /  AlkPhos  116<H>  09-20                  RADIOLOGY:    PHYSICAL EXAM:  GEN: No acute distress  LUNGS: Clear to auscultation bilaterally   HEART: S1/S2 present. RRR.   ABD/ GI: Soft, non-tender, non-distended. Bowel sounds present  EXT: NC/NC/NE/2+PP/LOJA  NEURO: AAOX3

## 2022-09-20 NOTE — PROGRESS NOTE ADULT - ASSESSMENT
48 yo M with PMH of Intellectual Disability and DM not on any medications presented with foot wounds. Hospital stay complicated Hospital acquired COVID s/p RDV, poorly controlled DM. Patient has been in the hospital >100 days, guardianship established and now pending placement after IQ tests done by neuropsych. Patient still has itchy skin excoriations all over his body.    # Excoriations likely 2/2 skin dryness- Active  - lesions still itchy but improving on lac-hydrin and topical triamcinolone.  - Derm consut 9/16 apreciated:cw triamcinolone 0.1% topical cream, moisturizing cream, and only way to improve excoriations is pt must avoid scratching  - Patient given on 9/15 permethrin 1% full body wash.     # sinus Tachycardia 8/12 and intermittently - resolved  - No CP/SOB  - EKG 8/11 with new incomplete RBBB and s1q3t3  - Discussed with Dr Piper, recs r/o DVT/PE;   - DVT and PE were ruled out on 8/13 : CTA + LE duplex negative  - TTE on 8/14: 1. EF of 66 % Grade I diastolic dysfunction, no valvular dysfunction    # Covid PNA 5/21- resolved  - S/p RDV    # bilateral Onychomycosis - stable  - S/p debridement and curettage by podiatry 7/18  - F/u outpatient with Dr. Bronson    # Dental caries - stable  - S/p course of clindamycin, outpatient f/u for teeth extractions    # DM2- stable  - LAntus, lispro and SS  - A1c 7.5 in 8/11. well controlled.    # Intellectual disability/autism- stable  - Neuropsych consulted Dr Deborah Weller ([facility]) and her assistant Cheryl ([phone])  - Psychological test with IQ and [place] scores done for placement completed 9/14 and showed:  Based on full-scale IQ (FSIQ = 60) and his inability to perform IADLs and certain ADLS on his own, he meets criteria for Mild Intellectual Disability (F70.) Following that patient does not have a support system nor family to care for him, it is crucial for his safety, that he should be placed in a supervised living environment. Ideally, this placement should be long-standing, as his disability is permanent and he will continue to require support and supervision for the remainder of his life.       # Misc  - DVT Prophylaxis: rivaroxaban  - GI Prophylaxis: famotidine Tablet 20 milliGRAM(s) Oral daily  - Diet: Diet, DASH/TLC  - Activity: Activity - Ambulate as Tolerated  - Code Status: Full.  - pending: placement. Labs weekly.    48 yo M with PMH of Intellectual Disability and DM not on any medications presented with foot wounds. Hospital stay complicated Hospital acquired COVID s/p RDV, poorly controlled DM. Patient has been in the hospital >100 days, guardianship established and now pending placement after IQ tests done by neuropsych. Patient still has itchy skin excoriations all over his body.    # Excoriations likely 2/2 skin dryness- Active  - lesions still itchy but improving on lac-hydrin and topical triamcinolone.  - Derm consut 9/16 apreciated:cw triamcinolone 0.1% topical cream, moisturizing cream, and only way to improve excoriations is pt must avoid scratching  - Patient given on 9/15 permethrin 1% full body wash.   - d/c rivaroxaban 9/20 as continues to itch breaking skin, increased bleeding risk  - will consider podiatry consult 9/21    # sinus Tachycardia 8/12 and intermittently - resolved  - No CP/SOB  - EKG 8/11 with new incomplete RBBB and s1q3t3  - Discussed with Dr Piper, recs r/o DVT/PE;   - DVT and PE were ruled out on 8/13 : CTA + LE duplex negative  - TTE on 8/14: 1. EF of 66 % Grade I diastolic dysfunction, no valvular dysfunction    # Covid PNA 5/21- resolved  - S/p RDV    # bilateral Onychomycosis - stable  - S/p debridement and curettage by podiatry 7/18  - F/u outpatient with Dr. Bronson    # Dental caries - stable  - S/p course of clindamycin, outpatient f/u for teeth extractions    # DM2- stable  - LAntus, lispro and SS  - A1c 7.5 in 8/11. well controlled.    # Intellectual disability/autism- stable  - Neuropsych consulted Dr Deborah Weller ([employer]) and her assistant Cheryl ([phone])  - Psychological test with IQ and [place] scores done for placement completed 9/14 and showed:  Based on full-scale IQ (FSIQ = 60) and his inability to perform IADLs and certain ADLS on his own, he meets criteria for Mild Intellectual Disability (F70.) Following that patient does not have a support system nor family to care for him, it is crucial for his safety, that he should be placed in a supervised living environment. Ideally, this placement should be long-standing, as his disability is permanent and he will continue to require support and supervision for the remainder of his life.       # Misc  - DVT Prophylaxis: rivaroxaban  - GI Prophylaxis: famotidine Tablet 20 milliGRAM(s) Oral daily  - Diet: Diet, DASH/TLC  - Activity: Activity - Ambulate as Tolerated  - Code Status: Full.  - pending: placement. Labs weekly.

## 2022-09-20 NOTE — PROGRESS NOTE ADULT - ASSESSMENT
45 yo M with PMH of Intellectual Disability and DM not on any medications presented with foot wounds.    Covid  PNA  Onychomycosis  Dental caries  DM2 with hyperglycemia due to dietary non-compliance  Intellectual disability/autism      Intermittent tachycardia without hypoxia. Recently ruled out for PE and DVT, TTE unremarkable  Covid PNA- completed RDV. Resolved  Nail bed wound and onychomycosis. Status post Aseptic debridement and curettage of all fungal and ingrowing nails 1-5 bilateral with sterile nail nipper 06/07  Podiatry- no surgical intervention is advised. outpt podiatry f/u  DM type 2 - uncontrolled. A1C 8.9.Insulin glargine and lispro . Not compliant with diabetic diet.   Dental Caries-completed clinda course. Will need outpt f/u for possible multiple extractions  Lac-hydrin added for skin dryness &  pruritis ,added topical triamcinolone  Insomnia- prn melatonin  IQ evaluation -- Neuropsychiatry eval done.  permethrin treatment done --- as patient is itching less-- dermatology eval avoid scratching--on triamcinolone and lachydrin--will dc xarelto as can cause bleeding

## 2022-09-21 LAB
GLUCOSE BLDC GLUCOMTR-MCNC: 158 MG/DL — HIGH (ref 70–99)
GLUCOSE BLDC GLUCOMTR-MCNC: 196 MG/DL — HIGH (ref 70–99)
GLUCOSE BLDC GLUCOMTR-MCNC: 227 MG/DL — HIGH (ref 70–99)
GLUCOSE BLDC GLUCOMTR-MCNC: 293 MG/DL — HIGH (ref 70–99)

## 2022-09-21 PROCEDURE — 99231 SBSQ HOSP IP/OBS SF/LOW 25: CPT

## 2022-09-21 RX ADMIN — Medication 4: at 12:09

## 2022-09-21 RX ADMIN — FAMOTIDINE 20 MILLIGRAM(S): 10 INJECTION INTRAVENOUS at 12:09

## 2022-09-21 RX ADMIN — Medication 3 MILLIGRAM(S): at 21:58

## 2022-09-21 RX ADMIN — INSULIN GLARGINE 25 UNIT(S): 100 INJECTION, SOLUTION SUBCUTANEOUS at 08:23

## 2022-09-21 RX ADMIN — Medication 1 APPLICATION(S): at 17:49

## 2022-09-21 RX ADMIN — Medication 2: at 08:49

## 2022-09-21 RX ADMIN — Medication 1 APPLICATION(S): at 07:28

## 2022-09-21 RX ADMIN — Medication 8 UNIT(S): at 08:49

## 2022-09-21 RX ADMIN — Medication 6: at 17:47

## 2022-09-21 RX ADMIN — Medication 1 APPLICATION(S): at 12:13

## 2022-09-21 RX ADMIN — RIVAROXABAN 5 MILLIGRAM(S): KIT at 12:09

## 2022-09-21 RX ADMIN — Medication 1 TABLET(S): at 07:27

## 2022-09-21 RX ADMIN — Medication 1 TABLET(S): at 17:48

## 2022-09-21 RX ADMIN — Medication 8 UNIT(S): at 12:10

## 2022-09-21 RX ADMIN — Medication 8 UNIT(S): at 17:47

## 2022-09-21 RX ADMIN — Medication 1 APPLICATION(S): at 07:30

## 2022-09-21 RX ADMIN — Medication 1 APPLICATION(S): at 17:48

## 2022-09-21 NOTE — PROGRESS NOTE ADULT - ASSESSMENT
48 yo M with PMH of Intellectual Disability and DM not on any medications presented with foot wounds. Hospital stay complicated Hospital acquired COVID s/p RDV, poorly controlled DM. Patient has been in the hospital >100 days, guardianship established and now pending placement after IQ tests done by neuropsych. Patient still has itchy skin excoriations all over his body.    # Excoriations likely 2/2 skin dryness- Active  - lesions still itchy but improving on lac-hydrin and topical triamcinolone.  - Derm consut 9/16- cw triamcinolone 0.1% topical cream, moisturizing cream, and only way to improve excoriations is pt must avoid scratching  - Patient given on 9/15 permethrin 1% full body wash.     # sinus Tachycardia 8/12 and intermittently - resolved  - No CP/SOB  - EKG 8/11 with new incomplete RBBB and s1q3t3  - DVT and PE were ruled out on 8/13 : CTA + LE duplex negative  - TTE on 8/14: 1. EF of 66 % Grade I diastolic dysfunction, no valvular dysfunction    # Covid PNA 5/21- resolved  - S/p RDV    # bilateral Onychomycosis - stable  - S/p debridement and curettage by podiatry 7/18  - F/u outpatient with Dr. Bronson  - will consider podiatry follow up soon for finger and toe nails    # Dental caries - stable  - S/p course of clindamycin, outpatient f/u for teeth extractions    # DM2- stable  - LAntus, lispro and SS  - A1c 7.5 in 8/11. well controlled.    # Intellectual disability/autism- stable  - Neuropsych consulted Dr Deborah Weller ([employer]) and her assistant Cheryl ([phone])  - Psychological test with IQ and [place] scores done for placement completed 9/14 and showed:  Based on full-scale IQ (FSIQ = 60) and his inability to perform IADLs and certain ADLS on his own, he meets criteria for Mild Intellectual Disability (F70.) Following that patient does not have a support system nor family to care for him, it is crucial for his safety, that he should be placed in a supervised living environment. Ideally, this placement should be long-standing, as his disability is permanent and he will continue to require support and supervision for the remainder of his life.       # Misc  - DVT Prophylaxis: rivaroxaban  - GI Prophylaxis: famotidine Tablet 20 milliGRAM(s) Oral daily  - Diet: Diet, DASH/TLC  - Activity: Activity - Ambulate as Tolerated  - Code Status: Full.  - pending: placement. Labs weekly.

## 2022-09-21 NOTE — PROGRESS NOTE ADULT - SUBJECTIVE AND OBJECTIVE BOX
CARIN WATSON 47y Male  MRN#: [id number]   CODE STATUS:    Hospital Day: 174d    SUBJECTIVE  No acute events overnight.                                               ----------------------------------------------------------  OBJECTIVE  PAST MEDICAL & SURGICAL HISTORY  DM (diabetes mellitus)    Intellectual disability                                              -----------------------------------------------------------  ALLERGIES:  penicillin (Unknown)                                            ------------------------------------------------------------    HOME MEDICATIONS  Home Medications:  vitamin A and D topical ointment: 1 application topically once a day (12 Apr 2022 11:40)                           MEDICATIONS:  STANDING MEDICATIONS  ammonium lactate 12% Lotion 1 Application(s) Topical two times a day  chlorhexidine 4% Liquid 1 Application(s) Topical <User Schedule>  clotrimazole 1% Cream 1 Application(s) Topical two times a day  famotidine    Tablet 20 milliGRAM(s) Oral daily  insulin glargine Injectable (LANTUS) 25 Unit(s) SubCutaneous every morning  insulin lispro (ADMELOG) corrective regimen sliding scale   SubCutaneous three times a day before meals  insulin lispro Injectable (ADMELOG) 8 Unit(s) SubCutaneous three times a day before meals  lactobacillus acidophilus 1 Tablet(s) Oral two times a day  melatonin 3 milliGRAM(s) Oral at bedtime  rivaroxaban 5 milliGRAM(s) Oral daily  triamcinolone 0.1% Oral Paste 1 Application(s) Topical two times a day  vitamin A &amp; D Ointment 1 Application(s) Topical daily    PRN MEDICATIONS  acetaminophen     Tablet .. 650 milliGRAM(s) Oral every 6 hours PRN                                            ------------------------------------------------------------  VITAL SIGNS: Last 24 Hours  T(C): 36.2 (21 Sep 2022 08:37), Max: 36.3 (20 Sep 2022 15:05)  T(F): 97.2 (21 Sep 2022 08:37), Max: 97.4 (20 Sep 2022 15:05)  HR: 99 (21 Sep 2022 08:37) (99 - 107)  BP: 140/63 (21 Sep 2022 08:37) (125/72 - 143/82)  BP(mean): --  RR: 18 (21 Sep 2022 08:37) (18 - 18)  SpO2: --                                             --------------------------------------------------------------  LABS:                        14.9   7.45  )-----------( 317      ( 20 Sep 2022 07:37 )             43.6     09-20    140  |  103  |  16  ----------------------------<  151<H>  3.8   |  24  |  0.7    Ca    9.2      20 Sep 2022 07:37  Mg     1.7     09-20    TPro  6.7  /  Alb  4.1  /  TBili  0.7  /  DBili  x   /  AST  17  /  ALT  20  /  AlkPhos  116<H>  09-20                        PHYSICAL EXAM:  General: well-appearing in NAD. AAO x 3. using iPad.  HEENT: Normocephalic, nontraumatic.   LUNGS: Clear to auscultation b/l.   HEART: RRR. S1/S2 present.  ABDOMEN: Nontender, nondistended. + bowel sounds.   EXT: Pulses palpable. Nonedematous.   SKIN: Warm, dry. excoriations b/l LE and UE

## 2022-09-21 NOTE — PROGRESS NOTE ADULT - SUBJECTIVE AND OBJECTIVE BOX
Progress Note:  Provider Speciality                            Hospitalist      CARIN WATSON MRN-[id number] 47y Male     CHIEF PRESENTING COMPLAINT:  Patient is a 47y old  Male who presents with a chief complaint of DFU (24 Aug 2022 06:56)        SUBJECTIVE:  Patient was seen and examined at bedside. No new complaints described by patient in morning rounds  No significant overnight events reported.     HISTORY OF PRESENTING ILLNESS:  HPI:  45 yo M with PMHx of Intellectual Disability, DM not on any medications presents with foot wounds. Pt has very poor foot hygiene and per the sister, has been persistently scratching an open wound on his L second toe, which worsened and became more red, had drainage, malodor. He saw a physician at [facility] and was told to come to the ED for evaluation. Pt is poor historian. Does endorse abdominal pain for a few days, cannot give much more description. Per the sister, pt did not seem to have any recent fevers, however is also not a very good historian and does not seem to have good health literacy. States that her and her mom decided to stop giving pt Metformin a while ago, are treating his diabetes by not giving him any sugary foods.  In the ED, T 97.8, /87, , RR 16, SpO2 99% on RA. Lab work unrevealing.  (31 Mar 2022 22:36)        REVIEW OF SYSTEMS:  Patient denies any headache, any vision complaints, runny nose, fever, chills. Denies chest pain, shortness of breath, palpitation. Denies nausea, vomiting, abdominal pain or diarrhoea, Denies dysuria. Denies  localized weakness in any part of the body or numbness.   At least 10 systems were reviewed in ROS. All systems reviewed  are within normal limits except for the complaints as described in Subjective.    PAST MEDICAL & SURGICAL HISTORY:  PAST MEDICAL & SURGICAL HISTORY:  DM (diabetes mellitus)      Intellectual disability              VITAL SIGNS:  Vital Signs Last 24 Hrs  T(C): 36.2 (21 Sep 2022 08:37), Max: 36.3 (20 Sep 2022 15:05)  T(F): 97.2 (21 Sep 2022 08:37), Max: 97.4 (20 Sep 2022 15:05)  HR: 99 (21 Sep 2022 08:37) (99 - 107)  BP: 140/63 (21 Sep 2022 08:37) (125/72 - 143/82)  BP(mean): --  RR: 18 (21 Sep 2022 08:37) (18 - 18)  SpO2: --    Parameters below as of 20 Sep 2022 15:05  Patient On (Oxygen Delivery Method): room air                  PHYSICAL EXAMINATION:  Not in acute distress  General: No icterus  HEENT:   no JVD.  Heart: S1+S2 audible  Lungs: bilateral  moderate air entry, no wheezing, no crepitations.  Abdomen: Soft, non-tender, non-distended , no  rigidity or guarding.  CNS: Awake alert, CN  grossly intact.  Extremities:  No edema            CONSULTS:  Consultant(s) Notes Reviewed by me.   Care Discussed with Consultants/Other Providers where required.        MEDICATIONS:  MEDICATIONS  (STANDING):  ammonium lactate 12% Lotion 1 Application(s) Topical two times a day  chlorhexidine 4% Liquid 1 Application(s) Topical <User Schedule>  clotrimazole 1% Cream 1 Application(s) Topical two times a day  enoxaparin Injectable 40 milliGRAM(s) SubCutaneous every 24 hours  famotidine    Tablet 20 milliGRAM(s) Oral daily  insulin glargine Injectable (LANTUS) 25 Unit(s) SubCutaneous every morning  insulin lispro (ADMELOG) corrective regimen sliding scale   SubCutaneous three times a day before meals  insulin lispro Injectable (ADMELOG) 8 Unit(s) SubCutaneous three times a day before meals  lactobacillus acidophilus 1 Tablet(s) Oral two times a day  melatonin 3 milliGRAM(s) Oral at bedtime  vitamin A &amp; D Ointment 1 Application(s) Topical daily    MEDICATIONS  (PRN):  acetaminophen     Tablet .. 650 milliGRAM(s) Oral every 6 hours PRN Temp greater or equal to 38C (100.4F), Mild Pain (1 - 3)            ASSESSMENT:    45 yo M with PMH of Intellectual Disability and DM not on any medications presented with foot wounds.    Covid  PNA  Onychomycosis  Dental caries  DM2 with hyperglycemia due to dietary non-compliance  Intellectual disability/autism      Intermittent tachycardia without hypoxia. Recently ruled out for PE and DVT, TTE unremarkable  Covid PNA- completed RDV. Resolved  Nail bed wound and onychomycosis. Status post Aseptic debridement and curettage of all fungal and ingrowing nails 1-5 bilateral with sterile nail nipper 06/07  Podiatry- no surgical intervention is advised. outpt podiatry f/u  DM type 2 - uncontrolled. A1C 8.9.Insulin glargine and lispro . Not compliant with diabetic diet.   Dental Caries-completed clinda course. Will need outpt f/u for possible multiple extractions  Lac-hydrin added for skin dryness &  pruritis , topical triamcinolone. permethrin given.  Insomnia- prn melatonin  IQ evaluation pending. Neuropsychiatry evaluation done    Handoff:  Pending placement , guardianship established  medically stable for discharge    Total time spent to complete patient's bedside assessment, physical examination, review medical chart including labs & imaging, discuss medical plan of care with housestaff was more than 20 minutes

## 2022-09-22 LAB
ALBUMIN SERPL ELPH-MCNC: 4.1 G/DL — SIGNIFICANT CHANGE UP (ref 3.5–5.2)
ALP SERPL-CCNC: 108 U/L — SIGNIFICANT CHANGE UP (ref 30–115)
ALT FLD-CCNC: 17 U/L — SIGNIFICANT CHANGE UP (ref 0–41)
ANION GAP SERPL CALC-SCNC: 12 MMOL/L — SIGNIFICANT CHANGE UP (ref 7–14)
AST SERPL-CCNC: 16 U/L — SIGNIFICANT CHANGE UP (ref 0–41)
BASOPHILS # BLD AUTO: 0.03 K/UL — SIGNIFICANT CHANGE UP (ref 0–0.2)
BASOPHILS NFR BLD AUTO: 0.4 % — SIGNIFICANT CHANGE UP (ref 0–1)
BILIRUB SERPL-MCNC: 0.8 MG/DL — SIGNIFICANT CHANGE UP (ref 0.2–1.2)
BUN SERPL-MCNC: 16 MG/DL — SIGNIFICANT CHANGE UP (ref 10–20)
CALCIUM SERPL-MCNC: 9.1 MG/DL — SIGNIFICANT CHANGE UP (ref 8.4–10.5)
CHLORIDE SERPL-SCNC: 104 MMOL/L — SIGNIFICANT CHANGE UP (ref 98–110)
CO2 SERPL-SCNC: 24 MMOL/L — SIGNIFICANT CHANGE UP (ref 17–32)
CREAT SERPL-MCNC: 0.7 MG/DL — SIGNIFICANT CHANGE UP (ref 0.7–1.5)
EGFR: 114 ML/MIN/1.73M2 — SIGNIFICANT CHANGE UP
EOSINOPHIL # BLD AUTO: 0.41 K/UL — SIGNIFICANT CHANGE UP (ref 0–0.7)
EOSINOPHIL NFR BLD AUTO: 5.6 % — SIGNIFICANT CHANGE UP (ref 0–8)
GLUCOSE BLDC GLUCOMTR-MCNC: 121 MG/DL — HIGH (ref 70–99)
GLUCOSE BLDC GLUCOMTR-MCNC: 173 MG/DL — HIGH (ref 70–99)
GLUCOSE BLDC GLUCOMTR-MCNC: 179 MG/DL — HIGH (ref 70–99)
GLUCOSE BLDC GLUCOMTR-MCNC: 202 MG/DL — HIGH (ref 70–99)
GLUCOSE SERPL-MCNC: 196 MG/DL — HIGH (ref 70–99)
HCT VFR BLD CALC: 41.8 % — LOW (ref 42–52)
HGB BLD-MCNC: 14.3 G/DL — SIGNIFICANT CHANGE UP (ref 14–18)
IMM GRANULOCYTES NFR BLD AUTO: 0.3 % — SIGNIFICANT CHANGE UP (ref 0.1–0.3)
LYMPHOCYTES # BLD AUTO: 2.27 K/UL — SIGNIFICANT CHANGE UP (ref 1.2–3.4)
LYMPHOCYTES # BLD AUTO: 31.1 % — SIGNIFICANT CHANGE UP (ref 20.5–51.1)
MAGNESIUM SERPL-MCNC: 1.8 MG/DL — SIGNIFICANT CHANGE UP (ref 1.8–2.4)
MCHC RBC-ENTMCNC: 32.1 PG — HIGH (ref 27–31)
MCHC RBC-ENTMCNC: 34.2 G/DL — SIGNIFICANT CHANGE UP (ref 32–37)
MCV RBC AUTO: 93.7 FL — SIGNIFICANT CHANGE UP (ref 80–94)
MONOCYTES # BLD AUTO: 0.6 K/UL — SIGNIFICANT CHANGE UP (ref 0.1–0.6)
MONOCYTES NFR BLD AUTO: 8.2 % — SIGNIFICANT CHANGE UP (ref 1.7–9.3)
NEUTROPHILS # BLD AUTO: 3.98 K/UL — SIGNIFICANT CHANGE UP (ref 1.4–6.5)
NEUTROPHILS NFR BLD AUTO: 54.4 % — SIGNIFICANT CHANGE UP (ref 42.2–75.2)
NRBC # BLD: 0 /100 WBCS — SIGNIFICANT CHANGE UP (ref 0–0)
PLATELET # BLD AUTO: 287 K/UL — SIGNIFICANT CHANGE UP (ref 130–400)
POTASSIUM SERPL-MCNC: 3.9 MMOL/L — SIGNIFICANT CHANGE UP (ref 3.5–5)
POTASSIUM SERPL-SCNC: 3.9 MMOL/L — SIGNIFICANT CHANGE UP (ref 3.5–5)
PROT SERPL-MCNC: 6.7 G/DL — SIGNIFICANT CHANGE UP (ref 6–8)
RBC # BLD: 4.46 M/UL — LOW (ref 4.7–6.1)
RBC # FLD: 11.8 % — SIGNIFICANT CHANGE UP (ref 11.5–14.5)
SODIUM SERPL-SCNC: 140 MMOL/L — SIGNIFICANT CHANGE UP (ref 135–146)
WBC # BLD: 7.31 K/UL — SIGNIFICANT CHANGE UP (ref 4.8–10.8)
WBC # FLD AUTO: 7.31 K/UL — SIGNIFICANT CHANGE UP (ref 4.8–10.8)

## 2022-09-22 PROCEDURE — 99231 SBSQ HOSP IP/OBS SF/LOW 25: CPT

## 2022-09-22 RX ADMIN — Medication 1 APPLICATION(S): at 12:03

## 2022-09-22 RX ADMIN — Medication 4: at 12:01

## 2022-09-22 RX ADMIN — Medication 3 MILLIGRAM(S): at 21:42

## 2022-09-22 RX ADMIN — Medication 1 APPLICATION(S): at 17:25

## 2022-09-22 RX ADMIN — Medication 2: at 08:04

## 2022-09-22 RX ADMIN — Medication 1 APPLICATION(S): at 05:50

## 2022-09-22 RX ADMIN — Medication 8 UNIT(S): at 08:04

## 2022-09-22 RX ADMIN — RIVAROXABAN 5 MILLIGRAM(S): KIT at 12:03

## 2022-09-22 RX ADMIN — INSULIN GLARGINE 25 UNIT(S): 100 INJECTION, SOLUTION SUBCUTANEOUS at 08:03

## 2022-09-22 RX ADMIN — Medication 8 UNIT(S): at 17:24

## 2022-09-22 RX ADMIN — Medication 1 TABLET(S): at 17:25

## 2022-09-22 RX ADMIN — Medication 1 TABLET(S): at 05:49

## 2022-09-22 RX ADMIN — Medication 2: at 17:24

## 2022-09-22 RX ADMIN — Medication 1 APPLICATION(S): at 17:26

## 2022-09-22 RX ADMIN — Medication 1 APPLICATION(S): at 05:53

## 2022-09-22 RX ADMIN — FAMOTIDINE 20 MILLIGRAM(S): 10 INJECTION INTRAVENOUS at 12:03

## 2022-09-22 RX ADMIN — Medication 8 UNIT(S): at 12:02

## 2022-09-22 NOTE — PROGRESS NOTE ADULT - SUBJECTIVE AND OBJECTIVE BOX
Progress Note:  Provider Speciality                            Hospitalist      CARIN WATSON MRN-[id number] 47y Male     CHIEF PRESENTING COMPLAINT:  Patient is a 47y old  Male who presents with a chief complaint of DFU (24 Aug 2022 06:56)        SUBJECTIVE:  Patient was seen and examined at bedside. No new complaints described by patient in morning rounds  No significant overnight events reported.     HISTORY OF PRESENTING ILLNESS:  HPI:  45 yo M with PMHx of Intellectual Disability, DM not on any medications presents with foot wounds. Pt has very poor foot hygiene and per the sister, has been persistently scratching an open wound on his L second toe, which worsened and became more red, had drainage, malodor. He saw a physician at [facility] and was told to come to the ED for evaluation. Pt is poor historian. Does endorse abdominal pain for a few days, cannot give much more description. Per the sister, pt did not seem to have any recent fevers, however is also not a very good historian and does not seem to have good health literacy. States that her and her mom decided to stop giving pt Metformin a while ago, are treating his diabetes by not giving him any sugary foods.  In the ED, T 97.8, /87, , RR 16, SpO2 99% on RA. Lab work unrevealing.  (31 Mar 2022 22:36)        REVIEW OF SYSTEMS:  Patient denies any headache, any vision complaints, runny nose, fever, chills. Denies chest pain, shortness of breath, palpitation. Denies nausea, vomiting, abdominal pain or diarrhoea, Denies dysuria. Denies  localized weakness in any part of the body or numbness.   At least 10 systems were reviewed in ROS. All systems reviewed  are within normal limits except for the complaints as described in Subjective.    PAST MEDICAL & SURGICAL HISTORY:  PAST MEDICAL & SURGICAL HISTORY:  DM (diabetes mellitus)      Intellectual disability              VITAL SIGNS:  Vital Signs Last 24 Hrs  T(C): 36.3 (22 Sep 2022 07:26), Max: 36.3 (22 Sep 2022 07:26)  T(F): 97.3 (22 Sep 2022 07:26), Max: 97.3 (22 Sep 2022 07:26)  HR: 97 (22 Sep 2022 07:26) (97 - 114)  BP: 147/65 (22 Sep 2022 07:26) (137/62 - 147/65)  BP(mean): --  RR: 18 (22 Sep 2022 07:26) (18 - 20)  SpO2: 94% (22 Sep 2022 07:26) (94% - 94%)    Parameters below as of 22 Sep 2022 07:26  Patient On (Oxygen Delivery Method): room air                  PHYSICAL EXAMINATION:  Not in acute distress  General: No icterus  HEENT:   no JVD.  Heart: S1+S2 audible  Lungs: bilateral  moderate air entry, no wheezing, no crepitations.  Abdomen: Soft, non-tender, non-distended , no  rigidity or guarding.  CNS: Awake alert, CN  grossly intact.  Extremities:  No edema            CONSULTS:  Consultant(s) Notes Reviewed by me.   Care Discussed with Consultants/Other Providers where required.        MEDICATIONS:  MEDICATIONS  (STANDING):  ammonium lactate 12% Lotion 1 Application(s) Topical two times a day  chlorhexidine 4% Liquid 1 Application(s) Topical <User Schedule>  clotrimazole 1% Cream 1 Application(s) Topical two times a day  enoxaparin Injectable 40 milliGRAM(s) SubCutaneous every 24 hours  famotidine    Tablet 20 milliGRAM(s) Oral daily  insulin glargine Injectable (LANTUS) 25 Unit(s) SubCutaneous every morning  insulin lispro (ADMELOG) corrective regimen sliding scale   SubCutaneous three times a day before meals  insulin lispro Injectable (ADMELOG) 8 Unit(s) SubCutaneous three times a day before meals  lactobacillus acidophilus 1 Tablet(s) Oral two times a day  melatonin 3 milliGRAM(s) Oral at bedtime  vitamin A &amp; D Ointment 1 Application(s) Topical daily    MEDICATIONS  (PRN):  acetaminophen     Tablet .. 650 milliGRAM(s) Oral every 6 hours PRN Temp greater or equal to 38C (100.4F), Mild Pain (1 - 3)            ASSESSMENT:    45 yo M with PMH of Intellectual Disability and DM not on any medications presented with foot wounds.    Covid  PNA  Onychomycosis  Dental caries  DM2 with hyperglycemia due to dietary non-compliance  Intellectual disability/autism      Intermittent tachycardia without hypoxia. Recently ruled out for PE and DVT, TTE unremarkable  Covid PNA- completed RDV. Resolved  Nail bed wound and onychomycosis. Status post Aseptic debridement and curettage of all fungal and ingrowing nails 1-5 bilateral with sterile nail nipper 06/07  Podiatry- no surgical intervention is advised. outpt podiatry f/u  DM type 2 - uncontrolled. A1C 8.9.Insulin glargine and lispro . Not compliant with diabetic diet.   Dental Caries-completed clinda course. Will need outpt f/u for possible multiple extractions  Lac-hydrin added for skin dryness &  pruritis , topical triamcinolone. permethrin given.  Insomnia- prn melatonin  IQ evaluation pending. Neuropsychiatry evaluation done    Handoff:  Pending placement , guardianship established  medically stable for discharge    Total time spent to complete patient's bedside assessment, physical examination, review medical chart including labs & imaging, discuss medical plan of care with housestaff was more than 20 minutes

## 2022-09-22 NOTE — PROGRESS NOTE ADULT - ASSESSMENT
48 yo M with PMH of Intellectual Disability and DM not on any medications presented with foot wounds. Hospital stay complicated Hospital acquired COVID s/p RDV, poorly controlled DM. Patient has been in the hospital >100 days, guardianship established and now pending placement after IQ tests done by neuropsych. Patient still has itchy skin excoriations all over his body.    # Excoriations likely 2/2 skin dryness- Active  - lesions still itchy but improving on lac-hydrin and topical triamcinolone.  - Derm consut 9/16- cw triamcinolone 0.1% topical cream, moisturizing cream, and only way to improve excoriations is pt must avoid scratching  - Patient given on 9/15 permethrin 1% full body wash.     # sinus Tachycardia 8/12 and intermittently - resolved  - No CP/SOB  - EKG 8/11 with new incomplete RBBB and [id number]  - DVT and PE were ruled out on 8/13 : CTA + LE duplex negative  - TTE on 8/14: 1. EF of 66 % Grade I diastolic dysfunction, no valvular dysfunction    # Covid PNA 5/21- resolved  - S/p RDV    # bilateral Onychomycosis - stable  - S/p debridement and curettage by podiatry 7/18  - F/u outpatient with Dr. Bronson  - will consider podiatry follow up soon for finger and toe nails    # Dental caries - stable  - S/p course of clindamycin, outpatient f/u for teeth extractions    # DM2- stable  - LAntus, lispro and SS  - A1c 7.5 in 8/11. well controlled.    # Intellectual disability/autism- stable  - Neuropsych consulted Dr Deborah Weller ([employer]) and her assistant Cheryl ([phone])  - Psychological test with IQ and [place] scores done for placement completed 9/14 and showed:  Based on full-scale IQ (FSIQ = 60) and his inability to perform IADLs and certain ADLS on his own, he meets criteria for Mild Intellectual Disability (F70.) Following that patient does not have a support system nor family to care for him, it is crucial for his safety, that he should be placed in a supervised living environment. Ideally, this placement should be long-standing, as his disability is permanent and he will continue to require support and supervision for the remainder of his life.       # Misc  - DVT Prophylaxis: rivaroxaban  - GI Prophylaxis: famotidine Tablet 20 milliGRAM(s) Oral daily  - Diet: Diet, DASH/TLC  - Activity: Activity - Ambulate as Tolerated  - Code Status: Full.  - pending: placement. Labs weekly.

## 2022-09-22 NOTE — PROGRESS NOTE ADULT - SUBJECTIVE AND OBJECTIVE BOX
CARIN WATSON 47y Male  MRN#: [id number]   CODE STATUS:    Hospital Day: 175d    SUBJECTIVE  No acute events overnight. Patient not sleeping much overnight due to increased screen time. Not leaving room as much as usual.                                              ----------------------------------------------------------  OBJECTIVE  PAST MEDICAL & SURGICAL HISTORY  DM (diabetes mellitus)    Intellectual disability                                              -----------------------------------------------------------  ALLERGIES:  penicillin (Unknown)                                            ------------------------------------------------------------    HOME MEDICATIONS  Home Medications:  vitamin A and D topical ointment: 1 application topically once a day (12 Apr 2022 11:40)                           MEDICATIONS:  STANDING MEDICATIONS  ammonium lactate 12% Lotion 1 Application(s) Topical two times a day  chlorhexidine 4% Liquid 1 Application(s) Topical <User Schedule>  clotrimazole 1% Cream 1 Application(s) Topical two times a day  famotidine    Tablet 20 milliGRAM(s) Oral daily  insulin glargine Injectable (LANTUS) 25 Unit(s) SubCutaneous every morning  insulin lispro (ADMELOG) corrective regimen sliding scale   SubCutaneous three times a day before meals  insulin lispro Injectable (ADMELOG) 8 Unit(s) SubCutaneous three times a day before meals  lactobacillus acidophilus 1 Tablet(s) Oral two times a day  melatonin 3 milliGRAM(s) Oral at bedtime  rivaroxaban 5 milliGRAM(s) Oral daily  triamcinolone 0.1% Oral Paste 1 Application(s) Topical two times a day  vitamin A &amp; D Ointment 1 Application(s) Topical daily    PRN MEDICATIONS  acetaminophen     Tablet .. 650 milliGRAM(s) Oral every 6 hours PRN                                            ------------------------------------------------------------  VITAL SIGNS: Last 24 Hours  T(C): 36.3 (22 Sep 2022 07:26), Max: 36.3 (22 Sep 2022 07:26)  T(F): 97.3 (22 Sep 2022 07:26), Max: 97.3 (22 Sep 2022 07:26)  HR: 97 (22 Sep 2022 07:26) (97 - 114)  BP: 147/65 (22 Sep 2022 07:26) (137/62 - 147/65)  BP(mean): --  RR: 18 (22 Sep 2022 07:26) (18 - 20)  SpO2: 94% (22 Sep 2022 07:26) (94% - 94%)                                             --------------------------------------------------------------  LABS:                        14.3   7.31  )-----------( 287      ( 22 Sep 2022 07:25 )             41.8     09-22    140  |  104  |  16  ----------------------------<  196<H>  3.9   |  24  |  0.7    Ca    9.1      22 Sep 2022 07:25  Mg     1.8     09-22    TPro  6.7  /  Alb  4.1  /  TBili  0.8  /  DBili  x   /  AST  16  /  ALT  17  /  AlkPhos  108  09-22                        PHYSICAL EXAM:  General: well-appearing in NAD. AAO x 3.  HEENT: Normocephalic, nontraumatic.   LUNGS: Clear to auscultation b/l.   HEART: RRR. S1/S2 present.  ABDOMEN: Nontender, nondistended. + bowel sounds.   EXT: Pulses palpable. Nonedematous.   SKIN: Warm, dry. multiple excoriations B/L UE and LE

## 2022-09-23 LAB
ANION GAP SERPL CALC-SCNC: 10 MMOL/L — SIGNIFICANT CHANGE UP (ref 7–14)
BUN SERPL-MCNC: 16 MG/DL — SIGNIFICANT CHANGE UP (ref 10–20)
CALCIUM SERPL-MCNC: 9 MG/DL — SIGNIFICANT CHANGE UP (ref 8.4–10.5)
CHLORIDE SERPL-SCNC: 102 MMOL/L — SIGNIFICANT CHANGE UP (ref 98–110)
CO2 SERPL-SCNC: 23 MMOL/L — SIGNIFICANT CHANGE UP (ref 17–32)
CREAT SERPL-MCNC: 0.7 MG/DL — SIGNIFICANT CHANGE UP (ref 0.7–1.5)
EGFR: 114 ML/MIN/1.73M2 — SIGNIFICANT CHANGE UP
GLUCOSE BLDC GLUCOMTR-MCNC: 179 MG/DL — HIGH (ref 70–99)
GLUCOSE BLDC GLUCOMTR-MCNC: 265 MG/DL — HIGH (ref 70–99)
GLUCOSE BLDC GLUCOMTR-MCNC: 298 MG/DL — HIGH (ref 70–99)
GLUCOSE BLDC GLUCOMTR-MCNC: 303 MG/DL — HIGH (ref 70–99)
GLUCOSE SERPL-MCNC: 258 MG/DL — HIGH (ref 70–99)
HCT VFR BLD CALC: 42.1 % — SIGNIFICANT CHANGE UP (ref 42–52)
HGB BLD-MCNC: 14.3 G/DL — SIGNIFICANT CHANGE UP (ref 14–18)
MCHC RBC-ENTMCNC: 31.4 PG — HIGH (ref 27–31)
MCHC RBC-ENTMCNC: 34 G/DL — SIGNIFICANT CHANGE UP (ref 32–37)
MCV RBC AUTO: 92.3 FL — SIGNIFICANT CHANGE UP (ref 80–94)
NRBC # BLD: 0 /100 WBCS — SIGNIFICANT CHANGE UP (ref 0–0)
PLATELET # BLD AUTO: 300 K/UL — SIGNIFICANT CHANGE UP (ref 130–400)
POTASSIUM SERPL-MCNC: 4.1 MMOL/L — SIGNIFICANT CHANGE UP (ref 3.5–5)
POTASSIUM SERPL-SCNC: 4.1 MMOL/L — SIGNIFICANT CHANGE UP (ref 3.5–5)
RBC # BLD: 4.56 M/UL — LOW (ref 4.7–6.1)
RBC # FLD: 11.6 % — SIGNIFICANT CHANGE UP (ref 11.5–14.5)
SODIUM SERPL-SCNC: 135 MMOL/L — SIGNIFICANT CHANGE UP (ref 135–146)
WBC # BLD: 6.69 K/UL — SIGNIFICANT CHANGE UP (ref 4.8–10.8)
WBC # FLD AUTO: 6.69 K/UL — SIGNIFICANT CHANGE UP (ref 4.8–10.8)

## 2022-09-23 PROCEDURE — 99231 SBSQ HOSP IP/OBS SF/LOW 25: CPT

## 2022-09-23 RX ADMIN — Medication 6: at 17:17

## 2022-09-23 RX ADMIN — Medication 2: at 08:24

## 2022-09-23 RX ADMIN — Medication 1 TABLET(S): at 17:22

## 2022-09-23 RX ADMIN — Medication 1 APPLICATION(S): at 05:27

## 2022-09-23 RX ADMIN — Medication 8 UNIT(S): at 11:52

## 2022-09-23 RX ADMIN — INSULIN GLARGINE 25 UNIT(S): 100 INJECTION, SOLUTION SUBCUTANEOUS at 08:25

## 2022-09-23 RX ADMIN — Medication 3 MILLIGRAM(S): at 22:12

## 2022-09-23 RX ADMIN — FAMOTIDINE 20 MILLIGRAM(S): 10 INJECTION INTRAVENOUS at 11:54

## 2022-09-23 RX ADMIN — RIVAROXABAN 5 MILLIGRAM(S): KIT at 11:54

## 2022-09-23 RX ADMIN — Medication 1 APPLICATION(S): at 17:21

## 2022-09-23 RX ADMIN — Medication 8 UNIT(S): at 17:18

## 2022-09-23 RX ADMIN — Medication 1 APPLICATION(S): at 11:55

## 2022-09-23 RX ADMIN — Medication 1 APPLICATION(S): at 05:28

## 2022-09-23 RX ADMIN — Medication 1 APPLICATION(S): at 05:29

## 2022-09-23 RX ADMIN — Medication 1 APPLICATION(S): at 17:23

## 2022-09-23 RX ADMIN — Medication 8: at 11:51

## 2022-09-23 RX ADMIN — Medication 1 TABLET(S): at 05:27

## 2022-09-23 RX ADMIN — Medication 8 UNIT(S): at 08:24

## 2022-09-23 NOTE — CHART NOTE - NSCHARTNOTEFT_GEN_A_CORE
PO remains optimum per EMR documentation. 9/15-9/21 intake: %.     Not at risk, will re-assess in 10 days.

## 2022-09-23 NOTE — PROGRESS NOTE ADULT - SUBJECTIVE AND OBJECTIVE BOX
Progress Note:  Provider Speciality                            Hospitalist      CARIN WATSON MRN-[id number] 47y Male     CHIEF PRESENTING COMPLAINT:  Patient is a 47y old  Male who presents with a chief complaint of DFU (24 Aug 2022 06:56)        SUBJECTIVE:  Patient was seen and examined at bedside. No new complaints described by patient in morning rounds  No significant overnight events reported.     HISTORY OF PRESENTING ILLNESS:  HPI:  45 yo M with PMHx of Intellectual Disability, DM not on any medications presents with foot wounds. Pt has very poor foot hygiene and per the sister, has been persistently scratching an open wound on his L second toe, which worsened and became more red, had drainage, malodor. He saw a physician at [facility] and was told to come to the ED for evaluation. Pt is poor historian. Does endorse abdominal pain for a few days, cannot give much more description. Per the sister, pt did not seem to have any recent fevers, however is also not a very good historian and does not seem to have good health literacy. States that her and her mom decided to stop giving pt Metformin a while ago, are treating his diabetes by not giving him any sugary foods.  In the ED, T 97.8, /87, , RR 16, SpO2 99% on RA. Lab work unrevealing.  (31 Mar 2022 22:36)        REVIEW OF SYSTEMS:  Patient denies any headache, any vision complaints, runny nose, fever, chills. Denies chest pain, shortness of breath, palpitation. Denies nausea, vomiting, abdominal pain or diarrhoea, Denies dysuria. Denies  localized weakness in any part of the body or numbness.   At least 10 systems were reviewed in ROS. All systems reviewed  are within normal limits except for the complaints as described in Subjective.    PAST MEDICAL & SURGICAL HISTORY:  PAST MEDICAL & SURGICAL HISTORY:  DM (diabetes mellitus)      Intellectual disability              VITAL SIGNS:  Vital Signs Last 24 Hrs  T(C): 36.6 (23 Sep 2022 08:13), Max: 36.6 (23 Sep 2022 08:13)  T(F): 97.9 (23 Sep 2022 08:13), Max: 97.9 (23 Sep 2022 08:13)  HR: 101 (23 Sep 2022 08:13) (95 - 107)  BP: 148/78 (23 Sep 2022 08:13) (119/71 - 161/66)  BP(mean): --  RR: 18 (23 Sep 2022 08:13) (18 - 18)  SpO2: 96% (23 Sep 2022 00:55) (95% - 96%)                PHYSICAL EXAMINATION:  Not in acute distress  General: No icterus  HEENT:   no JVD.  Heart: S1+S2 audible  Lungs: bilateral  moderate air entry, no wheezing, no crepitations.  Abdomen: Soft, non-tender, non-distended , no  rigidity or guarding.  CNS: Awake alert, CN  grossly intact.  Extremities:  No edema            CONSULTS:  Consultant(s) Notes Reviewed by me.   Care Discussed with Consultants/Other Providers where required.        MEDICATIONS:  MEDICATIONS  (STANDING):  ammonium lactate 12% Lotion 1 Application(s) Topical two times a day  chlorhexidine 4% Liquid 1 Application(s) Topical <User Schedule>  clotrimazole 1% Cream 1 Application(s) Topical two times a day  enoxaparin Injectable 40 milliGRAM(s) SubCutaneous every 24 hours  famotidine    Tablet 20 milliGRAM(s) Oral daily  insulin glargine Injectable (LANTUS) 25 Unit(s) SubCutaneous every morning  insulin lispro (ADMELOG) corrective regimen sliding scale   SubCutaneous three times a day before meals  insulin lispro Injectable (ADMELOG) 8 Unit(s) SubCutaneous three times a day before meals  lactobacillus acidophilus 1 Tablet(s) Oral two times a day  melatonin 3 milliGRAM(s) Oral at bedtime  vitamin A &amp; D Ointment 1 Application(s) Topical daily    MEDICATIONS  (PRN):  acetaminophen     Tablet .. 650 milliGRAM(s) Oral every 6 hours PRN Temp greater or equal to 38C (100.4F), Mild Pain (1 - 3)            ASSESSMENT:    45 yo M with PMH of Intellectual Disability and DM not on any medications presented with foot wounds.    Covid  PNA  Onychomycosis  Dental caries  DM2 with hyperglycemia due to dietary non-compliance  Intellectual disability/autism      Intermittent tachycardia without hypoxia. Recently ruled out for PE and DVT, TTE unremarkable  Covid PNA- completed RDV. Resolved  Nail bed wound and onychomycosis. Status post Aseptic debridement and curettage of all fungal and ingrowing nails 1-5 bilateral with sterile nail nipper 06/07  Podiatry- no surgical intervention is advised. outpt podiatry f/u  DM type 2 - uncontrolled. A1C 8.9.Insulin glargine and lispro . Not compliant with diabetic diet.   Dental Caries-completed clinda course. Will need outpt f/u for possible multiple extractions  Lac-hydrin added for skin dryness &  pruritis , topical triamcinolone. permethrin given.  Insomnia- prn melatonin  IQ evaluation pending. Neuropsychiatry evaluation done    Handoff:  Pending placement , guardianship established  medically stable for discharge    Total time spent to complete patient's bedside assessment, physical examination, review medical chart including labs & imaging, discuss medical plan of care with housestaff was more than 20 minutes

## 2022-09-23 NOTE — PROGRESS NOTE ADULT - ASSESSMENT
48 yo M with PMH of Intellectual Disability and DM not on any medications presented with foot wounds. Hospital stay complicated Hospital acquired COVID s/p RDV, poorly controlled DM. Patient has been in the hospital >100 days, guardianship established and now pending placement after IQ tests done by neuropsych. Patient still has itchy skin excoriations all over his body.    #Possible URI  - afrebrile  - cough, reports green mucus  - lungs clear to auscultation  - 1 episode emesis (says he had this often in past)  - f/u covid pcr, WBC    # Excoriations likely 2/2 skin dryness- Active  - lesions still itchy but improving on lac-hydrin and topical triamcinolone.  - Derm consut 9/16- cw triamcinolone 0.1% topical cream, moisturizing cream, and only way to improve excoriations is pt must avoid scratching  - Patient given on 9/15 permethrin 1% full body wash.     # sinus Tachycardia 8/12 and intermittently - resolved  - No CP/SOB  - EKG 8/11 with new incomplete RBBB and s1q3t3  - DVT and PE were ruled out on 8/13 : CTA + LE duplex negative  - TTE on 8/14: 1. EF of 66 % Grade I diastolic dysfunction, no valvular dysfunction    # Covid PNA 5/21- resolved  - S/p RDV    # bilateral Onychomycosis - stable  - S/p debridement and curettage by podiatry 7/18  - F/u outpatient with Dr. Bronson  - will consider podiatry follow up soon for finger and toe nails    # Dental caries - stable  - S/p course of clindamycin, outpatient f/u for teeth extractions    # DM2- stable  - LAntus, lispro and SS  - A1c 7.5 in 8/11. well controlled.    # Intellectual disability/autism- stable  - Neuropsych consulted Dr Deborah Weller ([employer]) and her assistant Cheryl ([phone])  - Psychological test with IQ and [place] scores done for placement completed 9/14 and showed:  Based on full-scale IQ (FSIQ = 60) and his inability to perform IADLs and certain ADLS on his own, he meets criteria for Mild Intellectual Disability (F70.) Following that patient does not have a support system nor family to care for him, it is crucial for his safety, that he should be placed in a supervised living environment. Ideally, this placement should be long-standing, as his disability is permanent and he will continue to require support and supervision for the remainder of his life.       # Misc  - DVT Prophylaxis: rivaroxaban  - GI Prophylaxis: famotidine Tablet 20 milliGRAM(s) Oral daily  - Diet: Diet, DASH/TLC  - Activity: Activity - Ambulate as Tolerated  - Code Status: Full.  - pending: placement. Labs weekly.    46 yo M with PMH of Intellectual Disability and DM not on any medications presented with foot wounds. Hospital stay complicated Hospital acquired COVID s/p RDV, poorly controlled DM. Patient has been in the hospital >100 days, guardianship established and now pending placement after IQ tests done by neuropsych. Patient still has itchy skin excoriations all over his body.    #Possible URI  - afrebrile  - cough, reports green mucus-- resolved in PM  - lungs clear to auscultation  - 1 episode emesis (says he had this often in past)  - f/u covid pcr, WBC    # Excoriations likely 2/2 skin dryness- Active  - lesions still itchy but improving on lac-hydrin and topical triamcinolone.  - Derm consut 9/16- cw triamcinolone 0.1% topical cream, moisturizing cream, and only way to improve excoriations is pt must avoid scratching  - Patient given on 9/15 permethrin 1% full body wash.     # sinus Tachycardia 8/12 and intermittently - resolved  - No CP/SOB  - EKG 8/11 with new incomplete RBBB and s1q3t3  - DVT and PE were ruled out on 8/13 : CTA + LE duplex negative  - TTE on 8/14: 1. EF of 66 % Grade I diastolic dysfunction, no valvular dysfunction    # Covid PNA 5/21- resolved  - S/p RDV    # bilateral Onychomycosis - stable  - S/p debridement and curettage by podiatry 7/18  - F/u outpatient with Dr. Bronson  - will consider podiatry follow up soon for finger and toe nails    # Dental caries - stable  - S/p course of clindamycin, outpatient f/u for teeth extractions    # DM2- stable  - LAntus, lispro and SS  - A1c 7.5 in 8/11. well controlled.    # Intellectual disability/autism- stable  - Neuropsych consulted Dr Deborah Weller ([employer]) and her assistant Cheryl ([phone])  - Psychological test with IQ and [place] scores done for placement completed 9/14 and showed:  Based on full-scale IQ (FSIQ = 60) and his inability to perform IADLs and certain ADLS on his own, he meets criteria for Mild Intellectual Disability (F70.) Following that patient does not have a support system nor family to care for him, it is crucial for his safety, that he should be placed in a supervised living environment. Ideally, this placement should be long-standing, as his disability is permanent and he will continue to require support and supervision for the remainder of his life.       # Misc  - DVT Prophylaxis: rivaroxaban  - GI Prophylaxis: famotidine Tablet 20 milliGRAM(s) Oral daily  - Diet: Diet, DASH/TLC  - Activity: Activity - Ambulate as Tolerated  - Code Status: Full.  - pending: placement. Labs weekly.

## 2022-09-23 NOTE — PROGRESS NOTE ADULT - SUBJECTIVE AND OBJECTIVE BOX
CARIN WATSON 47y Male  MRN#: [id number]   CODE STATUS:    Hospital Day: 176d    SUBJECTIVE  Patient reports 1 episode emesis overnight. Has developed a cough with reportedly green mucus.                                               ----------------------------------------------------------  OBJECTIVE  PAST MEDICAL & SURGICAL HISTORY  DM (diabetes mellitus)    Intellectual disability                                              -----------------------------------------------------------  ALLERGIES:  penicillin (Unknown)                                            ------------------------------------------------------------    HOME MEDICATIONS  Home Medications:  vitamin A and D topical ointment: 1 application topically once a day (12 Apr 2022 11:40)                           MEDICATIONS:  STANDING MEDICATIONS  ammonium lactate 12% Lotion 1 Application(s) Topical two times a day  chlorhexidine 4% Liquid 1 Application(s) Topical <User Schedule>  clotrimazole 1% Cream 1 Application(s) Topical two times a day  famotidine    Tablet 20 milliGRAM(s) Oral daily  insulin glargine Injectable (LANTUS) 25 Unit(s) SubCutaneous every morning  insulin lispro (ADMELOG) corrective regimen sliding scale   SubCutaneous three times a day before meals  insulin lispro Injectable (ADMELOG) 8 Unit(s) SubCutaneous three times a day before meals  lactobacillus acidophilus 1 Tablet(s) Oral two times a day  melatonin 3 milliGRAM(s) Oral at bedtime  rivaroxaban 5 milliGRAM(s) Oral daily  triamcinolone 0.1% Oral Paste 1 Application(s) Topical two times a day  vitamin A &amp; D Ointment 1 Application(s) Topical daily    PRN MEDICATIONS  acetaminophen     Tablet .. 650 milliGRAM(s) Oral every 6 hours PRN                                            ------------------------------------------------------------  VITAL SIGNS: Last 24 Hours  T(C): 36.6 (23 Sep 2022 08:13), Max: 36.6 (23 Sep 2022 08:13)  T(F): 97.9 (23 Sep 2022 08:13), Max: 97.9 (23 Sep 2022 08:13)  HR: 101 (23 Sep 2022 08:13) (95 - 107)  BP: 148/78 (23 Sep 2022 08:13) (119/71 - 161/66)  BP(mean): --  RR: 18 (23 Sep 2022 08:13) (18 - 18)  SpO2: 96% (23 Sep 2022 00:55) (95% - 96%)                                             --------------------------------------------------------------  LABS:                        14.3   7.31  )-----------( 287      ( 22 Sep 2022 07:25 )             41.8     09-22    140  |  104  |  16  ----------------------------<  196<H>  3.9   |  24  |  0.7    Ca    9.1      22 Sep 2022 07:25  Mg     1.8     09-22    TPro  6.7  /  Alb  4.1  /  TBili  0.8  /  DBili  x   /  AST  16  /  ALT  17  /  AlkPhos  108  09-22                        PHYSICAL EXAM:  General: well-appearing in NAD. AAO x 3.  HEENT: Normocephalic, nontraumatic.   LUNGS: Clear to auscultation b/l.   HEART: RRR. S1/S2 present.  ABDOMEN: Nontender, nondistended. + bowel sounds.   EXT: Pulses palpable. Nonedematous.   SKIN: Warm, dry. multiple excoriations b/l upper and lower extremities

## 2022-09-24 LAB
ALBUMIN SERPL ELPH-MCNC: 4.2 G/DL — SIGNIFICANT CHANGE UP (ref 3.5–5.2)
ALP SERPL-CCNC: 114 U/L — SIGNIFICANT CHANGE UP (ref 30–115)
ALT FLD-CCNC: 18 U/L — SIGNIFICANT CHANGE UP (ref 0–41)
ANION GAP SERPL CALC-SCNC: 11 MMOL/L — SIGNIFICANT CHANGE UP (ref 7–14)
AST SERPL-CCNC: 16 U/L — SIGNIFICANT CHANGE UP (ref 0–41)
BASOPHILS # BLD AUTO: 0.04 K/UL — SIGNIFICANT CHANGE UP (ref 0–0.2)
BASOPHILS NFR BLD AUTO: 0.6 % — SIGNIFICANT CHANGE UP (ref 0–1)
BILIRUB SERPL-MCNC: 0.7 MG/DL — SIGNIFICANT CHANGE UP (ref 0.2–1.2)
BUN SERPL-MCNC: 14 MG/DL — SIGNIFICANT CHANGE UP (ref 10–20)
CALCIUM SERPL-MCNC: 9.3 MG/DL — SIGNIFICANT CHANGE UP (ref 8.4–10.4)
CHLORIDE SERPL-SCNC: 100 MMOL/L — SIGNIFICANT CHANGE UP (ref 98–110)
CO2 SERPL-SCNC: 26 MMOL/L — SIGNIFICANT CHANGE UP (ref 17–32)
CREAT SERPL-MCNC: 0.7 MG/DL — SIGNIFICANT CHANGE UP (ref 0.7–1.5)
EGFR: 114 ML/MIN/1.73M2 — SIGNIFICANT CHANGE UP
EOSINOPHIL # BLD AUTO: 0.34 K/UL — SIGNIFICANT CHANGE UP (ref 0–0.7)
EOSINOPHIL NFR BLD AUTO: 5 % — SIGNIFICANT CHANGE UP (ref 0–8)
GLUCOSE BLDC GLUCOMTR-MCNC: 127 MG/DL — HIGH (ref 70–99)
GLUCOSE BLDC GLUCOMTR-MCNC: 176 MG/DL — HIGH (ref 70–99)
GLUCOSE BLDC GLUCOMTR-MCNC: 190 MG/DL — HIGH (ref 70–99)
GLUCOSE BLDC GLUCOMTR-MCNC: 269 MG/DL — HIGH (ref 70–99)
GLUCOSE SERPL-MCNC: 160 MG/DL — HIGH (ref 70–99)
HCT VFR BLD CALC: 43.6 % — SIGNIFICANT CHANGE UP (ref 42–52)
HGB BLD-MCNC: 14.8 G/DL — SIGNIFICANT CHANGE UP (ref 14–18)
IMM GRANULOCYTES NFR BLD AUTO: 0.3 % — SIGNIFICANT CHANGE UP (ref 0.1–0.3)
LYMPHOCYTES # BLD AUTO: 2.08 K/UL — SIGNIFICANT CHANGE UP (ref 1.2–3.4)
LYMPHOCYTES # BLD AUTO: 30.5 % — SIGNIFICANT CHANGE UP (ref 20.5–51.1)
MAGNESIUM SERPL-MCNC: 1.8 MG/DL — SIGNIFICANT CHANGE UP (ref 1.8–2.4)
MCHC RBC-ENTMCNC: 31.8 PG — HIGH (ref 27–31)
MCHC RBC-ENTMCNC: 33.9 G/DL — SIGNIFICANT CHANGE UP (ref 32–37)
MCV RBC AUTO: 93.8 FL — SIGNIFICANT CHANGE UP (ref 80–94)
MONOCYTES # BLD AUTO: 0.59 K/UL — SIGNIFICANT CHANGE UP (ref 0.1–0.6)
MONOCYTES NFR BLD AUTO: 8.7 % — SIGNIFICANT CHANGE UP (ref 1.7–9.3)
NEUTROPHILS # BLD AUTO: 3.75 K/UL — SIGNIFICANT CHANGE UP (ref 1.4–6.5)
NEUTROPHILS NFR BLD AUTO: 54.9 % — SIGNIFICANT CHANGE UP (ref 42.2–75.2)
NRBC # BLD: 0 /100 WBCS — SIGNIFICANT CHANGE UP (ref 0–0)
PLATELET # BLD AUTO: 319 K/UL — SIGNIFICANT CHANGE UP (ref 130–400)
POTASSIUM SERPL-MCNC: 3.7 MMOL/L — SIGNIFICANT CHANGE UP (ref 3.5–5)
POTASSIUM SERPL-SCNC: 3.7 MMOL/L — SIGNIFICANT CHANGE UP (ref 3.5–5)
PROT SERPL-MCNC: 7 G/DL — SIGNIFICANT CHANGE UP (ref 6–8)
RBC # BLD: 4.65 M/UL — LOW (ref 4.7–6.1)
RBC # FLD: 11.8 % — SIGNIFICANT CHANGE UP (ref 11.5–14.5)
SARS-COV-2 RNA SPEC QL NAA+PROBE: SIGNIFICANT CHANGE UP
SODIUM SERPL-SCNC: 137 MMOL/L — SIGNIFICANT CHANGE UP (ref 135–146)
WBC # BLD: 6.82 K/UL — SIGNIFICANT CHANGE UP (ref 4.8–10.8)
WBC # FLD AUTO: 6.82 K/UL — SIGNIFICANT CHANGE UP (ref 4.8–10.8)

## 2022-09-24 PROCEDURE — 99231 SBSQ HOSP IP/OBS SF/LOW 25: CPT

## 2022-09-24 RX ADMIN — Medication 3 MILLIGRAM(S): at 22:09

## 2022-09-24 RX ADMIN — Medication 1 APPLICATION(S): at 11:57

## 2022-09-24 RX ADMIN — Medication 2: at 12:01

## 2022-09-24 RX ADMIN — Medication 1 APPLICATION(S): at 06:21

## 2022-09-24 RX ADMIN — RIVAROXABAN 5 MILLIGRAM(S): KIT at 11:59

## 2022-09-24 RX ADMIN — Medication 1 TABLET(S): at 06:21

## 2022-09-24 RX ADMIN — Medication 1 APPLICATION(S): at 06:22

## 2022-09-24 RX ADMIN — Medication 1 APPLICATION(S): at 17:12

## 2022-09-24 RX ADMIN — Medication 2: at 08:05

## 2022-09-24 RX ADMIN — Medication 1 APPLICATION(S): at 17:11

## 2022-09-24 RX ADMIN — Medication 8 UNIT(S): at 12:01

## 2022-09-24 RX ADMIN — INSULIN GLARGINE 25 UNIT(S): 100 INJECTION, SOLUTION SUBCUTANEOUS at 08:08

## 2022-09-24 RX ADMIN — Medication 8 UNIT(S): at 08:05

## 2022-09-24 RX ADMIN — Medication 1 TABLET(S): at 17:20

## 2022-09-24 RX ADMIN — Medication 8 UNIT(S): at 17:14

## 2022-09-24 RX ADMIN — FAMOTIDINE 20 MILLIGRAM(S): 10 INJECTION INTRAVENOUS at 12:01

## 2022-09-24 RX ADMIN — Medication 6: at 17:14

## 2022-09-24 NOTE — PROGRESS NOTE ADULT - SUBJECTIVE AND OBJECTIVE BOX
CARIN WATSNO 47y Male  MRN#: [id number]   Hospital Day: 177d    HPI:  45 yo M with PMHx of Intellectual Disability, DM not on any medications presents with foot wounds. Pt has very poor foot hygiene and per the sister, has been persistently scratching an open wound on his L second toe, which worsened and became more red, had drainage, malodor. He saw a physician at [facility] and was told to come to the ED for evaluation. Pt is poor historian. Does endorse abdominal pain for a few days, cannot give much more description. Per the sister, pt did not seem to have any recent fevers, however is also not a very good historian and does not seem to have good health literacy. States that her and her mom decided to stop giving pt Metformin a while ago, are treating his diabetes by not giving him any sugary foods.  In the ED, T 97.8, /87, , RR 16, SpO2 99% on RA. Lab work unrevealing.  (31 Mar 2022 22:36)      SUBJECTIVE  the aptient is feeling well today. no rhinorrhea , occasional cough at night. no fever no chills    OBJECTIVE  PAST MEDICAL & SURGICAL HISTORY  DM (diabetes mellitus)    Intellectual disability      ALLERGIES:  penicillin (Unknown)    MEDICATIONS:  STANDING MEDICATIONS  ammonium lactate 12% Lotion 1 Application(s) Topical two times a day  chlorhexidine 4% Liquid 1 Application(s) Topical <User Schedule>  clotrimazole 1% Cream 1 Application(s) Topical two times a day  famotidine    Tablet 20 milliGRAM(s) Oral daily  insulin glargine Injectable (LANTUS) 25 Unit(s) SubCutaneous every morning  insulin lispro (ADMELOG) corrective regimen sliding scale   SubCutaneous three times a day before meals  insulin lispro Injectable (ADMELOG) 8 Unit(s) SubCutaneous three times a day before meals  lactobacillus acidophilus 1 Tablet(s) Oral two times a day  melatonin 3 milliGRAM(s) Oral at bedtime  rivaroxaban 5 milliGRAM(s) Oral daily  triamcinolone 0.1% Oral Paste 1 Application(s) Topical two times a day  vitamin A &amp; D Ointment 1 Application(s) Topical daily    PRN MEDICATIONS  acetaminophen     Tablet .. 650 milliGRAM(s) Oral every 6 hours PRN      VITAL SIGNS: Last 24 Hours  T(C): 36.5 (24 Sep 2022 08:19), Max: 37.2 (23 Sep 2022 17:31)  T(F): 97.7 (24 Sep 2022 08:19), Max: 98.9 (23 Sep 2022 17:31)  HR: 99 (24 Sep 2022 08:19) (99 - 108)  BP: 147/77 (24 Sep 2022 08:19) (120/72 - 147/77)  BP(mean): --  RR: 20 (24 Sep 2022 08:19) (18 - 20)  SpO2: 98% (23 Sep 2022 17:31) (96% - 98%)    LABS:                        14.8   6.82  )-----------( 319      ( 24 Sep 2022 06:30 )             43.6     09-24    137  |  100  |  14  ----------------------------<  160<H>  3.7   |  26  |  0.7    Ca    9.3      24 Sep 2022 06:30  Mg     1.8     09-24    TPro  7.0  /  Alb  4.2  /  TBili  0.7  /  DBili  x   /  AST  16  /  ALT  18  /  AlkPhos  114  09-24                  RADIOLOGY:      PHYSICAL EXAM:  General: well-appearing in NAD. AAO x 3.  HEENT: Normocephalic, nontraumatic.   LUNGS: Clear to auscultation b/l.   HEART: RRR. S1/S2 present.  ABDOMEN: Nontender, nondistended. + bowel sounds.   EXT: Pulses palpable. Nonedematous.   SKIN: Warm, dry. multiple excoriations b/l upper and lower extremities                                                Assessment and Plan:   · Assessment	  48 yo M with PMH of Intellectual Disability and DM not on any medications presented with foot wounds. Hospital stay complicated Hospital acquired COVID s/p RDV, poorly controlled DM. Patient has been in the hospital >100 days, guardianship established and now pending placement after IQ tests done by neuropsych. Patient still has itchy skin excoriations all over his body.    #Possible URI  - afrebrile  - cough, reports green mucus-- resolved in PM  - lungs clear to auscultation  - 1 episode emesis (says he had this often in past)  - f/u covid pcr, WBC    # Excoriations likely 2/2 skin dryness- Active  - lesions still itchy but improving on lac-hydrin and topical triamcinolone.  - Derm consut 9/16- cw triamcinolone 0.1% topical cream, moisturizing cream, and only way to improve excoriations is pt must avoid scratching  - Patient given on 9/15 permethrin 1% full body wash.     # sinus Tachycardia 8/12 and intermittently - resolved  - No CP/SOB  - EKG 8/11 with new incomplete RBBB and s1q3t3  - DVT and PE were ruled out on 8/13 : CTA + LE duplex negative  - TTE on 8/14: 1. EF of 66 % Grade I diastolic dysfunction, no valvular dysfunction    # Covid PNA 5/21- resolved  - S/p RDV    # bilateral Onychomycosis - stable  - S/p debridement and curettage by podiatry 7/18  - F/u outpatient with Dr. Bronson  - will consider podiatry follow up soon for finger and toe nails    # Dental caries - stable  - S/p course of clindamycin, outpatient f/u for teeth extractions    # DM2- stable  - LAntus, lispro and SS  - A1c 7.5 in 8/11. well controlled.    # Intellectual disability/autism- stable  - Neuropsych consulted Dr Deborah Weller ([employer]) and her assistant Cheryl ([phone])  - Psychological test with IQ and [place] scores done for placement completed 9/14 and showed:  Based on full-scale IQ (FSIQ = 60) and his inability to perform IADLs and certain ADLS on his own, he meets criteria for Mild Intellectual Disability (F70.) Following that patient does not have a support system nor family to care for him, it is crucial for his safety, that he should be placed in a supervised living environment. Ideally, this placement should be long-standing, as his disability is permanent and he will continue to require support and supervision for the remainder of his life.       # Misc  - DVT Prophylaxis: rivaroxaban  - GI Prophylaxis: famotidine Tablet 20 milliGRAM(s) Oral daily  - Diet: Diet, DASH/TLC  - Activity: Activity - Ambulate as Tolerated  - Code Status: Full.  - pending: placement. Labs weekly.

## 2022-09-24 NOTE — PROGRESS NOTE ADULT - SUBJECTIVE AND OBJECTIVE BOX
Progress Note:  Provider Speciality                            Hospitalist      CARIN WATSON MRN-[id number] 47y Male     CHIEF PRESENTING COMPLAINT:  Patient is a 47y old  Male who presents with a chief complaint of DFU (24 Aug 2022 06:56)        SUBJECTIVE:  Patient was seen and examined at bedside. No new complaints described by patient in morning rounds  No significant overnight events reported.     HISTORY OF PRESENTING ILLNESS:  HPI:  47 yo M with PMHx of Intellectual Disability, DM not on any medications presents with foot wounds. Pt has very poor foot hygiene and per the sister, has been persistently scratching an open wound on his L second toe, which worsened and became more red, had drainage, malodor. He saw a physician at [facility] and was told to come to the ED for evaluation. Pt is poor historian. Does endorse abdominal pain for a few days, cannot give much more description. Per the sister, pt did not seem to have any recent fevers, however is also not a very good historian and does not seem to have good health literacy. States that her and her mom decided to stop giving pt Metformin a while ago, are treating his diabetes by not giving him any sugary foods.  In the ED, T 97.8, /87, , RR 16, SpO2 99% on RA. Lab work unrevealing.  (31 Mar 2022 22:36)        REVIEW OF SYSTEMS:  Patient denies any headache, any vision complaints, runny nose, fever, chills. Denies chest pain, shortness of breath, palpitation. Denies nausea, vomiting, abdominal pain or diarrhoea, Denies dysuria. Denies  localized weakness in any part of the body or numbness.   At least 10 systems were reviewed in ROS. All systems reviewed  are within normal limits except for the complaints as described in Subjective.    PAST MEDICAL & SURGICAL HISTORY:  PAST MEDICAL & SURGICAL HISTORY:  DM (diabetes mellitus)      Intellectual disability              VITAL SIGNS:  Vital Signs Last 24 Hrs  T(C): 36.5 (24 Sep 2022 08:19), Max: 37.2 (23 Sep 2022 17:31)  T(F): 97.7 (24 Sep 2022 08:19), Max: 98.9 (23 Sep 2022 17:31)  HR: 99 (24 Sep 2022 08:19) (99 - 108)  BP: 147/77 (24 Sep 2022 08:19) (120/72 - 147/77)  BP(mean): --  RR: 20 (24 Sep 2022 08:19) (18 - 20)  SpO2: 98% (23 Sep 2022 17:31) (96% - 98%)    Parameters below as of 23 Sep 2022 17:31  Patient On (Oxygen Delivery Method): room air                  PHYSICAL EXAMINATION:  Not in acute distress  General: No icterus  HEENT:   no JVD.  Heart: S1+S2 audible  Lungs: bilateral  moderate air entry, no wheezing, no crepitations.  Abdomen: Soft, non-tender, non-distended , no  rigidity or guarding.  CNS: Awake alert, CN  grossly intact.  Extremities:  No edema            CONSULTS:  Consultant(s) Notes Reviewed by me.   Care Discussed with Consultants/Other Providers where required.        MEDICATIONS:  MEDICATIONS  (STANDING):  ammonium lactate 12% Lotion 1 Application(s) Topical two times a day  chlorhexidine 4% Liquid 1 Application(s) Topical <User Schedule>  clotrimazole 1% Cream 1 Application(s) Topical two times a day  enoxaparin Injectable 40 milliGRAM(s) SubCutaneous every 24 hours  famotidine    Tablet 20 milliGRAM(s) Oral daily  insulin glargine Injectable (LANTUS) 25 Unit(s) SubCutaneous every morning  insulin lispro (ADMELOG) corrective regimen sliding scale   SubCutaneous three times a day before meals  insulin lispro Injectable (ADMELOG) 8 Unit(s) SubCutaneous three times a day before meals  lactobacillus acidophilus 1 Tablet(s) Oral two times a day  melatonin 3 milliGRAM(s) Oral at bedtime  vitamin A &amp; D Ointment 1 Application(s) Topical daily    MEDICATIONS  (PRN):  acetaminophen     Tablet .. 650 milliGRAM(s) Oral every 6 hours PRN Temp greater or equal to 38C (100.4F), Mild Pain (1 - 3)            ASSESSMENT:    47 yo M with PMH of Intellectual Disability and DM not on any medications presented with foot wounds.    Covid  PNA  Onychomycosis  Dental caries  DM2 with hyperglycemia due to dietary non-compliance  Intellectual disability/autism      Intermittent tachycardia without hypoxia. Recently ruled out for PE and DVT, TTE unremarkable  Covid PNA- completed RDV. Resolved  Nail bed wound and onychomycosis. Status post Aseptic debridement and curettage of all fungal and ingrowing nails 1-5 bilateral with sterile nail nipper 06/07  Podiatry- no surgical intervention is advised. outpt podiatry f/u  DM type 2 - uncontrolled. A1C 8.9.Insulin glargine and lispro . Not compliant with diabetic diet.   Dental Caries-completed clinda course. Will need outpt f/u for possible multiple extractions  Lac-hydrin added for skin dryness &  pruritis , topical triamcinolone. permethrin given.  Insomnia- prn melatonin  IQ evaluation pending. Neuropsychiatry evaluation done    Handoff:  Pending placement , guardianship established  medically stable for discharge    Total time spent to complete patient's bedside assessment, physical examination, review medical chart including labs & imaging, discuss medical plan of care with housestaff was more than 20 minutes

## 2022-09-25 LAB
GLUCOSE BLDC GLUCOMTR-MCNC: 176 MG/DL — HIGH (ref 70–99)
GLUCOSE BLDC GLUCOMTR-MCNC: 189 MG/DL — HIGH (ref 70–99)
GLUCOSE BLDC GLUCOMTR-MCNC: 264 MG/DL — HIGH (ref 70–99)

## 2022-09-25 PROCEDURE — 99231 SBSQ HOSP IP/OBS SF/LOW 25: CPT

## 2022-09-25 RX ADMIN — Medication 6: at 17:47

## 2022-09-25 RX ADMIN — Medication 1 APPLICATION(S): at 17:16

## 2022-09-25 RX ADMIN — Medication 2: at 12:31

## 2022-09-25 RX ADMIN — Medication 1 TABLET(S): at 06:56

## 2022-09-25 RX ADMIN — Medication 1 TABLET(S): at 17:49

## 2022-09-25 RX ADMIN — Medication 3 MILLIGRAM(S): at 23:20

## 2022-09-25 RX ADMIN — Medication 8 UNIT(S): at 08:31

## 2022-09-25 RX ADMIN — FAMOTIDINE 20 MILLIGRAM(S): 10 INJECTION INTRAVENOUS at 12:31

## 2022-09-25 RX ADMIN — INSULIN GLARGINE 25 UNIT(S): 100 INJECTION, SOLUTION SUBCUTANEOUS at 08:32

## 2022-09-25 RX ADMIN — Medication 8 UNIT(S): at 12:31

## 2022-09-25 RX ADMIN — Medication 2: at 08:31

## 2022-09-25 RX ADMIN — Medication 1 APPLICATION(S): at 06:55

## 2022-09-25 RX ADMIN — RIVAROXABAN 5 MILLIGRAM(S): KIT at 12:32

## 2022-09-25 RX ADMIN — Medication 1 APPLICATION(S): at 17:56

## 2022-09-25 RX ADMIN — Medication 8 UNIT(S): at 17:48

## 2022-09-25 NOTE — PROGRESS NOTE ADULT - SUBJECTIVE AND OBJECTIVE BOX
Progress Note:  Provider Speciality                            Hospitalist      CARIN WATSON MRN-[id number] 47y Male     CHIEF PRESENTING COMPLAINT:  Patient is a 47y old  Male who presents with a chief complaint of DFU (24 Aug 2022 06:56)        SUBJECTIVE:  Patient was seen and examined at bedside. No new complaints described by patient in morning rounds  No significant overnight events reported.     HISTORY OF PRESENTING ILLNESS:  HPI:  47 yo M with PMHx of Intellectual Disability, DM not on any medications presents with foot wounds. Pt has very poor foot hygiene and per the sister, has been persistently scratching an open wound on his L second toe, which worsened and became more red, had drainage, malodor. He saw a physician at [facility] and was told to come to the ED for evaluation. Pt is poor historian. Does endorse abdominal pain for a few days, cannot give much more description. Per the sister, pt did not seem to have any recent fevers, however is also not a very good historian and does not seem to have good health literacy. States that her and her mom decided to stop giving pt Metformin a while ago, are treating his diabetes by not giving him any sugary foods.  In the ED, T 97.8, /87, , RR 16, SpO2 99% on RA. Lab work unrevealing.  (31 Mar 2022 22:36)        REVIEW OF SYSTEMS:  Patient denies any headache, any vision complaints, runny nose, fever, chills. Denies chest pain, shortness of breath, palpitation. Denies nausea, vomiting, abdominal pain or diarrhoea, Denies dysuria. Denies  localized weakness in any part of the body or numbness.   At least 10 systems were reviewed in ROS. All systems reviewed  are within normal limits except for the complaints as described in Subjective.    PAST MEDICAL & SURGICAL HISTORY:  PAST MEDICAL & SURGICAL HISTORY:  DM (diabetes mellitus)      Intellectual disability              VITAL SIGNS:  Vital Signs Last 24 Hrs  T(C): 36.1 (25 Sep 2022 07:20), Max: 36.1 (25 Sep 2022 00:00)  T(F): 97 (25 Sep 2022 07:20), Max: 97 (25 Sep 2022 00:00)  HR: 93 (25 Sep 2022 07:20) (93 - 104)  BP: 133/82 (25 Sep 2022 07:20) (106/55 - 133/82)  BP(mean): --  RR: 18 (25 Sep 2022 07:20) (18 - 19)  SpO2: --                PHYSICAL EXAMINATION:  Not in acute distress  General: No icterus  HEENT:   no JVD.  Heart: S1+S2 audible  Lungs: bilateral  moderate air entry, no wheezing, no crepitations.  Abdomen: Soft, non-tender, non-distended , no  rigidity or guarding.  CNS: Awake alert, CN  grossly intact.  Extremities:  No edema            CONSULTS:  Consultant(s) Notes Reviewed by me.   Care Discussed with Consultants/Other Providers where required.        MEDICATIONS:  MEDICATIONS  (STANDING):  ammonium lactate 12% Lotion 1 Application(s) Topical two times a day  chlorhexidine 4% Liquid 1 Application(s) Topical <User Schedule>  clotrimazole 1% Cream 1 Application(s) Topical two times a day  enoxaparin Injectable 40 milliGRAM(s) SubCutaneous every 24 hours  famotidine    Tablet 20 milliGRAM(s) Oral daily  insulin glargine Injectable (LANTUS) 25 Unit(s) SubCutaneous every morning  insulin lispro (ADMELOG) corrective regimen sliding scale   SubCutaneous three times a day before meals  insulin lispro Injectable (ADMELOG) 8 Unit(s) SubCutaneous three times a day before meals  lactobacillus acidophilus 1 Tablet(s) Oral two times a day  melatonin 3 milliGRAM(s) Oral at bedtime  vitamin A &amp; D Ointment 1 Application(s) Topical daily    MEDICATIONS  (PRN):  acetaminophen     Tablet .. 650 milliGRAM(s) Oral every 6 hours PRN Temp greater or equal to 38C (100.4F), Mild Pain (1 - 3)            ASSESSMENT:    47 yo M with PMH of Intellectual Disability and DM not on any medications presented with foot wounds.    Covid  PNA  Onychomycosis  Dental caries  DM2 with hyperglycemia due to dietary non-compliance  Intellectual disability/autism      Intermittent tachycardia without hypoxia. Recently ruled out for PE and DVT, TTE unremarkable  Covid PNA- completed RDV. Resolved  Nail bed wound and onychomycosis. Status post Aseptic debridement and curettage of all fungal and ingrowing nails 1-5 bilateral with sterile nail nipper 06/07  Podiatry- no surgical intervention is advised. outpt podiatry f/u  DM type 2 - uncontrolled. A1C 8.9.Insulin glargine and lispro . Not compliant with diabetic diet.   Dental Caries-completed clinda course. Will need outpt f/u for possible multiple extractions  Lac-hydrin added for skin dryness &  pruritis , topical triamcinolone. permethrin given.  Insomnia- prn melatonin  IQ evaluation pending. Neuropsychiatry evaluation done    Handoff:  Pending placement , guardianship established  medically stable for discharge    Total time spent to complete patient's bedside assessment, physical examination, review medical chart including labs & imaging, discuss medical plan of care with housestaff was more than 20 minutes

## 2022-09-26 LAB
ALBUMIN SERPL ELPH-MCNC: 4.1 G/DL — SIGNIFICANT CHANGE UP (ref 3.5–5.2)
ALP SERPL-CCNC: 118 U/L — HIGH (ref 30–115)
ALT FLD-CCNC: 19 U/L — SIGNIFICANT CHANGE UP (ref 0–41)
ANION GAP SERPL CALC-SCNC: 14 MMOL/L — SIGNIFICANT CHANGE UP (ref 7–14)
AST SERPL-CCNC: 18 U/L — SIGNIFICANT CHANGE UP (ref 0–41)
BASOPHILS # BLD AUTO: 0.04 K/UL — SIGNIFICANT CHANGE UP (ref 0–0.2)
BASOPHILS NFR BLD AUTO: 0.5 % — SIGNIFICANT CHANGE UP (ref 0–1)
BILIRUB SERPL-MCNC: 0.5 MG/DL — SIGNIFICANT CHANGE UP (ref 0.2–1.2)
BUN SERPL-MCNC: 12 MG/DL — SIGNIFICANT CHANGE UP (ref 10–20)
CALCIUM SERPL-MCNC: 9 MG/DL — SIGNIFICANT CHANGE UP (ref 8.4–10.4)
CHLORIDE SERPL-SCNC: 102 MMOL/L — SIGNIFICANT CHANGE UP (ref 98–110)
CO2 SERPL-SCNC: 20 MMOL/L — SIGNIFICANT CHANGE UP (ref 17–32)
CREAT SERPL-MCNC: 0.7 MG/DL — SIGNIFICANT CHANGE UP (ref 0.7–1.5)
EGFR: 114 ML/MIN/1.73M2 — SIGNIFICANT CHANGE UP
EOSINOPHIL # BLD AUTO: 0.33 K/UL — SIGNIFICANT CHANGE UP (ref 0–0.7)
EOSINOPHIL NFR BLD AUTO: 4.5 % — SIGNIFICANT CHANGE UP (ref 0–8)
GLUCOSE BLDC GLUCOMTR-MCNC: 130 MG/DL — HIGH (ref 70–99)
GLUCOSE BLDC GLUCOMTR-MCNC: 208 MG/DL — HIGH (ref 70–99)
GLUCOSE BLDC GLUCOMTR-MCNC: 223 MG/DL — HIGH (ref 70–99)
GLUCOSE BLDC GLUCOMTR-MCNC: 232 MG/DL — HIGH (ref 70–99)
GLUCOSE SERPL-MCNC: 177 MG/DL — HIGH (ref 70–99)
HCT VFR BLD CALC: 43.3 % — SIGNIFICANT CHANGE UP (ref 42–52)
HGB BLD-MCNC: 15.1 G/DL — SIGNIFICANT CHANGE UP (ref 14–18)
IMM GRANULOCYTES NFR BLD AUTO: 0.3 % — SIGNIFICANT CHANGE UP (ref 0.1–0.3)
LYMPHOCYTES # BLD AUTO: 2.36 K/UL — SIGNIFICANT CHANGE UP (ref 1.2–3.4)
LYMPHOCYTES # BLD AUTO: 32.1 % — SIGNIFICANT CHANGE UP (ref 20.5–51.1)
MAGNESIUM SERPL-MCNC: 1.8 MG/DL — SIGNIFICANT CHANGE UP (ref 1.8–2.4)
MCHC RBC-ENTMCNC: 31.8 PG — HIGH (ref 27–31)
MCHC RBC-ENTMCNC: 34.9 G/DL — SIGNIFICANT CHANGE UP (ref 32–37)
MCV RBC AUTO: 91.2 FL — SIGNIFICANT CHANGE UP (ref 80–94)
MONOCYTES # BLD AUTO: 0.62 K/UL — HIGH (ref 0.1–0.6)
MONOCYTES NFR BLD AUTO: 8.4 % — SIGNIFICANT CHANGE UP (ref 1.7–9.3)
NEUTROPHILS # BLD AUTO: 3.98 K/UL — SIGNIFICANT CHANGE UP (ref 1.4–6.5)
NEUTROPHILS NFR BLD AUTO: 54.2 % — SIGNIFICANT CHANGE UP (ref 42.2–75.2)
NRBC # BLD: 0 /100 WBCS — SIGNIFICANT CHANGE UP (ref 0–0)
PLATELET # BLD AUTO: 318 K/UL — SIGNIFICANT CHANGE UP (ref 130–400)
POTASSIUM SERPL-MCNC: 4.3 MMOL/L — SIGNIFICANT CHANGE UP (ref 3.5–5)
POTASSIUM SERPL-SCNC: 4.3 MMOL/L — SIGNIFICANT CHANGE UP (ref 3.5–5)
PROT SERPL-MCNC: 6.9 G/DL — SIGNIFICANT CHANGE UP (ref 6–8)
RBC # BLD: 4.75 M/UL — SIGNIFICANT CHANGE UP (ref 4.7–6.1)
RBC # FLD: 11.5 % — SIGNIFICANT CHANGE UP (ref 11.5–14.5)
SODIUM SERPL-SCNC: 136 MMOL/L — SIGNIFICANT CHANGE UP (ref 135–146)
WBC # BLD: 7.35 K/UL — SIGNIFICANT CHANGE UP (ref 4.8–10.8)
WBC # FLD AUTO: 7.35 K/UL — SIGNIFICANT CHANGE UP (ref 4.8–10.8)

## 2022-09-26 PROCEDURE — 99231 SBSQ HOSP IP/OBS SF/LOW 25: CPT

## 2022-09-26 RX ADMIN — Medication 1 APPLICATION(S): at 06:04

## 2022-09-26 RX ADMIN — Medication 8 UNIT(S): at 08:54

## 2022-09-26 RX ADMIN — Medication 1 APPLICATION(S): at 06:03

## 2022-09-26 RX ADMIN — FAMOTIDINE 20 MILLIGRAM(S): 10 INJECTION INTRAVENOUS at 11:56

## 2022-09-26 RX ADMIN — INSULIN GLARGINE 25 UNIT(S): 100 INJECTION, SOLUTION SUBCUTANEOUS at 08:53

## 2022-09-26 RX ADMIN — Medication 1 APPLICATION(S): at 17:37

## 2022-09-26 RX ADMIN — RIVAROXABAN 5 MILLIGRAM(S): KIT at 11:57

## 2022-09-26 RX ADMIN — Medication 1 TABLET(S): at 06:02

## 2022-09-26 RX ADMIN — Medication 4: at 08:53

## 2022-09-26 RX ADMIN — Medication 3 MILLIGRAM(S): at 22:44

## 2022-09-26 RX ADMIN — Medication 8 UNIT(S): at 17:41

## 2022-09-26 RX ADMIN — Medication 4: at 12:00

## 2022-09-26 RX ADMIN — Medication 1 TABLET(S): at 17:42

## 2022-09-26 RX ADMIN — Medication 8 UNIT(S): at 12:01

## 2022-09-26 RX ADMIN — Medication 1 APPLICATION(S): at 12:03

## 2022-09-26 NOTE — PROGRESS NOTE ADULT - SUBJECTIVE AND OBJECTIVE BOX
Progress Note:  Provider Speciality                            Hospitalist      CARIN WATSON MRN-[id number] 47y Male     CHIEF PRESENTING COMPLAINT:  Patient is a 47y old  Male who presents with a chief complaint of DFU (24 Aug 2022 06:56)        SUBJECTIVE:  Patient was seen and examined at bedside. No new complaints described by patient in morning rounds  No significant overnight events reported.     HISTORY OF PRESENTING ILLNESS:  HPI:  47 yo M with PMHx of Intellectual Disability, DM not on any medications presents with foot wounds. Pt has very poor foot hygiene and per the sister, has been persistently scratching an open wound on his L second toe, which worsened and became more red, had drainage, malodor. He saw a physician at [facility] and was told to come to the ED for evaluation. Pt is poor historian. Does endorse abdominal pain for a few days, cannot give much more description. Per the sister, pt did not seem to have any recent fevers, however is also not a very good historian and does not seem to have good health literacy. States that her and her mom decided to stop giving pt Metformin a while ago, are treating his diabetes by not giving him any sugary foods.  In the ED, T 97.8, /87, , RR 16, SpO2 99% on RA. Lab work unrevealing.  (31 Mar 2022 22:36)        REVIEW OF SYSTEMS:  Patient denies any headache, any vision complaints, runny nose, fever, chills. Denies chest pain, shortness of breath, palpitation. Denies nausea, vomiting, abdominal pain or diarrhoea, Denies dysuria. Denies  localized weakness in any part of the body or numbness.   At least 10 systems were reviewed in ROS. All systems reviewed  are within normal limits except for the complaints as described in Subjective.    PAST MEDICAL & SURGICAL HISTORY:  PAST MEDICAL & SURGICAL HISTORY:  DM (diabetes mellitus)      Intellectual disability              VITAL SIGNS:  Vital Signs Last 24 Hrs  T(C): 35.8 (26 Sep 2022 08:00), Max: 36.1 (25 Sep 2022 17:08)  T(F): 96.5 (26 Sep 2022 08:00), Max: 97 (26 Sep 2022 00:00)  HR: 112 (26 Sep 2022 08:00) (106 - 112)  BP: 136/78 (26 Sep 2022 08:00) (133/60 - 148/67)  BP(mean): --  RR: 18 (26 Sep 2022 08:00) (18 - 18)  SpO2: 94% (26 Sep 2022 08:00) (94% - 96%)    Parameters below as of 25 Sep 2022 17:08  Patient On (Oxygen Delivery Method): room air                  PHYSICAL EXAMINATION:  Not in acute distress  General: No icterus  HEENT:   no JVD.  Heart: S1+S2 audible  Lungs: bilateral  moderate air entry, no wheezing, no crepitations.  Abdomen: Soft, non-tender, non-distended , no  rigidity or guarding.  CNS: Awake alert, CN  grossly intact.  Extremities:  No edema            CONSULTS:  Consultant(s) Notes Reviewed by me.   Care Discussed with Consultants/Other Providers where required.        MEDICATIONS:  MEDICATIONS  (STANDING):  ammonium lactate 12% Lotion 1 Application(s) Topical two times a day  chlorhexidine 4% Liquid 1 Application(s) Topical <User Schedule>  clotrimazole 1% Cream 1 Application(s) Topical two times a day  enoxaparin Injectable 40 milliGRAM(s) SubCutaneous every 24 hours  famotidine    Tablet 20 milliGRAM(s) Oral daily  insulin glargine Injectable (LANTUS) 25 Unit(s) SubCutaneous every morning  insulin lispro (ADMELOG) corrective regimen sliding scale   SubCutaneous three times a day before meals  insulin lispro Injectable (ADMELOG) 8 Unit(s) SubCutaneous three times a day before meals  lactobacillus acidophilus 1 Tablet(s) Oral two times a day  melatonin 3 milliGRAM(s) Oral at bedtime  vitamin A &amp; D Ointment 1 Application(s) Topical daily    MEDICATIONS  (PRN):  acetaminophen     Tablet .. 650 milliGRAM(s) Oral every 6 hours PRN Temp greater or equal to 38C (100.4F), Mild Pain (1 - 3)            ASSESSMENT:    47 yo M with PMH of Intellectual Disability and DM not on any medications presented with foot wounds.    Covid  PNA  Onychomycosis  Dental caries  DM2 with hyperglycemia due to dietary non-compliance  Intellectual disability/autism      Intermittent tachycardia without hypoxia. Recently ruled out for PE and DVT, TTE unremarkable  Covid PNA- completed RDV. Resolved  Nail bed wound and onychomycosis. Status post Aseptic debridement and curettage of all fungal and ingrowing nails 1-5 bilateral with sterile nail nipper 06/07  Podiatry- no surgical intervention is advised. outpt podiatry f/u  DM type 2 - uncontrolled. A1C 8.9.Insulin glargine and lispro . Not compliant with diabetic diet.   Dental Caries-completed clinda course. Will need outpt f/u for possible multiple extractions  Lac-hydrin added for skin dryness &  pruritis , topical triamcinolone. permethrin given.  Insomnia- prn melatonin  Neuropsychiatry evaluation done [facility]    Handoff:  Pending placement , guardianship established  medically stable for discharge    Total time spent to complete patient's bedside assessment, physical examination, review medical chart including labs & imaging, discuss medical plan of care with housestaff was more than 20 minutes

## 2022-09-26 NOTE — PROGRESS NOTE ADULT - SUBJECTIVE AND OBJECTIVE BOX
CARIN WATSON 47y Male  MRN#: [id number]   Hospital Day: 179d    SUBJECTIVE  Patient is a 47y old Male who presents with a chief complaint of DFU (25 Sep 2022 10:21)  Currently admitted to medicine with the primary diagnosis of Onychomycosis      INTERVAL HPI AND OVERNIGHT EVENTS:  Patient was examined and seen at bedside. This morning he is resting comfortably in bed and reports no issues or overnight events.    REVIEW OF SYMPTOMS:  Denies all.     OBJECTIVE  PAST MEDICAL & SURGICAL HISTORY  DM (diabetes mellitus)    Intellectual disability      ALLERGIES:  penicillin (Unknown)    MEDICATIONS:  STANDING MEDICATIONS  ammonium lactate 12% Lotion 1 Application(s) Topical two times a day  chlorhexidine 4% Liquid 1 Application(s) Topical <User Schedule>  clotrimazole 1% Cream 1 Application(s) Topical two times a day  famotidine    Tablet 20 milliGRAM(s) Oral daily  insulin glargine Injectable (LANTUS) 25 Unit(s) SubCutaneous every morning  insulin lispro (ADMELOG) corrective regimen sliding scale   SubCutaneous three times a day before meals  insulin lispro Injectable (ADMELOG) 8 Unit(s) SubCutaneous three times a day before meals  lactobacillus acidophilus 1 Tablet(s) Oral two times a day  melatonin 3 milliGRAM(s) Oral at bedtime  rivaroxaban 5 milliGRAM(s) Oral daily  triamcinolone 0.1% Oral Paste 1 Application(s) Topical two times a day  vitamin A &amp; D Ointment 1 Application(s) Topical daily    PRN MEDICATIONS  acetaminophen     Tablet .. 650 milliGRAM(s) Oral every 6 hours PRN      VITAL SIGNS: Last 24 Hours  T(C): 36.1 (26 Sep 2022 00:00), Max: 36.1 (25 Sep 2022 17:08)  T(F): 97 (26 Sep 2022 00:00), Max: 97 (26 Sep 2022 00:00)  HR: 106 (26 Sep 2022 00:00) (106 - 108)  BP: 133/60 (26 Sep 2022 00:00) (133/60 - 148/67)  BP(mean): --  RR: 18 (26 Sep 2022 00:00) (18 - 18)  SpO2: 96% (25 Sep 2022 17:08) (96% - 96%)      PHYSICAL EXAM:  CONSTITUTIONAL: No acute distress, well-developed, well-groomed  HEAD: Atraumatic, normocephalic  EYES: EOM intact, PERRLA, conjunctiva and sclera clear  PULMONARY: Clear to auscultation bilaterally; no wheezes, rales, or rhonchi  CARDIOVASCULAR: Regular rate and rhythm; no murmurs, rubs, or gallops  GASTROINTESTINAL: Soft, non-tender, non-distended; bowel sounds present  MUSCULOSKELETAL: 2+ peripheral pulses throughout; no lower extremity edema  SKIN: No rashes or lesions; warm and dry    ASSESSMENT & PLAN:  46 yo M with PMH of Intellectual Disability and DM not on any medications presented with foot wounds. Hospital stay complicated Hospital acquired COVID s/p RDV, poorly controlled DM. Patient has been in the hospital >100 days, guardianship established and now pending placement after IQ tests done by neuropsych. Patient still has itchy skin excoriations all over his body.    #Possible URI  - afrebrile  - cough, reports green mucus-- resolved   - lungs clear to auscultation    # Excoriations likely 2/2 skin dryness- Active  - lesions still itchy but improving on lac-hydrin and topical triamcinolone.  - Derm consut 9/16- cw triamcinolone 0.1% topical cream, moisturizing cream, and only way to improve excoriations is pt must avoid scratching  - Patient given on 9/15 permethrin 1% full body wash.     # sinus Tachycardia 8/12 and intermittently - resolved  - No CP/SOB  - EKG 8/11 with new incomplete RBBB and [id number]  - DVT and PE were ruled out on 8/13 : CTA + LE duplex negative  - TTE on 8/14: 1. EF of 66 % Grade I diastolic dysfunction, no valvular dysfunction    # Covid PNA 5/21- resolved  - S/p RDV    # bilateral Onychomycosis - stable  - S/p debridement and curettage by podiatry 7/18  - F/u outpatient with Dr. Bronson  - will consider podiatry follow up soon for finger and toe nails    # Dental caries - stable  - S/p course of clindamycin, outpatient f/u for teeth extractions    # DM2- stable  - LAntus, lispro and SS  - A1c 7.5 in 8/11. well controlled.    # Intellectual disability/autism- stable  - Neuropsych consulted Dr Deborah Weller ([facility]) and her assistant Cheryl ([phone])  - Psychological test with IQ and [place] scores done for placement completed 9/14 and showed:  Based on full-scale IQ (FSIQ = 60) and his inability to perform IADLs and certain ADLS on his own, he meets criteria for Mild Intellectual Disability (F70.) Following that patient does not have a support system nor family to care for him, it is crucial for his safety, that he should be placed in a supervised living environment. Ideally, this placement should be long-standing, as his disability is permanent and he will continue to require support and supervision for the remainder of his life.       # Misc  - DVT Prophylaxis: rivaroxaban  - GI Prophylaxis: famotidine Tablet 20 milliGRAM(s) Oral daily  - Diet: Diet, DASH/TLC  - Activity: Activity - Ambulate as Tolerated  - Code Status: Full.  - pending: placement at long term living facility. Labs weekly. Medically cleared for discharge

## 2022-09-27 LAB
GLUCOSE BLDC GLUCOMTR-MCNC: 156 MG/DL — HIGH (ref 70–99)
GLUCOSE BLDC GLUCOMTR-MCNC: 208 MG/DL — HIGH (ref 70–99)
GLUCOSE BLDC GLUCOMTR-MCNC: 230 MG/DL — HIGH (ref 70–99)
GLUCOSE BLDC GLUCOMTR-MCNC: 255 MG/DL — HIGH (ref 70–99)

## 2022-09-27 PROCEDURE — 99231 SBSQ HOSP IP/OBS SF/LOW 25: CPT

## 2022-09-27 RX ORDER — TUBERCULIN PURIFIED PROTEIN DERIVATIVE 5 [IU]/.1ML
5 INJECTION, SOLUTION INTRADERMAL ONCE
Refills: 0 | Status: COMPLETED | OUTPATIENT
Start: 2022-09-27 | End: 2022-09-27

## 2022-09-27 RX ADMIN — FAMOTIDINE 20 MILLIGRAM(S): 10 INJECTION INTRAVENOUS at 11:26

## 2022-09-27 RX ADMIN — Medication 8 UNIT(S): at 11:25

## 2022-09-27 RX ADMIN — Medication 1 APPLICATION(S): at 17:41

## 2022-09-27 RX ADMIN — RIVAROXABAN 5 MILLIGRAM(S): KIT at 11:26

## 2022-09-27 RX ADMIN — INSULIN GLARGINE 25 UNIT(S): 100 INJECTION, SOLUTION SUBCUTANEOUS at 08:07

## 2022-09-27 RX ADMIN — TUBERCULIN PURIFIED PROTEIN DERIVATIVE 5 UNIT(S): 5 INJECTION, SOLUTION INTRADERMAL at 11:36

## 2022-09-27 RX ADMIN — Medication 8 UNIT(S): at 17:37

## 2022-09-27 RX ADMIN — Medication 1 TABLET(S): at 06:56

## 2022-09-27 RX ADMIN — Medication 1 APPLICATION(S): at 06:57

## 2022-09-27 RX ADMIN — Medication 6: at 17:37

## 2022-09-27 RX ADMIN — Medication 1 TABLET(S): at 17:38

## 2022-09-27 RX ADMIN — Medication 4: at 08:06

## 2022-09-27 RX ADMIN — Medication 4: at 11:25

## 2022-09-27 RX ADMIN — Medication 3 MILLIGRAM(S): at 21:24

## 2022-09-27 RX ADMIN — Medication 8 UNIT(S): at 08:05

## 2022-09-27 RX ADMIN — Medication 1 APPLICATION(S): at 16:52

## 2022-09-27 NOTE — PROGRESS NOTE ADULT - SUBJECTIVE AND OBJECTIVE BOX
CARIN WATSON 47y Male  MRN#: [id number]   CODE STATUS:    Hospital Day: 180d    SUBJECTIVE  No acute events overnight.                                               ----------------------------------------------------------  OBJECTIVE  PAST MEDICAL & SURGICAL HISTORY  DM (diabetes mellitus)    Intellectual disability                                              -----------------------------------------------------------  ALLERGIES:  penicillin (Unknown)                                            ------------------------------------------------------------    HOME MEDICATIONS  Home Medications:  vitamin A and D topical ointment: 1 application topically once a day (12 Apr 2022 11:40)                           MEDICATIONS:  STANDING MEDICATIONS  ammonium lactate 12% Lotion 1 Application(s) Topical two times a day  chlorhexidine 4% Liquid 1 Application(s) Topical <User Schedule>  clotrimazole 1% Cream 1 Application(s) Topical two times a day  famotidine    Tablet 20 milliGRAM(s) Oral daily  insulin glargine Injectable (LANTUS) 25 Unit(s) SubCutaneous every morning  insulin lispro (ADMELOG) corrective regimen sliding scale   SubCutaneous three times a day before meals  insulin lispro Injectable (ADMELOG) 8 Unit(s) SubCutaneous three times a day before meals  lactobacillus acidophilus 1 Tablet(s) Oral two times a day  melatonin 3 milliGRAM(s) Oral at bedtime  PPD  5 Tuberculin Unit(s) Injectable 5 Unit(s) IntraDermal once  rivaroxaban 5 milliGRAM(s) Oral daily  triamcinolone 0.1% Oral Paste 1 Application(s) Topical two times a day  vitamin A &amp; D Ointment 1 Application(s) Topical daily    PRN MEDICATIONS  acetaminophen     Tablet .. 650 milliGRAM(s) Oral every 6 hours PRN                                            ------------------------------------------------------------  VITAL SIGNS: Last 24 Hours  T(C): 36 (27 Sep 2022 08:00), Max: 36.4 (26 Sep 2022 17:07)  T(F): 96.8 (27 Sep 2022 08:00), Max: 97.6 (26 Sep 2022 17:07)  HR: 81 (27 Sep 2022 08:00) (81 - 100)  BP: 128/87 (27 Sep 2022 08:00) (111/67 - 143/78)  BP(mean): --  RR: 18 (27 Sep 2022 08:00) (18 - 18)  SpO2: --                                             --------------------------------------------------------------  LABS:                        15.1   7.35  )-----------( 318      ( 26 Sep 2022 06:53 )             43.3     09-26    136  |  102  |  12  ----------------------------<  177<H>  4.3   |  20  |  0.7    Ca    9.0      26 Sep 2022 06:53  Mg     1.8     09-26    TPro  6.9  /  Alb  4.1  /  TBili  0.5  /  DBili  x   /  AST  18  /  ALT  19  /  AlkPhos  118<H>  09-26                        PHYSICAL EXAM:  General: well-appearing in NAD. AAO x 3.  HEENT: Normocephalic, nontraumatic.   LUNGS: Clear to auscultation b/l.   HEART: RRR. S1/S2 present.  ABDOMEN: Nontender, nondistended. + bowel sounds.   EXT: Pulses palpable. Nonedematous.   SKIN: Warm, dry.

## 2022-09-27 NOTE — PROGRESS NOTE ADULT - SUBJECTIVE AND OBJECTIVE BOX
Progress Note:  Provider Speciality                            Hospitalist      CARIN WATSON MRN-[id number] 47y Male     CHIEF PRESENTING COMPLAINT:  Patient is a 47y old  Male who presents with a chief complaint of DFU (24 Aug 2022 06:56)        SUBJECTIVE:  Patient was seen and examined at bedside. No new complaints described by patient in morning rounds  No significant overnight events reported.     HISTORY OF PRESENTING ILLNESS:  HPI:  45 yo M with PMHx of Intellectual Disability, DM not on any medications presents with foot wounds. Pt has very poor foot hygiene and per the sister, has been persistently scratching an open wound on his L second toe, which worsened and became more red, had drainage, malodor. He saw a physician at [facility] and was told to come to the ED for evaluation. Pt is poor historian. Does endorse abdominal pain for a few days, cannot give much more description. Per the sister, pt did not seem to have any recent fevers, however is also not a very good historian and does not seem to have good health literacy. States that her and her mom decided to stop giving pt Metformin a while ago, are treating his diabetes by not giving him any sugary foods.  In the ED, T 97.8, /87, , RR 16, SpO2 99% on RA. Lab work unrevealing.  (31 Mar 2022 22:36)        REVIEW OF SYSTEMS:  Patient denies any headache, any vision complaints, runny nose, fever, chills. Denies chest pain, shortness of breath, palpitation. Denies nausea, vomiting, abdominal pain or diarrhoea, Denies dysuria. Denies  localized weakness in any part of the body or numbness.   At least 10 systems were reviewed in ROS. All systems reviewed  are within normal limits except for the complaints as described in Subjective.    PAST MEDICAL & SURGICAL HISTORY:  PAST MEDICAL & SURGICAL HISTORY:  DM (diabetes mellitus)      Intellectual disability              VITAL SIGNS:  Vital Signs Last 24 Hrs  T(C): 36 (27 Sep 2022 08:00), Max: 36.4 (26 Sep 2022 17:07)  T(F): 96.8 (27 Sep 2022 08:00), Max: 97.6 (26 Sep 2022 17:07)  HR: 81 (27 Sep 2022 08:00) (81 - 100)  BP: 128/87 (27 Sep 2022 08:00) (111/67 - 143/78)  BP(mean): --  RR: 18 (27 Sep 2022 08:00) (18 - 18)  SpO2: --                    PHYSICAL EXAMINATION:  Not in acute distress  General: No icterus  HEENT:   no JVD.  Heart: S1+S2 audible  Lungs: bilateral  moderate air entry, no wheezing, no crepitations.  Abdomen: Soft, non-tender, non-distended , no  rigidity or guarding.  CNS: Awake alert, CN  grossly intact.  Extremities:  No edema            CONSULTS:  Consultant(s) Notes Reviewed by me.   Care Discussed with Consultants/Other Providers where required.        MEDICATIONS:  MEDICATIONS  (STANDING):  ammonium lactate 12% Lotion 1 Application(s) Topical two times a day  chlorhexidine 4% Liquid 1 Application(s) Topical <User Schedule>  clotrimazole 1% Cream 1 Application(s) Topical two times a day  enoxaparin Injectable 40 milliGRAM(s) SubCutaneous every 24 hours  famotidine    Tablet 20 milliGRAM(s) Oral daily  insulin glargine Injectable (LANTUS) 25 Unit(s) SubCutaneous every morning  insulin lispro (ADMELOG) corrective regimen sliding scale   SubCutaneous three times a day before meals  insulin lispro Injectable (ADMELOG) 8 Unit(s) SubCutaneous three times a day before meals  lactobacillus acidophilus 1 Tablet(s) Oral two times a day  melatonin 3 milliGRAM(s) Oral at bedtime  vitamin A &amp; D Ointment 1 Application(s) Topical daily    MEDICATIONS  (PRN):  acetaminophen     Tablet .. 650 milliGRAM(s) Oral every 6 hours PRN Temp greater or equal to 38C (100.4F), Mild Pain (1 - 3)            ASSESSMENT:    45 yo M with PMH of Intellectual Disability and DM not on any medications presented with foot wounds.    Covid  PNA  Onychomycosis  Dental caries  DM2 with hyperglycemia due to dietary non-compliance  Intellectual disability/autism      Covid PNA- completed RDV. Resolved & stable   Nail bed wound and onychomycosis. Status post Aseptic debridement and curettage of all fungal and ingrowing nails 1-5 bilateral with sterile nail nipper 06/07  Podiatry- no surgical intervention is advised. outpt podiatry f/u  DM type 2 - uncontrolled. A1C 8.9.Insulin glargine and lispro . Not compliant with diabetic diet.   Dental Caries-completed clinda course. Will need outpt f/u for possible multiple extractions  Lac-hydrin added for skin dryness &  pruritis , topical triamcinolone. permethrin given.  Insomnia- prn melatonin  Neuropsychiatry evaluation done [facility]    Handoff:  Pending placement , guardianship established  medically stable for discharge    Total time spent to complete patient's bedside assessment, physical examination, review medical chart including labs & imaging, discuss medical plan of care with housestaff was more than 20 minutes         Progress Note:  Provider Speciality                            Hospitalist      CARIN WATSON MRN-[id number] 47y Male     CHIEF PRESENTING COMPLAINT:  Patient is a 47y old  Male who presents with a chief complaint of DFU (24 Aug 2022 06:56)        SUBJECTIVE:  Patient was seen and examined at bedside. No new complaints described by patient in morning rounds  No significant overnight events reported.     HISTORY OF PRESENTING ILLNESS:  HPI:  47 yo M with PMHx of Intellectual Disability, DM not on any medications presents with foot wounds. Pt has very poor foot hygiene and per the sister, has been persistently scratching an open wound on his L second toe, which worsened and became more red, had drainage, malodor. He saw a physician at [facility] and was told to come to the ED for evaluation. Pt is poor historian. Does endorse abdominal pain for a few days, cannot give much more description. Per the sister, pt did not seem to have any recent fevers, however is also not a very good historian and does not seem to have good health literacy. States that her and her mom decided to stop giving pt Metformin a while ago, are treating his diabetes by not giving him any sugary foods.  In the ED, T 97.8, /87, , RR 16, SpO2 99% on RA. Lab work unrevealing.  (31 Mar 2022 22:36)        REVIEW OF SYSTEMS:  Patient denies any headache, any vision complaints, runny nose, fever, chills. Denies chest pain, shortness of breath, palpitation. Denies nausea, vomiting, abdominal pain or diarrhoea, Denies dysuria. Denies  localized weakness in any part of the body or numbness.   At least 10 systems were reviewed in ROS. All systems reviewed  are within normal limits except for the complaints as described in Subjective.    PAST MEDICAL & SURGICAL HISTORY:  PAST MEDICAL & SURGICAL HISTORY:  DM (diabetes mellitus)      Intellectual disability              VITAL SIGNS:  Vital Signs Last 24 Hrs  T(C): 36 (27 Sep 2022 08:00), Max: 36.4 (26 Sep 2022 17:07)  T(F): 96.8 (27 Sep 2022 08:00), Max: 97.6 (26 Sep 2022 17:07)  HR: 81 (27 Sep 2022 08:00) (81 - 100)  BP: 128/87 (27 Sep 2022 08:00) (111/67 - 143/78)  BP(mean): --  RR: 18 (27 Sep 2022 08:00) (18 - 18)  SpO2: --                    PHYSICAL EXAMINATION:  Not in acute distress  General: No icterus  HEENT:   no JVD.  Heart: S1+S2 audible  Lungs: bilateral  moderate air entry, no wheezing, no crepitations.  Abdomen: Soft, non-tender, non-distended , no  rigidity or guarding.  CNS: Awake alert, CN  grossly intact.  Extremities:  No edema            CONSULTS:  Consultant(s) Notes Reviewed by me.   Care Discussed with Consultants/Other Providers where required.        MEDICATIONS:  MEDICATIONS  (STANDING):  ammonium lactate 12% Lotion 1 Application(s) Topical two times a day  chlorhexidine 4% Liquid 1 Application(s) Topical <User Schedule>  clotrimazole 1% Cream 1 Application(s) Topical two times a day  enoxaparin Injectable 40 milliGRAM(s) SubCutaneous every 24 hours  famotidine    Tablet 20 milliGRAM(s) Oral daily  insulin glargine Injectable (LANTUS) 25 Unit(s) SubCutaneous every morning  insulin lispro (ADMELOG) corrective regimen sliding scale   SubCutaneous three times a day before meals  insulin lispro Injectable (ADMELOG) 8 Unit(s) SubCutaneous three times a day before meals  lactobacillus acidophilus 1 Tablet(s) Oral two times a day  melatonin 3 milliGRAM(s) Oral at bedtime  vitamin A &amp; D Ointment 1 Application(s) Topical daily    MEDICATIONS  (PRN):  acetaminophen     Tablet .. 650 milliGRAM(s) Oral every 6 hours PRN Temp greater or equal to 38C (100.4F), Mild Pain (1 - 3)            ASSESSMENT:    47 yo M with PMH of Intellectual Disability and DM not on any medications presented with foot wounds.    Covid  PNA  Onychomycosis  Dental caries  DM2 with hyperglycemia due to dietary non-compliance  Intellectual disability/autism      Covid PNA- completed RDV. Resolved & stable   Nail bed wound and onychomycosis. Status post Aseptic debridement and curettage of all fungal and ingrowing nails bilateral   Podiatry- no surgical intervention is advised. outpt podiatry f/u  DM type 2 - uncontrolled. A1C 8.9.Insulin glargine and lispro . Not compliant with diabetic diet.   Dental Caries-completed clinda course. Will need outpt f/u for possible multiple extractions  Lac-hydrin added for skin dryness &  pruritis , topical triamcinolone. permethrin given.  Insomnia- prn melatonin  Neuropsychiatry evaluation done for IQ  PPD to be placed today. Recheck 48-72 hrs later    Handoff:  Pending placement , guardianship established  medically stable for discharge    Total time spent to complete patient's bedside assessment, physical examination, review medical chart including labs & imaging, discuss medical plan of care with housestaff was more than 20 minutes

## 2022-09-27 NOTE — PROGRESS NOTE ADULT - ASSESSMENT
46 yo M with PMH of Intellectual Disability and DM not on any medications presented with foot wounds. Hospital stay complicated Hospital acquired COVID s/p RDV, poorly controlled DM. Patient has been in the hospital >100 days, guardianship established and now pending placement after IQ tests done by neuropsych. Patient still has itchy skin excoriations all over his body.    # Excoriations likely 2/2 skin dryness- Active  - lesions still itchy but improving on lac-hydrin and topical triamcinolone.  - Derm consut 9/16- cw triamcinolone 0.1% topical cream, moisturizing cream, and only way to improve excoriations is pt must avoid scratching  - Patient given on 9/15 permethrin 1% full body wash.     # sinus Tachycardia 8/12 and intermittently - resolved  - No CP/SOB  - EKG 8/11 with new incomplete RBBB and s1q3t3  - DVT and PE were ruled out on 8/13 : CTA + LE duplex negative  - TTE on 8/14: 1. EF of 66 % Grade I diastolic dysfunction, no valvular dysfunction    # Covid PNA 5/21- resolved  - S/p RDV    # bilateral Onychomycosis - stable  - S/p debridement and curettage by podiatry 7/18  - F/u outpatient with Dr. Bronson  - will consider podiatry follow up soon for finger and toe nails    # Dental caries - stable  - S/p course of clindamycin, outpatient f/u for teeth extractions    # DM2- stable  - LAntus, lispro and SS  - A1c 7.5 in 8/11. well controlled.    # Intellectual disability/autism- stable  - Neuropsych consulted Dr Deborah Weller ([facility]) and her assistant Cheryl ([phone])  - Psychological test with IQ and [place] scores done for placement completed 9/14 and showed:  Based on full-scale IQ (FSIQ = 60) and his inability to perform IADLs and certain ADLS on his own, he meets criteria for Mild Intellectual Disability (F70.) Following that patient does not have a support system nor family to care for him, it is crucial for his safety, that he should be placed in a supervised living environment. Ideally, this placement should be long-standing, as his disability is permanent and he will continue to require support and supervision for the remainder of his life.       # Misc  - DVT Prophylaxis: rivaroxaban  - GI Prophylaxis: famotidine Tablet 20 milliGRAM(s) Oral daily  - Diet: Diet, DASH/TLC  - Activity: Activity - Ambulate as Tolerated  - Code Status: Full.  - pending: placement at long term living facility. Labs weekly. Medically cleared for discharge. f/u PPD

## 2022-09-28 LAB
GAMMA INTERFERON BACKGROUND BLD IA-ACNC: 0.01 IU/ML — SIGNIFICANT CHANGE UP
GLUCOSE BLDC GLUCOMTR-MCNC: 120 MG/DL — HIGH (ref 70–99)
GLUCOSE BLDC GLUCOMTR-MCNC: 132 MG/DL — HIGH (ref 70–99)
GLUCOSE BLDC GLUCOMTR-MCNC: 220 MG/DL — HIGH (ref 70–99)
GLUCOSE BLDC GLUCOMTR-MCNC: 225 MG/DL — HIGH (ref 70–99)
M TB IFN-G BLD-IMP: NEGATIVE — SIGNIFICANT CHANGE UP
M TB IFN-G CD4+ BCKGRND COR BLD-ACNC: 0 IU/ML — SIGNIFICANT CHANGE UP
M TB IFN-G CD4+CD8+ BCKGRND COR BLD-ACNC: 0 IU/ML — SIGNIFICANT CHANGE UP
QUANT TB PLUS MITOGEN MINUS NIL: 7.53 IU/ML — SIGNIFICANT CHANGE UP
SARS-COV-2 RNA SPEC QL NAA+PROBE: SIGNIFICANT CHANGE UP

## 2022-09-28 PROCEDURE — 99231 SBSQ HOSP IP/OBS SF/LOW 25: CPT

## 2022-09-28 RX ADMIN — Medication 1 TABLET(S): at 05:59

## 2022-09-28 RX ADMIN — Medication 8 UNIT(S): at 17:09

## 2022-09-28 RX ADMIN — Medication 8 UNIT(S): at 08:54

## 2022-09-28 RX ADMIN — Medication 8 UNIT(S): at 11:15

## 2022-09-28 RX ADMIN — Medication 1 APPLICATION(S): at 05:58

## 2022-09-28 RX ADMIN — RIVAROXABAN 5 MILLIGRAM(S): KIT at 11:15

## 2022-09-28 RX ADMIN — Medication 1 APPLICATION(S): at 17:13

## 2022-09-28 RX ADMIN — INSULIN GLARGINE 25 UNIT(S): 100 INJECTION, SOLUTION SUBCUTANEOUS at 08:55

## 2022-09-28 RX ADMIN — Medication 3 MILLIGRAM(S): at 21:23

## 2022-09-28 RX ADMIN — Medication 4: at 08:54

## 2022-09-28 RX ADMIN — Medication 1 TABLET(S): at 17:11

## 2022-09-28 RX ADMIN — Medication 4: at 11:15

## 2022-09-28 RX ADMIN — Medication 1 APPLICATION(S): at 11:23

## 2022-09-28 RX ADMIN — FAMOTIDINE 20 MILLIGRAM(S): 10 INJECTION INTRAVENOUS at 11:15

## 2022-09-28 NOTE — PROGRESS NOTE ADULT - SUBJECTIVE AND OBJECTIVE BOX
CARIN WATSON 47y Male  MRN#: [id number]   CODE STATUS:    Hospital Day: 181d    SUBJECTIVE  Hospital Course                                              ----------------------------------------------------------  OBJECTIVE  PAST MEDICAL & SURGICAL HISTORY  DM (diabetes mellitus)    Intellectual disability                                              -----------------------------------------------------------  ALLERGIES:  penicillin (Unknown)                                            ------------------------------------------------------------    HOME MEDICATIONS  Home Medications:  vitamin A and D topical ointment: 1 application topically once a day (12 Apr 2022 11:40)                           MEDICATIONS:  STANDING MEDICATIONS  ammonium lactate 12% Lotion 1 Application(s) Topical two times a day  chlorhexidine 4% Liquid 1 Application(s) Topical <User Schedule>  clotrimazole 1% Cream 1 Application(s) Topical two times a day  famotidine    Tablet 20 milliGRAM(s) Oral daily  insulin glargine Injectable (LANTUS) 25 Unit(s) SubCutaneous every morning  insulin lispro (ADMELOG) corrective regimen sliding scale   SubCutaneous three times a day before meals  insulin lispro Injectable (ADMELOG) 8 Unit(s) SubCutaneous three times a day before meals  lactobacillus acidophilus 1 Tablet(s) Oral two times a day  melatonin 3 milliGRAM(s) Oral at bedtime  rivaroxaban 5 milliGRAM(s) Oral daily  triamcinolone 0.1% Oral Paste 1 Application(s) Topical two times a day  vitamin A &amp; D Ointment 1 Application(s) Topical daily    PRN MEDICATIONS  acetaminophen     Tablet .. 650 milliGRAM(s) Oral every 6 hours PRN                                            ------------------------------------------------------------  VITAL SIGNS: Last 24 Hours  T(C): 36.1 (27 Sep 2022 23:15), Max: 36.1 (27 Sep 2022 16:19)  T(F): 96.9 (27 Sep 2022 23:15), Max: 97 (27 Sep 2022 16:19)  HR: 89 (27 Sep 2022 23:15) (89 - 104)  BP: 133/68 (27 Sep 2022 23:15) (133/68 - 133/74)  BP(mean): --  RR: 18 (27 Sep 2022 23:15) (18 - 18)  SpO2: --      09-27-22 @ 07:01  -  09-28-22 @ 07:00  --------------------------------------------------------  IN: 480 mL / OUT: 0 mL / NET: 480 mL                                             --------------------------------------------------------------  LABS:                              PHYSICAL EXAM:  General: well-appearing in NAD. AAO x 3.  HEENT: Normocephalic, nontraumatic.   LUNGS: Clear to auscultation b/l.   HEART: RRR. S1/S2 present.  ABDOMEN: Nontender, nondistended. + bowel sounds.   EXT: Pulses palpable. Nonedematous.   SKIN: Warm, dry. excoriations across body from skin picking.

## 2022-09-28 NOTE — PROGRESS NOTE ADULT - SUBJECTIVE AND OBJECTIVE BOX
SUBJECTIVE:    Patient is a 47y old Male who presents with a chief complaint of DFU (28 Sep 2022 09:06)    Currently admitted to medicine with the primary diagnosis of Onychomycosis       Today is hospital day 181d.     PAST MEDICAL & SURGICAL HISTORY  DM (diabetes mellitus)    Intellectual disability      ALLERGIES:  penicillin (Unknown)    MEDICATIONS:  STANDING MEDICATIONS  ammonium lactate 12% Lotion 1 Application(s) Topical two times a day  chlorhexidine 4% Liquid 1 Application(s) Topical <User Schedule>  clotrimazole 1% Cream 1 Application(s) Topical two times a day  famotidine    Tablet 20 milliGRAM(s) Oral daily  insulin glargine Injectable (LANTUS) 25 Unit(s) SubCutaneous every morning  insulin lispro (ADMELOG) corrective regimen sliding scale   SubCutaneous three times a day before meals  insulin lispro Injectable (ADMELOG) 8 Unit(s) SubCutaneous three times a day before meals  lactobacillus acidophilus 1 Tablet(s) Oral two times a day  melatonin 3 milliGRAM(s) Oral at bedtime  rivaroxaban 5 milliGRAM(s) Oral daily  triamcinolone 0.1% Oral Paste 1 Application(s) Topical two times a day  vitamin A &amp; D Ointment 1 Application(s) Topical daily    PRN MEDICATIONS  acetaminophen     Tablet .. 650 milliGRAM(s) Oral every 6 hours PRN    VITALS:   T(F): 96.1  HR: 94  BP: 130/66  RR: 18  SpO2: --    LABS:                        RADIOLOGY:    PHYSICAL EXAM:  GEN: No acute distress  LUNGS: Clear to auscultation bilaterally   HEART: S1/S2 present. RRR.   ABD/ GI: Soft, non-tender, non-distended. Bowel sounds present  EXT: NC/NC/NE/2+PP/LOJA  NEURO: AAOX3

## 2022-09-28 NOTE — PROGRESS NOTE ADULT - ASSESSMENT
47 yo M with PMH of Intellectual Disability and DM not on any medications presented with foot wounds.    Covid  PNA  Onychomycosis  Dental caries  DM2 with hyperglycemia due to dietary non-compliance  Intellectual disability/autism      Covid PNA- completed RDV. Resolved & stable   Nail bed wound and onychomycosis. Status post Aseptic debridement and curettage of all fungal and ingrowing nails bilateral   Podiatry- no surgical intervention is advised. outpt podiatry f/u  DM type 2 - uncontrolled. A1C 8.9.Insulin glargine and lispro . Not compliant with diabetic diet.   Dental Caries-completed clinda course. Will need outpt f/u for possible multiple extractions  Lac-hydrin added for skin dryness &  pruritis , topical triamcinolone. permethrin given.  Insomnia- prn melatonin  Neuropsychiatry evaluation done for IQ  PPD placed 9/27. Recheck 48-72 hrs later

## 2022-09-28 NOTE — PROGRESS NOTE ADULT - ASSESSMENT
48 yo M with PMH of Intellectual Disability and DM not on any medications presented with foot wounds. Hospital stay complicated Hospital acquired COVID s/p RDV, poorly controlled DM. Patient has been in the hospital >100 days, guardianship established and now pending placement after IQ tests done by neuropsych. Patient still has itchy skin excoriations all over his body.    # Excoriations likely 2/2 skin dryness- Active  - lesions still itchy but improving on lac-hydrin and topical triamcinolone.  - Derm consut 9/16- cw triamcinolone 0.1% topical cream, moisturizing cream, and only way to improve excoriations is pt must avoid scratching  - Patient given on 9/15 permethrin 1% full body wash.     # sinus Tachycardia 8/12 and intermittently - resolved  - No CP/SOB  - EKG 8/11 with new incomplete RBBB and s1q3t3  - DVT and PE were ruled out on 8/13 : CTA + LE duplex negative  - TTE on 8/14: 1. EF of 66 % Grade I diastolic dysfunction, no valvular dysfunction    # Covid PNA 5/21- resolved  - S/p RDV    # bilateral Onychomycosis - stable  - S/p debridement and curettage by podiatry 7/18  - F/u outpatient with Dr. Bronson  - will consider podiatry follow up for finger and toe nails    # Dental caries - stable  - S/p course of clindamycin, outpatient f/u for teeth extractions    # DM2- stable  - LAntus, lispro and SS  - A1c 7.5 in 8/11. well controlled.    # Intellectual disability/autism- stable  - Neuropsych consulted Dr Deborah Weller ([facility]) and her assistant Cheryl ([phone])  - Psychological test with IQ and [place] scores done for placement completed 9/14 and showed:  Based on full-scale IQ (FSIQ = 60) and his inability to perform IADLs and certain ADLS on his own, he meets criteria for Mild Intellectual Disability (F70.) Following that patient does not have a support system nor family to care for him, it is crucial for his safety, that he should be placed in a supervised living environment. Ideally, this placement should be long-standing, as his disability is permanent and he will continue to require support and supervision for the remainder of his life.       # Misc  - DVT Prophylaxis: rivaroxaban  - GI Prophylaxis: famotidine Tablet 20 milliGRAM(s) Oral daily  - Diet: Diet, DASH/TLC  - Activity: Activity - Ambulate as Tolerated  - Code Status: Full.  - pending: placement at long term living facility. Labs weekly. Medically cleared for discharge. f/u PPD 9/29 on L forearm

## 2022-09-29 LAB
GLUCOSE BLDC GLUCOMTR-MCNC: 109 MG/DL — HIGH (ref 70–99)
GLUCOSE BLDC GLUCOMTR-MCNC: 267 MG/DL — HIGH (ref 70–99)
GLUCOSE BLDC GLUCOMTR-MCNC: 291 MG/DL — HIGH (ref 70–99)
GLUCOSE BLDC GLUCOMTR-MCNC: 412 MG/DL — HIGH (ref 70–99)

## 2022-09-29 PROCEDURE — 99232 SBSQ HOSP IP/OBS MODERATE 35: CPT

## 2022-09-29 RX ORDER — RIVAROXABAN 15 MG-20MG
2 KIT ORAL
Qty: 0 | Refills: 0 | DISCHARGE
Start: 2022-09-29

## 2022-09-29 RX ORDER — ACETAMINOPHEN 500 MG
2 TABLET ORAL
Qty: 0 | Refills: 0 | DISCHARGE
Start: 2022-09-29

## 2022-09-29 RX ADMIN — CHLORHEXIDINE GLUCONATE 1 APPLICATION(S): 213 SOLUTION TOPICAL at 07:27

## 2022-09-29 RX ADMIN — FAMOTIDINE 20 MILLIGRAM(S): 10 INJECTION INTRAVENOUS at 11:22

## 2022-09-29 RX ADMIN — Medication 3 MILLIGRAM(S): at 22:45

## 2022-09-29 RX ADMIN — Medication 8 UNIT(S): at 08:20

## 2022-09-29 RX ADMIN — Medication 12: at 16:44

## 2022-09-29 RX ADMIN — INSULIN GLARGINE 25 UNIT(S): 100 INJECTION, SOLUTION SUBCUTANEOUS at 08:20

## 2022-09-29 RX ADMIN — Medication 8 UNIT(S): at 16:44

## 2022-09-29 RX ADMIN — Medication 6: at 11:21

## 2022-09-29 RX ADMIN — Medication 1 TABLET(S): at 18:14

## 2022-09-29 RX ADMIN — RIVAROXABAN 5 MILLIGRAM(S): KIT at 11:22

## 2022-09-29 RX ADMIN — Medication 1 TABLET(S): at 07:27

## 2022-09-29 RX ADMIN — Medication 1 APPLICATION(S): at 07:27

## 2022-09-29 RX ADMIN — Medication 1 APPLICATION(S): at 18:14

## 2022-09-29 RX ADMIN — Medication 8 UNIT(S): at 11:21

## 2022-09-29 RX ADMIN — Medication 1 APPLICATION(S): at 18:15

## 2022-09-29 NOTE — PROGRESS NOTE ADULT - ASSESSMENT
48 yo M with PMH of Intellectual Disability and DM not on any medications presented with foot wounds. Hospital stay complicated Hospital acquired COVID s/p RDV, poorly controlled DM. Patient has been in the hospital >100 days, guardianship established and now pending placement after IQ tests done by neuropsych. Patient still has itchy skin excoriations all over his body.    # Excoriations likely 2/2 skin dryness- Active  - lesions still itchy but improving on lac-hydrin and topical triamcinolone.  - Derm consut 9/16- cw triamcinolone 0.1% topical cream, moisturizing cream, and only way to improve excoriations is pt must avoid scratching  - Patient given on 9/15 permethrin 1% full body wash.     # sinus Tachycardia 8/12 and intermittently - resolved  - No CP/SOB  - EKG 8/11 with new incomplete RBBB and s1q3t3  - DVT and PE were ruled out on 8/13 : CTA + LE duplex negative  - TTE on 8/14: 1. EF of 66 % Grade I diastolic dysfunction, no valvular dysfunction    # Covid PNA 5/21- resolved  - S/p RDV    # bilateral Onychomycosis - stable  - S/p debridement and curettage by podiatry 7/18  - F/u outpatient with Dr. Bronson  - will consider podiatry follow up for finger and toe nails    # Dental caries - stable  - S/p course of clindamycin, outpatient f/u for teeth extractions    # DM2- stable  - LAntus, lispro and SS  - A1c 7.5 in 8/11. well controlled.    # Intellectual disability/autism- stable  - Neuropsych consulted Dr Deboarh Weller ([facility]) and her assistant Cheryl ([phone])  - Psychological test with IQ and [place] scores done for placement completed 9/14 and showed:  Based on full-scale IQ (FSIQ = 60) and his inability to perform IADLs and certain ADLS on his own, he meets criteria for Mild Intellectual Disability (F70.) Following that patient does not have a support system nor family to care for him, it is crucial for his safety, that he should be placed in a supervised living environment. Ideally, this placement should be long-standing, as his disability is permanent and he will continue to require support and supervision for the remainder of his life.       # Misc  - DVT Prophylaxis: rivaroxaban  - GI Prophylaxis: famotidine Tablet 20 milliGRAM(s) Oral daily  - Diet: Diet, DASH/TLC  - Activity: Activity - Ambulate as Tolerated  - Code Status: Full.  - pending: placement at long term living facility. Labs weekly. Medically cleared for discharge. PPD negative. QuantiFeron negative.

## 2022-09-29 NOTE — PROGRESS NOTE ADULT - ASSESSMENT
47 yo M with PMH of Intellectual Disability and DM not on any medications presented with foot wounds.    Covid  PNA  Onychomycosis  Dental caries  DM2 with hyperglycemia due to dietary non-compliance  Intellectual disability/autism      Covid PNA- completed RDV. Resolved & stable   Nail bed wound and onychomycosis. Status post Aseptic debridement and curettage of all fungal and ingrowing nails bilateral   Podiatry- no surgical intervention is advised. outpt podiatry f/u  DM type 2 - uncontrolled. A1C 8.9.Insulin glargine and lispro . Not compliant with diabetic diet.   Dental Caries-completed clinda course. Will need outpt f/u for possible multiple extractions  Lac-hydrin added for skin dryness &  pruritis , topical triamcinolone. permethrin given.  Insomnia- prn melatonin  Neuropsychiatry evaluation done for IQ  PPD placed 9/27. negative    dc planning in progress-- will go to [place] for eval on 10/4

## 2022-09-29 NOTE — PROGRESS NOTE ADULT - SUBJECTIVE AND OBJECTIVE BOX
CARIN WATSON 47y Male  MRN#: [id number]   CODE STATUS:    Hospital Day: 182d    SUBJECTIVE  No acute events overnight.                                               ----------------------------------------------------------  OBJECTIVE  PAST MEDICAL & SURGICAL HISTORY  DM (diabetes mellitus)    Intellectual disability                                              -----------------------------------------------------------  ALLERGIES:  penicillin (Unknown)                                            ------------------------------------------------------------    HOME MEDICATIONS  Home Medications:  vitamin A and D topical ointment: 1 application topically once a day (12 Apr 2022 11:40)                           MEDICATIONS:  STANDING MEDICATIONS  ammonium lactate 12% Lotion 1 Application(s) Topical two times a day  chlorhexidine 4% Liquid 1 Application(s) Topical <User Schedule>  clotrimazole 1% Cream 1 Application(s) Topical two times a day  famotidine    Tablet 20 milliGRAM(s) Oral daily  insulin glargine Injectable (LANTUS) 25 Unit(s) SubCutaneous every morning  insulin lispro (ADMELOG) corrective regimen sliding scale   SubCutaneous three times a day before meals  insulin lispro Injectable (ADMELOG) 8 Unit(s) SubCutaneous three times a day before meals  lactobacillus acidophilus 1 Tablet(s) Oral two times a day  melatonin 3 milliGRAM(s) Oral at bedtime  rivaroxaban 5 milliGRAM(s) Oral daily  triamcinolone 0.1% Oral Paste 1 Application(s) Topical two times a day  vitamin A &amp; D Ointment 1 Application(s) Topical daily    PRN MEDICATIONS  acetaminophen     Tablet .. 650 milliGRAM(s) Oral every 6 hours PRN                                            ------------------------------------------------------------  VITAL SIGNS: Last 24 Hours  T(C): 35.7 (29 Sep 2022 08:00), Max: 36.1 (28 Sep 2022 23:07)  T(F): 96.2 (29 Sep 2022 08:00), Max: 97 (28 Sep 2022 23:07)  HR: 80 (29 Sep 2022 08:00) (80 - 95)  BP: 119/60 (29 Sep 2022 08:00) (119/60 - 127/67)  BP(mean): --  RR: 18 (29 Sep 2022 08:00) (18 - 18)  SpO2: --                                             --------------------------------------------------------------  LABS:                              PHYSICAL EXAM:  General: well-appearing in NAD. AAO x 3.  HEENT: Normocephalic, nontraumatic.   LUNGS: Clear to auscultation b/l.   HEART: RRR. S1/S2 present.  ABDOMEN: Nontender, nondistended. + bowel sounds.   EXT: Pulses palpable. Nonedematous. negative PPD  SKIN: Warm, dry. excoriations across body from skin picking.

## 2022-09-29 NOTE — PROGRESS NOTE ADULT - SUBJECTIVE AND OBJECTIVE BOX
SUBJECTIVE:    Patient is a 47y old Male who presents with a chief complaint of DFU (29 Sep 2022 11:10)    Currently admitted to medicine with the primary diagnosis of Onychomycosis       Today is hospital day 182d.     PAST MEDICAL & SURGICAL HISTORY  DM (diabetes mellitus)    Intellectual disability      ALLERGIES:  penicillin (Unknown)    MEDICATIONS:  STANDING MEDICATIONS  ammonium lactate 12% Lotion 1 Application(s) Topical two times a day  chlorhexidine 4% Liquid 1 Application(s) Topical <User Schedule>  clotrimazole 1% Cream 1 Application(s) Topical two times a day  famotidine    Tablet 20 milliGRAM(s) Oral daily  insulin glargine Injectable (LANTUS) 25 Unit(s) SubCutaneous every morning  insulin lispro (ADMELOG) corrective regimen sliding scale   SubCutaneous three times a day before meals  insulin lispro Injectable (ADMELOG) 8 Unit(s) SubCutaneous three times a day before meals  lactobacillus acidophilus 1 Tablet(s) Oral two times a day  melatonin 3 milliGRAM(s) Oral at bedtime  rivaroxaban 5 milliGRAM(s) Oral daily  triamcinolone 0.1% Oral Paste 1 Application(s) Topical two times a day  vitamin A &amp; D Ointment 1 Application(s) Topical daily    PRN MEDICATIONS  acetaminophen     Tablet .. 650 milliGRAM(s) Oral every 6 hours PRN    VITALS:   T(F): 98.1  HR: 108  BP: 113/56  RR: 17  SpO2: --    LABS:                        RADIOLOGY:    PHYSICAL EXAM:  GEN: No acute distress  LUNGS: Clear to auscultation bilaterally   HEART: S1/S2 present. RRR.   ABD/ GI: Soft, non-tender, non-distended. Bowel sounds present  EXT: NC/NC/NE/2+PP/LOJA  NEURO: AAOX3

## 2022-09-30 LAB
GLUCOSE BLDC GLUCOMTR-MCNC: 152 MG/DL — HIGH (ref 70–99)
GLUCOSE BLDC GLUCOMTR-MCNC: 189 MG/DL — HIGH (ref 70–99)
GLUCOSE BLDC GLUCOMTR-MCNC: 219 MG/DL — HIGH (ref 70–99)
GLUCOSE BLDC GLUCOMTR-MCNC: 82 MG/DL — SIGNIFICANT CHANGE UP (ref 70–99)

## 2022-09-30 PROCEDURE — 99231 SBSQ HOSP IP/OBS SF/LOW 25: CPT

## 2022-09-30 RX ADMIN — Medication 1 APPLICATION(S): at 06:53

## 2022-09-30 RX ADMIN — Medication 1 APPLICATION(S): at 17:18

## 2022-09-30 RX ADMIN — Medication 1 TABLET(S): at 06:55

## 2022-09-30 RX ADMIN — Medication 2: at 11:57

## 2022-09-30 RX ADMIN — Medication 3 MILLIGRAM(S): at 21:27

## 2022-09-30 RX ADMIN — RIVAROXABAN 5 MILLIGRAM(S): KIT at 11:56

## 2022-09-30 RX ADMIN — FAMOTIDINE 20 MILLIGRAM(S): 10 INJECTION INTRAVENOUS at 11:58

## 2022-09-30 RX ADMIN — Medication 4: at 17:16

## 2022-09-30 RX ADMIN — Medication 1 TABLET(S): at 17:17

## 2022-09-30 RX ADMIN — INSULIN GLARGINE 25 UNIT(S): 100 INJECTION, SOLUTION SUBCUTANEOUS at 08:21

## 2022-09-30 RX ADMIN — Medication 8 UNIT(S): at 08:19

## 2022-09-30 RX ADMIN — Medication 8 UNIT(S): at 11:57

## 2022-09-30 RX ADMIN — Medication 8 UNIT(S): at 17:16

## 2022-09-30 RX ADMIN — Medication 2: at 08:19

## 2022-09-30 RX ADMIN — Medication 1 APPLICATION(S): at 11:58

## 2022-09-30 NOTE — PROGRESS NOTE ADULT - ASSESSMENT
48 yo M with PMH of Intellectual Disability and DM not on any medications presented with foot wounds. Hospital stay complicated Hospital acquired COVID s/p RDV, poorly controlled DM. Patient has been in the hospital >100 days, guardianship established and now pending placement after IQ tests done by neuropsych. Patient still has itchy skin excoriations all over his body.    # Excoriations likely 2/2 skin dryness- improving  - lesions still itchy but improving on lac-hydrin and topical triamcinolone.  - Derm consut 9/16- cw triamcinolone 0.1% topical cream, moisturizing cream, and only way to improve excoriations is pt must avoid scratching  - Patient given on 9/15 permethrin 1% full body wash.     # sinus Tachycardia 8/12 and intermittently - resolved  - No CP/SOB  - EKG 8/11 with new incomplete RBBB and s1q3t3  - DVT and PE were ruled out on 8/13 : CTA + LE duplex negative  - TTE on 8/14: 1. EF of 66 % Grade I diastolic dysfunction, no valvular dysfunction    # Covid PNA 5/21- resolved  - S/p RDV    # bilateral Onychomycosis - stable  - S/p debridement and curettage by podiatry 7/18  - F/u outpatient with Dr. Bronson  - will consider podiatry follow up for finger and toe nails    # Dental caries - stable  - S/p course of clindamycin, outpatient f/u for teeth extractions    # DM2- stable  - LAntus, lispro and SS  - A1c 7.5 in 8/11. well controlled.    # Intellectual disability/autism- stable  - Neuropsych consulted Dr Deborah Weller ([facility]) and her assistant Cheryl ([phone])  - Psychological test with IQ and [place] scores done for placement completed 9/14 and showed:  Based on full-scale IQ (FSIQ = 60) and his inability to perform IADLs and certain ADLS on his own, he meets criteria for Mild Intellectual Disability (F70.) Following that patient does not have a support system nor family to care for him, it is crucial for his safety, that he should be placed in a supervised living environment. Ideally, this placement should be long-standing, as his disability is permanent and he will continue to require support and supervision for the remainder of his life.       # Misc  - DVT Prophylaxis: rivaroxaban  - GI Prophylaxis: famotidine Tablet 20 milliGRAM(s) Oral daily  - Diet: Diet, DASH/TLC  - Activity: Activity - Ambulate as Tolerated  - Code Status: Full.  - pending: placement at long term living facility. Labs weekly. Medically cleared for discharge. PPD negative. QuantiFeron negative.   - going Tuesday 10/4 to visit facility pending cost of transport

## 2022-09-30 NOTE — PROGRESS NOTE ADULT - SUBJECTIVE AND OBJECTIVE BOX
CARIN WATSON 47y Male  MRN#: [id number]   CODE STATUS:    Hospital Day: 183d    SUBJECTIVE  No acute events overnight.                                               ----------------------------------------------------------  OBJECTIVE  PAST MEDICAL & SURGICAL HISTORY  DM (diabetes mellitus)    Intellectual disability                                              -----------------------------------------------------------  ALLERGIES:  penicillin (Unknown)                                            ------------------------------------------------------------    HOME MEDICATIONS  Home Medications:  acetaminophen 325 mg oral tablet: 2 tab(s) orally every 6 hours, As needed, Temp greater or equal to 38C (100.4F), Mild Pain (1 - 3) (29 Sep 2022 14:13)  rivaroxaban 2.5 mg oral tablet: 2 tab(s) orally once a day (29 Sep 2022 14:13)  vitamin A and D topical ointment: 1 application topically once a day (12 Apr 2022 11:40)                           MEDICATIONS:  STANDING MEDICATIONS  ammonium lactate 12% Lotion 1 Application(s) Topical two times a day  chlorhexidine 4% Liquid 1 Application(s) Topical <User Schedule>  clotrimazole 1% Cream 1 Application(s) Topical two times a day  famotidine    Tablet 20 milliGRAM(s) Oral daily  insulin glargine Injectable (LANTUS) 25 Unit(s) SubCutaneous every morning  insulin lispro (ADMELOG) corrective regimen sliding scale   SubCutaneous three times a day before meals  insulin lispro Injectable (ADMELOG) 8 Unit(s) SubCutaneous three times a day before meals  lactobacillus acidophilus 1 Tablet(s) Oral two times a day  melatonin 3 milliGRAM(s) Oral at bedtime  rivaroxaban 5 milliGRAM(s) Oral daily  triamcinolone 0.1% Oral Paste 1 Application(s) Topical two times a day  vitamin A &amp; D Ointment 1 Application(s) Topical daily    PRN MEDICATIONS  acetaminophen     Tablet .. 650 milliGRAM(s) Oral every 6 hours PRN                                            ------------------------------------------------------------  VITAL SIGNS: Last 24 Hours  T(C): 36.3 (30 Sep 2022 08:13), Max: 36.7 (29 Sep 2022 15:30)  T(F): 97.4 (30 Sep 2022 08:13), Max: 98.1 (29 Sep 2022 15:30)  HR: 116 (30 Sep 2022 08:13) (100 - 116)  BP: 128/65 (30 Sep 2022 08:13) (113/56 - 128/65)  BP(mean): --  RR: 17 (30 Sep 2022 08:13) (17 - 18)  SpO2: 98% (30 Sep 2022 08:13) (98% - 98%)                                             --------------------------------------------------------------  LABS:                              PHYSICAL EXAM:  General: well-appearing in NAD. AAO x 3.  HEENT: Normocephalic, nontraumatic.   LUNGS: Clear to auscultation b/l.   HEART: RRR. S1/S2 present.  ABDOMEN: Nontender, nondistended. + bowel sounds.   EXT: Pulses palpable. Nonedematous.   SKIN: Warm, dry. excoriations to skin across arms and legs, pt says less pruritic skin lately.

## 2022-09-30 NOTE — PROGRESS NOTE ADULT - ASSESSMENT
45 yo M with PMH of Intellectual Disability and DM not on any medications presented with foot wounds.    Covid  PNA  Onychomycosis  Dental caries  DM2 with hyperglycemia due to dietary non-compliance  Intellectual disability/autism      Covid PNA- completed RDV. Resolved & stable   Nail bed wound and onychomycosis. Status post Aseptic debridement and curettage of all fungal and ingrowing nails bilateral   Podiatry- no surgical intervention is advised. outpt podiatry f/u  DM type 2 - uncontrolled. A1C 8.9.Insulin glargine and lispro . Not compliant with diabetic diet.   Dental Caries-completed clinda course. Will need outpt f/u for possible multiple extractions  Lac-hydrin added for skin dryness &  pruritis , topical triamcinolone. permethrin given.  Insomnia- prn melatonin  Neuropsychiatry evaluation done for IQ  PPD placed 9/27. negative    dc planning in progress-- will go to [facility] for eval on 10/4.

## 2022-09-30 NOTE — PROGRESS NOTE ADULT - SUBJECTIVE AND OBJECTIVE BOX
SUBJECTIVE:    Patient is a 47y old Male who presents with a chief complaint of DFU (30 Sep 2022 11:12)    Currently admitted to medicine with the primary diagnosis of Onychomycosis       Today is hospital day 183d.     PAST MEDICAL & SURGICAL HISTORY  DM (diabetes mellitus)    Intellectual disability      ALLERGIES:  penicillin (Unknown)    MEDICATIONS:  STANDING MEDICATIONS  ammonium lactate 12% Lotion 1 Application(s) Topical two times a day  chlorhexidine 4% Liquid 1 Application(s) Topical <User Schedule>  clotrimazole 1% Cream 1 Application(s) Topical two times a day  famotidine    Tablet 20 milliGRAM(s) Oral daily  insulin glargine Injectable (LANTUS) 25 Unit(s) SubCutaneous every morning  insulin lispro (ADMELOG) corrective regimen sliding scale   SubCutaneous three times a day before meals  insulin lispro Injectable (ADMELOG) 8 Unit(s) SubCutaneous three times a day before meals  lactobacillus acidophilus 1 Tablet(s) Oral two times a day  melatonin 3 milliGRAM(s) Oral at bedtime  rivaroxaban 5 milliGRAM(s) Oral daily  triamcinolone 0.1% Oral Paste 1 Application(s) Topical two times a day  vitamin A &amp; D Ointment 1 Application(s) Topical daily    PRN MEDICATIONS  acetaminophen     Tablet .. 650 milliGRAM(s) Oral every 6 hours PRN    VITALS:   T(F): 97.4  HR: 116  BP: 128/65  RR: 17  SpO2: 98%    LABS:                        RADIOLOGY:    PHYSICAL EXAM:  GEN: No acute distress  LUNGS: Clear to auscultation bilaterally   HEART: S1/S2 present. RRR.   ABD/ GI: Soft, non-tender, non-distended. Bowel sounds present  EXT: NC/NC/NE/2+PP/LOJA  NEURO: AAOX3

## 2022-10-01 LAB
GLUCOSE BLDC GLUCOMTR-MCNC: 154 MG/DL — HIGH (ref 70–99)
GLUCOSE BLDC GLUCOMTR-MCNC: 223 MG/DL — HIGH (ref 70–99)
GLUCOSE BLDC GLUCOMTR-MCNC: 256 MG/DL — HIGH (ref 70–99)
GLUCOSE BLDC GLUCOMTR-MCNC: 274 MG/DL — HIGH (ref 70–99)

## 2022-10-01 PROCEDURE — 99231 SBSQ HOSP IP/OBS SF/LOW 25: CPT

## 2022-10-01 RX ADMIN — CHLORHEXIDINE GLUCONATE 1 APPLICATION(S): 213 SOLUTION TOPICAL at 05:54

## 2022-10-01 RX ADMIN — FAMOTIDINE 20 MILLIGRAM(S): 10 INJECTION INTRAVENOUS at 12:06

## 2022-10-01 RX ADMIN — Medication 1 APPLICATION(S): at 17:56

## 2022-10-01 RX ADMIN — RIVAROXABAN 5 MILLIGRAM(S): KIT at 12:06

## 2022-10-01 RX ADMIN — Medication 1 APPLICATION(S): at 05:53

## 2022-10-01 RX ADMIN — Medication 1 APPLICATION(S): at 05:54

## 2022-10-01 RX ADMIN — Medication 1 TABLET(S): at 17:57

## 2022-10-01 RX ADMIN — Medication 6: at 17:55

## 2022-10-01 RX ADMIN — Medication 2: at 10:42

## 2022-10-01 RX ADMIN — Medication 3 MILLIGRAM(S): at 21:09

## 2022-10-01 RX ADMIN — Medication 8 UNIT(S): at 12:06

## 2022-10-01 RX ADMIN — Medication 1 TABLET(S): at 05:53

## 2022-10-01 RX ADMIN — Medication 8 UNIT(S): at 10:43

## 2022-10-01 RX ADMIN — Medication 4: at 12:05

## 2022-10-01 RX ADMIN — INSULIN GLARGINE 25 UNIT(S): 100 INJECTION, SOLUTION SUBCUTANEOUS at 10:43

## 2022-10-01 RX ADMIN — Medication 1 APPLICATION(S): at 17:57

## 2022-10-01 RX ADMIN — Medication 8 UNIT(S): at 17:56

## 2022-10-01 NOTE — PROGRESS NOTE ADULT - ASSESSMENT
45 yo M with PMH of Intellectual Disability and DM not on any medications presented with foot wounds.    Covid  PNA  Onychomycosis  Dental caries  DM2 with hyperglycemia due to dietary non-compliance  Intellectual disability/autism      Covid PNA- completed RDV. Resolved & stable   Nail bed wound and onychomycosis. Status post Aseptic debridement and curettage of all fungal and ingrowing nails bilateral   Podiatry- no surgical intervention is advised. outpt podiatry f/u  DM type 2 - uncontrolled. A1C 8.9.Insulin glargine and lispro . Not compliant with diabetic diet.   Dental Caries-completed clinda course. Will need outpt f/u for possible multiple extractions  Lac-hydrin added for skin dryness &  pruritis , topical triamcinolone. permethrin given.  Insomnia- prn melatonin  Neuropsychiatry evaluation done for IQ  PPD placed 9/27. negative    dc planning in progress-- will go to [facility] for eval on 10/4.  on xarelto small dose it is only for DVT prophylaxis as he hardly comes out of the room

## 2022-10-01 NOTE — PROGRESS NOTE ADULT - ASSESSMENT
46 yo M with PMH of Intellectual Disability and DM not on any medications presented with foot wounds. Hospital stay complicated Hospital acquired COVID s/p RDV, poorly controlled DM. Patient has been in the hospital >100 days, guardianship established and now pending placement after IQ tests done by neuropsych. Patient still has itchy skin excoriations all over his body.    # Excoriations likely 2/2 skin dryness- improving  - lesions still itchy but improving on lac-hydrin and topical triamcinolone.  - Derm consut 9/16- cw triamcinolone 0.1% topical cream, moisturizing cream, and only way to improve excoriations is pt must avoid scratching  - Patient given on 9/15 permethrin 1% full body wash.     # sinus Tachycardia 8/12 and intermittently - resolved  - No CP/SOB  - EKG 8/11 with new incomplete RBBB and s1q3t3  - DVT and PE were ruled out on 8/13 : CTA + LE duplex negative  - TTE on 8/14: 1. EF of 66 % Grade I diastolic dysfunction, no valvular dysfunction    # Covid PNA 5/21- resolved  - S/p RDV    # bilateral Onychomycosis - stable  - S/p debridement and curettage by podiatry 7/18  - F/u outpatient with Dr. Bronson  - will consider podiatry follow up for finger and toe nails    # Dental caries - stable  - S/p course of clindamycin, outpatient f/u for teeth extractions    # DM2- stable  - LAntus, lispro and SS  - A1c 7.5 in 8/11. well controlled.    # Intellectual disability/autism- stable  - Neuropsych consulted Dr Deborah Weller ([facility]) and her assistant Cheryl ([phone])  - Psychological test with IQ and [place] scores done for placement completed 9/14 and showed:  Based on full-scale IQ (FSIQ = 60) and his inability to perform IADLs and certain ADLS on his own, he meets criteria for Mild Intellectual Disability (F70.) Following that patient does not have a support system nor family to care for him, it is crucial for his safety, that he should be placed in a supervised living environment. Ideally, this placement should be long-standing, as his disability is permanent and he will continue to require support and supervision for the remainder of his life.       # Misc  - DVT Prophylaxis: rivaroxaban  - GI Prophylaxis: famotidine Tablet 20 milliGRAM(s) Oral daily  - Diet: Diet, DASH/TLC  - Activity: Activity - Ambulate as Tolerated  - Code Status: Full.  - pending: placement at long term living facility. Labs weekly. Medically cleared for discharge. PPD negative. QuantiFeron negative.   - going Tuesday 10/4 to visit facility pending cost of transport covered or not

## 2022-10-01 NOTE — PROGRESS NOTE ADULT - SUBJECTIVE AND OBJECTIVE BOX
SUBJECTIVE:    Patient is a 47y old Male who presents with a chief complaint of DFU (01 Oct 2022 12:01)    Currently admitted to medicine with the primary diagnosis of Onychomycosis       Today is hospital day 184d.     PAST MEDICAL & SURGICAL HISTORY  DM (diabetes mellitus)    Intellectual disability      ALLERGIES:  penicillin (Unknown)    MEDICATIONS:  STANDING MEDICATIONS  ammonium lactate 12% Lotion 1 Application(s) Topical two times a day  chlorhexidine 4% Liquid 1 Application(s) Topical <User Schedule>  clotrimazole 1% Cream 1 Application(s) Topical two times a day  famotidine    Tablet 20 milliGRAM(s) Oral daily  insulin glargine Injectable (LANTUS) 25 Unit(s) SubCutaneous every morning  insulin lispro (ADMELOG) corrective regimen sliding scale   SubCutaneous three times a day before meals  insulin lispro Injectable (ADMELOG) 8 Unit(s) SubCutaneous three times a day before meals  lactobacillus acidophilus 1 Tablet(s) Oral two times a day  melatonin 3 milliGRAM(s) Oral at bedtime  rivaroxaban 5 milliGRAM(s) Oral daily  triamcinolone 0.1% Oral Paste 1 Application(s) Topical two times a day  vitamin A &amp; D Ointment 1 Application(s) Topical daily    PRN MEDICATIONS  acetaminophen     Tablet .. 650 milliGRAM(s) Oral every 6 hours PRN    VITALS:   T(F): 98  HR: 113  BP: 142/75  RR: 18  SpO2: --    LABS:                        RADIOLOGY:    PHYSICAL EXAM:  GEN: No acute distress  LUNGS: Clear to auscultation bilaterally   HEART: S1/S2 present. RRR.   ABD/ GI: Soft, non-tender, non-distended. Bowel sounds present  EXT: NC/NC/NE/2+PP/LOJA  NEURO: AAOX3

## 2022-10-01 NOTE — PROGRESS NOTE ADULT - SUBJECTIVE AND OBJECTIVE BOX
CARIN WATSON 47y Male  MRN#: [id number]   CODE STATUS:    Hospital Day: 184d    SUBJECTIVE  No acute events overnight.                                               ----------------------------------------------------------  OBJECTIVE  PAST MEDICAL & SURGICAL HISTORY  DM (diabetes mellitus)    Intellectual disability                                              -----------------------------------------------------------  ALLERGIES:  penicillin (Unknown)                                            ------------------------------------------------------------    HOME MEDICATIONS  Home Medications:  acetaminophen 325 mg oral tablet: 2 tab(s) orally every 6 hours, As needed, Temp greater or equal to 38C (100.4F), Mild Pain (1 - 3) (29 Sep 2022 14:13)  rivaroxaban 2.5 mg oral tablet: 2 tab(s) orally once a day (29 Sep 2022 14:13)  vitamin A and D topical ointment: 1 application topically once a day (12 Apr 2022 11:40)                           MEDICATIONS:  STANDING MEDICATIONS  ammonium lactate 12% Lotion 1 Application(s) Topical two times a day  chlorhexidine 4% Liquid 1 Application(s) Topical <User Schedule>  clotrimazole 1% Cream 1 Application(s) Topical two times a day  famotidine    Tablet 20 milliGRAM(s) Oral daily  insulin glargine Injectable (LANTUS) 25 Unit(s) SubCutaneous every morning  insulin lispro (ADMELOG) corrective regimen sliding scale   SubCutaneous three times a day before meals  insulin lispro Injectable (ADMELOG) 8 Unit(s) SubCutaneous three times a day before meals  lactobacillus acidophilus 1 Tablet(s) Oral two times a day  melatonin 3 milliGRAM(s) Oral at bedtime  rivaroxaban 5 milliGRAM(s) Oral daily  triamcinolone 0.1% Oral Paste 1 Application(s) Topical two times a day  vitamin A &amp; D Ointment 1 Application(s) Topical daily    PRN MEDICATIONS  acetaminophen     Tablet .. 650 milliGRAM(s) Oral every 6 hours PRN                                            ------------------------------------------------------------  VITAL SIGNS: Last 24 Hours  T(C): 36.7 (01 Oct 2022 08:23), Max: 36.7 (01 Oct 2022 08:23)  T(F): 98 (01 Oct 2022 08:23), Max: 98 (01 Oct 2022 08:23)  HR: 113 (01 Oct 2022 08:23) (100 - 113)  BP: 142/75 (01 Oct 2022 08:23) (125/66 - 142/75)  BP(mean): --  RR: 18 (01 Oct 2022 00:00) (18 - 18)  SpO2: --      09-30-22 @ 07:01  -  10-01-22 @ 07:00  --------------------------------------------------------  IN: 700 mL / OUT: 0 mL / NET: 700 mL                                             --------------------------------------------------------------  LABS:                              PHYSICAL EXAM:  General: well-appearing in NAD. AAO x 3.  HEENT: Normocephalic, nontraumatic.   LUNGS: Clear to auscultation b/l.   HEART: RRR. S1/S2 present.  ABDOMEN: Nontender, nondistended. + bowel sounds.   EXT: Pulses palpable. Nonedematous.   SKIN: Warm, dry.

## 2022-10-02 LAB
GLUCOSE BLDC GLUCOMTR-MCNC: 203 MG/DL — HIGH (ref 70–99)
GLUCOSE BLDC GLUCOMTR-MCNC: 256 MG/DL — HIGH (ref 70–99)
GLUCOSE BLDC GLUCOMTR-MCNC: 288 MG/DL — HIGH (ref 70–99)
GLUCOSE BLDC GLUCOMTR-MCNC: 364 MG/DL — HIGH (ref 70–99)

## 2022-10-02 PROCEDURE — 99231 SBSQ HOSP IP/OBS SF/LOW 25: CPT

## 2022-10-02 RX ORDER — INSULIN LISPRO 100/ML
5 VIAL (ML) SUBCUTANEOUS ONCE
Refills: 0 | Status: COMPLETED | OUTPATIENT
Start: 2022-10-02 | End: 2022-10-02

## 2022-10-02 RX ADMIN — RIVAROXABAN 5 MILLIGRAM(S): KIT at 11:33

## 2022-10-02 RX ADMIN — Medication 1 TABLET(S): at 05:07

## 2022-10-02 RX ADMIN — Medication 6: at 11:31

## 2022-10-02 RX ADMIN — Medication 1 APPLICATION(S): at 11:33

## 2022-10-02 RX ADMIN — Medication 1 APPLICATION(S): at 05:07

## 2022-10-02 RX ADMIN — Medication 3 MILLIGRAM(S): at 21:44

## 2022-10-02 RX ADMIN — Medication 8 UNIT(S): at 11:32

## 2022-10-02 RX ADMIN — Medication 1 APPLICATION(S): at 18:06

## 2022-10-02 RX ADMIN — Medication 8 UNIT(S): at 09:24

## 2022-10-02 RX ADMIN — Medication 8 UNIT(S): at 18:06

## 2022-10-02 RX ADMIN — FAMOTIDINE 20 MILLIGRAM(S): 10 INJECTION INTRAVENOUS at 11:33

## 2022-10-02 RX ADMIN — Medication 5 UNIT(S): at 21:44

## 2022-10-02 RX ADMIN — INSULIN GLARGINE 25 UNIT(S): 100 INJECTION, SOLUTION SUBCUTANEOUS at 09:24

## 2022-10-02 RX ADMIN — Medication 4: at 18:05

## 2022-10-02 NOTE — PROGRESS NOTE ADULT - ASSESSMENT
47 yo M with PMH of Intellectual Disability and DM not on any medications presented with foot wounds.    Covid  PNA  Onychomycosis  Dental caries  DM2 with hyperglycemia due to dietary non-compliance  Intellectual disability/autism      Covid PNA- completed RDV. Resolved & stable   Nail bed wound and onychomycosis. Status post Aseptic debridement and curettage of all fungal and ingrowing nails bilateral   Podiatry- no surgical intervention is advised. outpt podiatry f/u  DM type 2 - uncontrolled. A1C 8.9.Insulin glargine and lispro . Not compliant with diabetic diet.   Dental Caries-completed clinda course. Will need outpt f/u for possible multiple extractions  Lac-hydrin added for skin dryness &  pruritis , topical triamcinolone. permethrin given.  Insomnia- prn melatonin  Neuropsychiatry evaluation done for IQ  PPD placed 9/27. negative    dc planning in progress-- will go to [facility] for eval on 10/4.  DC xarelto as DC planning soon.

## 2022-10-02 NOTE — PROGRESS NOTE ADULT - SUBJECTIVE AND OBJECTIVE BOX
SUBJECTIVE:    Patient is a 47y old Male who presents with a chief complaint of DFU (01 Oct 2022 13:59)    Currently admitted to medicine with the primary diagnosis of Onychomycosis       Today is hospital day 185d.     PAST MEDICAL & SURGICAL HISTORY  DM (diabetes mellitus)    Intellectual disability      ALLERGIES:  penicillin (Unknown)    MEDICATIONS:  STANDING MEDICATIONS  ammonium lactate 12% Lotion 1 Application(s) Topical two times a day  chlorhexidine 4% Liquid 1 Application(s) Topical <User Schedule>  clotrimazole 1% Cream 1 Application(s) Topical two times a day  famotidine    Tablet 20 milliGRAM(s) Oral daily  insulin glargine Injectable (LANTUS) 25 Unit(s) SubCutaneous every morning  insulin lispro (ADMELOG) corrective regimen sliding scale   SubCutaneous three times a day before meals  insulin lispro Injectable (ADMELOG) 8 Unit(s) SubCutaneous three times a day before meals  lactobacillus acidophilus 1 Tablet(s) Oral two times a day  melatonin 3 milliGRAM(s) Oral at bedtime  triamcinolone 0.1% Oral Paste 1 Application(s) Topical two times a day  vitamin A &amp; D Ointment 1 Application(s) Topical daily    PRN MEDICATIONS  acetaminophen     Tablet .. 650 milliGRAM(s) Oral every 6 hours PRN    VITALS:   T(F): 97  HR: 91  BP: 131/59  RR: 18  SpO2: --    LABS:                        RADIOLOGY:    PHYSICAL EXAM:  GEN: No acute distress  LUNGS: Clear to auscultation bilaterally   HEART: S1/S2 present. RRR.   ABD/ GI: Soft, non-tender, non-distended. Bowel sounds present  EXT: NC/NC/NE/2+PP/LOJA  NEURO: AAOX3

## 2022-10-03 LAB
GLUCOSE BLDC GLUCOMTR-MCNC: 133 MG/DL — HIGH (ref 70–99)
GLUCOSE BLDC GLUCOMTR-MCNC: 229 MG/DL — HIGH (ref 70–99)
GLUCOSE BLDC GLUCOMTR-MCNC: 245 MG/DL — HIGH (ref 70–99)
GLUCOSE BLDC GLUCOMTR-MCNC: 250 MG/DL — HIGH (ref 70–99)

## 2022-10-03 PROCEDURE — 99232 SBSQ HOSP IP/OBS MODERATE 35: CPT

## 2022-10-03 RX ADMIN — Medication 1 TABLET(S): at 06:34

## 2022-10-03 RX ADMIN — CHLORHEXIDINE GLUCONATE 1 APPLICATION(S): 213 SOLUTION TOPICAL at 08:22

## 2022-10-03 RX ADMIN — Medication 3 MILLIGRAM(S): at 21:59

## 2022-10-03 RX ADMIN — FAMOTIDINE 20 MILLIGRAM(S): 10 INJECTION INTRAVENOUS at 12:38

## 2022-10-03 RX ADMIN — Medication 1 APPLICATION(S): at 17:53

## 2022-10-03 RX ADMIN — Medication 8 UNIT(S): at 12:38

## 2022-10-03 RX ADMIN — Medication 4: at 17:50

## 2022-10-03 RX ADMIN — INSULIN GLARGINE 25 UNIT(S): 100 INJECTION, SOLUTION SUBCUTANEOUS at 08:21

## 2022-10-03 RX ADMIN — Medication 1 APPLICATION(S): at 12:38

## 2022-10-03 RX ADMIN — Medication 1 APPLICATION(S): at 06:34

## 2022-10-03 RX ADMIN — Medication 8 UNIT(S): at 17:53

## 2022-10-03 RX ADMIN — Medication 1 TABLET(S): at 17:54

## 2022-10-03 RX ADMIN — Medication 4: at 12:37

## 2022-10-03 NOTE — PROGRESS NOTE ADULT - SUBJECTIVE AND OBJECTIVE BOX
SUBJECTIVE:    Patient is a 47y old Male who presents with a chief complaint of DFU (02 Oct 2022 15:11)    Currently admitted to medicine with the primary diagnosis of:    Today is hospital day 186d.     Overnight Events:     no overnight events     PAST MEDICAL & SURGICAL HISTORY  DM (diabetes mellitus)    Intellectual disability      ALLERGIES:  penicillin (Unknown)    MEDICATIONS:  STANDING MEDICATIONS  ammonium lactate 12% Lotion 1 Application(s) Topical two times a day  chlorhexidine 4% Liquid 1 Application(s) Topical <User Schedule>  clotrimazole 1% Cream 1 Application(s) Topical two times a day  famotidine    Tablet 20 milliGRAM(s) Oral daily  insulin glargine Injectable (LANTUS) 25 Unit(s) SubCutaneous every morning  insulin lispro (ADMELOG) corrective regimen sliding scale   SubCutaneous three times a day before meals  insulin lispro Injectable (ADMELOG) 8 Unit(s) SubCutaneous three times a day before meals  lactobacillus acidophilus 1 Tablet(s) Oral two times a day  melatonin 3 milliGRAM(s) Oral at bedtime  triamcinolone 0.1% Oral Paste 1 Application(s) Topical two times a day  vitamin A &amp; D Ointment 1 Application(s) Topical daily    PRN MEDICATIONS  acetaminophen     Tablet .. 650 milliGRAM(s) Oral every 6 hours PRN    VITALS:   ICU Vital Signs Last 24 Hrs  T(C): 36 (03 Oct 2022 05:00), Max: 36.7 (02 Oct 2022 17:15)  T(F): 96.8 (03 Oct 2022 05:00), Max: 98.1 (02 Oct 2022 17:15)  HR: 82 (03 Oct 2022 05:00) (82 - 111)  BP: 136/71 (03 Oct 2022 05:00) (105/56 - 142/73)  BP(mean): --  ABP: --  ABP(mean): --  RR: 18 (03 Oct 2022 05:00) (18 - 18)  SpO2: 94% (02 Oct 2022 17:15) (94% - 94%)    O2 Parameters below as of 02 Oct 2022 17:15  Patient On (Oxygen Delivery Method): room air            LABS:                          RADIOLOGY:  < from: [facility] Duplex Lower Ext Vein Scan, Bilat (08.13.22 @ 20:35) >  IMPRESSION:  No evidence of deep venous thrombosis in either lower extremity.    < end of copied text >    PHYSICAL EXAM:  GEN: sitting in bed  LUNGS: clear bl  HEART: s1 s2  ABD: nontender nondistended  EXT: no lower extremity edema  NEURO: a03

## 2022-10-03 NOTE — PROGRESS NOTE ADULT - ASSESSMENT
46 yo M with PMH of Intellectual Disability and DM not on any medications presented with foot wounds. Hospital stay complicated Hospital acquired COVID s/p RDV, poorly controlled DM. Patient has been in the hospital >100 days, guardianship established and now pending placement after IQ tests done by neuropsych. Patient still has itchy skin excoriations all over his body.    # Excoriations likely 2/2 skin dryness- improving  - lesions still itchy but improving on lac-hydrin and topical triamcinolone.  - Derm consut 9/16- cw triamcinolone 0.1% topical cream, moisturizing cream, and only way to improve excoriations is pt must avoid scratching  - Patient given on 9/15 permethrin 1% full body wash.     # sinus Tachycardia 8/12 and intermittently - resolved  - No CP/SOB  - EKG 8/11 with new incomplete RBBB and s1q3t3  - DVT and PE were ruled out on 8/13 : CTA + LE duplex negative  - TTE on 8/14: 1. EF of 66 % Grade I diastolic dysfunction, no valvular dysfunction    # Covid PNA 5/21- resolved  - S/p RDV    # bilateral Onychomycosis - stable  - S/p debridement and curettage by podiatry 7/18  - F/u outpatient with Dr. Bronson  - will consider podiatry follow up for finger and toe nails    # Dental caries - stable  - S/p course of clindamycin, outpatient f/u for teeth extractions    # DM2- stable  - LAntus, lispro and SS  - A1c 7.5 in 8/11. well controlled.    # Intellectual disability/autism- stable  - Neuropsych consulted Dr Deborah Weller ([facility]) and her assistant Cheryl ([phone])  - Psychological test with IQ and [place] scores done for placement completed 9/14 and showed:  Based on full-scale IQ (FSIQ = 60) and his inability to perform IADLs and certain ADLS on his own, he meets criteria for Mild Intellectual Disability (F70.) Following that patient does not have a support system nor family to care for him, it is crucial for his safety, that he should be placed in a supervised living environment. Ideally, this placement should be long-standing, as his disability is permanent and he will continue to require support and supervision for the remainder of his life.       # Misc  - DVT Prophylaxis: rivaroxaban  - GI Prophylaxis: famotidine Tablet 20 milliGRAM(s) Oral daily  - Diet: Diet, DASH/TLC  - Activity: Activity - Ambulate as Tolerated  - Code Status: Full.  - pending: placement at long term living facility. Labs weekly. Medically cleared for discharge. PPD negative. QuantiFeron negative.   - going Tuesday 10/4 to visit facility pending cost of transport covered or not

## 2022-10-03 NOTE — PROGRESS NOTE ADULT - ASSESSMENT
45 yo M with PMH of Intellectual Disability and DM not on any medications presented with foot wounds.    Covid  PNA  Onychomycosis  Dental caries  DM2 with hyperglycemia due to dietary non-compliance  Intellectual disability/autism      Covid PNA- completed RDV. Resolved & stable   Nail bed wound and onychomycosis. Status post Aseptic debridement and curettage of all fungal and ingrowing nails bilateral   Podiatry- no surgical intervention is advised. outpt podiatry f/u  DM type 2 - uncontrolled. A1C 8.9.Insulin glargine and lispro . Not compliant with diabetic diet.   Dental Caries-completed clinda course. Will need outpt f/u for possible multiple extractions  Lac-hydrin added for skin dryness &  pruritis , topical triamcinolone. permethrin given.  Insomnia- prn melatonin  Neuropsychiatry evaluation done for IQ  PPD placed 9/27. negative    dc planning in progress-- will go to [facility] for eval on 10/4.  DC xarelto as DC planning soon.

## 2022-10-03 NOTE — PROGRESS NOTE ADULT - SUBJECTIVE AND OBJECTIVE BOX
SUBJECTIVE:    Patient is a 47y old Male who presents with a chief complaint of DFU (03 Oct 2022 10:46)    Currently admitted to medicine with the primary diagnosis of Onychomycosis       Today is hospital day 186d.     PAST MEDICAL & SURGICAL HISTORY  DM (diabetes mellitus)    Intellectual disability      ALLERGIES:  penicillin (Unknown)    MEDICATIONS:  STANDING MEDICATIONS  ammonium lactate 12% Lotion 1 Application(s) Topical two times a day  chlorhexidine 4% Liquid 1 Application(s) Topical <User Schedule>  clotrimazole 1% Cream 1 Application(s) Topical two times a day  famotidine    Tablet 20 milliGRAM(s) Oral daily  insulin glargine Injectable (LANTUS) 25 Unit(s) SubCutaneous every morning  insulin lispro (ADMELOG) corrective regimen sliding scale   SubCutaneous three times a day before meals  insulin lispro Injectable (ADMELOG) 8 Unit(s) SubCutaneous three times a day before meals  lactobacillus acidophilus 1 Tablet(s) Oral two times a day  melatonin 3 milliGRAM(s) Oral at bedtime  triamcinolone 0.1% Oral Paste 1 Application(s) Topical two times a day  vitamin A &amp; D Ointment 1 Application(s) Topical daily    PRN MEDICATIONS  acetaminophen     Tablet .. 650 milliGRAM(s) Oral every 6 hours PRN    VITALS:   T(F): 96.7  HR: 116  BP: 128/67  RR: 18  SpO2: 94%    LABS:                        RADIOLOGY:    PHYSICAL EXAM:  GEN: No acute distress  LUNGS: Clear to auscultation bilaterally   HEART: S1/S2 present. RRR.   ABD/ GI: Soft, non-tender, non-distended. Bowel sounds present  EXT: NC/NC/NE/2+PP/LOJA  NEURO: AAOX3

## 2022-10-04 LAB
GLUCOSE BLDC GLUCOMTR-MCNC: 106 MG/DL — HIGH (ref 70–99)
GLUCOSE BLDC GLUCOMTR-MCNC: 119 MG/DL — HIGH (ref 70–99)
GLUCOSE BLDC GLUCOMTR-MCNC: 211 MG/DL — HIGH (ref 70–99)
GLUCOSE BLDC GLUCOMTR-MCNC: 254 MG/DL — HIGH (ref 70–99)
GLUCOSE BLDC GLUCOMTR-MCNC: 286 MG/DL — HIGH (ref 70–99)

## 2022-10-04 PROCEDURE — 99231 SBSQ HOSP IP/OBS SF/LOW 25: CPT

## 2022-10-04 RX ADMIN — Medication 8 UNIT(S): at 17:08

## 2022-10-04 RX ADMIN — Medication 8 UNIT(S): at 06:41

## 2022-10-04 RX ADMIN — INSULIN GLARGINE 25 UNIT(S): 100 INJECTION, SOLUTION SUBCUTANEOUS at 07:44

## 2022-10-04 RX ADMIN — Medication 1 APPLICATION(S): at 06:42

## 2022-10-04 RX ADMIN — Medication 8 UNIT(S): at 13:37

## 2022-10-04 RX ADMIN — Medication 100 MILLIGRAM(S): at 23:54

## 2022-10-04 RX ADMIN — Medication 6: at 13:36

## 2022-10-04 RX ADMIN — Medication 3 MILLIGRAM(S): at 22:02

## 2022-10-04 RX ADMIN — Medication 4: at 06:40

## 2022-10-04 RX ADMIN — Medication 1 APPLICATION(S): at 17:07

## 2022-10-04 RX ADMIN — Medication 1 TABLET(S): at 17:09

## 2022-10-04 RX ADMIN — Medication 1 APPLICATION(S): at 17:08

## 2022-10-04 RX ADMIN — Medication 1 TABLET(S): at 06:41

## 2022-10-04 NOTE — PROGRESS NOTE ADULT - SUBJECTIVE AND OBJECTIVE BOX
SUBJECTIVE:    Patient is a 47y old Male who presents with a chief complaint of DFU (04 Oct 2022 09:27)    Currently admitted to medicine with the primary diagnosis of Onychomycosis       Today is hospital day 187d.     PAST MEDICAL & SURGICAL HISTORY  DM (diabetes mellitus)    Intellectual disability      ALLERGIES:  penicillin (Unknown)    MEDICATIONS:  STANDING MEDICATIONS  ammonium lactate 12% Lotion 1 Application(s) Topical two times a day  chlorhexidine 4% Liquid 1 Application(s) Topical <User Schedule>  clotrimazole 1% Cream 1 Application(s) Topical two times a day  famotidine    Tablet 20 milliGRAM(s) Oral daily  insulin glargine Injectable (LANTUS) 25 Unit(s) SubCutaneous every morning  insulin lispro (ADMELOG) corrective regimen sliding scale   SubCutaneous three times a day before meals  insulin lispro Injectable (ADMELOG) 8 Unit(s) SubCutaneous three times a day before meals  lactobacillus acidophilus 1 Tablet(s) Oral two times a day  melatonin 3 milliGRAM(s) Oral at bedtime  triamcinolone 0.1% Oral Paste 1 Application(s) Topical two times a day  vitamin A &amp; D Ointment 1 Application(s) Topical daily    PRN MEDICATIONS  acetaminophen     Tablet .. 650 milliGRAM(s) Oral every 6 hours PRN    VITALS:   T(F): 97.8  HR: 104  BP: 140/61  RR: 18  SpO2: 97%    LABS:                        RADIOLOGY:    PHYSICAL EXAM:  GEN: No acute distress  LUNGS: Clear to auscultation bilaterally   HEART: S1/S2 present. RRR.   ABD/ GI: Soft, non-tender, non-distended. Bowel sounds present  EXT: NC/NC/NE/2+PP/LOJA  NEURO: AAOX3

## 2022-10-04 NOTE — PROGRESS NOTE ADULT - ASSESSMENT
45 yo M with PMH of Intellectual Disability and DM not on any medications presented with foot wounds.    Covid  PNA  Onychomycosis  Dental caries  DM2 with hyperglycemia due to dietary non-compliance  Intellectual disability/autism      Covid PNA- completed RDV. Resolved & stable   Nail bed wound and onychomycosis. Status post Aseptic debridement and curettage of all fungal and ingrowing nails bilateral   Podiatry- no surgical intervention is advised. outpt podiatry f/u  DM type 2 - uncontrolled. A1C 8.9.Insulin glargine and lispro . Not compliant with diabetic diet. can change to metformin at discharge-- check hba1c am  Dental Caries-completed clinda course. Will need outpt f/u for possible multiple extractions  Lac-hydrin added for skin dryness &  pruritis , topical triamcinolone. permethrin given.  Insomnia- prn melatonin  Neuropsychiatry evaluation done for IQ  PPD placed 9/27. negative    dc planning in progress-- will go to [facility] for eval today-- 10/4.  off xarelto as DC planning soon.

## 2022-10-04 NOTE — PROGRESS NOTE ADULT - SUBJECTIVE AND OBJECTIVE BOX
SUBJECTIVE:    Patient is a 47y old Male who presents with a chief complaint of DFU (03 Oct 2022 17:05)    Currently admitted to medicine with the primary diagnosis of:    Today is hospital day 187d.     Overnight Events:     no overnight events    PAST MEDICAL & SURGICAL HISTORY  DM (diabetes mellitus)    Intellectual disability        ALLERGIES:  penicillin (Unknown)    MEDICATIONS:  STANDING MEDICATIONS  ammonium lactate 12% Lotion 1 Application(s) Topical two times a day  chlorhexidine 4% Liquid 1 Application(s) Topical <User Schedule>  clotrimazole 1% Cream 1 Application(s) Topical two times a day  famotidine    Tablet 20 milliGRAM(s) Oral daily  insulin glargine Injectable (LANTUS) 25 Unit(s) SubCutaneous every morning  insulin lispro (ADMELOG) corrective regimen sliding scale   SubCutaneous three times a day before meals  insulin lispro Injectable (ADMELOG) 8 Unit(s) SubCutaneous three times a day before meals  lactobacillus acidophilus 1 Tablet(s) Oral two times a day  melatonin 3 milliGRAM(s) Oral at bedtime  triamcinolone 0.1% Oral Paste 1 Application(s) Topical two times a day  vitamin A &amp; D Ointment 1 Application(s) Topical daily    PRN MEDICATIONS  acetaminophen     Tablet .. 650 milliGRAM(s) Oral every 6 hours PRN    VITALS:   ICU Vital Signs Last 24 Hrs  T(C): 36.6 (04 Oct 2022 05:11), Max: 36.6 (03 Oct 2022 21:00)  T(F): 97.8 (04 Oct 2022 05:11), Max: 97.9 (03 Oct 2022 21:00)  HR: 104 (04 Oct 2022 05:11) (100 - 116)  BP: 140/61 (04 Oct 2022 05:11) (128/67 - 140/69)  BP(mean): --  ABP: --  ABP(mean): --  RR: 18 (04 Oct 2022 05:11) (18 - 18)  SpO2: 97% (03 Oct 2022 21:00) (97% - 97%)    O2 Parameters below as of 03 Oct 2022 21:00  Patient On (Oxygen Delivery Method): room air            LABS:                          RADIOLOGY:  < from: [facility] Duplex Lower Ext Vein Scan, Bilat (08.13.22 @ 20:35) >  IMPRESSION:  No evidence of deep venous thrombosis in either lower extremity.    < end of copied text >  < from: [facility] Duplex Lower Ext Vein Scan, Maximusat (08.13.22 @ 20:35) >  IMPRESSION:  No evidence of deep venous thrombosis in either lower extremity.    < end of copied text >    PHYSICAL EXAM:  before rounds went to exam patient though he was in bathroom, during rounds he was being brought out to visit potential place to be moved to in [place]   GEN: looks happy in good spirits to go out  LUNGS: no respiratory distress  HEART: s1 s2  NEURO: in good spirits , happy mood, friendly, said bye to everyone as he was being wheeled out

## 2022-10-04 NOTE — PROGRESS NOTE ADULT - ASSESSMENT
48 yo M with PMH of Intellectual Disability and DM not on any medications presented with foot wounds. Hospital stay complicated Hospital acquired COVID s/p RDV, poorly controlled DM. Patient has been in the hospital >100 days, guardianship established and now pending placement after IQ tests done by neuropsych. Patient still has itchy skin excoriations all over his body.    # Excoriations likely 2/2 skin dryness- improving  - lesions still itchy but improving on lac-hydrin and topical triamcinolone.  - Derm consut 9/16- cw triamcinolone 0.1% topical cream, moisturizing cream, and only way to improve excoriations is pt must avoid scratching  - Patient given on 9/15 permethrin 1% full body wash.     # sinus Tachycardia 8/12 and intermittently - resolved  - No CP/SOB  - EKG 8/11 with new incomplete RBBB and [id number]  - DVT and PE were ruled out on 8/13 : CTA + LE duplex negative  - TTE on 8/14: 1. EF of 66 % Grade I diastolic dysfunction, no valvular dysfunction    # Covid PNA 5/21- resolved  - S/p RDV    # bilateral Onychomycosis - stable  - S/p debridement and curettage by podiatry 7/18  - F/u outpatient with Dr. Bronson  - will consider podiatry follow up for finger and toe nails    # Dental caries - stable  - S/p course of clindamycin, outpatient f/u for teeth extractions    # DM2- stable  - LAntus, lispro and SS  - A1c 7.5 in 8/11. well controlled.    # Intellectual disability/autism- stable  - Neuropsych consulted Dr Deborah Weller ([facility]) and her assistant Cheryl ([phone])  - Psychological test with IQ and [place] scores done for placement completed 9/14 and showed:  Based on full-scale IQ (FSIQ = 60) and his inability to perform IADLs and certain ADLS on his own, he meets criteria for Mild Intellectual Disability (F70.) Following that patient does not have a support system nor family to care for him, it is crucial for his safety, that he should be placed in a supervised living environment. Ideally, this placement should be long-standing, as his disability is permanent and he will continue to require support and supervision for the remainder of his life.       # Misc  - DVT Prophylaxis: rivaroxaban  - GI Prophylaxis: famotidine Tablet 20 milliGRAM(s) Oral daily  - Diet: Diet, DASH/TLC  - Activity: Activity - Ambulate as Tolerated  - Code Status: Full.  - pending: placement at long term living facility. Labs weekly. Medically cleared for discharge. PPD negative. QuantiFeron negative.   - going Tuesday 10/4 to visit facility

## 2022-10-05 LAB
GLUCOSE BLDC GLUCOMTR-MCNC: 100 MG/DL — HIGH (ref 70–99)
GLUCOSE BLDC GLUCOMTR-MCNC: 187 MG/DL — HIGH (ref 70–99)
GLUCOSE BLDC GLUCOMTR-MCNC: 237 MG/DL — HIGH (ref 70–99)
GLUCOSE BLDC GLUCOMTR-MCNC: 252 MG/DL — HIGH (ref 70–99)

## 2022-10-05 PROCEDURE — 93010 ELECTROCARDIOGRAM REPORT: CPT

## 2022-10-05 PROCEDURE — 99231 SBSQ HOSP IP/OBS SF/LOW 25: CPT

## 2022-10-05 RX ORDER — INSULIN LISPRO 100/ML
4 VIAL (ML) SUBCUTANEOUS ONCE
Refills: 0 | Status: COMPLETED | OUTPATIENT
Start: 2022-10-05 | End: 2022-10-05

## 2022-10-05 RX ADMIN — Medication 1 APPLICATION(S): at 11:54

## 2022-10-05 RX ADMIN — Medication 8 UNIT(S): at 11:52

## 2022-10-05 RX ADMIN — INSULIN GLARGINE 25 UNIT(S): 100 INJECTION, SOLUTION SUBCUTANEOUS at 08:25

## 2022-10-05 RX ADMIN — Medication 1 APPLICATION(S): at 06:32

## 2022-10-05 RX ADMIN — Medication 2: at 08:24

## 2022-10-05 RX ADMIN — Medication 8 UNIT(S): at 17:41

## 2022-10-05 RX ADMIN — Medication 8 UNIT(S): at 08:24

## 2022-10-05 RX ADMIN — Medication 6: at 11:51

## 2022-10-05 RX ADMIN — FAMOTIDINE 20 MILLIGRAM(S): 10 INJECTION INTRAVENOUS at 11:54

## 2022-10-05 RX ADMIN — Medication 3 MILLIGRAM(S): at 21:01

## 2022-10-05 RX ADMIN — Medication 4 UNIT(S): at 22:00

## 2022-10-05 RX ADMIN — Medication 1 TABLET(S): at 17:41

## 2022-10-05 RX ADMIN — Medication 1 TABLET(S): at 06:32

## 2022-10-05 NOTE — CHART NOTE - NSCHARTNOTEFT_GEN_A_CORE
PO remains optimum per EMR documentation. 9/27-10/5 intake: %.     Not at risk, will re-assess in 10 days.

## 2022-10-05 NOTE — PROGRESS NOTE ADULT - ASSESSMENT
47 yo M with PMH of Intellectual Disability and DM not on any medications presented with foot wounds.    Covid  PNA  Onychomycosis  Dental caries  DM2 with hyperglycemia due to dietary non-compliance  Intellectual disability/autism      Covid PNA- completed RDV. Resolved & stable   Nail bed wound and onychomycosis. Status post Aseptic debridement and curettage of all fungal and ingrowing nails bilateral   Podiatry- no surgical intervention is advised. outpt podiatry f/u  DM type 2 - uncontrolled. A1C 8.9.Insulin glargine and lispro . Not compliant with diabetic diet. can change to metformin at discharge-- check hba1c am  Dental Caries-completed clinda course. Will need outpt f/u for possible multiple extractions  Lac-hydrin added for skin dryness &  pruritis , topical triamcinolone. permethrin given.  Insomnia- prn melatonin  Neuropsychiatry evaluation done for IQ  PPD placed 9/27. negative    dc planning in progress  off xarelto as DC planning soon.

## 2022-10-05 NOTE — PROGRESS NOTE ADULT - SUBJECTIVE AND OBJECTIVE BOX
Location: [address] ([facility] F44[facility])  Patient Name: CARIN WATSON  Age: 47y  Gender: Male    Past Medical and Surgical History:  DM (diabetes mellitus)    Intellectual disability        Social History:  Social History:      Allergies:  penicillin (Unknown)      Patient is a 47y old Male who presents with a chief complaint of DFU (04 Oct 2022 13:45)    Primary diagnosis of DM FOOT ULCERS; HYPERGLYCEMIA        Progress Note  This morning patient was seen and examined at bedside.    Today is hospital day 188d.  Mr. XXX is doing fine.   He reports he had a good night sleep. His XXX (chief complaint) has improved. His appetite is adequate, and he denies nausea or vomiting. He denies any abdominal pain, diarrhea, or constipation. His last bowel movement was soft and was on 03/08/2020 in AM. He is ambulating and denies any shortness of breath, chest pain, palpitations, or light headedness. He is voiding freely/ via kennedy catheter and denies urinary symptoms (dysuria, urgency, frequency, intermittence).    Hospital Course      Overnight events      Vital Signs in the last 24 hours   Vitals Summary T(C): 36.1 (10-05-22 @ 13:35), Max: 37.5 (10-05-22 @ 01:00)  HR: 123 (10-05-22 @ 13:35) (105 - 123)  BP: 127/64 (10-05-22 @ 13:35) (122/89 - 145/75)  RR: 18 (10-05-22 @ 13:35) (18 - 18)  SpO2: --  Vent Data   Intake/ Output       Physical Exam  GEN: No acute distress  LUNGS: Clear to auscultation bilaterally   HEART: S1/S2 present. RRR.   ABD/ GI: Soft, non-tender, non-distended. Bowel sounds present  EXT: NC/NC/NE/2+PP/LOJA  NEURO: AAOX3      Investigations   Laboratory Workup  - CBC:    - Chemistry:        - Coagulation Studies:    - ABG:    - Cardiac Markers:        Microbiological Workup        Radiological Workup  *      Current Medications  Standing Medications  ammonium lactate 12% Lotion 1 Application(s) Topical two times a day  chlorhexidine 4% Liquid 1 Application(s) Topical <User Schedule>  clotrimazole 1% Cream 1 Application(s) Topical two times a day  famotidine    Tablet 20 milliGRAM(s) Oral daily  insulin glargine Injectable (LANTUS) 25 Unit(s) SubCutaneous every morning  insulin lispro (ADMELOG) corrective regimen sliding scale   SubCutaneous three times a day before meals  insulin lispro Injectable (ADMELOG) 8 Unit(s) SubCutaneous three times a day before meals  lactobacillus acidophilus 1 Tablet(s) Oral two times a day  melatonin 3 milliGRAM(s) Oral at bedtime  triamcinolone 0.1% Oral Paste 1 Application(s) Topical two times a day  vitamin A &amp; D Ointment 1 Application(s) Topical daily    PRN Medications  acetaminophen     Tablet .. 650 milliGRAM(s) Oral every 6 hours PRN Temp greater or equal to 38C (100.4F), Mild Pain (1 - 3)    Singles Doses Administered  acetaminophen     Tablet .. 650 milliGRAM(s) Oral once  acetaminophen     Tablet .. 650 milliGRAM(s) Oral once  acetaminophen     Tablet .. 650 milliGRAM(s) Oral once  benzonatate 100 milliGRAM(s) Oral once  clindamycin IVPB 600 milliGRAM(s) IV Intermittent once  ibuprofen  Tablet. 400 milliGRAM(s) Oral once  insulin glargine Injectable (LANTUS) 10 Unit(s) SubCutaneous once  insulin lispro Injectable (ADMELOG). 5 Unit(s) SubCutaneous once  insulin lispro Injectable (ADMELOG). 5 Unit(s) SubCutaneous once  insulin lispro Injectable (ADMELOG). 8 Unit(s) SubCutaneous once  insulin lispro Injectable (ADMELOG). 3 Unit(s) SubCutaneous once  insulin lispro Injectable (ADMELOG). 3 Unit(s) SubCutaneous once  insulin lispro Injectable (ADMELOG). 4 Unit(s) SubCutaneous once  insulin lispro Injectable (ADMELOG). 12 Unit(s) SubCutaneous once  insulin lispro Injectable (ADMELOG). 15 Unit(s) SubCutaneous once  insulin lispro Injectable (ADMELOG). 8 Unit(s) SubCutaneous once  insulin lispro Injectable (ADMELOG). 8 Unit(s) SubCutaneous once  insulin lispro Injectable (ADMELOG). 4 Unit(s) SubCutaneous once  insulin lispro Injectable (ADMELOG). 4 Unit(s) SubCutaneous once  insulin lispro Injectable (ADMELOG). 4 Unit(s) SubCutaneous once  insulin lispro Injectable (ADMELOG). 4 Unit(s) SubCutaneous once  insulin lispro Injectable (ADMELOG). 5 Unit(s) SubCutaneous once  magnesium oxide 400 milliGRAM(s) Oral three times a day with meals  magnesium oxide 400 milliGRAM(s) Oral once  magnesium sulfate  IVPB 2 Gram(s) IV Intermittent once  magnesium sulfate  IVPB 2 Gram(s) IV Intermittent once  ondansetron Injectable 4 milliGRAM(s) IV Push once  permethrin 1% Rinse 1 Application(s) Topical once  PPD  5 Tuberculin Unit(s) Injectable 5 Unit(s) IntraDermal once  PPD  5 Tuberculin Unit(s) Injectable 5 Unit(s) IntraDermal once  remdesivir  IVPB   IV Intermittent   remdesivir  IVPB 100 milliGRAM(s) IV Intermittent every 24 hours  remdesivir  IVPB 200 milliGRAM(s) IV Intermittent every 24 hours  sodium chloride 0.9% Bolus 1000 milliLiter(s) IV Bolus once  triamcinolone 0.1% Oral Paste 1 Application(s) Topical two times a day  zolpidem 5 milliGRAM(s) Oral at bedtime PRN  zolpidem 5 milliGRAM(s) Oral at bedtime PRN

## 2022-10-05 NOTE — PROGRESS NOTE ADULT - ASSESSMENT
45 yo M with PMH of Intellectual Disability and DM not on any medications presented with foot wounds.    Covid  PNA  Onychomycosis  Dental caries  DM2 with hyperglycemia due to dietary non-compliance  Intellectual disability/autism      Covid PNA- completed RDV. Resolved & stable   Nail bed wound and onychomycosis. Status post Aseptic debridement and curettage of all fungal and ingrowing nails bilateral   Podiatry- no surgical intervention is advised. outpt podiatry f/u  DM type 2 - uncontrolled. A1C 8.9.Insulin glargine and lispro. Endocrine consult to evaluate for pO regimen on discharge as compliance with insulin may be an issue on discharge  Dental Caries-completed clinda course. Will need outpt f/u for possible multiple extractions  Lac-hydrin added for skin dryness &  pruritis , topical triamcinolone. permethrin given.  Insomnia- prn melatonin  Neuropsychiatry evaluation done for IQ  PPD placed 9/27. negative    [place]: pending placement to group home

## 2022-10-05 NOTE — CHART NOTE - NSCHARTNOTESELECT_GEN_ALL_CORE
Nutrition Note
Nutrition/Event Note
Event Note
Event Note
Nutrition/Event Note
RD Follow Up/Event Note

## 2022-10-06 LAB
ALBUMIN SERPL ELPH-MCNC: 4.2 G/DL — SIGNIFICANT CHANGE UP (ref 3.5–5.2)
ALP SERPL-CCNC: 116 U/L — HIGH (ref 30–115)
ALT FLD-CCNC: 27 U/L — SIGNIFICANT CHANGE UP (ref 0–41)
ANION GAP SERPL CALC-SCNC: 13 MMOL/L — SIGNIFICANT CHANGE UP (ref 7–14)
AST SERPL-CCNC: 37 U/L — SIGNIFICANT CHANGE UP (ref 0–41)
BASOPHILS # BLD AUTO: 0.05 K/UL — SIGNIFICANT CHANGE UP (ref 0–0.2)
BASOPHILS NFR BLD AUTO: 0.5 % — SIGNIFICANT CHANGE UP (ref 0–1)
BILIRUB SERPL-MCNC: 0.7 MG/DL — SIGNIFICANT CHANGE UP (ref 0.2–1.2)
BUN SERPL-MCNC: 9 MG/DL — LOW (ref 10–20)
CALCIUM SERPL-MCNC: 8.4 MG/DL — SIGNIFICANT CHANGE UP (ref 8.4–10.5)
CHLORIDE SERPL-SCNC: 98 MMOL/L — SIGNIFICANT CHANGE UP (ref 98–110)
CO2 SERPL-SCNC: 23 MMOL/L — SIGNIFICANT CHANGE UP (ref 17–32)
CREAT SERPL-MCNC: 0.7 MG/DL — SIGNIFICANT CHANGE UP (ref 0.7–1.5)
EGFR: 114 ML/MIN/1.73M2 — SIGNIFICANT CHANGE UP
EOSINOPHIL # BLD AUTO: 0.36 K/UL — SIGNIFICANT CHANGE UP (ref 0–0.7)
EOSINOPHIL NFR BLD AUTO: 3.5 % — SIGNIFICANT CHANGE UP (ref 0–8)
GLUCOSE BLDC GLUCOMTR-MCNC: 123 MG/DL — HIGH (ref 70–99)
GLUCOSE BLDC GLUCOMTR-MCNC: 138 MG/DL — HIGH (ref 70–99)
GLUCOSE BLDC GLUCOMTR-MCNC: 170 MG/DL — HIGH (ref 70–99)
GLUCOSE BLDC GLUCOMTR-MCNC: 82 MG/DL — SIGNIFICANT CHANGE UP (ref 70–99)
GLUCOSE SERPL-MCNC: 114 MG/DL — HIGH (ref 70–99)
HCT VFR BLD CALC: 39.7 % — LOW (ref 42–52)
HGB BLD-MCNC: 13.8 G/DL — LOW (ref 14–18)
IMM GRANULOCYTES NFR BLD AUTO: 0.3 % — SIGNIFICANT CHANGE UP (ref 0.1–0.3)
LYMPHOCYTES # BLD AUTO: 1.86 K/UL — SIGNIFICANT CHANGE UP (ref 1.2–3.4)
LYMPHOCYTES # BLD AUTO: 17.9 % — LOW (ref 20.5–51.1)
MCHC RBC-ENTMCNC: 32 PG — HIGH (ref 27–31)
MCHC RBC-ENTMCNC: 34.8 G/DL — SIGNIFICANT CHANGE UP (ref 32–37)
MCV RBC AUTO: 92.1 FL — SIGNIFICANT CHANGE UP (ref 80–94)
MONOCYTES # BLD AUTO: 1.22 K/UL — HIGH (ref 0.1–0.6)
MONOCYTES NFR BLD AUTO: 11.7 % — HIGH (ref 1.7–9.3)
NEUTROPHILS # BLD AUTO: 6.89 K/UL — HIGH (ref 1.4–6.5)
NEUTROPHILS NFR BLD AUTO: 66.1 % — SIGNIFICANT CHANGE UP (ref 42.2–75.2)
NRBC # BLD: 0 /100 WBCS — SIGNIFICANT CHANGE UP (ref 0–0)
PLATELET # BLD AUTO: 336 K/UL — SIGNIFICANT CHANGE UP (ref 130–400)
POTASSIUM SERPL-MCNC: 3.5 MMOL/L — SIGNIFICANT CHANGE UP (ref 3.5–5)
POTASSIUM SERPL-SCNC: 3.5 MMOL/L — SIGNIFICANT CHANGE UP (ref 3.5–5)
PROT SERPL-MCNC: 6.9 G/DL — SIGNIFICANT CHANGE UP (ref 6–8)
RBC # BLD: 4.31 M/UL — LOW (ref 4.7–6.1)
RBC # FLD: 11.5 % — SIGNIFICANT CHANGE UP (ref 11.5–14.5)
SODIUM SERPL-SCNC: 134 MMOL/L — LOW (ref 135–146)
WBC # BLD: 10.41 K/UL — SIGNIFICANT CHANGE UP (ref 4.8–10.8)
WBC # FLD AUTO: 10.41 K/UL — SIGNIFICANT CHANGE UP (ref 4.8–10.8)

## 2022-10-06 PROCEDURE — 71045 X-RAY EXAM CHEST 1 VIEW: CPT | Mod: 26

## 2022-10-06 PROCEDURE — 99231 SBSQ HOSP IP/OBS SF/LOW 25: CPT

## 2022-10-06 RX ORDER — ERYTHROMYCIN BASE 5 MG/GRAM
1 OINTMENT (GRAM) OPHTHALMIC (EYE)
Refills: 0 | Status: DISCONTINUED | OUTPATIENT
Start: 2022-10-06 | End: 2022-10-11

## 2022-10-06 RX ADMIN — Medication 100 MILLIGRAM(S): at 17:54

## 2022-10-06 RX ADMIN — Medication 1 APPLICATION(S): at 05:03

## 2022-10-06 RX ADMIN — Medication 100 MILLIGRAM(S): at 21:20

## 2022-10-06 RX ADMIN — Medication 3 MILLIGRAM(S): at 21:20

## 2022-10-06 RX ADMIN — FAMOTIDINE 20 MILLIGRAM(S): 10 INJECTION INTRAVENOUS at 12:02

## 2022-10-06 RX ADMIN — Medication 1 APPLICATION(S): at 17:54

## 2022-10-06 RX ADMIN — Medication 8 UNIT(S): at 08:11

## 2022-10-06 RX ADMIN — Medication 1 TABLET(S): at 05:03

## 2022-10-06 RX ADMIN — Medication 1 APPLICATION(S): at 12:02

## 2022-10-06 RX ADMIN — Medication 2: at 12:06

## 2022-10-06 RX ADMIN — Medication 8 UNIT(S): at 12:06

## 2022-10-06 RX ADMIN — Medication 1 APPLICATION(S): at 17:55

## 2022-10-06 RX ADMIN — Medication 1 TABLET(S): at 17:54

## 2022-10-06 RX ADMIN — INSULIN GLARGINE 25 UNIT(S): 100 INJECTION, SOLUTION SUBCUTANEOUS at 08:11

## 2022-10-06 NOTE — PROGRESS NOTE ADULT - ASSESSMENT
45 yo M with PMH of Intellectual Disability and DM not on any medications presented with foot wounds.    Covid  PNA  Onychomycosis  Dental caries  DM2 with hyperglycemia due to dietary non-compliance  Intellectual disability/autism    f/u repeat a1c    Covid PNA- completed RDV. Resolved & stable   Nail bed wound and onychomycosis. Status post Aseptic debridement and curettage of all fungal and ingrowing nails bilateral   Podiatry- no surgical intervention is advised. outpt podiatry f/u  DM type 2 - uncontrolled. A1C 8.9.Insulin glargine and lispro . Not compliant with diabetic diet. can change to metformin at discharge-- check hba1c am  Dental Caries-completed clinda course. Will need outpt f/u for possible multiple extractions  Lac-hydrin added for skin dryness &  pruritis , topical triamcinolone. permethrin given.  Insomnia- prn melatonin  Neuropsychiatry evaluation done for IQ  PPD placed 9/27. negative    dc planning in progress  off xarelto as DC planning soon.

## 2022-10-06 NOTE — PROGRESS NOTE ADULT - SUBJECTIVE AND OBJECTIVE BOX
SUBJECTIVE:    Patient is a 47y old Male who presents with a chief complaint of DFU (05 Oct 2022 15:34)    Currently admitted to medicine with the primary diagnosis of:    Today is hospital day 189d.     Overnight Events:     patient coughing ordered rvp     PAST MEDICAL & SURGICAL HISTORY  DM (diabetes mellitus)    Intellectual disability      ALLERGIES:  penicillin (Unknown)    MEDICATIONS:  STANDING MEDICATIONS  ammonium lactate 12% Lotion 1 Application(s) Topical two times a day  benzonatate 100 milliGRAM(s) Oral every 8 hours  chlorhexidine 4% Liquid 1 Application(s) Topical <User Schedule>  clotrimazole 1% Cream 1 Application(s) Topical two times a day  famotidine    Tablet 20 milliGRAM(s) Oral daily  insulin glargine Injectable (LANTUS) 25 Unit(s) SubCutaneous every morning  insulin lispro (ADMELOG) corrective regimen sliding scale   SubCutaneous three times a day before meals  insulin lispro Injectable (ADMELOG) 8 Unit(s) SubCutaneous three times a day before meals  lactobacillus acidophilus 1 Tablet(s) Oral two times a day  melatonin 3 milliGRAM(s) Oral at bedtime  triamcinolone 0.1% Oral Paste 1 Application(s) Topical two times a day  vitamin A &amp; D Ointment 1 Application(s) Topical daily    PRN MEDICATIONS  acetaminophen     Tablet .. 650 milliGRAM(s) Oral every 6 hours PRN    VITALS:   ICU Vital Signs Last 24 Hrs  T(C): 37.7 (06 Oct 2022 04:56), Max: 37.7 (06 Oct 2022 04:56)  T(F): 99.8 (06 Oct 2022 04:56), Max: 99.8 (06 Oct 2022 04:56)  HR: 107 (06 Oct 2022 04:56) (107 - 123)  BP: 135/61 (06 Oct 2022 04:56) (127/64 - 135/61)  BP(mean): --  ABP: --  ABP(mean): --  RR: 18 (06 Oct 2022 04:56) (18 - 18)  SpO2: 95% (06 Oct 2022 04:56) (95% - 95%)    O2 Parameters below as of 06 Oct 2022 04:56  Patient On (Oxygen Delivery Method): room air            LABS:                          RADIOLOGY:  < from: [facility] Duplex Lower Ext Vein Scan, Bilat (08.13.22 @ 20:35) >  No evidence of deep venous thrombosis in either lower extremity.    < end of copied text >    PHYSICAL EXAM:  GEN: sitting in bed, coughing   LUNGS: clear bl  HEART: s1 s2  ABD: nontender nondistended  EXT: no lower extremity edema  NEURO: aoX3

## 2022-10-06 NOTE — PROVIDER CONTACT NOTE (OTHER) - ACTION/TREATMENT ORDERED:
MD Kimball aware. Will continue to monitor.
MD Kimball aware. Will continue to monitor.
MD to placed an order for insulin lispro 8 units.
md huerta aware and notified, stat does of amelog 12 units ordered and given, will continue to monitor
will come and assess patient

## 2022-10-06 NOTE — PROGRESS NOTE ADULT - ASSESSMENT
45 yo M with PMH of Intellectual Disability and DM not on any medications presented with foot wounds.    Covid  PNA  Onychomycosis  Dental caries  DM2 with hyperglycemia due to dietary non-compliance  Intellectual disability/autism      Covid PNA- completed RDV. Resolved & stable   Nail bed wound and onychomycosis. Status post Aseptic debridement and curettage of all fungal and ingrowing nails bilateral   Podiatry- no surgical intervention is advised. outpt podiatry f/u  DM type 2 - uncontrolled. A1C 8.9.Insulin glargine and lispro. Endocrine consult to evaluate for pO regimen on discharge as compliance with insulin may be an issue on discharge  Dental Caries-completed clinda course. Will need outpt f/u for possible multiple extractions  Lac-hydrin added for skin dryness &  pruritis , topical triamcinolone. permethrin given.  Insomnia- prn melatonin  Neuropsychiatry evaluation done for IQ  PPD placed 9/27. negative    [place]: pending placement to group home/shelter   45 yo M with PMH of Intellectual Disability and DM not on any medications presented with foot wounds.    Covid  PNA-treated  Onychomycosis  Dental caries  DM2 with hyperglycemia due to dietary non-compliance  Intellectual disability/autism    Low grade pyrexia and S tachycardia- recently having same problems was ruled out for PE with TTE normal. Covid negative 10/05. CXR no infiltrates  Get CMP. CBC  Possible conjunctivitis- added opthalmic erythromycin  Covid PNA-  previously completed RDV. Resolved & stable   Nail bed wound and onychomycosis. Status post Aseptic debridement and curettage of all fungal and ingrowing nails bilateral   Podiatry- no surgical intervention is advised. outpt podiatry f/u  DM type 2 - uncontrolled. A1C 8.9.Insulin glargine and lispro. Endocrine consult to evaluate for pO regimen on discharge as compliance with insulin may be an issue on discharge  Dental Caries-completed clinda course. Will need outpt f/u for possible multiple extractions  Lac-hydrin added for skin dryness &  pruritis , topical triamcinolone. permethrin given.  Insomnia- prn melatonin  Neuropsychiatry evaluation done for IQ  PPD placed 9/27. negative    [place]nadoff: pending placement to group home/shelter   45 yo M with PMH of Intellectual Disability and DM not on any medications presented with foot wounds.    Covid  PNA-treated  Onychomycosis  Dental caries  DM2 with hyperglycemia due to dietary non-compliance  Intellectual disability/autism    Low grade pyrexia and S tachycardia- recently having same problems was ruled out for PE with TTE normal. Covid negative 10/05. CXR no infiltrates  Get CMP. CBC, RVP  Possible conjunctivitis- added opthalmic erythromycin  Covid PNA-  previously completed RDV. Resolved & stable   Nail bed wound and onychomycosis. Status post Aseptic debridement and curettage of all fungal and ingrowing nails bilateral   Podiatry- no surgical intervention is advised. outpt podiatry f/u  DM type 2 - uncontrolled. A1C 8.9.Insulin glargine and lispro. Endocrine consult to evaluate for pO regimen on discharge as compliance with insulin may be an issue on discharge  Dental Caries-completed clinda course. Will need outpt f/u for possible multiple extractions  Lac-hydrin added for skin dryness &  pruritis , topical triamcinolone. permethrin given.  Insomnia- prn melatonin  Neuropsychiatry evaluation done for IQ  PPD placed 9/27. negative    [place]nadoff: pending placement to group home/shelter

## 2022-10-06 NOTE — PROVIDER CONTACT NOTE (OTHER) - SITUATION
Pt w/ frequent cough and congestion noted.
routine vital signs taken
Patient fs 335. Patient denies any s.s of hyperglycemia. MD made aware, order placed 5 units lispro. Patient fs rechecked x2 285 and 230 sugar trending down.
Patient noted with elevated BP/HR-  BPs as followed- 150/90 hr 110, 119/61 hr 105, 149/88 hr 110. Patient denies any chest pain or discomfort at this time. Patient resting in bed.
PTs P: 121
pt blood glucose 581
Pt. bsg level 404. Pt. does not get scheduled Lantus or metformin until AM. No order present for coverage at night.

## 2022-10-07 LAB
A1C WITH ESTIMATED AVERAGE GLUCOSE RESULT: 7.5 % — HIGH (ref 4–5.6)
ESTIMATED AVERAGE GLUCOSE: 169 MG/DL — HIGH (ref 68–114)
GLUCOSE BLDC GLUCOMTR-MCNC: 101 MG/DL — HIGH (ref 70–99)
GLUCOSE BLDC GLUCOMTR-MCNC: 116 MG/DL — HIGH (ref 70–99)
GLUCOSE BLDC GLUCOMTR-MCNC: 244 MG/DL — HIGH (ref 70–99)
GLUCOSE BLDC GLUCOMTR-MCNC: 271 MG/DL — HIGH (ref 70–99)

## 2022-10-07 PROCEDURE — 99231 SBSQ HOSP IP/OBS SF/LOW 25: CPT

## 2022-10-07 RX ADMIN — INSULIN GLARGINE 25 UNIT(S): 100 INJECTION, SOLUTION SUBCUTANEOUS at 12:37

## 2022-10-07 RX ADMIN — Medication 1 APPLICATION(S): at 18:54

## 2022-10-07 RX ADMIN — Medication 100 MILLIGRAM(S): at 15:24

## 2022-10-07 RX ADMIN — FAMOTIDINE 20 MILLIGRAM(S): 10 INJECTION INTRAVENOUS at 12:37

## 2022-10-07 RX ADMIN — Medication 1 APPLICATION(S): at 05:36

## 2022-10-07 RX ADMIN — Medication 6: at 12:37

## 2022-10-07 RX ADMIN — Medication 8 UNIT(S): at 17:26

## 2022-10-07 RX ADMIN — Medication 1 APPLICATION(S): at 12:41

## 2022-10-07 RX ADMIN — Medication 8 UNIT(S): at 12:39

## 2022-10-07 RX ADMIN — Medication 3 MILLIGRAM(S): at 21:36

## 2022-10-07 RX ADMIN — Medication 1 TABLET(S): at 05:34

## 2022-10-07 RX ADMIN — Medication 1 APPLICATION(S): at 05:35

## 2022-10-07 RX ADMIN — CHLORHEXIDINE GLUCONATE 1 APPLICATION(S): 213 SOLUTION TOPICAL at 05:41

## 2022-10-07 RX ADMIN — Medication 100 MILLIGRAM(S): at 05:34

## 2022-10-07 RX ADMIN — Medication 100 MILLIGRAM(S): at 21:36

## 2022-10-07 RX ADMIN — Medication 1 APPLICATION(S): at 13:29

## 2022-10-07 RX ADMIN — Medication 1 APPLICATION(S): at 18:58

## 2022-10-07 RX ADMIN — Medication 1 TABLET(S): at 18:58

## 2022-10-07 NOTE — CONSULT NOTE ADULT - SUBJECTIVE AND OBJECTIVE BOX
Request for consultation:  Requested by:    Patient is a 47y old  Male who presents with a chief complaint of DFU (07 Oct 2022 12:20)    HPI:  45 yo M with PMHx of Intellectual Disability, DM not on any medications presents with foot wounds. Pt has very poor foot hygiene and per the sister, has been persistently scratching an open wound on his L second toe, which worsened and became more red, had drainage, malodor. He saw a physician at [facility] and was told to come to the ED for evaluation. Pt is poor historian. Does endorse abdominal pain for a few days, cannot give much more description. Per the sister, pt did not seem to have any recent fevers, however is also not a very good historian and does not seem to have good health literacy. States that her and her mom decided to stop giving pt Metformin a while ago, are treating his diabetes by not giving him any sugary foods.  In the ED, T 97.8, /87, , RR 16, SpO2 99% on RA. Lab work unrevealing.  (31 Mar 2022 22:36)      FAMILY HISTORY:    PAST MEDICAL & SURGICAL HISTORY:  DM (diabetes mellitus)      Intellectual disability        Birth History:  Developmental History:    Review of Systems:  CONSTITUTIONAL: No weakness, fevers or chills  EYES/ENT: No visual changes;  No vertigo or throat pain   NECK: No pain or stiffness  RESPIRATORY: + cough, no wheezing, hemoptysis; No shortness of breath  CARDIOVASCULAR: No chest pain or palpitations  GASTROINTESTINAL: No abdominal or epigastric pain. No nausea, vomiting, or hematemesis; No diarrhea or constipation. No melena or hematochezia.  GENITOURINARY: No dysuria, frequency or hematuria  NEUROLOGICAL: No numbness or weakness  All other review of systems is negative unless indicated above.    Allergies    penicillin (Unknown)    Intolerances      MEDICATIONS  (STANDING):  ammonium lactate 12% Lotion 1 Application(s) Topical two times a day  benzonatate 100 milliGRAM(s) Oral every 8 hours  chlorhexidine 4% Liquid 1 Application(s) Topical <User Schedule>  clotrimazole 1% Cream 1 Application(s) Topical two times a day  erythromycin   Ointment 1 Application(s) Both EYES four times a day  famotidine    Tablet 20 milliGRAM(s) Oral daily  insulin glargine Injectable (LANTUS) 25 Unit(s) SubCutaneous every morning  insulin lispro (ADMELOG) corrective regimen sliding scale   SubCutaneous three times a day before meals  insulin lispro Injectable (ADMELOG) 8 Unit(s) SubCutaneous three times a day before meals  lactobacillus acidophilus 1 Tablet(s) Oral two times a day  melatonin 3 milliGRAM(s) Oral at bedtime  triamcinolone 0.1% Oral Paste 1 Application(s) Topical two times a day  vitamin A &amp; D Ointment 1 Application(s) Topical daily    MEDICATIONS  (PRN):  acetaminophen     Tablet .. 650 milliGRAM(s) Oral every 6 hours PRN Temp greater or equal to 38C (100.4F), Mild Pain (1 - 3)      Vital Signs Last 24 Hrs  T(C): 37.4 (07 Oct 2022 05:00), Max: 37.6 (06 Oct 2022 13:17)  T(F): 99.4 (07 Oct 2022 05:00), Max: 99.6 (06 Oct 2022 13:17)  HR: 112 (07 Oct 2022 05:00) (100 - 116)  BP: 127/69 (07 Oct 2022 05:00) (124/79 - 137/83)  BP(mean): --  RR: 18 (07 Oct 2022 05:00) (18 - 18)  SpO2: --          PHYSICAL EXAM  General: NAD, lying in bed comfortably  Mental status: Awake and alert, cooperative  Head:  Atraumatic  Eyes: EOMI, conjunctiva and sclera clear  ENT: Moist mucous membranes  Neck: No JVD  Chest/lung: Clear to auscultation bilaterally; No wheezing or crackles  Heart: Regular rate and rhythm; No murmurs, rubs, or gallops  Abdomen: Bowel sounds present; Soft, Nontender, Nondistended  Extremities:  2+ Peripheral Pulses. No edema or cyanosis    LABS                          13.8   10.41 )-----------( 336      ( 06 Oct 2022 19:06 )             39.7     10-06    134<L>  |  98  |  9<L>  ----------------------------<  114<H>  3.5   |  23  |  0.7    Ca    8.4      06 Oct 2022 19:06    TPro  6.9  /  Alb  4.2  /  TBili  0.7  /  DBili  x   /  AST  37  /  ALT  27  /  AlkPhos  116<H>  10-06        CAPILLARY BLOOD GLUCOSE      POCT Blood Glucose.: 271 mg/dL (07 Oct 2022 11:46)  POCT Blood Glucose.: 101 mg/dL (07 Oct 2022 07:57)  POCT Blood Glucose.: 138 mg/dL (06 Oct 2022 22:52)  POCT Blood Glucose.: 82 mg/dL (06 Oct 2022 17:08)

## 2022-10-07 NOTE — PROGRESS NOTE ADULT - SUBJECTIVE AND OBJECTIVE BOX
CARIN WATSON 47y Male  MRN#: [id number]   Hospital Day: 190d    SUBJECTIVE  Patient is a 47y old Male who presents with a chief complaint of DFU (06 Oct 2022 13:51)  Currently admitted to medicine with the primary diagnosis of Onychomycosis      INTERVAL HPI AND OVERNIGHT EVENTS:  Patient was examined and seen at bedside. This morning he is resting comfortably in bed and reports no issues or overnight events.    REVIEW OF SYMPTOMS:  Denies all.     OBJECTIVE  PAST MEDICAL & SURGICAL HISTORY  DM (diabetes mellitus)    Intellectual disability      ALLERGIES:  penicillin (Unknown)    MEDICATIONS:  STANDING MEDICATIONS  ammonium lactate 12% Lotion 1 Application(s) Topical two times a day  benzonatate 100 milliGRAM(s) Oral every 8 hours  chlorhexidine 4% Liquid 1 Application(s) Topical <User Schedule>  clotrimazole 1% Cream 1 Application(s) Topical two times a day  erythromycin   Ointment 1 Application(s) Both EYES four times a day  famotidine    Tablet 20 milliGRAM(s) Oral daily  insulin glargine Injectable (LANTUS) 25 Unit(s) SubCutaneous every morning  insulin lispro (ADMELOG) corrective regimen sliding scale   SubCutaneous three times a day before meals  insulin lispro Injectable (ADMELOG) 8 Unit(s) SubCutaneous three times a day before meals  lactobacillus acidophilus 1 Tablet(s) Oral two times a day  melatonin 3 milliGRAM(s) Oral at bedtime  triamcinolone 0.1% Oral Paste 1 Application(s) Topical two times a day  vitamin A &amp; D Ointment 1 Application(s) Topical daily    PRN MEDICATIONS  acetaminophen     Tablet .. 650 milliGRAM(s) Oral every 6 hours PRN      VITAL SIGNS: Last 24 Hours  T(C): 37.4 (07 Oct 2022 05:00), Max: 37.6 (06 Oct 2022 13:17)  T(F): 99.4 (07 Oct 2022 05:00), Max: 99.6 (06 Oct 2022 13:17)  HR: 112 (07 Oct 2022 05:00) (100 - 116)  BP: 127/69 (07 Oct 2022 05:00) (124/79 - 137/83)  BP(mean): --  RR: 18 (07 Oct 2022 05:00) (18 - 18)  SpO2: --    LABS:                        13.8   10.41 )-----------( 336      ( 06 Oct 2022 19:06 )             39.7     10-06    134<L>  |  98  |  9<L>  ----------------------------<  114<H>  3.5   |  23  |  0.7    Ca    8.4      06 Oct 2022 19:06    TPro  6.9  /  Alb  4.2  /  TBili  0.7  /  DBili  x   /  AST  37  /  ALT  27  /  AlkPhos  116<H>  10-06      PHYSICAL EXAM:  CONSTITUTIONAL: No acute distress, well-developed, well-groomed, AAOx3  HEAD: Atraumatic, normocephalic  EYES: EOM intact, PERRLA, conjunctiva and sclera clear  PULMONARY: Clear to auscultation bilaterally; no wheezes, rales, or rhonchi  CARDIOVASCULAR: Regular rate and rhythm; no murmurs, rubs, or gallops  GASTROINTESTINAL: Soft, non-tender, non-distended; bowel sounds present  MUSCULOSKELETAL: 2+ peripheral pulses; no clubbing, no cyanosis, no edema  SKIN: No rashes or lesions; warm and dry    ASSESSMENT & PLAN:  45 yo M with PMH of Intellectual Disability and DM not on any medications presented with foot wounds.    #Covid  PNA  - completed RDV. Resolved & stable     #Onychomycosis  - Status post Aseptic debridement and curettage of all fungal and ingrowing nails bilateral   - Podiatry- no surgical intervention is advised. outpt podiatry f/u    #Dental caries  - completed clindamycin course  - Will need outpatient f/u for possible multiple extractions    #DM2 with hyperglycemia due to dietary non-compliance  - uncontrolled.   - A1C 8.9  - Insulin glargine and lispro   - Not compliant with diabetic diet   - can change to metformin at discharge  - f/U A1c    #Intellectual disability/autism  - dc planning in progress  - off xarelto as DC planning soon.

## 2022-10-07 NOTE — CONSULT NOTE ADULT - ASSESSMENT
47 M with mild Intellectual Disability and DM admitted to the hospital since March 2022 initially with DFU, now acute issues have resolved and endocrinology consulted for DM outpatient recommendation as insulin compliance may be an issue. Plan is for patient to be discharged to a group home/shelter.    A1c 7.5 (was 8.9 on admission)  Was on metformin 1000 BID per outpatient med rec    May benefit from addition of a statin for primary ASCVD risk reduction given age >40 and DM      INCOMPLETE NOTE 47 M with mild Intellectual Disability and DM admitted to the hospital since March 2022 initially with DFU, now acute issues have resolved and endocrinology consulted for DM outpatient recommendation as insulin compliance may be an issue. Plan is for patient to be discharged to a group home/shelter.    A1c 7.5 (was 8.9 on admission)  Was on metformin 1000 BID per outpatient med rec    Recommendations:  Can discharge on metformin 1000 BID and pioglitazone 30 mg daily  May benefit from addition of a statin for ASCVD risk reduction given age >40 and DM

## 2022-10-07 NOTE — CONSULT NOTE ADULT - CONSULT REQUESTED DATE/TIME
13-Sep-2022 08:52
01-Apr-2022 09:54
01-Apr-2022 13:12
07-Oct-2022 13:15
11-Aug-2022 19:08
18-Jul-2022 06:35
06-Jun-2022 11:21
09-Jun-2022 12:02

## 2022-10-07 NOTE — CONSULT NOTE ADULT - ATTENDING COMMENTS
Nail debridement performed to patients tolerance  no local wound care needed    My note supersedes the above resident note in case of discrepancy.    Thank you for allowing me to participate in your patient care    Sivakumar Linder DPM, FACFAS
Gross nail dystrophy secondary to onychomycosis  poor hygiene   left hallux wound over nail bed. +tender  xrays negative for osteomyelitis  continue antibiotics. f/u ID recs  no podiatric intervention at this time  patient can follow up with his private podiatrist (Dr. Tucker) for continued foot care and onychomycosis      Thank you for allowing me to participate in your patient care.     My notes supersedes the above resident documentation in case of discrepancy. Correction for their notes is in my notes.     Sivakumar Bronson DPM
Nail debridement performed to patients tolerance  No open ulcerations b/l feet  no local wound care needed      My note supersedes the above resident note in case of discrepancy.    Thank you for allowing me to participate in your patient care    Sivakumar Linder DPM, FACFAS
47 M with mild Intellectual Disability and DM admitted to the hospital since March 2022 initially with DFU, had a complicated course of hospitalization, and now is awaiting disposition.  Patient states she was previously on metformin in the hospital he has been on a basal bolus regimen.  Most recent A1c has been 7.5    #Type 2 diabetes mellitus  -A1c has improved to 7.5, concern about patient using insulin on discharge  -Can continue current inpatient regimen as patient's blood glucose readings are in good control  -On discharge can be resumed on metformin with dose titration [id number] mg twice daily, can also start patient on pioglitazone 30 mg daily  -Will need outpatient follow-up may need addition of additional oral agents
Counseled patient about diagnostic testing and treatment plan. All questions answered.
Seen / examined and above reviewed.    No cardiac history.  Comorbidities as above.  Notably, intellectual disability.     Admitted with diabetic foot infection.  Prolonged hospitalization complicated by COVID 19 infection (resolved).    EKG done for apparent abdominal pain.  Reviewed: SR with IRBBB and S1QT3 (new).  ECHO pending.    Patient appears comfortable without labored breathing.  Denies CP / dyspnea.  Mild intermittent tachycardia.  BP stable.   Room air / SaO2 not recently checked.    Not on DVT PPX at present.    RECOMMEND:  Given DVT / PE risk factors (prolonged hospitalization / COVID), recommend CTA to rule-out PE.  If delayed, start empiric Lovenonx.    Discussed with Dr. Phan.

## 2022-10-07 NOTE — CONSULT NOTE ADULT - CONSULT REASON
DM
DFU
Dental pain
excoriations all over and pruiritus   Not getting better with emoliants
Abnormal EKG
DFU, bilateral fungal infection
Nails getting long
Fungal toe infection

## 2022-10-07 NOTE — PROGRESS NOTE ADULT - ASSESSMENT
47 yo M with PMH of Intellectual Disability and DM not on any medications presented with foot wounds.    Covid  PNA-treated  Onychomycosis  Dental caries  DM2 with hyperglycemia due to dietary non-compliance  Intellectual disability/autism    Low grade pyrexia and S tachycardia- recently having same problems was ruled out for PE with TTE normal. Covid negative 10/05. CXR no infiltrates  Repeat CBC, CMP unremarkble,  RVP pending  Possible conjunctivitis- added opthalmic erythromycin  Covid PNA-  previously completed RDV. Resolved & stable   Nail bed wound and onychomycosis. Status post Aseptic debridement and curettage of all fungal and ingrowing nails bilateral   Podiatry- no surgical intervention is advised. outpt podiatry f/u  DM type 2 - uncontrolled. A1C 8.9.Insulin glargine and lispro. Endocrine consult to evaluate for pO regimen on discharge as compliance with insulin may be an issue on discharge  Dental Caries-completed clinda course. Will need outpt f/u for possible multiple extractions  Lac-hydrin added for skin dryness &  pruritis , topical triamcinolone. permethrin given.  Insomnia- prn melatonin  Neuropsychiatry evaluation done for IQ  PPD placed 9/27. negative    [place]: pending placement to group home/shelter

## 2022-10-08 LAB
GLUCOSE BLDC GLUCOMTR-MCNC: 129 MG/DL — HIGH (ref 70–99)
GLUCOSE BLDC GLUCOMTR-MCNC: 171 MG/DL — HIGH (ref 70–99)
GLUCOSE BLDC GLUCOMTR-MCNC: 177 MG/DL — HIGH (ref 70–99)
GLUCOSE BLDC GLUCOMTR-MCNC: 200 MG/DL — HIGH (ref 70–99)
GLUCOSE BLDC GLUCOMTR-MCNC: 81 MG/DL — SIGNIFICANT CHANGE UP (ref 70–99)
RAPID RVP RESULT: SIGNIFICANT CHANGE UP
SARS-COV-2 RNA SPEC QL NAA+PROBE: SIGNIFICANT CHANGE UP

## 2022-10-08 PROCEDURE — 99231 SBSQ HOSP IP/OBS SF/LOW 25: CPT

## 2022-10-08 RX ORDER — INSULIN LISPRO 100/ML
3 VIAL (ML) SUBCUTANEOUS ONCE
Refills: 0 | Status: COMPLETED | OUTPATIENT
Start: 2022-10-08 | End: 2022-10-08

## 2022-10-08 RX ADMIN — Medication 1 APPLICATION(S): at 06:07

## 2022-10-08 RX ADMIN — Medication 8 UNIT(S): at 08:20

## 2022-10-08 RX ADMIN — Medication 3 MILLIGRAM(S): at 21:54

## 2022-10-08 RX ADMIN — Medication 1 APPLICATION(S): at 17:15

## 2022-10-08 RX ADMIN — Medication 2: at 08:20

## 2022-10-08 RX ADMIN — Medication 100 MILLIGRAM(S): at 06:06

## 2022-10-08 RX ADMIN — Medication 8 UNIT(S): at 17:14

## 2022-10-08 RX ADMIN — Medication 1 TABLET(S): at 17:15

## 2022-10-08 RX ADMIN — FAMOTIDINE 20 MILLIGRAM(S): 10 INJECTION INTRAVENOUS at 11:57

## 2022-10-08 RX ADMIN — Medication 1 APPLICATION(S): at 11:57

## 2022-10-08 RX ADMIN — Medication 100 MILLIGRAM(S): at 21:55

## 2022-10-08 RX ADMIN — Medication 2: at 11:56

## 2022-10-08 RX ADMIN — INSULIN GLARGINE 25 UNIT(S): 100 INJECTION, SOLUTION SUBCUTANEOUS at 08:21

## 2022-10-08 RX ADMIN — Medication 1 TABLET(S): at 06:06

## 2022-10-08 RX ADMIN — Medication 1 APPLICATION(S): at 11:58

## 2022-10-08 RX ADMIN — Medication 8 UNIT(S): at 11:56

## 2022-10-08 RX ADMIN — Medication 3 UNIT(S): at 00:51

## 2022-10-08 RX ADMIN — Medication 1 APPLICATION(S): at 00:03

## 2022-10-09 LAB
GLUCOSE BLDC GLUCOMTR-MCNC: 105 MG/DL — HIGH (ref 70–99)
GLUCOSE BLDC GLUCOMTR-MCNC: 130 MG/DL — HIGH (ref 70–99)
GLUCOSE BLDC GLUCOMTR-MCNC: 154 MG/DL — HIGH (ref 70–99)
GLUCOSE BLDC GLUCOMTR-MCNC: 94 MG/DL — SIGNIFICANT CHANGE UP (ref 70–99)

## 2022-10-09 PROCEDURE — 99231 SBSQ HOSP IP/OBS SF/LOW 25: CPT

## 2022-10-09 RX ADMIN — Medication 1 APPLICATION(S): at 06:04

## 2022-10-09 RX ADMIN — Medication 3 MILLIGRAM(S): at 21:23

## 2022-10-09 RX ADMIN — Medication 100 MILLIGRAM(S): at 06:04

## 2022-10-09 RX ADMIN — Medication 1 TABLET(S): at 06:03

## 2022-10-09 RX ADMIN — Medication 8 UNIT(S): at 17:35

## 2022-10-09 RX ADMIN — Medication 1 APPLICATION(S): at 17:33

## 2022-10-09 RX ADMIN — Medication 2: at 12:23

## 2022-10-09 RX ADMIN — FAMOTIDINE 20 MILLIGRAM(S): 10 INJECTION INTRAVENOUS at 12:22

## 2022-10-09 RX ADMIN — Medication 8 UNIT(S): at 12:23

## 2022-10-09 RX ADMIN — Medication 1 APPLICATION(S): at 12:23

## 2022-10-09 RX ADMIN — Medication 1 TABLET(S): at 17:33

## 2022-10-09 RX ADMIN — INSULIN GLARGINE 25 UNIT(S): 100 INJECTION, SOLUTION SUBCUTANEOUS at 08:15

## 2022-10-09 RX ADMIN — Medication 1 APPLICATION(S): at 23:33

## 2022-10-09 RX ADMIN — Medication 1 APPLICATION(S): at 00:15

## 2022-10-09 RX ADMIN — Medication 8 UNIT(S): at 08:15

## 2022-10-09 RX ADMIN — Medication 1 APPLICATION(S): at 17:34

## 2022-10-09 NOTE — PROGRESS NOTE ADULT - ASSESSMENT
47 yo M with PMH of Intellectual Disability and DM not on any medications presented with foot wounds.    #Covid  PNA  - completed RDV. Resolved & stable     #Onychomycosis  - Status post Aseptic debridement and curettage of all fungal and ingrowing nails bilateral   - Podiatry- no surgical intervention is advised. outpt podiatry f/u    #Dental caries  - completed clindamycin course  - Will need outpatient f/u for possible multiple extractions    #DM2 with hyperglycemia due to dietary non-compliance  - uncontrolled.   - A1C 8.9  - Insulin glargine and lispro   - Not compliant with diabetic diet   - can change to metformin at discharge  - f/U A1c    #Intellectual disability/autism  - dc planning in progress  - off xarelto as DC planning soon.    Low grade pyrexia and S tachycardia- recently having same problems was ruled out for PE with TTE normal. Covid negative 10/05. CXR no infiltrates  Repeat CBC, CMP unremarkble,  RVP not detected  Possible conjunctivitis- added opthalmic erythromycin

## 2022-10-09 NOTE — PROGRESS NOTE ADULT - SUBJECTIVE AND OBJECTIVE BOX
SUBJECTIVE:    Patient is a 47y old Male who presents with a chief complaint of DFU (09 Oct 2022 03:25)    Currently admitted to medicine with the primary diagnosis of:    Today is hospital day 192d.     Overnight Events:     no overnight events    PAST MEDICAL & SURGICAL HISTORY  DM (diabetes mellitus)    Intellectual disability        ALLERGIES:  penicillin (Unknown)    MEDICATIONS:  STANDING MEDICATIONS  ammonium lactate 12% Lotion 1 Application(s) Topical two times a day  benzonatate 100 milliGRAM(s) Oral every 8 hours  chlorhexidine 4% Liquid 1 Application(s) Topical <User Schedule>  clotrimazole 1% Cream 1 Application(s) Topical two times a day  erythromycin   Ointment 1 Application(s) Both EYES four times a day  famotidine    Tablet 20 milliGRAM(s) Oral daily  insulin glargine Injectable (LANTUS) 25 Unit(s) SubCutaneous every morning  insulin lispro (ADMELOG) corrective regimen sliding scale   SubCutaneous three times a day before meals  insulin lispro Injectable (ADMELOG) 8 Unit(s) SubCutaneous three times a day before meals  lactobacillus acidophilus 1 Tablet(s) Oral two times a day  melatonin 3 milliGRAM(s) Oral at bedtime  triamcinolone 0.1% Oral Paste 1 Application(s) Topical two times a day  vitamin A &amp; D Ointment 1 Application(s) Topical daily    PRN MEDICATIONS  acetaminophen     Tablet .. 650 milliGRAM(s) Oral every 6 hours PRN    VITALS:   ICU Vital Signs Last 24 Hrs  T(C): 36.3 (09 Oct 2022 05:00), Max: 36.7 (08 Oct 2022 12:24)  T(F): 97.4 (09 Oct 2022 05:00), Max: 98 (08 Oct 2022 12:24)  HR: 78 (09 Oct 2022 05:00) (78 - 112)  BP: 147/67 (09 Oct 2022 05:00) (136/62 - 147/67)  BP(mean): --  ABP: --  ABP(mean): --  RR: 18 (09 Oct 2022 05:00) (18 - 18)  SpO2: 96% (08 Oct 2022 12:24) (96% - 96%)    O2 Parameters below as of 08 Oct 2022 12:24  Patient On (Oxygen Delivery Method): room air            LABS:                          RADIOLOGY:  < from: Xray Chest 1 View- PORTABLE-Routine (Xray Chest 1 View- PORTABLE-Routine .) (10.06.22 @ 09:37) >  No radiographic evidence of acute cardiopulmonary disease.    Distended bowel loops, which appear similar to prior radiograph on   5/21/2022.    < end of copied text >    PHYSICAL EXAM:  GEN: in room  LUNGS: no resp distress  HEART: s1 s2  ABD: nontender   EXT: no lower extremity edema  NEURO: alert responsive SUBJECTIVE:    Patient is a 47y old Male who presents with a chief complaint of DFU (09 Oct 2022 03:25)    Currently admitted to medicine with the primary diagnosis of:    Today is hospital day 192d.     Overnight Events:     no overnight events    PAST MEDICAL & SURGICAL HISTORY  DM (diabetes mellitus)    Intellectual disability        ALLERGIES:  penicillin (Unknown)    MEDICATIONS:  STANDING MEDICATIONS  ammonium lactate 12% Lotion 1 Application(s) Topical two times a day  benzonatate 100 milliGRAM(s) Oral every 8 hours  chlorhexidine 4% Liquid 1 Application(s) Topical <User Schedule>  clotrimazole 1% Cream 1 Application(s) Topical two times a day  erythromycin   Ointment 1 Application(s) Both EYES four times a day  famotidine    Tablet 20 milliGRAM(s) Oral daily  insulin glargine Injectable (LANTUS) 25 Unit(s) SubCutaneous every morning  insulin lispro (ADMELOG) corrective regimen sliding scale   SubCutaneous three times a day before meals  insulin lispro Injectable (ADMELOG) 8 Unit(s) SubCutaneous three times a day before meals  lactobacillus acidophilus 1 Tablet(s) Oral two times a day  melatonin 3 milliGRAM(s) Oral at bedtime  triamcinolone 0.1% Oral Paste 1 Application(s) Topical two times a day  vitamin A &amp; D Ointment 1 Application(s) Topical daily    PRN MEDICATIONS  acetaminophen     Tablet .. 650 milliGRAM(s) Oral every 6 hours PRN    VITALS:   ICU Vital Signs Last 24 Hrs  T(C): 36.3 (09 Oct 2022 05:00), Max: 36.7 (08 Oct 2022 12:24)  T(F): 97.4 (09 Oct 2022 05:00), Max: 98 (08 Oct 2022 12:24)  HR: 78 (09 Oct 2022 05:00) (78 - 112)  BP: 147/67 (09 Oct 2022 05:00) (136/62 - 147/67)  BP(mean): --  ABP: --  ABP(mean): --  RR: 18 (09 Oct 2022 05:00) (18 - 18)  SpO2: 96% (08 Oct 2022 12:24) (96% - 96%)    O2 Parameters below as of 08 Oct 2022 12:24  Patient On (Oxygen Delivery Method): room air            LABS:                          RADIOLOGY:  < from: Xray Chest 1 View- PORTABLE-Routine (Xray Chest 1 View- PORTABLE-Routine .) (10.06.22 @ 09:37) >  No radiographic evidence of acute cardiopulmonary disease.    Distended bowel loops, which appear similar to prior radiograph on   5/21/2022.    < end of copied text >    PHYSICAL EXAM:  GEN: in room  LUNGS: no resp distress  HEART: s1 s2  ABD: stomachache    EXT: no lower extremity edema  NEURO: alert responsive

## 2022-10-10 LAB
GLUCOSE BLDC GLUCOMTR-MCNC: 156 MG/DL — HIGH (ref 70–99)
GLUCOSE BLDC GLUCOMTR-MCNC: 197 MG/DL — HIGH (ref 70–99)
GLUCOSE BLDC GLUCOMTR-MCNC: 245 MG/DL — HIGH (ref 70–99)
GLUCOSE BLDC GLUCOMTR-MCNC: 75 MG/DL — SIGNIFICANT CHANGE UP (ref 70–99)

## 2022-10-10 PROCEDURE — 99231 SBSQ HOSP IP/OBS SF/LOW 25: CPT

## 2022-10-10 RX ORDER — ATORVASTATIN CALCIUM 80 MG/1
40 TABLET, FILM COATED ORAL AT BEDTIME
Refills: 0 | Status: DISCONTINUED | OUTPATIENT
Start: 2022-10-10 | End: 2022-10-11

## 2022-10-10 RX ORDER — LORATADINE 10 MG/1
1 TABLET ORAL
Qty: 0 | Refills: 0 | DISCHARGE
Start: 2022-10-10 | End: 2022-11-10

## 2022-10-10 RX ORDER — LACTOBACILLUS ACIDOPHILUS 100MM CELL
1 CAPSULE ORAL
Qty: 0 | Refills: 0 | DISCHARGE
Start: 2022-10-10

## 2022-10-10 RX ORDER — LORATADINE 10 MG/1
10 TABLET ORAL DAILY
Refills: 0 | Status: DISCONTINUED | OUTPATIENT
Start: 2022-10-10 | End: 2022-10-11

## 2022-10-10 RX ORDER — ENOXAPARIN SODIUM 100 MG/ML
40 INJECTION SUBCUTANEOUS EVERY 24 HOURS
Refills: 0 | Status: DISCONTINUED | OUTPATIENT
Start: 2022-10-10 | End: 2022-10-11

## 2022-10-10 RX ORDER — ATORVASTATIN CALCIUM 80 MG/1
1 TABLET, FILM COATED ORAL
Qty: 0 | Refills: 0 | DISCHARGE
Start: 2022-10-10

## 2022-10-10 RX ORDER — LANOLIN ALCOHOL/MO/W.PET/CERES
1 CREAM (GRAM) TOPICAL
Qty: 0 | Refills: 0 | DISCHARGE
Start: 2022-10-10

## 2022-10-10 RX ORDER — FAMOTIDINE 10 MG/ML
1 INJECTION INTRAVENOUS
Qty: 0 | Refills: 0 | DISCHARGE
Start: 2022-10-10

## 2022-10-10 RX ADMIN — Medication 1 TABLET(S): at 19:00

## 2022-10-10 RX ADMIN — Medication 1 APPLICATION(S): at 13:23

## 2022-10-10 RX ADMIN — ATORVASTATIN CALCIUM 40 MILLIGRAM(S): 80 TABLET, FILM COATED ORAL at 20:54

## 2022-10-10 RX ADMIN — Medication 4: at 13:46

## 2022-10-10 RX ADMIN — Medication 1 APPLICATION(S): at 06:42

## 2022-10-10 RX ADMIN — ENOXAPARIN SODIUM 40 MILLIGRAM(S): 100 INJECTION SUBCUTANEOUS at 10:23

## 2022-10-10 RX ADMIN — Medication 2: at 16:52

## 2022-10-10 RX ADMIN — Medication 1 APPLICATION(S): at 18:59

## 2022-10-10 RX ADMIN — Medication 1 TABLET(S): at 06:42

## 2022-10-10 RX ADMIN — LORATADINE 10 MILLIGRAM(S): 10 TABLET ORAL at 10:22

## 2022-10-10 RX ADMIN — Medication 8 UNIT(S): at 13:47

## 2022-10-10 RX ADMIN — Medication 8 UNIT(S): at 16:53

## 2022-10-10 RX ADMIN — CHLORHEXIDINE GLUCONATE 1 APPLICATION(S): 213 SOLUTION TOPICAL at 13:47

## 2022-10-10 RX ADMIN — INSULIN GLARGINE 25 UNIT(S): 100 INJECTION, SOLUTION SUBCUTANEOUS at 09:08

## 2022-10-10 RX ADMIN — Medication 1 APPLICATION(S): at 13:21

## 2022-10-10 RX ADMIN — FAMOTIDINE 20 MILLIGRAM(S): 10 INJECTION INTRAVENOUS at 13:23

## 2022-10-10 RX ADMIN — Medication 3 MILLIGRAM(S): at 20:54

## 2022-10-10 RX ADMIN — Medication 1 APPLICATION(S): at 20:54

## 2022-10-10 NOTE — PROGRESS NOTE ADULT - SUBJECTIVE AND OBJECTIVE BOX
CARIN WATSON 47y Male  MRN#: [id number]   CODE STATUS:    Hospital Day: 193d    SUBJECTIVE  No acute events overnight. Eyes appear watery; patient reports no discomfort with eyes however does scratch around them.                                              ----------------------------------------------------------  OBJECTIVE  PAST MEDICAL & SURGICAL HISTORY  DM (diabetes mellitus)    Intellectual disability                                              -----------------------------------------------------------  ALLERGIES:  penicillin (Unknown)                                            ------------------------------------------------------------    HOME MEDICATIONS  Home Medications:  acetaminophen 325 mg oral tablet: 2 tab(s) orally every 6 hours, As needed, Temp greater or equal to 38C (100.4F), Mild Pain (1 - 3) (29 Sep 2022 14:13)  rivaroxaban 2.5 mg oral tablet: 2 tab(s) orally once a day (29 Sep 2022 14:13)  vitamin A and D topical ointment: 1 application topically once a day (12 Apr 2022 11:40)                           MEDICATIONS:  STANDING MEDICATIONS  ammonium lactate 12% Lotion 1 Application(s) Topical two times a day  atorvastatin 40 milliGRAM(s) Oral at bedtime  benzonatate 100 milliGRAM(s) Oral every 8 hours  chlorhexidine 4% Liquid 1 Application(s) Topical <User Schedule>  clotrimazole 1% Cream 1 Application(s) Topical two times a day  enoxaparin Injectable 40 milliGRAM(s) SubCutaneous every 24 hours  erythromycin   Ointment 1 Application(s) Both EYES four times a day  famotidine    Tablet 20 milliGRAM(s) Oral daily  insulin glargine Injectable (LANTUS) 25 Unit(s) SubCutaneous every morning  insulin lispro (ADMELOG) corrective regimen sliding scale   SubCutaneous three times a day before meals  insulin lispro Injectable (ADMELOG) 8 Unit(s) SubCutaneous three times a day before meals  lactobacillus acidophilus 1 Tablet(s) Oral two times a day  loratadine 10 milliGRAM(s) Oral daily  melatonin 3 milliGRAM(s) Oral at bedtime  triamcinolone 0.1% Oral Paste 1 Application(s) Topical two times a day  vitamin A &amp; D Ointment 1 Application(s) Topical daily    PRN MEDICATIONS  acetaminophen     Tablet .. 650 milliGRAM(s) Oral every 6 hours PRN                                            ------------------------------------------------------------  VITAL SIGNS: Last 24 Hours  T(C): 36.7 (10 Oct 2022 05:00), Max: 36.7 (10 Oct 2022 05:00)  T(F): 98 (10 Oct 2022 05:00), Max: 98 (10 Oct 2022 05:00)  HR: 110 (09 Oct 2022 21:00) (91 - 110)  BP: 135/86 (10 Oct 2022 05:00) (135/86 - 172/80)  BP(mean): --  RR: 18 (10 Oct 2022 05:00) (18 - 18)  SpO2: 95% (09 Oct 2022 21:00) (95% - 95%)      10-09-22 @ 07:01  -  10-10-22 @ 07:00  --------------------------------------------------------  IN: 360 mL / OUT: 0 mL / NET: 360 mL                                             --------------------------------------------------------------  LABS:                              PHYSICAL EXAM:  General: well-appearing in NAD. AAO x 3.  HEENT: Normocephalic, nontraumatic. watery eyes, no erythema or edema surrounding eyes  LUNGS: Clear to auscultation b/l.   HEART: RRR. S1/S2 present.  ABDOMEN: Nontender, nondistended. + bowel sounds.   EXT: Pulses palpable. Nonedematous.   SKIN: Warm, dry.

## 2022-10-10 NOTE — PROGRESS NOTE ADULT - ASSESSMENT
48 yo M with PMH of Intellectual Disability and DM not on any medications presented with foot wounds. Hospital stay complicated Hospital acquired COVID s/p RDV, poorly controlled DM. Patient has been in the hospital >100 days, guardianship established and now pending placement after IQ tests done by neuropsych. Patient still has itchy skin excoriations all over his body.    #Possible conjunctivitis  - added opthalmic erythromycin  - patient scratches eyes occasionally advised pt to limit doing so    # Excoriations likely 2/2 skin dryness- improving  - lesions still itchy but improving on lac-hydrin and topical triamcinolone.  - Derm consut 9/16- cw triamcinolone 0.1% topical cream, moisturizing cream, and only way to improve excoriations is pt must avoid scratching  - Patient given on 9/15 permethrin 1% full body wash.     # sinus Tachycardia 8/12 and intermittently - resolved  - No CP/SOB  - EKG 8/11 with new incomplete RBBB and s1q3t3  - DVT and PE were ruled out on 8/13 : CTA + LE duplex negative  - TTE on 8/14: 1. EF of 66 % Grade I diastolic dysfunction, no valvular dysfunction    # Covid PNA 5/21- resolved  - S/p RDV  - Covid negative 10/05. CXR no infiltrates    # bilateral Onychomycosis - stable  - S/p debridement and curettage by podiatry 7/18  - F/u outpatient with Dr. Bronson  - will consider podiatry follow up for finger and toe nails    # Dental caries - stable  - S/p course of clindamycin, outpatient f/u for teeth extractions    # DM2- stable  - LAntus, lispro and SS  - A1c 7.5 in 8/11. well controlled.    # Intellectual disability/autism- stable  - Neuropsych consulted Dr Deborah Weller ([facility]) and her assistant Cheryl ([phone])  - Psychological test with IQ and [place] scores done for placement completed 9/14 and showed:  Based on full-scale IQ (FSIQ = 60) and his inability to perform IADLs and certain ADLS on his own, he meets criteria for Mild Intellectual Disability (F70.) Following that patient does not have a support system nor family to care for him, it is crucial for his safety, that he should be placed in a supervised living environment. Ideally, this placement should be long-standing, as his disability is permanent and he will continue to require support and supervision for the remainder of his life.     # Misc  - DVT Prophylaxis: rivaroxaban  - GI Prophylaxis: famotidine Tablet 20 milliGRAM(s) Oral daily  - Diet: Diet, DASH/TLC  - Activity: Activity - Ambulate as Tolerated  - Code Status: Full.  - pending: placement at long term living facility. Labs weekly. Medically cleared for discharge. PPD negative. QuantiFeron negative.    48 yo M with PMH of Intellectual Disability and DM not on any medications presented with foot wounds. Hospital stay complicated Hospital acquired COVID s/p RDV, poorly controlled DM. Patient has been in the hospital >100 days, guardianship established and now pending placement after IQ tests done by neuropsych. Patient still has itchy skin excoriations all over his body.    #Possible conjunctivitis vs allergy  - added opthalmic erythromycin  - patient scratches around eyes occasionally advised pt to limit doing so  - Claritin 10mg PO daily for 1 month starting 10/10/22    # Excoriations likely 2/2 skin dryness- improving  - lesions still itchy but improving on lac-hydrin and topical triamcinolone.  - Derm consut 9/16- cw triamcinolone 0.1% topical cream, moisturizing cream, and only way to improve excoriations is pt must avoid scratching  - Patient given on 9/15 permethrin 1% full body wash.     # sinus Tachycardia 8/12 and intermittently - resolved  - No CP/SOB  - EKG 8/11 with new incomplete RBBB and s1q3t3  - DVT and PE were ruled out on 8/13 : CTA + LE duplex negative  - TTE on 8/14: 1. EF of 66 % Grade I diastolic dysfunction, no valvular dysfunction    # Covid PNA 5/21- resolved  - S/p RDV  - Covid negative 10/05. CXR no infiltrates    # bilateral Onychomycosis - stable  - S/p debridement and curettage by podiatry 7/18  - F/u outpatient with Dr. Bronson  - will consider podiatry follow up for finger and toe nails    # Dental caries - stable  - S/p course of clindamycin, outpatient f/u for teeth extractions    # DM2- stable  - LAntus, lispro and SS  - A1c 7.5 in 8/11. well controlled.    # Intellectual disability/autism- stable  - Neuropsych consulted Dr Deborah Weller ([facility]) and her assistant Cheryl ([phone])  - Psychological test with IQ and [place] scores done for placement completed 9/14 and showed:  Based on full-scale IQ (FSIQ = 60) and his inability to perform IADLs and certain ADLS on his own, he meets criteria for Mild Intellectual Disability (F70.) Following that patient does not have a support system nor family to care for him, it is crucial for his safety, that he should be placed in a supervised living environment. Ideally, this placement should be long-standing, as his disability is permanent and he will continue to require support and supervision for the remainder of his life.     # Misc  - DVT Prophylaxis: rivaroxaban  - GI Prophylaxis: famotidine Tablet 20 milliGRAM(s) Oral daily  - Diet: Diet, DASH/TLC  - Activity: Activity - Ambulate as Tolerated  - Code Status: Full.  - pending: placement at long term living facility. Labs weekly. Medically cleared for discharge. PPD negative. QuantiFeron negative.

## 2022-10-11 VITALS
HEART RATE: 100 BPM | DIASTOLIC BLOOD PRESSURE: 70 MMHG | TEMPERATURE: 97 F | SYSTOLIC BLOOD PRESSURE: 153 MMHG | RESPIRATION RATE: 18 BRPM

## 2022-10-11 LAB
GLUCOSE BLDC GLUCOMTR-MCNC: 126 MG/DL — HIGH (ref 70–99)
SARS-COV-2 RNA SPEC QL NAA+PROBE: SIGNIFICANT CHANGE UP

## 2022-10-11 PROCEDURE — 99239 HOSP IP/OBS DSCHRG MGMT >30: CPT

## 2022-10-11 RX ORDER — ATORVASTATIN CALCIUM 80 MG/1
1 TABLET, FILM COATED ORAL
Qty: 30 | Refills: 0
Start: 2022-10-11 | End: 2022-11-09

## 2022-10-11 RX ORDER — LACTOBACILLUS ACIDOPHILUS 100MM CELL
1 CAPSULE ORAL
Qty: 30 | Refills: 0
Start: 2022-10-11 | End: 2022-11-09

## 2022-10-11 RX ORDER — LORATADINE 10 MG/1
1 TABLET ORAL
Qty: 30 | Refills: 0
Start: 2022-10-11 | End: 2022-11-09

## 2022-10-11 RX ORDER — METFORMIN HYDROCHLORIDE 850 MG/1
1 TABLET ORAL
Qty: 60 | Refills: 0
Start: 2022-10-11 | End: 2022-11-09

## 2022-10-11 RX ORDER — FAMOTIDINE 10 MG/ML
1 INJECTION INTRAVENOUS
Qty: 30 | Refills: 0
Start: 2022-10-11 | End: 2022-11-09

## 2022-10-11 RX ORDER — RIVAROXABAN 15 MG-20MG
2 KIT ORAL
Qty: 60 | Refills: 0
Start: 2022-10-11 | End: 2022-11-09

## 2022-10-11 RX ADMIN — Medication 1 APPLICATION(S): at 05:05

## 2022-10-11 RX ADMIN — Medication 1 APPLICATION(S): at 04:40

## 2022-10-11 RX ADMIN — Medication 1 TABLET(S): at 05:05

## 2022-10-11 RX ADMIN — Medication 8 UNIT(S): at 08:12

## 2022-10-11 RX ADMIN — CHLORHEXIDINE GLUCONATE 1 APPLICATION(S): 213 SOLUTION TOPICAL at 04:40

## 2022-10-11 RX ADMIN — INSULIN GLARGINE 25 UNIT(S): 100 INJECTION, SOLUTION SUBCUTANEOUS at 08:10

## 2022-10-11 NOTE — PROGRESS NOTE ADULT - PROVIDER SPECIALTY LIST ADULT
Hospitalist
Internal Medicine
Hospitalist
Internal Medicine
Neuropsychology
Dental
Hospitalist
Internal Medicine
Hospitalist
Internal Medicine
Neuropsychology

## 2022-10-11 NOTE — PROGRESS NOTE ADULT - ASSESSMENT
45 yo M with PMH of Intellectual Disability and DM not on any medications presented with foot wounds.    Covid  PNA-treated  Onychomycosis  Dental caries  DM2 with hyperglycemia due to dietary non-compliance  Intellectual disability/autism    Low grade pyrexia and S tachycardia- recently having same problems was ruled out for PE with TTE normal. Covid negative 10/05. CXR no infiltrates  Repeat CBC, CMP unremarkble,  RVP pending  Possible conjunctivitis- added opthalmic erythromycin  Covid PNA-  previously completed RDV. Resolved & stable   Nail bed wound and onychomycosis. Status post Aseptic debridement and curettage of all fungal and ingrowing nails bilateral   Podiatry- no surgical intervention is advised. outpt podiatry f/u  DM type 2 - uncontrolled. A1C 8.9.Insulin glargine and lispro. Endocrine consult to evaluate for pO regimen on discharge as compliance with insulin may be an issue on discharge  Dental Caries-completed clinda course. Will need outpt f/u for possible multiple extractions  Lac-hydrin added for skin dryness &  pruritis , topical triamcinolone. permethrin given.  Insomnia- prn melatonin  Neuropsychiatry evaluation done for IQ  PPD placed 9/27. negative    Handoff  Stable for discharge today

## 2022-10-11 NOTE — PROGRESS NOTE ADULT - REASON FOR ADMISSION
[facility]U
Diabetic Foot ulcers
[facility]

## 2022-10-17 DIAGNOSIS — Z63.8 OTHER SPECIFIED PROBLEMS RELATED TO PRIMARY SUPPORT GROUP: ICD-10-CM

## 2022-10-17 DIAGNOSIS — B35.1 TINEA UNGUIUM: ICD-10-CM

## 2022-10-17 DIAGNOSIS — F70 MILD INTELLECTUAL DISABILITIES: ICD-10-CM

## 2022-10-17 DIAGNOSIS — U07.1 COVID-19: ICD-10-CM

## 2022-10-17 DIAGNOSIS — F84.0 AUTISTIC DISORDER: ICD-10-CM

## 2022-10-17 DIAGNOSIS — Z75.1 PERSON AWAITING ADMISSION TO ADEQUATE FACILITY ELSEWHERE: ICD-10-CM

## 2022-10-17 DIAGNOSIS — L97.529 NON-PRESSURE CHRONIC ULCER OF OTHER PART OF LEFT FOOT WITH UNSPECIFIED SEVERITY: ICD-10-CM

## 2022-10-17 DIAGNOSIS — L85.3 XEROSIS CUTIS: ICD-10-CM

## 2022-10-17 DIAGNOSIS — Z88.0 ALLERGY STATUS TO PENICILLIN: ICD-10-CM

## 2022-10-17 DIAGNOSIS — K02.9 DENTAL CARIES, UNSPECIFIED: ICD-10-CM

## 2022-10-17 DIAGNOSIS — E11.65 TYPE 2 DIABETES MELLITUS WITH HYPERGLYCEMIA: ICD-10-CM

## 2022-10-17 DIAGNOSIS — G47.00 INSOMNIA, UNSPECIFIED: ICD-10-CM

## 2022-10-17 DIAGNOSIS — J12.82 PNEUMONIA DUE TO CORONAVIRUS DISEASE 2019: ICD-10-CM

## 2022-10-17 DIAGNOSIS — Z91.119 PATIENT'S NONCOMPLIANCE WITH DIETARY REGIMEN DUE TO UNSPECIFIED REASON: ICD-10-CM

## 2022-10-17 DIAGNOSIS — E11.621 TYPE 2 DIABETES MELLITUS WITH FOOT ULCER: ICD-10-CM

## 2022-10-17 DIAGNOSIS — H10.9 UNSPECIFIED CONJUNCTIVITIS: ICD-10-CM

## 2022-10-17 DIAGNOSIS — Z91.A4 CAREGIVER'S OTHER NONCOMPLIANCE WITH PATIENT'S MEDICATION REGIMEN: ICD-10-CM

## 2022-10-17 DIAGNOSIS — R00.0 TACHYCARDIA, UNSPECIFIED: ICD-10-CM

## 2022-10-17 DIAGNOSIS — E83.42 HYPOMAGNESEMIA: ICD-10-CM

## 2022-10-17 SDOH — SOCIAL STABILITY - SOCIAL INSECURITY: OTHER SPECIFIED PROBLEMS RELATED TO PRIMARY SUPPORT GROUP: Z63.8

## 2022-11-04 NOTE — PROGRESS NOTE ADULT - ASSESSMENT
Letter mailed to Masood re: unable to reach him by phone and encouraged him to call clinic back with phone number provided.      It's been over a year since last appt in clinic and would need to get established with new provider as Tejal is not in office any longer.  Could schedule NEW PT appt and bring paperwork to appt.    Letter mailed today.  Message closed.   47 yo M with PMH of Intellectual Disability and DM not on any medications presented with foot wounds.    Covid  PNA  Onychomycosis  Dental caries  DM2 with hyperglycemia due to dietary non-compliance  Intellectual disability/autism      Intermittent tachycardia without hypoxia. Recently ruled out for PE and DVT, TTE unremarkable  Covid PNA- completed RDV. Resolved  Nail bed wound and onychomycosis. Status post Aseptic debridement and curettage of all fungal and ingrowing nails 1-5 bilateral with sterile nail nipper 06/07  Podiatry- no surgical intervention is advised. outpt podiatry f/u  DM type 2 - uncontrolled. A1C 8.9.Insulin glargine and lispro . Not compliant with diabetic diet.   Dental Caries-completed clinda course. Will need outpt f/u for possible multiple extractions  Lac-hydrin added for skin dryness &  pruritis ,added topical triamcinolone  Insomnia- prn melatonin  IQ evaluation pending. Neuropsychiatry eval done.  permethrin treatment done today-- as patient is itching a lot-- dermatology eval pending

## 2023-04-27 NOTE — PROGRESS NOTE ADULT - ASSESSMENT
· Per wife and his BP records, his BP has been uncontrolled  · Compliance has not been ideal until she started managing his medications    · Continue with metoprolol 100mg daily, losartan 50mg daily, hydralazine 25mg TID and amlodipine 10mg reed - monitor Bps and adjust PRN  · Holding HCTZ due to hyponatremia  · Low Na diet 45 yo M with PMHx of Intellectual Disability, DM not on any medications presents with foot wounds. Pt has very poor foot hygiene and per the sister, has been persistently scratching an open wound on his L second toe, which worsened and became more red, had drainage, malodor. He saw a physician at [facility] and was told to come to the ED for eval.    #Onychomycosis   - antifungal, s/p ABX po (likely due to severe infection)  - seen by ID and podiatry eval  - Completed clindamycin  - PO terbinafine not suggested by ID or podiatry  - Per podiatry, no surgical intervention is advised, clotrimazole cream adequate     #uncontrolled diabetes mellitus   - hba1c is 8.9- patient does not take any medication at home  - started on metformin- dose increased to 1000 mg BID.    # Autism   - patient is high functioning but unable to take care of basics.    #Misc   - DVT prophylaxis: Lovenox   - GI prophylaxis: PPI   - Diet: CC  - Activity: IAT   - Disposition: CM following. Legal will obtain guardianship.  eval is done.

## 2023-10-08 NOTE — ED ADULT NURSE REASSESSMENT NOTE - NS ED NURSE REASSESS COMMENT FT1
Patient came to the ER complaining of fall. Patient is confused and repeats he wants water. Two to four times a month

## 2024-02-29 NOTE — BH CONSULTATION LIAISON ASSESSMENT NOTE - NSBHMSEREMMEM_PSY_A_CORE
Render In Strict Bullet Format?: No Initiate Treatment: Efudex 5% Detail Level: Zone Initiate Treatment: Mupiricin 2% Normal

## 2024-10-24 NOTE — PROGRESS NOTE ADULT - ASSESSMENT
47 yo M with PMH of Intellectual Disability and DM not on any medications presented with foot wounds.    Covid 19 positive   Onychomycosis  Dental caries  DM2  Intellectual disability/autism        Covid PNA- completed RDV  Nail bed wound and onychomycosis. Status post Aseptic debridement and curettage of all fungal and ingrowing nails 1-5 bilateral with sterile nail nipper 06/07  Podiatry- no surgical intervention is advised. outpt podiatry f/u  DM type 2 - uncontrolled. A1C 8.9. Continue  metformin 1000mg BID and insulin glargine and lispro-- sugar is still high-- noncompliant with diet  Dental Caries-completed clinda for 7 days. Will need outpt f/u for possible multiple extractions  Insomnia- prn melatonin    Handoff:  Pending legal issue and guardianship,  medically stable for discharge .             Hospitalist Hospitalist Infectious Disease Infectious Disease Infectious Disease Urology Hospitalist Hospitalist Hospitalist Hospitalist Infectious Disease Urology Urology Heme/Onc Heme/Onc Hospitalist Hospitalist Hospitalist Infectious Disease Infectious Disease Pulmonology Thoracic Surgery Urology Urology Hospitalist Hospitalist Infectious Disease Infectious Disease Urology Urology Urology Surgery Heme/Onc